# Patient Record
Sex: FEMALE | Race: WHITE | Employment: OTHER | URBAN - METROPOLITAN AREA
[De-identification: names, ages, dates, MRNs, and addresses within clinical notes are randomized per-mention and may not be internally consistent; named-entity substitution may affect disease eponyms.]

---

## 2020-05-12 ENCOUNTER — APPOINTMENT (OUTPATIENT)
Dept: GENERAL RADIOLOGY | Age: 25
End: 2020-05-12
Payer: COMMERCIAL

## 2020-05-12 ENCOUNTER — HOSPITAL ENCOUNTER (EMERGENCY)
Age: 25
Discharge: HOME | End: 2020-05-12
Attending: EMERGENCY MEDICINE
Payer: COMMERCIAL

## 2020-05-12 VITALS
RESPIRATION RATE: 13 BRPM | DIASTOLIC BLOOD PRESSURE: 84 MMHG | SYSTOLIC BLOOD PRESSURE: 136 MMHG | TEMPERATURE: 99.1 F | WEIGHT: 230 LBS | HEIGHT: 61 IN | OXYGEN SATURATION: 98 % | HEART RATE: 110 BPM | BODY MASS INDEX: 43.43 KG/M2

## 2020-05-12 PROCEDURE — 73562 X-RAY EXAM OF KNEE 3: CPT

## 2020-05-12 PROCEDURE — 99283 EMERGENCY DEPT VISIT LOW MDM: CPT

## 2020-05-12 ASSESSMENT — PAIN SCALES - GENERAL: PAINLEVEL_OUTOF10: 7

## 2020-05-12 NOTE — ED NOTES
Patient to Ed with c/o right knee pain for past week after kneeling on ground at work to move \"product\". Pain not improving although trying to rest, ice and taking Tylenol for pain. Patient was working today and had to kneel again and pain returned to point that patient had trouble walking and came to ED for evaluation.      Rebecca Ramirez RN  05/12/20 1484

## 2020-05-12 NOTE — ED PROVIDER NOTES
[facility] radiographic images are visualized and preliminarily interpreted by the emergency physician with the below findings:    Interpretation per the Radiologist below, ifavailable at the time of this note:    XR KNEE RIGHT (3 VIEWS)   Final Result   No acute fracture or dislocation. Possible chronic patellar tendinitis and   early degenerative change of patella and patellofemoral joint. ED BEDSIDE ULTRASOUND:   Performed by ED Physician - none    LABS:  Labs Reviewed - No data to display    All other labs were within normal range ornot returned as of this dictation. EMERGENCY DEPARTMENT COURSE and DIFFERENTIAL DIAGNOSIS/MDM:   Vitals:    Vitals:    05/12/20 0949   BP: (!) 164/81   Pulse: 124   Resp: 15   Temp: 99.1 °F (37.3 °C)   SpO2: 100%   Weight: 104.3 kg (230 lb)   Height: 5' 1\" (1.549 m)            X-rays reveal mild degenerative changes around the patella. Patient is provided with a knee immobilizer and is advised outpatient follow-up. She might need quadriceps strengthening exercises for long-term relief of symptoms. MDM    CONSULTS:  None    PROCEDURES:  Unlessotherwise noted below, none     Procedures    FINAL IMPRESSION      1. Strain of right knee, initial encounter          DISPOSITION/PLAN   DISPOSITION Decision To Discharge 05/12/2020 10:41:25 AM      PATIENT REFERRED TO:  [place] [address]  [phone]          DISCHARGE MEDICATIONS:         Problem List:  There is no problem list on file for this patient. Summation      Patient Course: Discharged. ED Medicationsadministered this visit:  Medications - No data to display    New Prescriptions from this visit:    New Prescriptions    No medications on file       Follow-up:  [place] [address]  [phone]            Final Impression:   1.  Strain of right knee, initial encounter               (Please note that portions of this

## 2020-05-19 ENCOUNTER — HOSPITAL ENCOUNTER (OUTPATIENT)
Age: 25
Setting detail: SPECIMEN
Discharge: HOME | End: 2020-05-19
Payer: COMMERCIAL

## 2020-05-19 ENCOUNTER — INITIAL PRENATAL (OUTPATIENT)
Dept: OBGYN CLINIC | Age: 25
End: 2020-05-19

## 2020-05-19 VITALS
BODY MASS INDEX: 41.54 KG/M2 | DIASTOLIC BLOOD PRESSURE: 60 MMHG | SYSTOLIC BLOOD PRESSURE: 120 MMHG | WEIGHT: 220 LBS | HEIGHT: 61 IN

## 2020-05-19 LAB
DIRECT EXAM: NORMAL
Lab: NORMAL
SPECIMEN DESCRIPTION: NORMAL

## 2020-05-19 PROCEDURE — 0500F INITIAL PRENATAL CARE VISIT: CPT | Performed by: NURSE PRACTITIONER

## 2020-05-19 RX ORDER — ACETAMINOPHEN 325 MG/1
650 TABLET ORAL EVERY 6 HOURS PRN
COMMUNITY
End: 2020-08-18

## 2020-05-19 NOTE — PROGRESS NOTES
OB History    Para Term  AB Living   1             SAB TAB Ectopic Molar Multiple Live Births                    # Outcome Date GA Lbr Zeferino/2nd Weight Sex Delivery Anes PTL Lv   1 Current               Harsha Joshi is being seen today for her new obstetrical visit. The pregnancy is not a planned pregnancy, but she states she is very happy. She is  accepting at this time. Her last period was Patient's last menstrual period was 2020. Celio Urrutia This was a sure and definite menses. She was not on OCP at conception. Gestation 13w0d  Estimated Date of Delivery: 20  Last PAP : has never had one      SO/Partner:   -Darinel Involved:  yes    Occupation:  [employer]      Pets:  dog    Teratogen exposure since LMP: (OTC/prescription/ETOH/drugs):  denies    History of prior GBS-infected child:  no  Recent travel outside of country (partner also):  no   COVID/Zika exposure (partner also): no  Any history of genetic or chromosomal aberations in herself the father to be or their respective families: FOB cousin with muscular dystrophy  Any histories of spina bifida or abdominal wall defects; omphalocele's or gastroschisis no  Hx Hypothyroidism: denies  Hx preeclampsia or HTN:  denies  Hx PPD: n/a, denies hx of anxiety or depression  Hx Diabetes: denies  Hx GDM: n/a  First degree relative with diabetes: mother       No Known Allergies  Current Outpatient Medications   Medication Sig Dispense Refill    Prenatal Vit-Fe Fumarate-FA (PRENATAL VITAMIN PO) Take by mouth       No current facility-administered medications for this visit. No past medical history on file. No past surgical history on file. Swelling: no  Bleeding: no  Discharge: no   Dysuria: no  Nausea: yes -  encouraged small frequent meals, and vitamin B6 and unisom if needed. Heartburn: no  Epigastric pain: no       Office protocol reviewed, After hours number provided.    Diet and exercise reveiwed  Warning signs reviewed  Testing reviewed     Q&A  Refer no- declined  for 1st trimester screen, info provided for screening, Discussed dates and orders for Anatomy USd and fetal echo if indicated. Breastfeeding education. She verbally consented to HIV testing and drug screening. She was counseled on Toxoplasmosis/Listeriosis (cats/raw meat). Prenatal Vitamins recommended. Pelvic exam completed PAP and cultures obtained and sent. Prenatal labs and dating us ordered. .     RV prn/ 4 weeks     Of the 30 minute duration appointment visit, Samantha Wheatley CNP spent at least 50% of the face-to-face time in counseling, explanation of diagnosis, planning of further management, and answering all questions.

## 2020-05-19 NOTE — PATIENT INSTRUCTIONS
[employer], [facility] disclaims any warranty or liability for your use of this information. Nutrition During Pregnancy: Care Instructions  Your Care Instructions    Healthy eating when you are pregnant is important for you and your baby. It can help you feel well and have a successful pregnancy and delivery. During pregnancy your nutrition needs increase. Even if you have excellent eating habits, your doctor may recommend a multivitamin to make sure you get enough iron and folic acid. Many pregnant women wonder how much weight they should gain. In general, women who were at a healthy weight before they became pregnant should gain between 25 and 35 pounds. Women who were overweight before pregnancy are usually advised to gain 15 to 25 pounds. Women who were underweight before pregnancy are usually advised to gain 28 to 40 pounds. Your doctor will work with you to set a weight goal that is right for you. Gaining a healthy amount of weight helps you have a healthy baby. Follow-up care is a key part of your treatment and safety. Be sure to make and go to all appointments, and call your doctor if you are having problems. It's also a good idea to know your test results and keep a list of the medicines you take. How can you care for yourself at home? · Eat plenty of fruits and vegetables. Include a variety of orange, yellow, and leafy dark-green vegetables every day. · Choose whole-grain bread, cereal, and pasta. Good choices include whole wheat bread, whole wheat pasta, brown rice, and oatmeal.  · Get 4 or more servings of milk and milk products each day. Good choices include nonfat or low-fat milk, yogurt, and cheese. If you cannot eat milk products, you can get calcium from calcium-fortified products such as orange juice, soy milk, and tofu. Other non-milk sources of calcium include leafy green vegetables, such as broccoli, kale, mustard greens, turnip greens, bok maria luisa, and brussels sprouts.   · If you Incorporated disclaims any warranty or liability for your use of this information. Managing Morning Sickness: Care Instructions  Your Care Instructions    For many women, the toughest part of early pregnancy is morning sickness. Morning sickness can range from mild nausea to severe nausea with bouts of vomiting. Symptoms may be worse in the morning, although they can strike at any time of the day or night. If you have nausea, vomiting, or both, look for safe measures that can bring you relief. You can take simple steps at home to manage morning sickness. These steps include changing what and when you eat and avoiding certain foods and smells. Some women find that acupuncture and acupressure wristbands also help. Follow-up care is a key part of your treatment and safety. Be sure to make and go to all appointments, and call your doctor if you are having problems. It's also a good idea to know your test results and keep a list of the medicines you take. How can you care for yourself at home? · Keep food in your stomach, but not too much at once. Your nausea may be worse if your stomach is empty. Eat five or six small meals a day instead of three large meals. · For morning nausea, eat a small snack, such as a couple of crackers or dry biscuits, before rising. Allow a few minutes for your stomach to settle before you get out of bed slowly. · Drink plenty of fluids, enough so that your urine is light yellow or clear like water. If you have kidney, heart, or liver disease and have to limit fluids, talk with your doctor before you increase the amount of fluids you drink. Some women find that peppermint tea helps with nausea. · Eat more protein, such as chicken, fish, lean meat, beans, nuts, and seeds. · Eat carbohydrate foods, such as potatoes, whole-grain cereals, rice, and pasta. · Avoid smells and foods that make you feel nauseated.  Spicy or high-fat foods, citrus juice, milk, coffee, and tea with hand from supporting your breast and bring it under your baby to cradle him or her. Now just relax and breastfeed your baby. · You will know that your baby is feeding well when:  ? His or her mouth covers a lot of the areola, and the lips are flared out.  ? His or her chin and nose rest against your breast.  ? Sucking is deep and rhythmic, with short pauses. ? You are able to see and hear your baby swallowing. ? You do not feel pain in your nipple. · Offer both breasts to your baby at each feeding. Each time you breastfeed, switch which breast you start with. · Anytime you need to remove your baby from the breast, put one finger in the corner of his or her mouth. Push your finger between your baby's gums to gently break the seal. If you do not break the tight seal before you remove your baby, your nipples can become sore, cracked, or bruised. · After feeding your baby, gently pat his or her back to let out any swallowed air. After your baby burps, offer the breast again, or offer the other breast. Sometimes a baby will want to keep feeding after being burped. When should you call for help? Call your doctor now or seek immediate medical care if:    · You have symptoms of a breast infection, such as:  ? Increased pain, swelling, redness, or warmth around a breast.  ? Red streaks extending from the breast.  ? Pus draining from a breast.  ? A fever. · Your baby has no wet diapers for 6 hours. Watch closely for changes in your health, and be sure to contact your doctor if:    · Your baby has trouble latching on to your breast.     · You continue to have pain or discomfort when breastfeeding. · You have other questions or concerns. Where can you learn more? Go to https://InStore Audio NetworkgauravMempile.UpMo. org and sign in to your Delivered account. Enter P492 in the EdRover box to learn more about \"Breastfeeding: Care Instructions. \"     If you do not have an account, please click on the \"Sign Up Now\" link. Current as of: September 5, 2018  Content Version: 12.0  © 8569-9446 [employer]. Care instructions adapted under license by [facility] ([facility]). If you have questions about a medical condition or this instruction, always ask your healthcare professional. [address] any warranty or liability for your use of this information. Learning About Birth Defects Testing  What is birth defects testing? Birth defects testing is done during pregnancy to look for possible problems with the baby (fetus). A birth defect may have only a minor impact on a child's life. Or it can have a major effect on quality or length of life. You and your doctor can choose from many tests. You may have no tests, one test, or many tests. Talking with a genetic counselor may help you make decisions about testing. The counselor is trained to help you understand these tests. He or she can also help you find resources for support. What are the types of tests? You may have a screening test or a diagnostic test. Or you may have both types of tests. Screening tests show the chance that a baby has a certain birth defect, such as Down syndrome, spina bifida, or trisomy 25. Diagnostic tests show if a baby has a certain birth defect. · Screening tests and when they are done:  ? Blood tests at 10 to 13 weeks (first trimester)  ? Cell free fetal DNA test at 10 weeks or later (first trimester)  ? Nuchal translucency test at 11 to 14 weeks (first trimester)  ? Triple or quad screening at 15 to 20 weeks (second trimester)  ? Ultrasound at 18 to 20 weeks (second trimester)  · Diagnostic tests and when they are done:  ? Chorionic villus sampling (CVS) at 10 to 13 weeks (first trimester)  ? Amniocentesis at 13 to 20 weeks (second trimester)  In some cases, the doctors look at the combined screenings that you've had over a period of time. This is called an integrated screening.   What are the screening

## 2020-05-21 LAB
CHLAMYDIA BY THIN PREP: NEGATIVE
N. GONORRHOEAE DNA, THIN PREP: NEGATIVE
SPECIMEN DESCRIPTION: NORMAL

## 2020-05-23 ENCOUNTER — PATIENT MESSAGE (OUTPATIENT)
Dept: OBGYN CLINIC | Age: 25
End: 2020-05-23

## 2020-05-26 ENCOUNTER — PATIENT MESSAGE (OUTPATIENT)
Dept: OBGYN CLINIC | Age: 25
End: 2020-05-26

## 2020-05-26 LAB — CYTOLOGY REPORT: NORMAL

## 2020-05-26 NOTE — TELEPHONE ENCOUNTER
From: Christofertine Nose  To: FATEMEH Oliver CNP  Sent: 5/26/2020 3:07 PM EDT  Subject: Non-Urgent Medical Question    Luc Stokesen,    For the blood work should I try and block out a chunk of my day to make sure I get all of it done? And the fax number for Anuj Dave is [phone] to send the work restrictions. Thanks Presley Chapman!      ----- Message -----   From:FATEMEH Wells CNP   Sent:5/26/2020 12:48 PM EDT   To:Kathy Coyne   Subject:RE: Non-Urgent Medical Question    Luc Mcallisterah    I am so happy that you felt comfortably we want to make sure you always feel that way! Don't hesitate to let us know if you need anything!!    1. Yes there is blood work it is the system if you go to any [facility] lab and I would recommend getting that done as soon as you are able. You just go to [facility] lab and tell them you have orders in system for labs. 2. In regards to work restrictions we recommend not consistently lifting over 25 pounds. . Do you want us to fax a letter to your employer with the restrictions? If so send us their information and the staff will fax it. Make sure you stay well hydrated at work also. Let us know if you need anything else! Ta Noonan CNP       ----- Message -----   From:Kathy Coyne   Sent:5/23/2020 11:51 AM EDT   To:FATEMEH Grimaldo CNP   Subject:Non-Urgent Medical Question    Presley Chapman, thank you for a visit that I thought was going to be awkward not be so awkward. You made me feel comfortable. So I have two questions. 1. Didnt you say there was bloodwork I had to get done and when did you want me to complete that by? 2. Also, can I also get the work restrictions that we were talking about last appointment? I know I said I probably didnt need it but I just want it Incase there is any issues with my employer.

## 2020-05-26 NOTE — LETTER
[facility] [address]  Phone: [phone]  Fax: [phone]    FATEMEH Schilling CNP        May 26, 2020     Patient: Alto Gilford   YOB: 1995   Date of Visit: 5/26/2020       To Whom It May Concern: It is my medical opinion that Alto Gilford is able to work with lifting restrictions of 25lbs or less. If you have any questions or concerns, please don't hesitate to call.     Sincerely,        FATEMEH Schilling CNP

## 2020-06-01 ENCOUNTER — HOSPITAL ENCOUNTER (OUTPATIENT)
Age: 25
Setting detail: SPECIMEN
Discharge: HOME | End: 2020-06-01
Payer: COMMERCIAL

## 2020-06-01 LAB
ABO/RH: NORMAL
ABSOLUTE EOS #: 0.03 K/UL (ref 0–0.44)
ABSOLUTE IMMATURE GRANULOCYTE: 0.09 K/UL (ref 0–0.3)
ABSOLUTE LYMPH #: 1.55 K/UL (ref 1.1–3.7)
ABSOLUTE MONO #: 0.67 K/UL (ref 0.1–1.2)
ANTIBODY SCREEN: NEGATIVE
BASOPHILS # BLD: 0 % (ref 0–2)
BASOPHILS ABSOLUTE: 0.03 K/UL (ref 0–0.2)
DIFFERENTIAL TYPE: ABNORMAL
EOSINOPHILS RELATIVE PERCENT: 0 % (ref 1–4)
GLUCOSE ADMINISTRATION: ABNORMAL
GLUCOSE TOLERANCE SCREEN 50G: 183 MG/DL (ref 70–135)
HCG QUANTITATIVE: ABNORMAL IU/L
HCT VFR BLD CALC: 34.6 % (ref 36.3–47.1)
HEMOGLOBIN: 11.5 G/DL (ref 11.9–15.1)
HEPATITIS B SURFACE ANTIGEN: NONREACTIVE
HIV AG/AB: NONREACTIVE
IMMATURE GRANULOCYTES: 1 %
LYMPHOCYTES # BLD: 10 % (ref 24–43)
MCH RBC QN AUTO: 28.6 PG (ref 25.2–33.5)
MCHC RBC AUTO-ENTMCNC: 33.2 G/DL (ref 28.4–34.8)
MCV RBC AUTO: 86.1 FL (ref 82.6–102.9)
MONOCYTES # BLD: 4 % (ref 3–12)
NRBC AUTOMATED: 0 PER 100 WBC
PDW BLD-RTO: 11.9 % (ref 11.8–14.4)
PLATELET # BLD: 336 K/UL (ref 138–453)
PLATELET ESTIMATE: ABNORMAL
PMV BLD AUTO: 11.8 FL (ref 8.1–13.5)
RBC # BLD: 4.02 M/UL (ref 3.95–5.11)
RBC # BLD: ABNORMAL 10*6/UL
RUBV IGG SER QL: 57.3 IU/ML
SEG NEUTROPHILS: 85 % (ref 36–65)
SEGMENTED NEUTROPHILS ABSOLUTE COUNT: 12.79 K/UL (ref 1.5–8.1)
T. PALLIDUM, IGG: NONREACTIVE
TSH SERPL DL<=0.05 MIU/L-ACNC: 1.37 MIU/L (ref 0.3–5)
WBC # BLD: 15.2 K/UL (ref 3.5–11.3)
WBC # BLD: ABNORMAL 10*3/UL

## 2020-06-02 ENCOUNTER — TELEPHONE (OUTPATIENT)
Dept: OBGYN CLINIC | Age: 25
End: 2020-06-02

## 2020-06-02 PROBLEM — O99.810 ABNORMAL GLUCOSE TOLERANCE TEST IN PREGNANCY: Status: ACTIVE | Noted: 2020-06-02

## 2020-06-08 ENCOUNTER — OFFICE VISIT (OUTPATIENT)
Dept: PRIMARY CARE CLINIC | Age: 25
End: 2020-06-08
Payer: COMMERCIAL

## 2020-06-08 VITALS
BODY MASS INDEX: 40.7 KG/M2 | WEIGHT: 215.6 LBS | RESPIRATION RATE: 16 BRPM | OXYGEN SATURATION: 98 % | SYSTOLIC BLOOD PRESSURE: 130 MMHG | HEART RATE: 130 BPM | DIASTOLIC BLOOD PRESSURE: 88 MMHG | HEIGHT: 61 IN

## 2020-06-08 PROBLEM — Z83.3 FAMILY HISTORY OF DIABETES MELLITUS: Status: ACTIVE | Noted: 2020-06-08

## 2020-06-08 LAB — CYSTIC FIBROSIS: NORMAL

## 2020-06-08 PROCEDURE — 99385 PREV VISIT NEW AGE 18-39: CPT | Performed by: NURSE PRACTITIONER

## 2020-06-08 ASSESSMENT — ENCOUNTER SYMPTOMS
ABDOMINAL PAIN: 0
SORE THROAT: 0
VOMITING: 0
NAUSEA: 0
COLOR CHANGE: 0
RHINORRHEA: 0
DIARRHEA: 0
SHORTNESS OF BREATH: 0
CHEST TIGHTNESS: 0

## 2020-06-08 ASSESSMENT — PATIENT HEALTH QUESTIONNAIRE - PHQ9
SUM OF ALL RESPONSES TO PHQ QUESTIONS 1-9: 0
SUM OF ALL RESPONSES TO PHQ9 QUESTIONS 1 & 2: 0
2. FEELING DOWN, DEPRESSED OR HOPELESS: 0
1. LITTLE INTEREST OR PLEASURE IN DOING THINGS: 0
SUM OF ALL RESPONSES TO PHQ QUESTIONS 1-9: 0

## 2020-06-08 NOTE — PROGRESS NOTES
instructions. Discussed use, benefit, and side effects of prescribed medications. All patient questions answered. Pt voiced understanding. Reviewed health maintenance. Instructed to continue current medications, diet and exercise. Patient agreed with treatment plan. Follow up as directed.        Electronicallysigned by FATEMEH Cardona CNP on 6/8/2020 at 2:38 PM

## 2020-06-10 ENCOUNTER — HOSPITAL ENCOUNTER (OUTPATIENT)
Age: 25
Setting detail: SPECIMEN
Discharge: HOME | End: 2020-06-10
Payer: COMMERCIAL

## 2020-06-10 LAB
AMOUNT GLUCOSE GIVEN: 100 G
GLUCOSE FASTING: 86 MG/DL (ref 65–99)
GLUCOSE TOLERANCE TEST 1 HOUR: 224 MG/DL (ref 65–184)
GLUCOSE TOLERANCE TEST 2 HOUR: 187 MG/DL (ref 65–139)
GLUCOSE TOLERANCE TEST 3 HOUR: 164 MG/DL (ref 65–130)

## 2020-06-11 ENCOUNTER — TELEPHONE (OUTPATIENT)
Dept: OBGYN CLINIC | Age: 25
End: 2020-06-11

## 2020-06-11 ENCOUNTER — TELEPHONE (OUTPATIENT)
Dept: PERINATAL CARE | Age: 25
End: 2020-06-11

## 2020-06-12 ENCOUNTER — PATIENT MESSAGE (OUTPATIENT)
Dept: OBGYN CLINIC | Age: 25
End: 2020-06-12

## 2020-06-15 ENCOUNTER — HOSPITAL ENCOUNTER (OUTPATIENT)
Dept: DIABETES SERVICES | Age: 25
Setting detail: THERAPIES SERIES
Discharge: HOME | End: 2020-06-15
Payer: COMMERCIAL

## 2020-06-15 PROCEDURE — G0108 DIAB MANAGE TRN  PER INDIV: HCPCS

## 2020-06-16 ENCOUNTER — ROUTINE PRENATAL (OUTPATIENT)
Dept: OBGYN CLINIC | Age: 25
End: 2020-06-16

## 2020-06-16 ENCOUNTER — HOSPITAL ENCOUNTER (OUTPATIENT)
Dept: MRI IMAGING | Facility: CLINIC | Age: 25
Discharge: HOME | End: 2020-06-18
Payer: COMMERCIAL

## 2020-06-16 VITALS
TEMPERATURE: 98.2 F | DIASTOLIC BLOOD PRESSURE: 72 MMHG | WEIGHT: 208.38 LBS | SYSTOLIC BLOOD PRESSURE: 122 MMHG | BODY MASS INDEX: 39.05 KG/M2

## 2020-06-16 PROCEDURE — 0502F SUBSEQUENT PRENATAL CARE: CPT | Performed by: OBSTETRICS & GYNECOLOGY

## 2020-06-16 PROCEDURE — 73721 MRI JNT OF LWR EXTRE W/O DYE: CPT

## 2020-06-16 PROCEDURE — 73718 MRI LOWER EXTREMITY W/O DYE: CPT

## 2020-06-16 NOTE — PROGRESS NOTES
Occupational Health NP, Sarah Leslie calls, pt with abnormal MRI right femur, questionable sarcoma, less likely large hematoma with absence of major trauma per radiologist.  Referral faxed to [facility] Dr. Manuel Fong, Orthopedic Oncology. Pt notified.

## 2020-06-17 ENCOUNTER — TELEPHONE (OUTPATIENT)
Dept: OBGYN CLINIC | Age: 25
End: 2020-06-17

## 2020-06-17 RX ORDER — HYDROCODONE BITARTRATE AND ACETAMINOPHEN 5; 325 MG/1; MG/1
1 TABLET ORAL EVERY 4 HOURS PRN
Qty: 30 TABLET | Refills: 0 | Status: SHIPPED | OUTPATIENT
Start: 2020-06-17 | End: 2020-06-22

## 2020-06-17 NOTE — TELEPHONE ENCOUNTER
Patient has not taken either one of these medications before, but she is wiling to try. [employer] in [place]

## 2020-06-22 ENCOUNTER — HOSPITAL ENCOUNTER (OUTPATIENT)
Age: 25
Setting detail: SPECIMEN
Discharge: HOME | End: 2020-06-22
Payer: COMMERCIAL

## 2020-06-22 LAB
C-REACTIVE PROTEIN: 273 MG/L (ref 0–5)
INR BLD: 1
PARTIAL THROMBOPLASTIN TIME: 25.1 SEC (ref 20.5–30.5)
PROTHROMBIN TIME: 10.1 SEC (ref 9–12)
SEDIMENTATION RATE, ERYTHROCYTE: 56 MM (ref 0–20)

## 2020-06-22 RX ORDER — HYDROCODONE BITARTRATE AND ACETAMINOPHEN 5; 325 MG/1; MG/1
1 TABLET ORAL EVERY 8 HOURS PRN
Qty: 12 TABLET | Refills: 0 | Status: SHIPPED | OUTPATIENT
Start: 2020-06-23 | End: 2020-06-28

## 2020-06-22 RX ORDER — HYDROCODONE BITARTRATE AND ACETAMINOPHEN 5; 325 MG/1; MG/1
1 TABLET ORAL EVERY 4 HOURS PRN
Qty: 30 TABLET | Refills: 0 | Status: CANCELLED | OUTPATIENT
Start: 2020-06-22 | End: 2020-06-27

## 2020-06-22 RX ORDER — HYDROCODONE BITARTRATE AND ACETAMINOPHEN 5; 325 MG/1; MG/1
1 TABLET ORAL EVERY 8 HOURS PRN
Qty: 12 TABLET | Refills: 0 | Status: SHIPPED | OUTPATIENT
Start: 2020-06-23 | End: 2020-06-22 | Stop reason: CLARIF

## 2020-06-23 ENCOUNTER — TELEPHONE (OUTPATIENT)
Dept: PRIMARY CARE CLINIC | Age: 25
End: 2020-06-23

## 2020-06-23 LAB
ABSOLUTE EOS #: 0 K/UL (ref 0–0.4)
ABSOLUTE IMMATURE GRANULOCYTE: 0.38 K/UL (ref 0–0.3)
ABSOLUTE LYMPH #: 1.5 K/UL (ref 1–4.8)
ABSOLUTE MONO #: 2.63 K/UL (ref 0.1–0.8)
BASOPHILS # BLD: 0 % (ref 0–2)
BASOPHILS ABSOLUTE: 0 K/UL (ref 0–0.2)
DIFFERENTIAL TYPE: ABNORMAL
EOSINOPHILS RELATIVE PERCENT: 0 % (ref 1–4)
HCT VFR BLD CALC: 30.5 % (ref 36.3–47.1)
HEMOGLOBIN: 9.3 G/DL (ref 11.9–15.1)
IMMATURE GRANULOCYTES: 1 %
LYMPHOCYTES # BLD: 4 % (ref 24–44)
MCH RBC QN AUTO: 27 PG (ref 25.2–33.5)
MCHC RBC AUTO-ENTMCNC: 30.5 G/DL (ref 28.4–34.8)
MCV RBC AUTO: 88.7 FL (ref 82.6–102.9)
MONOCYTES # BLD: 7 % (ref 1–7)
MORPHOLOGY: NORMAL
NRBC AUTOMATED: 0.1 PER 100 WBC
PDW BLD-RTO: 12.2 % (ref 11.8–14.4)
PLATELET # BLD: 552 K/UL (ref 138–453)
PLATELET ESTIMATE: ABNORMAL
PMV BLD AUTO: 10.4 FL (ref 8.1–13.5)
RBC # BLD: 3.44 M/UL (ref 3.95–5.11)
RBC # BLD: ABNORMAL 10*6/UL
SEG NEUTROPHILS: 88 % (ref 36–66)
SEGMENTED NEUTROPHILS ABSOLUTE COUNT: 33.09 K/UL (ref 1.8–7.7)
WBC # BLD: 37.6 K/UL (ref 3.5–11.3)
WBC # BLD: ABNORMAL 10*3/UL

## 2020-06-24 ENCOUNTER — HOSPITAL ENCOUNTER (OUTPATIENT)
Dept: DIABETES SERVICES | Age: 25
Setting detail: THERAPIES SERIES
Discharge: HOME | End: 2020-06-24
Payer: COMMERCIAL

## 2020-06-24 PROCEDURE — 97802 MEDICAL NUTRITION INDIV IN: CPT

## 2020-06-24 NOTE — PROGRESS NOTES
Gestational Diabetes Education Progress Note - PHONE  This visit is a TELEPHONE encounter (During MQTCB-13 public health emergency). Pursuant to the emergency declaration under the [facility], [address] [place] [address] authority and the [employer] Act, this TELEPHONE Visit was conducted with patient's (and/or legal guardian's) consent, to reduce the patient's risk of exposure to COVID-19 and provide necessary medical care. The patient (and/or legal guardian) has also been advised to contact this office for worsening conditions or problems, and seek emergency medical treatment and/or call 911 if deemed necessary. Patient identification was verified at the start of the visit: Yes    Total time spent for this encounter: 1 hour   6/24/20 JW session begun on doxy. me but switched to phone as sound went. Doxy 15 mins, phone 30 total 45 mins. Services were provided through a video synchronous discussion virtually to substitute for in-person clinic visit. Patient and provider were located at their individual home/office. Assessment of learning needs and self care was done - see Gestational Diabetes Screening  This is new for patient, both pregnancy and GDM. Family Hx of DM on mom's side and mom does take Metformin and insulin.  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  and family supportive.           Teaching provided at this session included:   _X__ Definition of gestational diabetes    _X_ Risk factors   _X__ Effects on pregnancy       _ X_ Management   _X__ Self-monitoring of blood sugar (FBS and 2 hrs postprandial)   _X__ Blood sugar targets FBS 65-90 and <120 2 hrs postprandial)   _X__  Role of Insulins and use of injection methods  _x__ Pen   __x_ Vonita Bence    _x___Hyperglycemia  _x__ Hypoglycemia and treatment                X       Exercise  Recently injured knee at work and now having pain going up and down ladders at work and walking on cement. Does have consult for PT but nothing set up yet,,filed for workman's comp but nothing started yet. Having some stress over all this. 6/24/20 JW found to have a mass on thigh and to see a specialist re: Pt is in extreme pain and taking pain medicines around the clock. At time of call, pt in bed. Only getting up to use the bathroom. Meal planning:   _X_  Avoid fruit juice and sugary beverages. recently switched to diet 7 up and will put [employer] flavor packets in water              _X_  Aim to eat small frequent meals and snacks. (ie., 3 + 3)                _X__  Eat balanced meals according to divided dinner plate [id number]/14/62 JW pt's spouse, family and friends doing her meals and very supportive. Discussed basics of carb counting to help pt and others with putting meals together. 30,15.45-60,15,45-60,15 grams carb per meal/snack. Discussed constipation and handling if happens (d/t pain med, hormones and if limits water b/c of not wanting to move). Planned follow-up:    x__  Another appointment /  phone call support has been scheduled for RD on June 24 at 3:30p. Pt will try video. _X_ Patient is to report blood glucose test results to physician as directed by  prescribing physician. 6/24/20 JW pt had positive attitude during the call and appreciative of the session. Resource materials provided via mail prior to call includes:  Mailed out packet today 6/15/20 and referred to [facility] resources included. Gestational Diabetes Mellitus ( GDM) toolkit form [place] gestational diabetes postpartum care learning collaborative 2018.    Gestational diabetes handout from [facility] jan 2016  \"Simple Guidelines for meal planning with gestational diabetes\" handout 3 meals and 3 snacks  SMBG sheets to fax back to [facility] weekly  BD  healthy injection site selection and rotation with 6 mm insulin syringe and 4 mm pen needle  Did you have gestational diabetes when you were pregnant? Handout from [facility]  April 2014      Patient has set goal for practice of diabetes self management :  SMBG- check fasting and 2 hours PP, eat 3 meals and 3 snacks/day, avoid sugary beverages. Patient does not have use of a home BG meter and has not started to test BG. Pt informed order was called into [facility] on June 11 but patient has not heard anything. Recommended pt call [facility] but may need to call insurance, [employer] to find out where she may need to get meter and supplies. Discussed results of 3hGTT and importance of getting meter as soon as possible to monitor BG. Patient stated understanding of role of insulin in care of gestational diabetes and if needs support to start to use insulin follow up care will be planned.     Mushtaq Trinidad RD, LD

## 2020-06-25 ENCOUNTER — PATIENT MESSAGE (OUTPATIENT)
Dept: OBGYN CLINIC | Age: 25
End: 2020-06-25

## 2020-07-02 ENCOUNTER — TELEPHONE (OUTPATIENT)
Dept: PRIMARY CARE CLINIC | Age: 25
End: 2020-07-02

## 2020-07-17 ENCOUNTER — TELEPHONE (OUTPATIENT)
Dept: PRIMARY CARE CLINIC | Age: 25
End: 2020-07-17

## 2020-07-17 PROBLEM — D50.9 IRON DEFICIENCY ANEMIA: Status: ACTIVE | Noted: 2020-07-02

## 2020-07-17 PROBLEM — D75.839 THROMBOCYTOSIS: Status: ACTIVE | Noted: 2020-06-30

## 2020-07-17 PROBLEM — O10.919 CHRONIC HYPERTENSION AFFECTING PREGNANCY: Status: ACTIVE | Noted: 2020-07-11

## 2020-07-17 PROBLEM — C41.9 SARCOMA OF BONE (HCC): Status: ACTIVE | Noted: 2020-07-07

## 2020-07-17 PROBLEM — O24.419 GESTATIONAL DIABETES: Status: ACTIVE | Noted: 2020-06-30

## 2020-07-17 PROBLEM — R74.01 TRANSAMINITIS: Status: ACTIVE | Noted: 2020-07-11

## 2020-07-17 RX ORDER — POLYETHYLENE GLYCOL 3350 17 G/17G
17 POWDER, FOR SOLUTION ORAL 2 TIMES DAILY PRN
Qty: 1020 G | Refills: 1 | Status: SHIPPED | OUTPATIENT
Start: 2020-07-17 | End: 2020-09-15

## 2020-07-17 RX ORDER — OXYCODONE HYDROCHLORIDE 15 MG/1
15 TABLET ORAL EVERY 4 HOURS PRN
Qty: 15 TABLET | Refills: 0 | Status: SHIPPED | OUTPATIENT
Start: 2020-07-17 | End: 2020-07-21

## 2020-07-17 RX ORDER — OXYCODONE HYDROCHLORIDE 15 MG/1
15 TABLET ORAL EVERY 4 HOURS PRN
COMMUNITY
Start: 2020-07-06 | End: 2020-07-21 | Stop reason: SDUPTHER

## 2020-07-17 NOTE — TELEPHONE ENCOUNTER
Patient called, she was dc from [facility] with rx for oxycodone 15mg 1 po q 4hrs prn pain. They advised her if she was still in pain to contact pcp for refill. Please advise due to her unique circumstance if you are willing to rx this for patient.     Zachary Welsh

## 2020-07-21 RX ORDER — OXYCODONE HYDROCHLORIDE 10 MG/1
10 TABLET ORAL EVERY 4 HOURS PRN
Qty: 60 TABLET | Refills: 0 | Status: ON HOLD | OUTPATIENT
Start: 2020-07-21 | End: 2020-08-10 | Stop reason: HOSPADM

## 2020-07-21 NOTE — TELEPHONE ENCOUNTER
Patient requesting refill of oxycodone. Patient states she is having surgery next Thursday 7/30 and needs the medication to last her until then. Advised patient it may be too soon to fill since the last script looks like it was sent on 7/6 and she would not be due to refill until 8/6. Please advise.

## 2020-07-22 ENCOUNTER — CARE COORDINATION (OUTPATIENT)
Dept: OTHER | Facility: CLINIC | Age: 25
End: 2020-07-22

## 2020-07-22 NOTE — CARE COORDINATION
ACM attempted to reach patient for ACM call. HIPAA compliant message left requesting a return phone call at patients convenience. Will continue to follow.

## 2020-07-23 ENCOUNTER — CARE COORDINATION (OUTPATIENT)
Dept: OTHER | Facility: CLINIC | Age: 25
End: 2020-07-23

## 2020-07-23 NOTE — CARE COORDINATION
ACM attempted to reach patient for ACM call. HIPAA compliant message left requesting a return phone call at patients convenience. Will continue to follow. Unable to reach letter sent out to this patient. Will continue to monitor.

## 2020-07-31 ENCOUNTER — HOSPITAL ENCOUNTER (INPATIENT)
Age: 25
LOS: 10 days | Discharge: HOME | DRG: 560 | End: 2020-08-10
Attending: PHYSICAL MEDICINE & REHABILITATION | Admitting: PHYSICAL MEDICINE & REHABILITATION
Payer: COMMERCIAL

## 2020-07-31 PROBLEM — D72.829 LEUKOCYTOSIS: Status: ACTIVE | Noted: 2020-07-31

## 2020-07-31 PROBLEM — Z89.621: Status: ACTIVE | Noted: 2020-07-31

## 2020-07-31 PROCEDURE — 6370000000 HC RX 637 (ALT 250 FOR IP): Performed by: PHYSICAL MEDICINE & REHABILITATION

## 2020-07-31 PROCEDURE — 82947 ASSAY GLUCOSE BLOOD QUANT: CPT

## 2020-07-31 PROCEDURE — 1180000000 HC REHAB R&B

## 2020-07-31 RX ORDER — SWAB
1 SWAB, NON-MEDICATED MISCELLANEOUS DAILY
Status: DISCONTINUED | OUTPATIENT
Start: 2020-08-01 | End: 2020-08-10 | Stop reason: HOSPADM

## 2020-07-31 RX ORDER — BISACODYL 10 MG
10 SUPPOSITORY, RECTAL RECTAL DAILY PRN
Status: DISCONTINUED | OUTPATIENT
Start: 2020-07-31 | End: 2020-08-10 | Stop reason: HOSPADM

## 2020-07-31 RX ORDER — CYCLOBENZAPRINE HCL 10 MG
10 TABLET ORAL 3 TIMES DAILY PRN
Status: DISCONTINUED | OUTPATIENT
Start: 2020-07-31 | End: 2020-08-10 | Stop reason: HOSPADM

## 2020-07-31 RX ORDER — ACETAMINOPHEN 325 MG/1
650 TABLET ORAL EVERY 4 HOURS PRN
Status: DISCONTINUED | OUTPATIENT
Start: 2020-07-31 | End: 2020-08-10 | Stop reason: HOSPADM

## 2020-07-31 RX ORDER — OXYCODONE HYDROCHLORIDE 5 MG/1
10 TABLET ORAL EVERY 4 HOURS PRN
Status: DISCONTINUED | OUTPATIENT
Start: 2020-07-31 | End: 2020-08-10 | Stop reason: HOSPADM

## 2020-07-31 RX ORDER — POLYETHYLENE GLYCOL 3350 17 G/17G
17 POWDER, FOR SOLUTION ORAL DAILY
Status: DISCONTINUED | OUTPATIENT
Start: 2020-08-01 | End: 2020-08-10 | Stop reason: HOSPADM

## 2020-07-31 RX ORDER — OXYCODONE HYDROCHLORIDE 5 MG/1
5 TABLET ORAL EVERY 4 HOURS PRN
Status: DISCONTINUED | OUTPATIENT
Start: 2020-07-31 | End: 2020-08-10 | Stop reason: HOSPADM

## 2020-07-31 RX ORDER — SENNA PLUS 8.6 MG/1
2 TABLET ORAL DAILY PRN
Status: DISCONTINUED | OUTPATIENT
Start: 2020-07-31 | End: 2020-08-07 | Stop reason: CLARIF

## 2020-07-31 RX ORDER — GABAPENTIN 100 MG/1
200 CAPSULE ORAL 3 TIMES DAILY
Status: DISCONTINUED | OUTPATIENT
Start: 2020-07-31 | End: 2020-08-02

## 2020-07-31 RX ADMIN — INSULIN HUMAN 8 UNITS: 100 INJECTION, SUSPENSION SUBCUTANEOUS at 23:30

## 2020-07-31 RX ADMIN — GABAPENTIN 200 MG: 100 CAPSULE ORAL at 23:18

## 2020-07-31 RX ADMIN — OXYCODONE HYDROCHLORIDE 5 MG: 5 TABLET ORAL at 23:18

## 2020-07-31 ASSESSMENT — PAIN DESCRIPTION - ONSET: ONSET: ON-GOING

## 2020-07-31 ASSESSMENT — PAIN DESCRIPTION - FREQUENCY: FREQUENCY: CONTINUOUS

## 2020-07-31 ASSESSMENT — PAIN DESCRIPTION - ORIENTATION: ORIENTATION: RIGHT

## 2020-07-31 ASSESSMENT — PAIN DESCRIPTION - LOCATION: LOCATION: LEG

## 2020-07-31 ASSESSMENT — PAIN DESCRIPTION - DESCRIPTORS: DESCRIPTORS: NUMBNESS;TINGLING;BURNING

## 2020-07-31 ASSESSMENT — PAIN DESCRIPTION - PAIN TYPE: TYPE: SURGICAL PAIN;PHANTOM PAIN

## 2020-07-31 ASSESSMENT — PAIN SCALES - GENERAL
PAINLEVEL_OUTOF10: 0
PAINLEVEL_OUTOF10: 3

## 2020-07-31 NOTE — PROGRESS NOTES
Rcv'd approval for ARU from [facility] via reviewLink. Bria Rubio CM @ . Martina Martinez states pt is medically ready today, and is agreeable to fax pt's admitting orders with continuation of DVT prophylaxis. Admission Assessment completed and Dr Deondre Metz notified. Writer provided Martina Martinez with contact info for report. Verified pt's benefits for ARU with Dejah @ [facility] which are as follows:  90/10 after $1000 deductible.

## 2020-07-31 NOTE — PROGRESS NOTES
[address]  Acute Inpatient Rehab Preadmission Assessment    Patient Name: Miranda Becker        MRN: <[id number]>    : 1995  (22 y.o.)  Gender: female     Admitted from:   [facility]  [][facility]   [][facility]   [x]Outside Admission - Location:                                  [x]Initial         []Updated    Date of Onset / admission to the acute hospital:  20    Impairment group:  5.3 R AKA    Did patient have surgery? []No  [x]Yes:    Right Hip Disarticulation    Physicians: Sirisha Anderson, [address] [place] [address] for clinical complications:  High    Co-morbidities:  23 weeks gestation, Gestational DM, recent dx of Sarcoma R BLE, Anemia, Leukocytosis, Pain     Financial Information  Primary insurance:  []Medicare [] Medicare HMO  [x]Commercial insurance    []Medicaid   [] Medicaid HMO []Workers Compensation   []Personal Pay    Secondary Insurance:  []Medicare [] Medicare HMO  []Commercial insurance    []Medicaid   []Workers Compensation [x]None    Precautions:   []Cardiac Precautions    []Total hip precautions    [x]Weight Bearing status:  NWB R pelvis, but ok to sit upright  [x]Safety Precautions/Concerns  []Visually impaired     []Hard of Hearing     Isolation Precautions:         []Yes  [x]No  If Yes:  [] Droplet  []Contact []Airborne     []VRE     []MRSA     []C-diff         [] TB       [] Other:        Physiatrist:  []        [x]   Dr. Raciel Dao  []   Dr. Lázaro Foster    Patients Occupation: Employed full time    Reviewed Lab and Diagnostic reports from Current Admission: Yes    Patients Prior Functional  Level: Independent    History of current illness:  22 yr old female who had c/o pain in right thigh beginning in 2020. MRI performed on 20 showed a large complex mass along the anteromedial aspect of the mid-distal thigh. 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 Pt had recent hip disarticulation surgery. Pt is currently 23 weeks pregnant. Current functional status for upper extremity ADLs:  Min A-Mod A       Current functional status for lower extremity ADLs:  Min A-Dep       Current functional status for bed, chair, wheelchair transfers:  Min A       Current functional status for toilet transfers:  Min A       Current functional status for locomotion:  Not Attempted       Current functional status for comprehension: Complete independence    Current functional status for expression: Complete independence    Current functional status for social interaction: Complete independence    Current functional status for problem solving: Complete independence    Current functional status for memory: Complete independence    Current Deficits R/T Impairment: Impaired Functional Mobility and Decreased ADLs    Required Therapy:   [x] Physical Therapy  [x] Occupational Therapy   [] Speech Therapy    Additional Services:  [x]   [x] Recreational Therapy  [x] Nutrition  [] Dialysis  [] Other:     Rehab Justification:  Needs 3 hrs therapy per day or 15 hours per week:  Yes  Identified Rehab Nursing needs: Yes  Intense Interdisciplinary need:  Yes  Need for 24 hr physician supervision:  Yes  Measurable improved quality of life:  Yes  Willingness to participate:  Yes  Medical Necessity:  Yes  Patient able to tolerate care proposed:  Yes    Expected Discharge Destination/Functional Level:  Home with assist  Expected length of time to achieve that level of improvement: 1-2 weeks  Expected Post Discharge Treatments: Home with possible Home Care    Other information relevant to the care needs:  n/a    Acute Inpatient Rehabilitation Disclosure Statement provided to patient. Patient verbalized understanding. Copy placed on patient's light chart. I have reviewed and concur with the findings and results of the pre-admission screening assessment completed by the Inpatient Rehabilitation Admissions Coordinator.

## 2020-08-01 PROBLEM — Z92.89 HISTORY OF BLOOD TRANSFUSION: Status: ACTIVE | Noted: 2020-08-01

## 2020-08-01 LAB
ALBUMIN SERPL-MCNC: 3.3 G/DL (ref 3.5–5.2)
ALBUMIN/GLOBULIN RATIO: ABNORMAL (ref 1–2.5)
ALP BLD-CCNC: 112 U/L (ref 35–104)
ALT SERPL-CCNC: 163 U/L (ref 5–33)
ANION GAP SERPL CALCULATED.3IONS-SCNC: 12 MMOL/L (ref 9–17)
AST SERPL-CCNC: 92 U/L
BILIRUB SERPL-MCNC: 0.53 MG/DL (ref 0.3–1.2)
BILIRUBIN DIRECT: 0.28 MG/DL
BILIRUBIN, INDIRECT: 0.25 MG/DL (ref 0–1)
BUN BLDV-MCNC: 5 MG/DL (ref 6–20)
BUN/CREAT BLD: ABNORMAL (ref 9–20)
CALCIUM SERPL-MCNC: 9.4 MG/DL (ref 8.6–10.4)
CHLORIDE BLD-SCNC: 100 MMOL/L (ref 98–107)
CO2: 22 MMOL/L (ref 20–31)
CREAT SERPL-MCNC: <0.4 MG/DL (ref 0.5–0.9)
CREATININE URINE: 79 MG/DL (ref 28–217)
GFR AFRICAN AMERICAN: ABNORMAL ML/MIN
GFR NON-AFRICAN AMERICAN: ABNORMAL ML/MIN
GFR SERPL CREATININE-BSD FRML MDRD: ABNORMAL ML/MIN/{1.73_M2}
GFR SERPL CREATININE-BSD FRML MDRD: ABNORMAL ML/MIN/{1.73_M2}
GLOBULIN: ABNORMAL G/DL (ref 1.5–3.8)
GLUCOSE BLD-MCNC: 100 MG/DL (ref 65–105)
GLUCOSE BLD-MCNC: 106 MG/DL (ref 70–99)
GLUCOSE BLD-MCNC: 114 MG/DL (ref 65–105)
GLUCOSE BLD-MCNC: 88 MG/DL (ref 65–105)
GLUCOSE BLD-MCNC: 93 MG/DL (ref 65–105)
HCT VFR BLD CALC: 32 % (ref 36–46)
HEMOGLOBIN: 11 G/DL (ref 12–16)
MCH RBC QN AUTO: 30.1 PG (ref 26–34)
MCHC RBC AUTO-ENTMCNC: 34.5 G/DL (ref 31–37)
MCV RBC AUTO: 87.3 FL (ref 80–100)
NRBC AUTOMATED: ABNORMAL PER 100 WBC
PDW BLD-RTO: 16.6 % (ref 11.5–14.9)
PLATELET # BLD: 509 K/UL (ref 150–450)
PMV BLD AUTO: 7.3 FL (ref 6–12)
POTASSIUM SERPL-SCNC: 4 MMOL/L (ref 3.7–5.3)
RBC # BLD: 3.66 M/UL (ref 4–5.2)
SODIUM BLD-SCNC: 134 MMOL/L (ref 135–144)
TOTAL PROTEIN, URINE: 16 MG/DL
TOTAL PROTEIN: 6.3 G/DL (ref 6.4–8.3)
URINE TOTAL PROTEIN CREATININE RATIO: 0.2 (ref 0–0.2)
WBC # BLD: 6.5 K/UL (ref 3.5–11)

## 2020-08-01 PROCEDURE — 6360000002 HC RX W HCPCS: Performed by: PHYSICAL MEDICINE & REHABILITATION

## 2020-08-01 PROCEDURE — 80076 HEPATIC FUNCTION PANEL: CPT

## 2020-08-01 PROCEDURE — 97530 THERAPEUTIC ACTIVITIES: CPT

## 2020-08-01 PROCEDURE — 99223 1ST HOSP IP/OBS HIGH 75: CPT | Performed by: PHYSICAL MEDICINE & REHABILITATION

## 2020-08-01 PROCEDURE — 97542 WHEELCHAIR MNGMENT TRAINING: CPT

## 2020-08-01 PROCEDURE — 6370000000 HC RX 637 (ALT 250 FOR IP): Performed by: STUDENT IN AN ORGANIZED HEALTH CARE EDUCATION/TRAINING PROGRAM

## 2020-08-01 PROCEDURE — 97162 PT EVAL MOD COMPLEX 30 MIN: CPT

## 2020-08-01 PROCEDURE — 97110 THERAPEUTIC EXERCISES: CPT

## 2020-08-01 PROCEDURE — 6370000000 HC RX 637 (ALT 250 FOR IP): Performed by: PHYSICAL MEDICINE & REHABILITATION

## 2020-08-01 PROCEDURE — 80048 BASIC METABOLIC PNL TOTAL CA: CPT

## 2020-08-01 PROCEDURE — 36415 COLL VENOUS BLD VENIPUNCTURE: CPT

## 2020-08-01 PROCEDURE — 99223 1ST HOSP IP/OBS HIGH 75: CPT | Performed by: INTERNAL MEDICINE

## 2020-08-01 PROCEDURE — 82570 ASSAY OF URINE CREATININE: CPT

## 2020-08-01 PROCEDURE — 84156 ASSAY OF PROTEIN URINE: CPT

## 2020-08-01 PROCEDURE — 85027 COMPLETE CBC AUTOMATED: CPT

## 2020-08-01 PROCEDURE — 97166 OT EVAL MOD COMPLEX 45 MIN: CPT

## 2020-08-01 PROCEDURE — 1180000000 HC REHAB R&B

## 2020-08-01 PROCEDURE — 97535 SELF CARE MNGMENT TRAINING: CPT

## 2020-08-01 RX ORDER — ASPIRIN 81 MG/1
81 TABLET, CHEWABLE ORAL DAILY
Status: DISCONTINUED | OUTPATIENT
Start: 2020-08-01 | End: 2020-08-10 | Stop reason: HOSPADM

## 2020-08-01 RX ORDER — NICOTINE POLACRILEX 4 MG
15 LOZENGE BUCCAL PRN
Status: DISCONTINUED | OUTPATIENT
Start: 2020-08-01 | End: 2020-08-10 | Stop reason: HOSPADM

## 2020-08-01 RX ORDER — FERROUS SULFATE 325(65) MG
325 TABLET ORAL
Status: DISCONTINUED | OUTPATIENT
Start: 2020-08-01 | End: 2020-08-10 | Stop reason: HOSPADM

## 2020-08-01 RX ADMIN — ENOXAPARIN SODIUM 40 MG: 40 INJECTION SUBCUTANEOUS at 08:42

## 2020-08-01 RX ADMIN — Medication 1 TABLET: at 08:43

## 2020-08-01 RX ADMIN — FERROUS SULFATE TAB 325 MG (65 MG ELEMENTAL FE) 325 MG: 325 (65 FE) TAB at 08:43

## 2020-08-01 RX ADMIN — ASPIRIN 81 MG CHEWABLE TABLET 81 MG: 81 TABLET CHEWABLE at 08:42

## 2020-08-01 RX ADMIN — GABAPENTIN 200 MG: 100 CAPSULE ORAL at 14:43

## 2020-08-01 RX ADMIN — GABAPENTIN 200 MG: 100 CAPSULE ORAL at 20:31

## 2020-08-01 RX ADMIN — OXYCODONE HYDROCHLORIDE 5 MG: 5 TABLET ORAL at 08:42

## 2020-08-01 RX ADMIN — POLYETHYLENE GLYCOL 3350 17 G: 17 POWDER, FOR SOLUTION ORAL at 08:42

## 2020-08-01 RX ADMIN — GABAPENTIN 200 MG: 100 CAPSULE ORAL at 08:43

## 2020-08-01 RX ADMIN — OXYCODONE HYDROCHLORIDE 5 MG: 5 TABLET ORAL at 14:47

## 2020-08-01 ASSESSMENT — PAIN SCALES - GENERAL
PAINLEVEL_OUTOF10: 3
PAINLEVEL_OUTOF10: 3
PAINLEVEL_OUTOF10: 4
PAINLEVEL_OUTOF10: 4
PAINLEVEL_OUTOF10: 0
PAINLEVEL_OUTOF10: 3

## 2020-08-01 ASSESSMENT — PAIN DESCRIPTION - PAIN TYPE
TYPE: PHANTOM PAIN
TYPE: SURGICAL PAIN;PHANTOM PAIN

## 2020-08-01 ASSESSMENT — PAIN DESCRIPTION - LOCATION
LOCATION: LEG
LOCATION: LEG;GROIN

## 2020-08-01 ASSESSMENT — PAIN DESCRIPTION - ORIENTATION
ORIENTATION: RIGHT
ORIENTATION: RIGHT

## 2020-08-01 ASSESSMENT — PAIN DESCRIPTION - FREQUENCY
FREQUENCY: CONTINUOUS
FREQUENCY: CONTINUOUS

## 2020-08-01 ASSESSMENT — PAIN DESCRIPTION - PROGRESSION
CLINICAL_PROGRESSION: NOT CHANGED
CLINICAL_PROGRESSION: NOT CHANGED

## 2020-08-01 ASSESSMENT — PAIN DESCRIPTION - ONSET: ONSET: ON-GOING

## 2020-08-01 NOTE — H&P
Physical Medicine & Rehabilitation History and Physical  [facility] Acute Rehabilitation Unit     Primary care provider: FATEMEH Peres CNP     Chief Complaint and Reason for Rehabilitation Admission:   ADL and Mobility deficits secondary to R leg amputation    History of Present Illness:  Keaton Arzate  is a 22 y.o. right-handed     female admitted to the [address] [place] [address] unit on 7/31/2020. She was originally admitted to [address] [place] [address],[address] on 7/23/2020 for planned amputation R leg for Stage 3 sarcoma. She had R hip disarticulation performed 7/23/2020. She is pregnant - at 21 weeks gestation with comorbid gestational diabetes. She is NWB on RLE/R pelvis. She is currently requiring assistance for self-care activities and mobility prompting this admission. She reports pain is well controlled and responding to medications. Premorbid function:  Independent    Current Function:  PT:  Restrictions/Precautions: Weight Bearing  Other position/activity restrictions: Per Dr. Jessica Miles, Pt's orthopaedic resident, no showering until the patient follows up with the Ortho surgeon on 8/19. OT:                                               SPEECH:      Past Medical History:      Diagnosis Date    Diabetes mellitus ([address]     Gestational    Osteosarcoma        Past Surgical History:      Procedure Laterality Date    AMPUTATION Right 07/23/2020    Right Hip Disarticulation       Allergies:    Patient has no known allergies.     Medications   Scheduled Meds:   ferrous sulfate  325 mg Oral Daily with breakfast    aspirin  81 mg Oral Daily    enoxaparin  40 mg Subcutaneous Daily    polyethylene glycol  17 g Oral Daily    gabapentin  200 mg Oral TID    insulin NPH  8 Units Subcutaneous Nightly    prenatal vitamin  1 tablet Oral Daily     Continuous Infusions:  PRN Meds:.senna, bisacodyl, acetaminophen, cyclobenzaprine, oxyCODONE of club or organization: None     Attends meetings of clubs or organizations: None     Relationship status: None    Intimate partner violence     Fear of current or ex partner: None     Emotionally abused: None     Physically abused: None     Forced sexual activity: None   Other Topics Concern    None   Social History Narrative    ** Merged History Encounter **            Family History:       Problem Relation Age of Onset    Diabetes Mother     Diabetes Maternal Grandmother     Heart Surgery Maternal Grandmother     Stroke Maternal Grandfather     Hypertension Maternal Grandfather        Review of Systems:  CONSTITUTIONAL:  Denies fevers, chills, sweats or fatigue. EYES:  Denies diplopia, blind spots, blurring. HEENT:  Denies hearing loss, trouble chewing or swallowing. RESPIRATORY:  No wheezing, coughing, shortness of breath. CARDIOVASCULAR:  Denies chest pain, palpitations, lightheadedness. GASTROINTESTINAL:  Denies heartburn, nausea, constipation, diarrhea, abdominal pain. GENITOURINARY:  No urgency, frequency, incontinence, dysuria. ENDOCRINE:  Denies hot or cold intolerance. MUSCULOSKELETAL:  Reports focal joint pain. Denies back pain, neck pain. NEUROLOGICAL:  Denies focal numbness, tingling, balance loss, headache. BEHAVIOR/PSYCH:  Denies depression, anxiety, memory loss, insomnia. SKIN:  No ulcers, rash, bruises. Physical Exam:  /87   Pulse 101   Temp 97.7 °F (36.5 °C) (Oral)   Resp 16   Ht 5' 2\" (1.575 m)   Wt 152 lb 12.5 oz (69.3 kg)   LMP 02/18/2020   SpO2 99%   BMI 27.94 kg/m²     GEN: Well developed, well nourished, in NAD  HEENT:  NCAT. PERRL. EOMI. Mucous membranes pink and moist.   PULM:  Clear to ausculation. No rales or rhonchi. Respirations WNL and unlabored. CV:  tachycardic rate regular rhythm. No murmurs or gallops. GI:  Abdomen soft. Nontender. Non-distended. BS + and equal.    NEUROLOGICAL: A&O x3. Sensation intact to light touch. DTRs 2+ L leg. MSK:  Functional ROM BUEs and LLE. Motor testing 5/5 key muscles BUE and LLEs. SKIN: Warm dry and intact. Good turgor. R hip disartic incision well approximated, no erythema, no drainage. EXTREMITIES:  No L calf tenderness to palpation. No edema LLE. PSYCH: Mood WNL. Appropriately interactive. Affect WNL. Principal Diagnosis/plan:  The patient is a 22y.o. year old with ADL and Mobility deficits secondary to R leg amputation    She will require close medical monitoring for the comorbidities listed below. She will benefit from intensive interdisciplinary therapies and rehab nursing care and is appropriate for inpatient rehabilitation. The post admission physician evaluation (CHARLENE) is consistent with the pre-admission assessment. See above findings to reflect the elements required in the CHARLENE. Patient's admitting condition is consistent with the findings of the preadmission assessment by the rehabilitation admissions coordinator. Diagnoses/plan:    1. 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 NWB RLE. Tylenol prn, gabapentin TID, oxycodone prn. She met with Filemon Garcia pre-operatively and will be working with him for prosthetic. 2. Pregnant with gestational diabetes: 23 weeks gestation. OB consulted. On NPH insulin nightly. Prenatal vitamin daily. Will defer hypoglycemia orders to OB/IM. 3. Bowel management: on miralax daily, senokot prn, dulcolax prn.   4. DVT Prophylaxis:  low molecular weight heparin, SCD while in bed and SADIE  5. Internal medicine for medical management      Estimated Length of Stay:  2 weeks.     Prognosis  good    Goals    Home at [facility] at Discharge: None      Maurilio Head MD     This note is created with the assistance of a speech recognition program.  While intending to generate a document that actually reflects the content of the visit, the document can still have

## 2020-08-01 NOTE — PROGRESS NOTES
Writer spoke with pt's nurse Glorietta Nageotte from [facility] who states that orders were completed in a discharge readmission navigator up at [facility]. Writer explained to nurse that these orders did not transfer as the patient came from a different hospital system. Glorietta Nageotte states she will contact the pt's physicians to call writer to clarify orders that were listed in the AVS.  In the meantime, writer contacts Dr. Nicho Santos to obtain home med orders and dressing change orders. At 2046, Dr. Tracey Carranza, Pt's orthopaedic resident calls to clarify orders including daily dressing changes to incision site as well as no showering until the patient follows up with the Ortho surgeon on 8/19.     [facility] RN  7/31/20 2046

## 2020-08-01 NOTE — CONSULTS
[address]    Patient Name: Chrystal Thomas     Patient : 1995  Room/Bed: 7036/7842-29  Admission Date/Time: 2020  7:15 PM  Primary Care Physician: FATEMEH Goetz CNP    Consulting Provider: Deidra Correia MD  Reason for Consult: 23 weeks pregnant s/p hip disarticulation    CC: S/p R hip disarticulation 20             HPI: Chrystal Thomas is a 22 y.o. female  at 23w4d admitted to inpatient rehab s/p right hip disarticulation at [facility] on 20. The patient is established with Dr. Mehrdad Hobbs for her OB/GYN care. The patient was recently diagnosed with osteosarcoma and had her right hip removed. She had been having pain in the leg for some time and the lesion was noted on imaging. The patient today denies fever, chills, headache, change in vision, RUQ pain, N/V, urinary symptoms, change in vaginal discharge. She is feeling fetal movement, she denies contractions, vaginal bleeding, or leaking of fluid. She admits to some phantom leg pain which is overall controlled. The patient did not receive chemo or radiation for her tumor, as she elected for surgical management. The patient's obstetric history is significant for newly diagnosed cHTN, newly diagnosed pregestational diabetes, FHx DM, anemia, and osteosarcoma.     REVIEW OF SYSTEMS:   Constitutional: negative fever, negative chills, positive weight loss  HEENT: negative visual disturbances, negative headaches, negative dizziness, negative hearing loss  Breast: Negative breast abnormalities, negative breast lumps, negative nipple discharge  Respiratory: negative dyspnea, negative cough, negative SOB  Cardiovascular: negative chest pain,  negative palpitations, negative arrhythmia, negative syncope   Gastrointestinal: negative abdominal pain, negative RUQ pain, negative N/V, negative diarrhea, negative constipation, negative bowel changes, negative heartburn   Genitourinary: negative Nicole Salamanca MD        oxyCODONE (ROXICODONE) immediate release tablet 5 mg  5 mg Oral Q4H PRN Nicole Salamanca MD   5 mg at 07/31/20 2318    Or    oxyCODONE (ROXICODONE) immediate release tablet 10 mg  10 mg Oral Q4H PRN Nicole Salamanca MD        prenatal vitamin 28-0.8 MG tablet 1 tablet  1 tablet Oral Daily Nicole Salamanca MD           FAMILY HISTORY:  family history includes Diabetes in her maternal grandmother and mother; Heart Surgery in her maternal grandmother; Hypertension in her maternal grandfather; Stroke in her maternal grandfather. SOCIAL HISTORY:   reports that she has never smoked. She has never used smokeless tobacco. She reports previous alcohol use. She reports that she does not use drugs. ________________________________________________________________________                                    Fany Cedars:  Vitals:    07/31/20 2116 07/31/20 2318 07/31/20 2332   BP:   131/77   Pulse: 116  96   Resp: 16  16   Temp: 97.4 °F (36.3 °C)  97.5 °F (36.4 °C)   TempSrc:   Oral   SpO2: 95%  100%   Weight:  152 lb 12.5 oz (69.3 kg)    Height:  5' 2\" (1.575 m)                                                FHT: 143    INPUT/OUTPUT:  No intake/output data recorded. No intake/output data recorded. PHYSICAL EXAM:     General Appearance: Appears healthy. Alert; in no acute distress. Pleasant. Skin: Skin color, texture, turgor normal. No rashes, right hip extensively bandaged. Lymphatic: No abnormally enlarged lymph nodes. Neck and EENT: normal atraumatic, no neck masses, normal thyroid, no jvd  Respiratory: Normal expansion. Clear to auscultation. No rales, rhonchi, or wheezing.   Cardiovascular: normal, regular rate and rhythm  Abdomen: soft, non-tender, non-distended, gravid, no right upper quadrant tenderness and no CVA tenderness, no rebound, guarding, or Thrombocytosis   - Patient's most recent platelets 210, CBC ordered weekly   - Platelets elevated prior to surgery, trending down    Sinus Tachycardia   - Patient noted to have tachycardia through many hospital admission, VSS, asymptomatic at this time   - EKGs showing sinus tach during hospital admissions    Transaminitis   - Patient with previously elevated ALT and AST   - Most recent ALT/AST of 29/15 on 7/12/20    Weight Loss   - Patient's prepregnancy weight 240lb, currently 150lb   - Dietary consult per primary      Patient Active Problem List    Diagnosis Date Noted    History of blood transfusion 08/01/2020    History of disarticulation of right hip 07/31/2020    Leukocytosis 07/31/2020    Transaminitis 07/11/2020    Chronic hypertension affecting pregnancy 07/11/2020    Sarcoma of bone ([facility] [address] 07/07/2020    Iron deficiency anemia 07/02/2020     Last Assessment & Plan:   Hgb 8.2 g/dL  Asymptomatic   S/p iv iron on 7/1 and 1 u pRBC     Continue to monitor vitals  Last Assessment & Plan:   Hgb 8.2 g/dL  Asymptomatic   S/p iv iron on 7/1 and 1 u pRBC     Continue to monitor vitals      Gestational diabetes 06/30/2020     Last Assessment & Plan:   Formatting of this note might be different from the original.  GDMA2  Abnormal early 1 hour GTT (183)  Abnormal 3 hour GTT  - fasting 86  - 1 hour 224  - 2 hour 187  - 3 hour 164  HbA1c 7/1 5.8  Component      Latest Ref Rng & Units 7/5/2020 7/5/2020 7/5/2020 7/5/2020           8:06 AM 11:27 AM  4:48 PM  9:28 PM   Glucose, metered      50 - 140 mg/dL 93 135 99 131     Component      Latest Ref Rng & Units 7/6/2020           8:05 AM   Glucose, metered      50 - 140 mg/dL 98        Plan:  Insulin NPH 20/10 and novolog 6u w/ meals   BG checks 4x daily  Last Assessment & Plan:   Formatting of this note might be different from the original.  GDMA2  Abnormal early 1 hour GTT (183)  Abnormal 3 hour GTT  - fasting 86  - 1 hour 224  - 2 hour 187  - 3 hour 164  HbA1c 7/1 5. 8  Component      Latest Ref Rng & Units 7/5/2020 7/5/2020 7/5/2020 7/5/2020           8:06 AM 11:27 AM  4:48 PM  9:28 PM   Glucose, metered      50 - 140 mg/dL 93 135 99 131     Component      Latest Ref Rng & Units 7/6/2020           8:05 AM   Glucose, metered      50 - 140 mg/dL 98        Plan:  Insulin NPH 20/10 and novolog 6u w/ meals   BG checks 4x daily      Thrombocytosis ([address] 06/30/2020     Last Assessment & Plan:   Plts elevated, 573 (07/02) --> 534 (07/03)  Etiology: secondary to iron deficiency   Patient found to have iron deficiency  Peripheral smear   -  Neutrophilia and monocytosis. -  Thrombocytosis. -  Normocytic normochromic anemia. -  No evidence of schistocytes, dysplasia or blasts. 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 Nacho Bruno, who is agreeable.      Attending's Name: Dr. Kelli Medley DO  Ob/Gyn Resident  Pager: [phone]  [facility]  8/1/2020, 12:06 AM

## 2020-08-01 NOTE — CONSULTS
Formerly [facility] Internal Medicine    CONSULTATION / HISTORY AND PHYSICAL EXAMINATION            Date:   8/1/2020  Patient name:  Heide Collins  Date of admission:  7/31/2020  7:15 PM  MRN:   [id number]  Account:  [de-identified]  YOB: 1995  PCP:    FATEMEH Pinedo CNP  Room:   2613/2613-01  Code Status:    Full Code    Physician Requesting Consult: Dakota Yadav MD    Reason for Consult:  Med mx    Chief Complaint:     No chief complaint on file.  sarcoma thigh    History Obtained From:     Patient medical record nursing staff    History of Present Illness:     21-year-old very pleasant  lady who is 26 weeks pregnant a month ago she had right thigh and knee pain with extensive work-up she was diagnosed with sarcoma at a history of [place] she had right. Disarticulation surgery done  Pain is controlled she denies any nausea vomiting no dizziness    Past Medical History:     Past Medical History:   Diagnosis Date    Diabetes mellitus ([address]     Gestational    Osteosarcoma         Past Surgical History:     Past Surgical History:   Procedure Laterality Date    AMPUTATION Right 07/23/2020    Right Hip Disarticulation        Medications Prior to Admission:     Prior to Admission medications    Medication Sig Start Date End Date Taking? Authorizing Provider   polyethylene glycol (GLYCOLAX) 17 GM/SCOOP powder Take 17 g by mouth 2 times daily as needed (constipation) 7/17/20 9/15/20 Yes FATEMEH Issa CNP   acetaminophen (TYLENOL) 325 MG tablet Take 650 mg by mouth every 6 hours as needed for Pain   Yes Historical Provider, MD   Prenatal MV-Min-Fe Fum-FA-DHA (PRENATAL 1 PO) Take by mouth   Yes Historical Provider, MD        Allergies:     Patient has no known allergies. Social History:     Tobacco:    reports that she has never smoked. She has never used smokeless tobacco.  Alcohol:      reports previous alcohol use.   Drug Use:  reports no history of drug use. Family History:     Family History   Problem Relation Age of Onset    Diabetes Mother     Diabetes Maternal Grandmother     Heart Surgery Maternal Grandmother     Stroke Maternal Grandfather     Hypertension Maternal Grandfather        Review of Systems:     Positive and Negative as described in HPI. CONSTITUTIONAL:  negative for fevers, chills, sweats, fatigue, weight loss  HEENT:  negative for vision, hearing changes, runny nose, throat pain  RESPIRATORY:  negative for shortness of breath, cough, congestion, wheezing. CARDIOVASCULAR:  negative for chest pain, palpitations. GASTROINTESTINAL:  negative for nausea, vomiting, diarrhea, constipation, change in bowel habits, abdominal pain   GENITOURINARY:  negative for difficulty of urination, burning with urination, frequency   INTEGUMENT:  negative for rash, skin lesions, easy bruising   HEMATOLOGIC/LYMPHATIC:  negative for swelling/edema   ALLERGIC/IMMUNOLOGIC:  negative for urticaria , itching  ENDOCRINE:  negative increase in drinking, increase in urination, hot or cold intolerance  MUSCULOSKELETAL:post  Hip  Dis articulation NEUROLOGICAL:  negative for headaches, dizziness, lightheadedness, numbness, pain, tingling extremities  BEHAVIOR/PSYCH:  negative for depression, anxiety    Physical Exam:     /87   Pulse 101   Temp 97.7 °F (36.5 °C) (Oral)   Resp 16   Ht 5' 2\" (1.575 m)   Wt 152 lb 12.5 oz (69.3 kg)   LMP 2020   SpO2 99%   BMI 27.94 kg/m²   Temp (24hrs), Av.5 °F (36.4 °C), Min:97.4 °F (36.3 °C), Max:97.7 °F (36.5 °C)    Recent Labs     20  2328 20  1130   POCGLU 114* 100     No intake or output data in the 24 hours ending 20 1326    General Appearance:  alert, well appearing, and in no acute distress  Mental status: oriented to person, place, and time with normal affect  Head:  normocephalic, atraumatic.   Eye: no icterus, redness, pupils equal and reactive, extraocular eye movements intact, conjunctiva clear  Ear: normal external ear, no discharge, hearing intact  Nose:  no drainage noted  Mouth: mucous membranes moist  Neck: supple, no carotid bruits, thyroid not palpable  Lungs: Bilateral equal air entry, clear to ausculation, no wheezing, rales or rhonchi, normal effort  Cardiovascular: normal rate, regular rhythm, no murmur, gallop, rub.   Abdomen: Soft, nontender, nondistended, normal bowel sounds, no hepatomegaly or splenomegaly  Neurologic: There are no new focal motor or sensory deficits, normal muscle tone and bulk, no abnormal sensation, normal speech, cranial nerves II through XII grossly intact  Skin: No gross lesions, rashes, bruising or bleeding on exposed skin area  Extremities: Right hip posterior disarticulation for right femur psych: normal affect    Investigations:      Laboratory Testing:  Recent Results (from the past 24 hour(s))   POC Glucose Fingerstick    Collection Time: 07/31/20 11:28 PM   Result Value Ref Range    POC Glucose 114 (H) 65 - 105 mg/dL   Basic Metabolic Panel w/ Reflex to MG    Collection Time: 08/01/20  6:38 AM   Result Value Ref Range    Glucose 106 (H) 70 - 99 mg/dL    BUN 5 (L) 6 - 20 mg/dL    CREATININE <0.40 (L) 0.50 - 0.90 mg/dL    Bun/Cre Ratio NOT REPORTED 9 - 20    Calcium 9.4 8.6 - 10.4 mg/dL    Sodium 134 (L) 135 - 144 mmol/L    Potassium 4.0 3.7 - 5.3 mmol/L    Chloride 100 98 - 107 mmol/L    CO2 22 20 - 31 mmol/L    Anion Gap 12 9 - 17 mmol/L    GFR Non- CANNOT BE CALCULATED >60 mL/min    GFR  CANNOT BE CALCULATED >60 mL/min    GFR Comment          GFR Staging NOT REPORTED    CBC    Collection Time: 08/01/20  6:38 AM   Result Value Ref Range    WBC 6.5 3.5 - 11.0 k/uL    RBC 3.66 (L) 4.0 - 5.2 m/uL    Hemoglobin 11.0 (L) 12.0 - 16.0 g/dL    Hematocrit 32.0 (L) 36 - 46 %    MCV 87.3 80 - 100 fL    MCH 30.1 26 - 34 pg    MCHC 34.5 31 - 37 g/dL    RDW 16.6 (H) 11.5 - 14.9 %    Platelets 923 (H) 426 - 450 k/uL MPV 7.3 6.0 - 12.0 fL    NRBC Automated NOT REPORTED per 100 WBC   HEPATIC FUNCTION PANEL    Collection Time: 08/01/20  6:38 AM   Result Value Ref Range    Alb 3.3 (L) 3.5 - 5.2 g/dL    Alkaline Phosphatase 112 (H) 35 - 104 U/L     (H) 5 - 33 U/L    AST 92 (H) <32 U/L    Total Bilirubin 0.53 0.3 - 1.2 mg/dL    Bilirubin, Direct 0.28 <0.31 mg/dL    Bilirubin, Indirect 0.25 0.00 - 1.00 mg/dL    Total Protein 6.3 (L) 6.4 - 8.3 g/dL    Globulin NOT REPORTED 1.5 - 3.8 g/dL    Albumin/Globulin Ratio NOT REPORTED 1.0 - 2.5   POC Glucose Fingerstick    Collection Time: 08/01/20 11:30 AM   Result Value Ref Range    POC Glucose 100 65 - 105 mg/dL       Imaging/Diagonstics:      Assessment :      Primary Problem  History of disarticulation of right hip    Active Hospital Problems    Diagnosis Date Noted    History of blood transfusion [Z92.89] 08/01/2020    History of disarticulation of right hip [Z89.621] 07/31/2020    Leukocytosis [D72.829] 07/31/2020    Chronic hypertension affecting pregnancy [O10.919] 07/11/2020    Sarcoma of bone [address] [C41.9] 07/07/2020    Iron deficiency anemia [D50.9] 07/02/2020    Gestational diabetes [O24.419] 06/30/2020    Thrombocytosis ([facility] [D47.3] 06/30/2020    Family history of diabetes mellitus [Z83.3] 06/08/2020    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 26 weeks pregnant  3. 40 of Lovenox for DVT prophylaxis  4. On novolin shiv check a1c  5. On gabapentin 200 mg 3 times a day iron deficiency anemia perioperative blood loss on ferrous sulfate    Consultations:   IP CONSULT TO DIETITIAN  IP CONSULT TO SOCIAL WORK  IP CONSULT TO INTERNAL MEDICINE  IP CONSULT TO OB GYN  IP CONSULT TO OB GYN      Tawny Tubbs MD  8/1/2020  1:26 PM    Copy sent to Dr. Jermaine Garcia, APRN - CNP    Please note that this chart was generated using voice recognition Dragon dictation software.   Although every effort was made to ensure the accuracy of this automated transcription, some errors in transcription may have occurred.

## 2020-08-01 NOTE — PROGRESS NOTES
Page out to Dr. Miladys Calderon at [phone], returns call. Updated on pt condition, gestational diabetes and need for a hypoglycemia protocol. Orders received and read back to use our standard hypoglycemia protocol.

## 2020-08-01 NOTE — PROGRESS NOTES
[address] [facility]    Acute Rehabilitation OT Evaluation  Date: 20  Patient Name: Warren Bustillo       Room: 4182/8767-69  MRN: [id number]  Account: [de-identified]   : 1995  (22 y.o.) Gender: female     Referring Practitioner: Dr. Jadon Jarvis  Diagnosis: R hip disarticulation on 20 due to osteosarcoma  Additional Pertinent Hx: Warren Bustillo is a 22 y.o. right-handed     female admitted to the [address] [place] [address] on 2020. She was originally admitted to [address] [place] [address],[address] on 2020 for planned amputation R leg for Stage 3 sarcoma. She had R hip disarticulation performed 2020. She is pregnant - at 21 weeks gestation with comorbid gestational diabetes. She is NWB on RLE/R pelvis. Treatment Diagnosis: Impaired self-care status. Past Medical History:  has a past medical history of Diabetes mellitus ([address] and Osteosarcoma. Past Surgical History:   has a past surgical history that includes amputation (Right, 2020). Restrictions  Restrictions/Precautions: Weight Bearing(NWB Right pelvis - do not roll onto R hip or weight shift onto R hip while seated)  Other position/activity restrictions: Per Dr. Eliazar Mcmanusist, Pt's orthopaedic resident, no showering until the patient follows up with the Ortho surgeon on . Right Lower Extremity Weight Bearing: Non Weight Bearing(NWB R pelvis, OK to sit upright- per PT notes from [facility]  Equipment Used: Bed, Wheelchair    Vitals  Temp: 97.7 °F (36.5 °C)  Pulse: 101  Resp: 16  BP: 131/87  Height: 5' 2\" (157.5 cm)  Weight: 152 lb 12.5 oz (69.3 kg)  BMI (Calculated): 28  Oxygen Therapy  SpO2: 99 %  O2 Device: None (Room air)  Level of Consciousness: Alert    Subjective  Subjective: \"I had to take a break from the wheelchair, that was my first time sitting up. Derril Athens Derril Athens Derril Sergio I don't think there are any precautions for that, I think it's just what I can tolerate\" Pt knows she is NWB to her R pelvis and cannot roll onto R side while supine. Pain Level: 4  Pain Location: Leg  Pain Orientation: Right  Orientation  Overall Orientation Status: Within Normal Limits  Vision  Vision: Impaired  Vision Exceptions: Wears glasses at all times  Hearing  Hearing: Within functional limits  Vision - Basic Assessment  Prior Vision: Wears glasses all the time  Social/Functional History  Lives With: Spouse(Parents live next door)  Type of Home: Mobile home  Home Layout: One level  Home Access: Ramped entrance  Bathroom Shower/Tub: Tub/Shower unit  Bathroom Equipment: Shower chair(Bedside commode)  Bathroom Accessibility: Walker accessible(w/c will not fit through bathroom doors.)  Home Equipment: Rolling walker, [employer], Wheelchair-electric(Borrowed w/c, RW, and power chair.)  ADL Assistance: Independent  Homemaking Assistance: Independent  Ambulation Assistance: Independent  Transfer Assistance: Independent  Active : Yes  Mode of Transportation: SUV, Truck  Occupation: Unemployed  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 Pt reports she has good family support. Parents live next door. Both parents work, pt's father work in the same neighbor boogie, both parents will be able to assist as needed. Spouse works at [employer]/. Pt reports plans to potentially move into more accessible home if needed.   Pain Assessment  Pain Assessment: 0-10  Pain Level: 4  Patient's Stated Pain Goal: No pain  Pain Type: Phantom pain  Pain Location: Leg  Pain Orientation: Right  Pain Descriptors: (\"It feels like my leg is asleep\")  Pain Frequency: Continuous  Clinical Progression: Not changed    Objective  Vision - Basic Assessment  Prior Vision: Wears glasses all the time   Cognition  Overall Cognitive Status: WFL   Perception  Overall Perceptual Status: WFL  Sensation  Overall Sensation Status: Impaired(reports phantom limb sensation RLE)     UE Function  Hand Dominance  Hand Dominance: Right        LUE Strength  Gross LUE Strength: WFL  L Hand General: 4+/5  LUE Strength Comment: Shoulder 4+/5, elbow 4+/5     LUE Tone: Normotonic     LUE AROM (degrees)  LUE AROM : WFL     Left Hand AROM (degrees)  Left Hand AROM: WFL  RUE Strength  Gross RUE Strength: WFL  R Hand General: 4+/5  RUE Strength Comment: Shoulder 4+/5, elbow 4+/5      RUE Tone: Normotonic     RUE AROM (degrees)  RUE AROM : WFL     Right Hand AROM (degrees)  Right Hand AROM: WFL                        Fine Motor Skills  Coordination  Movements Are Fluid And Coordinated:  Yes                           Mobility  Supine to Sit: Stand by assistance  Sit to Supine: Stand by assistance       Balance  Sitting Balance: Stand by assistance  Standing Balance: Minimal assistance  Standing Balance  Time: AM: 1-2 minutes x 4; 2-3 minutes x 1; PM: 1-2 minutes x 3  Activity: AM: self-care tasks including lower body bathing/dressing, toileting tasks and transfers; PM: toileting tasks and transfers  Comment: 1-2 UE support via grab bar or sink in bathroom  Functional Mobility  Functional - Mobility Device: Wheelchair  Activity: To/from bathroom, To/From therapy gym  Assist Level: Supervision  Functional Mobility Comments: 1-2 verbal cues as needed for technique  Bed mobility  Rolling to Left: Stand by assistance  Rolling to Right: Unable to assess(pt is NWB R pelvis/LE)  Supine to Sit: Stand by assistance  Sit to Supine: Stand by assistance  Scooting: Stand by assistance     Transfers  Stand Pivot Transfers: Minimal assistance  Sit to stand: Minimal assistance  Stand to sit: Minimal assistance  Transfer Comments: Pt demonstrated Fair safety awareness with functional transfer with further education warranted  Toilet Transfers  Toilet - Technique: Stand pivot, To right, To left  Equipment Used: Grab bars  Toilet Transfer: Minimal assistance  Toilet Transfers Comments: Minimal verbal cues for wheelchair positioning and safety  Tub Transfers  Tub Transfers Comments: OT provided education and demonstration regarding use of tub transfer bench for tub shower for Pt's home discharge. Pt verbalized Fair understanding. Shower Transfers  Shower Transfers Comments: Per ortho surgeon, Pt may not shower until after her follow-up appointment on 8/19/20. Wheelchair Bed Transfers  Wheelchair/Bed - Technique: Stand pivot, To left  Equipment Used: Bed, Wheelchair  Level of Asssistance: Minimal assistance  Wheelchair Transfers Comments: in AM, Pt engaged in 270 degree pivot to wheelchair. OT provided physical demonstration and education regarding performing 90 degree pivot to promote safety and energy conservation. Pt demonstrated Fair carryover. Functional Activity Tolerance  Functional Activity Tolerance: Tolerates 30 min exercise with multiple rests   Activity Tolerance: Patient Tolerated treatment well         ADL     ADL  Feeding: Independent  Grooming: Supervision;Setup(seated in wheelchair at sink)  UE Bathing: Supervision;Setup(seated in wheelchair at sink)  LE Bathing: Minimal assistance;Setup; Increased time to complete(assist L foot; contact guard assist while standing)  UE Dressing: Supervision;Setup(while seated in wheelchair)  LE Dressing: Dependent/Total(assist SADIE hose, L sock, thread BLE & pull pants over R hip)  Toileting: Maximum assistance;Setup(assist personal hygiene with bowel movement & pull clothing down/up over right hip; contact guard while standing with personal hygiene after urinating in PM)  Additional Comments: OT faciliated Pt engagement in self-care tasks while seated at sink. Please see above for details. In AM, OT facilaited Pt engagement in toileting tasks with bowel movement. Pt required assist for personal hygiene after bowel movement as well as clothing management before/after toileting. In PM, OT facilitated Pt engagement in toileting tasks after voiding.  Pt stood with contact guard assist and engaged in personal hygiene with success. Pt continued to require assist to pull pants over right hip. Continue OT POC to maximize Pt's independence with self-care and mobility. OT scores     Eating  Assistance Needed: Independent  CARE Score: 6  Discharge Goal: Independent  Oral Hygiene  Assistance Needed: Supervision or touching assistance  CARE Score: 4  Discharge Goal: Independent  Toileting Hygiene  Assistance Needed: Substantial/maximal assistance  CARE Score: 2  Discharge Goal: Independent  Shower/Bathe Self  Assistance Needed: Partial/moderate assistance  CARE Score: 3  Discharge Goal: Independent  Upper Body Dressing  Assistance Needed: Supervision or touching assistance  CARE Score: 4  Discharge Goal: Independent  Lower Body Dressing  Assistance Needed: Dependent  CARE Score: 1  Discharge Goal: Independent  Putting On/Taking Off Footwear  Assistance Needed: Dependent  CARE Score: 1  Discharge Goal: Partial/moderate assistance  Toilet Transfer  Assistance Needed: Partial/moderate assistance  CARE Score: 3  Discharge Goal: Independent      Goals  Patient Goals   Patient goals : \"My goal is to be as independent as I can be\"  Short term goals  Time Frame for Short term goals: 5 to 7 days  Short term goal 1: Pt will verbalize/demonstrate Good understanding of assistive equipment/durable medical equipment/modified techniques as needed for increased independence with self-care and mobility. Short term goal 2: Pt will perform lower body dressing and toileting tasks with Minimal assist and use of assistive equipment/modified techniques as needed. Short term goal 3: Pt will perform functional mobility and transfers during self-care with stand by assist, least restrictive mobility device, and Good safety. Short term goal 4: Pt will stand for 4+ minutes with 1-2 UE support, stand by assist, and no loss of balance while engaging in functional activity of choice.   Short term goal 5: Pt will actively participate in 30+ minutes of therapeutic exercise/functional activities to promote increased independence with self-care and mobility. Long term goals  Time Frame for Long term goals : By discharge  Long term goal 1: Pt will perform BADLs with modified independence and Good safety. Long term goal 2: Pt will perform functional mobility and transfers during self-care with modified independence, least restrictive mobility device, and Good safety. Long term goal 3: Pt will stand for 8+ minutes with 1-2 UE support, modified independence, and no loss of balance while engaging in self-care/functional activity of choice. Long term goal 4: Pt will perform simple meal prep/light housekeeping with modified independence and Good safety. Assessment  Performance deficits / Impairments: Decreased functional mobility , Decreased ADL status, Decreased strength, Decreased safe awareness, Decreased endurance, Decreased sensation, Decreased balance, Decreased high-level IADLs, Decreased posture  Treatment Diagnosis: Impaired self-care status.   Prognosis: Good  Decision Making: Medium Complexity  REQUIRES OT FOLLOW UP: Yes  Discharge Recommendations: Patient would benefit from continued therapy after discharge  Plan  Times per week: 5-7  Times per day: Twice a day  Current Treatment Recommendations: Self-Care / ADL, Home Management Training, Strengthening, Balance Training, Functional Mobility Training, Endurance Training, Wheelchair Mobility Training, Pain Management, Safety Education & Training, Patient/Caregiver Education & Training, Equipment Evaluation, Education, & procurement, Positioning        08/01/20 1028 08/01/20 1410   OT Individual Minutes   Time In [id number]   [facility] Z [id number]   [id number]   Time Code Minutes    Timed Code Treatment Minutes 77 Minutes 30 Minutes     Electronically signed by Nette Gamino OT on 8/1/20 at 3:13 PM EDT

## 2020-08-01 NOTE — PROGRESS NOTES
Physical Therapy    Facility/Department: [facility] ACUTE REHAB  Initial Assessment    NAME: Wayne Live  : 1995  MRN: [id number]    Date of Service: 2020    Discharge Recommendations:  Home with assist PRN   PT Equipment Recommendations  Equipment Needed: Yes  Other: Light weight w/c, RW    Assessment   Body structures, Functions, Activity limitations: Decreased functional mobility ; Decreased ROM; Decreased strength;Decreased endurance;Decreased balance; Increased pain;Decreased posture  Assessment: Pt admitted to reb unit s/p r Hip disacticualtion due to osteosarcoma. Pt very motivated and has good family support. Pt present with overall decreased strength, including core strength, affecting her posture and mobility. Pt needs skilled physical therapy to address her deficits. Treatment Diagnosis: Impaired fucntion. Prognosis: Good  Decision Making: High Complexity  Exam: ROM, MMT, fucntional mobility. PT Education: PT Role;Plan of Care;Goals;Precautions;Weight-bearing Education; Functional Mobility Training;Transfer Training;Disease Specific Education  Patient Education: desentization of residual limb. REQUIRES PT FOLLOW UP: Yes  Activity Tolerance  Activity Tolerance: Patient limited by fatigue       Patient Diagnosis(es): There were no encounter diagnoses. has a past medical history of Diabetes mellitus ([facility] [address] and Osteosarcoma. has a past surgical history that includes amputation (Right, 2020).     Restrictions  Restrictions/Precautions  Restrictions/Precautions: Weight Bearing(NWB Right pelvis - do not roll onto R hip or weight shift onto R hip while seated)  Lower Extremity Weight Bearing Restrictions  Right Lower Extremity Weight Bearing: Non Weight Bearing(NWB R pelvis, OK to sit upright- per PT notes from [facility]  Position Activity Restriction  Other position/activity restrictions: Per Dr. Betts, Pt's orthopaedic resident, no showering until the patient follows up with the Ortho surgeon on 8/19. Vision/Hearing  Vision: Impaired  Vision Exceptions: Wears glasses at all times  Hearing: Within functional limits     Subjective  General  Patient assessed for rehabilitation services?: Yes  Additional Pertinent Hx: Yoselin Nur  is a 22 y.o. right-handed     female admitted to the [address] [place] [address] unit on 7/31/2020. She was originally admitted to [address] [place] [address],[address] on 7/23/2020 for planned amputation R leg for Stage 3 sarcoma. She had R hip disarticulation performed 7/23/2020. She is pregnant - at 21 weeks gestation with comorbid gestational diabetes. She is NWB on RLE/R pelvis. Referring Practitioner: Dr Armando Parks  Referral Date : 07/31/20  Diagnosis: S/P R hip disarticulation  Follows Commands: Within Functional Limits  Pain Screening  Patient Currently in Pain: Yes  Pain Assessment  Pain Assessment: 0-10  Pain Level: 3  Patient's Stated Pain Goal: No pain  Pain Type: Surgical pain; Phantom pain  Pain Location: Leg;Groin  Pain Orientation: Right  Pain Descriptors: (Pulling)  Pain Frequency: Continuous  Pain Onset: On-going  Clinical Progression: Not changed  Non-Pharmaceutical Pain Intervention(s): Repositioned  Response to Pain Intervention: Asleep with RR greater than 10  Vital Signs  Patient Currently in Pain: Yes       Orientation  Orientation  Overall Orientation Status: Within Functional Limits  Social/Functional History  Social/Functional History  Lives With: Spouse(Parents live next door)  Type of Home: Mobile home  Home Layout: One level  Home Access: Ramped entrance  Bathroom Shower/Tub: Tub/Shower unit  Bathroom Equipment: Shower chair(Bedside commode)  Bathroom Accessibility: Walker accessible(w/c will not fit through bathroom doors.)  Home Equipment: Rolling walker, Wheelchair-manual, Wheelchair-electric(Borrowed w/c, RW, and power chair.)  ADL Assistance: Independent  Homemaking Assistance: Independent  Ambulation Assistance: Independent  Transfer Assistance: Independent  Active : Yes  Mode of Transportation: SUV, Truck  Occupation: Unemployed  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 Pt reports she has good family support. Parents live next door. Both parents work, pt's father work in the same neighbor boogie, both parents will be able to assist as needed. Spouse works at [employer]/. Pt reports plans to potentially move into more accessible home if needed. Cognition        Objective          AROM RLE (degrees)  RLE General AROM: R Hip disarticulation  AROM LLE (degrees)  LLE AROM : WFL  Strength RLE  Comment: NA  Strength LLE  Comment: Grossly 4-/5     Sensation  Overall Sensation Status: WFL     Transfers  Sit to Stand: Contact guard assistance  Stand to sit: Contact guard assistance  Bed to Chair: Contact guard assistance;Minimal assistance(RW)  Pt tolerates sitting up in a w/c for 1 to 1 and a half hour at this time. Comment: Provided verbal/tactile cues and physical assist as needed to progress functional mobility including bed mobility, transfers and standing tolerance with 2ww  Ambulation  Ambulation?: Yes  Ambulation 1  Surface: level tile  Device: Rolling Walker  Assistance: Minimal assistance  Quality of Gait: L LE shaky due to weakness, pt slight nervous , but able to conintue with treatment progression, pt motivated. Gait Deviations: Slow Laurence;Decreased step length;Decreased step height  Distance: 3 tiny steps A:M and P:M.   Wheelchair Activities  Wheelchair Type: Standard  Wheelchair Cushion: Pressure Relieving  Pressure Relief Type: Lateral lean;Push up  Level of Assistance for pressure relief activities: Stand by assistance  Propulsion: Yes  Propulsion 1  Propulsion: Manual  Level: Level Tile  Method: RUE;LUE  Level of Assistance: Contact guard assistance;Stand by assistance  Description/ Details: SBA straight path,  CGA/min A around corners. Distance: 70 ft (A:M), 60 ft (P:M). Balance  Posture: Fair(Increased weight shift noted on L hip)  Sitting - Static: Fair;+(needs ue support to maiantian balance)  Sitting - Dynamic: Fair;-  Standing - Static: Fair(RW)  Standing - Dynamic: Fair;-(RW)  Other exercises  Other exercises?: Yes  Other exercises 1: Educated on weight shifting, scooting, transfers, w/c mobility, desensitization, rehab protocol. Other exercises 2: Standing tolerance in // bar, stood  for 35 secs, 45 secs, L LE gets shaky due to weakness/fatique  Other exercises 3: Worked on seated posture -feedback from mirror. Plan   Plan  Times per week: 1.5 hr/day, 5 to 7 days/week. Safety Devices  Type of devices: Gait belt, Left in chair, Call light within reach  Current Treatment Recommendations: Strengthening, Balance Training, Functional Mobility Training, Transfer Training, Endurance Training, Wheelchair Mobility Training, Gait Training, Safety Education & Training, Patient/Caregiver Education & Training, Equipment Evaluation, Education, & procurement, Positioning    G-Code       OutComes Score                                                  AM-PAC Score             Goals  Short term goals  Time Frame for Short term goals: 1 week  Short term goal 1: Pt able to roll to left side for psotion change/pressure relief, iindependently. Short term goal 2: Pt able to perform supine <>sit at supervsion level. Short term goal 3: Pt able to perform sit <>stand at SBA  Short term goal 4:  Pt able to transfer from bed<>W/C<>toilet at [facility]  Short term goal 5: Pt to demonstrate independence in pressure reliefing technique while in bed,  chair or w/c.    Short term goal 6: Pt able to manuever w/c on level surface, 150 ft, SBA  Short term goal 7:  Pt able to ambulate with RW dist of 10 to 15 ft, CGA  Long term goals  Time Frame for Long term goals : By LOS  Long term goal 1:  Pt able to trasnfer independently  Long term goal 2: Pt able to ambulate with RW dist of 10 to 15 ft with RW at supervsion level. Long term goal 3: Pt able to propel w/c on incline surfaces at SBA, dist of 50 to 80 ft   Long term goal 4: Pt able to proel w/c dist of 150 ft, on level surfaces, independently.   Patient Goals   Patient goals : Get stronger       Therapy Time   Individual Concurrent Group Co-treatment   Time In [facility]         Time Out 1402         Minutes 31         Timed Code Treatment Minutes: 235 [facility]       Luci Méndez, SUSIE

## 2020-08-02 PROBLEM — Z87.440 HISTORY OF UTI: Status: ACTIVE | Noted: 2020-08-02

## 2020-08-02 LAB
-: ABNORMAL
AMORPHOUS: ABNORMAL
BACTERIA: ABNORMAL
BILIRUBIN URINE: NEGATIVE
CASTS UA: ABNORMAL /LPF
COLOR: YELLOW
COMMENT UA: ABNORMAL
CRYSTALS, UA: ABNORMAL /HPF
EPITHELIAL CELLS UA: ABNORMAL /HPF
ESTIMATED AVERAGE GLUCOSE: 97 MG/DL
GLUCOSE BLD-MCNC: 104 MG/DL (ref 65–105)
GLUCOSE BLD-MCNC: 108 MG/DL (ref 65–105)
GLUCOSE BLD-MCNC: 90 MG/DL (ref 65–105)
GLUCOSE BLD-MCNC: 92 MG/DL (ref 65–105)
GLUCOSE URINE: NEGATIVE
HBA1C MFR BLD: 5 % (ref 4–6)
KETONES, URINE: NEGATIVE
LEUKOCYTE ESTERASE, URINE: NEGATIVE
MUCUS: ABNORMAL
NITRITE, URINE: NEGATIVE
OTHER OBSERVATIONS UA: ABNORMAL
PH UA: 7 (ref 5–8)
PROTEIN UA: NEGATIVE
RBC UA: ABNORMAL /HPF
RENAL EPITHELIAL, UA: ABNORMAL /HPF
SPECIFIC GRAVITY UA: 1.01 (ref 1–1.03)
TRICHOMONAS: ABNORMAL
TURBIDITY: ABNORMAL
URINE HGB: NEGATIVE
UROBILINOGEN, URINE: ABNORMAL
WBC UA: ABNORMAL /HPF
YEAST: ABNORMAL

## 2020-08-02 PROCEDURE — 97535 SELF CARE MNGMENT TRAINING: CPT

## 2020-08-02 PROCEDURE — 97542 WHEELCHAIR MNGMENT TRAINING: CPT

## 2020-08-02 PROCEDURE — 1180000000 HC REHAB R&B

## 2020-08-02 PROCEDURE — 81001 URINALYSIS AUTO W/SCOPE: CPT

## 2020-08-02 PROCEDURE — 82947 ASSAY GLUCOSE BLOOD QUANT: CPT

## 2020-08-02 PROCEDURE — 87088 URINE BACTERIA CULTURE: CPT

## 2020-08-02 PROCEDURE — 87186 SC STD MICRODIL/AGAR DIL: CPT

## 2020-08-02 PROCEDURE — 97116 GAIT TRAINING THERAPY: CPT

## 2020-08-02 PROCEDURE — 83036 HEMOGLOBIN GLYCOSYLATED A1C: CPT

## 2020-08-02 PROCEDURE — 87077 CULTURE AEROBIC IDENTIFY: CPT

## 2020-08-02 PROCEDURE — 87086 URINE CULTURE/COLONY COUNT: CPT

## 2020-08-02 PROCEDURE — 6360000002 HC RX W HCPCS: Performed by: PHYSICAL MEDICINE & REHABILITATION

## 2020-08-02 PROCEDURE — 97110 THERAPEUTIC EXERCISES: CPT

## 2020-08-02 PROCEDURE — 36415 COLL VENOUS BLD VENIPUNCTURE: CPT

## 2020-08-02 PROCEDURE — 97530 THERAPEUTIC ACTIVITIES: CPT

## 2020-08-02 PROCEDURE — 6370000000 HC RX 637 (ALT 250 FOR IP): Performed by: PHYSICAL MEDICINE & REHABILITATION

## 2020-08-02 PROCEDURE — 99232 SBSQ HOSP IP/OBS MODERATE 35: CPT | Performed by: INTERNAL MEDICINE

## 2020-08-02 PROCEDURE — 97112 NEUROMUSCULAR REEDUCATION: CPT

## 2020-08-02 PROCEDURE — 6370000000 HC RX 637 (ALT 250 FOR IP): Performed by: STUDENT IN AN ORGANIZED HEALTH CARE EDUCATION/TRAINING PROGRAM

## 2020-08-02 RX ORDER — GABAPENTIN 100 MG/1
100 CAPSULE ORAL ONCE
Status: COMPLETED | OUTPATIENT
Start: 2020-08-02 | End: 2020-08-02

## 2020-08-02 RX ORDER — GABAPENTIN 300 MG/1
300 CAPSULE ORAL 3 TIMES DAILY
Status: DISCONTINUED | OUTPATIENT
Start: 2020-08-02 | End: 2020-08-10 | Stop reason: HOSPADM

## 2020-08-02 RX ADMIN — GABAPENTIN 100 MG: 100 CAPSULE ORAL at 16:18

## 2020-08-02 RX ADMIN — OXYCODONE HYDROCHLORIDE 5 MG: 5 TABLET ORAL at 17:37

## 2020-08-02 RX ADMIN — GABAPENTIN 200 MG: 100 CAPSULE ORAL at 08:09

## 2020-08-02 RX ADMIN — GABAPENTIN 200 MG: 100 CAPSULE ORAL at 14:14

## 2020-08-02 RX ADMIN — ENOXAPARIN SODIUM 40 MG: 40 INJECTION SUBCUTANEOUS at 08:10

## 2020-08-02 RX ADMIN — GABAPENTIN 300 MG: 300 CAPSULE ORAL at 22:42

## 2020-08-02 RX ADMIN — FERROUS SULFATE TAB 325 MG (65 MG ELEMENTAL FE) 325 MG: 325 (65 FE) TAB at 08:09

## 2020-08-02 RX ADMIN — OXYCODONE HYDROCHLORIDE 5 MG: 5 TABLET ORAL at 05:56

## 2020-08-02 RX ADMIN — ASPIRIN 81 MG CHEWABLE TABLET 81 MG: 81 TABLET CHEWABLE at 08:09

## 2020-08-02 RX ADMIN — OXYCODONE HYDROCHLORIDE 5 MG: 5 TABLET ORAL at 22:42

## 2020-08-02 RX ADMIN — Medication 1 TABLET: at 11:18

## 2020-08-02 ASSESSMENT — PAIN DESCRIPTION - FREQUENCY
FREQUENCY: CONTINUOUS
FREQUENCY: INTERMITTENT
FREQUENCY: CONTINUOUS

## 2020-08-02 ASSESSMENT — PAIN DESCRIPTION - DESCRIPTORS
DESCRIPTORS: ACHING;THROBBING
DESCRIPTORS: BURNING;NUMBNESS;TINGLING
DESCRIPTORS: SPASM
DESCRIPTORS: SPASM
DESCRIPTORS: ACHING;DISCOMFORT

## 2020-08-02 ASSESSMENT — PAIN DESCRIPTION - ORIENTATION
ORIENTATION: RIGHT
ORIENTATION: RIGHT
ORIENTATION: LEFT
ORIENTATION: RIGHT
ORIENTATION: RIGHT
ORIENTATION: LEFT

## 2020-08-02 ASSESSMENT — PAIN DESCRIPTION - PAIN TYPE
TYPE: PHANTOM PAIN
TYPE: ACUTE PAIN;PHANTOM PAIN
TYPE: SURGICAL PAIN;PHANTOM PAIN
TYPE: ACUTE PAIN;PHANTOM PAIN;SURGICAL PAIN
TYPE: PHANTOM PAIN

## 2020-08-02 ASSESSMENT — PAIN DESCRIPTION - LOCATION
LOCATION: GROIN;HIP
LOCATION: LEG
LOCATION: HIP;LEG

## 2020-08-02 ASSESSMENT — PAIN SCALES - GENERAL
PAINLEVEL_OUTOF10: 6
PAINLEVEL_OUTOF10: 3
PAINLEVEL_OUTOF10: 0
PAINLEVEL_OUTOF10: 3
PAINLEVEL_OUTOF10: 4
PAINLEVEL_OUTOF10: 2
PAINLEVEL_OUTOF10: 4
PAINLEVEL_OUTOF10: 4

## 2020-08-02 ASSESSMENT — PAIN DESCRIPTION - PROGRESSION
CLINICAL_PROGRESSION: NOT CHANGED
CLINICAL_PROGRESSION: GRADUALLY WORSENING

## 2020-08-02 ASSESSMENT — PAIN - FUNCTIONAL ASSESSMENT: PAIN_FUNCTIONAL_ASSESSMENT: PREVENTS OR INTERFERES SOME ACTIVE ACTIVITIES AND ADLS

## 2020-08-02 ASSESSMENT — PAIN DESCRIPTION - ONSET
ONSET: ON-GOING

## 2020-08-02 NOTE — PROGRESS NOTES
Dr. Ashley Silva called concerning UA results and tachycardia. She will order a C and S.  OB residents will not round today and will come as needed. She will defer the tachycardia to med group. Also, she states the Neurontin dose could be increased per their standpoint.

## 2020-08-02 NOTE — PLAN OF CARE
Problem: Falls - Risk of:  Goal: Will remain free from falls  Description: Will remain free from falls  Outcome: Ongoing  Note: No falls or injuries sustained at this time. No attempts to get out of bed without nursing assistance. Call light within reach and pt. uses appropriately for assistance. Siderails up x 2. Nonskid footwear remains on. Bed in low and locked position. Hourly nursing rounds made. Pt. Alert and oriented, aware of limitations, and exhibits good safety judgement. Pt. uses assistive devices appropriately. Pt. understands individual fall risk factors. Pt. reoriented to surroundings and reminded to use call light with each nurse/patient interaction. Bed alarm remains engaged throughout the shift as a precaution. Problem: Pain:  Goal: Control of acute pain  Description: Control of acute pain  Outcome: Ongoing  Note: Pain assessment completed. Pt. able to rest.  Patient medicated with Minoo for pain this shift as ordered. Patient relates a tolerable level of discomfort with the current medication. Pt. Repositions per self for comfort. Nonverbal cues indicate pain relief. Pt. Rests quietly with eyes closed after pain medication administration. Respirations easy and unlabored. Appears free from distress. Problem: Skin Integrity:  Goal: Absence of new skin breakdown  Description: Absence of new skin breakdown  Outcome: Ongoing  Note: Skin assessment completed this shift. Nutrition and Hydration status assessed with adequate intake. Arden Score as charted. Pressure Relief Overlay remains intact and inflated to patient's bed throughout the shift. Left heel remains elevated on pillows throughout the shift. Patient able to reposition self for comfort and to prevent breakdown. Patient verbalizes understanding of pressure ulcer prevention measures. Skin integrity maintained. No new skin breakdown noted. Skin to high risk pressure areas including coccyx is clear.  Right Leg Surgical Site

## 2020-08-02 NOTE — PROGRESS NOTES
Dulce PT informs nurse that patient's pulse rate increases to 120 when she was standing up in therapy. Patient is complaining of increased phantom area/ leg pain. Will imform MD of this.

## 2020-08-02 NOTE — PROGRESS NOTES
hip per precautions)  Other exercises 4: Lateral Transfer practice with w/c arm lifted for ease of transfers  Other exercises 5: Monitor Heart Rate Closely, Tachy HR fluctuating between 111 to 125           Activity Tolerance: Patient limited by fatigue, Treatment limited secondary to medical complications (free text)(Heart Rate Tachy at times 111 to 125)  PT Equipment Recommendations  Equipment Needed: Yes  Other: Light weight w/c, RW       Patient Education  New Education Provided:  Mirror Therapy and Phantom Pains  Learner:patient  Method: demonstration and explanation       Outcome: needs reinforcement     Current Treatment Recommendations: Strengthening, Balance Training, Functional Mobility Training, Transfer Training, Endurance Training, Wheelchair Mobility Training, Gait Training, Safety Education & Training, Patient/Caregiver Education & Training, Equipment Evaluation, Education, & procurement, Positioning    Conditions Requiring Skilled Therapeutic Intervention  Body structures, Functions, Activity limitations: Decreased functional mobility ; Decreased ROM; Decreased strength;Decreased endurance;Decreased balance; Increased pain;Decreased posture  Assessment: Pt admitted to [facility] unit s/p r Hip disacticualtion due to osteosarcoma. Pt very motivated and has good family support. Pt present with overall decreased strength, including core strength, affecting her posture and mobility. Today pt has increased phantom pain, mirror therapy initiated, pt also tachy during a:m session. Pt given rest breaks as needed. RN informed too. Treatment Diagnosis: Impaired function. Prognosis: Good  Decision Making: High Complexity  Exam: ROM, MMT, fucntional mobility. REQUIRES PT FOLLOW UP: Yes  Discharge Recommendations: Home with assist PRN    Goals  Short term goals  Time Frame for Short term goals: 1 week  Short term goal 1: Pt able to roll to left side for psotion change/pressure relief, iindependently.   Short term goal 2: Pt able to perform supine <>sit at supervsion level. Short term goal 3: Pt able to perform sit <>stand at SBA  Short term goal 4:  Pt able to transfer from bed<>W/C<>toilet at [facility]  Short term goal 5: Pt to demonstrate independence in pressure reliefing technique while in bed,  chair or w/c. Short term goal 6: Pt able to manuever w/c on level surface, 150 ft, SBA  Short term goal 7:  Pt able to ambulate with RW dist of 10 to 15 ft, CGA  Long term goals  Time Frame for Long term goals : By LOS  Long term goal 1:  Pt able to trasnfer independently  Long term goal 2: Pt able to ambulate with RW dist of 10 to 15 ft with RW at supervsion level. Long term goal 3: Pt able to propel w/c on incline surfaces at SBA, dist of 50 to 80 ft   Long term goal 4: Pt able to proel w/c dist of 150 ft, on level surfaces, independently.        08/02/20 1300   PT Individual Minutes   Time In 1300   Time Out 1345   Minutes 45       Electronically signed by Rui Lo PTA on 8/2/20 at 3:44 PM EDT

## 2020-08-02 NOTE — PLAN OF CARE
Problem: Falls - Risk of:  Goal: Will remain free from falls  Description: Will remain free from falls  Outcome: Ongoing  Note: No falls or injuries sustained at this time. No attempts to get out of bed without nursing assistance. Call light within reach and pt. uses appropriately for assistance. Siderails up x 2. Nonskid footwear remains on. Bed in low and locked position. Hourly nursing rounds made. Pt. Alert and oriented, aware of limitations, and exhibits good safety judgement. Pt. uses assistive devices appropriately. Pt. understands individual fall risk factors. Pt. reoriented to surroundings and reminded to use call light with each nurse/patient interaction. Bed alarm remains engaged throughout the shift as a precaution. Problem: Pain:  Goal: Control of acute pain  Description: Control of acute pain  Outcome: Ongoing  Note: Pain assessment completed. Pt. able to rest.  Patient medicated with Minoo for pain this shift as ordered. Patient relates a tolerable level of discomfort with the current medication. Pt. Repositions per self for comfort. Nonverbal cues indicate pain relief. Pt. Rests quietly with eyes closed after pain medication administration. Respirations easy and unlabored. Appears free from distress. Problem: Skin Integrity:  Goal: Absence of new skin breakdown  Description: Absence of new skin breakdown  Outcome: Ongoing  Note: Skin assessment completed this shift. Nutrition and Hydration status assessed with adequate intake. Arden Score as charted. Pressure Relief Overlay remains intact and inflated to patient's bed throughout the shift. Left heel remains elevated on pillows throughout the shift. Patient able to reposition self for comfort and to prevent breakdown. Patient verbalizes understanding of pressure ulcer prevention measures. Skin integrity maintained. No new skin breakdown noted. Skin to high risk pressure areas including coccyx is clear.  Right Leg Surgical Site dressing CDI.

## 2020-08-02 NOTE — PROGRESS NOTES
Assessed surgical site again. Staples intact and no drainage or redness noted. More swelling noted in perineal area medial to lower incision. Feels soft to touch but slightly shiny in appearance. Applied ice bag for 20 minutes, and will reassess.

## 2020-08-02 NOTE — CONSULTS
[facility] Internal Medicine    CONSULTATION / HISTORY AND PHYSICAL EXAMINATION            Date:   8/2/2020  Patient name:  Yoselin Nur  Date of admission:  7/31/2020  7:15 PM  MRN:   [id number]  Account:  [de-identified]  YOB: 1995  PCP:    FATEMEH Adam CNP  Room:   2613/2613-01  Code Status:    Full Code    Physician Requesting Consult: Jose Macdonald MD    Reason for Consult:  Med mx    Chief Complaint:     No chief complaint on file.  sarcoma thigh    History Obtained From:     Patient medical record nursing staff    History of Present Illness:     76-year-old very pleasant  lady who is 26 weeks pregnant a month ago she had right thigh and knee pain with extensive work-up she was diagnosed with sarcoma at a history of [place] she had right. Disarticulation surgery done  Pain is controlled she denies any nausea vomiting no dizziness    Past Medical History:     Past Medical History:   Diagnosis Date    Diabetes mellitus ([address]     Gestational    Osteosarcoma         Past Surgical History:     Past Surgical History:   Procedure Laterality Date    AMPUTATION Right 07/23/2020    Right Hip Disarticulation        Medications Prior to Admission:     Prior to Admission medications    Medication Sig Start Date End Date Taking? Authorizing Provider   polyethylene glycol (GLYCOLAX) 17 GM/SCOOP powder Take 17 g by mouth 2 times daily as needed (constipation) 7/17/20 9/15/20 Yes FATEMEH Issa CNP   acetaminophen (TYLENOL) 325 MG tablet Take 650 mg by mouth every 6 hours as needed for Pain   Yes Historical Provider, MD   Prenatal MV-Min-Fe Fum-FA-DHA (PRENATAL 1 PO) Take by mouth   Yes Historical Provider, MD        Allergies:     Patient has no known allergies. Social History:     Tobacco:    reports that she has never smoked. She has never used smokeless tobacco.  Alcohol:      reports previous alcohol use.   Drug Use:  reports no history of drug use. Family History:     Family History   Problem Relation Age of Onset    Diabetes Mother     Diabetes Maternal Grandmother     Heart Surgery Maternal Grandmother     Stroke Maternal Grandfather     Hypertension Maternal Grandfather        Review of Systems:     Positive and Negative as described in HPI. CONSTITUTIONAL:  negative for fevers, chills, sweats, fatigue, weight loss  HEENT:  negative for vision, hearing changes, runny nose, throat pain  RESPIRATORY:  negative for shortness of breath, cough, congestion, wheezing. CARDIOVASCULAR:  negative for chest pain, palpitations. GASTROINTESTINAL:  negative for nausea, vomiting, diarrhea, constipation, change in bowel habits, abdominal pain   GENITOURINARY:  negative for difficulty of urination, burning with urination, frequency   INTEGUMENT:  negative for rash, skin lesions, easy bruising   HEMATOLOGIC/LYMPHATIC:  negative for swelling/edema   ALLERGIC/IMMUNOLOGIC:  negative for urticaria , itching  ENDOCRINE:  negative increase in drinking, increase in urination, hot or cold intolerance  MUSCULOSKELETAL:post  Hip  Dis articulation NEUROLOGICAL:  negative for headaches, dizziness, lightheadedness, numbness, pain, tingling extremities  BEHAVIOR/PSYCH:  negative for depression, anxiety    Physical Exam:     /89   Pulse 121 Comment: flucutates between 109 to 121, mostly stays in high 110's.   Temp 97.8 °F (36.6 °C) (Oral)   Resp 18   Ht 5' 2\" (1.575 m)   Wt 152 lb 12.5 oz (69.3 kg)   LMP 2020   SpO2 100%   BMI 27.94 kg/m²   Temp (24hrs), Av.9 °F (36.6 °C), Min:97.8 °F (36.6 °C), Max:97.9 °F (36.6 °C)    Recent Labs     20  1533 20  1921 20  0721 20  1116   POCGLU 88 93 104 90       Intake/Output Summary (Last 24 hours) at 2020 1417  Last data filed at 2020 1156  Gross per 24 hour   Intake 800 ml   Output 1150 ml   Net -350 ml       General Appearance:  alert, well appearing, and in no acute distress  Mental status: oriented to person, place, and time with normal affect  Head:  normocephalic, atraumatic. Eye: no icterus, redness, pupils equal and reactive, extraocular eye movements intact, conjunctiva clear  Ear: normal external ear, no discharge, hearing intact  Nose:  no drainage noted  Mouth: mucous membranes moist  Neck: supple, no carotid bruits, thyroid not palpable  Lungs: Bilateral equal air entry, clear to ausculation, no wheezing, rales or rhonchi, normal effort  Cardiovascular: normal rate, regular rhythm, no murmur, gallop, rub.   Abdomen: Soft, nontender, nondistended, normal bowel sounds, no hepatomegaly or splenomegaly  Neurologic: There are no new focal motor or sensory deficits, normal muscle tone and bulk, no abnormal sensation, normal speech, cranial nerves II through XII grossly intact  Skin: No gross lesions, rashes, bruising or bleeding on exposed skin area  Extremities: Right hip posterior disarticulation for right femur psych: normal affect    Investigations:      Laboratory Testing:  Recent Results (from the past 24 hour(s))   POC Glucose Fingerstick    Collection Time: 08/01/20  3:33 PM   Result Value Ref Range    POC Glucose 88 65 - 105 mg/dL   Protein / Creatinine Ratio, Urine    Collection Time: 08/01/20  6:18 PM   Result Value Ref Range    Total Protein, Urine 16 mg/dL    Creatinine, Ur 79.0 28.0 - 217.0 mg/dL    Urine Total Protein Creatinine Ratio 0.20 0.00 - 0.20   POC Glucose Fingerstick    Collection Time: 08/01/20  7:21 PM   Result Value Ref Range    POC Glucose 93 65 - 105 mg/dL   Hemoglobin A1C    Collection Time: 08/02/20  6:13 AM   Result Value Ref Range    Hemoglobin A1C 5.0 4.0 - 6.0 %    Estimated Avg Glucose 97 mg/dL   POC Glucose Fingerstick    Collection Time: 08/02/20  7:21 AM   Result Value Ref Range    POC Glucose 104 65 - 105 mg/dL   Urinalysis, Routine    Collection Time: 08/02/20  8:45 AM   Result Value Ref Range    Color, UA YELLOW YELLOW Turbidity UA TURBID (A) CLEAR    Glucose, Ur NEGATIVE NEGATIVE    Bilirubin Urine NEGATIVE NEGATIVE    Ketones, Urine NEGATIVE NEGATIVE    Specific [place], UA 1.011 1.000 - 1.030    Urine Hgb NEGATIVE NEGATIVE    pH, UA 7.0 5.0 - 8.0    Protein, UA NEGATIVE NEGATIVE    Urobilinogen, Urine ELEVATED (A) Normal    Nitrite, Urine NEGATIVE NEGATIVE    Leukocyte Esterase, Urine NEGATIVE NEGATIVE    Urinalysis Comments NOT REPORTED    Microscopic Urinalysis    Collection Time: 08/02/20  8:45 AM   Result Value Ref Range    -          WBC, UA 0 TO 2 /HPF    RBC, UA 0 TO 2 /HPF    Casts UA NOT REPORTED /LPF    Crystals, UA NOT REPORTED None /HPF    Epithelial Cells UA 5 TO 10 /HPF    Renal Epithelial, UA NOT REPORTED 0 /HPF    Bacteria, UA FEW (A) None    Mucus, UA 2+ (A) None    Trichomonas, UA NOT REPORTED None    Amorphous, UA 3+ (A) None    Other Observations UA NOT REPORTED NOT REQ. Yeast, UA NOT REPORTED None   POC Glucose Fingerstick    Collection Time: 08/02/20 11:16 AM   Result Value Ref Range    POC Glucose 90 65 - 105 mg/dL       Imaging/Diagonstics:      Assessment :      Primary Problem  History of disarticulation of right hip    Active Hospital Problems    Diagnosis Date Noted    History of blood transfusion [Z92.89] 08/01/2020    History of disarticulation of right hip [Z89.621] 07/31/2020    Leukocytosis [D72.829] 07/31/2020    Chronic hypertension affecting pregnancy [O10.919] 07/11/2020    Sarcoma of bone ([facility] [address] [C41.9] 07/07/2020    Iron deficiency anemia [D50.9] 07/02/2020    Gestational diabetes [O24.419] 06/30/2020    Thrombocytosis ([address] [D47.3] 06/30/2020    Family history of diabetes mellitus [Z83.3] 06/08/2020    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 26 weeks pregnant  3. 40 of Lovenox for DVT prophylaxis  4. On novolin shiv check a1c  5.  On gabapentin 200 mg 3 times a day iron deficiency anemia perioperative blood loss on ferrous sulfate  6. Tachycardia with exertion  7. Denies any chest pain asymptomatic we will get a 12-lead EKG    Consultations:   IP CONSULT TO DIETITIAN  IP CONSULT TO SOCIAL WORK  IP CONSULT TO INTERNAL MEDICINE  IP CONSULT TO OB GYN  IP CONSULT TO OB GYN      Magdalene Summers MD  8/2/2020  2:17 PM    Copy sent to Dr. Devon Spencer, APRN - CNP    Please note that this chart was generated using voice recognition Dragon dictation software. Although every effort was made to ensure the accuracy of this automated transcription, some errors in transcription may have occurred.

## 2020-08-02 NOTE — PROGRESS NOTES
[facility]    ACUTE REHABILITATION OCCUPATIONAL THERAPY  DAILY NOTE    Date: 20  Patient Name: Miranda Becker      Room: 1049/7686-28    MRN: [id number]   : 1995  (22 y.o.)  Gender: female   Referring Practitioner: Dr. Raciel Dao  Diagnosis: R hip disarticulation on 20 due to osteosarcoma  Additional Pertinent Hx: Miranda Becker is a 22 y.o. right-handed     female admitted to the [address] [place] [address] on 2020. She was originally admitted to [address] [place], [place] [address] on 2020 for planned amputation R leg for Stage 3 sarcoma. She had R hip disarticulation performed 2020. She is pregnant - at 21 weeks gestation with comorbid gestational diabetes. She is NWB on RLE/R pelvis. Restrictions  Restrictions/Precautions: Weight Bearing(NWB Right pelvis - do not roll onto R hip or weight shift onto R hip while seated)  Other position/activity restrictions: Per Dr. Lena Elizabeth, Pt's orthopaedic resident, no showering until the patient follows up with the Ortho surgeon on . Right Lower Extremity Weight Bearing: Non Weight Bearing(NWB R pelvis, OK to sit upright- per PT notes from [facility]  Equipment Used: Bed, Wheelchair    Subjective  Subjective: \"I think I might have peed myself\" Pt reports feeling \"spasms\" in her R hip this AM and reports that she was incontinent as a result. STEPHANE Cardenas notified of Pt's concerns regarding pain in R hip as well as incontience. RN present to change R hip dressing at end of OT session.   Patient Currently in Pain: Yes  Pain Level: 4  Pain Orientation: Left  Restrictions/Precautions: Weight Bearing(NWB Right pelvis - do not roll onto R hip or weight shift onto R hip while seated)        Pain Assessment  Pain Assessment: 0-10  Pain Level: 4  Patient's Stated Pain Goal: No pain  Pain Type: Acute pain, Phantom pain(more of phantom pain today 4/10, incision pain 2/10)  Pain Location: Leg  Pain Orientation: Left  Pain Descriptors: Spasm  Pain Frequency: Intermittent  Clinical Progression: Not changed    Objective  Cognition  Overall Cognitive Status: WFL  Perception  Overall Perceptual Status: WFL  Balance  Sitting Balance: Stand by assistance  Standing Balance: Contact guard assistance(no loss of balance noted this date)  Bed mobility  Rolling to Left: Stand by assistance  Rolling to Right: (Do not roll on right side due to incision from surgery)  Supine to Sit: Stand by assistance  Sit to Supine: Stand by assistance  Scooting: Stand by assistance  Transfers  Sit to stand: Contact guard assistance  Stand to sit: Contact guard assistance  Transfer Comments: Good safety awareness noted with hand placement during transfers  Standing Balance  Time: AM: 2-3 minutes x 1, 1-2 mintues x 3  Activity: AM: self-care tasks  Comment: 0-2 UE support via grab bar or sink in bathroom  Functional Mobility  Functional - Mobility Device: Wheelchair  Activity: To/from bathroom  Assist Level: Supervision  Functional Mobility Comments: 1-2 verbal cues as needed for technique  Toilet Transfers  Toilet - Technique: Stand pivot; To right; To left  Equipment Used: Grab bars  Toilet Transfer: Minimal assistance  Toilet Transfers Comments: Fair carryover of positioning of wheelchair for safety during functional transfer  Shower Transfers  Shower Transfers Comments: Per ortho surgeon, Pt may not shower until after her follow-up appointment on 8/19/20. ADL  Equipment Provided: Reacher;Sock aid;Long-handled sponge(elastic shoelaces in PM)  Feeding: Independent  Grooming: Supervision;Setup(seated in wheelchair at sink)  UE Bathing: Supervision;Setup(seated in wheelchair at sink)  LE Bathing: Minimal assistance;Setup; Increased time to complete(assist to dry L foot; contact guard assist while standing)  UE Dressing: Supervision;Setup(while seated in wheelchair)  LE Dressing: Minimal assistance;Setup; Increased time to complete(assist L SADIE hose & shoe in AM; contact guard while standing)  Toileting: Moderate assistance;Setup(assist to pull pants over hips in PM with nursing)  Additional Comments: OT provided eduation, demonstration, and opportunities for Pt practice with assistive equipment including reacher, sock aid, pants clip, elastic shoelace, and long-handled sponge. Pt is open and agreeable to trialling any piece of assistive equipment to maximize independence with self-care tasks. Tomorrow's OT session to further address elastic shoelace, pants clip, and modified dressing technique of performing lower body dressing while seated edge of bed for more stability with LLE. Continue OT POC. Assessment  Performance deficits / Impairments: Decreased functional mobility ; Decreased ADL status; Decreased strength;Decreased safe awareness;Decreased endurance;Decreased sensation;Decreased balance;Decreased high-level IADLs;Decreased posture  Prognosis: Good  Discharge Recommendations: Patient would benefit from continued therapy after discharge  Activity Tolerance: Patient Tolerated treatment well  Safety Devices in place: Yes  Type of devices: All fall risk precautions in place;Call light within reach;Gait belt;Patient at risk for falls; Left in bed;Nurse notified          Patient Education: OT POC, use of assistive equipment including reacher, sock aid, long-handled sponge, pants clip, and elastic shoelaces  Patient Goals   Patient goals :  \"My goal is to be as independent as I can be\"  Learner:patient  Method: demonstration and explanation       Outcome: needs reinforcement and asked questions        Plan  Plan  Times per week: 5-7  Times per day: Twice a day  Current Treatment Recommendations: Self-Care / ADL, Home Management Training, Strengthening, Balance Training, Functional Mobility Training, Endurance Training, Wheelchair Mobility Training, Pain Management, Safety Education & Training, Patient/Caregiver Education & Training, Equipment Evaluation, Education, & procurement, Positioning  Patient Goals   Patient goals : \"My goal is to be as independent as I can be\"  Short term goals  Time Frame for Short term goals: 5 to 7 days  Short term goal 1: Pt will verbalize/demonstrate Good understanding of assistive equipment/durable medical equipment/modified techniques as needed for increased independence with self-care and mobility. Short term goal 2: Pt will perform lower body dressing and toileting tasks with Minimal assist and use of assistive equipment/modified techniques as needed. Short term goal 3: Pt will perform functional mobility and transfers during self-care with stand by assist, least restrictive mobility device, and Good safety. Short term goal 4: Pt will stand for 4+ minutes with 1-2 UE support, stand by assist, and no loss of balance while engaging in functional activity of choice. Short term goal 5: Pt will actively participate in 30+ minutes of therapeutic exercise/functional activities to promote increased independence with self-care and mobility. Long term goals  Time Frame for Long term goals : By discharge  Long term goal 1: Pt will perform BADLs with modified independence and Good safety. Long term goal 2: Pt will perform functional mobility and transfers during self-care with modified independence, least restrictive mobility device, and Good safety. Long term goal 3: Pt will stand for 8+ minutes with 1-2 UE support, modified independence, and no loss of balance while engaging in self-care/functional activity of choice. Long term goal 4: Pt will perform simple meal prep/light housekeeping with modified independence and Good safety.        08/02/20 0840 08/02/20 1404 08/02/20 1426   OT Individual Minutes   Time In 0840 1404  --    Time Out 0941 1425  --    Minutes 61 21  --    Minute Variance   Variance  --   --  8   Reason  --   --  Pain  (Pt requests return to bed in PM due to pain)   Time Code Minutes    Timed Code Treatment Minutes 61 Minutes 21 Minutes  --        Electronically signed by Doris Kaba OT on 8/2/20 at 3:09 PM EDT

## 2020-08-02 NOTE — PROGRESS NOTES
Physical Therapy  Facility/Department: [facility] ACUTE REHAB  Daily Treatment Note  NAME: Latrice Sparks  : 1995  MRN: [id number]    Date of Service: 2020    Discharge Recommendations:  Home with assist PRN   PT Equipment Recommendations  Equipment Needed: Yes  Other: Light weight w/c, RW    Assessment   Body structures, Functions, Activity limitations: Decreased functional mobility ; Decreased ROM; Decreased strength;Decreased endurance;Decreased balance; Increased pain;Decreased posture  Assessment: [facility] due to osteosarcoma. Pt very motivated and has good family support. Pt present with overall decreased strength, including core strength, affecting her posture and mobility. Today pt has increased phantom pain, mirror therapy initiated, pt also tachy during a:m session. Pt given rest breaks as needed. RN informed too. Treatment Diagnosis: Impaired fucntion. Prognosis: Good  Decision Making: High Complexity  Exam: ROM, MMT, fucntional mobility. REQUIRES PT FOLLOW UP: Yes  Activity Tolerance  Activity Tolerance: Patient limited by fatigue     Patient Diagnosis(es): There were no encounter diagnoses. has a past medical history of Diabetes mellitus ([facility] [address] and Osteosarcoma. has a past surgical history that includes amputation (Right, 2020). Restrictions  Restrictions/Precautions  Restrictions/Precautions: Weight Bearing(NWB Right pelvis - do not roll onto R hip or weight shift onto R hip while seated)  Lower Extremity Weight Bearing Restrictions  Right Lower Extremity Weight Bearing: Non Weight Bearing(NWB R pelvis, OK to sit upright- per PT notes from [facility]  Position Activity Restriction  Other position/activity restrictions: Per Dr. Hugo Hernandez, Pt's orthopaedic resident, no showering until the patient follows up with the Ortho surgeon on .   Subjective   General  Additional Pertinent Hx: Latrice Sparks  is a 22 y.o. right-handed    female admitted to the [address] [place] [address] unit on 7/31/2020. She was originally admitted to [address] [place] [address],[address] on 7/23/2020 for planned amputation R leg for Stage 3 sarcoma. She had R hip disarticulation performed 7/23/2020. She is pregnant - at 21 weeks gestation with comorbid gestational diabetes. She is NWB on RLE/R pelvis. Referring Practitioner: Dr Terri Costello: Pt reports increased nerve/phantom pain today. Discussed benefit of mirror therapy for phantom pain. Pain Screening  Patient Currently in Pain: Yes  Pain Assessment  Pain Assessment: 0-10  Pain Level: 4  Patient's Stated Pain Goal: No pain  Pain Type: Acute pain; Phantom pain;Surgical pain(more of phantom pain today 4/10, incision pain 2/10)  Pain Location: Hip;Leg  Pain Orientation: Right  Pain Descriptors: Spasm(and nerve pain)  Pain Frequency: Continuous  Pain Onset: On-going  Clinical Progression: Gradually worsening  Functional Pain Assessment: Prevents or interferes some active activities and ADLs  Response to Pain Intervention: Asleep with RR greater than 10(STEPHANE Zuleta notified.)  Vital Signs  Pulse: 121(flucutates between 109 to 121, mostly stays in high 110's.)  Heart Rate Source: Monitor  BP Location: Left Arm  Level of Consciousness: Alert  Patient Currently in Pain: Yes  Oxygen Therapy  O2 Device: None (Room air)       Orientation  Orientation  Overall Orientation Status: Within Functional Limits  Cognition      Objective   Bed mobility  Rolling to Left: Stand by assistance  Rolling to Right: (Do not roll n right side due to incision from surgery)  Supine to Sit: Stand by assistance  Sit to Supine: Stand by assistance  Scooting: Stand by assistance  Transfers  Sit to Stand: Contact guard assistance;Stand by assistance  Stand to sit: Contact guard assistance  Bed to Chair: Contact guard assistance(RW)  Comment: Provided verbal/tactile cues and physical assist as needed to progress functional mobility including bed mobility, transfers and standing tolerance  in // bars/RW  Ambulation  Ambulation?: Yes  Ambulation 1  Surface: level tile  Device: Parallel Bars  Assistance: Contact guard assistance  Quality of Gait: L LE shaky due to weakness, pt slight nervous , but able to conintue with treatment progression, pt motivated. Gait Deviations: Slow Laurence;Decreased step length;Decreased step height  Distance: 5 steps forward, standing rest and trun around takes another 5 steps. Comments: HR flucutates between 109 to 121, pt given extended rest periods, HR mostly stays in high 110's. RN florencio notified. Wheelchair Activities  Wheelchair Type: Standard  Wheelchair Cushion: Pressure Relieving  Pressure Relief Type: Lateral lean;Push up  Level of Assistance for pressure relief activities: Stand by assistance  Propulsion: Yes  Propulsion 1  Propulsion: Manual  Level: Level Tile  Method: RUE;LUE  Level of Assistance: Contact guard assistance;Stand by assistance  Description/ Details: SBA straight path,  CGA/min A around corners. Distance: 150 ft, pt stops breiefly in between during propulsion. Balance  Posture: Fair(Increased weight shift noted on L hip)  Sitting - Static: Fair;+(needs ue support to maiantian balance)  Sitting - Dynamic: Fair;-  Standing - Static: Fair(RW)  Standing - Dynamic: Fair;-(RW)  Other exercises  Other exercises?: Yes  Other exercises 1: Seated LLE 2 lb, 15 reps, Blue T band 15 reps. 5 reps w/c pushups  Other exercises 2: Standing tolerance in // bar, stood  for 40 secs, 45 secs, L LE gets shaky due to weakness/fatique  Other exercises 3: Mirror therapy while seated in w/c for phantom pain.     AROM RLE (degrees)  RLE General AROM: R Hip disarticulation  AROM LLE (degrees)  LLE AROM : WFL  Strength RLE  Comment: NA  Strength LLE  Comment: Grossly 4-/5                 G-Code     OutComes Score                                                     AM-PAC Score             Goals  Short term goals  Time Frame for Short term goals: 1 week  Short term goal 1: Pt able to roll to left side for psotion change/pressure relief, iindependently. Short term goal 2: Pt able to perform supine <>sit at supervsion level. Short term goal 3: Pt able to perform sit <>stand at SBA  Short term goal 4:  Pt able to transfer from bed<>W/C<>toilet at [facility]  Short term goal 5: Pt to demonstrate independence in pressure reliefing technique while in bed,  chair or w/c. Short term goal 6: Pt able to manuever w/c on level surface, 150 ft, SBA  Short term goal 7:  Pt able to ambulate with RW dist of 10 to 15 ft, CGA  Long term goals  Time Frame for Long term goals : By LOS  Long term goal 1:  Pt able to trasnfer independently  Long term goal 2: Pt able to ambulate with RW dist of 10 to 15 ft with RW at supervsion level. Long term goal 3: Pt able to propel w/c on incline surfaces at SBA, dist of 50 to 80 ft   Long term goal 4: Pt able to proel w/c dist of 150 ft, on level surfaces, independently. Patient Goals   Patient goals : Get stronger    Plan    Plan  Times per week: 1.5 hr/day, 5 to 7 days/week.   Current Treatment Recommendations: Strengthening, Balance Training, Functional Mobility Training, Transfer Training, Endurance Training, Wheelchair Mobility Training, Gait Training, Safety Education & Training, Patient/Caregiver Education & Training, Equipment Evaluation, Education, & procurement, Positioning  Safety Devices  Type of devices: Gait belt, Left in chair, Call light within reach     Therapy Time   Individual Concurrent Group Co-treatment   Time In 1300         Time Out 1345         Minutes [facility], PT

## 2020-08-03 LAB
GLUCOSE BLD-MCNC: 102 MG/DL (ref 65–105)
GLUCOSE BLD-MCNC: 93 MG/DL (ref 65–105)
GLUCOSE BLD-MCNC: 94 MG/DL (ref 65–105)
GLUCOSE BLD-MCNC: 95 MG/DL (ref 65–105)

## 2020-08-03 PROCEDURE — 97110 THERAPEUTIC EXERCISES: CPT

## 2020-08-03 PROCEDURE — 97116 GAIT TRAINING THERAPY: CPT

## 2020-08-03 PROCEDURE — 99232 SBSQ HOSP IP/OBS MODERATE 35: CPT | Performed by: PHYSICAL MEDICINE & REHABILITATION

## 2020-08-03 PROCEDURE — 1180000000 HC REHAB R&B

## 2020-08-03 PROCEDURE — 97535 SELF CARE MNGMENT TRAINING: CPT

## 2020-08-03 PROCEDURE — 97530 THERAPEUTIC ACTIVITIES: CPT

## 2020-08-03 PROCEDURE — 99232 SBSQ HOSP IP/OBS MODERATE 35: CPT | Performed by: INTERNAL MEDICINE

## 2020-08-03 PROCEDURE — 6360000002 HC RX W HCPCS: Performed by: PHYSICAL MEDICINE & REHABILITATION

## 2020-08-03 PROCEDURE — 6370000000 HC RX 637 (ALT 250 FOR IP): Performed by: PHYSICAL MEDICINE & REHABILITATION

## 2020-08-03 PROCEDURE — 82947 ASSAY GLUCOSE BLOOD QUANT: CPT

## 2020-08-03 PROCEDURE — 97542 WHEELCHAIR MNGMENT TRAINING: CPT

## 2020-08-03 PROCEDURE — 6370000000 HC RX 637 (ALT 250 FOR IP): Performed by: STUDENT IN AN ORGANIZED HEALTH CARE EDUCATION/TRAINING PROGRAM

## 2020-08-03 RX ADMIN — OXYCODONE HYDROCHLORIDE 5 MG: 5 TABLET ORAL at 21:28

## 2020-08-03 RX ADMIN — OXYCODONE HYDROCHLORIDE 5 MG: 5 TABLET ORAL at 09:12

## 2020-08-03 RX ADMIN — ASPIRIN 81 MG CHEWABLE TABLET 81 MG: 81 TABLET CHEWABLE at 09:04

## 2020-08-03 RX ADMIN — Medication 1 TABLET: at 09:04

## 2020-08-03 RX ADMIN — FERROUS SULFATE TAB 325 MG (65 MG ELEMENTAL FE) 325 MG: 325 (65 FE) TAB at 09:04

## 2020-08-03 RX ADMIN — ENOXAPARIN SODIUM 40 MG: 40 INJECTION SUBCUTANEOUS at 09:04

## 2020-08-03 RX ADMIN — POLYETHYLENE GLYCOL 3350 17 G: 17 POWDER, FOR SOLUTION ORAL at 09:04

## 2020-08-03 RX ADMIN — GABAPENTIN 300 MG: 300 CAPSULE ORAL at 09:04

## 2020-08-03 RX ADMIN — GABAPENTIN 300 MG: 300 CAPSULE ORAL at 14:11

## 2020-08-03 RX ADMIN — GABAPENTIN 300 MG: 300 CAPSULE ORAL at 21:27

## 2020-08-03 ASSESSMENT — PAIN DESCRIPTION - DESCRIPTORS: DESCRIPTORS: ACHING;DISCOMFORT

## 2020-08-03 ASSESSMENT — PAIN SCALES - GENERAL
PAINLEVEL_OUTOF10: 4
PAINLEVEL_OUTOF10: 3
PAINLEVEL_OUTOF10: 4
PAINLEVEL_OUTOF10: 3
PAINLEVEL_OUTOF10: 4

## 2020-08-03 ASSESSMENT — PAIN DESCRIPTION - PAIN TYPE
TYPE: PHANTOM PAIN

## 2020-08-03 ASSESSMENT — PAIN DESCRIPTION - LOCATION
LOCATION: HIP;LEG

## 2020-08-03 ASSESSMENT — PAIN DESCRIPTION - ORIENTATION
ORIENTATION: RIGHT
ORIENTATION: RIGHT

## 2020-08-03 ASSESSMENT — PAIN DESCRIPTION - FREQUENCY: FREQUENCY: CONTINUOUS

## 2020-08-03 ASSESSMENT — PAIN DESCRIPTION - PROGRESSION: CLINICAL_PROGRESSION: NOT CHANGED

## 2020-08-03 ASSESSMENT — PAIN DESCRIPTION - ONSET: ONSET: ON-GOING

## 2020-08-03 NOTE — PROGRESS NOTES
Physical Therapy  [address]  Acute Rehabilitation Physical Therapy Progress Note    Date: 8/3/20  Patient Name: Yo Mesa       Room: [facility]7/1872-28  MRN: [id number]   Account: [de-identified]   : 1995  (22 y.o.) Gender: female     Referring Practitioner: Dr. 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 Past Surgical History:   has a past surgical history that includes amputation (Right, 2020). Additional Pertinent Hx: Yo Mesa  is a 22 y.o. right-handed     female admitted to the [address] [place] [address] unit on 2020. She was originally admitted to [address] [place] [address],[address] on 2020 for planned amputation R leg for Stage 3 sarcoma. She had R hip disarticulation performed 2020. She is pregnant - at 21 weeks gestation with comorbid gestational diabetes. She is NWB on RLE/R pelvis. Restrictions/Precautions  Restrictions/Precautions: Weight Bearing(NWB Right pelvis - do not roll onto R hip or weight shift onto R hip while seated)  Lower Extremity Weight Bearing Restrictions  Right Lower Extremity Weight Bearing: Non Weight Bearing(NWB R pelvis, OK to sit upright- per PT notes from [facility]  Position Activity Restriction  Other position/activity restrictions: Per Dr. Jaquan Card, Pt's orthopaedic resident, no showering until the patient follows up with the Ortho surgeon on .             Vital Signs  Patient Currently in Pain: Yes  Pain Assessment: 0-10  Pain Level: 3  Pain Type: Phantom pain  Pain Location: Hip;Leg                Bed Mobility:   Bed Mobility  Rolling: Supervision;Rolling Left  Supine to Sit: Supervision  Sit to Supine: Supervision  Scooting: Supervision  Comment: mat table with wedge and 2 pillows  Bed mobility  Scooting: Supervision    Transfers:  Sit to Stand: Contact guard assistance;Stand from edge of mat with rolling walker  Other exercises 5: standing in // bars: L heel raises and mini squats  Other exercises 6: standing in // bars: standing liliya with single UE support x 10 ea side -40 seconds total  Other exercises 7: supine 5# bar upper body ex x 15 in all directions           Activity Tolerance: Patient Tolerated treatment well  PT Equipment Recommendations  Equipment Needed: Yes  Other: Light weight w/c, RW  Other Comments  Comments: Pt reported she is having some issues with swelling in R groin/pelvis area. Encouraged pt with icing following therapy sessions and to lye down in bed with head of bed elevated to assist with pressure relief    Patient Education  New Education Provided:  w/c mobility on ramp, gait training with RW, core/bal activities, LE strengthening ex  Learner:patient  Method: explanation       Outcome: demonstrated understanding     Current Treatment Recommendations: Strengthening, Balance Training, Functional Mobility Training, Transfer Training, Endurance Training, Wheelchair Mobility Training, Gait Training, Safety Education & Training, Patient/Caregiver Education & Training, Equipment Evaluation, Education, & procurement, Positioning    Conditions Requiring Skilled Therapeutic Intervention  Body structures, Functions, Activity limitations: Decreased functional mobility ; Decreased ROM; Decreased strength;Decreased endurance;Decreased balance; Increased pain;Decreased posture  Assessment: Pt admitted to reb unit s/p r Hip disacticualtion due to osteosarcoma. Pt very motivated and has good family support. Pt present with overall decreased strength, including core strength, affecting her posture and mobility. Today pt has increased phantom pain, mirror therapy initiated, pt also tachy during a:m session. Pt given rest breaks as needed. RN informed too. Treatment Diagnosis: Impaired function.   Prognosis: Good  REQUIRES PT FOLLOW UP: Yes  Discharge Recommendations: Home with assist PRN    Goals  Short term goals  Time Frame for Short term goals: 1 week  Short term goal 1: Pt able to roll to left side for psotion change/pressure relief, iindependently. Short term goal 2: Pt able to perform supine <>sit at supervsion level. Short term goal 3: Pt able to perform sit <>stand at SBA  Short term goal 4:  Pt able to transfer from bed<>W/C<>toilet at [facility]  Short term goal 5: Pt to demonstrate independence in pressure reliefing technique while in bed,  chair or w/c. Short term goal 6: Pt able to manuever w/c on level surface, 150 ft, SBA  Short term goal 7:  Pt able to ambulate with RW dist of 10 to 15 ft, CGA  Long term goals  Time Frame for Long term goals : By LOS  Long term goal 1:  Pt able to trasnfer independently  Long term goal 2: Pt able to ambulate with RW dist of 10 to 15 ft with RW at supervsion level. Long term goal 3: Pt able to propel w/c on incline surfaces at SBA, dist of 50 to 80 ft   Long term goal 4: Pt able to proel w/c dist of 150 ft, on level surfaces, independently.        08/03/20 0749 08/03/20 1614   PT Individual Minutes   Time In 4700 1445   Time Out 0848 1520   Minutes 59 35       Electronically signed by Charla Rhodes on 8/3/20 at 4:18 PM EDT

## 2020-08-03 NOTE — PLAN OF CARE
Individualized Plan of Care  [address]  [facility]    Rehabilitation physician: Dr Abbey Jackson Date: 7/31/2020     Rehabilitation Diagnosis: History of disarticulation of right hip [Z89.621]     Rehabilitation impairments: self care, mobility, pain management and safety    Factors facilitating achievement of predicted outcomes: Family support, Motivated, Cooperative, Pleasant, Good insight into deficits, Home is wheelchair accessible and Has homemaker services  Barriers to the achievement of predicted outcomes: Pain, Depression, Decreased endurance, Medical complications, Wound Care and Medication managment    Patient Goals: Improve independence with mobility, Improvement of mobility at a wheelchair level, Increase overall strength and endurance, Increase balance, Increase independence with activities of daily living, Increase safety awareness, Integrate appropriate pain management plan, Assure adequate nutritional option for discharge and Provide appropriate patient and family education      NURSING:  Nursing goals for Clarisse Herrera while on the rehabilitation unit will include:  Adequate number of bowel movements, Urinate with no urinary retention >300ml in bladder, Maintain O2 SATs at an acceptable level during stay, Effective pain management while on the rehabilitation unit, Establish adequate pain control plan for discharge, Absence of skin breakdown while on the rehabilitation unit, Improved skin integrity via assessments including wound measurements, Avoidance of any hospital acquired infections, No signs/symptoms of infection at the wound site, Freedom from injury during hospitalization and Complete education with patient/family with understanding demonstrated regarding disease process and resultant impairment     In order to achieve these goals, nursing interventions may include bowel/bladder training, education for medical assistive devices, medication education, O2 saturation management, energy conservation, stress management techniques, fall prevention, alarms protocol, seating and positioning, skin/wound care, pressure relief instruction, dressing changes, infection protection, DVT prophylaxis, assistance with safe transfers , and/or assistance with bathroom activities and hygiene. PHYSICAL THERAPY:  Goals:        Short term goals  Time Frame for Short term goals: 1 week  Short term goal 1: Pt able to roll to left side for psotion change/pressure relief, iindependently. Short term goal 2: Pt able to perform supine <>sit at supervsion level. Short term goal 3: Pt able to perform sit <>stand at SBA  Short term goal 4:  Pt able to transfer from bed<>W/C<>toilet at [facility]  Short term goal 5: Pt to demonstrate independence in pressure reliefing technique while in bed,  chair or w/c. Short term goal 6: Pt able to manuever w/c on level surface, 150 ft, SBA  Short term goal 7:  Pt able to ambulate with RW dist of 10 to 15 ft, CGA  Long term goals  Time Frame for Long term goals : By LOS  Long term goal 1:  Pt able to trasnfer independently  Long term goal 2: Pt able to ambulate with RW dist of 10 to 15 ft with RW at supervsion level. Long term goal 3: Pt able to propel w/c on incline surfaces at SBA, dist of 50 to 80 ft   Long term goal 4: Pt able to proel w/c dist of 150 ft, on level surfaces, independently. Plan of Care: Pt to be seen by physical therapy services 1 Hour 30 Minutes per day at least 5 out of 7 days per week     Anticipated interventions may include therapeutic exercises, gait training, neuromuscular re-ed, transfer training, community reintegration, bed mobility, w/c mobility and training.       OCCUPATIONAL THERAPY:  Goals:             Short term goals  Time Frame for Short term goals: 5 to 7 days  Short term goal 1: Pt will verbalize/demonstrate Good understanding of assistive equipment/durable medical equipment/modified techniques as needed for increased independence with self-care and mobility. Short term goal 2: Pt will perform lower body dressing and toileting tasks with Minimal assist and use of assistive equipment/modified techniques as needed. Short term goal 3: Pt will perform functional mobility and transfers during self-care with stand by assist, least restrictive mobility device, and Good safety. Short term goal 4: Pt will stand for 4+ minutes with 1-2 UE support, stand by assist, and no loss of balance while engaging in functional activity of choice. Short term goal 5: Pt will actively participate in 30+ minutes of therapeutic exercise/functional activities to promote increased independence with self-care and mobility. Long term goals  Time Frame for Long term goals : By discharge  Long term goal 1: Pt will perform BADLs with modified independence and Good safety. Long term goal 2: Pt will perform functional mobility and transfers during self-care with modified independence, least restrictive mobility device, and Good safety. Long term goal 3: Pt will stand for 8+ minutes with 1-2 UE support, modified independence, and no loss of balance while engaging in self-care/functional activity of choice. Long term goal 4: Pt will perform simple meal prep/light housekeeping with modified independence and Good safety. Plan of Care: Patient to be seen by occupational therapy services 1 Hour 30 Minutes per day at least 5 out of 7 days per week     Anticipated interventions may include ADL and IADL retraining, strengthening, safety education and training, patient/caregiver education and training, equipment evaluation/ training/procurement, neuromuscular reeducation, wheelchair mobility training. SPEECH THERAPY:   Goals:                      Plan of Care:     CASE MANAGEMENT:  Goals:   Assist patient/family with discharge planning, patient/family counseling,  and coordination with insurance during the inpatient rehabilitation stay.          Other members of the multidisciplinary rehabilitation team that will be involved in the patient's plan of care include recreational therapy, dietary, respiratory therapy, and neuropsychology. Medical issues being managed closely and that require 24 hour availability of a physician:  Weight bearing precautions, Wound care, Pain management, Infection protection, DVT prophylaxis, Fall precautions, Fluid/Electrolyte balance and Nutritional status                                           Physician anticipated functional outcomes: Improved independence with functional measures   Estimated length of stay for this admission 2 weeks  Medical Prognosis: Good  Anticipated disposition: Home. The potential to achieve the above medical and rehabilitative goals is good. This plan of care has been developed with the assistance and input of the multidisciplinary rehabilitation team.  The plan was reviewed with the patient on 8/3/2020. The patient has had the opportunity to provide input to the therapy team.    I have reviewed this Individualized Plan of Care and agree with its contents. Above documentation has been expanded, modified, adjusted to reflect the findings of my evaluations and goals for the patient.     Physician:  Electronically signed by Abraham Burks MD on 8/3/20 at 9:00 AM EDT

## 2020-08-03 NOTE — PATIENT CARE CONFERENCE
assist ignacio NOEL    Minimal assistance;Setup; Increased time to complete(assist L SADIE hose & shoe in AM; contact guard while standing)   Toilet Transfer   4  Assistance Needed: Supervision or touching assistance      Toilet - Technique: Stand pivot; To right; To left  Equipment Used: Grab bars  Toilet Transfer: Contact guard assistance  Toilet Transfers Comments: Fair carryover of positioning of wheelchair for safety during functional transfer   [address]   3 Assistance Needed: Partial/moderate assistance      Moderate assistance;Setup(assist to pull pants over hips in PM with nursing)    Bed mobility  Rolling to Left: Stand by assistance  Rolling to Right: (Do not roll on right side due to incision from surgery)  Supine to Sit: Stand by assistance  Sit to Supine: Stand by assistance  Scooting: Stand by assistance        Balance  Sitting Balance: Stand by assistance  Standing Balance: Stand by assistance  Standing Balance  Time: 2 min, 1 min x3   Activity: ADL, funcitonal standing   Comment: 0-2 UE support via grab bar or sink in bathroom    Short term goals  Time Frame for Short term goals: 5 to 7 days  Short term goal 1: Pt will verbalize/demonstrate Good understanding of assistive equipment/durable medical equipment/modified techniques as needed for increased independence with self-care and mobility. Short term goal 2: Pt will perform lower body dressing and toileting tasks with Minimal assist and use of assistive equipment/modified techniques as needed. Short term goal 3: Pt will perform functional mobility and transfers during self-care with stand by assist, least restrictive mobility device, and Good safety. Short term goal 4: Pt will stand for 4+ minutes with 1-2 UE support, stand by assist, and no loss of balance while engaging in functional activity of choice.   Short term goal 5: Pt will actively participate in 30+ minutes of therapeutic exercise/functional activities to promote increased prep/light housekeeping with modified independence and Good safety. ST:     Team Members Present at Conference:  :  [address], [address] [place] [address]  Occupational Therapist: Ni Wiggins OT   [address] PT  Speech Therapist:  N/A  Nurse: Dana Garcia RN   Dietary/Nutrition: Cici Adrian RD LD    Pastoral Care: Eliecer Saleh  CMG:   Miguelito Piña RN     I approve the established interdisciplinary plan of care as documented within the medical record of Yo Mesa.     Fiorella Olson MD

## 2020-08-03 NOTE — PROGRESS NOTES
[facility] Internal Medicine    Progress Note  Patient assessed for rehabilitation services?: Yes  Additional Pertinent Hx: Ramiro Macdonald  is a 22 y.o. right-handed     female admitted to the [address] [place] [address] unit on 7/31/2020. She was originally admitted to [address] [place] [address],[address] on 7/23/2020 for planned amputation R leg for Stage 3 sarcoma. She had R hip disarticulation performed 7/23/2020. She is pregnant - at 21 weeks gestation with comorbid gestational diabetes. She is NWB on RLE/R pelvis. Referring Practitioner: Dr Leonard Guerra  8/3/2020    11:18 AM    Name:   Ramiro Macdonald  MRN:     [id number]     Acct:      [de-identified]   Room:   [address] [place] [address] Day:  3  Admit Date:  7/31/2020  7:15 PM    PCP:   FATEMEH Alcantar CNP  Code Status:  Full Code    Subjective:     C/C: No chief complaint on file. Principal Problem:    History of disarticulation of right hip  Active Problems:    Pregestational Diabetes    Family history of diabetes mellitus    Gestational diabetes    Iron deficiency anemia    Sarcoma of bone ([facility]    Thrombocytosis ([facility]    Chronic hypertension affecting pregnancy    Leukocytosis    History of blood transfusion    History of E. Coli UTI 10-<100,000CFU (7/7/20)  Resolved Problems:    * No resolved hospital problems. *      Interval History Status: improved.        S/p disarticulation surgery following diagnosis of a sarcoma  She states she is doing much better  Would like to wait until after the completion of pregnancy before she tries her prosthesis  Working with physical therapy  No other complaints overnight     Significant last 24 hr data reviewed ;   Vitals:    08/02/20 1300 08/02/20 1532 08/02/20 1918 08/03/20 0624   BP:   118/74 125/80   Pulse: 125  107 101   Resp:   16 16   Temp:   97.9 °F (36.6 °C) 97.5 °F (36.4 °C)   TempSrc:   Oral Oral   SpO2: 100%  98% 97%   Weight:       Height:  5' 2\" (1.575 m)        Recent Results (from the past 24 hour(s))   POC Glucose Fingerstick    Collection Time: 08/02/20  3:54 PM   Result Value Ref Range    POC Glucose 92 65 - 105 mg/dL   POC Glucose Fingerstick    Collection Time: 08/02/20  7:50 PM   Result Value Ref Range    POC Glucose 108 (H) 65 - 105 mg/dL   POC Glucose Fingerstick    Collection Time: 08/03/20  6:21 AM   Result Value Ref Range    POC Glucose 95 65 - 105 mg/dL   POC Glucose Fingerstick    Collection Time: 08/03/20 10:48 AM   Result Value Ref Range    POC Glucose 93 65 - 105 mg/dL     Recent Labs     08/02/20  1116 08/02/20  1554 08/02/20  1950 08/03/20  0621 08/03/20  1048   POCGLU 90 92 108* 95 93        No results found. HPI:   See history in H and P      Review of Systems:     Constitutional:  negative for chills, fevers, sweats  Respiratory:  negative for cough, dyspnea on exertion, hemoptysis, shortness of breath, wheezing  Cardiovascular:  negative for chest pain, chest pressure/discomfort, lower extremity edema, palpitations  Gastrointestinal:  negative for abdominal pain, constipation, diarrhea, nausea, vomiting  Neurological:  negative for dizziness, headache  Data:     Past Medical History:  no change     Social History:  no change    Family History: @no change    Vitals:      I/O (24Hr):     Intake/Output Summary (Last 24 hours) at 8/3/2020 1118  Last data filed at 8/2/2020 1156  Gross per 24 hour   Intake 240 ml   Output 300 ml   Net -60 ml       Labs:    URINE ANALYSIS: No results found for: LABURIN     CBC:  Lab Results   Component Value Date    WBC 6.5 08/01/2020    HGB 11.0 08/01/2020     08/01/2020        BMP:    Lab Results   Component Value Date     08/01/2020    K 4.0 08/01/2020     08/01/2020    CO2 22 08/01/2020    BUN 5 08/01/2020    CREATININE <0.40 08/01/2020    GLUCOSE 106 08/01/2020      LIVER PROFILE:  Lab Results   Component Value Date     08/01/2020    AST 92 08/01/2020    PROT 6.3 08/01/2020    BILITOT 0.53 08/01/2020    BILIDIR 0.28 08/01/2020    LABALBU 3.3 08/01/2020               Radiology:      Physical Examination:        General appearance:  alert, cooperative and no distress  Mental Status:  oriented to person, place and time and normal affect  Lungs:  clear to auscultation bilaterally, normal effort  Heart:  regular rate and rhythm, no murmur  Abdomen:  soft, nontender, nondistended, normal bowel sounds, no masses, hepatomegaly, splenomegaly  Extremities:  no edema, redness, S/p disarticulation on right  Skin:  no gross lesions, rashes, induration    Assessment:        Primary Problem  History of disarticulation of right hip    Active Hospital Problems    Diagnosis Date Noted    History of E. Coli UTI 10-<100,000CFU (7/7/20) [Z87.440] 08/02/2020    History of blood transfusion [Z92.89] 08/01/2020    History of disarticulation of right hip [Z89.621] 07/31/2020    Leukocytosis [D72.829] 07/31/2020    Chronic hypertension affecting pregnancy [O10.919] 07/11/2020    Sarcoma of bone ([facility] [address] [C41.9] 07/07/2020    Iron deficiency anemia [D50.9] 07/02/2020    Gestational diabetes [O24.419] 06/30/2020    Thrombocytosis ([address] [D47.3] 06/30/2020    Family history of diabetes mellitus [Z83.3] 06/08/2020    Pregestational Diabetes [O99.810] 06/02/2020       Plan:        1. 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 26 weeks pregnant  3. 40 of Lovenox for DVT prophylaxis  4. On novolin- HbA1c of 5 (2/8/2020)  5. On gabapentin 300 mg 3 times a day   6. iron deficiency anemia perioperative blood loss on ferrous sulfate  7. Tachycardia with exertion      Medications:      Allergies:  No Known Allergies    Current Meds:   Scheduled Meds:    gabapentin  300 mg Oral TID    ferrous sulfate  325 mg Oral Daily with breakfast    aspirin  81 mg Oral Daily    enoxaparin  40 mg Subcutaneous Daily    polyethylene glycol  17 g Oral Daily    prenatal vitamin  1 tablet Oral Daily     Continuous Infusions: PRN Meds: glucose, glucagon (rDNA), senna, bisacodyl, acetaminophen, cyclobenzaprine, oxyCODONE **OR** oxyCODONE       Ximena Kang MD  8/3/2020  11:18 AM     Attestation and add on       I have discussed the care of Sandra Briggs , including pertinent history and exam findings,      8/3/20    with the resident. I have seen and examined the patient and the key elements of all parts of the encounter have been performed by me . I agree with the assessment, plan and orders as documented by the resident. Principal Problem:    History of disarticulation of right hip  Active Problems:    Pregestational Diabetes    Family history of diabetes mellitus    Gestational diabetes    Iron deficiency anemia    Sarcoma of bone ([address]    Thrombocytosis ([address]    Chronic hypertension affecting pregnancy    Leukocytosis    History of blood transfusion    History of E. Coli UTI 10-<100,000CFU (7/7/20)  Resolved Problems:    * No resolved hospital problems. *            ---- ;        Medications: Allergies:  No Known Allergies    Current Meds:   Scheduled Meds:    gabapentin  300 mg Oral TID    ferrous sulfate  325 mg Oral Daily with breakfast    aspirin  81 mg Oral Daily    enoxaparin  40 mg Subcutaneous Daily    polyethylene glycol  17 g Oral Daily    prenatal vitamin  1 tablet Oral Daily     Continuous Infusions:   PRN Meds: glucose, glucagon (rDNA), senna, bisacodyl, acetaminophen, cyclobenzaprine, oxyCODONE **OR** oxyCODONE        [facility] [address], [address] [place] [address].    Phone [phone]   Fax: [phone]  Answering Service: [phone]

## 2020-08-03 NOTE — CARE COORDINATION
Writer called oncologist office and cancelled the pts appt. They will call the writer and let him know if the dr wants to reschedule or not.

## 2020-08-03 NOTE — PLAN OF CARE
Problem: Falls - Risk of:  Goal: Will remain free from falls  Description: Will remain free from falls  Outcome: Ongoing  Note: No falls or injuries sustained at this time. No attempts to get out of bed without nursing assistance. Call light within reach and pt. uses appropriately for assistance. Siderails up x 2. Nonskid footwear remains on. Bed in low and locked position. Hourly nursing rounds made. Pt. Alert and oriented, aware of limitations, and exhibits good safety judgement. Pt. uses assistive devices appropriately. Pt. understands individual fall risk factors. Pt. reoriented to surroundings and reminded to use call light with each nurse/patient interaction. Bed alarm remains engaged throughout the shift as a precaution.         Problem: Pain:  Goal: Control of acute pain  Description: Control of acute pain  Outcome: Ongoing  Note: Pain assessment and reassessment completed so far this shift. Pt. able to rest after the use of pain medication. Patient medicated with M[place]o for pain this shift as ordered. Patient relates a tolerable level of discomfort with the current medication. Pt. Repositions per self for comfort. Nonverbal cues indicate pain relief. Pt. Rests quietly with eyes closed after pain medication administration. Respirations easy and unlabored. Appears free from distress.         Problem: Skin Integrity:  Goal: Absence of new skin breakdown  Description: Absence of new skin breakdown  Outcome: Ongoing  Note: Skin assessment completed this shift. Nutrition and Hydration status assessed with adequate intake. Arden Score as charted. Pt refuses Pressure Relief Overlay. Left heel remains elevated on pillows throughout the shift. Patient able to reposition self for comfort and to prevent breakdown. Patient verbalizes understanding of pressure ulcer prevention measures. Skin integrity maintained. No new skin breakdown noted. Skin to high risk pressure areas including coccyx is clear.  Right Leg Surgical Site dressing CDI. Redness noted around tape.

## 2020-08-03 NOTE — PROGRESS NOTES
Physical Medicine & Rehabilitation  Progress Note      Subjective:      22year-old female s/p R hip disarticulation amputation for sarcoma. Patient is having problems with pain in the R surgical incision , requiring pain medications and phantom limb pain  She is performing desensitization techniques. ROS:  Denies fevers, chills, sweats. No chest pain, palpitations, lightheadedness. Denies coughing, wheezing or shortness of breath. Denies abdominal pain, nausea, diarrhea or constipation. No new areas of joint pain. Denies new areas of numbness or weakness. Denies new anxiety or depression issues. No new skin problems. Rehabilitation:   Progressing in therapies. PT:  Restrictions/Precautions: Weight Bearing(NWB Right pelvis - do not roll onto R hip or weight shift onto R hip while seated)  Other position/activity restrictions: Per Dr. Ayesha Oliveira, Pt's orthopaedic resident, no showering until the patient follows up with the Ortho surgeon on 8/19. Right Lower Extremity Weight Bearing: Non Weight Bearing(NWB R pelvis, OK to sit upright- per PT notes from [facility].)   Transfers  Sit to Stand: Contact guard assistance, Stand by assistance  Stand to sit: Contact guard assistance, Stand by assistance  Bed to Chair: Contact guard assistance(RW)  Stand Pivot Transfers: Contact guard assistance(w/RW)  Squat Pivot Transfers: Contact guard assistance(no AD W/C<>mat table)  Lateral Transfers: Contact guard assistance  Comment: Standing with RW  Ambulation 1  Surface: level tile  Device: Rolling Walker  Other Apparatus: Wheelchair follow  Assistance: Contact guard assistance  Quality of Gait: L LE shaky due to weakness, however no buckling  Gait Deviations: Slow Laurence, Decreased step length, Decreased step height  Distance: 10ft  Comments: HR remains in 120's with activity.  Rest breaks given PRN    Transfers  Sit to Stand: Contact guard assistance, Stand by assistance  Stand to sit: Contact guard assistance, Stand by assistance  Bed to Chair: Contact guard assistance(RW)  Stand Pivot Transfers: Contact guard assistance(w/RW)  Squat Pivot Transfers: Contact guard assistance(no AD W/C<>mat table)  Lateral Transfers: Contact guard assistance  Comment: Standing with RW  Ambulation  Ambulation?: Yes  Ambulation 1  Surface: level tile  Device: Rolling Walker  Other Apparatus: Wheelchair follow  Assistance: Contact guard assistance  Quality of Gait: L LE shaky due to weakness, however no buckling  Gait Deviations: Slow Laurence, Decreased step length, Decreased step height  Distance: 10ft  Comments: HR remains in 120's with activity. Rest breaks given PRN    Surface: level tile  Ambulation 1  Surface: level tile  Device: Rolling Walker  Other Apparatus: Wheelchair follow  Assistance: Contact guard assistance  Quality of Gait: L LE shaky due to weakness, however no buckling  Gait Deviations: Slow Laurence, Decreased step length, Decreased step height  Distance: 10ft  Comments: HR remains in 120's with activity. Rest breaks given PRN    OT:  ADL  Equipment Provided: Reacher, Sock aid, Long-handled sponge(elastic shoelaces in PM)  Feeding: Independent  Grooming: Supervision, Setup(seated in wheelchair at sink)  UE Bathing: Supervision, Setup(seated in wheelchair at sink)  LE Bathing: Minimal assistance, Setup, Increased time to complete(assist to dry L foot; contact guard assist while standing)  UE Dressing: Supervision, Setup(while seated in wheelchair)  LE Dressing: Minimal assistance, Setup, Increased time to complete(assist L SADIE hose & shoe in AM; contact guard while standing)  Toileting: Moderate assistance, Setup(assist to pull pants over hips in PM with nursing)  Additional Comments: OT provided eduation, demonstration, and opportunities for Pt practice with assistive equipment including reacher, sock aid, pants clip, elastic shoelace, and long-handled sponge.  Pt is open and agreeable to trialling any piece of assistive tablet 325 mg, 325 mg, Oral, Daily with breakfast  aspirin chewable tablet 81 mg, 81 mg, Oral, Daily  enoxaparin (LOVENOX) injection 40 mg, 40 mg, Subcutaneous, Daily  glucose (GLUTOSE) 40 % oral gel 15 g, 15 g, Oral, PRN  glucagon (rDNA) injection 1 mg, 1 mg, Intramuscular, PRN  polyethylene glycol (GLYCOLAX) packet 17 g, 17 g, Oral, Daily  senna (SENOKOT) tablet 17.2 mg, 2 tablet, Oral, Daily PRN  bisacodyl (DULCOLAX) suppository 10 mg, 10 mg, Rectal, Daily PRN  acetaminophen (TYLENOL) tablet 650 mg, 650 mg, Oral, Q4H PRN  cyclobenzaprine (FLEXERIL) tablet 10 mg, 10 mg, Oral, TID PRN  oxyCODONE (ROXICODONE) immediate release tablet 5 mg, 5 mg, Oral, Q4H PRN **OR** oxyCODONE (ROXICODONE) immediate release tablet 10 mg, 10 mg, Oral, Q4H PRN  prenatal vitamin 28-0.8 MG tablet 1 tablet, 1 tablet, Oral, Daily      Impression/Plan:   Impaired ADLs, gait, and mobility due to:      1. 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 NWB RLE. Tylenol prn, gabapentin TID, oxycodone prn. She met with Katelynn Quintero pre-operatively and will be working with him for prosthetic. Phantom limb pain increasing - increased dose of gabapentin to 300 mg with approval from OB on 8/2. Will add mirror therapy to treatment plan. Has follow up apointment with oncologist at [facility] which will be rescheduled. 2. Pregnant with gestational diabetes: OB following. On NPH insulin nightly. Prenatal vitamin daily. 3. Bowel management: on miralax daily, senokot prn, dulcolax prn.   4. DVT Prophylaxis:  low molecular weight heparin, SCD while in bed and SADIE  5.  Internal medicine for medical management    Electronically signed by Keyshawn Chan MD on 8/3/2020 at 9:01 AM      This note is created with the assistance of a speech recognition program.  While intending to generate a document that actually reflects the content of the visit, the document can still have some errors

## 2020-08-03 NOTE — CARE COORDINATION
Pt completed the burns depression scale and scored a 5  which places the pt in the mild depression category. There were no reports of suicidal/homicidal ideation or plans at the time of the assessment. there is no  history suicidal/homicidal attempts or thought. There is no history of mental health treatment. There is no history of drug or alcohol abuse.  There is no history of drug or alcohol treatment in the past.

## 2020-08-03 NOTE — PROGRESS NOTES
OB/GYN PROGRESS NOTE    Nafisa Palmer is a 22 y.o. female  at [facility]  Day: 4    Subjective:   Patient has been seen and examined. Patient is doing well overall, complaining of nothing. Pt states she is anxious in general and is taking it day by day. Patient denies any vaginal discharge and any urinary complaints. The patient reports fetal movement is present, denies contractions, denies loss of fluid, denies vaginal bleeding. Patient denies headache, vision changes, nausea, vomiting, fever, chills, shortness of breath, chest pain, RUQ pain, abdominal pain, diarrhea, change in color/amount/odor of vaginal discharge, dysuria or, hematuria.      Objective:   Vitals:  Vitals:    20 1045 20 1300 20 1532 20 1918   BP:    118/74   Pulse: 121 125  107   Resp:    16   Temp:    97.9 °F (36.6 °C)   TempSrc:    Oral   SpO2:  100%  98%   Weight:       Height:   5' 2\" (1.575 m)          FHT: 140bpm    Physical Exam:  General appearance:  no apparent distress, alert and cooperative  HEENT: head atraumatic, normocephalic, moist mucous membranes, trachea midline  Neurologic:  alert, oriented, normal speech, no focal findings or movement disorder noted  Lungs:  No increased work of breathing, good air exchange, clear to auscultation bilaterally, no crackles or wheezing  Heart:  normal S1 and S2, regular rate and rhythm and no murmur    Abdomen:  soft, gravid, non-tender, no rebound, guarding, or rigidity, no RUQ or epigastric tenderness, no signs or symptoms of abruption and no signs or symptoms of chorioamnionitis  Extremities:  no left calf tenderness, left lower extremity without edema/erythema, R hip disarticulation site healing well and bandaged, no tenderness to palpation, erythema, drainage noted  Musculoskeletal: Gross strength equal and intact throughout, no gross abnormalities, range of motion normal in hips, knees, shoulders and spine  Psychiatric: Mood appropriate, normal affect Rectal Exam: not indicated  Pelvic Exam: not indicated    PRENATAL LAB RESULTS:   Blood Type/Rh: A pos  Antibody Screen: negative  Hemoglobin, Hematocrit, Platelets: 22.8 / 29.4 / 336  Rubella: immune  T. Pallidum, IgG: non-reactive   Hepatitis B Surface Antigen: non-reactive   HIV: non-reactive   Sickle Cell Screen: not done  Gonorrhea: negative  Chlamydia: negative  Urine culture: pending    Early 1 hour Glucose Tolerance Test: 183  Early 3 hour Glucose Tolerance Test: Fastin; 1 hour: 224; 2 hour  187; 3 hour: 164  1 hour Glucose Tolerance Test: not done  3 hour Glucose Tolerance Test: not done    Group B Strep: not done  Cystic Fibrosis Screen: negative  First Trimester Screen: not done  MSAFP/Multiple Markers: not done  Non-Invasive Prenatal Testing: not done  Anatomy US: not done    Diagnostics:     Assessment/Plan:  Sandra Briggs is a 22 y.o. female  at 24w9d IUP   - Rh positive/ Rubella immune/ GBS unknown   - No indication for GBS prophylaxis    - Rhogam: not indicated     - Tdap vaccination: will offer starting 27 weeks GA   - Continue PNV, aspirin 81mg daily, iron daily   - VSS   -  bpm   - Pt needs to follow up with MFM for an anatomy scan in the next 1-2weeks    - Will be rounding weekly on  with fetal heart tones unless symptoms of abdominal pain/cramping, vaginal bleeding, leakage of fluid are noted or any signs or symptoms of preeclampsia with elevated blood pressures greater than or equal to 163 systolic or greater than or equal to 234 diastolic with persistent headache, vision changes, RUQ abdominal pain even after pain medications. Please page Ob/Gyn on call resident with any questions/concerns if needed (will order nursing miscellaneous communication order with these criteria).     S/p R Hip Disarticulation  Osteosarcoma @[facility] on 20   - Inpatient rehab primary- defer management to primary team   - IM consulted   - Dietician consulted   - SW consulted   - PT/OT  Neutrophilia and monocytosis. -  Thrombocytosis. -  Normocytic normochromic anemia. -  No evidence of schistocytes, dysplasia or blasts.   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 Raine Venegas DO  Ob/Gyn Resident  8/3/2020, 1:31 AM

## 2020-08-03 NOTE — PLAN OF CARE
Problem: Falls - Risk of:  Goal: Will remain free from falls  Description: Will remain free from falls  Outcome: Ongoing  Goal: Absence of physical injury  Description: Absence of physical injury  Outcome: Ongoing     Problem: Pain:  Goal: Pain level will decrease  Description: Pain level will decrease  Outcome: Ongoing  Goal: Control of acute pain  Description: Control of acute pain  Outcome: Ongoing  Goal: Control of chronic pain  Description: Control of chronic pain  Outcome: Ongoing     Problem: Skin Integrity:  Goal: Will show no infection signs and symptoms  Description: Will show no infection signs and symptoms  Outcome: Ongoing  Goal: Absence of new skin breakdown  Description: Absence of new skin breakdown  Outcome: Ongoing     Problem: Musculor/Skeletal Functional Status  Goal: Highest potential functional level  Outcome: Ongoing  Goal: Absence of falls  Outcome: Ongoing     Problem: Musculor/Skeletal Functional Status  Goal: Highest potential functional level  Outcome: Ongoing  Goal: Absence of falls  Outcome: Ongoing     Problem: Nutrition  Goal: Optimal nutrition therapy  Outcome: Ongoing

## 2020-08-03 NOTE — PROGRESS NOTES
[address]   ACUTE REHABILITATION OCCUPATIONAL THERAPY  DAILY NOTE    Date: 8/3/20  Patient Name: Guanako Alfredo      Room: 5376/6456-14    MRN: [id number]   : 1995  (22 y.o.)  Gender: female   Referring Practitioner: Dr. Myron Karimi  Diagnosis: R hip disarticulation on 20 due to osteosarcoma  Additional Pertinent Hx: Guanako Alfredo is a 22 y.o. right-handed     female admitted to the [address] [place] [address] on 2020. She was originally admitted to [address] [place], [place] [address] on 2020 for planned amputation R leg for Stage 3 sarcoma. She had R hip disarticulation performed 2020. She is pregnant - at 21 weeks gestation with comorbid gestational diabetes. She is NWB on RLE/R pelvis. Restrictions  Restrictions/Precautions: Weight Bearing  Other position/activity restrictions: Per Dr. Elva Reyes, Pt's orthopaedic resident, no showering until the patient follows up with the Ortho surgeon on . Right Lower Extremity Weight Bearing: Non Weight Bearing  Required Braces or Orthoses?: Yes  Equipment Used: Bed, Wheelchair    Subjective  Subjective: Pt states she is having slight abdominal cramping in upper abdomen. Comments: Pt requried emotional support this date due to current situation.  Pt has anxiety about how things will  change at home after discharge    Patient Currently in Pain: Yes  Pain Level: 4  Pain Location: Hip;Leg  Pain Orientation: Right  Restrictions/Precautions: Weight Bearing  Overall Orientation Status: Within Normal Limits  Patient Observation  Observations: Pt demo left lean while seated   Pain Assessment  Pain Assessment: 0-10  Pain Level: 4  Pain Type: Phantom pain  Pain Location: Hip, Leg  Pain Orientation: Right    Objective  Cognition  Overall Cognitive Status: WFL  Perception  Overall Perceptual Status: WFL  Balance  Sitting Balance: Stand by assistance  Standing Balance: Stand by assistance  Bed mobility  Supine to Sit: Stand by assistance  Sit to Supine: Stand by assistance  Transfers  Sit to stand: Stand by assistance  Stand to sit: Stand by assistance  Standing Balance  Time: 2 min, 1 min x3   Activity: ADL, funcitonal standing   Functional Mobility  Functional - Mobility Device: Wheelchair  Activity: To/from bathroom;Transport items; Retrieve items  Assist Level: Supervision  Toilet Transfers  Toilet - Technique: Stand pivot; To right; To left  Equipment Used: Grab bars  Toilet Transfer: Contact guard assistance     Type of ROM/Therapeutic Exercise  Type of ROM/Therapeutic Exercise: Free weights  Comment: Pt engaged in upper extremeity exercises 2# free weight 20 reps. Requring rest breaks between exercises. Completed to imporve strneght and endurance for ADL and IADL activities   Exercises  Shoulder Elevation: x  Shoulder Flexion: x  Shoulder Extension: x  Shoulder ABduction: x  Shoulder ADduction: x  Horizontal ABduction: x  Horizontal ADduction: x  Elbow Flexion: x  Elbow Extension: x  Supination: x  Pronation: x  Wrist Flexion: x  Wrist Extension: x         ADL  Equipment Provided: Reacher;Sock aid;Long-handled sponge  Feeding: Independent  Grooming: Supervision;Setup(seated at sink )  UE Bathing: Supervision;Setup  LE Bathing: Minimal assistance;Setup; Increased time to complete  UE Dressing: Setup          Assessment  Performance deficits / Impairments: Decreased functional mobility ; Decreased ADL status; Decreased strength;Decreased safe awareness;Decreased endurance;Decreased sensation;Decreased balance;Decreased high-level IADLs;Decreased posture  Prognosis: Good  Discharge Recommendations: Patient would benefit from continued therapy after discharge  Activity Tolerance: Patient Tolerated treatment well  Safety Devices in place: Yes  Type of devices: Left in chair;Call light within reach; Chair alarm in place          Patient Education:  Patient Goals   Patient goals :  \"My goal is to be as independent as I can engaging in self-care/functional activity of choice. Long term goal 4: Pt will perform simple meal prep/light housekeeping with modified independence and Good safety.        08/03/20 0927 08/03/20 1536   OT Individual Minutes   Time In 3855 1410   Time Out 1037 1446   Minutes 70 36       Electronically signed by MELODY Blackwood Caro on 8/3/20 at 3:37 PM EDT

## 2020-08-03 NOTE — CARE COORDINATION
CASE MANAGEMENT NOTE:    Admission Date:  7/31/2020 Nafisa Palmer is a 22 y.o.  female    Admitted for : History of disarticulation of right hip [Z89.621]    Met with:  Patient    PCP:  hayley sim cnp                                Insurance:  mmo      Current Residence/ Living Arrangements:  independently at home with her , he works fulltime             Current Services PTA:  No    Is patient agreeable to VNS: Yes    Freedom of choice provided:  Yes    List of [address] provided: Yes    VNS chosen:  No    DME:  bedside commode    Home Oxygen: No    Nebulizer: No    CPAP/BIPAP: No    Supplier: N/A    Potential Assistance Needed: Yes    SNF needed: No    Freedom of choice and list provided: NA    Pharmacy:  unk       Does Patient want to use MEDS to BEDS? Yes    Is the Patient an V[facility] with Readmission Risk Score greater than 14%? No  If yes, pt needs a follow up appointment made within 7 days. Family Members/Caregivers that pt would like involved in their care:    Yes    If yes, list name here:  Tanisha Prabhakar Maryann 45    Transportation Provider:  Family             Is patient in Isolation/One on One/Altered Mental Status? No  If yes, skip next question. If no, would they like an I-Pad to  use? No  If yes, call [phone].              Discharge Plan:  Discharge home with vns, dme ordered as needed                 Electronically signed by: ALVARO Cramer, BELIA on 8/3/2020 at 1:03 PM

## 2020-08-04 PROBLEM — A49.8 PSEUDOMONAS INFECTION: Status: ACTIVE | Noted: 2020-08-04

## 2020-08-04 LAB
CULTURE: ABNORMAL
CULTURE: ABNORMAL
GLUCOSE BLD-MCNC: 107 MG/DL (ref 65–105)
GLUCOSE BLD-MCNC: 113 MG/DL (ref 65–105)
GLUCOSE BLD-MCNC: 90 MG/DL (ref 65–105)
GLUCOSE BLD-MCNC: 92 MG/DL (ref 65–105)
Lab: ABNORMAL
SPECIMEN DESCRIPTION: ABNORMAL

## 2020-08-04 PROCEDURE — 97530 THERAPEUTIC ACTIVITIES: CPT

## 2020-08-04 PROCEDURE — 6360000002 HC RX W HCPCS: Performed by: STUDENT IN AN ORGANIZED HEALTH CARE EDUCATION/TRAINING PROGRAM

## 2020-08-04 PROCEDURE — 99232 SBSQ HOSP IP/OBS MODERATE 35: CPT | Performed by: INTERNAL MEDICINE

## 2020-08-04 PROCEDURE — 97535 SELF CARE MNGMENT TRAINING: CPT

## 2020-08-04 PROCEDURE — 99232 SBSQ HOSP IP/OBS MODERATE 35: CPT | Performed by: PHYSICAL MEDICINE & REHABILITATION

## 2020-08-04 PROCEDURE — 82947 ASSAY GLUCOSE BLOOD QUANT: CPT

## 2020-08-04 PROCEDURE — 97116 GAIT TRAINING THERAPY: CPT

## 2020-08-04 PROCEDURE — 1180000000 HC REHAB R&B

## 2020-08-04 PROCEDURE — 97110 THERAPEUTIC EXERCISES: CPT

## 2020-08-04 PROCEDURE — 2580000003 HC RX 258: Performed by: STUDENT IN AN ORGANIZED HEALTH CARE EDUCATION/TRAINING PROGRAM

## 2020-08-04 PROCEDURE — 6370000000 HC RX 637 (ALT 250 FOR IP): Performed by: PHYSICAL MEDICINE & REHABILITATION

## 2020-08-04 PROCEDURE — 6370000000 HC RX 637 (ALT 250 FOR IP): Performed by: STUDENT IN AN ORGANIZED HEALTH CARE EDUCATION/TRAINING PROGRAM

## 2020-08-04 PROCEDURE — 6360000002 HC RX W HCPCS: Performed by: PHYSICAL MEDICINE & REHABILITATION

## 2020-08-04 RX ADMIN — ASPIRIN 81 MG CHEWABLE TABLET 81 MG: 81 TABLET CHEWABLE at 07:59

## 2020-08-04 RX ADMIN — CEFEPIME 2 G: 2 INJECTION, POWDER, FOR SOLUTION INTRAVENOUS at 12:11

## 2020-08-04 RX ADMIN — OXYCODONE HYDROCHLORIDE 5 MG: 5 TABLET ORAL at 21:58

## 2020-08-04 RX ADMIN — ENOXAPARIN SODIUM 40 MG: 40 INJECTION SUBCUTANEOUS at 07:59

## 2020-08-04 RX ADMIN — GABAPENTIN 300 MG: 300 CAPSULE ORAL at 14:01

## 2020-08-04 RX ADMIN — Medication 1 TABLET: at 09:26

## 2020-08-04 RX ADMIN — FERROUS SULFATE TAB 325 MG (65 MG ELEMENTAL FE) 325 MG: 325 (65 FE) TAB at 07:59

## 2020-08-04 RX ADMIN — GABAPENTIN 300 MG: 300 CAPSULE ORAL at 07:59

## 2020-08-04 RX ADMIN — GABAPENTIN 300 MG: 300 CAPSULE ORAL at 21:58

## 2020-08-04 RX ADMIN — POLYETHYLENE GLYCOL 3350 17 G: 17 POWDER, FOR SOLUTION ORAL at 07:59

## 2020-08-04 RX ADMIN — CEFEPIME 2 G: 2 INJECTION, POWDER, FOR SOLUTION INTRAVENOUS at 22:01

## 2020-08-04 ASSESSMENT — PAIN DESCRIPTION - ORIENTATION
ORIENTATION: RIGHT

## 2020-08-04 ASSESSMENT — PAIN DESCRIPTION - DESCRIPTORS: DESCRIPTORS: ACHING;DISCOMFORT

## 2020-08-04 ASSESSMENT — PAIN DESCRIPTION - PAIN TYPE
TYPE: SURGICAL PAIN;PHANTOM PAIN
TYPE: SURGICAL PAIN;REFERRED PAIN
TYPE: SURGICAL PAIN;PHANTOM PAIN

## 2020-08-04 ASSESSMENT — PAIN DESCRIPTION - ONSET: ONSET: ON-GOING

## 2020-08-04 ASSESSMENT — PAIN SCALES - GENERAL
PAINLEVEL_OUTOF10: 3
PAINLEVEL_OUTOF10: 4
PAINLEVEL_OUTOF10: 4
PAINLEVEL_OUTOF10: 3

## 2020-08-04 ASSESSMENT — PAIN DESCRIPTION - FREQUENCY: FREQUENCY: INTERMITTENT

## 2020-08-04 ASSESSMENT — PAIN DESCRIPTION - LOCATION
LOCATION: HIP;INCISION;LEG
LOCATION: HIP;LEG;INCISION
LOCATION: HIP

## 2020-08-04 ASSESSMENT — PAIN DESCRIPTION - PROGRESSION: CLINICAL_PROGRESSION: NOT CHANGED

## 2020-08-04 NOTE — PLAN OF CARE
Problem: Falls - Risk of:  Goal: Will remain free from falls  Description: Will remain free from falls  Outcome: Ongoing  Note: No falls this shift. Call light is within reach, side rails up x2, and bed in lowest position. Pt safety is maintained. Goal: Absence of physical injury  Description: Absence of physical injury  Outcome: Ongoing  Note: No injury this shift. Pt safety maintained. Problem: Pain:  Goal: Pain level will decrease  Description: Pain level will decrease  Outcome: Ongoing  Note: Pt pain is controlled with PRN pain medication as needed. Goal: Control of acute pain  Description: Control of acute pain  Outcome: Ongoing  Note: Pt pain is controlled with PRN pain medication as needed. Goal: Control of chronic pain  Description: Control of chronic pain  Outcome: Ongoing  Note: Pt pain is controlled with PRN pain medication as needed. Problem: Skin Integrity:  Goal: Will show no infection signs and symptoms  Description: Will show no infection signs and symptoms  Outcome: Ongoing  Note: Pt remains free from infection. No signs and symptoms of infection. Goal: Absence of new skin breakdown  Description: Absence of new skin breakdown  Outcome: Ongoing  Note: Skin assessment as charted. Problem: Musculor/Skeletal Functional Status  Goal: Highest potential functional level  Outcome: Ongoing  Note: PT & OT as ordered. Goal: Absence of falls  Outcome: Ongoing  Note: No falls this shift. Call light is within reach, side rails up x2, and bed in lowest position. Pt safety is maintained. Problem: Musculor/Skeletal Functional Status  Goal: Highest potential functional level  Outcome: Ongoing  Goal: Absence of falls  Outcome: Ongoing     Problem: Nutrition  Goal: Optimal nutrition therapy  Outcome: Ongoing  Note: Pt is tolerating diet. No complaints of nausea or vomiting.

## 2020-08-04 NOTE — PROGRESS NOTES
[facility] 52 Internal Medicine    Progress Note  Referring Practitioner: Dr José Miguel Melton  8/4/2020    1:50 PM    Name:   Chrystal Thomas  MRN:     [id number]     Acct:      [de-identified]   Room:   [address] [place] [address]  IP Day:  4  Admit Date:  7/31/2020  7:15 PM    PCP:   FATEMEH Goetz CNP  Code Status:  Full Code    Subjective:     C/C: No chief complaint on file. Principal Problem:    History of disarticulation of right hip  Active Problems:    Pregestational Diabetes    Family history of diabetes mellitus    Gestational diabetes    Iron deficiency anemia    Sarcoma of bone ([facility] [address]    Thrombocytosis ([facility] [address]    Chronic hypertension affecting pregnancy    Leukocytosis    History of blood transfusion    History of E. Coli UTI 10-<100,000CFU (7/7/20)    Pseudomonas UTI  Resolved Problems:    * No resolved hospital problems. *      Interval History Status: improved.        S/p disarticulation surgery following diagnosis of a sarcoma  She states she is doing much better  Would like to wait until after the completion of pregnancy before she tries her prosthesis  Urine culture growing Pseudomonas, started on cefepime  Working with physical therapy  No other complaints overnight     Significant last 24 hr data reviewed ;   Vitals:    08/02/20 1918 08/03/20 0624 08/03/20 1854 08/04/20 0625   BP: 118/74 125/80 118/61 115/82   Pulse: 107 101 102 102   Resp: 16 16 18 16   Temp: 97.9 °F (36.6 °C) 97.5 °F (36.4 °C) 97.2 °F (36.2 °C) 98.3 °F (36.8 °C)   TempSrc: Oral Oral Oral Oral   SpO2: 98% 97% 100% 100%   Weight:       Height:          Recent Results (from the past 24 hour(s))   POC Glucose Fingerstick    Collection Time: 08/03/20  3:39 PM   Result Value Ref Range    POC Glucose 94 65 - 105 mg/dL   POC Glucose Fingerstick    Collection Time: 08/03/20  7:45 PM   Result Value Ref Range    POC Glucose 102 65 - 105 mg/dL   POC Glucose Fingerstick    Collection Time: 08/04/20  6:23 AM   Result Value Ref Range    POC Glucose 90 65 - 105 mg/dL   POC Glucose Fingerstick    Collection Time: 08/04/20 10:40 AM   Result Value Ref Range    POC Glucose 92 65 - 105 mg/dL     Recent Labs     08/03/20  1048 08/03/20  1539 08/03/20  1945 08/04/20  0623 08/04/20  1040   POCGLU 93 94 102 90 92        No results found. HPI:   See history in H and P      Review of Systems:     Constitutional:  negative for chills, fevers, sweats  Respiratory:  negative for cough, dyspnea on exertion, hemoptysis, shortness of breath, wheezing  Cardiovascular:  negative for chest pain, chest pressure/discomfort, lower extremity edema, palpitations  Gastrointestinal:  negative for abdominal pain, constipation, diarrhea, nausea, vomiting  Neurological:  negative for dizziness, headache  Data:     Past Medical History:  no change     Social History:  no change    Family History: @no change    Vitals:      I/O (24Hr):   No intake or output data in the 24 hours ending 08/04/20 1350    Labs:    URINE ANALYSIS: No results found for: LABURIN     CBC:  Lab Results   Component Value Date    WBC 6.5 08/01/2020    HGB 11.0 08/01/2020     08/01/2020        BMP:    Lab Results   Component Value Date     08/01/2020    K 4.0 08/01/2020     08/01/2020    CO2 22 08/01/2020    BUN 5 08/01/2020    CREATININE <0.40 08/01/2020    GLUCOSE 106 08/01/2020      LIVER PROFILE:  Lab Results   Component Value Date     08/01/2020    AST 92 08/01/2020    PROT 6.3 08/01/2020    BILITOT 0.53 08/01/2020    BILIDIR 0.28 08/01/2020    LABALBU 3.3 08/01/2020               Radiology:      Physical Examination:        General appearance:  alert, cooperative and no distress  Mental Status:  oriented to person, place and time and normal affect  Lungs:  clear to auscultation bilaterally, normal effort  Heart:  regular rate and rhythm, no murmur  Abdomen:  soft, nontender, nondistended, normal bowel sounds, no masses, hepatomegaly, splenomegaly  Extremities: no edema, redness, S/p disarticulation on right  Skin:  no gross lesions, rashes, induration    Assessment:        Primary Problem  History of disarticulation of right hip    Active Hospital Problems    Diagnosis Date Noted    Pseudomonas UTI [A49.8] 08/04/2020    History of E. Coli UTI 10-<100,000CFU (7/7/20) [Z87.440] 08/02/2020    History of blood transfusion [Z92.89] 08/01/2020    History of disarticulation of right hip [Z89.621] 07/31/2020    Leukocytosis [D72.829] 07/31/2020    Chronic hypertension affecting pregnancy [O10.919] 07/11/2020    Sarcoma of bone ([address] [C41.9] 07/07/2020    Iron deficiency anemia [D50.9] 07/02/2020    Gestational diabetes [O24.419] 06/30/2020    Thrombocytosis ([address] [D47.3] 06/30/2020    Family history of diabetes mellitus [Z83.3] 06/08/2020    Pregestational Diabetes [O99.810] 06/02/2020       Plan:        1. 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 UTI- started on cefepime X 10 days  3. 26 weeks pregnant  4. 40 of Lovenox for DVT prophylaxis  5. On novolin- HbA1c of 5 (2/8/2020)  6. On gabapentin 300 mg 3 times a day   7. iron deficiency anemia perioperative blood loss on ferrous sulfate  8. Tachycardia with exertion      Medications: Allergies:  No Known Allergies    Current Meds:   Scheduled Meds:    cefepime  2 g Intravenous Q12H    gabapentin  300 mg Oral TID    ferrous sulfate  325 mg Oral Daily with breakfast    aspirin  81 mg Oral Daily    enoxaparin  40 mg Subcutaneous Daily    polyethylene glycol  17 g Oral Daily    prenatal vitamin  1 tablet Oral Daily     Continuous Infusions:   PRN Meds: glucose, glucagon (rDNA), senna, bisacodyl, acetaminophen, cyclobenzaprine, oxyCODONE **OR** oxyCODONE       Darryl Kang MD  8/4/2020  1:50 PM     Attestation and add on       I have discussed the care of Al Mirza , including pertinent history and exam findings,      8/5/20    with the resident.   I have seen and examined the patient and the key elements of all parts of the encounter have been performed by me . I agree with the assessment, plan and orders as documented by the resident. Principal Problem:    History of disarticulation of right hip  Active Problems:    Pregestational Diabetes    Family history of diabetes mellitus    Gestational diabetes    Iron deficiency anemia    Sarcoma of bone ([facility] [address]    Thrombocytosis ([facility] [address]    Chronic hypertension affecting pregnancy    Leukocytosis    History of blood transfusion    History of E. Coli UTI 10-<100,000CFU (7/7/20)    Pseudomonas UTI  Resolved Problems:    * No resolved hospital problems. *            ---- ;        Medications: Allergies:  No Known Allergies    Current Meds:   Scheduled Meds:    cefepime  2 g Intravenous Q12H    gabapentin  300 mg Oral TID    ferrous sulfate  325 mg Oral Daily with breakfast    aspirin  81 mg Oral Daily    enoxaparin  40 mg Subcutaneous Daily    polyethylene glycol  17 g Oral Daily    prenatal vitamin  1 tablet Oral Daily     Continuous Infusions:   PRN Meds: glucose, glucagon (rDNA), senna, bisacodyl, acetaminophen, cyclobenzaprine, oxyCODONE **OR** oxyCODONE        [facility]  [address] [place] [address].    Phone [phone]   Fax: [phone]  Answering Service: [phone]

## 2020-08-04 NOTE — PROGRESS NOTES
Physical Medicine & Rehabilitation  Progress Note      Subjective:      22year-old female s/p R hip disarticulation amputation for sarcoma. Patient reports more phantom limb sensations today than phantom limb pain. She is tearful today because of new UTI diagnosis and initiation of IV antibiotic. ROS:  Denies fevers, chills, sweats. No chest pain, palpitations, lightheadedness. Denies coughing, wheezing or shortness of breath. Denies abdominal pain, nausea, diarrhea or constipation. No new areas of joint pain. Denies new areas of numbness or weakness. Denies new anxiety or depression issues. No new skin problems. Rehabilitation:   Progressing in therapies. PT:  Restrictions/Precautions: Weight Bearing  Other position/activity restrictions: Per Dr. Tolu Hartman, Pt's orthopaedic resident, no showering until the patient follows up with the Ortho surgeon on 8/19. Right Lower Extremity Weight Bearing: Non Weight Bearing   Transfers  Sit to Stand: Contact guard assistance, Stand by assistance  Stand to sit: Contact guard assistance, Stand by assistance  Bed to Chair: Contact guard assistance(RW)  Stand Pivot Transfers: Contact guard assistance  Squat Pivot Transfers: Stand by assistance(no AD W/C<>mat table)  Lateral Transfers: Contact guard assistance  Comment: Rolling walker/parallel bars unless noted  Ambulation 1  Surface: level tile  Device: Rolling Walker  Other Apparatus: Wheelchair follow  Assistance: Contact guard assistance  Quality of Gait: L LE shaky due to weakness, however no buckling. Distance included 180* turn. Gait Deviations: Slow Laurence, Decreased step length, Decreased step height  Distance: 17' AM  Comments: HR 120s with activity. Rest breaks given PRN. Pt wearing sneaker today.      Transfers  Sit to Stand: Contact guard assistance, Stand by assistance  Stand to sit: Contact guard assistance, Stand by assistance  Bed to Chair: Contact guard assistance(RW)  Stand Pivot Transfers: Contact guard assistance  Squat Pivot Transfers: Stand by assistance(no AD W/C<>mat table)  Lateral Transfers: Contact guard assistance  Comment: Rolling walker/parallel bars unless noted  Ambulation  Ambulation?: Yes  Ambulation 1  Surface: level tile  Device: Rolling Walker  Other Apparatus: Wheelchair follow  Assistance: Contact guard assistance  Quality of Gait: L LE shaky due to weakness, however no buckling. Distance included 180* turn. Gait Deviations: Slow Laurence, Decreased step length, Decreased step height  Distance: 17' AM  Comments: HR 120s with activity. Rest breaks given PRN. Pt wearing sneaker today. Surface: level tile  Ambulation 1  Surface: level tile  Device: Rolling Walker  Other Apparatus: Wheelchair follow  Assistance: Contact guard assistance  Quality of Gait: L LE shaky due to weakness, however no buckling. Distance included 180* turn. Gait Deviations: Slow Laurence, Decreased step length, Decreased step height  Distance: 17' AM  Comments: HR 120s with activity. Rest breaks given PRN. Pt wearing sneaker today. OT:  ADL  Equipment Provided: Reacher, Sock aid, Long-handled sponge  Feeding: Independent  Grooming: Supervision, Setup(seated at sink )  UE Bathing: Supervision, Setup  LE Bathing: Minimal assistance, Setup, Increased time to complete  UE Dressing: Setup  LE Dressing: Minimal assistance, Setup, Increased time to complete(assist L SADIE hose & shoe in AM; contact guard while standing)  Toileting: Moderate assistance, Setup(assist to pull pants over hips in PM with nursing)  Additional Comments: OT provided eduation, demonstration, and opportunities for Pt practice with assistive equipment including reacher, sock aid, pants clip, elastic shoelace, and long-handled sponge. Pt is open and agreeable to trialling any piece of assistive equipment to maximize independence with self-care tasks.  Tomorrow's OT session to further address elastic shoelace, pants clip, and modified dressing technique of performing lower body dressing while seated edge of bed for more stability with LLE. Continue OT POC. Balance  Sitting Balance: Stand by assistance  Standing Balance: Stand by assistance   Standing Balance  Time: 2 min, 1 min x3   Activity: ADL, funcitonal standing   Comment: 0-2 UE support via grab bar or sink in bathroom  Functional Mobility  Functional - Mobility Device: Wheelchair  Activity: To/from bathroom, Transport items, Retrieve items  Assist Level: Supervision  Functional Mobility Comments: 1-2 verbal cues as needed for technique     Bed mobility  Rolling to Left: Stand by assistance  Rolling to Right: (Do not roll n right side due to incision from surgery)  Supine to Sit: Stand by assistance  Sit to Supine: Stand by assistance  Scooting: Supervision  Transfers  Stand Pivot Transfers: Minimal assistance  Sit to stand: Stand by assistance  Stand to sit: Stand by assistance  Transfer Comments: Good safety awareness noted with hand placement during transfers   Toilet Transfers  Toilet - Technique: Stand pivot, To right, To left  Equipment Used: Grab bars  Toilet Transfer: Contact guard assistance  Toilet Transfers Comments: Fair carryover of positioning of wheelchair for safety during functional transfer  Tub Transfers  Tub Transfers Comments: OT provided education and demonstration regarding use of tub transfer bench for tub shower for Pt's home discharge. Pt verbalized Fair understanding. Shower Transfers  Shower Transfers Comments: Per ortho surgeon, Pt may not shower until after her follow-up appointment on 8/19/20.   Wheelchair Bed Transfers  Wheelchair/Bed - Technique: Stand pivot, To left  Equipment Used: Bed, Wheelchair  Level of Asssistance: Contact guard assistance  Wheelchair Transfers Comments: Fair carryover of technique noted from yesterday's session    SPEECH:      Objective:  /82   Pulse 102   Temp 98.3 °F (36.8 °C) (Oral)   Resp 16   Ht 5' 2\" (1.575 m) Wt 152 lb 12.5 oz (69.3 kg)   LMP 02/18/2020   SpO2 100%   BMI 27.94 kg/m²       GEN: Well developed, well nourished, in NAD  HEENT:  NCAT. PERRL. EOMI. Mucous membranes pink and moist.   PULM:  Clear to ausculation. No rales or rhonchi. Respirations WNL and unlabored. CV:  tachycardic rate regular rhythm. No murmurs or gallops. GI:  Abdomen soft. Nontender. Non-distended. BS + and equal.    NEUROLOGICAL: A&O x3. Sensation intact to light touch. MSK:  Functional ROM BUEs and LLE. Motor testing 5/5 key muscles BUE and LLEs. SKIN: Warm dry and intact. Good turgor. R hip disartic incision well approximated with staples in place, no drainage. Mild erythema medial aspect of incision. Mild induration medial aspect incision - stable today. EXTREMITIES:  No L calf tenderness to palpation. No edema LLE. PSYCH: Mood WNL. Appropriately interactive. Affect WNL. Diagnostics:     CBC:   No results for input(s): WBC, RBC, HGB, HCT, MCV, RDW, PLT in the last 72 hours. BMP:   No results for input(s): NA, K, CL, CO2, PHOS, BUN, CREATININE, GLUCOSE in the last 72 hours. Invalid input(s): CA  BNP: No results for input(s): BNP in the last 72 hours. PT/INR: No results for input(s): PROTIME, INR in the last 72 hours. APTT: No results for input(s): APTT in the last 72 hours. CARDIAC ENZYMES: No results for input(s): CKMB, CKMBINDEX, TROPONINT in the last 72 hours. Invalid input(s): CKTOTAL;3  FASTING LIPID PANEL:No results found for: CHOL, HDL, TRIG  LIVER PROFILE:   No results for input(s): AST, ALT, ALB, BILIDIR, BILITOT, ALKPHOS in the last 72 hours.      Current Medications:   Current Facility-Administered Medications: gabapentin (NEURONTIN) capsule 300 mg, 300 mg, Oral, TID  ferrous sulfate (IRON 325) tablet 325 mg, 325 mg, Oral, Daily with breakfast  aspirin chewable tablet 81 mg, 81 mg, Oral, Daily  enoxaparin (LOVENOX) injection 40 mg, 40 mg, Subcutaneous, Daily  glucose (GLUTOSE) 40 % oral gel 15 g, 15 g, Oral, PRN  glucagon (rDNA) injection 1 mg, 1 mg, Intramuscular, PRN  polyethylene glycol (GLYCOLAX) packet 17 g, 17 g, Oral, Daily  senna (SENOKOT) tablet 17.2 mg, 2 tablet, Oral, Daily PRN  bisacodyl (DULCOLAX) suppository 10 mg, 10 mg, Rectal, Daily PRN  acetaminophen (TYLENOL) tablet 650 mg, 650 mg, Oral, Q4H PRN  cyclobenzaprine (FLEXERIL) tablet 10 mg, 10 mg, Oral, TID PRN  oxyCODONE (ROXICODONE) immediate release tablet 5 mg, 5 mg, Oral, Q4H PRN **OR** oxyCODONE (ROXICODONE) immediate release tablet 10 mg, 10 mg, Oral, Q4H PRN  prenatal vitamin 28-0.8 MG tablet 1 tablet, 1 tablet, Oral, Daily      Impression/Plan:   Impaired ADLs, gait, and mobility due to:      1. 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 NWB RLE. Tylenol prn, gabapentin TID, oxycodone prn. [address] following. Phantom limb pain increased dose of gabapentin to 300 mg with approval from OB on 8/2. Mirror therapy in PT. Has follow up apointment with oncologist at [facility] which will be rescheduled. 2. Pregnant with gestational diabetes: OB following. On NPH insulin nightly. Prenatal vitamin daily. 3. UTI: OB initiated 10 days treatment with IV cefepime BID. 4. Bowel management: on miralax daily, senokot prn, dulcolax prn.   5. DVT Prophylaxis:  low molecular weight heparin, SCD while in bed and SADIE  6. Internal medicine for medical management    Electronically signed by Mana Kelly MD on 8/4/2020 at 9:06 AM      This note is created with the assistance of a speech recognition program.  While intending to generate a document that actually reflects the content of the visit, the document can still have some errors including those of syntax and sound a like substitutions which may escape proof reading. In such instances, actual meaning can be extrapolated by contextual diversion.

## 2020-08-04 NOTE — PROGRESS NOTES
OB/GYN Resident Interval Note    Patient's urine culture grew pseudomonas >[id number]. Will treat with Cefepime 2g IV Q12 hours for 10 days.      Dr Hillary Solorio updated and in agreement       Ariel Shelton DO   OB/GYN Resident  Pager [phone]  Bruno Galeana  8/4/2020, 10:28 AM

## 2020-08-04 NOTE — PLAN OF CARE
Problem: Falls - Risk of:  Goal: Will remain free from falls  Description: Will remain free from falls  Outcome: Ongoing  Note: No falls or injuries sustained at this time. No attempts to get out of bed without nursing assistance. Call light within reach and pt. uses appropriately for assistance. Siderails up x 2. Nonskid footwear remains on. Bed in low and locked position. Hourly nursing rounds made. Pt. Alert and oriented, aware of limitations, and exhibits good safety judgement. Pt. uses assistive devices appropriately. Pt. understands individual fall risk factors. Pt. reoriented to surroundings and reminded to use call light with each nurse/patient interaction. Bed alarm remains engaged throughout the shift as a precaution.         Problem: Pain:  Goal: Control of acute pain  Description: Control of acute pain  Outcome: Ongoing  Note: Pain assessment and reassessment completed so far this shift. Pt. able to rest after the use of pain medication. Patient medicated with Minoo for pain this shift as ordered.  Patient relates a tolerable level of discomfort with the current medication. Pt. Repositions per self for comfort. Nonverbal cues indicate pain relief. Pt. Rests quietly with eyes closed after pain medication administration. Respirations easy and unlabored. Appears free from distress.         Problem: Skin Integrity:  Goal: Absence of new skin breakdown  Description: Absence of new skin breakdown  Outcome: Ongoing  Note: Skin assessment completed this shift. Nutrition and Hydration status assessed with adequate intake. Arden Score as charted. Pt refuses Pressure Relief Overlay. Left heel remains elevated on pillows throughout the shift. Patient able to reposition self for comfort and to prevent breakdown. Patient verbalizes understanding of pressure ulcer prevention measures. Skin integrity maintained. No new skin breakdown noted. Skin to high risk pressure areas including coccyx is clear.  Right Leg

## 2020-08-04 NOTE — PROGRESS NOTES
[facility]    ACUTE REHABILITATION OCCUPATIONAL THERAPY  DAILY NOTE    Date: 20  Patient Name: Laverne Palma      Room: 8090/7545-62    MRN: [id number]   : 1995  (22 y.o.)  Gender: female   Referring Practitioner: Dr. 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 right-handed     female admitted to the [address] [place] [address] on 2020. 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 She had R hip disarticulation performed 2020. She is pregnant - at 21 weeks gestation with comorbid gestational diabetes. She is NWB on RLE/R pelvis. Restrictions  Restrictions/Precautions: Weight Bearing(NWB R pelvis-do not roll onto R hip or weight shift on R hip)  Other position/activity restrictions: Per Dr. Rafal Greene, Pt's orthopaedic resident, no showering until the patient follows up with the Ortho surgeon on . Right Lower Extremity Weight Bearing: Non Weight Bearing(NWB R pelvis, OK to sit upright-per PT notes from [facility]. )  Required Braces or Orthoses?: Yes  Equipment Used: Bed, Wheelchair    Subjective  Comments: Pt pleasant and motivated. Emotional and tearful at start of PM session due to now having IV placed in L hand, pt fearful that it will impede functional use and transfers for therapy due placement area. Encouragement provided to pt with good return. Patient Currently in Pain: Yes  Pain Level: 3  Pain Location: Hip;Leg; Incision  Pain Orientation: Right  Restrictions/Precautions: Weight Bearing(NWB R pelvis-do not roll onto R hip or weight shift on R hip)  Overall Orientation Status: Within Normal Limits     Pain Assessment  Pain Assessment: 0-10  Pain Level: 3  Pain Type: Surgical pain, Phantom pain  Pain Location: Hip, Leg, Incision  Pain Orientation: Right  Response to Pain Intervention: Patient Satisfied    Objective  Cognition  Overall Cognitive Status: WFL  Perception  Overall Perceptual Status: WFL  Balance  Sitting Balance: Modified independent (pt does well with off loading weight on R hip)  Standing Balance: Supervision  Bed mobility  Supine to Sit: Stand by assistance  Transfers  Stand Pivot Transfers: Stand by assistance  Sit to stand: Supervision;Stand by assistance  Stand to sit: Supervision  Standing Balance  Time: AM: <1 min, ~1 min x2, ~3 min  Activity: Am: functional transfer, Lower body bathing and dressing. Comment: 1-2 UE support via sink   Functional Mobility  Functional - Mobility Device: Wheelchair  Activity: To/from bathroom;Transport items; Retrieve items; Other  Assist Level: Supervision  Functional Mobility Comments: PM: pt tolerates self propulsion in wheelchair despite having new IV placement into L hand, pt reports lessened axiety once she realized she was able to still propell self with minimal discomfort. [employer]  Community Re-Entry Level: Wheelchair  Community Re-entry Level of Assistance: Stand by [employer] Re-Entry: Pt demonstrated good manuverability around hospital gift shop while showing good enviromental awareness of objects in path of wheelchair, demonstrates good safety and technique with reaching for items from wheelchair level.          ADL  Equipment Provided: Reacher;Sock aid;Long-handled sponge  Feeding: Independent  Grooming: Supervision;Setup(seated sinkside)  UE Bathing: Supervision;Setup(seated sinkside)  LE Bathing: Supervision;Setup;Stand by assistance(seated/standing sinkside; use of long handled sponge)  UE Dressing: Modified independent (pt completed set up and completion at w/c level )  LE Dressing: Stand by assistance;Setup;Supervision;Minimal assistance(Assist to ryan NOEL, Stand by assist in standing. )  Toileting: None(did not occur during session. )  Additional Comments: Pt demonstrates good functional use of adaptive equipment for increased independence with ADL's. Instrumental ADL's  Instrumental ADLs: Yes  [employer]  Community Re-Entry Level: Wheelchair  Community Re-entry Level of Assistance: Stand by assistance  Community Re-Entry: Pt demonstrated good manuverability around hospital gift shop while showing good enviromental awareness of objects in path of wheelchair, demonstrates good safety and technique with reaching for items from wheelchair level. Assessment  Performance deficits / Impairments: Decreased functional mobility ; Decreased ADL status; Decreased strength;Decreased safe awareness;Decreased endurance;Decreased sensation;Decreased balance;Decreased high-level IADLs;Decreased posture  Prognosis: Good  Discharge Recommendations: Patient would benefit from continued therapy after discharge  Activity Tolerance: Patient Tolerated treatment well  Safety Devices in place: Yes  Type of devices: Left in chair;Left in bed;Call light within reach  Equipment Recommendations  Equipment Needed: (TBD)        Plan  Plan  Times per week: 5-7  Times per day: Twice a day  Current Treatment Recommendations: Self-Care / ADL, Home Management Training, Strengthening, Balance Training, Functional Mobility Training, Endurance Training, Wheelchair Mobility Training, Pain Management, Safety Education & Training, Patient/Caregiver Education & Training, Equipment Evaluation, Education, & procurement, Positioning  Patient Goals   Patient goals : \"My goal is to be as independent as I can be\"  Short term goals  Time Frame for Short term goals: 5 to 7 days  Short term goal 1: Pt will verbalize/demonstrate Good understanding of assistive equipment/durable medical equipment/modified techniques as needed for increased independence with self-care and mobility.   Short term goal 2: Pt will perform lower body dressing and toileting tasks with Minimal assist and use of assistive equipment/modified techniques

## 2020-08-04 NOTE — PROGRESS NOTES
Physical Therapy  [address]   Acute Rehabilitation Physical Therapy Progress Note    Date: 20  Patient Name: Lupe Abebe       Room: 3663/5869-62  MRN: [id number]   Account: [de-identified]   : 1995  (22 y.o.) Gender: female     Referring Practitioner: Dr. 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 Past Surgical History:   has a past surgical history that includes amputation (Right, 2020). Additional Pertinent Hx: Lupe Abebe  is a 22 y.o. right-handed     female admitted to the [address] [place] [address] unit on 2020. 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 She had R hip disarticulation performed 2020. She is pregnant - at 21 weeks gestation with comorbid gestational diabetes. She is NWB on RLE/R pelvis. Restrictions/Precautions  Restrictions/Precautions: Weight Bearing  Lower Extremity Weight Bearing Restrictions  Right Lower Extremity Weight Bearing: Non Weight Bearing(R pelvis (no rolling onto or weight bearing), OK to sit upright per  PT notes)  Position Activity Restriction  Other position/activity restrictions: Per Dr. Michell Grajeda, Pt's orthopaedic resident, no showering until the patient follows up with the Ortho surgeon on . Subjective: Reports using L LE/foot movement in attempt to relieve R LE phantom pain with some success. Comments: Cold pack applied after PT. Vital Signs  Patient Currently in Pain: Yes  Pain Assessment: 0-10  Pain Level: 4  Pain Type: Surgical pain; Phantom pain  Pain Location: Hip  Pain Orientation: Right  Non-Pharmaceutical Pain Intervention(s): Rest;Repositioned; Ambulation/Increased Activity;Cold applied  Response to Pain Intervention: None;Patient Satisfied    Bed Mobility  Bridging: Supervision  Rolling: Supervision;Rolling Left  Supine to Sit: Supervision  Sit to Supine: Supervision  Scooting: Supervision  Comment: Mat, wedge, 2-4 pillows    Transfers:  Sit to Stand: Contact guard assistance;Stand by assistance  Stand to sit: Contact guard assistance;Stand by assistance  Stand pivot transfers: Stand by assistance (no device, wheelchair to bed)  Squat Pivot Transfers: Stand by assistance;Contact guard assistance(no device wheelchair to mat; CGA due to new IV wrist placement)  Comment: Rolling walker/parallel bars, unless noted. Ambulation 1  Surface: level tile  Device: Rolling Walker  Other Apparatus: Wheelchair follow  Assistance: Contact guard assistance  Quality of Gait: L LE shaky due to weakness, however no buckling. Distance included 180* turn. Gait Deviations: Slow Laurence;Decreased step length;Decreased step height  Distance: 17'   Comments: HR 120s with activity. Rest breaks given PRN. Pt wearing sneaker today. Stairs/Curb  Stairs?: No(not ready/able at this time)    Wheelchair Activities  Wheelchair Type: Standard  Wheelchair Cushion: Pressure Relieving  Pressure Relief Type: Lateral lean;Push up  Level of Assistance for pressure relief activities: Supervision;Modified independent  Propulsion: Yes  Propulsion 1  Propulsion: Manual  Level: Level Tile  Method: RUE;LUE  Level of Assistance: Stand by assistance  Description/ Details: Pt able to propel straight path, turn, backing and manuver in tight spaces.    Distance: 235ft    BALANCE Posture: Good  Sitting - Static: Good(edge of mat/bed, no UE or back support)  Sitting - Dynamic: Fair;+(Edge of mat/bed, UE support, no back support)  Standing - Static: Fair;+(rolling walker)  Standing - Dynamic: Fair;+(rolling walker)    EXERCISES    Other exercises?: Yes  Other exercises 1: Supine, seated and standing mirror therapy with L LE active ROM to Pt. tolerance  Other exercises 4: sit<>stand x5(parallel bars, rolling walker)  Other exercises 5: standing in // bars: L heel raises and mini squats  Other exercises 6: standing in // bars: standing liliya with single UE support x 10 ea side -40 seconds total    Activity Tolerance: Patient Tolerated treatment well     PT Equipment Recommendations  Equipment Needed: Yes  Mobility Devices: Wheelchair;Walker  Walker: Rolling  Wheelchair: Light Weight(With pressure relieving cushion & swing-away armrests)  Chrystal Thomas was evaluated today and a DME order was entered for a wheeled walker because she requires this to successfully complete daily living tasks of eating, toileting, personal cares, ambulating and meal preparation. A wheeled walker is necessary due to the patient's unsteady gait, upper body weakness, and inability to  an ambulation device; and she can ambulate only by pushing a walker instead of a lesser assistive device such as a cane, crutch, or standard walker. The need for this equipment was discussed with the patient and she understands and is in agreement. Chrystal Thomas was evaluated today and a DME order was entered for a lightweight wheelchair with a Roho pressure relieving cushion and swing-away armrests because she requires this to successfully complete daily living tasks of bathing, toileting, personal cares, grooming, hygiene and meal preparation. A lightweight wheelchair is necessary due to the patient's impaired ambulation and mobility restrictions. The patient would not be able to resolve these daily living tasks using a cane or walker. The patient can self-propel a lightweight wheelchair safely in their home and can maneuver within their home with adequate access. The patient cannot self-propel in a standard wheelchair. The need for this equipment was discussed with the patient and she understands, is in agreement, and has not expressed an unwillingness to use the wheelchair. Swing-away armrests are necessary due to the patient's limited ability to transfer.  A surfaces, independently.      08/04/20 0844 08/04/20 1437   PT Individual Minutes   Time In 0800 1430   Time Out [id number]   Minutes 56 37     Electronically signed by Shawn So PTA on 8/4/20 at 3:30 PM EDT

## 2020-08-05 LAB
GLUCOSE BLD-MCNC: 102 MG/DL (ref 65–105)
GLUCOSE BLD-MCNC: 94 MG/DL (ref 65–105)
GLUCOSE BLD-MCNC: 97 MG/DL (ref 65–105)
GLUCOSE BLD-MCNC: 98 MG/DL (ref 65–105)

## 2020-08-05 PROCEDURE — 82947 ASSAY GLUCOSE BLOOD QUANT: CPT

## 2020-08-05 PROCEDURE — 99232 SBSQ HOSP IP/OBS MODERATE 35: CPT | Performed by: INTERNAL MEDICINE

## 2020-08-05 PROCEDURE — 6370000000 HC RX 637 (ALT 250 FOR IP): Performed by: STUDENT IN AN ORGANIZED HEALTH CARE EDUCATION/TRAINING PROGRAM

## 2020-08-05 PROCEDURE — 2580000003 HC RX 258: Performed by: STUDENT IN AN ORGANIZED HEALTH CARE EDUCATION/TRAINING PROGRAM

## 2020-08-05 PROCEDURE — 2580000003 HC RX 258: Performed by: INTERNAL MEDICINE

## 2020-08-05 PROCEDURE — 97535 SELF CARE MNGMENT TRAINING: CPT

## 2020-08-05 PROCEDURE — 97530 THERAPEUTIC ACTIVITIES: CPT

## 2020-08-05 PROCEDURE — 6360000002 HC RX W HCPCS: Performed by: PHYSICAL MEDICINE & REHABILITATION

## 2020-08-05 PROCEDURE — 1180000000 HC REHAB R&B

## 2020-08-05 PROCEDURE — 99232 SBSQ HOSP IP/OBS MODERATE 35: CPT | Performed by: PHYSICAL MEDICINE & REHABILITATION

## 2020-08-05 PROCEDURE — 6360000002 HC RX W HCPCS: Performed by: STUDENT IN AN ORGANIZED HEALTH CARE EDUCATION/TRAINING PROGRAM

## 2020-08-05 PROCEDURE — 97116 GAIT TRAINING THERAPY: CPT

## 2020-08-05 PROCEDURE — 97110 THERAPEUTIC EXERCISES: CPT

## 2020-08-05 PROCEDURE — 6370000000 HC RX 637 (ALT 250 FOR IP): Performed by: PHYSICAL MEDICINE & REHABILITATION

## 2020-08-05 PROCEDURE — 6360000002 HC RX W HCPCS: Performed by: INTERNAL MEDICINE

## 2020-08-05 RX ADMIN — FERROUS SULFATE TAB 325 MG (65 MG ELEMENTAL FE) 325 MG: 325 (65 FE) TAB at 09:27

## 2020-08-05 RX ADMIN — Medication 1 TABLET: at 09:27

## 2020-08-05 RX ADMIN — OXYCODONE HYDROCHLORIDE 5 MG: 5 TABLET ORAL at 23:15

## 2020-08-05 RX ADMIN — OXYCODONE HYDROCHLORIDE 5 MG: 5 TABLET ORAL at 15:33

## 2020-08-05 RX ADMIN — GABAPENTIN 300 MG: 300 CAPSULE ORAL at 09:18

## 2020-08-05 RX ADMIN — CEFEPIME 2 G: 2 INJECTION, POWDER, FOR SOLUTION INTRAVENOUS at 12:24

## 2020-08-05 RX ADMIN — ASPIRIN 81 MG CHEWABLE TABLET 81 MG: 81 TABLET CHEWABLE at 09:18

## 2020-08-05 RX ADMIN — ENOXAPARIN SODIUM 40 MG: 40 INJECTION SUBCUTANEOUS at 09:19

## 2020-08-05 RX ADMIN — GABAPENTIN 300 MG: 300 CAPSULE ORAL at 20:22

## 2020-08-05 RX ADMIN — OXYCODONE HYDROCHLORIDE 5 MG: 5 TABLET ORAL at 09:27

## 2020-08-05 RX ADMIN — GABAPENTIN 300 MG: 300 CAPSULE ORAL at 15:32

## 2020-08-05 RX ADMIN — PIPERACILLIN SODIUM AND TAZOBACTAM SODIUM 3.38 G: 3; .375 INJECTION, POWDER, LYOPHILIZED, FOR SOLUTION INTRAVENOUS at 23:00

## 2020-08-05 ASSESSMENT — PAIN SCALES - GENERAL
PAINLEVEL_OUTOF10: 2
PAINLEVEL_OUTOF10: 2
PAINLEVEL_OUTOF10: 4
PAINLEVEL_OUTOF10: 2
PAINLEVEL_OUTOF10: 0
PAINLEVEL_OUTOF10: 3
PAINLEVEL_OUTOF10: 1

## 2020-08-05 ASSESSMENT — PAIN DESCRIPTION - DESCRIPTORS: DESCRIPTORS: SORE

## 2020-08-05 ASSESSMENT — PAIN DESCRIPTION - LOCATION: LOCATION: HIP;LEG

## 2020-08-05 ASSESSMENT — PAIN DESCRIPTION - ORIENTATION: ORIENTATION: RIGHT

## 2020-08-05 ASSESSMENT — PAIN DESCRIPTION - PAIN TYPE: TYPE: SURGICAL PAIN;PHANTOM PAIN

## 2020-08-05 NOTE — PROGRESS NOTES
[facility] Internal Medicine    Progress Note  Family / Caregiver Present: No  Referring Practitioner: Dr Paty Goodman  Comments: Some pink spots noted on Pt's wheelchair cushion & shorts; Pt became teary, fearful of setback. RN Jeimy Chaidez notified. 8/5/2020    10:14 AM    Name:   Lupe Abebe  MRN:     [id number]     Acct:      [de-identified]   Room:   [address] [place] [address] Day:  5  Admit Date:  7/31/2020  7:15 PM    PCP:   FATEMEH Griffni CNP  Code Status:  Full Code    Subjective:     C/C: No chief complaint on file. Principal Problem:    History of disarticulation of right hip  Active Problems:    Pregestational Diabetes    Family history of diabetes mellitus    Gestational diabetes    Iron deficiency anemia    Sarcoma of bone ([address]    Thrombocytosis ([facility]    Chronic hypertension affecting pregnancy    Leukocytosis    History of blood transfusion    History of E. Coli UTI 10-<100,000CFU (7/7/20)    Pseudomonas UTI  Resolved Problems:    * No resolved hospital problems. *      Interval History Status: improved.        S/p disarticulation surgery following diagnosis of a sarcoma  She states she is doing much better a little concerned regarding thee length of IV antibiotics  Would like to wait until after the completion of pregnancy before she tries her prosthesis  Urine culture growing Pseudomonas, started on cefepime   Working with physical therapy  No other complaints overnight     Significant last 24 hr data reviewed ;   Vitals:    08/03/20 1854 08/04/20 0625 08/04/20 1844 08/05/20 0632   BP: 118/61 115/82 (!) 105/57 128/89   Pulse: 102 102 105 108   Resp: 18 16 18 19   Temp: 97.2 °F (36.2 °C) 98.3 °F (36.8 °C) 98.1 °F (36.7 °C) 97.2 °F (36.2 °C)   TempSrc: Oral Oral Oral Oral   SpO2: 100% 100% 99% 99%   Weight:       Height:          Recent Results (from the past 24 hour(s))   POC Glucose Fingerstick    Collection Time: 08/04/20 10:40 AM   Result Value Ref Range POC Glucose 92 65 - 105 mg/dL   POC Glucose Fingerstick    Collection Time: 08/04/20  3:54 PM   Result Value Ref Range    POC Glucose 107 (H) 65 - 105 mg/dL   POC Glucose Fingerstick    Collection Time: 08/04/20  8:07 PM   Result Value Ref Range    POC Glucose 113 (H) 65 - 105 mg/dL   POC Glucose Fingerstick    Collection Time: 08/05/20  6:30 AM   Result Value Ref Range    POC Glucose 97 65 - 105 mg/dL     Recent Labs     08/04/20  0623 08/04/20  1040 08/04/20  1554 08/04/20  2007 08/05/20  0630   POCGLU 90 92 107* 113* 97        No results found. HPI:   See history in H and P      Review of Systems:     Constitutional:  negative for chills, fevers, sweats  Respiratory:  negative for cough, dyspnea on exertion, hemoptysis, shortness of breath, wheezing  Cardiovascular:  negative for chest pain, chest pressure/discomfort, lower extremity edema, palpitations  Gastrointestinal:  negative for abdominal pain, constipation, diarrhea, nausea, vomiting  Neurological:  negative for dizziness, headache  Data:     Past Medical History:  no change     Social History:  no change    Family History: @no change    Vitals:      I/O (24Hr):   No intake or output data in the 24 hours ending 08/05/20 1014    Labs:    URINE ANALYSIS: No results found for: LABURIN     CBC:  Lab Results   Component Value Date    WBC 6.5 08/01/2020    HGB 11.0 08/01/2020     08/01/2020        BMP:    Lab Results   Component Value Date     08/01/2020    K 4.0 08/01/2020     08/01/2020    CO2 22 08/01/2020    BUN 5 08/01/2020    CREATININE <0.40 08/01/2020    GLUCOSE 106 08/01/2020      LIVER PROFILE:  Lab Results   Component Value Date     08/01/2020    AST 92 08/01/2020    PROT 6.3 08/01/2020    BILITOT 0.53 08/01/2020    BILIDIR 0.28 08/01/2020    LABALBU 3.3 08/01/2020               Radiology:      Physical Examination:        General appearance:  alert, cooperative and no distress  Mental Status:  oriented to person, place and time and normal affect  Lungs:  clear to auscultation bilaterally, normal effort  Heart:  regular rate and rhythm, no murmur  Abdomen:  soft, nontender, nondistended, normal bowel sounds, no masses, hepatomegaly, splenomegaly  Extremities:  no edema, redness, S/p disarticulation on right  Skin:  no gross lesions, rashes, induration    Assessment:        Primary Problem  History of disarticulation of right hip    Active Hospital Problems    Diagnosis Date Noted    Pseudomonas UTI [A49.8] 08/04/2020    History of E. Coli UTI 10-<100,000CFU (7/7/20) [Z87.440] 08/02/2020    History of blood transfusion [Z92.89] 08/01/2020    History of disarticulation of right hip [Z89.621] 07/31/2020    Leukocytosis [D72.829] 07/31/2020    Chronic hypertension affecting pregnancy [O10.919] 07/11/2020    Sarcoma of bone ([facility] [address] [facility] [C41.9] 07/07/2020    Iron deficiency anemia [D50.9] 07/02/2020    Gestational diabetes [O24.419] 06/30/2020    Thrombocytosis ([address] [D47.3] 06/30/2020    Family history of diabetes mellitus [Z83.3] 06/08/2020    Pregestational Diabetes [O99.810] 06/02/2020       Plan:        1. 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 UTI- started on cefepime X 5 days as patient does not have symptoms of pyelonephritis if agreeable with OBG  3. 26 weeks pregnant  4. 40 of Lovenox for DVT prophylaxis  5. On novolin- HbA1c of 5 (2/8/2020)  6. On gabapentin 300 mg 3 times a day   7. iron deficiency anemia perioperative blood loss on ferrous sulfate  8. Tachycardia with exertion      Medications:      Allergies:  No Known Allergies    Current Meds:   Scheduled Meds:    cefepime  2 g Intravenous Q12H    gabapentin  300 mg Oral TID    ferrous sulfate  325 mg Oral Daily with breakfast    aspirin  81 mg Oral Daily    enoxaparin  40 mg Subcutaneous Daily    polyethylene glycol  17 g Oral Daily    prenatal vitamin  1 tablet Oral Daily     Continuous Infusions:   PRN Meds: glucose, glucagon (rDNA), senna, bisacodyl, acetaminophen, cyclobenzaprine, oxyCODONE **OR** oxyCODONE       Shyam Kang MD  8/5/2020  10:14 AM     Attestation and add on       I have discussed the care of Audrey Raymundo , including pertinent history and exam findings,      8/5/20    with the resident. I have seen and examined the patient and the key elements of all parts of the encounter have been performed by me . I agree with the assessment, plan and orders as documented by the resident. Principal Problem:    History of disarticulation of right hip  Active Problems:    Pregestational Diabetes    Family history of diabetes mellitus    Gestational diabetes    Iron deficiency anemia    Sarcoma of bone ([address]    Thrombocytosis ([facility] [address]    Chronic hypertension affecting pregnancy    Leukocytosis    History of blood transfusion    History of E. Coli UTI 10-<100,000CFU (7/7/20)    Pseudomonas UTI  Resolved Problems:    * No resolved hospital problems. *            ---- ;        Medications: Allergies:  No Known Allergies    Current Meds:   Scheduled Meds:    cefepime  2 g Intravenous Q12H    gabapentin  300 mg Oral TID    ferrous sulfate  325 mg Oral Daily with breakfast    aspirin  81 mg Oral Daily    enoxaparin  40 mg Subcutaneous Daily    polyethylene glycol  17 g Oral Daily    prenatal vitamin  1 tablet Oral Daily     Continuous Infusions:   PRN Meds: glucose, glucagon (rDNA), senna, bisacodyl, acetaminophen, cyclobenzaprine, oxyCODONE **OR** oxyCODONE        [facility] [address], [address] [place] [address].    Phone [phone]   Fax: [phone]  Answering Service: [phone]

## 2020-08-05 NOTE — PROGRESS NOTES
Physical Medicine & Rehabilitation  Progress Note      Subjective:      22year-old female s/p R hip disarticulation amputation for sarcoma. Patient noting increased drainage from incision today with no increase in pain. Continues to note more phantom limb sensation than phantom limb pain. ROS:  Denies fevers, chills, sweats. No chest pain, palpitations, lightheadedness. Denies coughing, wheezing or shortness of breath. Denies abdominal pain, nausea, diarrhea or constipation. No new areas of joint pain. Denies new areas of numbness or weakness. Denies new anxiety or depression issues. No new skin problems. Rehabilitation:   Progressing in therapies. PT:  Restrictions/Precautions: Weight Bearing  Other position/activity restrictions: Per Dr. Deric Brown, Pt's orthopaedic resident, no showering until the patient follows up with the Ortho surgeon on 8/19. Right Lower Extremity Weight Bearing: Non Weight Bearing(R pelvis (no rolling onto or weight bearing), OK to sit upright per  PT notes)   Transfers  Sit to Stand: Stand by assistance  Stand to sit: Stand by assistance  Bed to Chair: Stand by assistance  Stand Pivot Transfers: Stand by assistance(no device, wheelchair to bed)  Squat Pivot Transfers: Stand by assistance(no device, wheelchair to mat)  Lateral Transfers: Contact guard assistance  Comment: Rolling walker/parallel bars unless noted  Ambulation 1  Surface: level tile  Device: Rolling Walker  Other Apparatus: Wheelchair follow  Assistance: Stand by assistance  Quality of Gait: No shaking or loss of balance observed. Flat-foot advancement. Gait Deviations: Slow Laurence, Decreased step length, Decreased step height  Distance: 25'  Comments: HR 120s with activity. Rest breaks given PRN.      Transfers  Sit to Stand: Stand by assistance  Stand to sit: Stand by assistance  Bed to Chair: Stand by assistance  Stand Pivot Transfers: Stand by assistance(no device, wheelchair to bed)  Squat Pivot Transfers: Stand by assistance(no device, wheelchair to mat)  Lateral Transfers: Contact guard assistance  Comment: Rolling walker/parallel bars unless noted  Ambulation  Ambulation?: Yes  Ambulation 1  Surface: level tile  Device: Rolling Walker  Other Apparatus: Wheelchair follow  Assistance: Stand by assistance  Quality of Gait: No shaking or loss of balance observed. Flat-foot advancement. Gait Deviations: Slow Laurence, Decreased step length, Decreased step height  Distance: 25'  Comments: HR 120s with activity. Rest breaks given PRN. Surface: level tile  Ambulation 1  Surface: level tile  Device: Rolling Walker  Other Apparatus: Wheelchair follow  Assistance: Stand by assistance  Quality of Gait: No shaking or loss of balance observed. Flat-foot advancement. Gait Deviations: Slow Laurence, Decreased step length, Decreased step height  Distance: 25'  Comments: HR 120s with activity. Rest breaks given PRN. OT:  ADL  Equipment Provided: Reacher, Sock aid, Long-handled sponge  Feeding: Independent  Grooming: Supervision, Setup(seated sinkside)  UE Bathing: Supervision, Setup(seated sinkside)  LE Bathing: Supervision, Setup, Stand by assistance(seated/standing sinkside; use of long handled sponge)  UE Dressing: Modified independent (pt completed set up and completion at w/c level )  LE Dressing: Stand by assistance, Setup, Supervision, Minimal assistance(Assist to ryan NOEL, Stand by assist in standing. )  Toileting: None(did not occur during session. )  Additional Comments: Pt demonstrates good functional use of adaptive equipment for increased independence with ADL's. Instrumental ADL's  Instrumental ADLs: Yes     Balance  Sitting Balance: Modified independent (pt does well with off loading weight on R hip)  Standing Balance: Supervision   Standing Balance  Time: AM: <1 min, ~1 min x2, ~3 min  Activity: Am: functional transfer, Lower body bathing and dressing.    Comment: 1-2 UE support via sink Functional Mobility  Functional - Mobility Device: Wheelchair  Activity: To/from bathroom, Transport items, Retrieve items, Other  Assist Level: Supervision  Functional Mobility Comments: PM: pt tolerates self propulsion in wheelchair despite having new IV placement into L hand, pt reports lessened axiety once she realized she was able to still propell self with minimal discomfort. Bed mobility  Bridging: Supervision  Rolling to Left: Stand by assistance  Rolling to Right: (Do not roll n right side due to incision from surgery)  Supine to Sit: Stand by assistance  Sit to Supine: Stand by assistance  Scooting: Supervision  Transfers  Stand Pivot Transfers: Stand by assistance  Sit to stand: Supervision, Stand by assistance  Stand to sit: Supervision  Transfer Comments: Good safety awareness noted with hand placement during transfers   Toilet Transfers  Toilet - Technique: Stand pivot, To right, To left  Equipment Used: Grab bars  Toilet Transfer: Contact guard assistance  Toilet Transfers Comments: Fair carryover of positioning of wheelchair for safety during functional transfer  Tub Transfers  Tub Transfers Comments: OT provided education and demonstration regarding use of tub transfer bench for tub shower for Pt's home discharge. Pt verbalized Fair understanding. Shower Transfers  Shower Transfers Comments: Per ortho surgeon, Pt may not shower until after her follow-up appointment on 8/19/20. Wheelchair Bed Transfers  Wheelchair/Bed - Technique: Stand pivot, To left  Equipment Used: Bed, Wheelchair  Level of Asssistance: Contact guard assistance  Wheelchair Transfers Comments: Fair carryover of technique noted from yesterday's session    SPEECH:      Objective:  /89   Pulse 108   Temp 97.2 °F (36.2 °C) (Oral)   Resp 19   Ht 5' 2\" (1.575 m)   Wt 152 lb 12.5 oz (69.3 kg)   LMP 02/18/2020   SpO2 99%   BMI 27.94 kg/m²       GEN: Well developed, well nourished, in NAD  HEENT:  NCAT. PERRL. EOMI. Intramuscular, PRN  polyethylene glycol (GLYCOLAX) packet 17 g, 17 g, Oral, Daily  senna (SENOKOT) tablet 17.2 mg, 2 tablet, Oral, Daily PRN  bisacodyl (DULCOLAX) suppository 10 mg, 10 mg, Rectal, Daily PRN  acetaminophen (TYLENOL) tablet 650 mg, 650 mg, Oral, Q4H PRN  cyclobenzaprine (FLEXERIL) tablet 10 mg, 10 mg, Oral, TID PRN  oxyCODONE (ROXICODONE) immediate release tablet 5 mg, 5 mg, Oral, Q4H PRN **OR** oxyCODONE (ROXICODONE) immediate release tablet 10 mg, 10 mg, Oral, Q4H PRN  prenatal vitamin 28-0.8 MG tablet 1 tablet, 1 tablet, Oral, Daily      Impression/Plan:   Impaired ADLs, gait, and mobility due to:      1. 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 NWB RLE. Tylenol prn, gabapentin TID, oxycodone prn. [address] following. Phantom limb pain increased dose of gabapentin to 300 mg with approval from OB on 8/2. Mirror therapy in PT. Has follow up apointment with oncologist at [facility] which will be rescheduled. Page out to surgeon Dr. Min Mueller to discuss incision changes and duration of weightbearing restrictions to plan for appropriate seating (I.e. Roho cushion). 2. Pregnant with gestational diabetes: OB following. On NPH insulin nightly. Prenatal vitamin daily. 3. UTI: OB initiated 10 days treatment with IV cefepime BID starting 8/4.   4. Bowel management: on miralax daily, senokot prn, dulcolax prn.   5. DVT Prophylaxis:  low molecular weight heparin, SCD while in bed and SADIE  6. Internal medicine for medical management    Merlyn Valadez is seen in face-to-face evaluation and requires the assistance of a wheeled walker to successfully complete daily living tasks such as: bathing, toileting, dressing and grooming.   A wheeled walker is necessary due to the patient's unsteady gait, upper body weakness, inability to  an ambulation device, and can ambulate only by pushing a walker instead of a lesser assistive

## 2020-08-05 NOTE — FLOWSHEET NOTE
Patient visiting with her mother. Patient is pregnant with her first child and her mother first grandchild. This family is so excited even through they have endured much disappointment the last couple of weeks. This family has a strong \"Yvonne\" and they believe that God won't give you more then what you can bear. Jacoby Saleem has a very good support system during this difficult time. Chaplains will remain available to offer spiritual and emotional support as needed. 08/04/20 2000   Encounter Summary   Services provided to: Patient and family together  (Mother Sierra Ty)   Referral/Consult From: Other disciplines  (Physical Therapist/ [place])   Support System Spouse;Parent; Family members; Advent/yvonne community;Friends/neighbors   Continue Visiting   (8/4/20)   Complexity of Encounter Low   Length of Encounter 15 minutes   Spiritual Assessment Completed Yes   Routine   Type Initial   Assessment Calm; Approachable; Hopeful   Intervention Active listening;Explored feelings, thoughts, concerns;Nurtured hope;Prayer;Sustaining presence/ Ministry of presence; Discussed relationship with God;Discussed belief system/Sikhism practices/yvonne;Discussed illness/injury and it's impact   Outcome Expressed gratitude;Engaged in conversation; Shared life review;Receptive   Visited by Freeda Severs

## 2020-08-05 NOTE — PROGRESS NOTES
Physical Therapy  [address] [facility]   Acute Rehabilitation Physical Therapy Progress Note    Date: 20  Patient Name: Josh Arredondo       Room: 6813/6849-86  MRN: [id number]   Account: [de-identified]   : 1995  (22 y.o.) Gender: female     Referring Practitioner: Dr. 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 Past Surgical History:   has a past surgical history that includes amputation (Right, 2020). Additional Pertinent Hx: Josh Arredondo  is a 22 y.o. right-handed     female admitted to the [address] [place] [address] on 2020. She was originally admitted to [facility] on 2020 for planned amputation R leg for Stage 3 sarcoma. She had R hip disarticulation performed 2020. She is pregnant - at 21 weeks gestation with comorbid gestational diabetes. She is NWB on RLE/R pelvis. Restrictions/Precautions  Restrictions/Precautions: Weight Bearing  Lower Extremity Weight Bearing Restrictions  Right Lower Extremity Weight Bearing: Non Weight Bearing(R pelvis (no rolling onto or weight bearing), OK to sit upright per  PT notes)    Subjective: Pt voicing pleasure that IV site was moved from L wrist to L forearm, making transfers, ambulation less painful. Comments: Some pink spots noted on Pt's wheelchair cushion & shorts; Pt became teary, fearful of setback. STEPHANE Tobar notified; later reported incisional bleeding increased, Dr. Rigoberto Alston assessed and notified surgeon for additional feedback. Daksha Hyde changed cushion in Pt's wheelchair to provide more support while enabling better unweighting of R pelvis. Vital Signs  Patient Currently in Pain: Yes  Pain Assessment: 0-10  Pain Level: 2  Pain Type: Surgical pain; Phantom pain  Pain Location: Hip;Leg  Pain Orientation: Right  Pain Descriptors: Sore  Non-Pharmaceutical Pain Intervention(s): Ambulation/Increased Activity;Repositioned;Rest;Emotional support  Response to Pain Intervention: None;Patient Satisfied    Bed Mobility  Bridging: Supervision  Rolling: Supervision;Rolling Left  Supine to Sit: Supervision  Sit to Supine: Supervision  Scooting: Supervision  Comment: Mat, wedge, 2 pillows    Transfers:  Sit to Stand: Stand by assistance  Stand to sit: Stand by assistance  Bed to Chair: Stand by assistance  Stand pivot transfers: Stand by assistance(no device, bed to wheelchair)  Squat Pivot Transfers: Stand by assistance(no device, wheelchair to mat)  Comment: Rolling walker/parallel bars unless noted    Ambulation 1  Surface: level tile  Device: Rolling Walker  Assistance: Stand by assistance  Quality of Gait: No shaking or loss of balance observed. Flat-foot advancement. Gait Deviations: Slow Laurence;Decreased step length;Decreased step height  Distance: 25' AM; 15' x2 PM  Comments: HR 120s with activity. Rest breaks given PRN. Ambulation 2  Surface - 2: level tile  Device 2: Parallel Bars  Assistance 2: Stand by assistance  Quality of Gait 2: Emphasis on heel strike with good return to cues. Relies heavily on UEs for exaggeratedly slow swing/caution. Gait Deviations: Decreased step height;Slow Laurence  Distance: 28' x2    Stairs/Curb  Stairs?: No(not ready/able at this time)     Wheelchair Activities  Wheelchair Type: Standard  Wheelchair Cushion: Pressure Relieving  Pressure Relief Type: Lateral lean;Push up  Level of Assistance for pressure relief activities: Supervision;Modified independent  Propulsion: Yes  Propulsion 1  Propulsion: Manual  Level: Level Tile(+ ramp)  Method: RUE;LUE  Level of Assistance: Stand by assistance  Description/ Details: Pt able to propel straight path, turn, backing and manuver in tight spaces.    Distance: 300'    BALANCE Posture: Good  Sitting - Static: Good(edge of mat/bed, no UE or back support)  Sitting - Dynamic: Fair;+(Edge of mat/bed, UE support, no back support)  Standing - Static: Good;-(Single UE support, parallel bars)  Standing - Dynamic: Good;-(Single UE support, parallel bars)    EXERCISES    Other exercises?: Yes  Other exercises 2: Seated L LE active ROM, repetitions to Pt. tolerance  Other exercises 4: sit<>stand x7(parallel bars, rolling walker)  Other exercises 5: standing in // bars: L heel raises and mini squats  Other exercises 6: standing in // bars: standing liliya & 1-2 UE support, reaching OOBOS, 5 min. total with 2 seated rest breaks  Other exercises 7: Supine bilateral LE exercises, 2#, 15x each    Activity Tolerance: Patient Tolerated treatment well, Patient limited by endurance  PT Equipment Recommendations  Equipment Needed: Yes  Walker: Rolling  Wheelchair: Light Weight(With pressure relieving cushion & swing-away armrests)  Other Comments  Comments: DME order for above given to [employer] on 8/4/2020. Current Treatment Recommendations: Strengthening, Balance Training, Functional Mobility Training, Transfer Training, Endurance Training, Wheelchair Mobility Training, Gait Training, Safety Education & Training, Patient/Caregiver Education & Training, Equipment Evaluation, Education, & procurement, Positioning    Conditions Requiring Skilled Therapeutic Intervention  Body structures, Functions, Activity limitations: Decreased functional mobility ; Decreased ROM; Decreased strength;Decreased endurance;Decreased balance; Increased pain;Decreased posture  REQUIRES PT FOLLOW UP: Yes  Discharge Recommendations: Home with assist PRN    Goals  Short term goals  Time Frame for Short term goals: 1 week  Short term goal 1: Pt able to roll to left side for psotion change/pressure relief, iindependently. Short term goal 2: Pt able to perform supine <>sit at supervsion level.   Short term goal 3: Pt able to perform sit <>stand at [facility]  Short term goal 4:  Pt able to transfer from bed<>W/C<>toilet at [facility]  Short term goal 5: Pt to

## 2020-08-05 NOTE — CARE COORDINATION
Marylee Click is out of network dme ordered canceled. Order faxed to [facility].  Writer left a message on the foundation vmail asking them if they could help pay for the lucian esquivel

## 2020-08-05 NOTE — PROGRESS NOTES
ADL  Equipment Provided: Reacher  Feeding: Independent  Grooming: Setup  UE Bathing: Setup  LE Bathing: Setup;Stand by assistance  UE Dressing: Setup  LE Dressing: Stand by assistance;Setup(pants, teds, slipper sock, shoe with elastic laces)  Toileting: Stand by assistance(uses pants clip effectively)  Additional Comments: adls completed from bed to allow more independence and maintain R weigh bearing precautions,  good maintain balance for pants on and off over hips and bathe en, bottom in stand by alternating hands on walker and doing task. Assessment     Activity Tolerance: Patient Tolerated treatment well  Activity Tolerance: in am was left seated edge of bed to eat lunch, in pm in recliner, wheelchair not used today- new options for cushion for less pressure on R hip disarticulation are being explored. 08/05/20 1526 08/05/20 1532   OT Individual Minutes   Time In [facility] 59   Time Out 1150 1344   Minutes 78 33       Patient Education:  Patient Goals   Patient goals : \"My goal is to be as independent as I can be\"  Learner:family and patient  Method: demonstration and explanation       Outcome: acknowledged understanding  and demonstrated understanding   OT Education  OT Education: ADL Adaptive Strategies  Patient Education: mother present for pm education: obtain foam wedge for use on bed at home to elevate head of bed when desired. don shoe with elastic shoe lace and reachers, use of baby carrier in future, need to learn independent chair to floor to chair transfer for baby playtime in future. advantages of complete LE self care from bed currently due to precautions re weight bear on R pelvis.     Plan  Plan  Times per week: 5-7  Times per day: Twice a day  Current Treatment Recommendations: Self-Care / ADL, Home Management Training, Strengthening, Balance Training, Functional Mobility Training, Endurance Training, Wheelchair Mobility Training, Pain Management, Safety Education & Training, Patient/Caregiver Education & Training, Equipment Evaluation, Education, & procurement, Positioning  Patient Goals   Patient goals : \"My goal is to be as independent as I can be\"  Short term goals  Time Frame for Short term goals: 5 to 7 days  Short term goal 1: Pt will verbalize/demonstrate Good understanding of assistive equipment/durable medical equipment/modified techniques as needed for increased independence with self-care and mobility. Short term goal 2: Pt will perform lower body dressing and toileting tasks with Minimal assist and use of assistive equipment/modified techniques as needed. Short term goal 3: Pt will perform functional mobility and transfers during self-care with stand by assist, least restrictive mobility device, and Good safety. Short term goal 4: Pt will stand for 4+ minutes with 1-2 UE support, stand by assist, and no loss of balance while engaging in functional activity of choice. Short term goal 5: Pt will actively participate in 30+ minutes of therapeutic exercise/functional activities to promote increased independence with self-care and mobility. Long term goals  Time Frame for Long term goals : By discharge  Long term goal 1: Pt will perform BADLs with modified independence and Good safety. Long term goal 2: Pt will perform functional mobility and transfers during self-care with modified independence, least restrictive mobility device, and Good safety. Long term goal 3: Pt will stand for 8+ minutes with 1-2 UE support, modified independence, and no loss of balance while engaging in self-care/functional activity of choice. Long term goal 4: Pt will perform simple meal prep/light housekeeping with modified independence and Good safety.        Electronically signed by Marilyn Meyer OT on 8/5/20 at 3:49 PM EDT

## 2020-08-06 LAB
GLUCOSE BLD-MCNC: 103 MG/DL (ref 65–105)
GLUCOSE BLD-MCNC: 114 MG/DL (ref 65–105)
GLUCOSE BLD-MCNC: 96 MG/DL (ref 65–105)
GLUCOSE BLD-MCNC: 98 MG/DL (ref 65–105)

## 2020-08-06 PROCEDURE — 97112 NEUROMUSCULAR REEDUCATION: CPT

## 2020-08-06 PROCEDURE — 97535 SELF CARE MNGMENT TRAINING: CPT

## 2020-08-06 PROCEDURE — 97530 THERAPEUTIC ACTIVITIES: CPT

## 2020-08-06 PROCEDURE — 97116 GAIT TRAINING THERAPY: CPT

## 2020-08-06 PROCEDURE — 1180000000 HC REHAB R&B

## 2020-08-06 PROCEDURE — 99231 SBSQ HOSP IP/OBS SF/LOW 25: CPT | Performed by: INTERNAL MEDICINE

## 2020-08-06 PROCEDURE — 97542 WHEELCHAIR MNGMENT TRAINING: CPT

## 2020-08-06 PROCEDURE — 82947 ASSAY GLUCOSE BLOOD QUANT: CPT

## 2020-08-06 PROCEDURE — 6370000000 HC RX 637 (ALT 250 FOR IP): Performed by: STUDENT IN AN ORGANIZED HEALTH CARE EDUCATION/TRAINING PROGRAM

## 2020-08-06 PROCEDURE — 99232 SBSQ HOSP IP/OBS MODERATE 35: CPT | Performed by: PHYSICAL MEDICINE & REHABILITATION

## 2020-08-06 PROCEDURE — 6360000002 HC RX W HCPCS: Performed by: INTERNAL MEDICINE

## 2020-08-06 PROCEDURE — 6360000002 HC RX W HCPCS: Performed by: PHYSICAL MEDICINE & REHABILITATION

## 2020-08-06 PROCEDURE — 2580000003 HC RX 258: Performed by: INTERNAL MEDICINE

## 2020-08-06 PROCEDURE — 97110 THERAPEUTIC EXERCISES: CPT

## 2020-08-06 PROCEDURE — 6370000000 HC RX 637 (ALT 250 FOR IP): Performed by: PHYSICAL MEDICINE & REHABILITATION

## 2020-08-06 RX ADMIN — GABAPENTIN 300 MG: 300 CAPSULE ORAL at 15:58

## 2020-08-06 RX ADMIN — OXYCODONE HYDROCHLORIDE 5 MG: 5 TABLET ORAL at 09:08

## 2020-08-06 RX ADMIN — OXYCODONE HYDROCHLORIDE 5 MG: 5 TABLET ORAL at 15:58

## 2020-08-06 RX ADMIN — GABAPENTIN 300 MG: 300 CAPSULE ORAL at 20:47

## 2020-08-06 RX ADMIN — Medication 1 TABLET: at 09:08

## 2020-08-06 RX ADMIN — GABAPENTIN 300 MG: 300 CAPSULE ORAL at 09:08

## 2020-08-06 RX ADMIN — OXYCODONE HYDROCHLORIDE 5 MG: 5 TABLET ORAL at 20:50

## 2020-08-06 RX ADMIN — PIPERACILLIN SODIUM AND TAZOBACTAM SODIUM 3.38 G: 3; .375 INJECTION, POWDER, LYOPHILIZED, FOR SOLUTION INTRAVENOUS at 06:01

## 2020-08-06 RX ADMIN — PIPERACILLIN SODIUM AND TAZOBACTAM SODIUM 3.38 G: 3; .375 INJECTION, POWDER, LYOPHILIZED, FOR SOLUTION INTRAVENOUS at 23:09

## 2020-08-06 RX ADMIN — PIPERACILLIN SODIUM AND TAZOBACTAM SODIUM 3.38 G: 3; .375 INJECTION, POWDER, LYOPHILIZED, FOR SOLUTION INTRAVENOUS at 15:58

## 2020-08-06 RX ADMIN — FERROUS SULFATE TAB 325 MG (65 MG ELEMENTAL FE) 325 MG: 325 (65 FE) TAB at 09:11

## 2020-08-06 RX ADMIN — ASPIRIN 81 MG CHEWABLE TABLET 81 MG: 81 TABLET CHEWABLE at 09:08

## 2020-08-06 RX ADMIN — ENOXAPARIN SODIUM 40 MG: 40 INJECTION SUBCUTANEOUS at 09:08

## 2020-08-06 ASSESSMENT — PAIN DESCRIPTION - PAIN TYPE: TYPE: PHANTOM PAIN

## 2020-08-06 ASSESSMENT — PAIN SCALES - GENERAL
PAINLEVEL_OUTOF10: 4
PAINLEVEL_OUTOF10: 0
PAINLEVEL_OUTOF10: 4
PAINLEVEL_OUTOF10: 3

## 2020-08-06 ASSESSMENT — PAIN DESCRIPTION - LOCATION: LOCATION: LEG

## 2020-08-06 ASSESSMENT — PAIN DESCRIPTION - ORIENTATION: ORIENTATION: RIGHT

## 2020-08-06 ASSESSMENT — PAIN SCALES - WONG BAKER: WONGBAKER_NUMERICALRESPONSE: 2

## 2020-08-06 ASSESSMENT — PAIN DESCRIPTION - DESCRIPTORS: DESCRIPTORS: NUMBNESS;PINS AND NEEDLES

## 2020-08-06 ASSESSMENT — PAIN DESCRIPTION - ONSET: ONSET: ON-GOING

## 2020-08-06 NOTE — PLAN OF CARE
Problem: Falls - Risk of:  Goal: Will remain free from falls  Description: Will remain free from falls  8/6/2020 1515 by Maria Eugenia Minor RN  Outcome: Ongoing  Note: Pt educated on and verbalizes understanding of fall risks. Pt wearing nonskid stockings, uses assistive devices appropriately, call light within reach, family at bedside, encouraged to ask for assistance. Pt calls out appropriately this shift. Will continue to monitor and intervene as needed. Problem: Pain:  Goal: Control of acute pain  Description: Control of acute pain  Outcome: Ongoing  Note: Pt reports as needed pain medications are enough to keep pain manageable. Non-pharmacological interventions discussed, pt observed using ice this shift, reports that it helps. Will continue to monitor and medicate per MD orders. Problem: Skin Integrity:  Goal: Will show no infection signs and symptoms  Description: Will show no infection signs and symptoms  8/6/2020 1515 by Maria Eugenia Minor RN  Outcome: Ongoing  Note: Pt educated on risks for impaired skin integrity. Pt assisted in keeping skin clean and dry, assisted and prompted to reposition frequently. Dressing changed per MD orders and as needed. No new s/s of infection or new breakdown noted this shift. Will continue to monitor and intervene as needed.

## 2020-08-06 NOTE — PROGRESS NOTES
[facility] 52 Internal Medicine    Progress Note  Family / Caregiver Present: No  Referring Practitioner: Dr Amanda Tovar  Comments: Some pink spots noted on Pt's wheelchair cushion & shorts; Pt became teary, fearful of setback. STEPHANE Garcia notified; later reported incisional bleeding increased, Dr. Jaleel Lyn assessed and notified surgeon for additional feedback. Austin Bethea changed cushion in Pt's wheelchair to provide more support while enabling better unweighting of R pelvis. 8/6/2020    11:33 AM    Name:   Clarisse Herrera  MRN:     [id number]     Acct:      [de-identified]   Room:   [address] [place] [address]  IP Day:  6  Admit Date:  7/31/2020  7:15 PM    PCP:   FATEMEH Fields CNP  Code Status:  Full Code    Subjective:     C/C: No chief complaint on file. Principal Problem:    History of disarticulation of right hip  Active Problems:    Pregestational Diabetes    Family history of diabetes mellitus    Gestational diabetes    Iron deficiency anemia    Sarcoma of bone ([address]    Thrombocytosis ([address]    Chronic hypertension affecting pregnancy    Leukocytosis    History of blood transfusion    History of E. Coli UTI 10-<100,000CFU (7/7/20)    Pseudomonas UTI  Resolved Problems:    * No resolved hospital problems. *      Interval History Status: improved.        S/p disarticulation surgery following diagnosis of a sarcoma  She states she is doing much better a little concerned regarding thee length of IV antibiotics  Would like to wait until after the completion of pregnancy before she tries her prosthesis  Urine culture growing Pseudomonas, started on Zosyn (8/6/2020)  Working with physical therapy  No other complaints overnight     Significant last 24 hr data reviewed ;   Vitals:    08/04/20 1844 08/05/20 0632 08/05/20 1904 08/06/20 0625   BP: (!) 105/57 128/89 120/70 128/79   Pulse: 105 108 86 85   Resp: 18 19 18 16   Temp: 98.1 °F (36.7 °C) 97.2 °F (36.2 °C) 97.7 °F (36.5 °C) 97.2 °F (36.2 °C)   TempSrc: Oral Oral Oral Oral   SpO2: 99% 99% 99% 99%   Weight:       Height:          Recent Results (from the past 24 hour(s))   POC Glucose Fingerstick    Collection Time: 08/05/20  3:36 PM   Result Value Ref Range    POC Glucose 98 65 - 105 mg/dL   POC Glucose Fingerstick    Collection Time: 08/05/20  8:20 PM   Result Value Ref Range    POC Glucose 102 65 - 105 mg/dL   POC Glucose Fingerstick    Collection Time: 08/06/20  6:26 AM   Result Value Ref Range    POC Glucose 96 65 - 105 mg/dL   POC Glucose Fingerstick    Collection Time: 08/06/20 10:33 AM   Result Value Ref Range    POC Glucose 103 65 - 105 mg/dL     Recent Labs     08/05/20  1053 08/05/20  1536 08/05/20  2020 08/06/20  0626 08/06/20  1033   POCGLU 94 98 102 96 103        No results found. HPI:   See history in H and P      Review of Systems:     Constitutional:  negative for chills, fevers, sweats  Respiratory:  negative for cough, dyspnea on exertion, hemoptysis, shortness of breath, wheezing  Cardiovascular:  negative for chest pain, chest pressure/discomfort, lower extremity edema, palpitations  Gastrointestinal:  negative for abdominal pain, constipation, diarrhea, nausea, vomiting  Neurological:  negative for dizziness, headache  Data:     Past Medical History:  no change     Social History:  no change    Family History: @no change    Vitals:      I/O (24Hr):   No intake or output data in the 24 hours ending 08/06/20 1133    Labs:    URINE ANALYSIS: No results found for: LABURIN     CBC:  Lab Results   Component Value Date    WBC 6.5 08/01/2020    HGB 11.0 08/01/2020     08/01/2020        BMP:    Lab Results   Component Value Date     08/01/2020    K 4.0 08/01/2020     08/01/2020    CO2 22 08/01/2020    BUN 5 08/01/2020    CREATININE <0.40 08/01/2020    GLUCOSE 106 08/01/2020      LIVER PROFILE:  Lab Results   Component Value Date     08/01/2020    AST 92 08/01/2020    PROT 6.3 08/01/2020    BILITOT 0.53 08/01/2020    BILIDIR 0.28 08/01/2020    LABALBU 3.3 08/01/2020               Radiology:      Physical Examination:        General appearance:  alert, cooperative and no distress  Mental Status:  oriented to person, place and time and normal affect  Lungs:  clear to auscultation bilaterally, normal effort  Heart:  regular rate and rhythm, no murmur  Abdomen:  soft, nontender, nondistended, normal bowel sounds, no masses, hepatomegaly, splenomegaly  Extremities:  no edema, redness, S/p disarticulation on right  Skin:  no gross lesions, rashes, induration    Assessment:        Primary Problem  History of disarticulation of right hip    Active Hospital Problems    Diagnosis Date Noted    Pseudomonas UTI [A49.8] 08/04/2020    History of E. Coli UTI 10-<100,000CFU (7/7/20) [Z87.440] 08/02/2020    History of blood transfusion [Z92.89] 08/01/2020    History of disarticulation of right hip [Z89.621] 07/31/2020    Leukocytosis [D72.829] 07/31/2020    Chronic hypertension affecting pregnancy [O10.919] 07/11/2020    Sarcoma of bone [facility] [C41.9] 07/07/2020    Iron deficiency anemia [D50.9] 07/02/2020    Gestational diabetes [O24.419] 06/30/2020    Thrombocytosis ([facility] [D47.3] 06/30/2020    Family history of diabetes mellitus [Z83.3] 06/08/2020    Pregestational Diabetes [O99.810] 06/02/2020       Plan:        1. 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 UTI- started on Zosyn (8/6/2020)   3. 26 weeks pregnant  4. 40 of Lovenox for DVT prophylaxis  5. On novolin- HbA1c of 5 (2/8/2020)  6. On gabapentin 300 mg 3 times a day   7. iron deficiency anemia perioperative blood loss on ferrous sulfate  8. Tachycardia with exertion      Medications:      Allergies:  No Known Allergies    Current Meds:   Scheduled Meds:    piperacillin-tazobactam  3.375 g Intravenous Q8H    gabapentin  300 mg Oral TID    ferrous sulfate  325 mg Oral Daily with breakfast    aspirin  81 mg Oral Daily    enoxaparin 40 mg Subcutaneous Daily    polyethylene glycol  17 g Oral Daily    prenatal vitamin  1 tablet Oral Daily     Continuous Infusions:   PRN Meds: glucose, glucagon (rDNA), senna, bisacodyl, acetaminophen, cyclobenzaprine, oxyCODONE **OR** oxyCODONE       Bertah Becker MD  8/6/2020  11:33 AM       Attestation and add on       I have discussed the care of Al Mirza , including pertinent history and exam findings,      8/6/20    with the resident. I have seen and examined the patient and the key elements of all parts of the encounter have been performed by me . I agree with the assessment, plan and orders as documented by the resident. Principal Problem:    History of disarticulation of right hip  Active Problems:    Pregestational Diabetes    Family history of diabetes mellitus    Gestational diabetes    Iron deficiency anemia    Sarcoma of bone ([address]    Thrombocytosis ([address]    Chronic hypertension affecting pregnancy    Leukocytosis    History of blood transfusion    History of E. Coli UTI 10-<100,000CFU (7/7/20)    Pseudomonas UTI  Resolved Problems:    * No resolved hospital problems. *            ---- ;        Medications: Allergies:  No Known Allergies    Current Meds:   Scheduled Meds:    piperacillin-tazobactam  3.375 g Intravenous Q8H    gabapentin  300 mg Oral TID    ferrous sulfate  325 mg Oral Daily with breakfast    aspirin  81 mg Oral Daily    enoxaparin  40 mg Subcutaneous Daily    polyethylene glycol  17 g Oral Daily    prenatal vitamin  1 tablet Oral Daily     Continuous Infusions:   PRN Meds: glucose, glucagon (rDNA), senna, bisacodyl, acetaminophen, cyclobenzaprine, oxyCODONE **OR** oxyCODONE        [facility] [address], [address] [place] [address].    Phone [phone]   Fax: [phone]  Answering Service: [phone]

## 2020-08-06 NOTE — PROGRESS NOTES
Physical Medicine & Rehabilitation  Progress Note      Subjective:      22year-old female s/p R hip disarticulation amputation for sarcoma. Phantom limb sensation continues with intermittent phantom limb pain. Serous drainage is still minimal from medial aspect of incision. ROS:  Denies fevers, chills, sweats. No chest pain, palpitations, lightheadedness. Denies coughing, wheezing or shortness of breath. Denies abdominal pain, nausea, diarrhea or constipation. No new areas of joint pain. Denies new areas of numbness or weakness. Denies new anxiety or depression issues. No new skin problems. Rehabilitation:   Progressing in therapies. PT:  Restrictions/Precautions: Weight Bearing  Other position/activity restrictions: Per Dr. Renay Saab, Pt's orthopaedic resident, no showering until the patient follows up with the Ortho surgeon on 8/19. Right Lower Extremity Weight Bearing: Non Weight Bearing(R pelvis (no rolling onto or weight bearing), OK to sit upright per  PT notes)   Transfers  Sit to Stand: Stand by assistance  Stand to sit: Stand by assistance  Bed to Chair: Stand by assistance  Stand Pivot Transfers: Stand by assistance(no device, bed to wheelchair )  Squat Pivot Transfers: Stand by assistance(no device, wheelchair to mat)  Lateral Transfers: Contact guard assistance  Comment: Rolling walker/parallel bars unless noted  Ambulation 1  Surface: level tile  Device: Rolling Walker  Other Apparatus: Wheelchair follow  Assistance: Stand by assistance  Quality of Gait: No shaking or loss of balance observed. Flat-foot advancement. Gait Deviations: Slow Laurence, Decreased step length, Decreased step height  Distance: 25' AM; 15' x2 PM  Comments: HR 120s with activity. Rest breaks given PRN.      Transfers  Sit to Stand: Stand by assistance  Stand to sit: Stand by assistance  Bed to Chair: Stand by assistance  Stand Pivot Transfers: Stand by assistance(no device, bed to wheelchair )  Squat Pivot Mobility Comments: ambulate 15 feet bed to recliner     Bed mobility  Bridging: Supervision  Rolling to Left: Stand by assistance  Rolling to Right: (Do not roll n right side due to incision from surgery)  Supine to Sit: Modified independent  Sit to Supine: Stand by assistance  Scooting: Supervision  Transfers  Stand Pivot Transfers: Stand by assistance  Sit to stand: Stand by assistance  Stand to sit: Supervision  Transfer Comments: Good safety awareness noted with hand placement during transfers   Toilet Transfers  Toilet - Technique: Stand pivot, To right, To left  Equipment Used: Grab bars  Toilet Transfer: Contact guard assistance  Toilet Transfers Comments: Fair carryover of positioning of wheelchair for safety during functional transfer  Tub Transfers  Tub Transfers Comments: OT provided education and demonstration regarding use of tub transfer bench for tub shower for Pt's home discharge. Pt verbalized Fair understanding. Shower Transfers  Shower Transfers Comments: Per ortho surgeon, Pt may not shower until after her follow-up appointment on 8/19/20. Wheelchair Bed Transfers  Wheelchair/Bed - Technique: Stand pivot, To left  Equipment Used: Bed, Wheelchair  Level of Asssistance: Contact guard assistance  Wheelchair Transfers Comments: Fair carryover of technique noted from yesterday's session    SPEECH:      Objective:  /79   Pulse 85   Temp 97.2 °F (36.2 °C) (Oral)   Resp 16   Ht 5' 2\" (1.575 m)   Wt 152 lb 12.5 oz (69.3 kg)   LMP 02/18/2020   SpO2 99%   BMI 27.94 kg/m²       GEN: Well developed, well nourished, in NAD  HEENT:  NCAT. PERRL. EOMI. Mucous membranes pink and moist.   PULM:  Clear to ausculation. No rales or rhonchi. Respirations WNL and unlabored. CV:  tachycardic rate regular rhythm. No murmurs or gallops. GI:  Abdomen soft. Nontender. Non-distended. BS + and equal.    NEUROLOGICAL: A&O x3. Sensation intact to light touch. MSK:  Functional ROM BUEs and LLE.  Motor testing 5/5 key muscles BUE and LLEs. SKIN: Warm dry and intact. Good turgor. R hip disartic incision well approximated with staples in place, no drainage. Mild erythema medial aspect of incision. Increasing area of induration medial aspect incision - with serous drainage today - improved today with less edema and redness. EXTREMITIES:  No L calf tenderness to palpation. No edema LLE. PSYCH: Mood WNL. Appropriately interactive. Affect WNL. Diagnostics:     CBC:   No results for input(s): WBC, RBC, HGB, HCT, MCV, RDW, PLT in the last 72 hours. BMP:   No results for input(s): NA, K, CL, CO2, PHOS, BUN, CREATININE, GLUCOSE in the last 72 hours. Invalid input(s): CA  BNP: No results for input(s): BNP in the last 72 hours. PT/INR: No results for input(s): PROTIME, INR in the last 72 hours. APTT: No results for input(s): APTT in the last 72 hours. CARDIAC ENZYMES: No results for input(s): CKMB, CKMBINDEX, TROPONINT in the last 72 hours. Invalid input(s): CKTOTAL;3  FASTING LIPID PANEL:No results found for: CHOL, HDL, TRIG  LIVER PROFILE:   No results for input(s): AST, ALT, ALB, BILIDIR, BILITOT, ALKPHOS in the last 72 hours.      Current Medications:   Current Facility-Administered Medications: piperacillin-tazobactam (ZOSYN) 3.375 g in dextrose 5 % 50 mL IVPB extended infusion (mini-bag), 3.375 g, Intravenous, Q8H  gabapentin (NEURONTIN) capsule 300 mg, 300 mg, Oral, TID  ferrous sulfate (IRON 325) tablet 325 mg, 325 mg, Oral, Daily with breakfast  aspirin chewable tablet 81 mg, 81 mg, Oral, Daily  enoxaparin (LOVENOX) injection 40 mg, 40 mg, Subcutaneous, Daily  glucose (GLUTOSE) 40 % oral gel 15 g, 15 g, Oral, PRN  glucagon (rDNA) injection 1 mg, 1 mg, Intramuscular, PRN  polyethylene glycol (GLYCOLAX) packet 17 g, 17 g, Oral, Daily  senna (SENOKOT) tablet 17.2 mg, 2 tablet, Oral, Daily PRN  bisacodyl (DULCOLAX) suppository 10 mg, 10 mg, Rectal, Daily PRN  acetaminophen (TYLENOL) tablet 650 mg, 650 mg, Oral, Q4H PRN  cyclobenzaprine (FLEXERIL) tablet 10 mg, 10 mg, Oral, TID PRN  oxyCODONE (ROXICODONE) immediate release tablet 5 mg, 5 mg, Oral, Q4H PRN **OR** oxyCODONE (ROXICODONE) immediate release tablet 10 mg, 10 mg, Oral, Q4H PRN  prenatal vitamin 28-0.8 MG tablet 1 tablet, 1 tablet, Oral, Daily      Impression/Plan:   Impaired ADLs, gait, and mobility due to:      1. 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 NWB RLE. Tylenol prn, gabapentin TID, oxycodone prn. [address] following. Phantom limb pain increased dose of gabapentin to 300 mg with approval from OB on 8/2. Mirror therapy in PT. [facility] up ap[facility]  which will be rescheduled. Discussed with Dr. Luep Reeder today. Notified her of serous drainage which can be monitored as long as no increased WBC, warmth or purulent drainage. She clarifies no need to avoid weight bearing on R pelvis and can weigh tbear as tolerated on pelvis for ADLs and mobility. 2. Pregnant with gestational diabetes: OB following. On NPH insulin nightly. Prenatal vitamin daily. 3. UTI: IM changed antibiotic to Zosyn - nursing confirmed OK with OB today. Zosyn through 8/10.  4. Bowel management: on miralax daily, senokot prn, dulcolax prn.   5. DVT Prophylaxis:  low molecular weight heparin, SCD while in bed and SADIE  6. Internal medicine for medical management      Electronically signed by Cristian Woodruff MD on 8/6/2020 at 9:30 AM      This note is created with the assistance of a speech recognition program.  While intending to generate a document that actually reflects the content of the visit, the document can still have some errors including those of syntax and sound a like substitutions which may escape proof reading. In such instances, actual meaning can be extrapolated by contextual diversion.

## 2020-08-06 NOTE — PROGRESS NOTES
Writer contacted Dr Jolene Herrera resident to verify that zosyn is safe for pt to take. Resident reported that zosyn is okay for pt to receive.

## 2020-08-06 NOTE — PROGRESS NOTES
[address]    ACUTE REHABILITATION OCCUPATIONAL THERAPY  DAILY NOTE    Date: 20  Patient Name: Keaton Arzate      Room: 9582/1369-48    MRN: [id number]   : 1995  (22 y.o.)  Gender: female   Referring Practitioner: Dr. 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 right-handed     female admitted to the [address] [place] [address] on 2020. She was originally admitted to [address] [place] [address],[address] on 2020 for planned amputation R leg for Stage 3 sarcoma. She had R hip disarticulation performed 2020. She is pregnant - at 21 weeks gestation with comorbid gestational diabetes. She is NWB on RLE/R pelvis. Restrictions  Restrictions/Precautions: Weight Bearing  Other position/activity restrictions: Per Dr. Jessica Miles, Pt's orthopaedic resident, no showering until the patient follows up with the Ortho surgeon on . Right Lower Extremity Weight Bearing: Non Weight Bearing(R pelvis (no rolling onto or weight bearing))  Required Braces or Orthoses?: Yes  Equipment Used: Bed, Wheelchair    Subjective  Comments: Pt very pleasant and motivated.  present and supportive throughout.   Patient Currently in Pain: Denies  Restrictions/Precautions: Weight Bearing  Overall Orientation Status: Within Normal Limits     Pain Assessment  Pain Assessment: 0-10  Pain Level: 3  Pain Type: Surgical pain, Phantom pain  Pain Location: Hip, Leg, Incision  Pain Orientation: Right  Response to Pain Intervention: Patient Satisfied    Objective  Cognition  Overall Cognitive Status: WNL  Perception  Overall Perceptual Status: WFL  Balance  Sitting Balance: Independent  Standing Balance: Stand by assistance(stand by assist to supervision )  Bed mobility  Supine to Sit: Modified independent  Sit to Supine: Supervision  Transfers  Stand Pivot Transfers: Stand by assistance(with and without device. )  Sit to stand: Stand by assistance  Stand to sit: Supervision  Transfer Comments: Good safety awareness noted with hand placement during transfers  Standing Balance  Time: 1-2 min trials. Activity: functional transfers and ADL tasks. Functional Mobility  Functional - Mobility Device: Wheelchair  Activity: To/from bathroom;Transport items; Retrieve items; Other  Assist Level: Supervision  Toilet Transfers  Toilet - Technique: Stand pivot; To right; To left  Equipment Used: Grab bars  Toilet Transfer: Stand by assistance  Toilet Transfers Comments: pt also transferred to bedside commode in PM using rolling walker in good safety awareness. Additional Activities: Other  Additional Activities: OT provided handout and information regarding drivers evaluation and training once pt ready to take on independence with driving again. Pt and spouse both verbalized appreciation and understanding. ADL  Equipment Provided: Reacher;Sock aid;Long-handled sponge; Other (comment)(clips for increased ease with clothing mgmt during toileting)  Feeding: Independent  Grooming: Modified independent (wheelchair level )  UE Bathing: Setup  LE Bathing: Setup;Supervision(seated/standing at wheelchair level/sinkside. )  UE Dressing: Modified independent (pt retrieves clothing at wheelchair level )  LE Dressing: Minimal assistance(Assist with donning L SADIE-educated spouse on use of ezslide. Pt uses adaptive equipment for other foot level dressing.)  Toileting: Supervision;Stand by assistance          Assessment  Performance deficits / Impairments: Decreased functional mobility ; Decreased ADL status; Decreased strength;Decreased safe awareness;Decreased endurance;Decreased sensation;Decreased balance;Decreased high-level IADLs;Decreased posture  Prognosis: Good  Discharge Recommendations: Patient would benefit from continued therapy after discharge  Activity Tolerance: Patient Tolerated treatment well  Safety Devices in place: Yes  Type of devices: Left in chair;Left in bed;Call light within reach  Equipment Recommendations (Spouse and pt very motivated to have home set up well with all necessary equipment-plan on moving for more open floor plan in home)  Equipment Needed: Yes  Wheelchair: Purchased  3-in-1 Commode: reports having-will utilize for toilet transfer duirng night to ensure safety. Hand Held Shower: x  Transfer Tub Bench: x  Grab bars: x(toilet and shower. )  Reacher: x  Sock Aid: x  Long-handled Shoehorn: x  Other: long handled sponge, ezslide. Patient Education: compensatory techniques for  with loss of lower extremity, home set up education. Patient Goals   Patient goals : \"My goal is to be as independent as I can be\"  Learner:patient and significant other  Method: explanation and handout       Outcome: acknowledged understanding of , verbalized concerns and asked questions        Plan  Plan  Times per week: 5-7  Times per day: Twice a day  Current Treatment Recommendations: Self-Care / ADL, Home Management Training, Strengthening, Balance Training, Functional Mobility Training, Endurance Training, Wheelchair Mobility Training, Pain Management, Safety Education & Training, Patient/Caregiver Education & Training, Equipment Evaluation, Education, & procurement, Positioning  Patient Goals   Patient goals : \"My goal is to be as independent as I can be\"  Short term goals  Time Frame for Short term goals: 5 to 7 days  Short term goal 1: Pt will verbalize/demonstrate Good understanding of assistive equipment/durable medical equipment/modified techniques as needed for increased independence with self-care and mobility. Short term goal 2: Pt will perform lower body dressing and toileting tasks with Minimal assist and use of assistive equipment/modified techniques as needed.   Short term goal 3: Pt will perform functional mobility and transfers during self-care with stand by assist, least restrictive mobility device, and Good safety. Short term goal 4: Pt will stand for 4+ minutes with 1-2 UE support, stand by assist, and no loss of balance while engaging in functional activity of choice. Short term goal 5: Pt will actively participate in 30+ minutes of therapeutic exercise/functional activities to promote increased independence with self-care and mobility. Long term goals  Time Frame for Long term goals : By discharge  Long term goal 1: Pt will perform BADLs with modified independence and Good safety. Long term goal 2: Pt will perform functional mobility and transfers during self-care with modified independence, least restrictive mobility device, and Good safety. Long term goal 3: Pt will stand for 8+ minutes with 1-2 UE support, modified independence, and no loss of balance while engaging in self-care/functional activity of choice. Long term goal 4: Pt will perform simple meal prep/light housekeeping with modified independence and Good safety.         08/06/20 1513 08/06/20 1514   OT Individual Minutes   Time In [id number]   Time Out 1145 1307   Minutes 71 35     Electronically signed by LILIAN Albrecht on 8/6/20 at 4:45 PM EDT

## 2020-08-07 LAB
GLUCOSE BLD-MCNC: 105 MG/DL (ref 65–105)
GLUCOSE BLD-MCNC: 108 MG/DL (ref 65–105)
GLUCOSE BLD-MCNC: 85 MG/DL (ref 65–105)
GLUCOSE BLD-MCNC: 95 MG/DL (ref 65–105)

## 2020-08-07 PROCEDURE — 6360000002 HC RX W HCPCS: Performed by: INTERNAL MEDICINE

## 2020-08-07 PROCEDURE — 6370000000 HC RX 637 (ALT 250 FOR IP): Performed by: STUDENT IN AN ORGANIZED HEALTH CARE EDUCATION/TRAINING PROGRAM

## 2020-08-07 PROCEDURE — 99232 SBSQ HOSP IP/OBS MODERATE 35: CPT | Performed by: PHYSICAL MEDICINE & REHABILITATION

## 2020-08-07 PROCEDURE — 82947 ASSAY GLUCOSE BLOOD QUANT: CPT

## 2020-08-07 PROCEDURE — 97535 SELF CARE MNGMENT TRAINING: CPT

## 2020-08-07 PROCEDURE — 99231 SBSQ HOSP IP/OBS SF/LOW 25: CPT | Performed by: INTERNAL MEDICINE

## 2020-08-07 PROCEDURE — 1180000000 HC REHAB R&B

## 2020-08-07 PROCEDURE — 97110 THERAPEUTIC EXERCISES: CPT

## 2020-08-07 PROCEDURE — 97530 THERAPEUTIC ACTIVITIES: CPT

## 2020-08-07 PROCEDURE — 97542 WHEELCHAIR MNGMENT TRAINING: CPT

## 2020-08-07 PROCEDURE — 6370000000 HC RX 637 (ALT 250 FOR IP): Performed by: PHYSICAL MEDICINE & REHABILITATION

## 2020-08-07 PROCEDURE — 97112 NEUROMUSCULAR REEDUCATION: CPT

## 2020-08-07 PROCEDURE — 6360000002 HC RX W HCPCS: Performed by: PHYSICAL MEDICINE & REHABILITATION

## 2020-08-07 PROCEDURE — 2580000003 HC RX 258: Performed by: INTERNAL MEDICINE

## 2020-08-07 PROCEDURE — 97116 GAIT TRAINING THERAPY: CPT

## 2020-08-07 RX ORDER — SENNA PLUS 8.6 MG/1
2 TABLET ORAL DAILY PRN
Status: DISCONTINUED | OUTPATIENT
Start: 2020-08-07 | End: 2020-08-10 | Stop reason: HOSPADM

## 2020-08-07 RX ADMIN — Medication 1 TABLET: at 07:40

## 2020-08-07 RX ADMIN — PIPERACILLIN SODIUM AND TAZOBACTAM SODIUM 3.38 G: 3; .375 INJECTION, POWDER, LYOPHILIZED, FOR SOLUTION INTRAVENOUS at 14:21

## 2020-08-07 RX ADMIN — OXYCODONE HYDROCHLORIDE 5 MG: 5 TABLET ORAL at 07:40

## 2020-08-07 RX ADMIN — GABAPENTIN 300 MG: 300 CAPSULE ORAL at 07:34

## 2020-08-07 RX ADMIN — GABAPENTIN 300 MG: 300 CAPSULE ORAL at 14:21

## 2020-08-07 RX ADMIN — ENOXAPARIN SODIUM 40 MG: 40 INJECTION SUBCUTANEOUS at 07:35

## 2020-08-07 RX ADMIN — ASPIRIN 81 MG CHEWABLE TABLET 81 MG: 81 TABLET CHEWABLE at 07:34

## 2020-08-07 RX ADMIN — OXYCODONE HYDROCHLORIDE 5 MG: 5 TABLET ORAL at 22:00

## 2020-08-07 RX ADMIN — GABAPENTIN 300 MG: 300 CAPSULE ORAL at 20:17

## 2020-08-07 RX ADMIN — FERROUS SULFATE TAB 325 MG (65 MG ELEMENTAL FE) 325 MG: 325 (65 FE) TAB at 07:35

## 2020-08-07 RX ADMIN — PIPERACILLIN SODIUM AND TAZOBACTAM SODIUM 3.38 G: 3; .375 INJECTION, POWDER, LYOPHILIZED, FOR SOLUTION INTRAVENOUS at 06:24

## 2020-08-07 RX ADMIN — POLYETHYLENE GLYCOL 3350 17 G: 17 POWDER, FOR SOLUTION ORAL at 07:34

## 2020-08-07 ASSESSMENT — PAIN DESCRIPTION - ORIENTATION
ORIENTATION: RIGHT

## 2020-08-07 ASSESSMENT — PAIN DESCRIPTION - PAIN TYPE
TYPE: PHANTOM PAIN

## 2020-08-07 ASSESSMENT — PAIN DESCRIPTION - LOCATION
LOCATION: LEG

## 2020-08-07 ASSESSMENT — PAIN SCALES - GENERAL
PAINLEVEL_OUTOF10: 2
PAINLEVEL_OUTOF10: 4
PAINLEVEL_OUTOF10: 3
PAINLEVEL_OUTOF10: 4
PAINLEVEL_OUTOF10: 4
PAINLEVEL_OUTOF10: 0

## 2020-08-07 NOTE — DISCHARGE INSTR - COC
Continuity of Care Form    Patient Name: Sandra Briggs   :  1995  MRN:  [id number]    [facility] date:  2020  Discharge date:  8/10/2020    Code Status Order: Full Code   Advance Directives:   [facility] Documentation     Date/Time Healthcare Directive Type of Healthcare Directive Copy in [facility] [address] Agent's Name Healthcare Agent's Phone Number    [phone]  No, patient does not have an advance directive for healthcare treatment -- -- -- -- --          Admitting Physician:  Maurice Chawla MD  PCP: FATEMEH Turner CNP    Discharging Nurse: [facility] Unit/Room#: 2344/3663-98  Discharging Unit Phone Number: [phone]    Emergency Contact:   Extended Emergency Contact Information  Primary Emergency Contact: Zelalem Gandhi Phone: [phone]  Relation: Spouse  Secondary Emergency Contact: [address] Phone: [phone]  Mobile Phone: [phone]  Relation: Parent    Past Surgical History:  Past Surgical History:   Procedure Laterality Date    AMPUTATION Right 2020    Right Hip Disarticulation       Immunization History:   Immunization History   Administered Date(s) Administered    DTaP (Infanrix) 1996, 1996, 1997, 1998, 2001    HPV Quadrivalent (Gardasil) 2014, 10/15/2014, 2015    Hepatitis A Ped/Adol (Havrix, Vaqta) 2014    Hepatitis B 1996, 1996, 1997    Hepatitis B Ped/Adol (Engerix-B, Recombivax HB) 1996, 1996, 1997    Hib vaccine 1996, 1996, 1997, 11/10/1997    Influenza Vaccine, unspecified formulation 10/15/2014, 2016    Influenza Virus Vaccine 10/15/2014, 2016    MMR 11/10/1997, 2001    Meningococcal B, OMV (Bexsero) 2016, 2016    Meningococcal MCV4O (Menveo) 2008, 2014    PPD Test 2016, 2016    Polio IPV (IPOL) 1996, 1996, 04/01/1998, 07/24/2001    Tdap (Boostrix, Adacel) 12/12/2008, 08/12/2016    Varicella (Varivax) 08/12/1997, 09/28/1998, 12/12/2008, 08/19/2016       Active Problems:  Patient Active Problem List   Diagnosis Code    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 Coli UTI 10-<100,000CFU (7/7/20) Z87.440    Pseudomonas UTI A49.8       Isolation/Infection:   Isolation          No Isolation        Patient Infection Status     None to display          Nurse Assessment:  Last Vital Signs: /61   Pulse 96   Temp 98.3 °F (36.8 °C)   Resp 16   Ht 5' 2\" (1.575 m)   Wt 152 lb 12.5 oz (69.3 kg)   LMP 02/18/2020   SpO2 99%   BMI 27.94 kg/m²     Last documented pain score (0-10 scale): Pain Level: 4  Last Weight:   Wt Readings from Last 1 Encounters:   07/31/20 152 lb 12.5 oz (69.3 kg)     Mental Status:  oriented, alert, coherent, logical, thought processes intact and able to concentrate and follow conversation    IV Access:  - None    Nursing Mobility/ADLs:  Walking   Assisted  Transfer  Assisted  Bathing  Assisted  Dressing  Assisted  Toileting  Assisted  Feeding  [facility] Delivery   whole    Wound Care Documentation and Therapy:        Elimination:  Continence:   · Bowel: Yes  · Bladder: Yes  Urinary Catheter: None   Colostomy/Ileostomy/Ileal Conduit: No       Date of Last BM: 8/10/2020  No intake or output data in the 24 hours ending 08/07/20 0834  No intake/output data recorded. Safety Concerns:      At Risk for Falls    Impairments/Disabilities:      Amputation - Right hip disarticulation    Nutrition Therapy:  Current Nutrition Therapy:   - Oral Diet:  General    Routes of Feeding: Oral  Liquids: Thin Liquids  Daily Fluid Restriction: no  Last Modified Barium Swallow with Video (Video Swallowing Test): not done    Treatments at the Time of Hospital Discharge:   Respiratory Treatments: N/a  Oxygen Therapy:  is not on home oxygen therapy. Ventilator:    - No ventilator support    Rehab Therapies: Physical Therapy and Occupational Therapy  Weight Bearing Status/Restrictions: No weight bearing restirctions  Other Medical Equipment (for information only, NOT a DME order):  wheelchair and walker  Other Treatments: N/a    Patient's personal belongings (please select all that are sent with patient):  Glasses    RN SIGNATURE:  Electronically signed by Arnulfo Garcia RN on 8/10/20 at 10:44 AM EDT    CASE MANAGEMENT/SOCIAL WORK SECTION    Inpatient Status Date: ***    Readmission Risk Assessment Score:  Readmission Risk              Risk of Unplanned Readmission:        10           Discharging to Facility/ [address] [address]  Phone [phone]  Fax  [phone]  [address], [address]  [phone]  wheelchair      Dialysis Facility (if applicable)   · Name:  · Address:  · Dialysis Schedule:  · Phone:  · Fax:    / signature: Electronically signed by ALVARO Engle, ELIZABETHW on 8/7/20 at 8:35 AM EDT    PHYSICIAN SECTION    Prognosis: Good    Condition at Discharge: Stable    Rehab Potential (if transferring to Rehab): Good    Recommended Labs or Other Treatments After Discharge: PT/OT to eval and treat. Assistance with gestational diabetes management. Dressing to R amputation site.     Physician Certification: I certify the above information and transfer of Marianne Lala  is necessary for the continuing treatment of the diagnosis listed and that she requires Home Care for less 30 days.     Update Admission H&P: No change in H&P    PHYSICIAN SIGNATURE:  Electronically signed by Misty Baptiste MD on 8/10/20 at 9:00 AM EDT

## 2020-08-07 NOTE — PLAN OF CARE
Problem: Falls - Risk of:  Goal: Will remain free from falls  Description: Will remain free from falls  Outcome: Ongoing   Pt. Remains free of falls, appropriate fall precautions in place. Problem: Pain:  Goal: Control of acute pain  Description: Control of acute pain  Outcome: Ongoing   Verbalizes pain is adequately controlled. Problem: Skin Integrity:  Goal: Absence of new skin breakdown  Description: Absence of new skin breakdown  Outcome: Ongoing   Pt. Shows no new signs of skin break down.

## 2020-08-07 NOTE — CARE COORDINATION
Attempted IV access x2 right arm unsuccessfully. Pt very upset because of prolonged illness and frequent \"pokes\". Will request house supervisor to attempt.

## 2020-08-07 NOTE — PROGRESS NOTES
Physical Therapy  [address]  Acute Rehabilitation Physical Therapy Progress Note    Date: 20  Patient Name: Chrystal Thomas       Room: 9379/8397-01  MRN: [id number]   Account: [de-identified]   : 1995  (22 y.o.) Gender: female     Referring Practitioner: Dr. 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 Past Surgical History:   has a past surgical history that includes amputation (Right, 2020). Additional Pertinent Hx: Chrystal Thomas is a 22 y.o. right-handed     female admitted to the [address] [place] [address] unit on 2020. 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 She had R hip disarticulation performed 2020. She is pregnant - at 24 weeks gestation with comorbid gestational diabetes. Restrictions/Precautions  Restrictions/Precautions: Weight Bearing  Lower Extremity Weight Bearing Restrictions  Right Lower Extremity Weight Bearing: Weight Bearing As Tolerated(Clarified order: allowed to weight bear R side pelvis)    Subjective: Pt reports wider wheelchair is more comfortable. Comments: ANGELICA Elaine notified of preference for wider wheelchair.      Vital Signs  Patient Currently in Pain: Yes  Pain Assessment: 0-10  Pain Level: 4  Pain Type: Phantom pain  Pain Location: Leg  Pain Orientation: Right  Non-Pharmaceutical Pain Intervention(s): Repositioned;Rest;Ambulation/Increased Activity(mirror therapy)  Response to Pain Intervention: None;Patient Satisfied     Bed Mobility  Bridging: Modified independent   Rolling: Modified independent;Rolling Left  Supine to Sit: Modified independent  Sit to Supine: Modified independent  Scooting: Modified independent  Comment: Mat, wedge, 4 pillows (at Pt request)    Transfers:  Sit to Stand: Stand by assistance;Supervision  Stand to sit: Stand by assistance;Supervision  Bed to Chair: Supervision(no device)  Stand pivot transfers: Supervision(no device)  Squat Pivot Transfers: Supervision(no device)  Comment: Rolling walker/parallel bars, except where noted. Ambulation 1  Surface: level tile  Device: Rolling Walker  Assistance: Supervision(IV pole management in AM)  Quality of Gait: Steady, no shaking or loss of balance observed. Good heel strike after initial flat-footed steps. Gait Deviations: Slow Laurence  Distance: 60' AM, 40' PM    Stairs/Curb  Stairs?: Yes  Stairs  # Steps : 4(x2)  Stairs Height: (2\"/4\")  Rails: Bilateral(parallel bars)  Assistance: Contact guard assistance  Comment: Pt demonstrated no difficulty stepping up onto & off of block steps, forward and retro approach. Wheelchair Activities  Wheelchair Type: Standard  Wheelchair Cushion: Pressure Relieving  Pressure Relief Type: Lateral lean;Push up  Level of Assistance for pressure relief activities: Modified independent  Wheelchair Parts Management: Yes  Left Leg Rest Level of Assistance: Independent(swinging away, into place)  Right Leg Rest Level of Assistance: Unable to assess(comment)(Not applicable)  Left Brakes Level of Assistance: Independent  Right Brakes Level of Assistance: Independent  Propulsion: Yes  Propulsion 1  Propulsion: Manual  Level: Level Tile  Method: RUE;LUE  Level of Assistance: Modified independent(Pt propelled herself to PT gym in afternoon.)  Description/ Details: Pt able to propel straight paths, turns, backing and manuevering in tight spaces, set-up for transfers; removes leg rest PRN and applied brakes appropriately.    Distance: 185'    BALANCE Posture: Good  Sitting - Static: Good(edge of mat/bed, no UE or back support)  Sitting - Dynamic: Good(Edge of mat/bed, no back or UE support)  Standing - Static: Good;-(Single UE support, parallel bars)  Standing - Dynamic: Good;-(Single UE support, parallel bars)    EXERCISES    Other exercises?: Yes  Other exercises 1: Supine mirror therapy & L LE active ROM, repetitions to Pt. tolerance  Other exercises 3: seated edge of mat: Balloon tap x 3 min; pt weight shifting to her tolerance & demonstrates good safety awareness  Other exercises 4: sit<>stand x7(Various seat heights)  Other exercises 5: Standing in parallel bars: L heel raises; mini squats on foam pad  Other exercises 6: Static standing balance in parallel bars: foam mat; magdalene disc  Other exercises 7: Forward, retro and lateral ambulation in parallel bars, 2x each direction. Activity Tolerance: Patient Tolerated treatment well, Patient limited by endurance     PT Equipment Recommendations  Equipment Needed: Yes  Walker: Rolling  Wheelchair: Light Weight(With pressure relieving cushion & swing-away armrests)  Other Comments  Comments: DME order for above given to Shanice Mera on 8/4/2020. Current Treatment Recommendations: Strengthening, Balance Training, Functional Mobility Training, Transfer Training, Endurance Training, Wheelchair Mobility Training, Gait Training, Safety Education & Training, Patient/Caregiver Education & Training, Equipment Evaluation, Education, & procurement, Positioning    Conditions Requiring Skilled Therapeutic Intervention  Body structures, Functions, Activity limitations: Decreased functional mobility ; Decreased ROM; Decreased strength;Decreased endurance;Decreased balance; Increased pain;Decreased posture  REQUIRES PT FOLLOW UP: Yes  Discharge Recommendations: Home with assist PRN    Goals  Short term goals  Time Frame for Short term goals: 1 week  Short term goal 1: Pt able to roll to left side for position change/pressure relief, independently. Short term goal 2: Pt able to perform supine <>sit at supervision level.   Short term goal 3: Pt able to perform sit <>stand at [facility]  Short term goal 4:  Pt able to transfer from bed<>W/C<>toilet at [facility]  Short term goal 5: Pt to demonstrate

## 2020-08-07 NOTE — PROGRESS NOTES
[address] [facility]    ACUTE REHABILITATION OCCUPATIONAL THERAPY  DAILY NOTE    Date: 20  Patient Name: Marianne Lala      Room: 9329/4854-30    MRN: [id number]   : 1995  (22 y.o.)  Gender: female   Referring Practitioner: Dr. 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 right-handed     female admitted to the [address] [place] [address] on 2020. 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 She had R hip disarticulation performed 2020. She is pregnant - at 21 weeks gestation with comorbid gestational diabetes. She is allowed to bear weight on her pelvis. Restrictions  Restrictions/Precautions: Weight Bearing  Other position/activity restrictions: Per Dr. Danielle Espinoza, Pt's orthopaedic resident, no showering until the patient follows up with the Ortho surgeon on .   Right Lower Extremity Weight Bearing: Weight Bearing As Tolerated(Clarified order  - allowed to weight bear right-side pelvis)  Required Braces or Orthoses?: Yes  Equipment Used: Bed, Wheelchair    Subjective  Subjective: \" I feel much more confident about going home, I am less worried\"   Comments: Pt motivated and cooperative   Patient Currently in Pain: Yes  Pain Level: 4  Pain Location: Leg  Pain Orientation: Right  Restrictions/Precautions: Weight Bearing  Overall Orientation Status: Within Normal Limits  Patient Observation  Observations: Pt demo left lean while seated to offload pressure on R hip/ incision   Pain Assessment  Pain Assessment: 0-10  Pain Level: 4  Pain Type: Phantom pain  Pain Location: Leg  Pain Orientation: Right    Objective  Cognition  Overall Cognitive Status: WNL  Perception  Overall Perceptual Status: WFL  Balance  Sitting Balance: Independent  Standing Balance: Stand by assistance  Bed mobility  Supine to Sit: Modified independent  Sit to Supine: Supervision  Transfers  Stand Pivot Transfers: Stand by assistance(bed to wheel chair)  Sit to stand: Stand by assistance  Stand to sit: Supervision  Standing Balance  Time: 1 min, 4 min   Activity: ADL, transfers   Comment: 1-2 UE support   Functional Mobility  Functional - Mobility Device: Wheelchair  Activity: To/from bathroom;Transport items; Retrieve items; Other  Assist Level: Supervision  Toilet Transfers  Toilet - Technique: Stand pivot; To right; To left  Equipment Used: Grab bars  Toilet Transfer: Stand by assistance  Shower Transfers  Shower Transfers Comments: Per ortho surgeon, Pt may not shower until after her follow-up appointment on 8/19/20. Type of ROM/Therapeutic Exercise  Type of ROM/Therapeutic Exercise: Free weights  Comment: Pt engaged in upper extremeity exercises 2# free weight 20 reps. Requring rest breaks between exercises. Completed to imporve strneght and endurance for ADL and IADL activities   Exercises  Shoulder Elevation: x  Shoulder Flexion: x  Shoulder Extension: x  Shoulder ABduction: x  Shoulder ADduction: x  Horizontal ABduction: x  Horizontal ADduction: x  Elbow Flexion: x  Elbow Extension: x  Supination: x  Pronation: x  Wrist Flexion: x  Wrist Extension: x            ADL  Equipment Provided: Reacher;Sock aid;Long-handled sponge; Other (comment)  Feeding: Independent  Grooming: Modified independent ( oral care and hair care seated at sink)   UE Bathing: Setup( seated at sink)   LE Bathing: Setup;Supervision ( using long handled sponge)   UE Dressing: Modified independent ( donning over head shirt and bra)   LE Dressing: min assistance( assist donning TEDS)           Assessment  Performance deficits / Impairments: Decreased functional mobility ; Decreased ADL status; Decreased strength;Decreased safe awareness;Decreased endurance;Decreased sensation;Decreased balance;Decreased high-level IADLs;Decreased posture  Assessment: Pt engaged in simulated in tub transfer bench transfers, good understanding and safety   Prognosis: Good  Discharge Recommendations: Patient would benefit from continued therapy after discharge  Activity Tolerance: Patient Tolerated treatment well  Safety Devices in place: Yes  Equipment Recommendations  Equipment Needed: Yes  Wheelchair: Purchased  3-in-1 Commode: reports having-will utilize for toilet transfer duirng night to ensure safety. Hand Held Shower: x  Transfer Tub Bench: x  Grab bars: x  Reacher: x  Sock Aid: x  Long-handled Shoehorn: x  Other: long handled sponge, ezslide. Patient Education:  Patient Goals   Patient goals : \"My goal is to be as independent as I can be\"  Learner:patient  Method: explanation       Outcome: acknowledged understanding  and demonstrated understanding        Plan  Plan  Times per week: 5-7  Times per day: Twice a day  Current Treatment Recommendations: Self-Care / ADL, Home Management Training, Strengthening, Balance Training, Functional Mobility Training, Endurance Training, Wheelchair Mobility Training, Pain Management, Safety Education & Training, Patient/Caregiver Education & Training, Equipment Evaluation, Education, & procurement, Positioning  Patient Goals   Patient goals : \"My goal is to be as independent as I can be\"  Short term goals  Time Frame for Short term goals: 5 to 7 days  Short term goal 1: Pt will verbalize/demonstrate Good understanding of assistive equipment/durable medical equipment/modified techniques as needed for increased independence with self-care and mobility. Short term goal 2: Pt will perform lower body dressing and toileting tasks with Minimal assist and use of assistive equipment/modified techniques as needed. Short term goal 3: Pt will perform functional mobility and transfers during self-care with stand by assist, least restrictive mobility device, and Good safety.   Short term goal 4: Pt will stand for 4+ minutes with 1-2 UE support, stand by assist, and no loss of balance while engaging in functional activity of choice. Short term goal 5: Pt will actively participate in 30+ minutes of therapeutic exercise/functional activities to promote increased independence with self-care and mobility. Long term goals  Time Frame for Long term goals : By discharge  Long term goal 1: Pt will perform BADLs with modified independence and Good safety. Long term goal 2: Pt will perform functional mobility and transfers during self-care with modified independence, least restrictive mobility device, and Good safety. Long term goal 3: Pt will stand for 8+ minutes with 1-2 UE support, modified independence, and no loss of balance while engaging in self-care/functional activity of choice. Long term goal 4: Pt will perform simple meal prep/light housekeeping with modified independence and Good safety.         08/07/20 1034 08/07/20 1556   OT Individual Minutes   Time In 1034 1300   Time Out 1138 1330   Minutes 64 30     Electronically signed by MELODY Gaitan on 8/7/20 at 3:56 PM EDT

## 2020-08-07 NOTE — PROGRESS NOTES
08/01/2020    BILITOT 0.53 08/01/2020    BILIDIR 0.28 08/01/2020    LABALBU 3.3 08/01/2020               Radiology:      Physical Examination:        General appearance:  alert, cooperative and no distress  Mental Status:  oriented to person, place and time and normal affect  Lungs:  clear to auscultation bilaterally, normal effort  Heart:  regular rate and rhythm, no murmur  Abdomen:  soft, nontender, nondistended, normal bowel sounds, no masses, hepatomegaly, splenomegaly  Extremities:  no edema, redness, S/p disarticulation on right  Skin:  no gross lesions, rashes, induration    Assessment:        Primary Problem  History of disarticulation of right hip    Active Hospital Problems    Diagnosis Date Noted    Pseudomonas UTI [A49.8] 08/04/2020    History of E. Coli UTI 10-<100,000CFU (7/7/20) [Z87.440] 08/02/2020    History of blood transfusion [Z92.89] 08/01/2020    History of disarticulation of right hip [Z89.621] 07/31/2020    Leukocytosis [D72.829] 07/31/2020    Chronic hypertension affecting pregnancy [O10.919] 07/11/2020    Sarcoma of bone ([facility] [C41.9] 07/07/2020    Iron deficiency anemia [D50.9] 07/02/2020    Gestational diabetes [O24.419] 06/30/2020    Thrombocytosis ([address] [D47.3] 06/30/2020    Family history of diabetes mellitus [Z83.3] 06/08/2020    Pregestational Diabetes [O99.810] 06/02/2020       Plan:        1. 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 UTI- started on Zosyn (8/6/2020), follow up on reports of the U/A with reflex to culture on monday  3. 26 weeks pregnant  4. 40 of Lovenox for DVT prophylaxis  5. Pre gestational diabetes -HbA1c of 5 (2/8/2020)  6. On gabapentin 300 mg 3 times a day   7. iron deficiency anemia perioperative blood loss on ferrous sulfate  8. Tachycardia with exertion  9. Tentative discharge on 8/10      Medications:      Allergies:  No Known Allergies    Current Meds:   Scheduled Meds:    piperacillin-tazobactam  3.375 g Intravenous Q8H  gabapentin  300 mg Oral TID    ferrous sulfate  325 mg Oral Daily with breakfast    aspirin  81 mg Oral Daily    enoxaparin  40 mg Subcutaneous Daily    polyethylene glycol  17 g Oral Daily    prenatal vitamin  1 tablet Oral Daily     Continuous Infusions:   PRN Meds: glucose, glucagon (rDNA), senna, bisacodyl, acetaminophen, cyclobenzaprine, oxyCODONE **OR** oxyCODONE       Bertha Garcia MD  8/7/2020  10:03 AM     Attestation and add on       I have discussed the care of Nafisa Palmer , including pertinent history and exam findings,      08/07/2020    with the resident. I have seen and examined the patient and the key elements of all parts of the encounter have been performed by me . I agree with the assessment, plan and orders as documented by the resident. Principal Problem:    History of disarticulation of right hip  Active Problems:    Pregestational Diabetes    Family history of diabetes mellitus    Gestational diabetes    Iron deficiency anemia    Sarcoma of bone ([address]    Thrombocytosis ([address]    Chronic hypertension affecting pregnancy    Leukocytosis    History of blood transfusion    History of E. Coli UTI 10-<100,000CFU (7/7/20)    Pseudomonas UTI  Resolved Problems:    * No resolved hospital problems. *            ---- ;        Medications: Allergies:  No Known Allergies    Current Meds:   Scheduled Meds:    piperacillin-tazobactam  3.375 g Intravenous Q8H    gabapentin  300 mg Oral TID    ferrous sulfate  325 mg Oral Daily with breakfast    aspirin  81 mg Oral Daily    enoxaparin  40 mg Subcutaneous Daily    polyethylene glycol  17 g Oral Daily    prenatal vitamin  1 tablet Oral Daily     Continuous Infusions:   PRN Meds: senna, glucose, glucagon (rDNA), bisacodyl, acetaminophen, cyclobenzaprine, oxyCODONE **OR** oxyCODONE        [facility] [address], [address] [place] [address].    Phone [phone]   Fax: [phone]  Answering

## 2020-08-07 NOTE — CARE COORDINATION
Writer texted ifeanyi that wheelchair order was changed from 16\" to an 18\". Writer also reminded her d/c was 08/10.

## 2020-08-07 NOTE — PROGRESS NOTES
Physical Medicine & Rehabilitation  Progress Note      Subjective:      22year-old female s/p R hip disarticulation amputation for sarcoma. Updated her on discussion with Dr. Mackenzie Davidson yesterday. Incision improving. She denies any acute issues overnight. ROS:  Denies fevers, chills, sweats. No chest pain, palpitations, lightheadedness. Denies coughing, wheezing or shortness of breath. Denies abdominal pain, nausea, diarrhea or constipation. No new areas of joint pain. Denies new areas of numbness or weakness. Denies new anxiety or depression issues. No new skin problems. Rehabilitation:   Progressing in therapies. PT:  Restrictions/Precautions: Weight Bearing  Other position/activity restrictions: Per Dr. Randy Santoro, Pt's orthopaedic resident, no showering until the patient follows up with the Ortho surgeon on 8/19. Right Lower Extremity Weight Bearing: Weight Bearing As Tolerated(Clarified order  - allowed to weight bear right-side pelvis)   Transfers  Sit to Stand: Stand by assistance  Stand to sit: Stand by assistance  Bed to Chair: Stand by assistance  Stand Pivot Transfers: Stand by assistance(no device, bed to wheelchair )  Squat Pivot Transfers: Stand by assistance(no device, wheelchair to mat)  Lateral Transfers: Contact guard assistance  Comment: Rolling walker/parallel bars unless noted  Ambulation 1  Surface: level tile  Device: Rolling Walker  Other Apparatus: Wheelchair follow  Assistance: Stand by assistance  Quality of Gait: Steady, no shaking or loss of balance observed. Cue for heel strike with good return. Gait Deviations: Slow Laurence, Decreased step length, Decreased step height  Distance: 40' AM & 50' PM  Comments: Pt states she's becoming better able to  fatigue.      Transfers  Sit to Stand: Stand by assistance  Stand to sit: Stand by assistance  Bed to Chair: Stand by assistance  Stand Pivot Transfers: Stand by assistance(no device, bed to wheelchair )  Squat Pivot Transfers: Stand by assistance(no device, wheelchair to mat)  Lateral Transfers: Contact guard assistance  Comment: Rolling walker/parallel bars unless noted  Ambulation  Ambulation?: Yes  Ambulation 1  Surface: level tile  Device: Rolling Walker  Other Apparatus: Wheelchair follow  Assistance: Stand by assistance  Quality of Gait: Steady, no shaking or loss of balance observed. Cue for heel strike with good return. Gait Deviations: Slow Laurence, Decreased step length, Decreased step height  Distance: 40' AM & 50' PM  Comments: Pt states she's becoming better able to  fatigue. Surface: level tile  Ambulation 1  Surface: level tile  Device: Rolling Walker  Other Apparatus: Wheelchair follow  Assistance: Stand by assistance  Quality of Gait: Steady, no shaking or loss of balance observed. Cue for heel strike with good return. Gait Deviations: Slow Laurence, Decreased step length, Decreased step height  Distance: 40' AM & 50' PM  Comments: Pt states she's becoming better able to  fatigue. OT:  ADL  Equipment Provided: Reacher, Sock aid, Long-handled sponge, Other (comment)  Feeding: Independent  Grooming: Modified independent   UE Bathing: Setup  LE Bathing: Setup, Supervision  UE Dressing: Modified independent   LE Dressing: Minimal assistance(Assist with donning L SADIE. Pt uses reacher for other footlev)  Toileting: Supervision, Stand by assistance  Additional Comments: adls completed from bed to allow more independence and maintain R weigh bearing precautions,  good maintain balance for pants on and off over hips and bathe en, bottom in stand by alternating hands on walker and doing task. Instrumental ADL's  Instrumental ADLs: Yes     Balance  Sitting Balance: Independent  Standing Balance: Stand by assistance(stand by assist to supervision )   Standing Balance  Time: 1-2 min trials. Activity: functional transfers and ADL tasks.    Comment: 1-2 UE support via sink   Functional Mobility  Functional - Mobility Device: Wheelchair  Activity: To/from bathroom, Transport items, Retrieve items, Other  Assist Level: Supervision  Functional Mobility Comments: ambulate 15 feet bed to recliner     Bed mobility  Bridging: Supervision  Rolling to Left: Stand by assistance  Rolling to Right: (Do not roll n right side due to incision from surgery)  Supine to Sit: Modified independent  Sit to Supine: Supervision  Scooting: Modified independent  Transfers  Stand Pivot Transfers: Stand by assistance(bed to wheel chair)  Sit to stand: Stand by assistance  Stand to sit: Supervision  Transfer Comments: Good safety awareness noted with hand placement during transfers   Toilet Transfers  Toilet - Technique: Stand pivot, To right, To left  Equipment Used: Grab bars  Toilet Transfer: Stand by assistance  Toilet Transfers Comments: pt also transferred to bedside commode in PM using rolling walker in good safety awareness. Tub Transfers  Tub Transfers Comments: OT provided education and demonstration regarding use of tub transfer bench for tub shower for Pt's home discharge. Pt verbalized Fair understanding. Shower Transfers  Shower Transfers Comments: Per ortho surgeon, Pt may not shower until after her follow-up appointment on 8/19/20. Wheelchair Bed Transfers  Wheelchair/Bed - Technique: Stand pivot, To left  Equipment Used: Bed, Wheelchair  Level of Asssistance: Stand by assistance  Wheelchair Transfers Comments: Fair carryover of technique noted from yesterday's session    SPEECH:      Objective:  /61   Pulse 96   Temp 98.3 °F (36.8 °C)   Resp 16   Ht 5' 2\" (1.575 m)   Wt 152 lb 12.5 oz (69.3 kg)   LMP 02/18/2020   SpO2 99%   BMI 27.94 kg/m²       GEN: Well developed, well nourished, in NAD  HEENT:  NCAT. PERRL. EOMI. Mucous membranes pink and moist.   PULM:  Clear to ausculation. No rales or rhonchi. Respirations WNL and unlabored. CV:  tachycardic rate regular rhythm.   No murmurs or gallops. GI:  Abdomen soft. Nontender. Non-distended. BS + and equal.    NEUROLOGICAL: A&O x3. Sensation intact to light touch. MSK:  Functional ROM BUEs and LLE. Motor testing 5/5 key muscles BUE and LLEs. SKIN: Warm dry and intact. Good turgor. R hip disartic incision well approximated with staples in place, no drainage. Induration medial aspect incision - with no drainage today   EXTREMITIES:  No L calf tenderness to palpation. No edema LLE. PSYCH: Mood WNL. Appropriately interactive. Affect WNL. Diagnostics:     CBC:   No results for input(s): WBC, RBC, HGB, HCT, MCV, RDW, PLT in the last 72 hours. BMP:   No results for input(s): NA, K, CL, CO2, PHOS, BUN, CREATININE, GLUCOSE in the last 72 hours. Invalid input(s): CA  BNP: No results for input(s): BNP in the last 72 hours. PT/INR: No results for input(s): PROTIME, INR in the last 72 hours. APTT: No results for input(s): APTT in the last 72 hours. CARDIAC ENZYMES: No results for input(s): CKMB, CKMBINDEX, TROPONINT in the last 72 hours. Invalid input(s): CKTOTAL;3  FASTING LIPID PANEL:No results found for: CHOL, HDL, TRIG  LIVER PROFILE:   No results for input(s): AST, ALT, ALB, BILIDIR, BILITOT, ALKPHOS in the last 72 hours.      Current Medications:   Current Facility-Administered Medications: piperacillin-tazobactam (ZOSYN) 3.375 g in dextrose 5 % 50 mL IVPB extended infusion (mini-bag), 3.375 g, Intravenous, Q8H  gabapentin (NEURONTIN) capsule 300 mg, 300 mg, Oral, TID  ferrous sulfate (IRON 325) tablet 325 mg, 325 mg, Oral, Daily with breakfast  aspirin chewable tablet 81 mg, 81 mg, Oral, Daily  enoxaparin (LOVENOX) injection 40 mg, 40 mg, Subcutaneous, Daily  glucose (GLUTOSE) 40 % oral gel 15 g, 15 g, Oral, PRN  glucagon (rDNA) injection 1 mg, 1 mg, Intramuscular, PRN  polyethylene glycol (GLYCOLAX) packet 17 g, 17 g, Oral, Daily  senna (SENOKOT) tablet 17.2 mg, 2 tablet, Oral, Daily PRN  bisacodyl (DULCOLAX) suppository 10 mg, 10 mg, Rectal, Daily PRN  acetaminophen (TYLENOL) tablet 650 mg, 650 mg, Oral, Q4H PRN  cyclobenzaprine (FLEXERIL) tablet 10 mg, 10 mg, Oral, TID PRN  oxyCODONE (ROXICODONE) immediate release tablet 5 mg, 5 mg, Oral, Q4H PRN **OR** oxyCODONE (ROXICODONE) immediate release tablet 10 mg, 10 mg, Oral, Q4H PRN  prenatal vitamin 28-0.8 MG tablet 1 tablet, 1 tablet, Oral, Daily      Impression/Plan:   Impaired ADLs, gait, and mobility due to:      1. 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 NWB RLE. Tylenol prn, gabapentin TID, oxycodone prn. [address] following. Phantom limb pain increased dose of gabapentin to 300 mg with approval from OB on 8/2. Mirror therapy in PT. [facility] follow up apointment [facility]  which will be rescheduled. Follow up with Dr. Shanika Romero (ortho) as scheduled. OK for seated activities for ADLs. No weight bearing restrictions on pelvis. 2. Pregnant with gestational diabetes: OB following. On NPH insulin nightly. Prenatal vitamin daily. 3. UTI: IM changed antibiotic to Zosyn - nursing confirmed OK with OB. Zosyn through 8/10.  4. Bowel management: on miralax daily, senokot prn, dulcolax prn.   5. DVT Prophylaxis:  low molecular weight heparin, SCD while in bed and SADIE  6. Internal medicine for medical management      Electronically signed by Keyshawn Chan MD on 8/7/2020 at 2:22 PM      This note is created with the assistance of a speech recognition program.  While intending to generate a document that actually reflects the content of the visit, the document can still have some errors including those of syntax and sound a like substitutions which may escape proof reading. In such instances, actual meaning can be extrapolated by contextual diversion.

## 2020-08-08 LAB
-: ABNORMAL
AMORPHOUS: ABNORMAL
ANION GAP SERPL CALCULATED.3IONS-SCNC: 10 MMOL/L (ref 9–17)
BACTERIA: ABNORMAL
BILIRUBIN URINE: NEGATIVE
BUN BLDV-MCNC: 4 MG/DL (ref 6–20)
BUN/CREAT BLD: ABNORMAL (ref 9–20)
CALCIUM SERPL-MCNC: 8.9 MG/DL (ref 8.6–10.4)
CASTS UA: ABNORMAL /LPF
CHLORIDE BLD-SCNC: 104 MMOL/L (ref 98–107)
CO2: 22 MMOL/L (ref 20–31)
COLOR: YELLOW
COMMENT UA: ABNORMAL
CREAT SERPL-MCNC: <0.4 MG/DL (ref 0.5–0.9)
CRYSTALS, UA: ABNORMAL /HPF
EPITHELIAL CELLS UA: ABNORMAL /HPF
GFR AFRICAN AMERICAN: ABNORMAL ML/MIN
GFR NON-AFRICAN AMERICAN: ABNORMAL ML/MIN
GFR SERPL CREATININE-BSD FRML MDRD: ABNORMAL ML/MIN/{1.73_M2}
GFR SERPL CREATININE-BSD FRML MDRD: ABNORMAL ML/MIN/{1.73_M2}
GLUCOSE BLD-MCNC: 113 MG/DL (ref 65–105)
GLUCOSE BLD-MCNC: 117 MG/DL (ref 70–99)
GLUCOSE BLD-MCNC: 127 MG/DL (ref 65–105)
GLUCOSE BLD-MCNC: 83 MG/DL (ref 65–105)
GLUCOSE BLD-MCNC: 92 MG/DL (ref 65–105)
GLUCOSE URINE: NEGATIVE
HCT VFR BLD CALC: 30.3 % (ref 36–46)
HEMOGLOBIN: 10 G/DL (ref 12–16)
KETONES, URINE: NEGATIVE
LEUKOCYTE ESTERASE, URINE: NEGATIVE
MCH RBC QN AUTO: 29.3 PG (ref 26–34)
MCHC RBC AUTO-ENTMCNC: 33.1 G/DL (ref 31–37)
MCV RBC AUTO: 88.4 FL (ref 80–100)
MUCUS: ABNORMAL
NITRITE, URINE: NEGATIVE
NRBC AUTOMATED: ABNORMAL PER 100 WBC
OTHER OBSERVATIONS UA: ABNORMAL
PDW BLD-RTO: 17.3 % (ref 11.5–14.9)
PH UA: 8 (ref 5–8)
PLATELET # BLD: 280 K/UL (ref 150–450)
PMV BLD AUTO: 7.9 FL (ref 6–12)
POTASSIUM SERPL-SCNC: 3.9 MMOL/L (ref 3.7–5.3)
PROTEIN UA: NEGATIVE
RBC # BLD: 3.42 M/UL (ref 4–5.2)
RBC UA: ABNORMAL /HPF
RENAL EPITHELIAL, UA: ABNORMAL /HPF
SODIUM BLD-SCNC: 136 MMOL/L (ref 135–144)
SPECIFIC GRAVITY UA: 1.01 (ref 1–1.03)
TRICHOMONAS: ABNORMAL
TURBIDITY: ABNORMAL
URINE HGB: NEGATIVE
UROBILINOGEN, URINE: NORMAL
WBC # BLD: 4.1 K/UL (ref 3.5–11)
WBC UA: ABNORMAL /HPF
YEAST: ABNORMAL

## 2020-08-08 PROCEDURE — 81001 URINALYSIS AUTO W/SCOPE: CPT

## 2020-08-08 PROCEDURE — 97530 THERAPEUTIC ACTIVITIES: CPT

## 2020-08-08 PROCEDURE — 6360000002 HC RX W HCPCS: Performed by: INTERNAL MEDICINE

## 2020-08-08 PROCEDURE — 97535 SELF CARE MNGMENT TRAINING: CPT

## 2020-08-08 PROCEDURE — 36415 COLL VENOUS BLD VENIPUNCTURE: CPT

## 2020-08-08 PROCEDURE — 6370000000 HC RX 637 (ALT 250 FOR IP): Performed by: STUDENT IN AN ORGANIZED HEALTH CARE EDUCATION/TRAINING PROGRAM

## 2020-08-08 PROCEDURE — 80048 BASIC METABOLIC PNL TOTAL CA: CPT

## 2020-08-08 PROCEDURE — 6360000002 HC RX W HCPCS: Performed by: PHYSICAL MEDICINE & REHABILITATION

## 2020-08-08 PROCEDURE — 6370000000 HC RX 637 (ALT 250 FOR IP): Performed by: PHYSICAL MEDICINE & REHABILITATION

## 2020-08-08 PROCEDURE — 1180000000 HC REHAB R&B

## 2020-08-08 PROCEDURE — 97116 GAIT TRAINING THERAPY: CPT

## 2020-08-08 PROCEDURE — 82947 ASSAY GLUCOSE BLOOD QUANT: CPT

## 2020-08-08 PROCEDURE — 99231 SBSQ HOSP IP/OBS SF/LOW 25: CPT | Performed by: INTERNAL MEDICINE

## 2020-08-08 PROCEDURE — 97110 THERAPEUTIC EXERCISES: CPT

## 2020-08-08 PROCEDURE — 85027 COMPLETE CBC AUTOMATED: CPT

## 2020-08-08 PROCEDURE — 2580000003 HC RX 258: Performed by: INTERNAL MEDICINE

## 2020-08-08 PROCEDURE — 97542 WHEELCHAIR MNGMENT TRAINING: CPT

## 2020-08-08 RX ADMIN — PIPERACILLIN SODIUM AND TAZOBACTAM SODIUM 3.38 G: 3; .375 INJECTION, POWDER, LYOPHILIZED, FOR SOLUTION INTRAVENOUS at 00:22

## 2020-08-08 RX ADMIN — OXYCODONE HYDROCHLORIDE 5 MG: 5 TABLET ORAL at 07:40

## 2020-08-08 RX ADMIN — FERROUS SULFATE TAB 325 MG (65 MG ELEMENTAL FE) 325 MG: 325 (65 FE) TAB at 07:40

## 2020-08-08 RX ADMIN — OXYCODONE HYDROCHLORIDE 5 MG: 5 TABLET ORAL at 21:16

## 2020-08-08 RX ADMIN — POLYETHYLENE GLYCOL 3350 17 G: 17 POWDER, FOR SOLUTION ORAL at 07:41

## 2020-08-08 RX ADMIN — PIPERACILLIN SODIUM AND TAZOBACTAM SODIUM 3.38 G: 3; .375 INJECTION, POWDER, LYOPHILIZED, FOR SOLUTION INTRAVENOUS at 23:45

## 2020-08-08 RX ADMIN — GABAPENTIN 300 MG: 300 CAPSULE ORAL at 07:40

## 2020-08-08 RX ADMIN — PIPERACILLIN SODIUM AND TAZOBACTAM SODIUM 3.38 G: 3; .375 INJECTION, POWDER, LYOPHILIZED, FOR SOLUTION INTRAVENOUS at 08:57

## 2020-08-08 RX ADMIN — GABAPENTIN 300 MG: 300 CAPSULE ORAL at 14:40

## 2020-08-08 RX ADMIN — ASPIRIN 81 MG CHEWABLE TABLET 81 MG: 81 TABLET CHEWABLE at 07:40

## 2020-08-08 RX ADMIN — ENOXAPARIN SODIUM 40 MG: 40 INJECTION SUBCUTANEOUS at 07:40

## 2020-08-08 RX ADMIN — GABAPENTIN 300 MG: 300 CAPSULE ORAL at 21:16

## 2020-08-08 RX ADMIN — PIPERACILLIN SODIUM AND TAZOBACTAM SODIUM 3.38 G: 3; .375 INJECTION, POWDER, LYOPHILIZED, FOR SOLUTION INTRAVENOUS at 17:21

## 2020-08-08 RX ADMIN — Medication 1 TABLET: at 07:41

## 2020-08-08 ASSESSMENT — PAIN DESCRIPTION - LOCATION
LOCATION: HIP
LOCATION: HIP

## 2020-08-08 ASSESSMENT — PAIN SCALES - GENERAL
PAINLEVEL_OUTOF10: 5
PAINLEVEL_OUTOF10: 2
PAINLEVEL_OUTOF10: 0
PAINLEVEL_OUTOF10: 3
PAINLEVEL_OUTOF10: 2

## 2020-08-08 ASSESSMENT — PAIN DESCRIPTION - ORIENTATION
ORIENTATION: RIGHT
ORIENTATION: RIGHT

## 2020-08-08 ASSESSMENT — PAIN DESCRIPTION - PAIN TYPE
TYPE: SURGICAL PAIN
TYPE: SURGICAL PAIN;PHANTOM PAIN

## 2020-08-08 NOTE — PROGRESS NOTES
[facility]    ACUTE REHABILITATION OCCUPATIONAL THERAPY  DAILY NOTE    Date: 20  Patient Name: Marianne Lala      Room: 2106/5976-95    MRN: [id number]   : 1995  (22 y.o.)  Gender: female   Referring Practitioner: Dr. 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 right-handed     female admitted to the [address] [place] [address] on 2020. She was originally admitted to [address] [place], [place] [address] [address] on 2020 for planned amputation R leg for Stage 3 sarcoma. She had R hip disarticulation performed 2020. She is pregnant - at 21 weeks gestation with comorbid gestational diabetes. She is NWB on RLE/R pelvis. Restrictions  Restrictions/Precautions: Weight Bearing  Other position/activity restrictions: Per Dr. Danielle Espinoza, Pt's orthopaedic resident, no showering until the patient follows up with the Ortho surgeon on . Right Lower Extremity Weight Bearing: Weight Bearing As Tolerated(allowed to weight bear. )  Required Braces or Orthoses?: Yes  Equipment Used: Bed, Wheelchair    Subjective  Comments: Pt pleasant and motivated.    Patient Currently in Pain: Yes  Pain Level: 3  Pain Location: Hip(incision site and also phatom foot pain. )  Pain Orientation: Right  Restrictions/Precautions: Weight Bearing  Overall Orientation Status: Within Normal Limits     Pain Assessment  Pain Assessment: 0-10  Pain Level: 3  Pain Type: Surgical pain, Phantom pain  Pain Location: Hip(incision site and also phatom foot pain. )  Pain Orientation: Right  Response to Pain Intervention: Patient Satisfied    Objective  Cognition  Overall Cognitive Status: WNL  Perception  Overall Perceptual Status: WFL  Balance  Sitting Balance: Independent  Standing Balance: Supervision  Bed mobility  Supine to Sit: Modified independent  Transfers  Stand Pivot Transfers: Supervision  Sit to stand: x  Long-handled Shoehorn: x  Other: long handled sponge, ezslide. Comments: Discussed techniques to increase ease and safety with careing for new baby while being a new amputee. Educated and recommended use of baby carriers for transporting baby, heightened basinets to place and retrieve baby, use of elevated surfaces for tummy time supervision vs. floor. Plan  Plan  Times per week: 5-7  Times per day: Twice a day  Current Treatment Recommendations: Self-Care / ADL, Home Management Training, Strengthening, Balance Training, Functional Mobility Training, Endurance Training, Wheelchair Mobility Training, Pain Management, Safety Education & Training, Patient/Caregiver Education & Training, Equipment Evaluation, Education, & procurement, Positioning  Patient Goals   Patient goals : \"My goal is to be as independent as I can be\"  Short term goals  Time Frame for Short term goals: 5 to 7 days  Short term goal 1: Pt will verbalize/demonstrate Good understanding of assistive equipment/durable medical equipment/modified techniques as needed for increased independence with self-care and mobility. Short term goal 2: Pt will perform lower body dressing and toileting tasks with Minimal assist and use of assistive equipment/modified techniques as needed. Short term goal 3: Pt will perform functional mobility and transfers during self-care with stand by assist, least restrictive mobility device, and Good safety. Short term goal 4: Pt will stand for 4+ minutes with 1-2 UE support, stand by assist, and no loss of balance while engaging in functional activity of choice. Short term goal 5: Pt will actively participate in 30+ minutes of therapeutic exercise/functional activities to promote increased independence with self-care and mobility. Long term goals  Time Frame for Long term goals : By discharge  Long term goal 1: Pt will perform BADLs with modified independence and Good safety.   Long term goal 2: Pt will perform

## 2020-08-08 NOTE — PLAN OF CARE
Problem: Falls - Risk of:  Goal: Will remain free from falls  Description: Will remain free from falls  8/8/2020 1640 by Antonio Kaur RN  Outcome: Ongoing  Note: Patient is alert and oriented and remains free from falls this shift. Call light is within reach and patient is using appropriately. Spouse at bedside. Problem: Pain:  Description: Pain management should include both nonpharmacologic and pharmacologic interventions. Goal: Control of acute pain  Description: Control of acute pain  8/8/2020 1640 by Antonio Kaur RN  Outcome: Ongoing  Note: Adequate pain control maintained this shift. Medicated per PRN orders. See MAR. Patient currently resting in bed. No distress noted. RN Will continue to monitor.       Problem: Skin Integrity:  Goal: Absence of new skin breakdown  Description: Absence of new skin breakdown  8/8/2020 1640 by Antonio Kaur RN  Outcome: Ongoing

## 2020-08-08 NOTE — PLAN OF CARE
Problem: Falls - Risk of:  Goal: Will remain free from falls  Description: Will remain free from falls  Outcome: Ongoing  Note: Pt remained absent from falls. Call light within reach. Bed locked and in lowest position. Problem: Falls - Risk of:  Goal: Absence of physical injury  Description: Absence of physical injury  Outcome: Ongoing  Note: Patient remains free of injury. safe environment maintained       Problem: Pain:  Goal: Pain level will decrease  Description: Pain level will decrease  Outcome: Ongoing  Note: Phantom pain right leg. Medicated as ordered. Patient tolerating      Problem: Pain:  Goal: Control of acute pain  Description: Control of acute pain  Outcome: Ongoing  Note: Phantom pain right leg. Medicated as ordered. Patient tolerating      Problem: Pain:  Goal: Control of chronic pain  Description: Control of chronic pain  Outcome: Ongoing  Note: Phantom pain right leg. Medicated as ordered. Patient tolerating      Problem: Skin Integrity:  Goal: Will show no infection signs and symptoms  Description: Will show no infection signs and symptoms  Outcome: Ongoing  Note: No new s/s of infection. Will continue to monitor        Problem: Skin Integrity:  Goal: Absence of new skin breakdown  Description: Absence of new skin breakdown  Outcome: Ongoing  Note: Pt skin integrity remained intact, no new alterations noted. Head to toe completed, see chart assessment.         Problem: Musculor/Skeletal Functional Status  Goal: Highest potential functional level  Outcome: Ongoing  Note: Functional level improving      Problem: Musculor/Skeletal Functional Status  Goal: Absence of falls  Outcome: Ongoing  Note: Free of falls       Problem: Nutrition  Goal: Optimal nutrition therapy  Outcome: Ongoing  Note: Adequate nutrition maintained

## 2020-08-08 NOTE — PROGRESS NOTES
Physical Medicine & Rehabilitation  Progress Note      Subjective:      22year-old female s/p R hip disarticulation amputation for sarcoma. . Incision improving. She denies any acute issues overnight. pain is well controlled, BM this AM     ROS:  Denies fevers, chills, sweats. No chest pain, palpitations, lightheadedness. Denies coughing, wheezing or shortness of breath. Denies abdominal pain, nausea, diarrhea or constipation. No new areas of joint pain. Denies new areas of numbness or weakness. Denies new anxiety or depression issues. No new skin problems. Rehabilitation:   Progressing in therapies. PT:    Bed Mobility  Bridging: Modified independent   Rolling: Modified independent;Rolling Left  Supine to Sit: Modified independent  Sit to Supine: Modified independent  Scooting: Modified independent  Comment: Mat, wedge, 4 pillows (at Pt request)     Transfers:  Sit to Stand: Stand by assistance;Supervision  Stand to sit: Stand by assistance;Supervision  Bed to Chair: Supervision(no device)  Stand pivot transfers: Supervision(no device)  Squat Pivot Transfers: Supervision(no device)  Comment: Rolling walker/parallel bars, except where noted.      Ambulation 1  Surface: level tile  Device: Rolling Walker  Assistance: Supervision(IV pole management in AM)  Quality of Gait: Steady, no shaking or loss of balance observed. Good heel strike after initial flat-footed steps.    Gait Deviations: Slow Laurence  Distance: 60' AM, 40' PM     Stairs/Curb  Stairs?: Yes  Stairs  # Steps : 4(x2)  Stairs Height: (2\"/4\")  Rails: Bilateral(parallel bars)  Assistance: Contact guard assistance  Comment: Pt demonstrated no difficulty stepping up onto & off of block steps, forward and retro approach.      Wheelchair Activities  Wheelchair Type: Standard  Wheelchair Cushion: Pressure Relieving  Pressure Relief Type: Lateral lean;Push up  Level of Assistance for pressure relief activities: Modified independent  Wheelchair Parts Management: Yes  Left Leg Rest Level of Assistance: Independent(swinging away, into place)  Right Leg Rest Level of Assistance: Unable to assess(comment)(Not applicable)  Left Brakes Level of Assistance: Independent  Right Brakes Level of Assistance: Independent  Propulsion: Yes  Propulsion 1  Propulsion: Manual  Level: Level Tile  Method: RUE;LUE  Level of Assistance: Modified independent(Pt propelled herself to PT gym in afternoon.)  Description/ Details: Pt able to propel straight paths, turns, backing and manuevering in tight spaces, set-up for transfers; removes leg rest PRN and applied brakes appropriately. Distance: 185'     BALANCE Posture: Good  Sitting - Static: Good(edge of mat/bed, no UE or back support)  Sitting - Dynamic: Good(Edge of mat/bed, no back or UE support)  Standing - Static: Good;-(Single UE support, parallel bars)  Standing - Dynamic: Good;-(Single UE support, parallel bars)       OT    Cognition  Overall Cognitive Status: WNL  Perception  Overall Perceptual Status: WFL  Balance  Sitting Balance: Independent  Standing Balance: Stand by assistance  Bed mobility  Supine to Sit: Modified independent  Sit to Supine: Supervision  Transfers  Stand Pivot Transfers: Stand by assistance(bed to wheel chair)  Sit to stand: Stand by assistance  Stand to sit: Supervision  Standing Balance  Time: 1 min, 4 min   Activity: ADL, transfers   Comment: 1-2 UE support   Functional Mobility  Functional - Mobility Device: Wheelchair  Activity: To/from bathroom;Transport items; Retrieve items; Other  Assist Level: Supervision  Toilet Transfers  Toilet - Technique: Stand pivot; To right; To left  Equipment Used: Grab bars  Toilet Transfer: Stand by assistance  Shower Transfers  Shower Transfers Comments: Per ortho surgeon, Pt may not shower until after her follow-up appointment on 8/19/20.      Type of ROM/Therapeutic Exercise  Type of ROM/Therapeutic Exercise: Free weights  Comment: Pt engaged in upper extremeity exercises 2# free weight 20 reps. Requring rest breaks between exercises. Completed to imporve strneght and endurance for ADL and IADL activities   Exercises  Shoulder Elevation: x  Shoulder Flexion: x  Shoulder Extension: x  Shoulder ABduction: x  Shoulder ADduction: x  Horizontal ABduction: x  Horizontal ADduction: x  Elbow Flexion: x  Elbow Extension: x  Supination: x  Pronation: x  Wrist Flexion: x  Wrist Extension: x            ADL  Equipment Provided: Reacher;Sock aid;Long-handled sponge; Other (comment)  Feeding: Independent  Grooming: Modified independent ( oral care and hair care seated at sink)   UE Bathing: Setup( seated at sink)   LE Bathing: Setup;Supervision ( using long handled sponge)   UE Dressing: Modified independent ( donning over head shirt and bra)   LE Dressing: min assistance( assist donning TEDS)                Objective:  /70   Pulse 93   Temp 97.6 °F (36.4 °C) (Oral)   Resp 20   Ht 5' 2\" (1.575 m)   Wt 152 lb 12.5 oz (69.3 kg)   LMP 02/18/2020   SpO2 99%   BMI 27.94 kg/m²       GEN: Well developed, well nourished, in NAD  HEENT:  NCAT. PERRL. EOMI. Mucous membranes pink and moist.   PULM:  Clear to ausculation. No rales or rhonchi. Respirations WNL and unlabored. CV:  tachycardic rate regular rhythm. No murmurs or gallops. GI:  Abdomen soft. Nontender. Non-distended. BS + and equal.    NEUROLOGICAL: A&O x3. Sensation intact to light touch. MSK:  Functional ROM BUEs and LLE. Motor testing 5/5 key muscles BUE and LLEs. SKIN: Warm dry and intact. Good turgor. R hip disartic incision well approximated with staples in place, no drainage. Induration medial aspect incision - with no drainage today   EXTREMITIES:  No L calf tenderness to palpation. No edema LLE. PSYCH: Mood WNL. Appropriately interactive. Affect WNL.      Diagnostics:     CBC:   Recent Labs     08/08/20  0657   WBC 4.1   RBC 3.42*   HGB 10.0*   HCT 30.3*   MCV 88.4   RDW 17.3*        BMP: Recent Labs     08/08/20  0657      K 3.9      CO2 22   BUN 4*   CREATININE <0.40*   GLUCOSE 117*     BNP: No results for input(s): BNP in the last 72 hours. PT/INR: No results for input(s): PROTIME, INR in the last 72 hours. APTT: No results for input(s): APTT in the last 72 hours. CARDIAC ENZYMES: No results for input(s): CKMB, CKMBINDEX, TROPONINT in the last 72 hours. Invalid input(s): CKTOTAL;3  FASTING LIPID PANEL:No results found for: CHOL, HDL, TRIG  LIVER PROFILE:   No results for input(s): AST, ALT, ALB, BILIDIR, BILITOT, ALKPHOS in the last 72 hours. Current Medications:   Current Facility-Administered Medications: senna (SENOKOT) tablet 17.2 mg, 2 tablet, Oral, Daily PRN  piperacillin-tazobactam (ZOSYN) 3.375 g in dextrose 5 % 50 mL IVPB extended infusion (mini-bag), 3.375 g, Intravenous, Q8H  gabapentin (NEURONTIN) capsule 300 mg, 300 mg, Oral, TID  ferrous sulfate (IRON 325) tablet 325 mg, 325 mg, Oral, Daily with breakfast  aspirin chewable tablet 81 mg, 81 mg, Oral, Daily  enoxaparin (LOVENOX) injection 40 mg, 40 mg, Subcutaneous, Daily  glucose (GLUTOSE) 40 % oral gel 15 g, 15 g, Oral, PRN  glucagon (rDNA) injection 1 mg, 1 mg, Intramuscular, PRN  polyethylene glycol (GLYCOLAX) packet 17 g, 17 g, Oral, Daily  bisacodyl (DULCOLAX) suppository 10 mg, 10 mg, Rectal, Daily PRN  acetaminophen (TYLENOL) tablet 650 mg, 650 mg, Oral, Q4H PRN  cyclobenzaprine (FLEXERIL) tablet 10 mg, 10 mg, Oral, TID PRN  oxyCODONE (ROXICODONE) immediate release tablet 5 mg, 5 mg, Oral, Q4H PRN **OR** oxyCODONE (ROXICODONE) immediate release tablet 10 mg, 10 mg, Oral, Q4H PRN  prenatal vitamin 28-0.8 MG tablet 1 tablet, 1 tablet, Oral, Daily      Impression/Plan:   Impaired ADLs, gait, and mobility due to:      1. 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 CODEY RLE. Tylenol prn, gabapentin TID, oxycodone prn.  Carmelo Goodman following. Phantom limb pain increased dose of gabapentin to 300 mg with approval from OB on 8/2. Mirror therapy in PT. Has follow up apointment with oncologist at [facility] which will be rescheduled. Follow up with Dr. Chidi Saenz (ortho) as scheduled. OK for seated activities for ADLs. No weight bearing restrictions on pelvis. 2. Pregnant with gestational diabetes: OB following. On NPH insulin nightly. Prenatal vitamin daily. 3. UTI: IM changed antibiotic to Zosyn - nursing confirmed OK with OB. Zosyn through 8/10.  4. Bowel management: on miralax daily, senokot prn, dulcolax prn.   5. DVT Prophylaxis:  low molecular weight heparin, SCD while in bed and SADIE  6. Internal medicine for medical management      Electronically signed by Humberto Streeter MD on 8/8/2020 at 9:08 AM      This note is created with the assistance of a speech recognition program.  While intending to generate a document that actually reflects the content of the visit, the document can still have some errors including those of syntax and sound a like substitutions which may escape proof reading. In such instances, actual meaning can be extrapolated by contextual diversion.

## 2020-08-08 NOTE — PROGRESS NOTES
Physical Therapy  Facility/Department: [facility] ACUTE REHAB  Daily Treatment Note  NAME: Monae Vines  : 1995  MRN: [id number]    Date of Service: 2020    Discharge Recommendations:  Home with assist PRN   PT Equipment Recommendations  Equipment Needed: Yes  Mobility Devices: Wheelchair;Walker  Walker: Rolling  Wheelchair: Light Weight(With pressure relieving cushion & swing-away armrests)    Assessment   Body structures, Functions, Activity limitations: Decreased functional mobility ; Decreased ROM; Decreased strength;Decreased endurance;Decreased balance; Increased pain;Decreased posture  Treatment Diagnosis: Impaired function. PT Education: General Safety;Gait Training;Functional Mobility Training;Pressure Relief;Transfer Training; Injury Prevention; Energy Conservation; Adaptive Device Training  REQUIRES PT FOLLOW UP: Yes  Activity Tolerance  Activity Tolerance: Patient Tolerated treatment well;Patient limited by endurance     Patient Diagnosis(es): There were no encounter diagnoses. has a past medical history of Diabetes mellitus ([facility] [address] and Osteosarcoma. has a past surgical history that includes amputation (Right, 2020). Restrictions  Restrictions/Precautions  Restrictions/Precautions: Weight Bearing  Required Braces or Orthoses?: Yes  Lower Extremity Weight Bearing Restrictions  Right Lower Extremity Weight Bearing: Weight Bearing As Tolerated(allowed to weight bear. )  Position Activity Restriction  Other position/activity restrictions: Per Dr. Catrina Real, Pt's orthopaedic resident, no showering until the patient follows up with the Ortho surgeon on . Subjective   General  Chart Reviewed: Yes  Additional Pertinent Hx: Monae Vines is a 22 y.o. right-handed     female admitted to the [address] [place] [address] on 2020. She was originally admitted to [address] [place] [address],[facility] on 2020 for planned amputation R leg for Stage 3 sarcoma.   She had R hip of Assistance: Independent  Right Brakes Level of Assistance: Independent  Propulsion: Yes  Propulsion 1  Propulsion: Manual  Level: Level Tile  Method: RUE;LUE  Level of Assistance: Modified independent  Description/ Details: Pt able to propel straight paths, turns, backing and manuevering in tight spaces, set-up for transfers; removes leg rest PRN and applied brakes appropriately. Distance: 200 ft     Other exercises  Other exercises?: Yes  Other exercises 1: Seated LLE exercises x15 (2#, lime green theraband)  Other exercises 2: Wheelchair mobility through obstacles (figure 8, forward and retro), patient able to  cones after completion of activity from seated level  Other exercises 3: wheelchair <> floor (mat) <> mat table transfers (patient requires mod-max A to perform transfers, therapist guarding all transfers to protect surgical site)  Other exercises 4: Standing balloon tap with RW x3 minutes      Goals  Short term goals  Time Frame for Short term goals: 1 week  Short term goal 1: Pt able to roll to left side for position change/pressure relief, independently. Short term goal 2: Pt able to perform supine <>sit at supervision level. Short term goal 3: Pt able to perform sit <>stand at SBA  Short term goal 4:  Pt able to transfer from bed<>W/C<>toilet at [facility]  Short term goal 5: Pt to demonstrate independence in pressure relieving technique while in bed,  chair or w/c. Short term goal 6: Pt able to manuever w/c on level surface, 150 ft, SBA  Short term goal 7:  Pt able to ambulate with RW dist of 10 to 15 ft, CGA  Long term goals  Time Frame for Long term goals : By LOS  Long term goal 1: Pt able to transfer independently  Long term goal 2: Pt able to ambulate with RW dist of 10 to 15 ft with RW at supervision level.    Long term goal 3: Pt able to propel w/c on incline surfaces at SBA, dist of 50 to 80 ft   Long term goal 4: Pt able to propel w/c dist of 150 ft, on level surfaces, independently. Patient Goals   Patient goals : Get stronger    Plan    Plan  Times per week: 1.5 hr/day, 5 to 7 days/week.   Specific instructions for Next Treatment: HEP  Current Treatment Recommendations: Strengthening, Balance Training, Functional Mobility Training, Transfer Training, Endurance Training, Wheelchair Mobility Training, Gait Training, Safety Education & Training, Patient/Caregiver Education & Training, Equipment Evaluation, Education, & procurement, Positioning  Safety Devices  Type of devices: Gait belt        08/08/20 0938 08/08/20 1537   PT Individual Minutes   Time In [id number]   Time Out [id number]   Minutes 46 [address] [place], [place]

## 2020-08-08 NOTE — PROGRESS NOTES
[place] [address] Internal Medicine    Progress Note  Chart Reviewed: Yes  Additional Pertinent Hx: Marisabel Mcelroy is a 22 y.o. right-handed     female admitted to the [address] [place] [address] unit on 7/31/2020. She was originally admitted to [address] [place] [address],[address] on 7/23/2020 for planned amputation R leg for Stage 3 sarcoma. She had R hip disarticulation performed 7/23/2020. She is pregnant - at 24 weeks gestation with comorbid gestational diabetes. Family / Caregiver Present: No  Referring Practitioner: Dr Baljeet Nieves  8/8/2020    9:51 AM    Name:   Marisabel Mcelroy  MRN:     [id number]     Acct:      [de-identified]   Room:   [address] [place] [address] Day:  8  Admit Date:  7/31/2020  7:15 PM    PCP:   FATEMEH Peralta CNP  Code Status:  Full Code    Subjective:     C/C: No chief complaint on file. Principal Problem:    History of disarticulation of right hip  Active Problems:    Pregestational Diabetes    Family history of diabetes mellitus    Gestational diabetes    Iron deficiency anemia    Sarcoma of bone ([facility] [address]    Thrombocytosis ([facility] [address]    Chronic hypertension affecting pregnancy    Leukocytosis    History of blood transfusion    History of E. Coli UTI 10-<100,000CFU (7/7/20)    Pseudomonas UTI  Resolved Problems:    * No resolved hospital problems. *      Interval History Status: improved.        S/p disarticulation surgery following diagnosis of a sarcoma  She states she is doing much better  Stop Zosyn on the day of the discharge  Would like to wait until after the completion of pregnancy before she tries her prosthesis  Good oral intake  Regular bowel and bladder movements  Urine culture growing Pseudomonas, started on Zosyn (8/6/2020)  Working with physical therapy  No other complaints overnight     Significant last 24 hr data reviewed ;   Vitals:    08/06/20 1851 08/07/20 0600 08/07/20 2026 08/08/20 0628   BP: 120/68 116/61 125/67 121/70   Pulse: 93 96 88 93 Resp: 16 16 17 20   Temp: 97.5 °F (36.4 °C) 98.3 °F (36.8 °C) 97.3 °F (36.3 °C) 97.6 °F (36.4 °C)   TempSrc: Oral  Oral Oral   SpO2: 98% 99% 96% 99%   Weight:       Height:          Recent Results (from the past 24 hour(s))   POC Glucose Fingerstick    Collection Time: 08/07/20 10:58 AM   Result Value Ref Range    POC Glucose 85 65 - 105 mg/dL   POC Glucose Fingerstick    Collection Time: 08/07/20  4:02 PM   Result Value Ref Range    POC Glucose 108 (H) 65 - 105 mg/dL   POC Glucose Fingerstick    Collection Time: 08/07/20  8:28 PM   Result Value Ref Range    POC Glucose 105 65 - 105 mg/dL   POC Glucose Fingerstick    Collection Time: 08/08/20  6:32 AM   Result Value Ref Range    POC Glucose 92 65 - 105 mg/dL   Basic Metabolic Panel w/ Reflex to MG    Collection Time: 08/08/20  6:57 AM   Result Value Ref Range    Glucose 117 (H) 70 - 99 mg/dL    BUN 4 (L) 6 - 20 mg/dL    CREATININE <0.40 (L) 0.50 - 0.90 mg/dL    Bun/Cre Ratio NOT REPORTED 9 - 20    Calcium 8.9 8.6 - 10.4 mg/dL    Sodium 136 135 - 144 mmol/L    Potassium 3.9 3.7 - 5.3 mmol/L    Chloride 104 98 - 107 mmol/L    CO2 22 20 - 31 mmol/L    Anion Gap 10 9 - 17 mmol/L    GFR Non- CANNOT BE CALCULATED >60 mL/min    GFR  CANNOT BE CALCULATED >60 mL/min    GFR Comment          GFR Staging NOT REPORTED    CBC    Collection Time: 08/08/20  6:57 AM   Result Value Ref Range    WBC 4.1 3.5 - 11.0 k/uL    RBC 3.42 (L) 4.0 - 5.2 m/uL    Hemoglobin 10.0 (L) 12.0 - 16.0 g/dL    Hematocrit 30.3 (L) 36 - 46 %    MCV 88.4 80 - 100 fL    MCH 29.3 26 - 34 pg    MCHC 33.1 31 - 37 g/dL    RDW 17.3 (H) 11.5 - 14.9 %    Platelets 586 618 - 545 k/uL    MPV 7.9 6.0 - 12.0 fL    NRBC Automated NOT REPORTED per 100 WBC     Recent Labs     08/07/20  0638 08/07/20  1058 08/07/20  1602 08/07/20  2028 08/08/20  0632   POCGLU 95 85 108* 105 92        No results found.           HPI:   See history in H and P      Review of Systems:     Constitutional: negative for chills, fevers, sweats  Respiratory:  negative for cough, dyspnea on exertion, hemoptysis, shortness of breath, wheezing  Cardiovascular:  negative for chest pain, chest pressure/discomfort, lower extremity edema, palpitations  Gastrointestinal:  negative for abdominal pain, constipation, diarrhea, nausea, vomiting  Neurological:  negative for dizziness, headache  Data:     Past Medical History:  no change     Social History:  no change    Family History: @no change    Vitals:      I/O (24Hr): Intake/Output Summary (Last 24 hours) at 8/8/2020 0951  Last data filed at 8/7/2020 1257  Gross per 24 hour   Intake 200 ml   Output --   Net 200 ml       Labs:    URINE ANALYSIS: No results found for: LABURIN     CBC:  Lab Results   Component Value Date    WBC 4.1 08/08/2020    HGB 10.0 08/08/2020     08/08/2020        BMP:    Lab Results   Component Value Date     08/08/2020    K 3.9 08/08/2020     08/08/2020    CO2 22 08/08/2020    BUN 4 08/08/2020    CREATININE <0.40 08/08/2020    GLUCOSE 117 08/08/2020      LIVER PROFILE:  Lab Results   Component Value Date     08/01/2020    AST 92 08/01/2020    PROT 6.3 08/01/2020    BILITOT 0.53 08/01/2020    BILIDIR 0.28 08/01/2020    LABALBU 3.3 08/01/2020               Radiology:      Physical Examination:        General appearance:  alert, cooperative and no distress  Mental Status:  oriented to person, place and time and normal affect  Lungs:  clear to auscultation bilaterally, normal effort  Heart:  regular rate and rhythm, no murmur  Abdomen:  soft, nontender, nondistended, normal bowel sounds, no masses, hepatomegaly, splenomegaly  Extremities:  no edema, redness, S/p disarticulation on right  Skin:  no gross lesions, rashes, induration    Assessment:        Primary Problem  History of disarticulation of right hip    Active Hospital Problems    Diagnosis Date Noted    Pseudomonas UTI [A49.8] 08/04/2020    History of E.  Coli UTI 10-<100,000CFU (7/7/20) [Z87.440] 08/02/2020    History of blood transfusion [Z92.89] 08/01/2020    History of disarticulation of right hip [Z89.621] 07/31/2020    Leukocytosis [D72.829] 07/31/2020    Chronic hypertension affecting pregnancy [O10.919] 07/11/2020    Sarcoma of bone ([facility] [address] [C41.9] 07/07/2020    Iron deficiency anemia [D50.9] 07/02/2020    Gestational diabetes [O24.419] 06/30/2020    Thrombocytosis ([facility] [address] [D47.3] 06/30/2020    Family history of diabetes mellitus [Z83.3] 06/08/2020    Pregestational Diabetes [O99.810] 06/02/2020       Plan:        1. 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 UTI- started on Zosyn (8/6/2020), follow up on reports of the U/A with reflex to culture on monday  3. 26 weeks pregnant  4. 40 of Lovenox for DVT prophylaxis  5. Pre gestational diabetes -HbA1c of 5 (2/8/2020)  6. On gabapentin 300 mg 3 times a day   7. iron deficiency anemia perioperative blood loss on ferrous sulfate  8. Tachycardia with exertion  9. Tentative discharge on 8/10      Medications: Allergies:  No Known Allergies    Current Meds:   Scheduled Meds:    piperacillin-tazobactam  3.375 g Intravenous Q8H    gabapentin  300 mg Oral TID    ferrous sulfate  325 mg Oral Daily with breakfast    aspirin  81 mg Oral Daily    enoxaparin  40 mg Subcutaneous Daily    polyethylene glycol  17 g Oral Daily    prenatal vitamin  1 tablet Oral Daily     Continuous Infusions:   PRN Meds: senna, glucose, glucagon (rDNA), bisacodyl, acetaminophen, cyclobenzaprine, oxyCODONE **OR** oxyCODONE       Bertha Steen MD  8/8/2020  9:51 AM     Attestation and add on       I have discussed the care of [facility] , including pertinent history and exam findings,      8/8/20    with the resident. I have seen and examined the patient and the key elements of all parts of the encounter have been performed by me .    I agree with the assessment, plan and orders as documented by the resident. Principal Problem:    History of disarticulation of right hip  Active Problems:    Pregestational Diabetes    Family history of diabetes mellitus    Gestational diabetes    Iron deficiency anemia    Sarcoma of bone ([facility] [address]    Thrombocytosis ([facility] [address]    Chronic hypertension affecting pregnancy    Leukocytosis    History of blood transfusion    History of E. Coli UTI 10-<100,000CFU (7/7/20)    Pseudomonas UTI  Resolved Problems:    * No resolved hospital problems. *            ---- ;        Medications: Allergies:  No Known Allergies    Current Meds:   Scheduled Meds:    piperacillin-tazobactam  3.375 g Intravenous Q8H    gabapentin  300 mg Oral TID    ferrous sulfate  325 mg Oral Daily with breakfast    aspirin  81 mg Oral Daily    enoxaparin  40 mg Subcutaneous Daily    polyethylene glycol  17 g Oral Daily    prenatal vitamin  1 tablet Oral Daily     Continuous Infusions:   PRN Meds: senna, glucose, glucagon (rDNA), bisacodyl, acetaminophen, cyclobenzaprine, oxyCODONE **OR** oxyCODONE        [facility] [address], [address] [place] [address].    Phone [phone]   Fax: [phone]  Answering Service: [phone]

## 2020-08-09 LAB
ALBUMIN SERPL-MCNC: 3.4 G/DL (ref 3.5–5.2)
ALBUMIN/GLOBULIN RATIO: ABNORMAL (ref 1–2.5)
ALP BLD-CCNC: 64 U/L (ref 35–104)
ALT SERPL-CCNC: 89 U/L (ref 5–33)
AST SERPL-CCNC: 19 U/L
BILIRUB SERPL-MCNC: 0.35 MG/DL (ref 0.3–1.2)
BILIRUBIN DIRECT: 0.15 MG/DL
BILIRUBIN, INDIRECT: 0.2 MG/DL (ref 0–1)
GLOBULIN: ABNORMAL G/DL (ref 1.5–3.8)
GLUCOSE BLD-MCNC: 110 MG/DL (ref 65–105)
GLUCOSE BLD-MCNC: 80 MG/DL (ref 65–105)
GLUCOSE BLD-MCNC: 89 MG/DL (ref 65–105)
GLUCOSE BLD-MCNC: 90 MG/DL (ref 65–105)
TOTAL PROTEIN: 5.9 G/DL (ref 6.4–8.3)

## 2020-08-09 PROCEDURE — 97530 THERAPEUTIC ACTIVITIES: CPT

## 2020-08-09 PROCEDURE — 99231 SBSQ HOSP IP/OBS SF/LOW 25: CPT | Performed by: INTERNAL MEDICINE

## 2020-08-09 PROCEDURE — 6360000002 HC RX W HCPCS: Performed by: PHYSICAL MEDICINE & REHABILITATION

## 2020-08-09 PROCEDURE — 6370000000 HC RX 637 (ALT 250 FOR IP): Performed by: PHYSICAL MEDICINE & REHABILITATION

## 2020-08-09 PROCEDURE — 2580000003 HC RX 258: Performed by: INTERNAL MEDICINE

## 2020-08-09 PROCEDURE — 97110 THERAPEUTIC EXERCISES: CPT

## 2020-08-09 PROCEDURE — 1180000000 HC REHAB R&B

## 2020-08-09 PROCEDURE — 6360000002 HC RX W HCPCS: Performed by: INTERNAL MEDICINE

## 2020-08-09 PROCEDURE — 97535 SELF CARE MNGMENT TRAINING: CPT

## 2020-08-09 PROCEDURE — 6370000000 HC RX 637 (ALT 250 FOR IP): Performed by: STUDENT IN AN ORGANIZED HEALTH CARE EDUCATION/TRAINING PROGRAM

## 2020-08-09 PROCEDURE — 97542 WHEELCHAIR MNGMENT TRAINING: CPT

## 2020-08-09 PROCEDURE — 36415 COLL VENOUS BLD VENIPUNCTURE: CPT

## 2020-08-09 PROCEDURE — 82947 ASSAY GLUCOSE BLOOD QUANT: CPT

## 2020-08-09 PROCEDURE — 97116 GAIT TRAINING THERAPY: CPT

## 2020-08-09 PROCEDURE — 80076 HEPATIC FUNCTION PANEL: CPT

## 2020-08-09 RX ADMIN — ACETAMINOPHEN 650 MG: 325 TABLET, FILM COATED ORAL at 09:19

## 2020-08-09 RX ADMIN — PIPERACILLIN SODIUM AND TAZOBACTAM SODIUM 3.38 G: 3; .375 INJECTION, POWDER, LYOPHILIZED, FOR SOLUTION INTRAVENOUS at 14:53

## 2020-08-09 RX ADMIN — OXYCODONE HYDROCHLORIDE 5 MG: 5 TABLET ORAL at 21:07

## 2020-08-09 RX ADMIN — PIPERACILLIN SODIUM AND TAZOBACTAM SODIUM 3.38 G: 3; .375 INJECTION, POWDER, LYOPHILIZED, FOR SOLUTION INTRAVENOUS at 05:51

## 2020-08-09 RX ADMIN — FERROUS SULFATE TAB 325 MG (65 MG ELEMENTAL FE) 325 MG: 325 (65 FE) TAB at 09:18

## 2020-08-09 RX ADMIN — ENOXAPARIN SODIUM 40 MG: 40 INJECTION SUBCUTANEOUS at 09:18

## 2020-08-09 RX ADMIN — ASPIRIN 81 MG CHEWABLE TABLET 81 MG: 81 TABLET CHEWABLE at 09:18

## 2020-08-09 RX ADMIN — GABAPENTIN 300 MG: 300 CAPSULE ORAL at 14:51

## 2020-08-09 RX ADMIN — Medication 1 TABLET: at 09:24

## 2020-08-09 RX ADMIN — GABAPENTIN 300 MG: 300 CAPSULE ORAL at 09:19

## 2020-08-09 RX ADMIN — CYCLOBENZAPRINE 10 MG: 10 TABLET, FILM COATED ORAL at 09:18

## 2020-08-09 RX ADMIN — OXYCODONE HYDROCHLORIDE 5 MG: 5 TABLET ORAL at 09:24

## 2020-08-09 RX ADMIN — GABAPENTIN 300 MG: 300 CAPSULE ORAL at 21:07

## 2020-08-09 ASSESSMENT — PAIN DESCRIPTION - DESCRIPTORS: DESCRIPTORS: NUMBNESS;TINGLING

## 2020-08-09 ASSESSMENT — PAIN DESCRIPTION - LOCATION
LOCATION: FOOT
LOCATION: HIP

## 2020-08-09 ASSESSMENT — PAIN SCALES - GENERAL
PAINLEVEL_OUTOF10: 3
PAINLEVEL_OUTOF10: 0
PAINLEVEL_OUTOF10: 4
PAINLEVEL_OUTOF10: 6
PAINLEVEL_OUTOF10: 0
PAINLEVEL_OUTOF10: 3

## 2020-08-09 ASSESSMENT — PAIN DESCRIPTION - ORIENTATION
ORIENTATION: RIGHT
ORIENTATION: RIGHT

## 2020-08-09 ASSESSMENT — PAIN DESCRIPTION - PAIN TYPE
TYPE: SURGICAL PAIN
TYPE: PHANTOM PAIN

## 2020-08-09 NOTE — PROGRESS NOTES
independent  Standing Balance  Time: AM: 1- 2min trials PM: 1-3 min trials   Activity: ADL and IADL tasks  Comment: 1-2 UE support, no noted loss of balance  Functional Mobility  Functional - Mobility Device: Wheelchair  Activity: To/from bathroom;Transport items; Retrieve items; Other  Assist Level: Modified independent   Toilet Transfers  Toilet - Technique: Stand pivot; To right; To left  Equipment Used: Grab bars  Toilet Transfer: Modified independent     Type of ROM/Therapeutic Exercise  Type of ROM/Therapeutic Exercise: AROM  Comment: educated on wheelchair pushups to address increased upper body strength to assist with transfers, mobilty and functional tasks for baby in near future. ADL  Equipment Provided: Reacher;Sock aid;Long-handled sponge; Other (comment)(dressing clips for toileting )  Feeding: Independent  Grooming: Modified independent   UE Bathing: Modified independent   LE Bathing: Modified independent   UE Dressing: Modified independent   LE Dressing: Modified independent ; Increased time to complete(pt able to ryan L SADIE stocking this date using ezslide while seated in bed.)  Toileting: Modified independent      Instrumental ADL's  Instrumental ADLs: Yes  Light Housekeeping  Light Housekeeping Level: Wheelchair  Light Housekeeping Level of Assistance: Supervision  Light Housekeeping: Pt engaged in multiple simulated household tasks to address technique and safety through engagement for increased independence with functional tasks. Pt loaded/transfered/unloaded towels using washer and dryer-stood safety prn to complete task, managed opening/closing  to unload items and return to upper/lower cupboards prn-utizing countertops for transfer assist, manged opening/closing refrigerater and retreving gallon of milk/items from lower drawers. No safety conerns with any tasks this date.  Discussed and educuated on tips and techniques for set up with  to increase ease/safety and independence; recommended ways to practice for baby upon returning home with use of weighted baby doll to practice transporting/lifting and caring for baby through simulated tasks. Pt receptive and appreciative of information provided. Assessment  Performance deficits / Impairments: Decreased functional mobility ; Decreased ADL status; Decreased strength;Decreased safe awareness;Decreased endurance;Decreased sensation;Decreased balance;Decreased high-level IADLs;Decreased posture  Prognosis: Good  Discharge Recommendations: Patient would benefit from continued therapy after discharge  Activity Tolerance: Patient Tolerated treatment well  Safety Devices in place: Yes  Type of devices: Left in chair;Left in bed;Call light within reach        Plan  Plan  Times per week: 5-7  Times per day: Twice a day  Current Treatment Recommendations: Self-Care / ADL, Home Management Training, Strengthening, Balance Training, Functional Mobility Training, Endurance Training, Wheelchair Mobility Training, Pain Management, Safety Education & Training, Patient/Caregiver Education & Training, Equipment Evaluation, Education, & procurement, Positioning  Patient Goals   Patient goals : \"My goal is to be as independent as I can be\"  Short term goals  Time Frame for Short term goals: 5 to 7 days  Short term goal 1: Pt will verbalize/demonstrate Good understanding of assistive equipment/durable medical equipment/modified techniques as needed for increased independence with self-care and mobility. Short term goal 2: Pt will perform lower body dressing and toileting tasks with Minimal assist and use of assistive equipment/modified techniques as needed. Short term goal 3: Pt will perform functional mobility and transfers during self-care with stand by assist, least restrictive mobility device, and Good safety.   Short term goal 4: Pt will stand for 4+ minutes with 1-2 UE support, stand by assist, and no loss of balance while engaging in functional activity of choice. Short term goal 5: Pt will actively participate in 30+ minutes of therapeutic exercise/functional activities to promote increased independence with self-care and mobility. Long term goals  Time Frame for Long term goals : By discharge  Long term goal 1: Pt will perform BADLs with modified independence and Good safety. Long term goal 2: Pt will perform functional mobility and transfers during self-care with modified independence, least restrictive mobility device, and Good safety. Long term goal 3: Pt will stand for 8+ minutes with 1-2 UE support, modified independence, and no loss of balance while engaging in self-care/functional activity of choice. Long term goal 4: Pt will perform simple meal prep/light housekeeping with modified independence and Good safety.         08/09/20 1429 08/09/20 1430   OT Individual Minutes   Time In [id number]   Time Out 1134 1303   Minutes 56 35     Electronically signed by LILIAN Dixon on 8/9/20 at 3:41 PM EDT

## 2020-08-09 NOTE — PROGRESS NOTES
[facility] Internal Medicine    Progress Note  Chart Reviewed: Yes  Additional Pertinent Hx: Miranda Becker is a 22 y.o. right-handed     female admitted to the [address] [place] [address] unit on 7/31/2020. 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 She had R hip disarticulation performed 7/23/2020. She is pregnant - at 24 weeks gestation with comorbid gestational diabetes. Family / Caregiver Present: No  Referring Practitioner: Dr Raciel Dao  8/9/2020    2:14 PM    Name:   Miranda Becker  MRN:     [id number]     Acct:      [de-identified]   Room:   [address] [place] [address] Day:  9  Admit Date:  7/31/2020  7:15 PM    PCP:   FATEMEH Hardy CNP  Code Status:  Full Code    Subjective:     C/C: No chief complaint on file. Principal Problem:    History of disarticulation of right hip  Active Problems:    Pregestational Diabetes    Family history of diabetes mellitus    Gestational diabetes    Iron deficiency anemia    Sarcoma of bone ([address]    Thrombocytosis ([address]    Chronic hypertension affecting pregnancy    Leukocytosis    History of blood transfusion    History of E. Coli UTI 10-<100,000CFU (7/7/20)    Pseudomonas UTI  Resolved Problems:    * No resolved hospital problems. *      Interval History Status: improved.        S/p disarticulation surgery following diagnosis of a sarcoma  She states she is doing much better  Stop Zosyn on the day of the discharge  Would like to wait until after the completion of pregnancy before she tries her prosthesis  Good oral intake  Regular bowel and bladder movements  Urine culture growing Pseudomonas, started on Zosyn (8/6/2020)  Working with physical therapy  No other complaints overnight     Significant last 24 hr data reviewed ;   Vitals:    08/07/20 2026 08/08/20 0628 08/08/20 1825 08/09/20 0649   BP: 125/67 121/70 128/73 109/64   Pulse: 88 93 103 103 Resp: 17 20 18 19   Temp: 97.3 °F (36.3 °C) 97.6 °F (36.4 °C) 98.1 °F (36.7 °C) 97.9 °F (36.6 °C)   TempSrc: Oral Oral Oral Oral   SpO2: 96% 99% 96% 100%   Weight:       Height:          Recent Results (from the past 24 hour(s))   POC Glucose Fingerstick    Collection Time: 08/08/20  3:35 PM   Result Value Ref Range    POC Glucose 113 (H) 65 - 105 mg/dL   POC Glucose Fingerstick    Collection Time: 08/08/20  8:11 PM   Result Value Ref Range    POC Glucose 127 (H) 65 - 105 mg/dL   POC Glucose Fingerstick    Collection Time: 08/09/20  6:46 AM   Result Value Ref Range    POC Glucose 90 65 - 105 mg/dL   HEPATIC FUNCTION PANEL    Collection Time: 08/09/20  8:50 AM   Result Value Ref Range    Alb 3.4 (L) 3.5 - 5.2 g/dL    Alkaline Phosphatase 64 35 - 104 U/L    ALT 89 (H) 5 - 33 U/L    AST 19 <32 U/L    Total Bilirubin 0.35 0.3 - 1.2 mg/dL    Bilirubin, Direct 0.15 <0.31 mg/dL    Bilirubin, Indirect 0.20 0.00 - 1.00 mg/dL    Total Protein 5.9 (L) 6.4 - 8.3 g/dL    Globulin NOT REPORTED 1.5 - 3.8 g/dL    Albumin/Globulin Ratio NOT REPORTED 1.0 - 2.5   POC Glucose Fingerstick    Collection Time: 08/09/20 11:15 AM   Result Value Ref Range    POC Glucose 80 65 - 105 mg/dL     Recent Labs     08/08/20  1052 08/08/20  1535 08/08/20 2011 08/09/20  0646 08/09/20  1115   POCGLU 83 113* 127* 90 80        No results found. HPI:   See history in H and P      Review of Systems:     Constitutional:  negative for chills, fevers, sweats  Respiratory:  negative for cough, dyspnea on exertion, hemoptysis, shortness of breath, wheezing  Cardiovascular:  negative for chest pain, chest pressure/discomfort, lower extremity edema, palpitations  Gastrointestinal:  negative for abdominal pain, constipation, diarrhea, nausea, vomiting  Neurological:  negative for dizziness, headache  Data:     Past Medical History:  no change     Social History:  no change    Family History: @no change    Vitals:      I/O (24Hr):     Intake/Output Summary (Last 24 hours) at 8/9/2020 1414  Last data filed at 8/8/2020 1728  Gross per 24 hour   Intake 300 ml   Output --   Net 300 ml       Labs:    URINE ANALYSIS: No results found for: LABURIN     CBC:  Lab Results   Component Value Date    WBC 4.1 08/08/2020    HGB 10.0 08/08/2020     08/08/2020        BMP:    Lab Results   Component Value Date     08/08/2020    K 3.9 08/08/2020     08/08/2020    CO2 22 08/08/2020    BUN 4 08/08/2020    CREATININE <0.40 08/08/2020    GLUCOSE 117 08/08/2020      LIVER PROFILE:  Lab Results   Component Value Date    ALT 89 08/09/2020    AST 19 08/09/2020    PROT 5.9 08/09/2020    BILITOT 0.35 08/09/2020    BILIDIR 0.15 08/09/2020    LABALBU 3.4 08/09/2020               Radiology:      Physical Examination:        General appearance:  alert, cooperative and no distress  Mental Status:  oriented to person, place and time and normal affect  Lungs:  clear to auscultation bilaterally, normal effort  Heart:  regular rate and rhythm, no murmur  Abdomen:  soft, nontender, nondistended, normal bowel sounds, no masses, hepatomegaly, splenomegaly  Extremities:  no edema, redness, S/p disarticulation on right  Skin:  no gross lesions, rashes, induration    Assessment:        Primary Problem  History of disarticulation of right hip    Active Hospital Problems    Diagnosis Date Noted    Pseudomonas UTI [A49.8] 08/04/2020    History of E.  Coli UTI 10-<100,000CFU (7/7/20) [Z87.440] 08/02/2020    History of blood transfusion [Z92.89] 08/01/2020    History of disarticulation of right hip [Z89.621] 07/31/2020    Leukocytosis [D72.829] 07/31/2020    Chronic hypertension affecting pregnancy [O10.919] 07/11/2020    Sarcoma of bone ([facility] [C41.9] 07/07/2020    Iron deficiency anemia [D50.9] 07/02/2020    Gestational diabetes [O24.419] 06/30/2020    Thrombocytosis ([address] [D47.3] 06/30/2020    Family history of diabetes mellitus [Z83.3] 06/08/2020    Pregestational Diabetes [O99.810] 06/02/2020       Plan:        1. 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 UTI- started on Zosyn (8/6/2020), follow up on reports of the U/A with reflex to culture on monday  3. 26 weeks pregnant  4. 40 of Lovenox for DVT prophylaxis  5. Pre gestational diabetes -HbA1c of 5 (2/8/2020)  6. On gabapentin 300 mg 3 times a day   7. iron deficiency anemia perioperative blood loss on ferrous sulfate  8. Tachycardia with exertion  9. Tentative discharge on 8/10      Medications:      Allergies:  No Known Allergies    Current Meds:   Scheduled Meds:    piperacillin-tazobactam  3.375 g Intravenous Q8H    gabapentin  300 mg Oral TID    ferrous sulfate  325 mg Oral Daily with breakfast    aspirin  81 mg Oral Daily    enoxaparin  40 mg Subcutaneous Daily    polyethylene glycol  17 g Oral Daily    prenatal vitamin  1 tablet Oral Daily     Continuous Infusions:   PRN Meds: senna, glucose, glucagon (rDNA), bisacodyl, acetaminophen, cyclobenzaprine, oxyCODONE **OR** oxyCODONE       Constanza Smith MD  8/9/2020  2:14 PM

## 2020-08-09 NOTE — PROGRESS NOTES
Dr. Tim Garcia notified of pt. Being lowered to the floor, per therapy this afternoon, after loosing her balance  Pt.  Has no c/o of pain, or SOB at this time  No new orders at this time

## 2020-08-09 NOTE — PROGRESS NOTES
Physical Therapy  [place]ility[place] [facility]  Daily Treatment Note  NAME: Lily Gatica  : 1995  MRN: [id number]    Date of Service: 2020    Discharge Recommendations:  Home with assist PRN   PT Equipment Recommendations  Equipment Needed: Yes  Mobility Devices: Scharlene Harriet; Wheelchair  Walker: Rolling  Wheelchair: Light Weight(With pressure relieving cushion & swing-away armrests)  Other: Light weight w/c, RW    Assessment   Body structures, Functions, Activity limitations: Decreased functional mobility ; Decreased ROM; Decreased strength;Decreased endurance;Decreased balance; Increased pain;Decreased posture  Treatment Diagnosis: Impaired function. REQUIRES PT FOLLOW UP: Yes  Activity Tolerance  Activity Tolerance: Patient Tolerated treatment well     Patient Diagnosis(es): There were no encounter diagnoses. has a past medical history of Diabetes mellitus ([facility] and Osteosarcoma. has a past surgical history that includes amputation (Right, 2020). Restrictions  Restrictions/Precautions  Restrictions/Precautions: Weight Bearing  Required Braces or Orthoses?: Yes  Lower Extremity Weight Bearing Restrictions  Right Lower Extremity Weight Bearing: Weight Bearing As Tolerated(allowed to weight bear. )  Position Activity Restriction  Other position/activity restrictions: Per Dr. Rosario Silva, Pt's orthopaedic resident, no showering until the patient follows up with the Ortho surgeon on . Subjective   General  Chart Reviewed: Yes  Additional Pertinent Hx: Lily Gatica is a 22 y.o. right-handed     female admitted to the [address] [place] [address] on 2020. 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 She had R hip disarticulation performed 2020. She is pregnant - at 24 weeks gestation with comorbid gestational diabetes.   Family / Caregiver Present: No  Referring Practitioner:  9  Stairs Height: 4\"  Rails: Bilateral  Curbs: 6\"(Patient performs x2 curb steps while outside. Patient able to perform at [facility]. Patient able to lift walker up/down on to and off of curb.)  Assistance: Contact guard assistance  Comment: x6 steps performed forward, x3 steps perfomed retro. Wheelchair Activities  Wheelchair Type: Standard  Wheelchair Cushion: Pressure Relieving  Pressure Relief Type: Lateral lean;Push up  Level of Assistance for pressure relief activities: Modified independent  Wheelchair Parts Management: Yes  Left Leg Rest Level of Assistance: Independent(swinging away, into place)  Right Leg Rest Level of Assistance: Unable to assess(comment)(N/A)  Left Brakes Level of Assistance: Independent  Right Brakes Level of Assistance: Independent  Propulsion: Yes  Propulsion 1  Propulsion: Manual  Level: Level Tile  Method: Minor [place]  Description/ Details: Pt able to propel straight paths, turns, backing and manuevering in tight spaces, set-up for transfers; removes leg rest PRN and applied brakes appropriately. Distance: 200 ft  Propulsion 2  Propulsion: Manual  Level: Ramp  Method: RUE;LUE  Level of Assistance: Minimal assistance  Description/ Details: Able to control descending ramp safely, requires minimal assist on ascending ramp. Distance: 50 ft     Other exercises  Other exercises?: Yes  Other exercises 1: Seated LLE exercises x15 (2#, lime green theraband)  Other exercises 2: Wheelchair mobility through obstacles (figure 8, forward and retro), patient able to  cones after completion of activity from seated level  Other exercises 3: wheelchair <> floor (mat) <> mat table transfers (patient requires min-mod A to perform transfers, therapist guarding all transfers to protect surgical site)  Other exercises 4: Seated balloon tap at edge of mat.  Patient using 3# weighted bar to tap balloon back to therapist.  Other exercises 5: Car transfer x3 (Patient's mother present for transfers)  Other exercises 6: Ambulation outside and performing curb step with RW x2  Other exercises 7: Mirror therapy x15 minutes (patient performs LLE supine ther-ex while doing mirror therapy x15)  Other exercises 8: Patient able to  object (remote) off floor from standing position with rolling walker (supervision level with reacher). Goals  Short term goals  Time Frame for Short term goals: 1 week  Short term goal 1: Pt able to roll to left side for position change/pressure relief, independently. Short term goal 2: Pt able to perform supine <>sit at supervision level. Short term goal 3: Pt able to perform sit <>stand at SBA  Short term goal 4:  Pt able to transfer from bed<>W/C<>toilet at [facility]  Short term goal 5: Pt to demonstrate independence in pressure relieving technique while in bed,  chair or w/c. Short term goal 6: Pt able to manuever w/c on level surface, 150 ft, SBA  Short term goal 7:  Pt able to ambulate with RW dist of 10 to 15 ft, CGA  Long term goals  Time Frame for Long term goals : By LOS  Long term goal 1: Pt able to transfer independently  Long term goal 2: Pt able to ambulate with RW dist of 10 to 15 ft with RW at supervision level. Long term goal 3: Pt able to propel w/c on incline surfaces at SBA, dist of 50 to 80 ft   Long term goal 4: Pt able to propel w/c dist of 150 ft, on level surfaces, independently. Patient Goals   Patient goals : Get stronger    Plan    Plan  Times per week: 1.5 hr/day, 5 to 7 days/week.   Specific instructions for Next Treatment: HEP  Current Treatment Recommendations: Strengthening, Balance Training, Functional Mobility Training, Transfer Training, Endurance Training, Wheelchair Mobility Training, Gait Training, Safety Education & Training, Patient/Caregiver Education & Training, Equipment Evaluation, Education, & procurement, Positioning  Safety Devices  Type of devices: Gait belt        08/09/20 0734 08/09/20 1445   PT Individual Minutes   Time In [id number] Time Out [id number]   Minutes 62 [id number] [place], [place]

## 2020-08-09 NOTE — PROGRESS NOTES
Per therapy, pt. Lost her balance, today , this afternoon, and was lowered to the floor  Pt.  Is now resting quietly in bed  Dressing is dry and intact, with all stapes intact to rt hip area

## 2020-08-09 NOTE — PROGRESS NOTES
Rounding done, per Mamadou Leggett and Edmund Enciso RN's  IV intact, no redness noted  Pt. Rests peacefully  Dressing dry and intact to rt hip area  Pt cont.  To watch TV at this time

## 2020-08-09 NOTE — PROGRESS NOTES
Physical Medicine & Rehabilitation  Progress Note      Subjective:      22year-old female s/p R hip disarticulation amputation for sarcoma. . Incision improving. She denies any acute issues overnight. Still some Phantom limb pain , B/B ok     ROS:  Denies fevers, chills, sweats. No chest pain, palpitations, lightheadedness. Denies coughing, wheezing or shortness of breath. Denies abdominal pain, nausea, diarrhea or constipation. No new areas of joint pain. Denies new areas of numbness or weakness. Denies new anxiety or depression issues. No new skin problems. Rehabilitation:   Progressing in therapies. PT:    Bed mobility  Bridging: Modified independent   Rolling to Left: Supervision  Rolling to Right: Unable to assess(Do not roll on right side due to incision from surgery)  Supine to Sit: Modified independent  Sit to Supine: Modified independent  Scooting: Modified independent  Transfers  Sit to Stand: Supervision  Stand to sit: Supervision  Bed to Chair: Supervision  Stand Pivot Transfers: Supervision  Squat Pivot Transfers: Supervision  Comment: With and without RW  Ambulation  Ambulation?: Yes  Ambulation 1  Surface: level tile  Device: Rolling Walker  Assistance: Supervision  Quality of Gait: Steady, no shaking or loss of balance observed. Good heel strike after initial flat-footed steps. Gait Deviations: Slow Laurence  Distance: 80 ft (AM); 90 ft (PM)  Comments: Ambulated with personal walker in PM.  Stairs/Curb  Stairs?: Yes  Stairs  # Steps : 9  Stairs Height: 4\"  Rails: Bilateral  Assistance: Minimal assistance  Comment: x6 steps performed forward, x3 steps perfomed retro. Patient initially fearful of stair negotiation.   Wheelchair Activities  Wheelchair Type: Standard  Wheelchair Cushion: Pressure Relieving  Pressure Relief Type: Lateral lean;Push up  Level of Assistance for pressure relief activities: Modified independent  Wheelchair Parts Management: Yes  Left Leg Rest Level of Assistance: Independent(swinging away, into place)  Right Leg Rest Level of Assistance: Unable to assess(comment)(N/A)  Left Brakes Level of Assistance: Independent  Right Brakes Level of Assistance: Independent  Propulsion: Yes  Propulsion 1  Propulsion: Manual  Level: Level Tile  Method: RUE;LUE  Level of Assistance: Modified independent  Description/ Details: Pt able to propel straight paths, turns, backing and manuevering in tight spaces, set-up for transfers; removes leg rest PRN and applied brakes appropriately. Distance: 200 ft    OT    Cognition  Overall Cognitive Status: WNL  Perception  Overall Perceptual Status: WFL  Balance  Sitting Balance: Independent  Standing Balance: Supervision  Bed mobility  Supine to Sit: Modified independent  Transfers  Stand Pivot Transfers: Supervision  Sit to stand: Supervision  Stand to sit: Supervision  Transfer Comments: Good safety awareness noted with hand placement during transfers  Standing Balance  Time: AM: 1-2 min trials PM: 2-3 min   Activity: ADL tasks. Comment: 1-2 UE support   Functional Mobility  Functional - Mobility Device: Wheelchair  Activity: To/from bathroom;Transport items; Retrieve items; Other  Assist Level: Modified independent   Toilet Transfers  Toilet - Technique: Stand pivot; To right; To left  Equipment Used: Grab bars  Toilet Transfer: Supervision  Toilet Transfers Comments: pt also transferred to/from bedside commode in PM using rolling walker in good safety awareness. ADL  Equipment Provided: Reacher;Sock aid;Long-handled sponge; Other (comment)  Feeding: Independent  Grooming: Modified independent   UE Bathing: Setup  LE Bathing: Setup;Supervision  UE Dressing: Modified independent   LE Dressing: Minimal assistance(Assist with donning L stocking. )  Toileting: Supervision (pt demonstrates good use of double clips for clothing management)     Instrumental ADL's  Instrumental ADLs: (attempted to complete IADL simulation this date however pt had urgent need to use restroom)         Objective:  /64   Pulse 103   Temp 97.9 °F (36.6 °C) (Oral)   Resp 19   Ht 5' 2\" (1.575 m)   Wt 152 lb 12.5 oz (69.3 kg)   LMP 02/18/2020   SpO2 100%   BMI 27.94 kg/m²       GEN: Well developed, well nourished, in NAD  HEENT:  NCAT. PERRL. EOMI. Mucous membranes pink and moist.   PULM:  Clear to ausculation. No rales or rhonchi. Respirations WNL and unlabored. CV:  tachycardic rate regular rhythm. No murmurs or gallops. GI:  Abdomen soft. Nontender. Non-distended. BS + and equal.    NEUROLOGICAL: A&O x3. Sensation intact to light touch. MSK:  Functional ROM BUEs and LLE. Motor testing 5/5 key muscles BUE and LLEs. SKIN: Warm dry and intact. Good turgor. R hip disartic incision well approximated with staples in place, no drainage. Induration medial aspect incision - with no drainage today   EXTREMITIES:  No L calf tenderness to palpation. No edema LLE. PSYCH: Mood WNL. Appropriately interactive. Affect WNL. Diagnostics:     CBC:   Recent Labs     08/08/20  0657   WBC 4.1   RBC 3.42*   HGB 10.0*   HCT 30.3*   MCV 88.4   RDW 17.3*        BMP:   Recent Labs     08/08/20  0657      K 3.9      CO2 22   BUN 4*   CREATININE <0.40*   GLUCOSE 117*     BNP: No results for input(s): BNP in the last 72 hours. PT/INR: No results for input(s): PROTIME, INR in the last 72 hours. APTT: No results for input(s): APTT in the last 72 hours. CARDIAC ENZYMES: No results for input(s): CKMB, CKMBINDEX, TROPONINT in the last 72 hours. Invalid input(s): CKTOTAL;3  FASTING LIPID PANEL:No results found for: CHOL, HDL, TRIG  LIVER PROFILE:   No results for input(s): AST, ALT, ALB, BILIDIR, BILITOT, ALKPHOS in the last 72 hours.      Current Medications:   Current Facility-Administered Medications: senna (SENOKOT) tablet 17.2 mg, 2 tablet, Oral, Daily PRN  piperacillin-tazobactam (ZOSYN) 3.375 g in dextrose 5 % 50 mL IVPB extended infusion (mini-bag), 3.375 g, Intravenous, Q8H  gabapentin (NEURONTIN) capsule 300 mg, 300 mg, Oral, TID  ferrous sulfate (IRON 325) tablet 325 mg, 325 mg, Oral, Daily with breakfast  aspirin chewable tablet 81 mg, 81 mg, Oral, Daily  enoxaparin (LOVENOX) injection 40 mg, 40 mg, Subcutaneous, Daily  glucose (GLUTOSE) 40 % oral gel 15 g, 15 g, Oral, PRN  glucagon (rDNA) injection 1 mg, 1 mg, Intramuscular, PRN  polyethylene glycol (GLYCOLAX) packet 17 g, 17 g, Oral, Daily  bisacodyl (DULCOLAX) suppository 10 mg, 10 mg, Rectal, Daily PRN  acetaminophen (TYLENOL) tablet 650 mg, 650 mg, Oral, Q4H PRN  cyclobenzaprine (FLEXERIL) tablet 10 mg, 10 mg, Oral, TID PRN  oxyCODONE (ROXICODONE) immediate release tablet 5 mg, 5 mg, Oral, Q4H PRN **OR** oxyCODONE (ROXICODONE) immediate release tablet 10 mg, 10 mg, Oral, Q4H PRN  prenatal vitamin 28-0.8 MG tablet 1 tablet, 1 tablet, Oral, Daily      Impression/Plan:   Impaired ADLs, gait, and mobility due to:      1. 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 NWB RLE. Tylenol prn, gabapentin TID, oxycodone prn. [address] following. Phantom limb pain increased dose of gabapentin to 300 mg with approval from OB on 8/2, consider titrating it to effect if ok with OB. Mirror therapy in PT. Has follow up apointment with, may  oncologist at [facility] which will be rescheduled. Follow up with Dr. Radha Bowen (ortho) as scheduled. OK for seated activities for ADLs. No weight bearing restrictions on pelvis. 2. Pregnant 26 weeks with gestational diabetes: OB following. On NPH insulin nightly. Prenatal vitamin daily. 3. UTI: IM changed antibiotic to Zosyn - nursing confirmed OK with OB. Zosyn through 8/10.  4. Bowel management: on miralax daily, senokot prn, dulcolax prn.   5. DVT Prophylaxis:  low molecular weight heparin, SCD while in bed and SADIE  6.  Internal medicine for medical management      Electronically

## 2020-08-10 VITALS
TEMPERATURE: 97.9 F | SYSTOLIC BLOOD PRESSURE: 116 MMHG | DIASTOLIC BLOOD PRESSURE: 74 MMHG | WEIGHT: 154.76 LBS | OXYGEN SATURATION: 98 % | HEIGHT: 62 IN | RESPIRATION RATE: 19 BRPM | BODY MASS INDEX: 28.48 KG/M2 | HEART RATE: 109 BPM

## 2020-08-10 LAB
GLUCOSE BLD-MCNC: 93 MG/DL (ref 65–105)
GLUCOSE BLD-MCNC: 96 MG/DL (ref 65–105)

## 2020-08-10 PROCEDURE — 6370000000 HC RX 637 (ALT 250 FOR IP): Performed by: PHYSICAL MEDICINE & REHABILITATION

## 2020-08-10 PROCEDURE — 6370000000 HC RX 637 (ALT 250 FOR IP): Performed by: STUDENT IN AN ORGANIZED HEALTH CARE EDUCATION/TRAINING PROGRAM

## 2020-08-10 PROCEDURE — 82947 ASSAY GLUCOSE BLOOD QUANT: CPT

## 2020-08-10 PROCEDURE — 6360000002 HC RX W HCPCS: Performed by: PHYSICAL MEDICINE & REHABILITATION

## 2020-08-10 PROCEDURE — 97110 THERAPEUTIC EXERCISES: CPT

## 2020-08-10 PROCEDURE — 99239 HOSP IP/OBS DSCHRG MGMT >30: CPT | Performed by: PHYSICAL MEDICINE & REHABILITATION

## 2020-08-10 PROCEDURE — 6360000002 HC RX W HCPCS: Performed by: INTERNAL MEDICINE

## 2020-08-10 PROCEDURE — 2580000003 HC RX 258: Performed by: INTERNAL MEDICINE

## 2020-08-10 PROCEDURE — 97116 GAIT TRAINING THERAPY: CPT

## 2020-08-10 RX ORDER — OXYCODONE HYDROCHLORIDE 5 MG/1
5 TABLET ORAL EVERY 8 HOURS PRN
Qty: 21 TABLET | Refills: 0 | Status: SHIPPED | OUTPATIENT
Start: 2020-08-10 | End: 2020-08-10

## 2020-08-10 RX ORDER — GLUCOSAMINE HCL/CHONDROITIN SU 500-400 MG
CAPSULE ORAL
Qty: 100 STRIP | Refills: 3 | Status: ON HOLD | OUTPATIENT
Start: 2020-08-10 | End: 2020-10-13 | Stop reason: HOSPADM

## 2020-08-10 RX ORDER — LANCETS 30 GAUGE
1 EACH MISCELLANEOUS DAILY
Qty: 200 EACH | Refills: 2 | Status: ON HOLD | OUTPATIENT
Start: 2020-08-10 | End: 2020-10-13 | Stop reason: HOSPADM

## 2020-08-10 RX ORDER — ASPIRIN 81 MG/1
81 TABLET, CHEWABLE ORAL DAILY
Qty: 30 TABLET | Refills: 3 | Status: ON HOLD | OUTPATIENT
Start: 2020-08-11 | End: 2020-10-13 | Stop reason: HOSPADM

## 2020-08-10 RX ORDER — GABAPENTIN 300 MG/1
300 CAPSULE ORAL 3 TIMES DAILY
Qty: 90 CAPSULE | Refills: 0 | Status: SHIPPED | OUTPATIENT
Start: 2020-08-10 | End: 2020-09-08 | Stop reason: SDUPTHER

## 2020-08-10 RX ORDER — BLOOD PRESSURE TEST KIT
1 KIT MISCELLANEOUS 4 TIMES DAILY
Qty: 100 EACH | Refills: 3 | Status: ON HOLD | OUTPATIENT
Start: 2020-08-10 | End: 2020-10-13 | Stop reason: HOSPADM

## 2020-08-10 RX ORDER — DOCUSATE SODIUM 100 MG/1
100 CAPSULE, LIQUID FILLED ORAL 2 TIMES DAILY
Qty: 60 CAPSULE | Refills: 0 | Status: SHIPPED | OUTPATIENT
Start: 2020-08-10 | End: 2020-09-09

## 2020-08-10 RX ORDER — CYCLOBENZAPRINE HCL 10 MG
10 TABLET ORAL 3 TIMES DAILY PRN
Qty: 30 TABLET | Refills: 0 | Status: SHIPPED | OUTPATIENT
Start: 2020-08-10 | End: 2020-09-08 | Stop reason: SDUPTHER

## 2020-08-10 RX ORDER — OXYCODONE HYDROCHLORIDE 5 MG/1
5 TABLET ORAL EVERY 8 HOURS PRN
Qty: 21 TABLET | Refills: 0 | Status: SHIPPED | OUTPATIENT
Start: 2020-08-10 | End: 2020-08-17

## 2020-08-10 RX ORDER — PNV NO.95/FERROUS FUM/FOLIC AC 28MG-0.8MG
1 TABLET ORAL DAILY
Qty: 30 TABLET | Refills: 5 | Status: SHIPPED | OUTPATIENT
Start: 2020-08-10 | End: 2021-01-06

## 2020-08-10 RX ORDER — FERROUS SULFATE 325(65) MG
325 TABLET ORAL 2 TIMES DAILY
Qty: 60 TABLET | Refills: 0 | Status: SHIPPED | OUTPATIENT
Start: 2020-08-10 | End: 2022-01-07 | Stop reason: ALTCHOICE

## 2020-08-10 RX ORDER — GABAPENTIN 300 MG/1
300 CAPSULE ORAL 3 TIMES DAILY
Qty: 90 CAPSULE | Refills: 0 | Status: SHIPPED | OUTPATIENT
Start: 2020-08-10 | End: 2020-08-10

## 2020-08-10 RX ADMIN — Medication 1 TABLET: at 15:17

## 2020-08-10 RX ADMIN — FERROUS SULFATE TAB 325 MG (65 MG ELEMENTAL FE) 325 MG: 325 (65 FE) TAB at 07:45

## 2020-08-10 RX ADMIN — OXYCODONE HYDROCHLORIDE 5 MG: 5 TABLET ORAL at 07:45

## 2020-08-10 RX ADMIN — PIPERACILLIN SODIUM AND TAZOBACTAM SODIUM 3.38 G: 3; .375 INJECTION, POWDER, LYOPHILIZED, FOR SOLUTION INTRAVENOUS at 00:44

## 2020-08-10 RX ADMIN — PIPERACILLIN SODIUM AND TAZOBACTAM SODIUM 3.38 G: 3; .375 INJECTION, POWDER, LYOPHILIZED, FOR SOLUTION INTRAVENOUS at 06:06

## 2020-08-10 RX ADMIN — ENOXAPARIN SODIUM 40 MG: 40 INJECTION SUBCUTANEOUS at 07:45

## 2020-08-10 RX ADMIN — ASPIRIN 81 MG CHEWABLE TABLET 81 MG: 81 TABLET CHEWABLE at 07:45

## 2020-08-10 RX ADMIN — GABAPENTIN 300 MG: 300 CAPSULE ORAL at 07:45

## 2020-08-10 RX ADMIN — GABAPENTIN 300 MG: 300 CAPSULE ORAL at 15:17

## 2020-08-10 ASSESSMENT — PAIN DESCRIPTION - ORIENTATION: ORIENTATION: RIGHT

## 2020-08-10 ASSESSMENT — PAIN DESCRIPTION - LOCATION: LOCATION: HIP

## 2020-08-10 ASSESSMENT — PAIN DESCRIPTION - PAIN TYPE: TYPE: SURGICAL PAIN

## 2020-08-10 ASSESSMENT — PAIN SCALES - GENERAL
PAINLEVEL_OUTOF10: 3
PAINLEVEL_OUTOF10: 3

## 2020-08-10 NOTE — PROGRESS NOTES
independent    Transfers  Sit to stand: Modified independent  Stand to sit: Modified independent  Transfer Comments: Good safety awareness noted with hand placement during transfers     Standing Balance  Time: 1 min x3   Activity: ADL and IADL tasks  Comment: 1-2 UE support, no noted loss of balance    Functional Mobility  Functional - Mobility Device: Wheelchair  Activity: To/from bathroom;Transport items; Retrieve items; Other  Assist Level: Modified independent   Shower Transfers  Shower Transfers Comments: Per ortho surgeon, Pt may not shower until after her follow-up appointment on 8/19/20. ADL  Feeding: Independent  Grooming: Modified independent (seated at sink )  UE Bathing: Modified independent   LE Bathing: Modified independent   UE Dressing: Modified independent   LE Dressing: Modified independent ; Increased time to complete(donning pants, underware and sock/SADIE )  Toileting: Modified independent   Additional Comments: Pt seated at sink for ADLs and gathering own clothing from wheel chair level. Pt sat on bed lifting leg onto bed to place of SADIE on left fot/leg       Light Housekeeping  Light Housekeeping Level: Wheelchair  Light Housekeeping Level of Assistance: Modified independent  Light Housekeeping: pt engaged in gathering personal items around room in prep for discharge     Assessment  Performance deficits / Impairments: Decreased functional mobility ; Decreased ADL status; Decreased strength;Decreased safe awareness;Decreased endurance;Decreased sensation;Decreased balance;Decreased high-level IADLs;Decreased posture  Prognosis: Good  Discharge Recommendations: Patient would benefit from continued therapy after discharge  Activity Tolerance: Patient Tolerated treatment well  Safety Devices in place: Yes  Equipment Recommendations  Equipment Needed: Yes  Wheelchair: Purchased  3-in-1 Commode: reports having-will utilize for toilet transfer duirng night to ensure safety.    Hand Held Shower: x  Transfer Tub Bench: x  Grab bars: x  Reacher: x  Sock Aid: x  Long-handled Shoehorn: x  Other: long handled sponge, ezslide. Patient Education:  Patient Goals   Patient goals : \"My goal is to be as independent as I can be\"   Education provided to pt on home safety, adaptive equipment, fall prevention and energy conservation   Learner:patient  Method: demonstration and explanation       Outcome: acknowledged understanding , demonstrated understanding and asked questions        Plan  Plan  Times per week: 5-7  Times per day: Twice a day  Current Treatment Recommendations: Self-Care / ADL, Home Management Training, Strengthening, Balance Training, Functional Mobility Training, Endurance Training, Wheelchair Mobility Training, Pain Management, Safety Education & Training, Patient/Caregiver Education & Training, Equipment Evaluation, Education, & procurement, Positioning  Patient Goals   Patient goals : \"My goal is to be as independent as I can be\"  Short term goals  Time Frame for Short term goals: 5 to 7 days  Short term goal 1: Pt will verbalize/demonstrate Good understanding of assistive equipment/durable medical equipment/modified techniques as needed for increased independence with self-care and mobility. Short term goal 2: Pt will perform lower body dressing and toileting tasks with Minimal assist and use of assistive equipment/modified techniques as needed. Short term goal 3: Pt will perform functional mobility and transfers during self-care with stand by assist, least restrictive mobility device, and Good safety. Short term goal 4: Pt will stand for 4+ minutes with 1-2 UE support, stand by assist, and no loss of balance while engaging in functional activity of choice. Short term goal 5: Pt will actively participate in 30+ minutes of therapeutic exercise/functional activities to promote increased independence with self-care and mobility. Long term goals  Time Frame for Long term goals :  By

## 2020-08-10 NOTE — PROGRESS NOTES
OB/GYN PROGRESS NOTE    Al Mirza is a 22 y.o. female  at [address] [facility] Day: 11    Subjective:   Patient has been seen and examined. Patient is pleasant and has no complaints. She states they are planning to discharge her home today. Patient discussed with Phillip Guzmán her new found community of amputees and her coordinated plans with her  and mother for care once she gets home. She plans to start carrying a baby doll with her in a carrier and while in the wheelchair in attempts to adapt for once the baby is born. The patient reports that she will call Dr. Ardath Halsted office to make a prenatal appointment and is also aware that she will need to be seen by MFM. Her blood glucose log was reviewed and fasting blood sugars noted to be consistently >90. Discussed with patient the need to place her back on NPH 8U. She previously declined insulin however, after a week of moniotring glucose levels is amenable to starting the medication. Writer reviewed with patient that her 2 hour PP levels are consistently <120 and there is no need for meal time insulin currently. Writer discussed with patient signs and symptoms of PreE as well as potential direct abdominal trauma from falls as she becomes more mobile and accustomed to life as an amputee. She verbalized understanding of direct abdominal trauma and risk or placental abruption and to call or report to the nearest emergency department with concerns. Patient denies any vaginal discharge and any urinary complaints. The patient reports fetal movement is present, denies contractions, denies loss of fluid, denies vaginal bleeding. Patient denies headache, vision changes, nausea, vomiting, fever, chills, shortness of breath, chest pain, RUQ pain, abdominal pain, diarrhea, change in color/amount/odor of vaginal discharge, dysuria or, hematuria.      Objective:   Vitals:  Vitals:    20 0719 20 1428 20 1832 08/10/20 0625   BP:  102/63 127/75 116/74 Pulse: 98 100 98 109   Resp:  19 18 19   Temp:  97.5 °F (36.4 °C) 97.9 °F (36.6 °C) 97.9 °F (36.6 °C)   TempSrc:  Oral Oral Oral   SpO2:  100% 99% 98%   Weight:       Height:             Fetal Heat Tones via Doppler: 135      Physical Exam:  General appearance:  no apparent distress, alert and cooperative  HEENT: head atraumatic, normocephalic, moist mucous membranes, trachea midline  Neurologic:  alert, oriented, normal speech, no focal findings or movement disorder noted  Lungs:  No increased work of breathing, good air exchange, clear to auscultation bilaterally, no crackles or wheezing  Heart:  regular rate and rhythm and no murmur    Abdomen:  soft, gravid, non-tender, no rebound, guarding, or rigidity, no RUQ or epigastric tenderness, no signs or symptoms of abruption and no signs or symptoms of chorioamnionitis  Extremities:  no calf tenderness, non edematous. Right lower extremity surgically absent   Musculoskeletal: Gross strength equal and intact throughout, no gross abnormalities, range of motion normal in hips, knees (right LE surgically absent), shoulders and spine, CVA tenderness: none  Psychiatric: Mood appropriate, normal affect   Pelvic Exam: Not indicated  Rectal Exam: Not indicated      DATA:  PRENATAL LAB RESULTS:   Blood Type/Rh: A pos  Antibody Screen: negative  Hemoglobin, Hematocrit, Platelets: 30.0 / 40.7 / 336  Rubella: immune  T. Pallidum, IgG: non-reactive   Hepatitis B Surface Antigen: non-reactive   HIV: non-reactive   Sickle Cell Screen: not available  Gonorrhea: negative  Chlamydia: negative  Urine culture: positive E. Coli, date: 20     positive Pseudomonas, 20    Early 1 hour Glucose Tolerance Test: 183  Early 3 hour Glucose Tolerance Test: Fastin; 1 hour: 224; 2 hour  187; 3 hour: 164    Group B Strep: not done  Cystic Fibrosis Screen: negative  First Trimester Screen: not available  MSAFP/Multiple Markers: not available  Non-Invasive Prenatal Testing: not available  Anatomy US: (20) posterior placenta, normal anatomy \"fetal survey can be performed on elective basis\"      Assessment/Plan:  Gwen Rowe is a 22 y.o. female  at 24w5d IUP   - Rh+/ Rubella Immune/ GBS unknown   - Rhogam: not indicated   - Tdap vaccination: at 27 weeks or later   - Continue PNV, ASA 81 mg, SCDs, iron daily   - VSS   -    - Anatomy scan (20) posterior placenta, normal anatomy \"fetal survey can be performed on elective basis. \" Discussed with patient that she will need to be seen by MFM outpatient and that referral will be made through Dr. Hitesh Barnhart office   - Plan per chart review and after speaking with the patient is that primary team will be discharging her home today. Patient state she will call Dr. Hitesh Barnhart office to schedule prenatal appointment and establish care. OB will continue to round weekly while inpatient with fetal heart tones unless patient experiences OB complaints or s/s PreEclampsia such as BP greater than or equal to 160/110 with persistent headache, vision changes, CP, difficulty breathing, or RUQ pain. Please page Ob/Gyn on call resident with any questions/concerns if needed. 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 HgbA1c 5.0% on    - POCTs 4 x daily fasting and 2 hours postprandial   - Was previously on NPH 8U nightly. Last dose given . On chart review patient previously declined further treatment with NPH 8U and stated she may be agreeable to start back on half dose of NPH 4U nightly. - Blood glucose levels through hospital stay reviewed. Patient is consistently >90 for fasting levels and thus not well controlled. Two hour postprandials are consistently <120 and well controlled without medications at this time. Discussed with patient and Dr. Mota Lung care plan and to restart NPH 8U nightly and for patient to keep track of glucose levels while at home. Rx provided for NPH and glucose testing supplies. cHTN (no meds)   - Diagnosed in this pregnancy   - ASA 81 mg daily if no contraindication from primary team   - PreE labs wnl with exception of transaminitis. P:C 0.20 (8/1)   - Denies s/s PreE at this time   - BP normotensive   - 24 hour protein/creatinine ordered, not completed    Transaminitis (downtrending)   - Previously eleavted ALT/AST    - RUQ ultrasound (7/7): sludge with no calculus or evidence of cholecystitis   - ALT/AST 17/15 (6/30) > 29/15 (7/12) > 163/92 (8/2)> 89/19 (8/9)   - Denies RUQ pain. Nontender, No masses palpated on exam    Hx Anemia    - s/p blood transfusion at [facility]   - CBC weekly   - Clinically asymptomatic with stable vital signs   - Hgb 10.0 (8/8)    Hx Leukocytosis (37>4.1)   - CBC without leukocytosis (8/8)   - Afebrile     Hx Thrombocytosis (552>509>280)   - Platelets 686 (8/8)    Maternal Tachycardia   - HR 90s-100   - Denies CP, SOB, Palpitations   - Maternal Echo 6/30/20 EF: 63%    Weight Loss   - Patient's prepregnancy weight 240 lb   - Current weight 154 lbs on 8/9   - Dietary consult per primary     Hx E.Coli UTI x 1   - Denies urinary complaints at this time   - UCx + Pseudomonas Cefepime 2g IV BID (8/4-8/5) > Zosyn q8h (8/5-8/10)  per primary       Patient Active Problem List    Diagnosis Date Noted    Pseudomonas UTI 08/04/2020     Overview Note:     8/4/20 treated with Cefepime 2g IV for 10 days       History of E.  Coli UTI 10-<100,000CFU (7/7/20) 08/02/2020    History of blood transfusion 08/01/2020    History of disarticulation of right hip 07/31/2020    Leukocytosis 07/31/2020    Transaminitis 07/11/2020    Chronic hypertension affecting pregnancy 07/11/2020 anemia. -  No evidence of schistocytes, dysplasia or blasts. -Iron studies: iron 19, iron saturation 11%  -patient given one 1 unit leukocyte-reduced, irradiated RBC 07/02    Plan:  - f/u CBC w/ daily      Family history of diabetes mellitus 06/08/2020    Pregestational Diabetes 06/02/2020     Overview Note:     Elevated early 1hr, failed early 3hr GTT         Dr. Rolando Moreland updated and in agreement with plan of care.      Uriel Faria DO  Ob/Gyn Resident  8/10/2020, 7:25 AM

## 2020-08-10 NOTE — PLAN OF CARE
Problem: Falls - Risk of:  Goal: Will remain free from falls  Description: Will remain free from falls  Outcome: Ongoing     Problem: Pain:  Goal: Pain level will decrease  Description: Pain level will decrease  Outcome: Ongoing     Problem: Skin Integrity:  Goal: Will show no infection signs and symptoms  Description: Will show no infection signs and symptoms  Outcome: Ongoing     Problem: Musculor/Skeletal Functional Status  Goal: Absence of falls  Outcome: Ongoing

## 2020-08-10 NOTE — PROGRESS NOTES
Physical Therapy  Facility/Department: [facility] ACUTE REHAB  Daily Treatment Note  NAME: Jonathan Melendrez  : 1995  MRN: [id number]    Date of Service: 8/10/2020    Discharge Recommendations:  Home with assist PRN        Assessment      PT Education: Disease Specific Education;Transfer Training;Gait Training  Patient Education: RoHo cushion set up and position with spouse and pt; home equipment and wc 16 inch vs 18 inch     Patient Diagnosis(es): The primary encounter diagnosis was Sarcoma of bone ([facility] A diagnosis of History of disarticulation of right hip was also pertinent to this visit. has a past medical history of Diabetes mellitus ([facility] and Osteosarcoma. has a past surgical history that includes amputation (Right, 2020). Restrictions  Restrictions/Precautions  Restrictions/Precautions: Weight Bearing  Required Braces or Orthoses?: Yes  Lower Extremity Weight Bearing Restrictions  Right Lower Extremity Weight Bearing: Weight Bearing As Tolerated  Position Activity Restriction  Other position/activity restrictions: Per Dr. Linda Foster, Pt's orthopaedic resident, no showering until the patient follows up with the Ortho surgeon on . Subjective   General  Chart Reviewed: Yes  Additional Pertinent Hx: Jonathan Melendrez is a 22 y.o. right-handed     female admitted to the [address] [place] [address] on 2020. 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 She had R hip disarticulation performed 2020. She is pregnant - at 24 weeks gestation with comorbid gestational diabetes. Response To Previous Treatment: Patient with no complaints from previous session.   Family / Caregiver Present: Earna Peabody)          Orientation     Cognition      Objective      Transfers  Sit to Stand: Supervision  Stand to sit: Supervision  Bed to Chair: Supervision  Stand Pivot Transfers: Supervision  Car Transfer: Contact guard assistance  Comment: With and without RW; Spouse Darinel assisting SBA; good understanding verbalized and demonstrated; Spouse works for [employer]. Ambulation  Ambulation?: Yes  Ambulation 1  Surface: level tile  Device: Rolling Walker  Assistance: Supervision  Quality of Gait: Steady, no shaking or loss of balance observed. Good heel strike after initial flat-footed steps. Gait Deviations: Slow Laurence  Distance: 120 feet  Ambulation 2  Surface - 2: outdoors;uneven  Device 2: Rolling Walker  Assistance 2: Contact guard assistance  Gait Deviations: Slow Laurence  Distance: 15 ft  Comments: Cues for cracks and uneven sidewalk. Stairs/Curb  Stairs?: Yes  Stairs  # Steps : 9  Stairs Height: 4\"  Rails: Bilateral  Curbs: 6\"(Patient performs x2 curb steps while outside. Patient able to perform at [facility]. Patient able to lift walker up/down on to and off of curb.)  Assistance: Contact guard assistance  Comment: x6 steps performed forward, x3 steps perfomed retro. Wheelchair Activities  Wheelchair Type: Standard  Wheelchair Cushion: Pressure Relieving  Pressure Relief Type: Lateral lean;Push up  Level of Assistance for pressure relief activities: Modified independent  Wheelchair Parts Management: Yes  Left Leg Rest Level of Assistance: Independent(swinging away, into place)  Right Leg Rest Level of Assistance: Unable to assess(comment)(N/A)  Left Brakes Level of Assistance: Independent  Right Brakes Level of Assistance: Independent  Propulsion: Yes  Propulsion 1  Propulsion: Manual  Level: Level Tile  Method: Mennie Fish  Description/ Details: Pt able to propel straight paths, turns, backing and manuevering in tight spaces, set-up for transfers; removes leg rest PRN and applied brakes appropriately.    Distance: 200 ft  Propulsion 2  Propulsion: Manual  Level: Ramp  Method: RUE;LUE  Level of Assistance: Minimal assistance  Description/ Details: Able to control descending ramp safely, requires minimal assist on ascending ramp. Distance: 50 ft                                 G-Code     OutComes Score                                                     AM-PAC Score             Goals  Short term goals  Time Frame for Short term goals: 1 week  Short term goal 1: Pt able to roll to left side for position change/pressure relief, independently. Short term goal 2: Pt able to perform supine <>sit at supervision level. Short term goal 3: Pt able to perform sit <>stand at SBA  Short term goal 4:  Pt able to transfer from bed<>W/C<>toilet at [facility]  Short term goal 5: Pt to demonstrate independence in pressure relieving technique while in bed,  chair or w/c. Short term goal 6: Pt able to manuever w/c on level surface, 150 ft, SBA  Short term goal 7:  Pt able to ambulate with RW dist of 10 to 15 ft, CGA  Long term goals  Time Frame for Long term goals : By LOS  Long term goal 1: Pt able to transfer independently  Long term goal 2: Pt able to ambulate with RW dist of 10 to 15 ft with RW at supervision level. Long term goal 3: Pt able to propel w/c on incline surfaces at SBA, dist of 50 to 80 ft   Long term goal 4: Pt able to propel w/c dist of 150 ft, on level surfaces, independently. Patient Goals   Patient goals : Get stronger    Plan    Plan  Times per week: 1.5 hr/day, 5 to 7 days/week.   Specific instructions for Next Treatment: HEP  Current Treatment Recommendations: Strengthening, Balance Training, Functional Mobility Training, Transfer Training, Endurance Training, Wheelchair Mobility Training, Gait Training, Safety Education & Training, Patient/Caregiver Education & Training, Equipment Evaluation, Education, & procurement, Positioning  Safety Devices  Type of devices: Gait belt     Therapy Time   Individual Concurrent Group Co-treatment   Time In 1000         Time Out 1032         Minutes 32                 Micaela De Luna, PTA

## 2020-08-10 NOTE — PROGRESS NOTES
CLINICAL PHARMACY NOTE: MEDS TO [address] Select Patient?: No  Total # of Prescriptions Filled: 4   The following medications were delivered to the patient:  · Cyclobenzaprine  · gabapentin  · BD INS syringe  · Oxycodone  ·   Total # of Interventions Completed: 0  Time Spent (min): 30    Additional Documentation:Patient to come back and  Humulin N insulin

## 2020-08-10 NOTE — DISCHARGE SUMMARY
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, gestational diabetes - 24 weeks gestation at discharge from rehab, UTI - resolved    Discharge Functional Status:    Physical therapy:  Bed Mobility: Rolling: Modified independent, Rolling Left  Supine to Sit: Modified independent  Sit to Supine: Modified independent  Scooting: Modified independent  Transfers: Sit to Stand: Supervision  Stand to sit: Supervision  Bed to Chair: Supervision  Squat Pivot Transfers: Supervision, Ambulation 1  Surface: level tile  Device: Rolling Walker  Other Apparatus: Wheelchair follow  Assistance: Supervision  Quality of Gait: Steady, no shaking or loss of balance observed. Good heel strike after initial flat-footed steps. Gait Deviations: Slow Laurence  Distance: 80 ft  Comments: Ambulated with personal walker in PM., Stairs  # Steps : 9  Stairs Height: 4\"  Rails: Bilateral  Curbs: 6\"(Patient performs x2 curb steps while outside. Patient able to perform at [facility]. Patient able to lift walker up/down on to and off of curb.)  Assistance: Contact guard assistance  Comment: x6 steps performed forward, x3 steps perfomed retro. Mobility:  , PT Equipment Recommendations  Equipment Needed: Yes  Mobility Devices: Delfinorelijahe Harriet, Wheelchair  Walker: Rolling  Wheelchair: Light Weight(With pressure relieving cushion & swing-away armrests)  Other: Light weight w/c, RW, Assessment: HR 110s-120s during activity today. Occupational therapy:  , Equipment Recommendations  Equipment Needed: Yes  Wheelchair: Purchased  3-in-1 Commode: reports having-will utilize for toilet transfer duirng night to ensure safety.    Hand Held Shower: x  Transfer Tub Bench: x  Grab bars: x  Reacher: x  Sock Aid: x  Long-handled Shoehorn: x  Other: long handled sponge, ezslide. , Assessment: Pt engaged in simulated in tub transfer bench transfers, good understanding and safety     Speech therapy:         Inpatient Rehabilitation Course:   Miranda Becker is a 22 y.o. female admitted to inpatient rehabilitation on 7/31/2020 for rehab for RLE hip disarticulation amputation. 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 She had R hip disarticulation performed 7/23/2020. She is pregnant - at 21 weeks gestation with comorbid gestational diabetes. She is NWB on RLE/R pelvis. Patient evaluated today:  GEN: Well developed, well nourished, in NAD  HEENT:  NCAT.  PERRL.  EOMI.  Mucous membranes pink and moist.   PULM:  Clear to ausculation. No rales or rhonchi. Respirations WNL and unlabored. CV:  tachycardic rate regular rhythm.  No murmurs or gallops. GI:  Abdomen soft. Nontender. Non-distended.  BS + and equal.    NEUROLOGICAL: A&O x3. Sensation intact to light touch. MSK:  Functional ROM BUEs and LLE. Motor testing 5/5 key muscles BUE and LLEs.    SKIN: Warm dry and intact. Good turgor. R hip disartic incision well approximated with staples in place, no drainage. Induration medial aspect incision - with no drainage today   EXTREMITIES:  No L calf tenderness to palpation. No edema LLE.    PSYCH: Mood WNL. Appropriately interactive. Affect WNL. Rehab course:   Her pain was controlled with prn oxycodone. Phantom limb pain was addressed with gabapentin and mirror therapy. She was followed by OB and internal medicine. Her gestational diabetes was managed with NPH insulin. She developed UTI which was treated with IV zosyn - completed 8/10. Chronic conditions were stable on home medications.      The patient participated in an aggressive multidisciplinary inpatient rehabilitation program involving 3 hours per day, 5 days per week of rehabilitation. Patient benefited from inpatient rehab and was discharged in good stable condition. Consults:   OB and internal medicine    Significant Diagnostics:   CBC:   Lab Results   Component Value Date    WBC 4.1 08/08/2020    RBC 3.42 08/08/2020    HGB 10.0 08/08/2020    HCT 30.3 08/08/2020    MCV 88.4 08/08/2020    MCH 29.3 08/08/2020    MCHC 33.1 08/08/2020    RDW 17.3 08/08/2020     08/08/2020    MPV 7.9 08/08/2020     BMP:    Lab Results   Component Value Date     08/08/2020    K 3.9 08/08/2020     08/08/2020    CO2 22 08/08/2020    BUN 4 08/08/2020    LABALBU 3.4 08/09/2020    CREATININE <0.40 08/08/2020    CALCIUM 8.9 08/08/2020    GFRAA CANNOT BE CALCULATED 08/08/2020    LABGLOM CANNOT BE CALCULATED 08/08/2020    GLUCOSE 117 08/08/2020         Patient Instructions: Activity as tolerated - no weight bearing restriction on pelvis. Medications, precautions and follow up reviewed with patient and family    Follow-up visits: See after visit summary from hospitalization    Disposition:  Home     Discharge Medications:   Real Hoar   Home Medication Instructions K:[id number]    Printed on:08/10/20 1010   Medication Information                      acetaminophen (TYLENOL) 325 MG tablet  Take 650 mg by mouth every 6 hours as needed for Pain             Alcohol Swabs PADS  1 tablet by Other route 4 times daily             aspirin 81 MG chewable tablet  Take 1 tablet by mouth daily             blood glucose monitor kit and supplies  Dispense sufficient amount for indicated testing frequency plus additional to accommodate PRN testing needs. Dispense all needed supplies to include: monitor, strips, lancing device, lancets, control solutions, alcohol swabs. blood glucose monitor strips  Test 4 times a day & as needed for symptoms of irregular blood glucose.  Dispense sufficient amount for indicated testing frequency plus additional to accommodate PRN testing needs. cyclobenzaprine (FLEXERIL) 10 MG tablet  Take 1 tablet by mouth 3 times daily as needed for Muscle spasms             docusate sodium (COLACE) 100 MG capsule  Take 1 capsule by mouth 2 times daily             ferrous sulfate (IRON 325) 325 (65 Fe) MG tablet  Take 1 tablet by mouth 2 times daily             gabapentin (NEURONTIN) 300 MG capsule  Take 1 capsule by mouth 3 times daily for 30 days. insulin NPH (HUMULIN N) 100 UNIT/ML injection vial  Inject 8 Units into the skin nightly             Insulin Pen Needle 31G X 5 MM MISC  1 each by Does not apply route daily Substitute with appropriate needle for injection of insulin NPH that is covered by the patient's insurance. Lancets MISC  1 each by Does not apply route daily Substitute as needed for insurance coverage             oxyCODONE (ROXICODONE) 5 MG immediate release tablet  Take 1 tablet by mouth every 8 hours as needed for Pain for up to 7 days. polyethylene glycol (GLYCOLAX) 17 GM/SCOOP powder  Take 17 g by mouth 2 times daily as needed (constipation)             Prenatal MV-Min-Fe Fum-FA-DHA (PRENATAL 1 PO)  Take by mouth             Prenatal Vit-Fe Fumarate-FA (PRENATAL VITAMIN) 27-0.8 MG TABS  Take 1 tablet by mouth daily May substitute with formulary covered by patient's insurance               She has 3 week supply of Lovenox at home. She will contact her surgeon Dr. Rl Lucero to determine post-op duration of Lovenox - no additional Lovenox ordered today.    Frederic Jason MD

## 2020-08-10 NOTE — PLAN OF CARE
Problem: Falls - Risk of:  Goal: Will remain free from falls  Description: Will remain free from falls  Outcome: Ongoing  Note: Pt educated on and verbalizes understanding of fall risks. Pt wearing nonskid stockings, uses assistive devices appropriately, call light within reach, spouse at bedside, encouraged to ask for assistance. Pt calls out appropriately this shift. Will continue to monitor and intervene as needed. Problem: Pain:  Goal: Control of acute pain  Description: Control of acute pain  Outcome: Ongoing  Note: Pt reports pain as 2-3/10 this shift. Reports that as needed pain medications help. Verbalizes she looks forward to not needing to take pain medications anymore. Pt acknowledges and demonstrates non-pharmacological interventions. Will continue to monitor and assist as needed. Problem: Skin Integrity:  Goal: Will show no infection signs and symptoms  Description: Will show no infection signs and symptoms  Outcome: Ongoing  Note: Pt educated on risks for impaired skin integrity. Pt assisted in keeping skin clean and dry, assisted and prompted to reposition frequently. Dressings changed per MD orders and as needed. No s/s of infection or new breakdown noted this shift. Will continue to monitor and intervene as needed.

## 2020-08-10 NOTE — PROGRESS NOTES
Pt discharged home with support from spouse and parents. Pt and spouse verbalized understanding of discharge instructions including medications, follow up appointments, and equipment. Medications filled through [id number]. No s/s of distress noted.

## 2020-08-10 NOTE — PROGRESS NOTES
Nutrition Note    Pt has been restarted on insulin. States she has been eating well and family is also bringing food in for her. Pt is aware of importance of eating adequate protein and distributing foods throughout the day with limitation of sugary foods. Has received some information regarding a gestational diabetic diet and has references at home. Pt is being discharged today.     Electronically signed by Marcell Frausto RD, LD on 8/10/20 at 3:31 PM EDT    Contact: [phone]

## 2020-08-10 NOTE — DISCHARGE INSTR - DIET

## 2020-08-11 ENCOUNTER — CARE COORDINATION (OUTPATIENT)
Dept: OTHER | Facility: CLINIC | Age: 25
End: 2020-08-11

## 2020-08-11 NOTE — CARE COORDINATION
Ambulatory Care Coordination Note  2020  CM Risk Score: 0  Charlson 10 Year Mortality Risk Score: 10%     ACC: Charis Colindres, RN    Summary Note: s/w the patient today, she is day 1 of rehab d/c. This is a 25 weeker with high risk pregnancy, hx of sarcoma recently leg amputation for this and gestational diabetes. Pt reports that she is doing well so far. Anna Griffin will f/u with the pt for the surgery. I am following the patient for the pregnancy. Patient eligible for [facility] Manager contacted the patient by telephone to discuss the maternity management program.  Patient agrees to care management services at this time. Verified name and  with patient as identifiers. Risk Factors Identified: Gestational diabetes /sarcoma    Needs to be reviewed by the provider   home health care-recent hospitalization for leg amputation pt seen by home health today, DME-walker/wc has the walker awaiting the wc and PT-needs continued in home PT already ordered         Method of communication with provider : none    Does patient have OB/Gyn Selected? Yes    Discussed follow up appointments. If no appointment was previously scheduled, appointment scheduling offered: Yes  [facility] follow up appointment(s): No future appointments. needs to make ob/mfm appointment  [facility] follow up appointment(s):     OB History:   OB History    Para Term  AB Living   1 0 0 0 0     SAB TAB Ectopic Molar Multiple Live Births   0 0 0 0          # Outcome Date GA Lbr Zeferino/2nd Weight Sex Delivery Anes PTL Lv   1 Current                25w0d    Medication reconciliation was performed with patient, who verbalizes understanding of administration of home medications. Advised obtaining a 90-day supply of all daily and as-needed medications.      Barriers/Support system:  mother and spouse     2nd and 3rd Trimester Focused Assessment:   Healthy behavior review, MFM referral needed? No, Fetal movement-pt reports baby moving well feeling flutter kicks, Red flags: bleeding/leaking and Food/Medications to avoid-diabetic diet/avoiding raw fish/meats/caffiene    BG [id number] pt is checking her bs 4 times a day taking insulin only at night and Insulin regimen-following MD orders taking insulin only at night now    Patient given an opportunity to ask questions and does not have any further questions or concerns at this time. Were discharge instructions available to patient? Yes. Reviewed appropriate site of care based on symptoms and resources available to patient including: Benefits related nurse triage line, When to call 911, [employer] Messaging and Condition related references. The patient agrees to contact the OB/Gyn office for questions related to their healthcare. Plan for follow-up call in 5-7 days based on severity of symptoms and risk factors. Plan for next call: symptom management-gestational diabetes, self management-prenatal care and follow up appointment-needs to make appt for OB/MFM F/U      Ambulatory Care Coordination Assessment    Care Coordination Protocol  Program Enrollment:  Rising Risk  Referral from Primary Care Provider:  No  Week 1 - Initial Assessment                             Suggested Interventions and Community Resources                  Prior to Admission medications    Medication Sig Start Date End Date Taking? Authorizing Provider   insulin NPH (HUMULIN N) 100 UNIT/ML injection vial Inject 8 Units into the skin nightly 8/10/20   June Angeles, DO   blood glucose monitor strips Test 4 times a day & as needed for symptoms of irregular blood glucose. Dispense sufficient amount for indicated testing frequency plus additional to accommodate PRN testing needs.  8/10/20   June Angeles, DO   Alcohol Swabs PADS 1 tablet by Other route 4 times daily 8/10/20   June Angeles, DO   blood glucose monitor kit and supplies Dispense sufficient amount for indicated testing frequency plus additional to accommodate PRN testing needs. Dispense all needed supplies to include: monitor, strips, lancing device, lancets, control solutions, alcohol swabs. 8/10/20   Adela Angeles, DO   Insulin Pen Needle 31G X 5 MM MISC 1 each by Does not apply route daily Substitute with appropriate needle for injection of insulin NPH that is covered by the patient's insurance. 8/10/20   [facility] [address], DO   Lancets [address] [facility] 1 each by Does not apply route daily Substitute as needed for insurance coverage 8/10/20   [facility] DO Karthik   Prenatal Vit-Fe Fumarate-FA (PRENATAL VITAMIN) 27-0.8 MG TABS Take 1 tablet by mouth daily May substitute with formulary covered by patient's insurance 8/10/20 9/9/20  [facility] K[facility]ik   ferrous sulfate (IRON 325) 325 (65 Fe) MG tablet Take 1 tablet by mouth 2 times daily 8/10/20   [facility] Karthik   docusate sodium (COLACE) 100 MG capsule Take 1 capsule by mouth 2 times daily 8/10/20 9/9/20  [facility] Karthik   aspirin 81 MG chewable tablet Take 1 tablet by mouth daily 8/11/20   Talon Aponte MD   cyclobenzaprine (FLEXERIL) 10 MG tablet Take 1 tablet by mouth 3 times daily as needed for Muscle spasms 8/10/20 8/20/20  Talon Aponte MD   oxyCODONE (ROXICODONE) 5 MG immediate release tablet Take 1 tablet by mouth every 8 hours as needed for Pain for up to 7 days. 8/10/20 8/17/20  Talon Aponte MD   gabapentin (NEURONTIN) 300 MG capsule Take 1 capsule by mouth 3 times daily for 30 days. 8/10/20 9/9/20  Talon Aponte MD   polyethylene glycol (GLYCOLAX) 17 GM/SCOOP powder Take 17 g by mouth 2 times daily as needed (constipation) 7/17/20 9/15/20  FATEMEH Steven - CNP   acetaminophen (TYLENOL) 325 MG tablet Take 650 mg by mouth every 6 hours as needed for Pain    Historical Provider, MD   Prenatal MV-Min-Fe Fum-FA-DHA (PRENATAL 1 PO) Take by mouth    Historical Provider, MD       No future appointments.

## 2020-08-11 NOTE — CARE COORDINATION
ACM 3rd attempt to reach patient for CM call. HIPAA compliant message left requesting a return phone call at patients convenience. Will continue to follow.

## 2020-08-12 ENCOUNTER — TELEPHONE (OUTPATIENT)
Dept: OBGYN CLINIC | Age: 25
End: 2020-08-12

## 2020-08-12 NOTE — TELEPHONE ENCOUNTER
In super good spirits :)    Sept 1- mfm for diabetes, could not get her in until then    Insulin at night, starting tonight and will follow and mfm, checking sugars    Wants to focus on baby and pregnancy, she is wheelchair bound after surgery. She feels great, and is doing well. Cant wait to meet you! She is bringing her  next week for a prenatal visit.

## 2020-08-18 ENCOUNTER — ROUTINE PRENATAL (OUTPATIENT)
Dept: OBGYN CLINIC | Age: 25
End: 2020-08-18

## 2020-08-18 ENCOUNTER — OFFICE VISIT (OUTPATIENT)
Dept: PRIMARY CARE CLINIC | Age: 25
End: 2020-08-18
Payer: COMMERCIAL

## 2020-08-18 VITALS
HEART RATE: 88 BPM | TEMPERATURE: 98.2 F | DIASTOLIC BLOOD PRESSURE: 72 MMHG | OXYGEN SATURATION: 98 % | SYSTOLIC BLOOD PRESSURE: 122 MMHG

## 2020-08-18 VITALS — WEIGHT: 154 LBS | SYSTOLIC BLOOD PRESSURE: 120 MMHG | BODY MASS INDEX: 28.17 KG/M2 | DIASTOLIC BLOOD PRESSURE: 70 MMHG

## 2020-08-18 PROBLEM — C49.9 SARCOMA (HCC): Status: ACTIVE | Noted: 2020-06-29

## 2020-08-18 PROCEDURE — 99214 OFFICE O/P EST MOD 30 MIN: CPT | Performed by: NURSE PRACTITIONER

## 2020-08-18 PROCEDURE — 0502F SUBSEQUENT PRENATAL CARE: CPT | Performed by: OBSTETRICS & GYNECOLOGY

## 2020-08-18 RX ORDER — OXYCODONE HYDROCHLORIDE 5 MG/1
5 TABLET ORAL EVERY 8 HOURS PRN
COMMUNITY
End: 2020-09-30

## 2020-08-18 ASSESSMENT — ENCOUNTER SYMPTOMS
DIARRHEA: 0
COLOR CHANGE: 0
VOMITING: 0
RHINORRHEA: 0
ABDOMINAL PAIN: 0
SORE THROAT: 0
CHEST TIGHTNESS: 0
SHORTNESS OF BREATH: 0
NAUSEA: 0

## 2020-08-18 NOTE — PROGRESS NOTES
[address] PRIMARY CARE  [address] [facility]  [address] [address]  Dept: [phone]  Dept Fax: [phone]    Gaurang Hughes is a 26 y.o. female who presentstoday for her medical conditions/complaints as noted below.   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 and inpatient rehab 2020-8/10/2020, in need of handicap placard, encounters reviewed and discussed, have also communicated with pt through [employer]   Is accompanied today by her , in wheelchair, using front wheel walker at home, will be moving on next month to home that is more easily accessible for her walker and wheelchair, have support/help from friends and family  Has 50 staples, incision is covered with dry gauze dressing, they have picture from last night- incision is CDI, no surrounding redness or swelling, pain is 2/10, no drainage or bleeding  Pt reports feeling \"so much better without the leg because margins are clear\" which has surprised her, she is smiling and laughing during visit, she and  appear to be adjusting without difficulty  Pain is well managed with ramona as needed, has not needed often, mostly using at night when she tends to have some phantom leg pain, has follow up at [facility] with surgeon tomorrow  Rest and sleep are improving, appetite is back to baseline    Had appt with GYN Dr. Vincenzo Valdez right before this appt today, pregnancy is progressing well- is 26 weeks today, she is feeling a lot of fetal movement, she is doing well managing gestational diabetes- has modified diet and using night time insulin for elevated FBS in mornings  Has appt [facility] Dr. Fan Elizabeth  Has follow up with oncologist tomorrow, chemo is planned for after birth of her son      Hemoglobin A1C (%)   Date include: monitor, strips, lancing device, lancets, control solutions, alcohol swabs. 1 kit 0    Insulin Pen Needle 31G X 5 MM MISC 1 each by Does not apply route daily Substitute with appropriate needle for injection of insulin NPH that is covered by the patient's insurance. 100 each 3    Lancets MISC 1 each by Does not apply route daily Substitute as needed for insurance coverage 200 each 2    Prenatal Vit-Fe Fumarate-FA (PRENATAL VITAMIN) 27-0.8 MG TABS Take 1 tablet by mouth daily May substitute with formulary covered by patient's insurance 30 tablet 5    ferrous sulfate (IRON 325) 325 (65 Fe) MG tablet Take 1 tablet by mouth 2 times daily 60 tablet 0    docusate sodium (COLACE) 100 MG capsule Take 1 capsule by mouth 2 times daily 60 capsule 0    aspirin 81 MG chewable tablet Take 1 tablet by mouth daily 30 tablet 3    cyclobenzaprine (FLEXERIL) 10 MG tablet Take 1 tablet by mouth 3 times daily as needed for Muscle spasms 30 tablet 0    gabapentin (NEURONTIN) 300 MG capsule Take 1 capsule by mouth 3 times daily for 30 days. 90 capsule 0    polyethylene glycol (GLYCOLAX) 17 GM/SCOOP powder Take 17 g by mouth 2 times daily as needed (constipation) 1020 g 1    Prenatal MV-Min-Fe Fum-FA-DHA (PRENATAL 1 PO) Take by mouth      insulin lispro (HUMALOG) 100 UNIT/ML injection vial Inject 6 Units into the skin Daily with supper       No current facility-administered medications for this visit.       No Known Allergies    Health Maintenance   Topic Date Due    Hepatitis A vaccine (2 of 2 - 2-dose series) 12/06/2014    Flu vaccine (1) 09/01/2020    Chlamydia screen  05/19/2021    Potassium monitoring  08/08/2021    Creatinine monitoring  08/08/2021    Cervical cancer screen  05/19/2023    DTaP/Tdap/Td vaccine (8 - Td) 08/12/2026    Hepatitis B vaccine  Completed    Hib vaccine  Completed    HPV vaccine  Completed    Varicella vaccine  Completed    Meningococcal (ACWY) vaccine  Completed    HIV screen Completed    Pneumococcal 0-64 years Vaccine  Aged Out       Subjective:      Review of Systems   Constitutional: Negative for activity change, fatigue and fever. HENT: Negative for congestion, rhinorrhea and sore throat. Eyes: Negative for visual disturbance. Respiratory: Negative for chest tightness and shortness of breath. Cardiovascular: Negative for chest pain and palpitations. Gastrointestinal: Negative for abdominal pain, diarrhea, nausea and vomiting. Endocrine: Negative for polydipsia. Genitourinary: Negative for difficulty urinating. Musculoskeletal: Positive for gait problem (NWB right pelvis). Negative for arthralgias and myalgias. R AKA   Skin: Negative for color change. Neurological: Negative for weakness and headaches. Psychiatric/Behavioral: Negative for behavioral problems. The patient is not nervous/anxious. All other systems reviewed and are negative. Objective:   /72 (Site: Left Upper Arm, Position: Sitting)   Pulse 88   Temp 98.2 °F (36.8 °C)   LMP 02/18/2020   SpO2 98%   Physical Exam  Vitals signs reviewed. Constitutional:       General: She is not in acute distress. Appearance: Normal appearance. HENT:      Head: Normocephalic. Eyes:      Pupils: Pupils are equal, round, and reactive to light. Neck:      Musculoskeletal: Normal range of motion. Cardiovascular:      Rate and Rhythm: Normal rate and regular rhythm. Pulses: Normal pulses. Heart sounds: Normal heart sounds. Pulmonary:      Effort: Pulmonary effort is normal.      Breath sounds: Normal breath sounds. Abdominal:      Palpations: Abdomen is soft. Musculoskeletal:      Comments: R AKA, unable to assess surgical site- covered with dry dressing, viewed picture from night before, incision well approximated, CDI, no drainage, no en-wound redness or swelling   Skin:     General: Skin is warm and dry.       Capillary Refill: Capillary refill takes less than 2 seconds. Neurological:      Mental Status: She is alert and oriented to person, place, and time. Psychiatric:         Mood and Affect: Mood normal.         Behavior: Behavior normal.           :       Diagnosis Orders   1. Right above-knee amputee ([address]  Handicap placard   2. Sarcoma of right femur (HCC)  Handicap placard   3. 26 weeks gestation of pregnancy     4. Encounter for follow-up examination after surgery for malignant neoplasm               :          1. Right above-knee amputee ([address]  2. Sarcoma of right femur ([address]  3. Surgical follow up  Order placed for handicap placard, f/u with surgeon and oncologist as directed. Surgical site healing well and pain well managed with gabapentin and ramona as needed. Continue OP PT.  - Handicap placard    4. Pregnancy, 26 weeks  VSS, not hypertensive, continue healthy/ low carb diet, and activity as tolerated. Continue current medications as directed and f/u with OB and MFM as scheduled. Return as needed, will plan to see after delivery to do routine screening labs and monitor diabetes    Return if symptoms worsen or fail to improve. Patient given educational materials - see patient instructions. Discussed use, benefit, and side effects of prescribed medications. All patient questions answered. Pt voiced understanding. Reviewed health maintenance. Instructed to continue current medications, diet and exercise. Patient agreed with treatment plan. Follow up as directed.        Electronicallysigned by FATEMEH Conroy CNP on 8/18/2020 at 1:08 PM

## 2020-08-19 ENCOUNTER — CARE COORDINATION (OUTPATIENT)
Dept: OTHER | Facility: CLINIC | Age: 25
End: 2020-08-19

## 2020-08-19 NOTE — CARE COORDINATION
Ambulatory Care Coordination Note  8/19/2020  CM Risk Score: 0  Charlson 10 Year Mortality Risk Score: 10%     ACC: Alan Vazquez, RN    Summary Note: ACM attempted to reach patient for follow up call regarding follow up for recent surgery due to sarcoma of thigh . HIPAA compliant message left requesting a return phone call at patients convenience. Will continue to follow.          Care Coordination Interventions    Program Enrollment:  Rising Risk  Referral from Primary Care Provider:  No  Suggested Interventions and Community Resources       Bj WHITEN, [address] [facility]  [phone]

## 2020-08-19 NOTE — PROGRESS NOTES
Josh Arredondo is a 26 y.o. female 29w4d        OB History    Para Term  AB Living   1 0 0 0 0     SAB TAB Ectopic Molar Multiple Live Births   0 0 0 0          # Outcome Date GA Lbr Zeferino/2nd Weight Sex Delivery Anes PTL Lv   1 Current                Vitals  BP: 120/70  Weight: 154 lb (69.9 kg)  Patient Position: Sitting    The patient was seen and evaluated. There was positive fetal movements. No contractions or leakage of fluid. Signs and symptoms of  labor as well as labor were reviewed. A 28 week lab panel was ordered. This includes a (HH, 1 hr GTT, U/A C&S). The patient is to complete this in the next two to four weeks. The S/S of Pre-Eclampsia were reviewed with the patient in detail. She is to report any of these if they occur. She currently denies any of these. The patient is RH positive Rhogam Ordered no    The patient was instructed on fetal kick counts and was given a kick sheet to complete every 8 hours. This is to begin at 28 weeks gestation. She was instructed that the baby should move at a minimum of ten times within one hour after a meal. The patient was instructed to lay down on her left side twenty minutes after eating and count movements for up to one hour with a target value of ten movements. She was instructed to notify the office if she did not make that target after two attempts or if after any attempt there was less than four movements. ACC: STEPHANE Gil RN MSN/ed Alonso   Cell [phone]  [email or web address]    Pt is enrolled into Maternity  20-10/22/20  Gestational diabetes 8u insulin at bedtime  Having a BABY BOY! Sarcoma of right leg- amputated, chemo after delivery? Margins were clear                        Assessment:  1. Josh Arredondo is a 26 y.o. female  2.    3. 26w1d    Patient Active Problem List    Diagnosis Date Noted    Pseudomonas UTI 08/04/2020 8/4/20 treated with Cefepime 2g IV for 10 days       History of E.  Coli UTI 10-<100,000CFU (7/7/20) 08/02/2020    History of blood transfusion 08/01/2020    History of disarticulation of right hip 07/31/2020    Leukocytosis 07/31/2020    Transaminitis 07/11/2020    Chronic hypertension affecting pregnancy 07/11/2020     Baseline preE labs wnl, P/C of 0.20 on 8/1/20      Iron deficiency anemia 07/02/2020     Last Assessment & Plan:   Hgb 8.2 g/dL  Asymptomatic   S/p iv iron on 7/1 and 1 u pRBC     Continue to monitor vitals  Last Assessment & Plan:   Hgb 8.2 g/dL  Asymptomatic   S/p iv iron on 7/1 and 1 u pRBC     Continue to monitor vitals      Gestational diabetes 06/30/2020     Last Assessment & Plan:   Formatting of this note might be different from the original.  GDMA2  Abnormal early 1 hour GTT (183)  Abnormal 3 hour GTT  - fasting 86  - 1 hour 224  - 2 hour 187  - 3 hour 164  HbA1c 7/1 5.8  Component      Latest Ref Rng & Units 7/5/2020 7/5/2020 7/5/2020 7/5/2020           8:06 AM 11:27 AM  4:48 PM  9:28 PM   Glucose, metered      50 - 140 mg/dL 93 135 99 131     Component      Latest Ref Rng & Units 7/6/2020           8:05 AM   Glucose, metered      50 - 140 mg/dL 98        Plan:  Insulin NPH 20/10 and novolog 6u w/ meals   BG checks 4x daily  Last Assessment & Plan:   Formatting of this note might be different from the original.  GDMA2  Abnormal early 1 hour GTT (183)  Abnormal 3 hour GTT  - fasting 86  - 1 hour 224  - 2 hour 187  - 3 hour 164  HbA1c 7/1 5.8  Component      Latest Ref Rng & Units 7/5/2020 7/5/2020 7/5/2020 7/5/2020           8:06 AM 11:27 AM  4:48 PM  9:28 PM   Glucose, metered      50 - 140 mg/dL 93 135 99 131     Component      Latest Ref Rng & Units 7/6/2020           8:05 AM   Glucose, metered      50 - 140 mg/dL 98        Plan:  Insulin NPH 20/10 and novolog 6u w/ meals   BG checks 4x daily      Thrombocytosis ([facility] 06/30/2020     Last Assessment & Plan:   Plts elevated, 573 (07/02) --> 534 (07/03)  Etiology: secondary to iron deficiency   Patient found to have iron deficiency  Peripheral smear   -  Neutrophilia and monocytosis. -  Thrombocytosis. -  Normocytic normochromic anemia. -  No evidence of schistocytes, dysplasia or blasts.   -Iron studies: iron 19, iron saturation 11%  -patient given one 1 unit leukocyte-reduced, irradiated RBC 07/02    Plan:  - f/u CBC w/ daily      Sarcoma ([address] 06/29/2020     Last Assessment & Plan:   Hx of right thigh mass concerning for sarcoma  - MRI at OSH on 6/12/2020: Centered along the anteromedial aspect of the mid-distal thigh there is a large complex mass measuring at least 9.7 x 8.3 x 16.9 cm. Concerning for sarcoma  Patient follows with Dr. Jaylene Harmon at [place] and wants to pursue her care at [facility]  S/p biopsy 6/29  Oxycodone for pain management     Plan:  -Continue heparin 5,000 u BID for DVT prophylaxis   - f/u biopsy results and tumor board treatment regimen   - pain management: continue oxycodone and tylenol as needed for pain management, avoid NSAIDs  -Please consider CT abdomen/pelvis/brain to stage patient appropriately   -Plan for transfer to [facility] due to [employer] and availability of pregnancy shield       Last Assessment & Plan:   Hx of right thigh mass concerning for sarcoma  - MRI at OSH on 6/12/2020: Centered along the anteromedial aspect of the mid-distal thigh there is a large complex mass measuring at least 9.7 x 8.3 x 16.9 cm. Concerning for sarcoma  Patient follows with Dr. Jaylene Harmon at [place] and wants to pursue her care at [facility]  S/p biopsy 6/29  Oxycodone for pain management     Plan:  -Continue heparin 5,000 u BID for DVT prophylaxis   - f/u biopsy results and tumor board treatment regimen   - pain management: continue oxycodone and tylenol as needed for pain management, avoid NSAIDs  -Please consider CT abdomen/pelvis/brain to stage patient appropriately   -Plan for transfer to [place] due to [employer] and availability of pregnancy shield           Family history of diabetes mellitus 2020    Pregestational Diabetes 2020     Elevated early 1hr, failed early 3hr GTT          Diagnosis Orders   1. Prenatal care in second trimester     2. Elevated glucose tolerance test     3. 26 weeks gestation of pregnancy     4. Insulin controlled gestational diabetes mellitus (GDM) in second trimester     5. Amputation of leg ([address]     6. Sarcoma of bone ([address]           Plan:  The patient will return to the office for her next visit in 3 weeks. See antepartum flow sheet. Counseled on s/s of preeclampsia. Counseled on s/s of  labor.      MFM consultation placed

## 2020-08-20 ENCOUNTER — CARE COORDINATION (OUTPATIENT)
Dept: OTHER | Facility: CLINIC | Age: 25
End: 2020-08-20

## 2020-08-20 SDOH — ECONOMIC STABILITY: TRANSPORTATION INSECURITY
IN THE PAST 12 MONTHS, HAS THE LACK OF TRANSPORTATION KEPT YOU FROM MEDICAL APPOINTMENTS OR FROM GETTING MEDICATIONS?: NO

## 2020-08-20 SDOH — ECONOMIC STABILITY: FOOD INSECURITY: WITHIN THE PAST 12 MONTHS, YOU WORRIED THAT YOUR FOOD WOULD RUN OUT BEFORE YOU GOT MONEY TO BUY MORE.: NEVER TRUE

## 2020-08-20 SDOH — ECONOMIC STABILITY: TRANSPORTATION INSECURITY
IN THE PAST 12 MONTHS, HAS LACK OF TRANSPORTATION KEPT YOU FROM MEETINGS, WORK, OR FROM GETTING THINGS NEEDED FOR DAILY LIVING?: NO

## 2020-08-20 SDOH — ECONOMIC STABILITY: FOOD INSECURITY: WITHIN THE PAST 12 MONTHS, THE FOOD YOU BOUGHT JUST DIDN'T LAST AND YOU DIDN'T HAVE MONEY TO GET MORE.: NEVER TRUE

## 2020-08-20 NOTE — CARE COORDINATION
Historical Provider, MD   enoxaparin (LOVENOX) 40 MG/0.4ML injection Inject 40 mg into the skin every 12 hours 7/31/20 8/21/20  Historical Provider, MD   oxyCODONE (ROXICODONE) 5 MG immediate release tablet Take 5 mg by mouth every 8 hours as needed for Pain. Historical Provider, MD   Handicap Placard MISC Please issue 2 placards    No expiration date 8/18/20   Ulises Love, APRN - CNP   insulin NPH (HUMULIN N) 100 UNIT/ML injection vial Inject 8 Units into the skin nightly 8/10/20   Sebastian Angeles, DO   blood glucose monitor strips Test 4 times a day & as needed for symptoms of irregular blood glucose. Dispense sufficient amount for indicated testing frequency plus additional to accommodate PRN testing needs. 8/10/20   Sebastian Angeles, DO   Alcohol Swabs PADS 1 tablet by Other route 4 times daily 8/10/20   Sebastian Angeles, DO   blood glucose monitor kit and supplies Dispense sufficient amount for indicated testing frequency plus additional to accommodate PRN testing needs. Dispense all needed supplies to include: monitor, strips, lancing device, lancets, control solutions, alcohol swabs. 8/10/20   Adela Angeles, DO   Insulin Pen Needle 31G X 5 MM MISC 1 each by Does not apply route daily Substitute with appropriate needle for injection of insulin NPH that is covered by the patient's insurance.  8/10/20   Sebastian Angeles, DO   Lancets MISC 1 each by Does not apply route daily Substitute as needed for insurance coverage 8/10/20   Sebastian Angeles DO   Prenatal Vit-Fe Fumarate-FA (PRENATAL VITAMIN) 27-0.8 MG TABS Take 1 tablet by mouth daily May substitute with formulary covered by patient's insurance 8/10/20 9/9/20  Sebastian Angeles DO   ferrous sulfate (IRON 325) 325 (65 Fe) MG tablet Take 1 tablet by mouth 2 times daily 8/10/20   Sebastian Angeles DO   docusate sodium (COLACE) 100 MG capsule Take 1 capsule by mouth 2 times daily 8/10/20 9/9/20  Sebastian Angeles DO   aspirin 81 MG chewable tablet Take 1 tablet by mouth daily 8/11/20   José Miguel Romano MD   cyclobenzaprine (FLEXERIL) 10 MG tablet Take 1 tablet by mouth 3 times daily as needed for Muscle spasms 8/10/20 8/20/20  José Miguel Romano MD   gabapentin (NEURONTIN) 300 MG capsule Take 1 capsule by mouth 3 times daily for 30 days.  8/10/20 9/9/20  José Miguel Romano MD   polyethylene glycol [facility]) 17 GM/SCOOP powder Take 17 g by mouth 2 times daily as needed (constipation) 7/17/20 9/15/20  FATEMEH Gauthier CNP   Prenatal MV-Min-Fe Fum-FA-DHA (PRENATAL 1 PO) Take by mouth    Historical Provider, MD       Future Appointments   Date Time Provider Salma Oliva   9/1/2020  9:00 AM ULTRASONOGRAPHER 1 Mat Fetal [facility]   9/1/2020 10:00 AM CONSULTS Mat Fetal [facility]   9/2/2020  2:15 PM FATEMEH Simmons CNP OB/Gyn [facility]   9/30/2020  4:30 PM José Miguel Romano MD [facility]

## 2020-08-21 ENCOUNTER — CARE COORDINATION (OUTPATIENT)
Dept: OTHER | Facility: CLINIC | Age: 25
End: 2020-08-21

## 2020-08-27 NOTE — CARE COORDINATION
F/U   Risk Factors Identified: Pt hip sarcoma/ s/p aka, gest diab     Needs to be reviewed by the provider   none         Method of communication with provider : none    Does patient have OB/Gyn Selected? Yes    Discussed follow up appointments. If no appointment was previously scheduled, appointment scheduling offered: Yes  [facility] follow up appointment(s):   Future Appointments   Date Time Provider Salma Edmondi   2020  9:00 AM ULTRASONOGRAPHER 1 Mat Fetal [facility]   2020 10:00 AM CONSULTS Mat Fetal [facility]   2020  2:15 PM Briana Amezcua, APRN - DEENA Saleh OB/Gyn [facility]   2020  4:30 PM Talon Aponte MD [facility]     Non-BS follow up appointment(s): N/A    OB History: n/a  OB History    Para Term  AB Living   1 0 0 0 0     SAB TAB Ectopic Molar Multiple Live Births   0 0 0 0          # Outcome Date GA Lbr Zeferino/2nd Weight Sex Delivery Anes PTL Lv   1 Current                27w2d    Medication reconciliation was performed with patient, who verbalizes understanding of administration of home medications. Advised obtaining a 90-day supply of all daily and as-needed medications. Barriers/Support system:  mother, father and      2nd and 3rd Trimester Focused Assessment:   Healthy behavior review  gest diab management  fasting  and post meals 100-120/ s/p aka seeing oncologyand surgeon post op    bs in good control    Pt aka incision healing well, steri strips in place staples take out out surgery f/u appointment this week. Pt has next appointments in place. Pt has no signs of infection and no pain to site. She was able to take a shower using the chair. Pt is 26 weeks this week. Pt reported baby kicking/moving stretching. Pt is anticipating breast feeding the baby, this cm provided the patient with the information to begin working on getting the breast pump through [facility].  The patient saw the onc this week and they would like to proceed with the

## 2020-09-01 ENCOUNTER — ROUTINE PRENATAL (OUTPATIENT)
Dept: PERINATAL CARE | Age: 25
End: 2020-09-01
Payer: COMMERCIAL

## 2020-09-01 VITALS
TEMPERATURE: 97.2 F | SYSTOLIC BLOOD PRESSURE: 120 MMHG | RESPIRATION RATE: 16 BRPM | HEIGHT: 62 IN | DIASTOLIC BLOOD PRESSURE: 84 MMHG | WEIGHT: 161 LBS | BODY MASS INDEX: 29.63 KG/M2 | HEART RATE: 100 BPM

## 2020-09-01 PROCEDURE — 76825 ECHO EXAM OF FETAL HEART: CPT | Performed by: OBSTETRICS & GYNECOLOGY

## 2020-09-01 PROCEDURE — 99243 OFF/OP CNSLTJ NEW/EST LOW 30: CPT | Performed by: OBSTETRICS & GYNECOLOGY

## 2020-09-01 PROCEDURE — 76820 UMBILICAL ARTERY ECHO: CPT | Performed by: OBSTETRICS & GYNECOLOGY

## 2020-09-01 PROCEDURE — 76811 OB US DETAILED SNGL FETUS: CPT | Performed by: OBSTETRICS & GYNECOLOGY

## 2020-09-01 PROCEDURE — 76819 FETAL BIOPHYS PROFIL W/O NST: CPT | Performed by: OBSTETRICS & GYNECOLOGY

## 2020-09-01 PROCEDURE — 76827 ECHO EXAM OF FETAL HEART: CPT | Performed by: OBSTETRICS & GYNECOLOGY

## 2020-09-01 PROCEDURE — 93325 DOPPLER ECHO COLOR FLOW MAPG: CPT | Performed by: OBSTETRICS & GYNECOLOGY

## 2020-09-02 ENCOUNTER — ROUTINE PRENATAL (OUTPATIENT)
Dept: OBGYN CLINIC | Age: 25
End: 2020-09-02

## 2020-09-02 VITALS
WEIGHT: 162 LBS | TEMPERATURE: 97.3 F | BODY MASS INDEX: 29.63 KG/M2 | SYSTOLIC BLOOD PRESSURE: 118 MMHG | RESPIRATION RATE: 16 BRPM | DIASTOLIC BLOOD PRESSURE: 74 MMHG

## 2020-09-02 PROBLEM — O9A.113: Status: ACTIVE | Noted: 2020-09-02

## 2020-09-02 PROCEDURE — 0502F SUBSEQUENT PRENATAL CARE: CPT | Performed by: NURSE PRACTITIONER

## 2020-09-02 NOTE — PATIENT INSTRUCTIONS
After Hours Number: You can call the office [phone]  or [phone]  Call if you have:  1. Bleeding like a period  2. Cramps or contractions greater than 2 hours  3. If you are leaking fluid  4. If you've a fever greater than 100°  5. If you feel as if baby is not moving  6. If you have continuous vomiting over 3-4 hours   Counting Your Baby's Kicks: Care Instructions  Your Care Instructions    Counting your baby's kicks is one way your doctor can tell that your baby is healthy. Most women--especially in a first pregnancy--feel their baby move for the first time between 16 and 22 weeks. The movement may feel like flutters rather than kicks. Your baby may move more at certain times of the day. When you are active, you may notice less kicking than when you are resting. At your prenatal visits, your doctor will ask whether the baby is active. In your last trimester, your doctor may ask you to count the number of times you feel your baby move. Follow-up care is a key part of your treatment and safety. Be sure to make and go to all appointments, and call your doctor if you are having problems. It's also a good idea to know your test results and keep a list of the medicines you take. How do you count fetal kicks? · A common method of checking your baby's movement is to count the number of kicks or moves you feel in 1 hour. Ten movements (such as kicks, flutters, or rolls) in 1 hour are normal. Some doctors suggest that you count in the morning until you get to 10 movements. Then you can quit for that day and start again the next day. · Pick your baby's most active time of day to count. This may be any time from morning to evening. · If you do not feel 10 movements in an hour, your baby may be sleeping. Wait for the next hour and count again. When should you call for help?   Call your doctor now or seek immediate medical care if:    · You noticed that your baby has stopped moving or is moving much less than normal.    Watch closely for changes in your health, and be sure to contact your doctor if you have any problems. Where can you learn more? Go to https://chpepiceweb.[employer]. org and sign in to your YOOWALK account. Enter F894 in the Decalog box to learn more about \"Counting Your Baby's Kicks: Care Instructions. \"     If you do not have an account, please click on the \"Sign Up Now\" link. Current as of: September 5, 2018  Content Version: 12.0  © 1734-0393 [employer]. Care instructions adapted under license by [facility] ([facility]). If you have questions about a medical condition or this instruction, always ask your healthcare professional. [address] any warranty or liability for your use of this information. Preeclampsia: Care Instructions  Overview    Preeclampsia occurs when a woman's blood pressure rises during pregnancy. Often with preeclampsia, you also have swelling in your legs, hands, and face. A test may show too much protein in your urine. Preeclampsia is also called toxemia. If preeclampsia is severe and not treated, it can lead to seizures (eclampsia) and damage to your liver or kidneys. Preeclampsia can prevent your baby from getting enough food and oxygen. This can cause a low birth weight or other problems. Your doctor will watch you closely to prevent these problems. He or she also may recommend that you reduce your activity. If your preeclampsia is a danger to your health or the health of your baby, your doctor may need to deliver your baby early. While preeclampsia is a concern, most women with preeclampsia have healthy babies. After a woman gives birth, preeclampsia usually goes away on its own. But symptoms may last a few weeks or more and can get worse after delivery. Rarely, symptoms of preeclampsia don't show up until days or even weeks after childbirth. Follow-up care is a key part of your treatment and safety.  Be sure to make and go to all appointments, and call your doctor if you are having problems. It's also a good idea to know your test results and keep a list of the medicines you take. How can you care for yourself at home? · Take and record your blood pressure at home if your doctor tells you to. ? Learn the importance of the two measures of blood pressure (such as 120 over 80, or 120/80). The first number is the systolic pressure, which is the force of blood on the artery walls as the heart pumps. The second number is the diastolic pressure, which is the force of blood on the artery walls between heartbeats, when the heart is at rest. You have a choice of monitors to use. ? Manual monitor: You pump up the cuff and use a stethoscope to listen for your pulse. ? Electronic monitor: The cuff inflates, and a gauge shows your pulse rate. ? To take your blood pressure:  ? Ask your doctor to check your blood pressure monitor to be sure that it is accurate and that the cuff fits you. Also ask your doctor to watch you to make sure that you are using it right. ? You should not eat, use tobacco products, or use medicine known to raise blood pressure (such as some nasal decongestant sprays) before you take your blood pressure. ? Avoid taking your blood pressure if you have just exercised. Also avoid taking it if you are nervous or upset. Rest at least 15 minutes before you take your blood pressure. · If your doctor advises, check the protein levels in your urine. Your doctor or nurse will show you how to do this. · Take your medicines exactly as prescribed. Call your doctor if you think you are having a problem with your medicine. · Do not smoke. Quitting smoking will help improve your baby's growth and health. If you need help quitting, talk to your doctor about stop-smoking programs and medicines. These can increase your chances of quitting for good.   · Eat a balanced and healthy diet that has lots of fruits and vegetables. · If your doctor advised bed rest, be sure to stay off your feet and rest as much as possible. ? Keep a phone, phone book, notepad, and pen near the bed where you can easily reach them. ? Gently stretch your legs every hour to maintain good blood flow. ? Have another family member pack snacks and lunch food in a cooler close to your bed. ? Use this time for activities that you usually cannot find time for, such as reading, craft projects, or letter writing. · You can keep track of your baby's health by noting the length of time it takes to count 10 movements (such as kicks, flutters, or rolls). Feeling 10 movements in less than 1 hour is considered normal. Track your baby's movements once each day. Bring this record with you to each prenatal visit. When should you call for help? Call 911 anytime you think you may need emergency care. For example, call if:    · You passed out (lost consciousness). · You have a seizure. Call your doctor now or seek immediate medical care if:    · You have symptoms of preeclampsia, such as:  ? Sudden swelling of your face, hands, or feet. ? New vision problems (such as dimness or blurring). ? A severe headache. · Your blood pressure is higher than it should be, or it rises suddenly. · You have new nausea or vomiting. · You think that you are in labor. · You have pain in your belly or pelvis. Watch closely for changes in your health, and be sure to contact your doctor if:    · You gain weight rapidly. Where can you learn more? Go to https://"[employer]"gauraviCardiac Technologies.SciQuest. org and sign in to your Emida account. Enter M664 in the [employer] box to learn more about \"Preeclampsia: Care Instructions. \"     If you do not have an account, please click on the \"Sign Up Now\" link. Current as of: September 5, 2018  Content Version: 12.0  © 4542-5813 Healthwise, Incorporated. Care instructions adapted under license by [facility] ([facility]).  If you have questions about a medical condition or this instruction, always ask your healthcare professional. [address] any warranty or liability for your use of this information.  Labor: Care Instructions  Your Care Instructions     labor is the start of labor between 21 and 36 weeks of pregnancy. A full-term pregnancy lasts 37 to 42 weeks. In labor, the uterus contracts to open the cervix. This is the first stage of childbirth.  labor can be caused by a problem with the baby, the mother, or both. Often the cause is not known. In some cases, doctors use medicines to try to delay labor until 29 or more weeks of pregnancy. By this time, a baby has grown enough so that problems are not likely. In some cases--such as with a serious infection--it is healthier for the baby to be born early. Your treatment will depend on how far along you are in your pregnancy and on your health and your baby's health. Follow-up care is a key part of your treatment and safety. Be sure to make and go to all appointments, and call your doctor if you are having problems. It's also a good idea to know your test results and keep a list of the medicines you take. How can you care for yourself at home? · If your doctor prescribed medicines, take them exactly as directed. Call your doctor if you think you are having a problem with your medicine. · Rest until your doctor advises you about activity. He or she will tell you if you should stay in bed most of the time. You may need to arrange for  if you have young children. · Do not have sexual intercourse unless your doctor says it is safe. · Use pads, not tampons, if you have vaginal bleeding. · Make sure to drink plenty of fluids. Dehydration can lead to contractions. If you have kidney, heart, or liver disease and have to limit fluids, talk with your doctor before you increase the amount of fluids you drink.   · Do not smoke or allow others to smoke around you. If you need help quitting, talk to your doctor about stop-smoking programs and medicines. These can increase your chances of quitting for good. When should you call for help? Call 911 anytime you think you may need emergency care. For example, call if:    · You passed out (lost consciousness). · You have severe vaginal bleeding. · You have severe pain in your belly or pelvis. · You have had fluid gushing or leaking from your vagina and you know or think the umbilical cord is bulging into your vagina. If this happens, immediately get down on your knees so your rear end (buttocks) is higher than your head. This will decrease the pressure on the cord until help arrives. Call your doctor now or seek immediate medical care if:    · You have signs of preeclampsia, such as:  ? Sudden swelling of your face, hands, or feet. ? New vision problems (such as dimness or blurring). ? A severe headache. · You have any vaginal bleeding. · You have belly pain or cramping. · You have a fever. · You have had regular contractions (with or without pain) for an hour. This means that you have 6 or more within 1 hour after you change your position and drink fluids. · You have a sudden release of fluid from the vagina. · You have low back pain or pelvic pressure that does not go away. · You notice that your baby has stopped moving or is moving much less than normal.    Watch closely for changes in your health, and be sure to contact your doctor if you have any problems. Where can you learn more? Go to https://CaninesgauravThe Luxury Club.Jike Xueyuan. org and sign in to your ICONOGRAFICO account. Enter Q400 in the [employer] box to learn more about \" Labor: Care Instructions. \"     If you do not have an account, please click on the \"Sign Up Now\" link. Current as of: 2018  Content Version: 12.0  © 9206-9552 Healthwise, [facility].  Care

## 2020-09-02 NOTE — PROGRESS NOTES
one hour after a meal. The patient was instructed to lay down on her left side twenty minutes after eating and count movements for up to one hour with a target value of ten movements. She was instructed to notify the office if she did not make that target after two attempts or if after any attempt there was less than four movements. After hour numbers reviewed , along with labor signs if indicated. Contractions/cramping between 5-7 minutes and persisting even with attempts of increased water and laying on side. Fluid leakage, bleeding  NST information given yes - starting 32 weeks    The patient was counseled on the mandatory call ahead policy. She has been instructed to call the office at anytime prior to going into the hospital so the on-call physician may direct her to the appropriate facility for care. Exceptions were reviewed including but not limited to: Decreased fetal movement, vaginal Bleeding or hemorrhage, trauma, readily expectant delivery, or any instance where she feels 911 should be utilized. Of the 15 minute duration appointment visit, Romero Shepard CNP spent at least 50% of the face-to-face time in counseling, explanation of diagnosis, planning of further management, and answering all questions.

## 2020-09-04 ENCOUNTER — CARE COORDINATION (OUTPATIENT)
Dept: OTHER | Facility: CLINIC | Age: 25
End: 2020-09-04

## 2020-09-04 NOTE — CARE COORDINATION
Maria G Farrar, DO   blood glucose monitor strips Test 4 times a day & as needed for symptoms of irregular blood glucose. Dispense sufficient amount for indicated testing frequency plus additional to accommodate PRN testing needs. 8/10/20   Mayuri Riveralyric, DO   Alcohol Swabs PADS 1 tablet by Other route 4 times daily 8/10/20   Mayuri Riveralyric, DO   blood glucose monitor kit and supplies Dispense sufficient amount for indicated testing frequency plus additional to accommodate PRN testing needs. Dispense all needed supplies to include: monitor, strips, lancing device, lancets, control solutions, alcohol swabs. 8/10/20   Adela GAYTAN Karthik, DO   Insulin Pen Needle 31G X 5 MM MISC 1 each by Does not apply route daily Substitute with appropriate needle for injection of insulin NPH that is covered by the patient's insurance. 8/10/20   Mayuri Riveralyric, DO   Lancets MISC 1 each by Does not apply route daily Substitute as needed for insurance coverage 8/10/20   Mayuri Riveralyric, DO   Prenatal Vit-Fe Fumarate-FA (PRENATAL VITAMIN) 27-0.8 MG TABS Take 1 tablet by mouth daily May substitute with formulary covered by patient's insurance 8/10/20 9/9/20  Mayuri Riveralyric, DO   ferrous sulfate (IRON 325) 325 (65 Fe) MG tablet Take 1 tablet by mouth 2 times daily 8/10/20   Mayuri Riveralyric, DO   docusate sodium (COLACE) 100 MG capsule Take 1 capsule by mouth 2 times daily  Patient not taking: Reported on 9/1/2020 8/10/20 9/9/20  Mayuri Bootuyet Angeles DO   aspirin 81 MG chewable tablet Take 1 tablet by mouth daily 8/11/20   Luis Robbins MD   gabapentin (NEURONTIN) 300 MG capsule Take 1 capsule by mouth 3 times daily for 30 days.  8/10/20 9/9/20  Luis Robbins MD   polyethylene glycol (GLYCOLAX) 17 GM/SCOOP powder Take 17 g by mouth 2 times daily as needed (constipation)  Patient not taking: Reported on 9/1/2020 7/17/20 9/15/20  FATEMEH Blanco - CNP   Prenatal MV-Min-Fe Fum-FA-DHA (PRENATAL 1 PO) Take by mouth    Historical Provider, MD       Future Appointments   Date Time Provider Salma Oliva   9/28/2020  9:00 AM ULTRASONOGRAPHER 1 Mat Fetal [facility]   9/30/2020  2:00 PM DO [facility]   9/30/2020  4:30 PM Luis [facility]bins [facility]

## 2020-09-04 NOTE — CARE COORDINATION
ACM attempted to reach patient for F/U CM MATERNITY call. HIPAA compliant message left requesting a return phone call at patients convenience. Will continue to follow.

## 2020-09-08 RX ORDER — GABAPENTIN 300 MG/1
300 CAPSULE ORAL 3 TIMES DAILY
Qty: 90 CAPSULE | Refills: 0 | Status: SHIPPED | OUTPATIENT
Start: 2020-09-08 | End: 2020-10-07 | Stop reason: SDUPTHER

## 2020-09-08 RX ORDER — CYCLOBENZAPRINE HCL 10 MG
10 TABLET ORAL 3 TIMES DAILY PRN
Qty: 30 TABLET | Refills: 0 | Status: SHIPPED | OUTPATIENT
Start: 2020-09-08 | End: 2020-09-18

## 2020-09-08 NOTE — TELEPHONE ENCOUNTER
Last OV 08/18/2020    Health Maintenance   Topic Date Due    Hepatitis A vaccine (2 of 2 - 2-dose series) 12/06/2014    Flu vaccine (1) 09/01/2020    Chlamydia screen  05/19/2021    Potassium monitoring  08/08/2021    Creatinine monitoring  08/08/2021    Cervical cancer screen  05/19/2023    DTaP/Tdap/Td vaccine (8 - Td) 08/12/2026    Hepatitis B vaccine  Completed    Hib vaccine  Completed    HPV vaccine  Completed    Varicella vaccine  Completed    Meningococcal (ACWY) vaccine  Completed    HIV screen  Completed    Pneumococcal 0-64 years Vaccine  Aged Out             (applicable per patient's age: Cancer Screenings, Depression Screening, Fall Risk Screening, Immunizations)    Hemoglobin A1C (%)   Date Value   08/02/2020 5.0     AST (U/L)   Date Value   08/09/2020 19     ALT (U/L)   Date Value   08/09/2020 89 (H)     BUN (mg/dL)   Date Value   08/08/2020 4 (L)      (goal A1C is < 7)   (goal LDL is <100) need 30-50% reduction from baseline     BP Readings from Last 3 Encounters:   09/02/20 118/74   09/01/20 120/84   08/18/20 122/72    (goal /80)      All Future Testing planned in [facility]:  Lab Frequency Next Occurrence   PAP SMEAR Once 05/19/2020   Urinalysis Reflex to Culture Once 05/19/2020   Prenatal type and screen Once 05/19/2020   Drugs of Abuse Scr, Urine Once 06/02/2020   Urinalysis Once 09/16/2020   CBC Once 12/02/2020   Culture, Urine Once 09/02/2020       Next Visit Date:  Future Appointments   Date Time Provider Salma Oliva   9/28/2020  9:00 AM ULTRASONOGRAPHER 1 Mat Fetal [facility]   9/30/2020  2:00 PM Marylee Prows Roost, DO [facility]   9/30/2020  4:30 PM Boubacar Long MD [facility]            Patient Active Problem List:     Pregestational Diabetes     Family history of diabetes mellitus     Gestational diabetes     Iron deficiency anemia     Sarcoma ([address]     Thrombocytosis ([address]     Transaminitis     Chronic hypertension affecting pregnancy     History of disarticulation of right hip     Leukocytosis     History of blood transfusion     History of E.  Coli UTI 10-<100,000CFU (7/7/20)     Pseudomonas UTI     Malignant neoplasm affecting pregnancy in third trimester

## 2020-09-16 ENCOUNTER — TELEPHONE (OUTPATIENT)
Dept: PERINATAL CARE | Age: 25
End: 2020-09-16

## 2020-09-16 ENCOUNTER — CARE COORDINATION (OUTPATIENT)
Dept: OTHER | Facility: CLINIC | Age: 25
End: 2020-09-16

## 2020-09-16 NOTE — TELEPHONE ENCOUNTER
Call to Gisela Doe to discuss adding 4 units of Novolog before dinner to her 10 units of NPH at bedtime that she is currently taking and will continue to take. Gisela Doe agrees. She has no questions at this time.  The prescription wlll be called into [address] at her request.

## 2020-09-16 NOTE — CARE COORDINATION
Ambulatory Care Coordination Note  9/16/2020  CM Risk Score: 0  Charlson 10 Year Mortality Risk Score: 100%     ACC: Marie Archer RN    Summary Note: ACM attempted to reach patient for follow up call regarding fitting for prosthetic leg, incision appearance/pain at site. HIPAA compliant message left requesting a return phone call at patients convenience. Will continue to follow. Care Coordination Interventions    Program Enrollment:  Rising Risk  Referral from Primary Care Provider:  No  Suggested Interventions and Community Resources         Goals Addressed                 This Visit's Progress     Patient Stated (pt-stated)        Pt will continue to go to [place] [id number] for fitting of prosthetic leg    Barriers: none  Plan for overcoming my barriers: N/A  Confidence: 10/10  Anticipated Goal Completion Date: 10/30/2020            Prior to Admission medications    Medication Sig Start Date End Date Taking? Authorizing Provider   gabapentin (NEURONTIN) 300 MG capsule Take 1 capsule by mouth 3 times daily for 30 days. 9/8/20 10/8/20  Nory Quails APRN - CNP   cyclobenzaprine (FLEXERIL) 10 MG tablet Take 1 tablet by mouth 3 times daily as needed for Muscle spasms 9/8/20 9/18/20  Cueto Quails, APRN - CNP   Handicap Placard MISC Expiration: Lifetime 8/26/20   Cueto Quails, APRN - CNP   insulin lispro (HUMALOG) 100 UNIT/ML injection vial Inject 6 Units into the skin Daily with supper 7/6/20   Historical Provider, MD   oxyCODONE (ROXICODONE) 5 MG immediate release tablet Take 5 mg by mouth every 8 hours as needed for Pain. Historical Provider, MD   insulin NPH (HUMULIN N) 100 UNIT/ML injection vial Inject 8 Units into the skin nightly  Patient taking differently: Inject 8 Units into the skin nightly Indications: pt states \"4-6u\"  8/10/20   Rosi Angeles, DO   blood glucose monitor strips Test 4 times a day & as needed for symptoms of irregular blood glucose.  Dispense sufficient amount

## 2020-09-17 ENCOUNTER — TELEPHONE (OUTPATIENT)
Dept: PERINATAL CARE | Age: 25
End: 2020-09-17

## 2020-09-28 ENCOUNTER — HOSPITAL ENCOUNTER (OUTPATIENT)
Age: 25
Setting detail: SPECIMEN
Discharge: HOME | End: 2020-09-28
Payer: COMMERCIAL

## 2020-09-28 ENCOUNTER — ROUTINE PRENATAL (OUTPATIENT)
Dept: PERINATAL CARE | Age: 25
End: 2020-09-28
Payer: COMMERCIAL

## 2020-09-28 VITALS
DIASTOLIC BLOOD PRESSURE: 84 MMHG | TEMPERATURE: 98 F | WEIGHT: 172 LBS | RESPIRATION RATE: 16 BRPM | HEART RATE: 92 BPM | HEIGHT: 62 IN | BODY MASS INDEX: 31.65 KG/M2 | SYSTOLIC BLOOD PRESSURE: 131 MMHG

## 2020-09-28 LAB
-: ABNORMAL
ABDOMINAL CIRCUMFERENCE: NORMAL
AMORPHOUS: ABNORMAL
BACTERIA: ABNORMAL
BILIRUBIN URINE: NEGATIVE
BIPARIETAL DIAMETER: NORMAL
CASTS UA: ABNORMAL /LPF (ref 0–2)
COLOR: YELLOW
COMMENT UA: ABNORMAL
CRYSTALS, UA: ABNORMAL /HPF
EPITHELIAL CELLS UA: ABNORMAL /HPF (ref 0–5)
ESTIMATED FETAL WEIGHT: NORMAL
FEMORAL DIAMETER: NORMAL
GLUCOSE URINE: NEGATIVE
HC/AC: NORMAL
HCT VFR BLD CALC: 38 % (ref 36.3–47.1)
HEAD CIRCUMFERENCE: NORMAL
HEMOGLOBIN: 12.4 G/DL (ref 11.9–15.1)
KETONES, URINE: NEGATIVE
LEUKOCYTE ESTERASE, URINE: ABNORMAL
MCH RBC QN AUTO: 29 PG (ref 25.2–33.5)
MCHC RBC AUTO-ENTMCNC: 32.6 G/DL (ref 28.4–34.8)
MCV RBC AUTO: 88.8 FL (ref 82.6–102.9)
MUCUS: ABNORMAL
NITRITE, URINE: NEGATIVE
NRBC AUTOMATED: 0 PER 100 WBC
OTHER OBSERVATIONS UA: ABNORMAL
PDW BLD-RTO: 12.7 % (ref 11.8–14.4)
PH UA: 6.5 (ref 5–8)
PLATELET # BLD: NORMAL K/UL (ref 138–453)
PLATELET, FLUORESCENCE: 185 K/UL (ref 138–453)
PLATELET, IMMATURE FRACTION: 8.2 % (ref 1.1–10.3)
PMV BLD AUTO: NORMAL FL (ref 8.1–13.5)
PROTEIN UA: NEGATIVE
RBC # BLD: 4.28 M/UL (ref 3.95–5.11)
RBC UA: ABNORMAL /HPF (ref 0–2)
RENAL EPITHELIAL, UA: ABNORMAL /HPF
SPECIFIC GRAVITY UA: 1.02 (ref 1–1.03)
TRICHOMONAS: ABNORMAL
TURBIDITY: ABNORMAL
URINE HGB: NEGATIVE
UROBILINOGEN, URINE: NORMAL
WBC # BLD: 6.2 K/UL (ref 3.5–11.3)
WBC UA: ABNORMAL /HPF (ref 0–5)
YEAST: ABNORMAL

## 2020-09-28 PROCEDURE — 99211 OFF/OP EST MAY X REQ PHY/QHP: CPT | Performed by: OBSTETRICS & GYNECOLOGY

## 2020-09-28 PROCEDURE — 76820 UMBILICAL ARTERY ECHO: CPT | Performed by: OBSTETRICS & GYNECOLOGY

## 2020-09-28 PROCEDURE — 76819 FETAL BIOPHYS PROFIL W/O NST: CPT | Performed by: OBSTETRICS & GYNECOLOGY

## 2020-09-28 PROCEDURE — 76816 OB US FOLLOW-UP PER FETUS: CPT | Performed by: OBSTETRICS & GYNECOLOGY

## 2020-09-28 NOTE — PROGRESS NOTES
M Office Visit:  Blood sugars reviewed (insulin regimen adjusted, as below). Insulin regimen increased to: NPH Insulin subcutaneously 18 units before bedtime  (consistently elevated fasting blood sugars above 90's). Insulin regimen continue: 4 units subcutaneous (SQ) Novolog before dinner. Patient declined both invasive prenatal diagnostic testing and non-invasive prenatal testing (NIPT/NIPS) today.

## 2020-09-29 LAB
CULTURE: NORMAL
Lab: NORMAL
SPECIMEN DESCRIPTION: NORMAL

## 2020-09-30 ENCOUNTER — ROUTINE PRENATAL (OUTPATIENT)
Dept: OBGYN CLINIC | Age: 25
End: 2020-09-30

## 2020-09-30 ENCOUNTER — OFFICE VISIT (OUTPATIENT)
Dept: PHYSICAL MEDICINE AND REHAB | Age: 25
End: 2020-09-30
Payer: COMMERCIAL

## 2020-09-30 VITALS
TEMPERATURE: 98.6 F | HEART RATE: 100 BPM | SYSTOLIC BLOOD PRESSURE: 105 MMHG | BODY MASS INDEX: 31.46 KG/M2 | WEIGHT: 172 LBS | DIASTOLIC BLOOD PRESSURE: 65 MMHG

## 2020-09-30 VITALS — WEIGHT: 172 LBS | DIASTOLIC BLOOD PRESSURE: 70 MMHG | SYSTOLIC BLOOD PRESSURE: 120 MMHG | BODY MASS INDEX: 31.46 KG/M2

## 2020-09-30 PROCEDURE — 0502F SUBSEQUENT PRENATAL CARE: CPT | Performed by: OBSTETRICS & GYNECOLOGY

## 2020-09-30 PROCEDURE — 99213 OFFICE O/P EST LOW 20 MIN: CPT | Performed by: PHYSICAL MEDICINE & REHABILITATION

## 2020-09-30 NOTE — PROGRESS NOTES
Sav Sepulveda is a 26 y.o. female 32w1d        OB History    Para Term  AB Living   1 0 0 0 0     SAB TAB Ectopic Molar Multiple Live Births   0 0 0 0          # Outcome Date GA Lbr Zeferino/2nd Weight Sex Delivery Anes PTL Lv   1 Current                Vitals  BP: 120/70  Weight: 172 lb (78 kg)  Patient Position: Sitting  Albumin: Negative  Glucose: Negative  Fundal Height (cm): 31 cm  Fetal Heart Rate: 139  Movement: Present      The patient was seen and evaluated. There was positive fetal movements. No contractions or leakage of fluid. Signs and symptoms of  labor as well as labor were reviewed. The S/S of Pre-Eclampsia were reviewed with the patient in detail. She is to report any of these if they occur. She currently denies any of these. The patient had her 28 week labs completed. Hospital Outpatient Visit on 2020   Component Date Value Ref Range Status    Specimen Description 2020 . CLEAN CATCH URINE   Final    Special Requests 2020 NOT REPORTED   Final    Culture 2020 NO SIGNIFICANT GROWTH   Final    WBC 2020 6.2  3.5 - 11.3 k/uL Final    RBC 2020 4.28  3.95 - 5.11 m/uL Final    Hemoglobin 2020 12.4  11.9 - 15.1 g/dL Final    Hematocrit 2020 38.0  36.3 - 47.1 % Final    MCV 2020 88.8  82.6 - 102.9 fL Final    MCH 2020 29.0  25.2 - 33.5 pg Final    MCHC 2020 32.6  28.4 - 34.8 g/dL Final    RDW 2020 12.7  11.8 - 14.4 % Final    Platelets 10/42/9894 See Reflexed IPF Result  138 - 453 k/uL Final    MPV 2020 NOT REPORTED  8.1 - 13.5 fL Final    NRBC Automated 2020 0.0  0.0 per 100 WBC Final    Color, UA 2020 YELLOW  YELLOW Final    Turbidity UA 2020 CLOUDY* CLEAR Final    Glucose, Ur 2020 NEGATIVE  NEGATIVE Final    Bilirubin Urine 2020 NEGATIVE  NEGATIVE Final    Ketones, Urine 2020 NEGATIVE  NEGATIVE Final    Specific [place], UA 2020 1.018  1.005 - 1.030 Final    Urine Hgb 2020 NEGATIVE  NEGATIVE Final    pH, UA 2020 6.5  5.0 - 8.0 Final    Protein, UA 2020 NEGATIVE  NEGATIVE Final    Urobilinogen, Urine 2020 Normal  Normal Final    Nitrite, Urine 2020 NEGATIVE  NEGATIVE Final    Leukocyte Esterase, Urine 2020 SMALL* NEGATIVE Final    Urinalysis Comments 2020 NOT REPORTED   Final    Platelet, Immature Fraction 2020 8.2  1.1 - 10.3 % Final    ORDERED BY LAB    Platelet, Fluorescence 2020 185  138 - 453 k/uL Final    ORDERED BY LAB    - 2020        Final    WBC, UA 2020 10 TO 20  0 - 5 /HPF Final    RBC, UA 2020 2 TO 5  0 - 2 /HPF Final    Casts UA 2020 0 TO 2  0 - 2 /LPF Final    Casts UA 2020 COARSELY GRANULAR  0 - 2 /LPF Final    Casts UA 2020 HYALINE  0 - 2 /LPF Final    Casts UA 2020 0 TO 2  0 - 2 /LPF Final    Crystals, UA 2020 CALCIUM OXALATE* None /HPF Final    Crystals, UA 2020 FEW* None /HPF Final    Crystals, UA 2020 TRIPLE PHOSPHATE* None /HPF Final    Crystals, UA 2020 FEW* None /HPF Final    Epithelial Cells UA 2020 10 TO 20  0 - 5 /HPF Final    Renal Epithelial, UA 2020 NOT REPORTED  0 /HPF Final    Bacteria, UA 2020 FEW* None Final    Mucus, UA 2020 NOT REPORTED  None Final    Trichomonas, UA 2020 NOT REPORTED  None Final    Amorphous, UA 2020 NOT REPORTED  None Final    Other Observations UA 2020 NOT REPORTED  NOT REQ. Final    Yeast, UA 2020 FEW* None Final       ACC: Adri Ragsdale RN Adri Ragsdale RN MSN/ed Alonso   Cell [phone]  [email or web address]    Pt is enrolled into Maternity  20-10/22/20  - Saurabh Yost  Gestational diabetes 8u insulin at bedtime  Having a BABY BOY! Sarcoma of right leg- amputated, chemo after delivery? Margins were clear   testing: cHTN, GDMA. Unless indicated earlier rec delivery 39w-  being done at [facility], seen here for prenatal appts only  Dr. Cesar Espinal oncologist at [facility] (phone : [phone]) recommends chemo 12 weeks after surgery                         T-Dap Vaccine (27-36 weeks): awaiting    The patient was instructed on fetal kick counts and was given a kick sheet to complete every 8 hours. She was instructed that the baby should move at a minimum of ten times within one hour after a meal. The patient was instructed to lay down on her left side twenty minutes after eating and count movements for up to one hour with a target value of ten movements. She was instructed to notify the office if she did not make that target after two attempts or if after any attempt there was less than four movements. The patient reports that the targets have been made Yes.  Testing:  Yes, cHTN, GDMA2    Assessment:  1Phil Morris is a 26 y.o. female  2.   3. 32w1d    Patient Active Problem List    Diagnosis Date Noted    Malignant neoplasm affecting pregnancy in third trimester 2020    Pseudomonas UTI 2020 treated with Cefepime 2g IV for 10 days       History of E.  Coli UTI 10-<100,000CFU (20) 2020    History of blood transfusion 2020    History of disarticulation of right hip 2020    Leukocytosis 2020    Transaminitis 2020    Chronic hypertension affecting pregnancy 2020     Baseline preE labs wnl, P/C of 0.20 on 20      Iron deficiency anemia 2020     Last Assessment & Plan:   Hgb 8.2 g/dL  Asymptomatic   S/p iv iron on  and 1 u pRBC     Continue to monitor vitals  Last Assessment & Plan:   Hgb 8.2 g/dL  Asymptomatic   S/p iv iron on  and 1 u pRBC     Continue to monitor vitals      Gestational diabetes 2020     Last Assessment & Plan:   Formatting of this note might be different from the original.  GDMA2  Abnormal early 1 hour GTT (183)  Abnormal 3 hour GTT  - fasting 86  - 1 hour 224  - 2 hour 187  - 3 hour 164  HbA1c 7/1 5.8  Component      Latest Ref Rng & Units 7/5/2020 7/5/2020 7/5/2020 7/5/2020           8:06 AM 11:27 AM  4:48 PM  9:28 PM   Glucose, metered      50 - 140 mg/dL 93 135 99 131     Component      Latest Ref Rng & Units 7/6/2020           8:05 AM   Glucose, metered      50 - 140 mg/dL 98        Plan:  Insulin NPH 20/10 and novolog 6u w/ meals   BG checks 4x daily  Last Assessment & Plan:   Formatting of this note might be different from the original.  GDMA2  Abnormal early 1 hour GTT (183)  Abnormal 3 hour GTT  - fasting 86  - 1 hour 224  - 2 hour 187  - 3 hour 164  HbA1c 7/1 5.8  Component      Latest Ref Rng & Units 7/5/2020 7/5/2020 7/5/2020 7/5/2020           8:06 AM 11:27 AM  4:48 PM  9:28 PM   Glucose, metered      50 - 140 mg/dL 93 135 99 131     Component      Latest Ref Rng & Units 7/6/2020           8:05 AM   Glucose, metered      50 - 140 mg/dL 98        Plan:  Insulin NPH 20/10 and novolog 6u w/ meals   BG checks 4x daily      Thrombocytosis ([address] 06/30/2020     Last Assessment & Plan:   Plts elevated, 573 (07/02) --> 534 (07/03)  Etiology: secondary to iron deficiency   Patient found to have iron deficiency  Peripheral smear   -  Neutrophilia and monocytosis. -  Thrombocytosis. -  Normocytic normochromic anemia. -  No evidence of schistocytes, dysplasia or blasts.   -Iron studies: iron 19, iron saturation 11%  -patient given one 1 unit leukocyte-reduced, irradiated RBC 07/02    Plan:  - f/u CBC w/ daily      Sarcoma ([address] 06/29/2020     Last Assessment & Plan:   Hx of right thigh mass concerning for sarcoma  - MRI at OSH on 6/12/2020: Centered along the anteromedial aspect of the mid-distal thigh there is a large complex mass measuring at least 9.7 x 8.3 x 16.9 cm. Concerning for sarcoma  Patient follows with Dr. Carla Davis at [place] and wants to pursue her care at [facility]  S/p biopsy 6/29  Oxycodone for pain doing  testing in office, no indication per ACOG for doppler studies. Discussed MFM recommendations and patient states too difficult given physical limitations and unable to have spouse at appt. Delivery expected 34-35 weeks to allow for adjuvant chemotherapy  Celestone planned for early delivery  Route of delivery to be discussed in more detail.  Testing Indicated: Yes  Scheduled with Nursing-Pt notified: Yes      Patient was seen with total face to face time of 15 minutes. More than 50% of this visit was on counseling and education regarding her    Diagnosis Orders   1. High-risk pregnancy in third trimester     2. 32 weeks gestation of pregnancy     3. Malignant neoplasm affecting pregnancy in third trimester     4. Chronic hypertension affecting pregnancy     5. Sarcoma ([address]     6. Insulin controlled gestational diabetes mellitus (GDM) in third trimester      and her options. She was also counseled on her preventative health maintenance recommendations and follow-up.

## 2020-10-01 ENCOUNTER — INITIAL CONSULT (OUTPATIENT)
Dept: ONCOLOGY | Age: 25
End: 2020-10-01
Payer: COMMERCIAL

## 2020-10-01 VITALS
HEIGHT: 62 IN | DIASTOLIC BLOOD PRESSURE: 87 MMHG | WEIGHT: 174.9 LBS | HEART RATE: 93 BPM | RESPIRATION RATE: 18 BRPM | SYSTOLIC BLOOD PRESSURE: 133 MMHG | TEMPERATURE: 98.7 F | BODY MASS INDEX: 32.18 KG/M2

## 2020-10-01 PROCEDURE — 99201 HC NEW PT, E/M LEVEL 1: CPT

## 2020-10-01 PROCEDURE — 99245 OFF/OP CONSLTJ NEW/EST HI 55: CPT | Performed by: INTERNAL MEDICINE

## 2020-10-01 NOTE — PROGRESS NOTES
DIAGNOSIS:   1. Undifferentiated pleomorphic  Sarcoma, right leg, T4 N0 M0  (stage IIIb) diagnosed 05/2020  2. Current pregnancy . CURRENT THERAPY:  S/P full right leg amputation (Right hip disarticulation)   Adjuvant chemotherapy delayed per patient choice until after delivery   Plan for chemo 3-4 weeks post partum with ifosfamide and adriamycin      BRIEF CASE HISTORY:   Sherie Roberts is a very pleasant 26 y.o. female who is referred to us for sarcoma. She presented with rightt knee sprain at work 04/2020 but the pain and swelling worsened with palpable mass. She underwent MRI which showed mass and was seen at [facility] where biopsy was taken and showed sarcoma. She was transferred to [facility] and decided to undergo full leg amputation due to early stage pregnancy, tumor was 16 cm x 20 cm x 3 cm and removed with negative margins. She was following with [facility] and decided she would prefer to receive treatment locally. The patient is currently 32 weeks pregnant and delivery plans are expected to be finalized 10/02/2020. She has elected to proceed with chemo after delivery. She is working towards prosthesis with test socket. 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 PAST MEDICAL HISTORY: has a past medical history of Diabetes mellitus ([address] and Osteosarcoma. PAST SURGICAL HISTORY: has a past surgical history that includes amputation (Right, 07/23/2020). CURRENT MEDICATIONS:  has a current medication list which includes the following prescription(s): gabapentin, handicap placard, insulin lispro, insulin nph, blood glucose test strips, alcohol swabs, blood glucose monitor kit and supplies, insulin pen needle, lancets, ferrous sulfate, aspirin, prenatal mv-min-fe fum-fa-dha, and prenatal vitamin. ALLERGIES:  has No Known Allergies. FAMILY HISTORY: Negative for any hematological or oncological conditions.      SOCIAL HISTORY:  reports that she has never smoked. She has never used smokeless tobacco. She reports previous alcohol use. She reports that she does not use drugs. REVIEW OF SYSTEMS:   General: No fever or night sweats. Weight is stable. ENT: No double or blurred vision, no tinnitus or hearing problem, no dysphagia or sore throat   Respiratory: No chest pain, no shortness of breath, no cough or hemoptysis. Cardiovascular: Denies chest pain, PND or orthopnea. No L E swelling or palpitations. Gastrointestinal: No nausea or vomiting, abdominal pain, diarrhea or constipation. Genitourinary: Denies dysuria, hematuria, frequency, urgency or incontinence. Neurological: Denies headaches, decreased LOC, no sensory or motor focal deficits. +post surgical phantom pain  Musculoskeletal: No arthralgia no back pain or joint swelling. Skin: There are no rashes or bleeding. Psychiatric: No anxiety, no depression. Endocrine: No diabetes or thyroid disease. Hematologic: No bleeding, no adenopathy. PHYSICAL EXAM: Shows a well appearing 26y.o.-year-old female who is not in pain or distress. Vital Signs: Blood pressure 133/87, pulse 93, temperature 98.7 °F (37.1 °C), temperature source Oral, resp. rate 18, height 5' 2\" (1.575 m), weight 174 lb 14.4 oz (79.3 kg), last menstrual period 02/18/2020, not currently breastfeeding. HEENT: Normocephalic and atraumatic. Pupils are equal, round, reactive to light and accommodation. Extraocular muscles are intact. Neck: Showed no JVD, no carotid bruit . Lungs: Clear to auscultation bilaterally. Heart: Regular without any murmur. Abdomen: Soft, nontender. No hepatosplenomegaly. Extremities: Right leg amputation. Lower extremities show no edema, clubbing, or cyanosis. Breasts: Examination not done today.  Neuro exam: intact cranial nerves bilaterally no motor or sensory deficit, gait is normal. Lymphatic: no adenopathy appreciated in the supraclavicular, axillary, cervical or inguinal area    REVIEW OF LABORATORY DATA:     REVIEW OF RADIOLOGICAL RESULTS:     IMPRESSION:   1. 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 S/P right leg amputation  3. Current pregnancy  4. Plan for adjuvant chemo following delivery with ifosfamide and adriamycin (AIM)     PLAN:   1. We discussed in detail her presenting symptoms, findings, and amputation surgery, prognostic data was discussed. 2. We reviewed details of current pregnancy and potential plans for delivery. 3. The patient will review her plans for local treatment with [facility] and I will communicate discussion. 4. Unfortunately because of the tumor is about 15 cm, it would be staged at T4 N0 M0. This is undifferentiated tumor, even after best local therapy, the odds of recurrence is about 60%. Considering her age and good health, I am in agreement that adjuvant chemotherapy is appropriate  5. However, the benefit of adjuvant chemotherapy in the sixth sarcomas have always been under intense debate. The modest benefit has to be weighed against long-term toxicity including sterility, cardiomyopathy, nephrotoxicity among others. It seems that the patient is very well-informed about dose and wants to proceed with adjuvant therapy. But she is interested of doing them after delivery. 6. We will plan for chemo following delivery per her wishes and I am putting in orders for ifosfamide and adriamycin. 7. We will start treatment 3-4 weeks after delivery and plan for 4 cycles. 8. I am ordering restaging CT to be done after delivery and port insertion. 9. Return to commence with treatment in 6 weeks.

## 2020-10-01 NOTE — LETTER
Marlo Greer MD    10/1/2020     Chayito Quinonez, APRN - CNP   [address] [facility] [address]    Dear Tracee Mai : Thank you for referring Win Cheek, 1995, to me for evaluation. Below are the relevant portions of my assessment and plan of care. DIAGNOSIS:   1. Undifferentiated pleomorphic  Sarcoma, right leg, T4 N0 M0  (stage IIIb) diagnosed 05/2020  2. Current pregnancy . CURRENT THERAPY:  S/P full right leg amputation (Right hip disarticulation)   Adjuvant chemotherapy delayed per patient choice until after delivery   Plan for chemo 3-4 weeks post partum with ifosfamide and adriamycin      BRIEF CASE HISTORY:   Win Cheek is a very pleasant 26 y.o. female who is referred to us for sarcoma. She presented with rightt knee sprain at work 04/2020 but the pain and swelling worsened with palpable mass. She underwent MRI which showed mass and was seen at [facility] where biopsy was taken and showed sarcoma. She was transferred to [facility] and decided to undergo full leg amputation due to early stage pregnancy, tumor was 16 cm x 20 cm x 3 cm and removed with negative margins. [facility] and decided she would prefer to receive treatment locally. The patient is currently 32 weeks pregnant and delivery plans are expected to be finalized 10/02/2020. She has elected to proceed with chemo after delivery. She is working towards prosthesis with test socket. 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 PAST MEDICAL HISTORY: has a past medical history of Diabetes mellitus ([facility] [address] and Osteosarcoma. PAST SURGICAL HISTORY: has a past surgical history that includes amputation (Right, 07/23/2020).      CURRENT MEDICATIONS:  has a current medication list which includes the following prescription(s): gabapentin, handicap placard, insulin lispro, insulin nph, blood glucose test strips, alcohol swabs, blood glucose monitor kit and supplies, insulin pen needle, lancets, ferrous sulfate, aspirin, prenatal mv-min-fe fum-fa-dha, and prenatal vitamin. ALLERGIES:  has No Known Allergies. FAMILY HISTORY: Negative for any hematological or oncological conditions. SOCIAL HISTORY:  reports that she has never smoked. She has never used smokeless tobacco. She reports previous alcohol use. She reports that she does not use drugs. REVIEW OF SYSTEMS:   General: No fever or night sweats. Weight is stable. ENT: No double or blurred vision, no tinnitus or hearing problem, no dysphagia or sore throat   Respiratory: No chest pain, no shortness of breath, no cough or hemoptysis. Cardiovascular: Denies chest pain, PND or orthopnea. No L E swelling or palpitations. Gastrointestinal: No nausea or vomiting, abdominal pain, diarrhea or constipation. Genitourinary: Denies dysuria, hematuria, frequency, urgency or incontinence. Neurological: Denies headaches, decreased LOC, no sensory or motor focal deficits. +post surgical phantom pain  Musculoskeletal: No arthralgia no back pain or joint swelling. Skin: There are no rashes or bleeding. Psychiatric: No anxiety, no depression. Endocrine: No diabetes or thyroid disease. Hematologic: No bleeding, no adenopathy. PHYSICAL EXAM: Shows a well appearing 26y.o.-year-old female who is not in pain or distress. Vital Signs: Blood pressure 133/87, pulse 93, temperature 98.7 °F (37.1 °C), temperature source Oral, resp. rate 18, height 5' 2\" (1.575 m), weight 174 lb 14.4 oz (79.3 kg), last menstrual period 02/18/2020, not currently breastfeeding. HEENT: Normocephalic and atraumatic. Pupils are equal, round, reactive to light and accommodation. Extraocular muscles are intact. Neck: Showed no JVD, no carotid bruit . Lungs: Clear to auscultation bilaterally.  Heart: Regular without any 9. Return to commence with treatment in 6 weeks. If you have questions, please do not hesitate to call me. I look forward to following [place] along with you.     Sincerely,    Elio Baptiste MD  Hematology/ Oncology  Cell [phone]

## 2020-10-02 ENCOUNTER — TELEPHONE (OUTPATIENT)
Dept: OBGYN CLINIC | Age: 25
End: 2020-10-02

## 2020-10-02 NOTE — TELEPHONE ENCOUNTER
Discussed plan of care with Sarcoma specialist oncologist Dr. Alyssa Gamino at [facility]. Discussed plan for delivery, timing, and importance of postpartum chemotherapy following Radical resection (right hip disarticulation amputation) for osteosacroma. Margins negative, negative lymph nodes. She discussed optimal timing would be to begin this within 12 weeks of the surgery. This would mean delivery approx 34 weeks gestation. Discussed that this is strongly recommended, and it is much after 12 weeks it may not carry the same benefit. She did mention that it would be safe to start approx 1 week following  and up to a couple days following vaginal delivery. Important to take into account fertility preservation, so I will have this discussion with Darron Peacock and her . Dr. Alyssa Gamino recommendation for maximizing adjuvant chemotherapy benefit is administration within 12 weeks of surgery. This will mean patient will need Celestone for fetal lung maturation. I will discuss and clear with MFM given pregestational DM. Dr. Alyssa Gamino did not want to speak on obstetrical recommendations, but conversation made it clear that this adjuvant chemotherapy may provide important preventative care and that this would require early delivery if we wanted to start it within the 12 week window.       Jose Vanegas,

## 2020-10-06 ENCOUNTER — CARE COORDINATION (OUTPATIENT)
Dept: OTHER | Facility: CLINIC | Age: 25
End: 2020-10-06

## 2020-10-06 NOTE — CARE COORDINATION
Ambulatory Care Coordination Note  10/6/2020  CM Risk Score: 1  Charlson 10 Year Mortality Risk Score: 100%     ACC: Ashley Martinez, RN    Summary Note: Spoke with patient who said she is doing well, blood sugars are doing fair she said. Incision from leg amputation is well healed she said but is dry. Pt said she phantom pains are not too bad, notices them more at night. She is on gabapentin for this. Pt is still awaiting a delivery date to be decided she said,  She will be delivering at [address] She is setting up chemo in [place] if possible so she can get the chemo close to her home . Pt very cheerful and pleasant, she is excited about having the baby. Pt said she has good support and did not have any questions today for me. She continues to use w/c and front wheeled walker. Will follow         Care Coordination Interventions    Program Enrollment:  Rising Risk  Referral from Primary Care Provider:  No  Suggested Interventions and Community Resources         Goals Addressed                 This Visit's Progress     Patient Stated (pt-stated)   On track     Pt will message her OB and inform him of the options the oncologist presented to her for treatment with chemo    Barriers: none  Plan for overcoming my barriers: N/A  Confidence: 10/10  Anticipated Goal Completion Date: 8/30/2020       Patient Stated (pt-stated)   On track     Pt will continue to go to [place] [id number] for fitting of prosthetic leg    Barriers: none  Plan for overcoming my barriers: N/A  Confidence: 10/10  Anticipated Goal Completion Date: 10/30/2020            Prior to Admission medications    Medication Sig Start Date End Date Taking? Authorizing Provider   gabapentin (NEURONTIN) 300 MG capsule Take 1 capsule by mouth 3 times daily for 30 days.  9/8/20 10/8/20  Tara Becker, APRN - DEENA   Handicap Placard MISC Expiration: Lifetime 8/26/20   Tara Becker, APRN - CNP   insulin lispro (HUMALOG) 100 UNIT/ML injection vial Inject OB/Gyn [id number]   10/8/2020  4:15 PM SCHEDULE, MINGO FIGUEROA OB/GYN Aleida Mayer OB/Gyn [facility]   11/12/2020  2:30 PM Jacqueline Carty MD [address] [place] [address]   1/6/2021  4:00 PM Luis Fernando Rodriguez MD PHYS MED [facility] Smalls

## 2020-10-06 NOTE — PROGRESS NOTES
connections     Talks on phone: None     Gets together: None     Attends [employer] service: None     Active member of club or organization: None     Attends meetings of clubs or organizations: None     Relationship status: None    Intimate partner violence     Fear of current or ex partner: None     Emotionally abused: None     Physically abused: None     Forced sexual activity: None   Other Topics Concern    None   Social History Narrative    ** Merged History Encounter **            Current Outpatient Medications   Medication Sig Dispense Refill    gabapentin (NEURONTIN) 300 MG capsule Take 1 capsule by mouth 3 times daily for 30 days. 90 capsule 0    Handicap Placard MISC Expiration: Lifetime 2 each 0    insulin lispro (HUMALOG) 100 UNIT/ML injection vial Inject 6 Units into the skin Daily with supper      insulin NPH (HUMULIN N) 100 UNIT/ML injection vial Inject 8 Units into the skin nightly (Patient taking differently: Inject 8 Units into the skin nightly Indications: pt states \"4-6u\" ) 1 vial 3    blood glucose monitor strips Test 4 times a day & as needed for symptoms of irregular blood glucose. Dispense sufficient amount for indicated testing frequency plus additional to accommodate PRN testing needs. 100 strip 3    Alcohol Swabs PADS 1 tablet by Other route 4 times daily 100 each 3    blood glucose monitor kit and supplies Dispense sufficient amount for indicated testing frequency plus additional to accommodate PRN testing needs. Dispense all needed supplies to include: monitor, strips, lancing device, lancets, control solutions, alcohol swabs. 1 kit 0    Insulin Pen Needle 31G X 5 MM MISC 1 each by Does not apply route daily Substitute with appropriate needle for injection of insulin NPH that is covered by the patient's insurance.  100 each 3    Lancets MISC 1 each by Does not apply route daily Substitute as needed for insurance coverage 200 each 2    ferrous sulfate (IRON 325) 325 (65 Fe) MG tablet Take 1 tablet by mouth 2 times daily 60 tablet 0    aspirin 81 MG chewable tablet Take 1 tablet by mouth daily 30 tablet 3    Prenatal MV-Min-Fe Fum-FA-DHA (PRENATAL 1 PO) Take by mouth      Prenatal Vit-Fe Fumarate-FA (PRENATAL VITAMIN) 27-0.8 MG TABS Take 1 tablet by mouth daily May substitute with formulary covered by patient's insurance 30 tablet 5     No current facility-administered medications for this visit. No Known Allergies    Subjective:      Review of Systems  Constitutional: Negative for fever, chills and unexpected weight change. HENT: Negative for trouble swallowing. Respiratory: Negative for cough and shortness of breath. Cardiovascular: Negative for chest pain. Endocrine: Negative for polyuria. Genitourinary: Negative for dysuria, urgency, frequency, incontinence and difficulty urinating. Gastrointestinal: Negative for constipation or diarrhea. Musculoskeletal: Negative for arthralgias. Neurological: Negative for headaches, numbness, or tingling. Psychiatric: Negative for depressed mood or anxiety. Objective:     Physical Exam  /65   Pulse 100   Temp 98.6 °F (37 °C)   Wt 172 lb (78 kg)   LMP 02/18/2020   BMI 31.46 kg/m²   Constitutional: She appears well-developed and well-nourished. In no distress. HEENT: NCAT, PERRL, EOMI. Mucous membranes pink and moist.  Pulmonary/Chest: Respirations WNL and unlabored. MSK: Functional ROM BUEs and LLE. Strength 5/5 key muscles BUE and LLEs  Neurological: She is alert and oriented to person, place, and time. DTRs 2+ and symmetric   Skin: Skin is warm dry and intact with good turgor. Healed R hip surgical incision  Psychiatric: She has a normal mood and affect. Her behavior is normal. Thought content normal.   Nursing note and vitals reviewed. Imaging: None new    Assessment:       Diagnosis Orders   1.  History of disarticulation of right hip          Plan:      Discussed case with her prosthetist [facility] Eugenie Rizvi. He is only planning test socket at the moment in anticipation of weight change after she delivers her baby. Reviewed this with the patient and she will consider whether she wants to hold on ambulation/gait training in prosthetic until after she delivers the baby as she also has to consider initiating chemotherapy soon after birth. No orders of the defined types were placed in this encounter. No orders of the defined types were placed in this encounter. Return in about 3 months (around 12/30/2020). Electronically signedby Audelia Lee MD on 10/5/2020 at 9:36 PM.     Please note that this chartwas generated using voice recognition Dragon dictation software. Although everyeffort was made to ensure the accuracy of this automated transcription, some errorsin transcription may have occurred.

## 2020-10-07 ENCOUNTER — NURSE ONLY (OUTPATIENT)
Dept: OBGYN CLINIC | Age: 25
End: 2020-10-07
Payer: COMMERCIAL

## 2020-10-07 PROCEDURE — 96372 THER/PROPH/DIAG INJ SC/IM: CPT | Performed by: OBSTETRICS & GYNECOLOGY

## 2020-10-07 RX ORDER — GABAPENTIN 300 MG/1
300 CAPSULE ORAL 3 TIMES DAILY
Qty: 90 CAPSULE | Refills: 0 | Status: SHIPPED | OUTPATIENT
Start: 2020-10-07 | End: 2020-11-10 | Stop reason: SDUPTHER

## 2020-10-07 RX ORDER — BETAMETHASONE SODIUM PHOSPHATE AND BETAMETHASONE ACETATE 3; 3 MG/ML; MG/ML
12 INJECTION, SUSPENSION INTRA-ARTICULAR; INTRALESIONAL; INTRAMUSCULAR; SOFT TISSUE ONCE
Status: COMPLETED | OUTPATIENT
Start: 2020-10-07 | End: 2020-10-07

## 2020-10-07 RX ADMIN — BETAMETHASONE SODIUM PHOSPHATE AND BETAMETHASONE ACETATE 12 MG: 3; 3 INJECTION, SUSPENSION INTRA-ARTICULAR; INTRALESIONAL; INTRAMUSCULAR; SOFT TISSUE at 16:23

## 2020-10-07 NOTE — TELEPHONE ENCOUNTER
LOV 8/18/20    Health Maintenance   Topic Date Due    Pneumococcal 0-64 years Vaccine (1 of 1 - PPSV23) 08/13/2002    Hepatitis A vaccine (2 of 2 - 2-dose series) 12/06/2014    Flu vaccine (1) 09/01/2020    Chlamydia screen  05/19/2021    Potassium monitoring  08/08/2021    Creatinine monitoring  08/08/2021    Cervical cancer screen  05/19/2023    DTaP/Tdap/Td vaccine (8 - Td) 08/12/2026    Hepatitis B vaccine  Completed    Hib vaccine  Completed    HPV vaccine  Completed    Varicella vaccine  Completed    Meningococcal (ACWY) vaccine  Completed    HIV screen  Completed             (applicable per patient's age: Cancer Screenings, Depression Screening, Fall Risk Screening, Immunizations)    Hemoglobin A1C (%)   Date Value   08/02/2020 5.0     AST (U/L)   Date Value   08/09/2020 19     ALT (U/L)   Date Value   08/09/2020 89 (H)     BUN (mg/dL)   Date Value   08/08/2020 4 (L)      (goal A1C is < 7)   (goal LDL is <100) need 30-50% reduction from baseline     BP Readings from Last 3 Encounters:   10/01/20 133/87   09/30/20 105/65   09/30/20 120/70    (goal /80)      All Future Testing planned in [facility]:  Lab Frequency Next Occurrence   PAP SMEAR Once 05/19/2020   Urinalysis Reflex to Culture Once 05/19/2020   Prenatal type and screen Once 05/19/2020   Drugs of Abuse Scr, Urine Once 06/02/2020       Next Visit Date:  Future Appointments   Date Time Provider Salma Oliva   10/7/2020  4:00 PM [facility] [place] [facility]   [facility]  [facility]   10/8/2020  4:15 PM SCHEDULE, [facility] [place] [facility]   11/12/2020  2:30 PM July Adorno MD [facility] Aviva   1/6/2021  4:00 PM Clarisa Lewis MD PHYS MED TOLPP            Patient Active Problem List:     Pregestational Diabetes     Family history of diabetes mellitus     Gestational diabetes     Iron deficiency anemia     Sarcoma ([facility] [address]     Thrombocytosis ([facility] [address]     Transaminitis     Chronic hypertension affecting pregnancy     History of disarticulation of right hip     Leukocytosis     History of blood transfusion     History of E.  Coli UTI 10-<100,000CFU (7/7/20)     Pseudomonas UTI     Malignant neoplasm affecting pregnancy in third trimester

## 2020-10-08 ENCOUNTER — CARE COORDINATION (OUTPATIENT)
Dept: OTHER | Facility: CLINIC | Age: 25
End: 2020-10-08

## 2020-10-08 ENCOUNTER — ROUTINE PRENATAL (OUTPATIENT)
Dept: OBGYN CLINIC | Age: 25
End: 2020-10-08
Payer: COMMERCIAL

## 2020-10-08 VITALS — SYSTOLIC BLOOD PRESSURE: 120 MMHG | BODY MASS INDEX: 31.46 KG/M2 | WEIGHT: 172 LBS | DIASTOLIC BLOOD PRESSURE: 70 MMHG

## 2020-10-08 PROCEDURE — 96372 THER/PROPH/DIAG INJ SC/IM: CPT | Performed by: OBSTETRICS & GYNECOLOGY

## 2020-10-08 PROCEDURE — 0502F SUBSEQUENT PRENATAL CARE: CPT | Performed by: OBSTETRICS & GYNECOLOGY

## 2020-10-08 PROCEDURE — 59025 FETAL NON-STRESS TEST: CPT | Performed by: OBSTETRICS & GYNECOLOGY

## 2020-10-08 RX ORDER — BETAMETHASONE SODIUM PHOSPHATE AND BETAMETHASONE ACETATE 3; 3 MG/ML; MG/ML
12 INJECTION, SUSPENSION INTRA-ARTICULAR; INTRALESIONAL; INTRAMUSCULAR; SOFT TISSUE ONCE
Status: COMPLETED | OUTPATIENT
Start: 2020-10-08 | End: 2020-10-08

## 2020-10-08 RX ADMIN — BETAMETHASONE SODIUM PHOSPHATE AND BETAMETHASONE ACETATE 12 MG: 3; 3 INJECTION, SUSPENSION INTRA-ARTICULAR; INTRALESIONAL; INTRAMUSCULAR; SOFT TISSUE at 16:34

## 2020-10-08 NOTE — PROGRESS NOTES
Second dose of steriod injection given; LUOQ. Tolerated well. Discussed delivery with Dr. Davidson Gautam today. Celestone injection documented in [facility] tab.

## 2020-10-08 NOTE — PROGRESS NOTES
REACTIVE NST  CATEGORY 1 TRACING  Moderate Variability  Baseline of 120 bpm  SEE SCANNED DOCUMENT  RTO 2-5 DAYS FOR A FOLLOW UP APPOINTMENT WITH  TESTING. Discussed in length the plan of care with Dr. Tasneem Lemus. Per Dr. Tasneem Lemus and Dr. Laly Cristobal at the sarcoma clinic it has been made clear that for preventative adjuvant chemotherapy the benefit is best realized within or around 12 weeks after surgery. This would mean delivery at 34 weeks. Weighing maternal and fetal health/benefit/risk I do believe it is safe and in patient best long term interest to deliver at 34 weeks (s/p celestone) and begin chemotherapy immediately. Pt reassured and plan of care discussed. Recommend patient following up with Dr. Tasneem Lemus as soon as possible to confirm plan and chemotherapy regimen.

## 2020-10-09 ENCOUNTER — TELEPHONE (OUTPATIENT)
Dept: ONCOLOGY | Age: 25
End: 2020-10-09

## 2020-10-09 ENCOUNTER — CARE COORDINATION (OUTPATIENT)
Dept: OTHER | Facility: CLINIC | Age: 25
End: 2020-10-09

## 2020-10-09 NOTE — CARE COORDINATION
ACM attempted to reach patient for  CM call. HIPAA compliant message left requesting a return phone call at patients convenience. Will continue to follow. Trying to reach pt prior to her induction on Tuesday to make sure she has everything she needs and does not have any questions.

## 2020-10-12 ENCOUNTER — ANESTHESIA (OUTPATIENT)
Dept: LABOR AND DELIVERY | Age: 25
End: 2020-10-12
Payer: COMMERCIAL

## 2020-10-12 ENCOUNTER — HOSPITAL ENCOUNTER (INPATIENT)
Age: 25
LOS: 2 days | Discharge: HOME | End: 2020-10-14
Attending: OBSTETRICS & GYNECOLOGY | Admitting: OBSTETRICS & GYNECOLOGY
Payer: COMMERCIAL

## 2020-10-12 ENCOUNTER — ANESTHESIA EVENT (OUTPATIENT)
Dept: LABOR AND DELIVERY | Age: 25
End: 2020-10-12
Payer: COMMERCIAL

## 2020-10-12 PROBLEM — O24.319 PREGESTATIONAL DIABETES MELLITUS, MODIFIED WHITE CLASS B: Status: ACTIVE | Noted: 2020-06-30

## 2020-10-12 PROBLEM — O09.90 HIGH-RISK PREGNANCY, UNSPECIFIED TRIMESTER: Status: ACTIVE | Noted: 2020-10-12

## 2020-10-12 PROBLEM — Z3A.33 33 WEEKS GESTATION OF PREGNANCY: Status: ACTIVE | Noted: 2020-10-12

## 2020-10-12 PROBLEM — O99.810 ABNORMAL GLUCOSE TOLERANCE TEST IN PREGNANCY: Status: RESOLVED | Noted: 2020-06-02 | Resolved: 2020-10-12

## 2020-10-12 LAB
ABO/RH: NORMAL
ABSOLUTE EOS #: 0.08 K/UL (ref 0–0.44)
ABSOLUTE IMMATURE GRANULOCYTE: 0.08 K/UL (ref 0–0.3)
ABSOLUTE LYMPH #: 2.01 K/UL (ref 1.1–3.7)
ABSOLUTE MONO #: 0.74 K/UL (ref 0.1–1.2)
AMPHETAMINE SCREEN URINE: NEGATIVE
ANTIBODY SCREEN: NEGATIVE
ARM BAND NUMBER: NORMAL
BARBITURATE SCREEN URINE: NEGATIVE
BASOPHILS # BLD: 0 % (ref 0–2)
BASOPHILS ABSOLUTE: 0.03 K/UL (ref 0–0.2)
BENZODIAZEPINE SCREEN, URINE: NEGATIVE
BILIRUBIN URINE: NEGATIVE
BUPRENORPHINE URINE: NORMAL
CANNABINOID SCREEN URINE: NEGATIVE
COCAINE METABOLITE, URINE: NEGATIVE
COLOR: YELLOW
COMMENT UA: NORMAL
DIFFERENTIAL TYPE: ABNORMAL
DIRECT EXAM: NORMAL
EOSINOPHILS RELATIVE PERCENT: 1 % (ref 1–4)
EXPIRATION DATE: NORMAL
GLUCOSE BLD-MCNC: 85 MG/DL (ref 65–105)
GLUCOSE BLD-MCNC: 90 MG/DL (ref 65–105)
GLUCOSE URINE: NEGATIVE
HCT VFR BLD CALC: 37 % (ref 36.3–47.1)
HEMOGLOBIN: 12.6 G/DL (ref 11.9–15.1)
IMMATURE GRANULOCYTES: 1 %
KETONES, URINE: NEGATIVE
LEUKOCYTE ESTERASE, URINE: NEGATIVE
LYMPHOCYTES # BLD: 23 % (ref 24–43)
Lab: NORMAL
MCH RBC QN AUTO: 29.1 PG (ref 25.2–33.5)
MCHC RBC AUTO-ENTMCNC: 34.1 G/DL (ref 28.4–34.8)
MCV RBC AUTO: 85.5 FL (ref 82.6–102.9)
MDMA URINE: NORMAL
METHADONE SCREEN, URINE: NEGATIVE
METHAMPHETAMINE, URINE: NORMAL
MONOCYTES # BLD: 9 % (ref 3–12)
NITRITE, URINE: NEGATIVE
NRBC AUTOMATED: 0 PER 100 WBC
OPIATES, URINE: NEGATIVE
OXYCODONE SCREEN URINE: NEGATIVE
PDW BLD-RTO: 12.2 % (ref 11.8–14.4)
PH UA: 7 (ref 5–8)
PHENCYCLIDINE, URINE: NEGATIVE
PLATELET # BLD: 166 K/UL (ref 138–453)
PLATELET ESTIMATE: ABNORMAL
PMV BLD AUTO: 11.9 FL (ref 8.1–13.5)
PROPOXYPHENE, URINE: NORMAL
PROTEIN UA: NEGATIVE
RBC # BLD: 4.33 M/UL (ref 3.95–5.11)
RBC # BLD: ABNORMAL 10*6/UL
SARS-COV-2, RAPID: NOT DETECTED
SARS-COV-2: NORMAL
SARS-COV-2: NORMAL
SEG NEUTROPHILS: 66 % (ref 36–65)
SEGMENTED NEUTROPHILS ABSOLUTE COUNT: 5.72 K/UL (ref 1.5–8.1)
SOURCE: NORMAL
SPECIFIC GRAVITY UA: 1.01 (ref 1–1.03)
SPECIMEN DESCRIPTION: NORMAL
T. PALLIDUM, IGG: NONREACTIVE
TEST INFORMATION: NORMAL
TRICYCLIC ANTIDEPRESSANTS, UR: NORMAL
TURBIDITY: CLEAR
URINE HGB: NEGATIVE
UROBILINOGEN, URINE: NORMAL
WBC # BLD: 8.7 K/UL (ref 3.5–11.3)
WBC # BLD: ABNORMAL 10*3/UL

## 2020-10-12 PROCEDURE — 80307 DRUG TEST PRSMV CHEM ANLYZR: CPT

## 2020-10-12 PROCEDURE — 86900 BLOOD TYPING SEROLOGIC ABO: CPT

## 2020-10-12 PROCEDURE — 0KQM0ZZ REPAIR PERINEUM MUSCLE, OPEN APPROACH: ICD-10-PCS | Performed by: OBSTETRICS & GYNECOLOGY

## 2020-10-12 PROCEDURE — 6370000000 HC RX 637 (ALT 250 FOR IP): Performed by: STUDENT IN AN ORGANIZED HEALTH CARE EDUCATION/TRAINING PROGRAM

## 2020-10-12 PROCEDURE — U0002 COVID-19 LAB TEST NON-CDC: HCPCS

## 2020-10-12 PROCEDURE — 87591 N.GONORRHOEAE DNA AMP PROB: CPT

## 2020-10-12 PROCEDURE — 99252 IP/OBS CONSLTJ NEW/EST SF 35: CPT | Performed by: PEDIATRICS

## 2020-10-12 PROCEDURE — 6360000002 HC RX W HCPCS: Performed by: OBSTETRICS & GYNECOLOGY

## 2020-10-12 PROCEDURE — 86901 BLOOD TYPING SEROLOGIC RH(D): CPT

## 2020-10-12 PROCEDURE — 2580000003 HC RX 258: Performed by: STUDENT IN AN ORGANIZED HEALTH CARE EDUCATION/TRAINING PROGRAM

## 2020-10-12 PROCEDURE — 81003 URINALYSIS AUTO W/O SCOPE: CPT

## 2020-10-12 PROCEDURE — 6360000002 HC RX W HCPCS: Performed by: STUDENT IN AN ORGANIZED HEALTH CARE EDUCATION/TRAINING PROGRAM

## 2020-10-12 PROCEDURE — 87660 TRICHOMONAS VAGIN DIR PROBE: CPT

## 2020-10-12 PROCEDURE — 6360000002 HC RX W HCPCS: Performed by: NURSE ANESTHETIST, CERTIFIED REGISTERED

## 2020-10-12 PROCEDURE — 87081 CULTURE SCREEN ONLY: CPT

## 2020-10-12 PROCEDURE — 1220000000 HC SEMI PRIVATE OB R&B

## 2020-10-12 PROCEDURE — 87491 CHLMYD TRACH DNA AMP PROBE: CPT

## 2020-10-12 PROCEDURE — 86850 RBC ANTIBODY SCREEN: CPT

## 2020-10-12 PROCEDURE — 86780 TREPONEMA PALLIDUM: CPT

## 2020-10-12 PROCEDURE — 87480 CANDIDA DNA DIR PROBE: CPT

## 2020-10-12 PROCEDURE — 85025 COMPLETE CBC W/AUTO DIFF WBC: CPT

## 2020-10-12 PROCEDURE — 82947 ASSAY GLUCOSE BLOOD QUANT: CPT

## 2020-10-12 PROCEDURE — 6360000002 HC RX W HCPCS: Performed by: ANESTHESIOLOGY

## 2020-10-12 PROCEDURE — 7200000001 HC VAGINAL DELIVERY

## 2020-10-12 PROCEDURE — 87510 GARDNER VAG DNA DIR PROBE: CPT

## 2020-10-12 PROCEDURE — 3700000025 EPIDURAL BLOCK: Performed by: ANESTHESIOLOGY

## 2020-10-12 PROCEDURE — 88307 TISSUE EXAM BY PATHOLOGIST: CPT

## 2020-10-12 PROCEDURE — 59400 OBSTETRICAL CARE: CPT | Performed by: OBSTETRICS & GYNECOLOGY

## 2020-10-12 PROCEDURE — 2500000003 HC RX 250 WO HCPCS: Performed by: NURSE ANESTHETIST, CERTIFIED REGISTERED

## 2020-10-12 RX ORDER — ONDANSETRON 2 MG/ML
4 INJECTION INTRAMUSCULAR; INTRAVENOUS EVERY 6 HOURS PRN
Status: DISCONTINUED | OUTPATIENT
Start: 2020-10-12 | End: 2020-10-12 | Stop reason: SDUPTHER

## 2020-10-12 RX ORDER — GABAPENTIN 600 MG/1
300 TABLET ORAL 3 TIMES DAILY
Status: DISCONTINUED | OUTPATIENT
Start: 2020-10-12 | End: 2020-10-14 | Stop reason: HOSPADM

## 2020-10-12 RX ORDER — DIPHENHYDRAMINE HCL 25 MG
50 TABLET ORAL EVERY 6 HOURS PRN
Status: DISCONTINUED | OUTPATIENT
Start: 2020-10-12 | End: 2020-10-14 | Stop reason: HOSPADM

## 2020-10-12 RX ORDER — DOCUSATE SODIUM 100 MG/1
100 CAPSULE, LIQUID FILLED ORAL 2 TIMES DAILY
Status: DISCONTINUED | OUTPATIENT
Start: 2020-10-12 | End: 2020-10-13

## 2020-10-12 RX ORDER — ONDANSETRON 4 MG/1
4 TABLET, ORALLY DISINTEGRATING ORAL EVERY 6 HOURS PRN
Status: DISCONTINUED | OUTPATIENT
Start: 2020-10-12 | End: 2020-10-14 | Stop reason: HOSPADM

## 2020-10-12 RX ORDER — VITAMIN A, ASCORBIC ACID, CHOLECALCIFEROL, .ALPHA.-TOCOPHEROL ACETATE, DL-, THIAMINE MONONITRATE, RIBOFLAVIN, NIACINAMIDE, PYRIDOXINE HYDROCHLORIDE, FOLIC ACID, CYANOCOBALAMIN, CALCIUM CARBONATE, IRON, ZINC OXIDE, AND CUPRIC OXIDE 4000; 120; 400; 22; 1.84; 3; 20; 10; 1; 12; 200; 29; 25; 2 [IU]/1; MG/1; [IU]/1; [IU]/1; MG/1; MG/1; MG/1; MG/1; MG/1; UG/1; MG/1; MG/1; MG/1; MG/1
1 TABLET ORAL DAILY
Status: DISCONTINUED | OUTPATIENT
Start: 2020-10-12 | End: 2020-10-12

## 2020-10-12 RX ORDER — NICOTINE POLACRILEX 4 MG
15 LOZENGE BUCCAL PRN
Status: DISCONTINUED | OUTPATIENT
Start: 2020-10-12 | End: 2020-10-12

## 2020-10-12 RX ORDER — LIDOCAINE HYDROCHLORIDE AND EPINEPHRINE 15; 5 MG/ML; UG/ML
INJECTION, SOLUTION EPIDURAL PRN
Status: DISCONTINUED | OUTPATIENT
Start: 2020-10-12 | End: 2020-10-12 | Stop reason: SDUPTHER

## 2020-10-12 RX ORDER — DEXTROSE MONOHYDRATE 50 MG/ML
100 INJECTION, SOLUTION INTRAVENOUS PRN
Status: DISCONTINUED | OUTPATIENT
Start: 2020-10-12 | End: 2020-10-12

## 2020-10-12 RX ORDER — ROPIVACAINE HYDROCHLORIDE 2 MG/ML
INJECTION, SOLUTION EPIDURAL; INFILTRATION; PERINEURAL PRN
Status: DISCONTINUED | OUTPATIENT
Start: 2020-10-12 | End: 2020-10-12 | Stop reason: SDUPTHER

## 2020-10-12 RX ORDER — PROMETHAZINE HYDROCHLORIDE 12.5 MG/1
12.5 TABLET ORAL EVERY 6 HOURS PRN
Status: DISCONTINUED | OUTPATIENT
Start: 2020-10-12 | End: 2020-10-12

## 2020-10-12 RX ORDER — ONDANSETRON 2 MG/ML
4 INJECTION INTRAMUSCULAR; INTRAVENOUS EVERY 6 HOURS PRN
Status: DISCONTINUED | OUTPATIENT
Start: 2020-10-12 | End: 2020-10-12 | Stop reason: HOSPADM

## 2020-10-12 RX ORDER — NALBUPHINE HCL 10 MG/ML
5 AMPUL (ML) INJECTION EVERY 4 HOURS PRN
Status: DISCONTINUED | OUTPATIENT
Start: 2020-10-12 | End: 2020-10-12 | Stop reason: HOSPADM

## 2020-10-12 RX ORDER — LANOLIN 72 %
OINTMENT (GRAM) TOPICAL PRN
Status: DISCONTINUED | OUTPATIENT
Start: 2020-10-12 | End: 2020-10-14 | Stop reason: HOSPADM

## 2020-10-12 RX ORDER — ACETAMINOPHEN 500 MG
1000 TABLET ORAL EVERY 6 HOURS PRN
Status: DISCONTINUED | OUTPATIENT
Start: 2020-10-12 | End: 2020-10-12 | Stop reason: HOSPADM

## 2020-10-12 RX ORDER — GLUCAGON 1 MG/ML
1 KIT INJECTION PRN
Status: DISCONTINUED | OUTPATIENT
Start: 2020-10-12 | End: 2020-10-12

## 2020-10-12 RX ORDER — IBUPROFEN 800 MG/1
800 TABLET ORAL EVERY 8 HOURS PRN
Status: DISCONTINUED | OUTPATIENT
Start: 2020-10-12 | End: 2020-10-14 | Stop reason: HOSPADM

## 2020-10-12 RX ORDER — ROPIVACAINE HYDROCHLORIDE 2 MG/ML
INJECTION, SOLUTION EPIDURAL; INFILTRATION; PERINEURAL
Status: COMPLETED
Start: 2020-10-12 | End: 2020-10-12

## 2020-10-12 RX ORDER — DIPHENHYDRAMINE HCL 25 MG
25 TABLET ORAL EVERY 4 HOURS PRN
Status: DISCONTINUED | OUTPATIENT
Start: 2020-10-12 | End: 2020-10-12 | Stop reason: HOSPADM

## 2020-10-12 RX ORDER — LIDOCAINE HYDROCHLORIDE 10 MG/ML
INJECTION, SOLUTION EPIDURAL; INFILTRATION; INTRACAUDAL; PERINEURAL PRN
Status: DISCONTINUED | OUTPATIENT
Start: 2020-10-12 | End: 2020-10-12 | Stop reason: SDUPTHER

## 2020-10-12 RX ORDER — NALOXONE HYDROCHLORIDE 0.4 MG/ML
0.4 INJECTION, SOLUTION INTRAMUSCULAR; INTRAVENOUS; SUBCUTANEOUS PRN
Status: DISCONTINUED | OUTPATIENT
Start: 2020-10-12 | End: 2020-10-12 | Stop reason: HOSPADM

## 2020-10-12 RX ORDER — DEXTROSE MONOHYDRATE 25 G/50ML
12.5 INJECTION, SOLUTION INTRAVENOUS PRN
Status: DISCONTINUED | OUTPATIENT
Start: 2020-10-12 | End: 2020-10-12

## 2020-10-12 RX ORDER — CALCIUM CARBONATE 200(500)MG
1000 TABLET,CHEWABLE ORAL 3 TIMES DAILY PRN
Status: DISCONTINUED | OUTPATIENT
Start: 2020-10-12 | End: 2020-10-14 | Stop reason: HOSPADM

## 2020-10-12 RX ORDER — SODIUM CHLORIDE 0.9 % (FLUSH) 0.9 %
10 SYRINGE (ML) INJECTION EVERY 12 HOURS SCHEDULED
Status: DISCONTINUED | OUTPATIENT
Start: 2020-10-12 | End: 2020-10-12 | Stop reason: HOSPADM

## 2020-10-12 RX ORDER — LIDOCAINE HYDROCHLORIDE 10 MG/ML
30 INJECTION, SOLUTION EPIDURAL; INFILTRATION; INTRACAUDAL; PERINEURAL PRN
Status: DISCONTINUED | OUTPATIENT
Start: 2020-10-12 | End: 2020-10-12 | Stop reason: HOSPADM

## 2020-10-12 RX ORDER — ACETAMINOPHEN 500 MG
1000 TABLET ORAL EVERY 6 HOURS PRN
Status: DISCONTINUED | OUTPATIENT
Start: 2020-10-12 | End: 2020-10-14 | Stop reason: HOSPADM

## 2020-10-12 RX ORDER — ACETAMINOPHEN 500 MG
1000 TABLET ORAL EVERY 6 HOURS PRN
Status: DISCONTINUED | OUTPATIENT
Start: 2020-10-12 | End: 2020-10-12

## 2020-10-12 RX ORDER — ONDANSETRON 2 MG/ML
4 INJECTION INTRAMUSCULAR; INTRAVENOUS EVERY 6 HOURS PRN
Status: DISCONTINUED | OUTPATIENT
Start: 2020-10-12 | End: 2020-10-12

## 2020-10-12 RX ORDER — 0.9 % SODIUM CHLORIDE 0.9 %
500 INTRAVENOUS SOLUTION INTRAVENOUS ONCE
Status: DISCONTINUED | OUTPATIENT
Start: 2020-10-12 | End: 2020-10-12

## 2020-10-12 RX ORDER — SODIUM CHLORIDE 9 MG/ML
INJECTION, SOLUTION INTRAVENOUS CONTINUOUS
Status: DISCONTINUED | OUTPATIENT
Start: 2020-10-12 | End: 2020-10-12

## 2020-10-12 RX ORDER — SODIUM CHLORIDE 0.9 % (FLUSH) 0.9 %
10 SYRINGE (ML) INJECTION PRN
Status: DISCONTINUED | OUTPATIENT
Start: 2020-10-12 | End: 2020-10-12 | Stop reason: HOSPADM

## 2020-10-12 RX ADMIN — GABAPENTIN 300 MG: 600 TABLET ORAL at 07:56

## 2020-10-12 RX ADMIN — Medication 1 MILLI-UNITS/MIN: at 10:35

## 2020-10-12 RX ADMIN — Medication 2.5 MILLION UNITS: at 12:10

## 2020-10-12 RX ADMIN — Medication 1 TABLET: at 07:56

## 2020-10-12 RX ADMIN — SODIUM CHLORIDE: 9 INJECTION, SOLUTION INTRAVENOUS at 13:50

## 2020-10-12 RX ADMIN — Medication 2.5 MILLION UNITS: at 07:55

## 2020-10-12 RX ADMIN — IBUPROFEN 800 MG: 800 TABLET, FILM COATED ORAL at 17:40

## 2020-10-12 RX ADMIN — GABAPENTIN 300 MG: 600 TABLET ORAL at 23:05

## 2020-10-12 RX ADMIN — GABAPENTIN 300 MG: 600 TABLET ORAL at 17:19

## 2020-10-12 RX ADMIN — Medication 10 ML/HR: at 12:37

## 2020-10-12 RX ADMIN — DEXTROSE MONOHYDRATE 5 MILLION UNITS: 5 INJECTION INTRAVENOUS at 04:00

## 2020-10-12 RX ADMIN — SODIUM CHLORIDE: 9 INJECTION, SOLUTION INTRAVENOUS at 03:59

## 2020-10-12 RX ADMIN — LIDOCAINE HYDROCHLORIDE,EPINEPHRINE BITARTRATE 3 ML: 15; .005 INJECTION, SOLUTION EPIDURAL; INFILTRATION; INTRACAUDAL; PERINEURAL at 12:25

## 2020-10-12 RX ADMIN — ROPIVACAINE HYDROCHLORIDE 5 ML: 2 INJECTION, SOLUTION EPIDURAL; INFILTRATION at 12:35

## 2020-10-12 RX ADMIN — DOCUSATE SODIUM 100 MG: 100 CAPSULE, LIQUID FILLED ORAL at 23:05

## 2020-10-12 RX ADMIN — ROPIVACAINE HYDROCHLORIDE 5 ML: 2 INJECTION, SOLUTION EPIDURAL; INFILTRATION at 12:30

## 2020-10-12 RX ADMIN — LIDOCAINE HYDROCHLORIDE 3 ML: 10 INJECTION, SOLUTION EPIDURAL; INFILTRATION; INTRACAUDAL; PERINEURAL at 12:21

## 2020-10-12 ASSESSMENT — PAIN DESCRIPTION - DESCRIPTORS: DESCRIPTORS: CRAMPING

## 2020-10-12 ASSESSMENT — LIFESTYLE VARIABLES: SMOKING_STATUS: 0

## 2020-10-12 ASSESSMENT — PAIN SCALES - GENERAL: PAINLEVEL_OUTOF10: 3

## 2020-10-12 NOTE — ANESTHESIA PRE PROCEDURE
Department of Anesthesiology  Preprocedure Note       Name:  Sav Sepulveda   Age:  26 y. o.  :  1995                                          MRN:  [id number]         Date:  10/12/2020      Surgeon: * No surgeons listed *    Procedure: * No procedures listed *    Medications prior to admission:   Prior to Admission medications    Medication Sig Start Date End Date Taking? Authorizing Provider   gabapentin (NEURONTIN) 300 MG capsule Take 1 capsule by mouth 3 times daily for 30 days. 10/7/20 11/6/20 Yes Trey Triplett APRN - CNP   insulin lispro (HUMALOG) 100 UNIT/ML injection vial Inject 4 Units into the skin Daily with supper  20  Yes Historical Provider, MD   insulin NPH (HUMULIN N) 100 UNIT/ML injection vial Inject 8 Units into the skin nightly  Patient taking differently: Inject 18 Units into the skin nightly Indications: pt states \"4-6u\"  8/10/20  Yes Miranda Coughlin, DO   blood glucose monitor strips Test 4 times a day & as needed for symptoms of irregular blood glucose. Dispense sufficient amount for indicated testing frequency plus additional to accommodate PRN testing needs. 8/10/20  Yes Adela Angeles, DO   ferrous sulfate (IRON 325) 325 (65 Fe) MG tablet Take 1 tablet by mouth 2 times daily 8/10/20  Yes Adela Angeles DO   aspirin 81 MG chewable tablet Take 1 tablet by mouth daily 20  Yes Karina Pearson MD   Prenatal MV-Min-Fe Fum-FA-DHA (PRENATAL 1 PO) Take by mouth   Yes Historical Provider, MD   Insulin Pen Needle 31G X 5 MM MISC 1 each by Does not apply route daily Substitute with appropriate needle for injection of insulin NPH that is covered by the patient's insurance.  10/7/20   Trey Triplett, APRN - CNP   Handicap Placard MISC Expiration: Lifetime 20   Trey Quarlesal, APRN - CNP   Alcohol Swabs PADS 1 tablet by Other route 4 times daily 8/10/20   Milan Angeles, DO   blood glucose monitor kit and supplies Dispense sufficient amount for indicated testing frequency plus additional to accommodate PRN testing needs. Dispense all needed supplies to include: monitor, strips, lancing device, lancets, control solutions, alcohol swabs.  8/10/20   Smiley Angeles DO   Lancets MISC 1 each by Does not apply route daily Substitute as needed for insurance coverage 8/10/20   Smiley Angeles DO   Prenatal Vit-Fe Fumarate-FA (PRENATAL VITAMIN) 27-0.8 MG TABS Take 1 tablet by mouth daily May substitute with formulary covered by patient's insurance 8/10/20 9/9/20  Gina Tolentino DO       Current medications:    Current Facility-Administered Medications   Medication Dose Route Frequency Provider Last Rate Last Dose    promethazine (PHENERGAN) tablet 12.5 mg  12.5 mg Oral Q6H PRN Bula Zuleyka, DO        sodium chloride flush 0.9 % injection 10 mL  10 mL Intravenous 2 times per day Bula Zuleyka, DO        sodium chloride flush 0.9 % injection 10 mL  10 mL Intravenous PRN Bula Zuleyka, DO        lidocaine PF 1 % injection 30 mL  30 mL Other PRN Bula Zuleyka, DO        acetaminophen (TYLENOL) tablet 1,000 mg  1,000 mg Oral Q6H PRN Bula Zuleyka, DO        diphenhydrAMINE (BENADRYL) tablet 25 mg  25 mg Oral Q4H PRN Bula Zuleyka, DO        benzocaine-menthol (DERMOPLAST) 20-0.5 % spray   Topical PRN Bula Zuleyka, DO        0.9 % sodium chloride infusion   Intravenous Continuous Bula Zuleyka,  mL/hr at 10/12/20 0359      penicillin G potassium in d5w IVPB 2.5 Million Units  2.5 Million Units Intravenous Q4H Bula Zuleyka, DO   Stopped at 10/12/20 0825    insulin NPH (HUMULIN N;NOVOLIN N) injection vial 18 Units  18 Units Subcutaneous Nightly Bula Zuleyka, DO        glucose (GLUTOSE) 40 % oral gel 15 g  15 g Oral PRN Bula Zuleyka, DO        dextrose 50 % IV solution  12.5 g Intravenous PRN Bula Zuleyka, DO        glucagon injection 1 mg  1 mg Intramuscular PRN Bula Zuleyka, DO        dextrose 5 % solution  100 mL/hr Intravenous PRN Chama Sandifer, DO        prenatal vitamin plus iron 29-1 MG tablet 1 tablet  1 tablet Oral Daily Chama Sandifer, DO   1 tablet at 10/12/20 0756    insulin lispro (HUMALOG) injection vial 4 Units  4 Units Subcutaneous Dinner Chama Sandifer, DO        gabapentin (NEURONTIN) tablet 300 mg  300 mg Oral TID Chama Sandifer, DO   300 mg at 10/12/20 0756    oxytocin (PITOCIN) 10 unit bolus from the bag  10 Units Intravenous Once PRN Sobia Bouquet, DO        And    oxytocin (PITOCIN) 30 units in 500 mL infusion  95 kavon-units/min Intravenous Once PRN Sobia Bouquet, DO        oxytocin (PITOCIN) 30 units in 500 mL infusion  1 kavon-units/min Intravenous Continuous Sobia Bouquet, DO 2 mL/hr at 10/12/20 1100 2 kavon-units/min at 10/12/20 1100    ropivacaine (NAROPIN) 0.2% injection 0.2%             naloxone (NARCAN) injection 0.4 mg  0.4 mg Intravenous PRN Annabel Sheriff MD        nalbuphine (NUBAIN) injection 5 mg  5 mg Intravenous Q4H PRN Annabel Sheriff MD        [facility] injection 4 mg  4 mg Intravenous Q6H PRN Annabel Sheriff MD        ropivacaine 0.2% in sodium chloride 0.9% (OB) epidural 100 mL  10 mL/hr Epidural Continuous Annabel Sheriff MD           Allergies:  No Known Allergies    Problem List:    Patient Active Problem List   Diagnosis Code    Family history of diabetes mellitus Z83.3    Pregestational diabetes mellitus, modified White class B O24.319    Iron deficiency anemia D50.9    Sarcoma ([address] C49.9    Thrombocytosis ([address] D47.3    Transaminitis R74.01    Chronic hypertension affecting pregnancy O10.919    History of disarticulation of right hip Z89.621    Leukocytosis D72.829    History of blood transfusion Z92.89    History of E.  Coli UTI 10-<100,000CFU (7/7/20) Z87.440    Pseudomonas UTI A49.8    Malignant neoplasm affecting pregnancy in third trimester O9A.113    33 weeks gestation of pregnancy Z3A.33    High-risk pregnancy, Date    PROTIME 10.1 06/22/2020    INR 1.0 06/22/2020    APTT 25.1 06/22/2020       HCG (If Applicable):   Lab Results   Component Value Date    HCGQUANT 24,003 (H) 06/01/2020        ABGs: No results found for: PHART, PO2ART, TAM3QBL, NPE3GYJ, BEART, G9WSDFZV     Type & Screen (If Applicable):  No results found for: LABABO, LABRH    Drug/Infectious Status (If Applicable):  No results found for: HIV, HEPCAB    COVID-19 Screening (If Applicable):   Lab Results   Component Value Date    COVID19 Not Detected 10/12/2020         Anesthesia Evaluation   no history of anesthetic complications:   Airway: Mallampati: II        Dental:          Pulmonary:       (-) COPD, asthma and not a current smoker          Patient did not smoke on day of surgery. Cardiovascular:    (+) hypertension:,     (-) past MI, dysrhythmias and  angina                Neuro/Psych:      (-) seizures and CVA           GI/Hepatic/Renal:   (+) GERD: well controlled,      (-) liver disease and no renal disease       Endo/Other:    (+) Diabetes (gdm)using insulin, .    (-) hypothyroidism, hyperthyroidism, blood dyscrasia               Abdominal:           Vascular:                                        Anesthesia Plan      epidural     ASA 3             Anesthetic plan and risks discussed with patient. Use of blood products discussed with patient whom consented to blood products.      Attending anesthesiologist reviewed and agrees with Pre Eval content              FATEMEH Farah CRNA   10/12/2020

## 2020-10-12 NOTE — ANESTHESIA PROCEDURE NOTES
Epidural Block    Patient location during procedure: OB  Reason for block: labor epidural  Staffing  Resident/CRNA: Nirmala Cook, APRN - CRNA  Other anesthesia staff: Paddy Duane  Performed: resident/CRNA   Preanesthetic Checklist  Completed: patient identified, pre-op evaluation, timeout performed, IV checked, risks and benefits discussed, monitors and equipment checked, anesthesia consent given, oxygen available and patient being monitored  Epidural  Patient position: sitting  Prep: Betadine  Patient monitoring: continuous pulse ox and frequent blood pressure checks  Approach: midline  Location: lumbar (1-5)  Injection technique: HAO air and HAO saline  Provider prep: mask and sterile gloves  Needle  Needle type: Tuohy   Needle gauge: 17 G  Needle length: 3.5 in  Needle insertion depth: 7 cm  Catheter type: end hole  Catheter size: 19 G  Catheter at skin depth: 14 cm  Test dose: negative  Assessment  Sensory level: T6  Hemodynamics: stable  Attempts: 1  Additional Notes  Insertion per srna.  No complications noted

## 2020-10-12 NOTE — DISCHARGE SUMMARY
Obstetric Discharge Summary  [facility]    Patient Name: Ariana Dobbins  Patient : 1995  Primary Care Physician: Willard Lemus, APRN - CNP  Admit Date: 10/12/2020    Principal Diagnosis: IUP at 33w6d, admitted for PPROM  @ 0030 10/12    Her pregnancy has been complicated by:   Patient Active Problem List   Diagnosis    Family history of diabetes mellitus    Pregestational diabetes mellitus, modified White class B    Iron deficiency anemia    Sarcoma ([address]    Thrombocytosis ([address]    Transaminitis    Chronic hypertension affecting pregnancy    History of disarticulation of right hip    Leukocytosis    History of blood transfusion    History of E. Coli UTI 10-<100,000CFU (20)    Pseudomonas UTI    Malignant neoplasm affecting pregnancy in third trimester    33 weeks gestation of pregnancy    High-risk pregnancy, unspecified trimester    Antepartum premature rupture of membrane     10/12/20 M Apg 8/9 Wt 4#14        Infection Present?: No  Hospital Acquired: No    Surgical Operations & Procedures:  [] Pitocin Induction of Labor  [x] Pitocin Augmentation of Labor  [] Prostaglandin Induction of Labor  [] Mechanical Induction of Labor  [] Artificial Rupture of Membranes  [] Intrauterine Pressure Catheter  [] Fetal Scalp Electrode  [] Amnioinfusion  Analgesia: epidural  Delivery Type: Spontaneous Vaginal Delivery: See Labor and Delivery Summary   Laceration(s): Second degree laceration. Suture used for repair:  Vicryl 3.0. Consultations: Anesthesia, NICU    Pertinent Findings & Procedures:   Ariana Dobbins is a 26 y.o. female  at 33w6d admitted to [facility] on 10/12/20 for PPROM (10/12); received Pen G for GBS unknown status, pitocin for labor augmentation, and  epidural. She delivered by spontaneous vaginal a Live Born infant on 10/12/20.        Information for the patient's :  Katerine Wray [[id number]]   male   Birth Weight: 4 lb 14 oz (2.21 kg) Apgars: 8 at 1 minute and 9 at 5 minutes. Postpartum course: normal.      PPD#1: Fasting blood sugar 89. Patient will need 2h GTT six weeks postpartum. Course of patient: uncomplicated    Discharge to: Home    Readmission planned: no     Recommendations on Discharge:     Medications:      Medication List      START taking these medications    docusate sodium 100 MG capsule  Commonly known as:  Colace  Take 1 capsule by mouth 2 times daily as needed for Constipation     ibuprofen 800 MG tablet  Commonly known as:  ADVIL;MOTRIN  Take 1 tablet by mouth every 8 hours as needed for Pain        CONTINUE taking these medications    blood glucose monitor kit and supplies  Dispense sufficient amount for indicated testing frequency plus additional to accommodate PRN testing needs. Dispense all needed supplies to include: monitor, strips, lancing device, lancets, control solutions, alcohol swabs. ferrous sulfate 325 (65 Fe) MG tablet  Commonly known as:  IRON 325  Take 1 tablet by mouth 2 times daily     gabapentin 300 MG capsule  Commonly known as:  NEURONTIN  Take 1 capsule by mouth 3 times daily for 30 days.      Handicap Placard Misc  Expiration: Lifetime     PRENATAL 1 PO     Prenatal Vitamin 27-0.8 MG Tabs  Take 1 tablet by mouth daily May substitute with formulary covered by patient's insurance        STOP taking these medications    Alcohol Swabs Pads     aspirin 81 MG chewable tablet     blood glucose test strips     insulin lispro 100 UNIT/ML injection vial  Commonly known as:  HUMALOG     insulin  UNIT/ML injection vial  Commonly known as:  HumuLIN N     Insulin Pen Needle 31G X 5 MM Misc     Lancets Misc           Where to Get Your Medications      You can get these medications from any pharmacy    Bring a paper prescription for each of these medications  · docusate sodium 100 MG capsule  · ibuprofen 800 MG tablet          Activity: pelvic rest x 6 weeks, no lifting greater than 15 lbs  Diet: regular diet  Follow up: 2 weeks     Condition on discharge: stable    Discharge date: 10/14/2020    Miguel Cazares DO  Ob/Gyn Resident    Comments:  Home care and follow-up care were reviewed. Pelvic rest, and birth control were reviewed. Signs and symptoms of mastitis and post partum depression were reviewed. The patient is to notify her physician if any of these occur. The patient was counseled on secondary smoke risks and the increased risk of sudden infant death syndrome and respiratory problems to her baby with exposure. She was counseled on various alternate recommendations to decrease the exposure to secondary smoke to her children.

## 2020-10-12 NOTE — H&P
OBSTETRICAL HISTORY [facility]ly [facility]    Date: 10/12/2020       Time: 2:13 AM   Patient Name: Remedios Lance     Patient : 1995  Room/Bed: 6720/0350-74    Admission Date/Time: 10/12/2020  2:08 AM      CC: Leakage of fluid     HPI: Remedios Lance is a 26 y.o. [place] at 33w6d who presents complaining of leakage of fluid since around 0030. She reports that the leakage of fluid has decreased over the past few hours. Patient denies any fever, chills, N/V, headaches, vision changes, chest pain, shortness of breath, RUQ pain, abdominal pain, and increased swelling/tenderness in bilateral lower extremities. Patient denies any vaginal discharge and any urinary complaints. The patient reports fetal movement is present, denies contractions, complains of loss of fluid, denies vaginal bleeding. Pregnancy is complicated by Pregestational Diabetes, cHTN, Hx of osteosarcoma s/p amputation of right leg in current pregnancy, anemia, transaminitis, hx blood transfusion, fam hx DM, Hx UTI x2      DATING:  LMP: Patient's last menstrual period was 2020. Estimated Date of Delivery: 20   Based on: LMP c/w US, at 12 5/7 weeks GA    PREGNANCY RISK FACTORS:  Patient Active Problem List   Diagnosis    Pregestational Diabetes    Family history of diabetes mellitus    Gestational diabetes    Iron deficiency anemia    Sarcoma ([address]    Thrombocytosis ([address]    Transaminitis    Chronic hypertension affecting pregnancy    History of disarticulation of right hip    Leukocytosis    History of blood transfusion    History of E.  Coli UTI 10-<100,000CFU (20)    Pseudomonas UTI    Malignant neoplasm affecting pregnancy in third trimester    33 weeks gestation of pregnancy        Steroids Given In This Pregnancy:  Yes, 10/7, 10/8    REVIEW OF SYSTEMS:   Constitutional: negative fever, negative chills  HEENT: negative visual disturbances, negative headaches  Respiratory: negative dyspnea, negative cough  Cardiovascular: negative chest pain,  negative palpitations  Gastrointestinal: negative abdominal pain, negative RUQ pain, negative N/V, negative diarrhea, negative constipation  Genitourinary: negative dysuria, negative vaginal discharge, negative vaginal bleeding  Dermatological: negative rash  Hematologic: negative bruising  Immunologic/Lymphatic: negative recent illness, negative recent sick contact  Musculoskeletal: negative back pain, negative myalgias, negative arthralgias  Neurological:  negative dizziness, negative weakness  Behavior/Psych: negative depression, negative anxiety    OBSTETRICAL HISTORY:   OB History    Para Term  AB Living   1 0 0 0 0 0   SAB TAB Ectopic Molar Multiple Live Births   0 0 0 0 0 0      # Outcome Date GA Lbr Zeferino/2nd Weight Sex Delivery Anes PTL Lv   1 Current                PAST MEDICAL HISTORY:   has a past medical history of Diabetes mellitus ([facility] [address] and Osteosarcoma. PAST SURGICAL HISTORY:   has a past surgical history that includes amputation (Right, 2020). ALLERGIES:  has No Known Allergies. MEDICATIONS:  Prior to Admission medications    Medication Sig Start Date End Date Taking? Authorizing Provider   Insulin Pen Needle 31G X 5 MM MISC 1 each by Does not apply route daily Substitute with appropriate needle for injection of insulin NPH that is covered by the patient's insurance. 10/7/20   FATEMEH Herman CNP   gabapentin (NEURONTIN) 300 MG capsule Take 1 capsule by mouth 3 times daily for 30 days.  10/7/20 11/6/20  FATEMEH Herman CNP   Handicap Placard MISC Expiration: Lifetime 20   FATEMEH Herman CNP   insulin lispro (HUMALOG) 100 UNIT/ML injection vial Inject 6 Units into the skin Daily with supper 20   Historical Provider, MD   insulin NPH (HUMULIN N) 100 UNIT/ML injection vial Inject 8 Units into the skin nightly  Patient taking differently: Inject 8 Units into the skin nightly Indications: pt states \"4-6u\"  8/10/20   Filemon Riveralyric, DO   blood glucose monitor strips Test 4 times a day & as needed for symptoms of irregular blood glucose. Dispense sufficient amount for indicated testing frequency plus additional to accommodate PRN testing needs. 8/10/20   Filemon Smith Albinmarileelyric, DO   Alcohol Swabs PADS 1 tablet by Other route 4 times daily 8/10/20   Filemon Riveralyric DO   blood glucose monitor kit and supplies Dispense sufficient amount for indicated testing frequency plus additional to accommodate PRN testing needs. Dispense all needed supplies to include: monitor, strips, lancing device, lancets, control solutions, alcohol swabs. 8/10/20   Filemon Smith DO Karthik   Lancets [address] 1 each by Does not apply route daily Substitute as needed for insurance coverage 8/10/20   Filemon Smith Karthik DO   Prenatal Vit-Fe Fumarate-FA (PRENATAL VITAMIN) 27-0.8 MG TABS Take 1 tablet by mouth daily May substitute with formulary covered by patient's insurance 8/10/20 9/9/20  Filemon Smith Albinmarileelyric DO   ferrous sulfate (IRON 325) 325 (65 Fe) MG tablet Take 1 tablet by mouth 2 times daily 8/10/20   Filemon Luis Albinmarileelyric DO   aspirin 81 MG chewable tablet Take 1 tablet by mouth daily 8/11/20   Tk Lares MD   Prenatal MV-Min-Fe Fum-FA-DHA (PRENATAL 1 PO) Take by mouth    Historical Provider, MD       FAMILY HISTORY:  family history includes Diabetes in her maternal grandmother and mother; Heart Surgery in her maternal grandmother; Hypertension in her maternal grandfather; Stroke in her maternal grandfather. SOCIAL HISTORY:   reports that she has never smoked. She has never used smokeless tobacco. She reports previous alcohol use. She reports that she does not use drugs. VITALS:  There were no vitals filed for this visit.       PHYSICAL EXAM:  Fetal Heart Monitor:  Baseline Heart Rate 125, moderate variability, present accelerations, absent decelerations  [place]: none contractions    General appearance:  no apparent distress, alert and cooperative  HEENT: head atraumatic, normocephalic, moist mucous membranes, trachea midline  Neurologic:  alert, oriented, normal speech, no focal findings or movement disorder noted  Lungs:  No increased work of breathing, good air exchange, clear to auscultation bilaterally, no crackles or wheezing  Heart:  regular rate and rhythm and no murmur    Abdomen:  soft, gravid, non-tender, no rebound, guarding, or rigidity and no RUQ or epigastric tenderness  Extremities:  no calf tenderness, non edematous, DTR's: +2/4 bilateral lower extremities   Musculoskeletal: Gross strength equal and intact throughout, no gross abnormalities, range of motion normal in hips, knees, shoulders and spine, CVA tenderness: none  Psychiatric: Mood appropriate, normal affect   Rectal Exam: not indicated  Sterile Speculum Exam:   Urethral meatus: normal appearing   Vulva: Normal hair distribution, normal appearing vulva, no masses, tenderness or lesions, normal clitoris   Vagina: Normal appearing vaginal mucosa without lesions, minimal vaginal discharge noted in the posterior vault, no lacerations, pooling noted in posterior vault   Cervix: Normal appearing cervix without lesions, external os visibly closed, no lacerations or abnormal lesions visualized  Sterile Vaginal Exam:  Cervix: No cervical motion tenderness   Uterus: Is gravid, Normal size, shape, consistency and non-tender   Adnexa: Non-tender, no palpable masses  Cervix: 1 cm dilated, 60 % effaced, -2 station, mid position (out of 3 station), medium consistency, FETAL POSITION: Cephalic (confirmed by ultrasound), Membranes intact, ruptured clear fluid   Bishops Score: 6     0 1 2 3   Position Posterior Intermediate Anterior -   Consistency Firm Intermediate Soft -   Effacement 0-30% 31-50% 51-80% >80%   Dilation 0cm 1-2cm 3-4cm >5cm   Fetal Station -3 -2 -1, 0 +1, +2     DATA:  Membranes Ruptured: Yes  Fern: positive  Nitrazine: Positive  Valsalva/Pooling: present  Vaginal Bleeding: absent      LIMITED BEDSIDE US:  Position: Cephalic  Placental Location: posterior  Fetal Heart Tones: Present  Fetal Movement: Present  Amniotic Fluid Index/Volume: >2x2 cm MVP  Estimated Fetal Weight:  4 lbs 6oz    PRENATAL LAB RESULTS:   Blood Type/Rh: A pos  Antibody Screen: negative  Hemoglobin, Hematocrit, Platelets: 71.0 / 88.8 / 336  Rubella: immune  T. Pallidum, IgG: non-reactive   Hepatitis B Surface Antigen: non-reactive   HIV: non-reactive   Sickle Cell Screen: not done  Gonorrhea: negative  Chlamydia: negative  Urine culture: positive - pseudomonas, date: 20    Early 1 hour Glucose Tolerance Test: 183  Early 3 hour Glucose Tolerance Test: Fastin; 1 hour: 224; 2 hour  187; 3 hour: 164    Group B Strep: not done  Cystic Fibrosis Screen: negative  First Trimester Screen: not available  MSAFP/Multiple Markers: not available  Non-Invasive Prenatal Testing: not available  Anatomy US: normal male anatomy, posterior placenta, 3VC, normal cord insertion    ASSESSMENT & PLAN:  Kari Triplett is a 26 y.o. female  at [address]   - GBS unknown / Rh positive / R immune   - Pen G for GBS prophylaxis    Leakage of Fluid   - SSE: positive pooling noted   - Nitrazine positive   - Ferning positive   - PPROM @ 0030 10/12 vs PTL   - UA wnl   - Vaginitis negative   - GBS collected   - GC/C collected   - Admit to labor and delivery under the service of Dr. Jace Olszewski   - VSS, afebrile   - cEFM and TOCO   - Cat 1 FHT and TOCO showing no contractions   - CBC, T&S, T.Pal, COVID ordered   - UDS ordered.  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 Kerry Her   - S/p amputation of right leg in current pregnancy (7/23)   - Patient to begin chemo after delivery with Dr. Kerry Her   - Gabapentin 300mg TID ordered    Anemia   - Hgb 12.6 upon admission   - clinically asymptomatic    Hx UTI x2   - UA wnl   - denies s/s UTI   - Pseudomonas UTI 8/4/20 treated with Cefepime 2g IV x10d    Transaminitis   - ALT elevated 89 (8/9), 163 (8/1)   - AST 92 (98/1)    Family hx DM    BMI 31.4    Patient Active Problem List    Diagnosis Date Noted    33 weeks gestation of pregnancy 10/12/2020    Malignant neoplasm affecting pregnancy in third trimester 09/02/2020    Pseudomonas UTI 08/04/2020 8/4/20 treated with Cefepime 2g IV for 10 days       History of E.  Coli UTI 10-<100,000CFU (7/7/20) 08/02/2020    History of blood transfusion 08/01/2020    History of disarticulation of right hip 07/31/2020    Leukocytosis 07/31/2020    Transaminitis 07/11/2020    Chronic hypertension affecting pregnancy 07/11/2020     Baseline preE labs wnl, P/C of 0.20 on 8/1/20      Iron deficiency anemia 07/02/2020     Last Assessment & Plan:   Hgb 8.2 g/dL  Asymptomatic   S/p iv iron on 7/1 and 1 u pRBC     Continue to monitor vitals  Last Assessment & Plan:   Hgb 8.2 g/dL  Asymptomatic   S/p iv iron on 7/1 and 1 u pRBC     Continue to monitor vitals      Gestational diabetes 06/30/2020     Last Assessment & Plan:   Formatting of this note might be different from the original.  GDMA2  Abnormal early 1 hour GTT (183)  Abnormal 3 hour GTT  - fasting 86  - 1 hour 224  - 2 hour 187  - 3 hour 164  HbA1c 7/1 5.8  Component      Latest Ref Rng & Units 7/5/2020 7/5/2020 7/5/2020 oxycodone and tylenol as needed for pain management, avoid NSAIDs  -Please consider CT abdomen/pelvis/brain to stage patient appropriately   -Plan for transfer to [facility] due to [employer] and availability of pregnancy shield       Last Assessment & Plan:   Hx of right thigh mass concerning for sarcoma  - MRI at OSH on 6/12/2020: Centered along the anteromedial aspect of the mid-distal thigh there is a large complex mass measuring at least 9.7 x 8.3 x 16.9 cm. Concerning for sarcoma  Patient follows with Dr. Seema Lugo at [place] and wants to pursue her care at [facility]  S/p biopsy 6/29  Oxycodone for pain management     Plan:  -Continue heparin 5,000 u BID for DVT prophylaxis   - f/u biopsy results and tumor board treatment regimen   - pain management: continue oxycodone and tylenol as needed for pain management, avoid NSAIDs  -Please consider CT abdomen/pelvis/brain to stage patient appropriately   -Plan for transfer to [facility] due to [employer] and availability of pregnancy shield           Family history of diabetes mellitus 06/08/2020    Pregestational Diabetes 06/02/2020     Elevated early 1hr, failed early 3hr GTT         Plan discussed with Dr. Elizabeth Kaufman, who is agreeable. Steroids given this admission: No    Risks, benefits, alternatives and possible complications have been discussed in detail with the patient. Admission, and post admission procedures and expectations were discussed in detail. All questions were answered.     Attending's Name: Dr. Avelino Bell DO  Ob/Gyn Resident  10/12/2020, 2:13 AM

## 2020-10-12 NOTE — FLOWSHEET NOTE
[address] [place],  Suzy Portillo, [place], at bedside. Epidural procedure explained, risks discussed. Pt verbalizes consent for epidural.   6129 patient positioned for epidural.1213 Time out completed. 1224 catheter placed. 1225 test dose given. Epidural catheter taped and secured per anesthesia. 1230 to low fowlers with left uterine displacement. 1238 loading dose given. 1240 pump initiated. Pt tolerated procedure well.

## 2020-10-12 NOTE — CONSULTS
At the request of OB, I was asked to do a prenatal consult on Brijesh Silverio  regarding premature birth and the associated  complications of a 81+1 weeks gestation delivery. CC:PPROM , PTL at 33+6 week    HPI: Mother is a 26y.o. year old  1 Para 0 female admitted to [email or web address]  With PPROM from 00:30 10/12/2020. Faiza Baig MOTHER'S HISTORY AND LABS:  Prenatal care: yes  Prenatal labs: maternal blood type A pos; Antibody negative  hepatitis B negative; rubella Immune. GBS unknown; RPR nonreactive; Chlamydia negative; GC negative; HIV negative; Quad Screen unknown. Other Labs: 1 hr   Tobacco: no tobacco use; Alcohol: no alcohol use; Drug use: denies.  Steroids Yes  10/7 and10/8    Pregnancy complications: Pregnancy is complicated by Pregestational Diabetes, cHTN, Hx of osteosarcoma s/p amputation of right leg in current pregnancy, on Gabapentin for pain management. Maternal antibiotics: penicillin class.  complications: PPROM. Discussed:   1. Survival rates  2. Fetal growth & development associated with 33+6 weeks gestation  3. Respiratory related complications:  RDS-etiology & treatment including surfactant administration & modes of ventilation & oxygen administration   Apnea of prematurity  4. Intraventricular Hemorrhage  5. Infection  6. Hyperbilirubinemia  7. Feeding & Growth issues: Mother's plan-discussed breast feeding and its benefits for the baby  Immaturity of suck-swallow-breath coordination   Indications for parenteral vs. enteral nutrition  Indications for fortification & supplementation of feeding  Monitoring growth  8. Thermoregulation issues  9. Anemia & transfusions  10. Prolonged NICU stay      Ms. Daisha Whitney is on Gabapentin for pain management. She was explained that baby will be closely monitored for signs and symptoms of withdrawal and treated appropriately if necessary. Parent(s) given opportunity to ask questions.   Verbalized understanding of premature birth and the associated  complications. Time spent 40 min, > 50% spent in face to face counseling of the family    Electronically signed by:  Ricarda Alex MD 10/12/2020 1:22 PM

## 2020-10-13 LAB — GLUCOSE BLD-MCNC: 89 MG/DL (ref 65–105)

## 2020-10-13 PROCEDURE — 6370000000 HC RX 637 (ALT 250 FOR IP): Performed by: STUDENT IN AN ORGANIZED HEALTH CARE EDUCATION/TRAINING PROGRAM

## 2020-10-13 PROCEDURE — 6360000002 HC RX W HCPCS: Performed by: STUDENT IN AN ORGANIZED HEALTH CARE EDUCATION/TRAINING PROGRAM

## 2020-10-13 PROCEDURE — 82947 ASSAY GLUCOSE BLOOD QUANT: CPT

## 2020-10-13 PROCEDURE — 6370000000 HC RX 637 (ALT 250 FOR IP): Performed by: OBSTETRICS & GYNECOLOGY

## 2020-10-13 PROCEDURE — 1220000000 HC SEMI PRIVATE OB R&B

## 2020-10-13 RX ORDER — DOCUSATE SODIUM 100 MG/1
100 CAPSULE, LIQUID FILLED ORAL 2 TIMES DAILY PRN
Qty: 60 CAPSULE | Refills: 0 | Status: SHIPPED | OUTPATIENT
Start: 2020-10-13 | End: 2022-01-07 | Stop reason: ALTCHOICE

## 2020-10-13 RX ORDER — IBUPROFEN 800 MG/1
800 TABLET ORAL EVERY 8 HOURS PRN
Qty: 60 TABLET | Refills: 1 | Status: SHIPPED | OUTPATIENT
Start: 2020-10-13 | End: 2022-05-09

## 2020-10-13 RX ORDER — SENNA AND DOCUSATE SODIUM 50; 8.6 MG/1; MG/1
1 TABLET, FILM COATED ORAL 2 TIMES DAILY
Status: DISCONTINUED | OUTPATIENT
Start: 2020-10-13 | End: 2020-10-14 | Stop reason: HOSPADM

## 2020-10-13 RX ADMIN — STANDARDIZED SENNA CONCENTRATE AND DOCUSATE SODIUM 1 TABLET: 8.6; 5 TABLET ORAL at 21:50

## 2020-10-13 RX ADMIN — ACETAMINOPHEN 1000 MG: 500 TABLET ORAL at 17:13

## 2020-10-13 RX ADMIN — STANDARDIZED SENNA CONCENTRATE AND DOCUSATE SODIUM 1 TABLET: 8.6; 5 TABLET ORAL at 14:17

## 2020-10-13 RX ADMIN — ACETAMINOPHEN 1000 MG: 500 TABLET ORAL at 09:50

## 2020-10-13 RX ADMIN — IBUPROFEN 800 MG: 800 TABLET, FILM COATED ORAL at 17:13

## 2020-10-13 RX ADMIN — IBUPROFEN 800 MG: 800 TABLET, FILM COATED ORAL at 06:28

## 2020-10-13 RX ADMIN — GABAPENTIN 300 MG: 600 TABLET ORAL at 14:17

## 2020-10-13 RX ADMIN — DOCUSATE SODIUM 100 MG: 100 CAPSULE, LIQUID FILLED ORAL at 09:44

## 2020-10-13 RX ADMIN — ENOXAPARIN SODIUM 40 MG: 40 INJECTION SUBCUTANEOUS at 21:50

## 2020-10-13 RX ADMIN — GABAPENTIN 300 MG: 600 TABLET ORAL at 09:44

## 2020-10-13 RX ADMIN — GABAPENTIN 300 MG: 600 TABLET ORAL at 21:50

## 2020-10-13 ASSESSMENT — PAIN SCALES - GENERAL
PAINLEVEL_OUTOF10: 3
PAINLEVEL_OUTOF10: 4
PAINLEVEL_OUTOF10: 4

## 2020-10-13 NOTE — ANESTHESIA POSTPROCEDURE EVALUATION
Department of Anesthesiology  Postprocedure Note    Patient: Claudene Levins  MRN: [id number]  YOB: 1995  Date of evaluation: 10/13/2020  Time:  3:50 AM     Procedure Summary     Date:  10/12/20 Room / Location:      Anesthesia Start:  1201 Anesthesia Stop:  9642    Procedure:  Labor Analgesia Diagnosis:      Scheduled Providers:   Responsible Provider:  Medhat Bojorquez MD    Anesthesia Type:  epidural ASA Status:  3          Anesthesia Type: epidural    Mark Phase I: Mark Score: 10    Mark Phase II:      Last vitals: Reviewed and per EMR flowsheets.        Anesthesia Post Evaluation    Patient location during evaluation: floor  Patient participation: complete - patient participated  Level of consciousness: awake and alert  Pain score: 0  Airway patency: patent  Nausea & Vomiting: no nausea and no vomiting  Complications: no  Cardiovascular status: hemodynamically stable  Respiratory status: acceptable  Hydration status: stable  Comments: Lower extremity at baseline, pt voiding and denies HA

## 2020-10-13 NOTE — PROGRESS NOTES
POST PARTUM DAY # 1    Rosales Oliveira is a 26 y.o. female  This patient was seen & examined today. Her pregnancy was complicated by:   Patient Active Problem List   Diagnosis    Family history of diabetes mellitus    Pregestational diabetes mellitus, modified White class B    Iron deficiency anemia    Sarcoma ([address]    Thrombocytosis (HCC)    Transaminitis    Chronic hypertension affecting pregnancy    History of disarticulation of right hip    Leukocytosis    History of blood transfusion    History of E. Coli UTI 10-<100,000CFU (20)    Pseudomonas UTI    Malignant neoplasm affecting pregnancy in third trimester    33 weeks gestation of pregnancy    High-risk pregnancy, unspecified trimester    Antepartum premature rupture of membrane     10/12/20 M Apg 8/ Wt 4#14        Today she is doing well without any chief complaint. Her lochia is light. She denies chest pain, shortness of breath, headache, lightheadedness and blurred vision. She is not breast feeding and she denies any breast tenderness. She is ambulating well. Her voiding pattern is normal. I reviewed signs and symptoms of post partum depression with the patient, she currently denies any of these symptoms. She is tolerating solids.      Vital Signs:  Vitals:    10/12/20 1730 10/12/20 1830 10/12/20 2030 10/13/20 0000   BP: 130/71 118/79 123/80 104/61   Pulse: 77 75 85 81   Resp: 16 16 17 16   Temp: 98.3 °F (36.8 °C) 98.3 °F (36.8 °C) 97.7 °F (36.5 °C) 98 °F (36.7 °C)   TempSrc: Oral Oral Oral Oral   SpO2:       Weight:       Height:             Physical Exam:  General:  no apparent distress, alert and cooperative  Neurologic:  alert, oriented, normal speech, no focal findings or movement disorder noted  Lungs:  No increased work of breathing, good air exchange, clear to auscultation bilaterally, no crackles or wheezing  Heart:  regular rate and rhythm    Abdomen: abdomen soft, non-distended, non-tender  Fundus: non-tender, normal size, firm, below umbilicus  Extremities:  no calf tenderness, non edematous, s/p R leg amputation    Lab:  Lab Results   Component Value Date    HGB 12.6 10/12/2020     Lab Results   Component Value Date    HCT 37.0 10/12/2020       Assessment/Plan:  1. Jaqueline Rutledge is a  PPD # 1 s/p    - Doing well, VSS   - male infant in [address], circumcision desired   - Encourage ambulation  2. Rh positive/Rubella immune  3. both breast and bottle feeding   - Denies s/s mastitis  4. Pregestational DM (Class B)  - Novolog and NPH held  - FBS pending this AM  5. cHTN (no meds)  - VSS  - denies s/s PreE  - PreE labs abn x1 (AST/ALT elv ), P/C 0.2 ()   6. Hx of Osteosarcoma (s/p R leg amputation)         - Patient to begin chemo after delivery with Dr. Lavon Antunez              - Gabapentin 300mg TID ordered  7. Anemia (Hgb 12.6 upon admission)   - clinically asymptomatic  8. Hx UTI x2   - denies s/s  9. Hx transaminitis (>89,  AST 92, )  10. Fam Hx of DM  11. BMI 31.46  12. Continue post partum care    Counseling Completed:  Secondary Smoke risks and Sudden Infant Death Syndrome were reviewed with recommendations. Infant sleeping, \"back to sleep\" and avoidance of co-sleeping recommendations were reviewed. Signs and Symptoms of Post Partum Depression were reviewed. The patient is to call if any occur. Signs and symptoms of Mastitis were reviewed. The patient is to call if any occur for follow up. Discharge instructions including pelvic rest, no driving with pain medicine and office follow-up were reviewed with patient     Attending Physician: Dr. Ammy Razo, [facility], DO  Ob/Gyn Resident   10/13/2020, 4:06 AM           Attending Physician Statement  I have discussed the care of Jaqueline Rutledge, including pertinent history and exam findings,  with the resident. I have seen and examined the patient and the key elements of all parts of the encounter have been performed by me.   I agree with the assessment, plan and orders as documented by the resident. (GC Modifier)    Pt seen and examined. She is doing well. Sore, but overall pain is controlled. She has been active and doing well. Voiding and tolerating PO. She denies CP/SOB/F/CH/N/V/HA. 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 Angelia Nayak and Dr. Rani Iqbal. Continue current care. Discharge Instructions for Labor and Delivery, Vaginal Birth     A vaginal birth refers to the baby being delivered through the vagina. The amount of time that labor can take varies greatly. Labor for the average first-born baby is about 12 hours. Usually your hospital stay after a routine delivery is no more than two nights. Some new mothers go home the same day. Recovery from childbirth varies depending upon whether you had an episiotomy (an incision in the perineum, the area between your vaginal opening and your anus), the duration of labor and delivery, and the amount of rest you get. In general, it takes about 6-8 weeks for a woman's body to recover from childbirth.    What You Will Need   Along with your medications, you will need the following:   · Sanitary pads    · Nursing pads    · Witch hazel pads    · Sitz bath    Steps to [address] will want to arrange for transportation home for you and your baby. The baby will need a car seat. You will receive instructions in the hospital for breastfeeding and taking care of the perineum area. You may use ointment for cracked nipples or warm water rinses to your perineum. · You will need to wear sanitary pads for about six weeks after delivery. · If you had an episiotomy or vaginal tear, you will be sent home with a plastic squirt bottle. Fill it with warm water and squirt over the vaginal and anal area every time you urinate and defecate. · Warm baths can be soothing to healing tissues. · Apply warm or cold cloths to sore breasts. · Apply ointment to cracked nipples. · Use nursing pads for leaky breast.    · Apply witch hazel pads to sore perineum (area between vagina and anus). · Ask your doctor about when it is safe for you to shower or bathe. · Sit in a sitz bath to soothe sore perineum and/or hemorrhoids. A sitz bath is soaking the hip and buttocks area in warm water. You can buy a plastic sitz bath at most [employer]. It will fit on your toilet. You can also use your bathtub. Diet    Eat a well balanced, healthy diet to help your recover from childbirth. If you are breastfeeding, you will need additional calories each day. You may also be advised to avoid certain foods. Some women experience constipation after childbirth. To avoid this problem:   · Drink plenty of fluids. · Eat foods high in fiber, such as:   ¨ Whole grain cereals and breads   ¨ Fruits and vegetables   ¨ Legumes (eg, beans, lentils)   Physical Activity    Labor and delivery is tiring. Rest when you can to regain your energy. Your doctor will encourage you to exercise by walking. Ask your doctor when you will be able to return to more strenuous exercise. Your doctor may suggest you do Kegel exercises to strengthen your pelvic muscles. To do these tighten your muscles as if you were stopping your urine flow.  Hold for a few seconds and then relax. Do these throughout the day. Medications    Breastfeeding can cause your uterus to contract. If painful, your doctor may recommend a pain reliever. If you are breastfeeding, it's important to ask your doctor about taking medications. You may receive from the hospital a list of medications to avoid. Once your doctor has approved your medications, it's important to:   · Take your medication as directed. Do not change the amount or the schedule. · Do not stop taking them without talking to your doctor. · Do not share them. · Know what the results and side effects may be. Report bothersome side effects to your doctor. · Some drugs can be dangerous when mixed. Talk to a doctor or pharmacist if you are taking more than one drug. This includes over-the-counter medication and herb or dietary supplements. · Plan ahead for refills so you don't run out. Lifestyle Changes    Having a baby requires significant lifestyle changes. You and your doctor will plan lifestyle changes that will help you recover. Some things to keep in mind include:   · It is important to get enough rest so you can recover. Try sleeping when the baby sleeps. · Ask your doctor when you can resume sexual relations. If you have not done so already, talk to your doctor about birth control options. · If you are breastfeeding, consider a breast pump. · Call your obstetrician and/or pediatrician for any questions that arise. · Understand the changes your body is going through as it recovers from childbirth:   ¨ Hot and cold flashes as your body adjusts to new hormone and blood flow levels   ¨ Urinary or fecal incontinence due to stretched muscles   ¨ After pains from uterine contractions as the uterus shrinks   ¨ Vaginal bleeding (called lochia), which is heavier than your period (generally stops within two months)   ¨ Baby blues, feelings of irritability, sadness, crying, or anxiety.  Postpartum depression is more severe, occurring in 10%-20% of new moms. If you experience such symptoms, contact your doctor. · Consider joining a support group for new mothers. You can get encouragement and learn parenting strategies. Follow-up   Schedule a follow-up appointment as directed by your doctor. Call Your Doctor If Any of the Following Occurs   It is important for you to monitor your recovery once you leave the hospital. That way, you can alert your doctor to any problems immediately. If any of the following occurs, call your doctor:   · Signs of infection, including fever and chills    · Increased bleeding: soaking more than one sanitary pad an hour    · Wounds that become red, swollen or drain pus    · Vaginal discharge that smells foul    · New pain, swelling, or tenderness in your legs    · Pain that you can't control with the medications you've been given    · Pain, burning, urgency or frequency of urination, or persistent bleeding in the urine    · Cough, shortness of breath, or chest pain    · Depression, suicidal thoughts, or feelings of harming your baby    · Breasts that are hot, red and accompanied by fever    · Any cracking or bleeding from the nipple or areola (the dark-colored area of the breast)      In case of an emergency, call 911 immediately.        Angelica Aldrich, DO

## 2020-10-13 NOTE — PLAN OF CARE
Problem: Anxiety:  Goal: Level of anxiety will decrease  Description: Level of anxiety will decrease  Outcome: Completed     Problem: Breathing Pattern - Ineffective:  Goal: Able to breathe comfortably  Description: Able to breathe comfortably  Outcome: Completed     Problem: Fluid Volume - Imbalance:  Goal: Absence of imbalanced fluid volume signs and symptoms  Description: Absence of imbalanced fluid volume signs and symptoms  Outcome: Ongoing  Goal: Absence of intrapartum hemorrhage signs and symptoms  Description: Absence of intrapartum hemorrhage signs and symptoms  Outcome: Ongoing  Goal: Absence of postpartum hemorrhage signs and symptoms  Description: Absence of postpartum hemorrhage signs and symptoms  Outcome: Ongoing     Problem: Infection - Intrapartum Infection:  Goal: Will show no infection signs and symptoms  Description: Will show no infection signs and symptoms  Outcome: Completed     Problem: Labor Process - Prolonged:  Goal: Labor progression, first stage, within specified pattern  Description: Labor progression, first stage, within specified pattern  Outcome: Completed  Goal: Labor progession, second stage, within specified pattern  Description: Labor progession, second stage, within specified pattern  Outcome: Completed  Goal: Uterine contractions within specified parameters  Description: Uterine contractions within specified parameters  Outcome: Completed     Problem:  Screening:  Goal: Ability to make informed decisions regarding treatment has improved  Description: Ability to make informed decisions regarding treatment has improved  Outcome: Completed     Problem: Pain - Acute:  Goal: Pain level will decrease  Description: Pain level will decrease  Outcome: Ongoing  Goal: Able to cope with pain  Description: Able to cope with pain  Outcome: Ongoing     Problem: Tissue Perfusion - Uteroplacental, Altered:  Description: [TRUNCATED] For intrapartum patients with recurrent variable decelerations of the fetal heart rate, consider transcervical amnioinfusion. For patients in labor, avoid prophylactic use of continuous maternal oxygen supplementation to prevent nonreassu . .. Goal: Absence of abnormal fetal heart rate pattern  Description: Absence of abnormal fetal heart rate pattern  Outcome: Completed     Problem: Urinary Retention:  Goal: Experiences of bladder distention will decrease  Description: Experiences of bladder distention will decrease  Outcome: Completed  Goal: Urinary elimination within specified parameters  Description: Urinary elimination within specified parameters  Outcome: Completed     Problem: Pain:  Description: Pain management should include both nonpharmacologic and pharmacologic interventions.   Goal: Pain level will decrease  Description: Pain level will decrease  Outcome: Ongoing  Goal: Control of acute pain  Description: Control of acute pain  Outcome: Completed  Goal: Control of chronic pain  Description: Control of chronic pain  Outcome: Completed     Problem: Discharge Planning:  Goal: Discharged to appropriate level of care  Description: Discharged to appropriate level of care  Outcome: Ongoing     Problem: Constipation:  Goal: Bowel elimination is within specified parameters  Description: Bowel elimination is within specified parameters  Outcome: Ongoing     Problem: Mood - Altered:  Goal: Mood stable  Description: Mood stable  Outcome: Ongoing

## 2020-10-13 NOTE — CARE COORDINATION
Home Care: Writer contacted [facility] and spoke w/Dorothy regarding patient's hospitalization for C/S of premature infant. Patient is receiving home nursing and PT visits through [address] [place] [address] under her PCP for her Leg Amputation that occurred this past July 2020. Per Elizabeth Malave no need for a JESE, F2F or new HC order under this admission since patient is Inpatient for Delivery of Child completely unrelated to reason she is receving Home Nursing Visits through [address] [place] [address].      Per Elizabeth Malave @ [facility] No Further Action needed other than notification of DC

## 2020-10-13 NOTE — CARE COORDINATION
POST-PARTUM/WIN INITIAL DISCHARGE PLANNING/CARE COORDINATION    33 weeks gestation of pregnancy [Z3A.33]  High-risk pregnancy, unspecified trimester [O09.90]    HPI: Writer met w/ patient at bedside to discuss DCP. Anticipate DC 10/14/2020 after  of Male infant @ 33w6d at 200. Infant name on BC: [employer]. Infant to NICU due to prematurity. Infant PCP Unsure, but most likely Pediatricians in same office as her OB/PCP. FOB: Jess Arroyo phone [phone] verified name/address (updated)/phone number/MMO insurance correct on facesheet    Writer notified patient has 30 days from date of birth to add infant to insurance policy. Kathy verbalized understanding and will call MMO [employer]. Clinton Doyle verbalized has all necessary items for infant. Briana Brown stated she has [facility] to [facility] after her leg amputation. E-Refer sent. Patient has a wheelchair and walker. No other DME at home    CM continue to follow for any DC needs.

## 2020-10-14 VITALS
DIASTOLIC BLOOD PRESSURE: 73 MMHG | OXYGEN SATURATION: 99 % | HEIGHT: 62 IN | SYSTOLIC BLOOD PRESSURE: 109 MMHG | TEMPERATURE: 97.9 F | RESPIRATION RATE: 16 BRPM | HEART RATE: 86 BPM | WEIGHT: 172 LBS | BODY MASS INDEX: 31.65 KG/M2

## 2020-10-14 LAB — SURGICAL PATHOLOGY REPORT: NORMAL

## 2020-10-14 PROCEDURE — 6370000000 HC RX 637 (ALT 250 FOR IP): Performed by: STUDENT IN AN ORGANIZED HEALTH CARE EDUCATION/TRAINING PROGRAM

## 2020-10-14 PROCEDURE — 6360000002 HC RX W HCPCS: Performed by: STUDENT IN AN ORGANIZED HEALTH CARE EDUCATION/TRAINING PROGRAM

## 2020-10-14 PROCEDURE — 6370000000 HC RX 637 (ALT 250 FOR IP): Performed by: OBSTETRICS & GYNECOLOGY

## 2020-10-14 RX ADMIN — STANDARDIZED SENNA CONCENTRATE AND DOCUSATE SODIUM 1 TABLET: 8.6; 5 TABLET ORAL at 09:30

## 2020-10-14 RX ADMIN — ENOXAPARIN SODIUM 40 MG: 40 INJECTION SUBCUTANEOUS at 09:30

## 2020-10-14 RX ADMIN — IBUPROFEN 800 MG: 800 TABLET, FILM COATED ORAL at 09:32

## 2020-10-14 RX ADMIN — GABAPENTIN 300 MG: 600 TABLET ORAL at 09:30

## 2020-10-14 RX ADMIN — ACETAMINOPHEN 1000 MG: 500 TABLET ORAL at 09:32

## 2020-10-14 ASSESSMENT — PAIN SCALES - GENERAL: PAINLEVEL_OUTOF10: 3

## 2020-10-14 NOTE — FLOWSHEET NOTE
Discussed Post Birth Warning Signs, Postpartum Discharge Instructions and medication(s) with the patient and all questions fully answered. Educated on when to follow up with OBGYN in 1 weeks. No concerns noted.

## 2020-10-14 NOTE — PROGRESS NOTES
POST PARTUM DAY # 2    Sehrie Roberts is a 26 y.o. female  This patient was seen & examined today. Her pregnancy was complicated by:   Patient Active Problem List   Diagnosis    Family history of diabetes mellitus    Pregestational diabetes mellitus, modified White class B    Iron deficiency anemia    Sarcoma ([address]    Thrombocytosis (HCC)    Transaminitis    Chronic hypertension affecting pregnancy    History of disarticulation of right hip    Leukocytosis    History of blood transfusion    History of E. Coli UTI 10-<100,000CFU (20)    Pseudomonas UTI    Malignant neoplasm affecting pregnancy in third trimester    33 weeks gestation of pregnancy    High-risk pregnancy, unspecified trimester    Antepartum premature rupture of membrane     10/12/20 M Apg 8 Wt 4#14        Today she is doing well without any chief complaint. Her lochia is light. She denies chest pain, shortness of breath, headache, lightheadedness and blurred vision. She is not breast feeding and she denies any breast tenderness. She is ambulating well. Her voiding pattern is normal. I reviewed signs and symptoms of post partum depression with the patient, she currently denies any of these symptoms. She is tolerating solids.      Vital Signs:  Vitals:    10/13/20 0000 10/13/20 0400 10/13/20 0815 10/13/20 2100   BP: 104/61 109/69 111/69 118/73   Pulse: 81 86 75 86   Resp: 16 16 16 16   Temp: 98 °F (36.7 °C) 98.1 °F (36.7 °C) 98 °F (36.7 °C) 98.3 °F (36.8 °C)   TempSrc: Oral Oral Oral Oral   SpO2:       Weight:       Height:           Physical Exam:  General:  no apparent distress, alert and cooperative  Neurologic:  alert, oriented, normal speech, no focal findings or movement disorder noted  Lungs:  No increased work of breathing, good air exchange, clear to auscultation bilaterally, no crackles or wheezing  Heart:  regular rate and rhythm and regular rate and rhythm    Abdomen: abdomen soft, non-distended, non-tender  Fundus: non-tender, normal size, firm, below umbilicus  Extremities:  no calf tenderness, non edematous, s/p R leg amputation    Lab:  Lab Results   Component Value Date    HGB 12.6 10/12/2020     Lab Results   Component Value Date    HCT 37.0 10/12/2020       Assessment/Plan:  1. Amy Gee is a  PPD # 2 s/p    - Doing well, VSS   - male infant in NICU, circumcision desired   - Encourage ambulation  2. Rh positive/Rubella immune  3. both breast and bottle feeding              - Denies s/s mastitis  4. Pregestational DM (Class B)  - Novolog and NPH held  - FBS 89 @10/13/2020 6:14 AM  5. cHTN (no meds)  - VSS  - denies s/s PreE  - PreE labs abn x1 (AST/ALT elv ), P/C 0.2 ()   6. Hx of Osteosarcoma (s/p R leg amputation)                    - Patient to begin chemo after delivery, with Dr. Jessa Barbosa              - Gabapentin 300mg TID ordered  7. Anemia (Hgb 12.6 upon admission)              - clinically asymptomatic  8. Hx UTI x2              - denies s/s  9. Hx transaminitis (>89,  AST 92, )  10. Fam Hx of DM  11. BMI 31.46  12. Continue post partum care    Counseling Completed:  Secondary Smoke risks and Sudden Infant Death Syndrome were reviewed with recommendations. Infant sleeping, \"back to sleep\" and avoidance of co-sleeping recommendations were reviewed. Signs and Symptoms of Post Partum Depression were reviewed. The patient is to call if any occur. Signs and symptoms of Mastitis were reviewed. The patient is to call if any occur for follow up. Discharge instructions including pelvic rest, no driving with pain medicine and office follow-up were reviewed with patient     Attending Physician: Dr. Janett Marrero, [address] [place] [address], DO  Ob/Gyn Resident   10/14/2020, 3:15 AM           Attending Physician Statement  I have discussed the care of Amy Gee, including pertinent history and exam findings,  with the resident.  I have seen and examined the patient and the key elements of all parts of the encounter have been performed by me. I agree with the assessment, plan and orders as documented by the resident. (GC Modifier)    Pt seen and examined. She is doing well. Sore, but overall pain is controlled. She has been active and doing well. Voiding and tolerating PO. She denies CP/SOB/F/CH/N/V/HA.       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 Catrachita Cornell and Dr. Rox Somers. Continue current care.     Felix Mckeon DO

## 2020-10-14 NOTE — PROGRESS NOTES
CLINICAL PHARMACY NOTE: MEDS TO [address] Select Patient?: Yes  Total # of Prescriptions Filled: 1   The following medications were delivered to the patient:  · Ibuprofen 800mg  Total # of Interventions Completed: 0  Time Spent (min): 30    Additional Documentation:

## 2020-10-14 NOTE — CARE COORDINATION
Social Work    Sw met with parents in NICU to introduce self, assess needs and discuss Nicu Sw role. Parents were very interactive and polite. Parents denied any current social needs or questions at this time. Parents report they do have baby items, including safe place for baby to sleep. Parents report this is their first child, whom they consider a miracle. Mom was dx with caner in her leg and her leg was amputated during pregnancy ( 7/23). Mom will begin Chemo soon for 3 months since she has delivered. Parents were very positive as they discussed current circumstances and mom was open about being concerned about her Chemo. Mom explained it will be done as a safety measure and so far all markers are within normal range and she is doing well. Parents report a very strong support system with family, and mom's parents live 2 streets away. Dad is off work for the next month. Parents denied any barriers to visiting in NICU accept for mom's upcoming chemo. Parents report that Kaylah Tovar in [facility] will be pediatrician. Sw informed parents that Sw is available for support as needed. Sw encouraged family to reach out if Sw can assist in any way.

## 2020-10-15 ENCOUNTER — CARE COORDINATION (OUTPATIENT)
Dept: OTHER | Facility: CLINIC | Age: 25
End: 2020-10-15

## 2020-10-15 LAB
C TRACH DNA GENITAL QL NAA+PROBE: NEGATIVE
CULTURE: NORMAL
Lab: NORMAL
N. GONORRHOEAE DNA: NEGATIVE
SPECIMEN DESCRIPTION: NORMAL
SPECIMEN DESCRIPTION: NORMAL

## 2020-10-15 NOTE — CARE COORDINATION
Zeinab 45 Transitions Initial Follow Up Call    Call within 2 business days of discharge: Yes    Patient: Rosales Oliveira Patient : 1995   MRN: [id number]  Reason for Admission: Vaginal Delivery Induction  Discharge Date: 10/14/20 RARS: Readmission Risk Score: 8      Last Discharge [facility]       Complaint Diagnosis Description Type Department Provider    10/12/20   Admission (Discharged) STV[facility] 7C Bela Licona DO        Rosales Oliveira is a 26 y.o. female recently discharged  ( )  from [facility]  ([facility]) with a reported diagnosis of Vaginal Delivery of infant, boy  The patient was contacted post discharge and the answers were provided by . Patient/CG had  understanding of reason for admission. Medications were reviewed and the patient had changes to medications. Was unable to review all medications as patient was in the car on her way to the NICU to spend time with baby CalebCoaching for symptoms related to disease demonstrates  and demonstrates  self-management skills. The patient was coached to follow all discharge instructions and notify OB of any concerns . The patient will receive a follow up call to monitor adherence and understanding as part of Transitions of Care. 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 Pt said she is sore but doing well, urinating and bowels moving. Pt notes light vaginal discharge. No Pain, only soreness she said. Pt is going to bottle feed due to upcoming chemotherapy. Pt said karla is doing well, oxygen has been decreased to 2 l per nasal cannula, tolerating tube feedings well. . Pt is expected to be in NICU for 2-4 weeks.  Will follow     Valentina Norwood  10/15/2020      Non-face-to-face services provided:  Obtained and reviewed discharge summary and/or continuity of care documents    Care Transitions 24 Hour Call    Do you have any ongoing symptoms?:

## 2020-10-19 NOTE — L&D DELIVERY NOTE
Department of Obstetrics and Gynecology  Spontaneous Vaginal Delivery Note        Patient Name: Sherie Roberts  Patient : 1995  Room/Bed: 1250/7178-56  Admission Date/Time: 10/12/2020  2:08 AM  MRN #: [id number]  [facility] #: [id number]          Date: 10/19/2020  Time: 8:55 AM    Pre-operative Diagnosis:   Sherie Roberts is a 26 y.o. female at 32w10d     pregnancy <37 weeks and Spontaneous labor  Patient Active Problem List    Diagnosis Date Noted    33 weeks gestation of pregnancy 10/12/2020    High-risk pregnancy, unspecified trimester 10/12/2020     10/12/20 M Apg  Wt 4#14  10/12/2020    Antepartum premature rupture of membrane     Malignant neoplasm affecting pregnancy in third trimester 2020    Pseudomonas UTI 2020     Overview Note:     20 treated with Cefepime 2g IV for 10 days       History of E.  Coli UTI 10-<100,000CFU (20) 2020    History of blood transfusion 2020    History of disarticulation of right hip 2020    Leukocytosis 2020    Transaminitis 2020    Chronic hypertension affecting pregnancy 2020     Overview Note:     Baseline preE labs wnl, P/C of 0.20 on 20      Iron deficiency anemia 2020     Overview Note:     Last Assessment & Plan:   Hgb 8.2 g/dL  Asymptomatic   S/p iv iron on  and 1 u pRBC     Continue to monitor vitals  Last Assessment & Plan:   Hgb 8.2 g/dL  Asymptomatic   S/p iv iron on  and 1 u pRBC     Continue to monitor vitals      Pregestational diabetes mellitus, modified White class B 2020     Overview Note:     Last Assessment & Plan:   Formatting of this note might be different from the original.  GDMA2  Abnormal early 1 hour GTT (183)  Abnormal 3 hour GTT  - fasting 86  - 1 hour 224  - 2 hour 187  - 3 hour 164  HbA1c  5.8  Component      Latest Ref Rng & Units 2020           8:06 AM 11:27 AM  4:48 PM  9:28 PM   Glucose, metered      50 - avoid NSAIDs  -Please consider CT abdomen/pelvis/brain to stage patient appropriately   -Plan for transfer to [facility] due to [employer] and availability of pregnancy shield       Last Assessment & Plan:   Hx of right thigh mass concerning for sarcoma  - MRI at OSH on 2020: Centered along the anteromedial aspect of the mid-distal thigh there is a large complex mass measuring at least 9.7 x 8.3 x 16.9 cm. Concerning for sarcoma  Patient follows with Dr. Connie Pride at [place] and wants to pursue her care at [facility]  S/p biopsy   Oxycodone for pain management     Plan:  -Continue heparin 5,000 u BID for DVT prophylaxis   - f/u biopsy results and tumor board treatment regimen   - pain management: continue oxycodone and tylenol as needed for pain management, avoid NSAIDs  -Please consider CT abdomen/pelvis/brain to stage patient appropriately   -Plan for transfer to [facility] due to [employer] and availability of pregnancy shield           Family history of diabetes mellitus 2020           Post-operative Diagnosis:    Living  infant(s) and Male  Same as Pre-Op    Anesthesia:  epidural anesthesia    EBL: refer to delivery summary      Findings Summary:  Delivery Summary:  Mother's Information    Labor Events     labor?:  Yes  Rupture type:  Spontaneous=SROM  Fluid color:  Clear  Fluid odor:  None     Mother Delivery Information    Episiotomy:  None  Lacerations:  2nd  Surgical or Additional Est. Blood Loss (mL):  0 (View Only):  Edit in Flowsheets   Combined Est. Blood Loss (mL):  0        Cassandra Monterroso [[id number]]    Labor Events     labor?:  Yes   steroids?:  Full Course  Cervical ripening date/time:     Antibiotics received during labor?:  Yes  Rupture Identifier:  Sac 1   Rupture date/time: 10/12/20 00:30:00   Rupture type:  Spontaneous=SROM, Premature=PROM  Fluid color:  Clear  Fluid odor:  None  Induction:  None  Augmentation: Oxytocin  Indications for augmentation:  Prolonged ROM          Anesthesia    Method:  Epidural     Assisted Delivery Details    Forceps attempted?:  No  Vacuum extractor attempted?:  No     Document Additional Attempt       Document Additional Attempt             Shoulder Dystocia    Shoulder dystocia present?:  No  Add Second Maneuver  Add Third Maneuver  Add Fourth Maneuver  Add Fifth Maneuver  Add Sixth Maneuver  Add Seventh Maneuver  Add Eighth Maneuver  Add Ninth Maneuver      Presentation    Presentation:  [facility]     [place] Information    Head delivery date/time:  10/12/2020 15:13:00   Changing the 's delivery date/time could affect patient care.:     Delivery date/time:   10/12/20 1513   Delivery type:  Vaginal, Spontaneous    Details:         Delivery Providers    Delivering clinician:  Loree Newberry DO   Provider Role    Lorena Persaud, DO     Chris Be, DO Steffany Avila, STEPHANE Tay, APRN - DEENA Barrow RCP       Cord    Vessels:  3 Vessels  Complications:  Nuchal  Nuchal intervention:  reduced  Nuchal cord description:  loose nuchal cord  Delayed cord clamping?:  Yes  Cord clamped date/time:  10/12/2020 1514  Cord blood disposition:  Lab  Gases sent?:  Yes  Stem cell collection (by provider): No     Placenta    Date/time:  10/12/2020 15:17:00  Removal:  Spontaneous  Appearance:  Intact  Disposition:  Lab, Pathology     Delivery Resuscitation    Method:  Bulb Suction, Stimulation     Apgars    Living status:  Living  Apgars   1 Minute:   5 Minute:   10 Minute 15 Minute 20 Minute   Skin Color: 0  1       Heart Rate: 2  2       Reflex Irritability: 2  2       Muscle Tone: 2  2       Respiratory Effort: 2  2       Total: 8  9               Apgars Assigned By:  NICU     Skin to Skin    Skin to skin initiation date/time: 10/12/20 15:13:00   Skin to skin with:   Mother  Skin to skin end date/time:         Measurements    Weight:  2210 g Length:  43 cm   Head circumference:  32 cm    Abdominal girth:  25.5 cm       Delivery Information    Episiotomy:  None  Perineal lacerations:  2nd Repaired:  Yes   Vaginal laceration:  Yes Repaired:  Yes   Surgical or additional est. blood loss (mL):  0 (View Only):  Edit in Flowsheets   Combined est. blood loss (mL):  0     Vaginal Delivery Counts    Initial count personnel:  Deepika Correia  Initial count verified by:  TAMMY DE LA ROSA RN   4x4:   Needles:   Instruments:   Lap Pads:   Sponges:     Initial counts:          Final counts:          Final count personnel:  DR. Constantino Singh  Final count verified by:  DR. YATES  Accurate final count?:  Yes  Final vaginal sweep completed:  Yes     Other Procedures    Procedures:  None     Labor Length    3rd stage:  0h 04m            Living  infant(s) and Male    Cephalic      Amniotic Fluid was: Clear  A Nuchal Cord: was not present  A Spontaneous Cry Was Noted: Yes  The Baby: was suctioned        The Placenta Was Removed:  intact and that the umbilical cord had three vessels noted    cord gasses were obtained and sent to the lab, cord blood was obtained and sent to the lab and Pitocin, 20 milliunits in 1 liter of ringers lactate was administered, wide open, to assist with uterine contraction    The umbilical cord had delayed clamping of 1 minute: Yes      Episiotomy: (none)  Absent  2nd degree laceration repaired with 3-0 vicryl in standard fashion    The Cervix Vagina & Rectum were inspected after the repair and found to be intact without any defects. Good sphincter tone was present.    Yes      Condition:  infant stable to special care nursery and mother stable    Blood Type and Rh:   ABO/Rh   Date Value Ref Range Status   10/12/2020 A POSITIVE  Final         Rubella Immunity Status:     Rubella Antibody, IGG   Date Value Ref Range Status   2020 57.3 IU/mL Final     Comment:                 REFERENCE RANGE:  <5.0       NON-REACTIVE (non-immune)  5.0 TO 9.9 EQUIVOCAL  >=10.0 REACTIVE     (immune)                     Infant Feeding:    bottle    Circumcision Requested: Yes      Attending Attestation: I was present and scrubbed for the entire procedure.       A Vaginal Vault Sweep Was Completed-All Sponge Counts Were Correct: Yes    Attending Name: Felix Mckeon DO    Electronically signed by Felix Mckeon DO on 10/19/2020 at 8:55 AM

## 2020-10-20 RX ORDER — ONDANSETRON 8 MG/1
8 TABLET, ORALLY DISINTEGRATING ORAL EVERY 8 HOURS PRN
Qty: 30 TABLET | Refills: 3 | Status: SHIPPED | OUTPATIENT
Start: 2020-10-20 | End: 2022-01-07 | Stop reason: ALTCHOICE

## 2020-10-20 RX ORDER — OMEPRAZOLE 20 MG/1
20 CAPSULE, DELAYED RELEASE ORAL DAILY
Qty: 30 CAPSULE | Refills: 3 | Status: SHIPPED | OUTPATIENT
Start: 2020-10-20 | End: 2022-01-07 | Stop reason: ALTCHOICE

## 2020-10-20 RX ORDER — LIDOCAINE AND PRILOCAINE 25; 25 MG/G; MG/G
CREAM TOPICAL
Qty: 1 TUBE | Refills: 1 | Status: SHIPPED | OUTPATIENT
Start: 2020-10-20

## 2020-10-26 ENCOUNTER — TELEMEDICINE (OUTPATIENT)
Dept: OBGYN CLINIC | Age: 25
End: 2020-10-26

## 2020-10-26 ENCOUNTER — PREP FOR PROCEDURE (OUTPATIENT)
Dept: GENERAL RADIOLOGY | Age: 25
End: 2020-10-26

## 2020-10-26 PROCEDURE — 99024 POSTOP FOLLOW-UP VISIT: CPT | Performed by: OBSTETRICS & GYNECOLOGY

## 2020-10-26 RX ORDER — SODIUM CHLORIDE 9 MG/ML
INJECTION, SOLUTION INTRAVENOUS CONTINUOUS
Status: CANCELLED | OUTPATIENT
Start: 2020-10-26

## 2020-10-26 NOTE — PROGRESS NOTES
Ezio Savage  10/26/2020  2:04 PM      Ezio Savage (:  1995) has requested an audio/video evaluation for the following concern(s):    1.  10/12/20 M Apg 8/9 Wt 4#14           TELEHEALTH EVALUATION -- Audio/Visual ([facility]        Ezio Savage is a 26 y.o. female       She has no chief complaints today. She delivered vaginally on 10/12/20. She is not breast feeding and there is not any signs or symptoms of mastitis. She does not have any signs or symptoms of post partum depression. She is anxious as she is starting chemotherapy next week. She has some moments of tearfulness, but she states she is doing ok overall. She denies any suicidal thoughts with a plan, intent to harm others and delusional ideas. Today her lochia is light she denies any dizziness or shortness of breath. Her pregnancy was complicated by:  Patient Active Problem List    Diagnosis Date Noted    33 weeks gestation of pregnancy 10/12/2020    High-risk pregnancy, unspecified trimester 10/12/2020     10/12/20 M Apg 8/9 Wt 4#14  10/12/2020    Antepartum premature rupture of membrane     Malignant neoplasm affecting pregnancy in third trimester 2020    Pseudomonas UTI 2020     Overview Note:     20 treated with Cefepime 2g IV for 10 days       History of E.  Coli UTI 10-<100,000CFU (20) 2020    History of blood transfusion 2020    History of disarticulation of right hip 2020    Leukocytosis 2020    Transaminitis 2020    Chronic hypertension affecting pregnancy 2020     Overview Note:     Baseline preE labs wnl, P/C of 0.20 on 20      Iron deficiency anemia 2020     Overview Note:     Last Assessment & Plan:   Hgb 8.2 g/dL  Asymptomatic   S/p iv iron on  and 1 u pRBC     Continue to monitor vitals  Last Assessment & Plan:   Hgb 8.2 g/dL  Asymptomatic   S/p iv iron on  and 1 u pRBC     Continue to monitor vitals      Pregestational diabetes mellitus, modified White class B 06/30/2020     Overview Note:     Last Assessment & Plan:   Formatting of this note might be different from the original.  GDMA2  Abnormal early 1 hour GTT (183)  Abnormal 3 hour GTT  - fasting 86  - 1 hour 224  - 2 hour 187  - 3 hour 164  HbA1c 7/1 5.8  Component      Latest Ref Rng & Units 7/5/2020 7/5/2020 7/5/2020 7/5/2020           8:06 AM 11:27 AM  4:48 PM  9:28 PM   Glucose, metered      50 - 140 mg/dL 93 135 99 131     Component      Latest Ref Rng & Units 7/6/2020           8:05 AM   Glucose, metered      50 - 140 mg/dL 98        Plan:  Insulin NPH 20/10 and novolog 6u w/ meals   BG checks 4x daily  Last Assessment & Plan:   Formatting of this note might be different from the original.  GDMA2  Abnormal early 1 hour GTT (183)  Abnormal 3 hour GTT  - fasting 86  - 1 hour 224  - 2 hour 187  - 3 hour 164  HbA1c 7/1 5.8  Component      Latest Ref Rng & Units 7/5/2020 7/5/2020 7/5/2020 7/5/2020           8:06 AM 11:27 AM  4:48 PM  9:28 PM   Glucose, metered      50 - 140 mg/dL 93 135 99 131     Component      Latest Ref Rng & Units 7/6/2020           8:05 AM   Glucose, metered      50 - 140 mg/dL 98        Plan:  Insulin NPH 20/10 and novolog 6u w/ meals   BG checks 4x daily      Thrombocytosis ([address] 06/30/2020     Overview Note:     Last Assessment & Plan:   Plts elevated, 573 (07/02) --> 534 (07/03)  Etiology: secondary to iron deficiency   Patient found to have iron deficiency  Peripheral smear   -  Neutrophilia and monocytosis. -  Thrombocytosis. -  Normocytic normochromic anemia. -  No evidence of schistocytes, dysplasia or blasts.   -Iron studies: iron 19, iron saturation 11%  -patient given one 1 unit leukocyte-reduced, irradiated RBC 07/02    Plan:  - f/u CBC w/ daily      Sarcoma ([address] 06/29/2020     Overview Note:     Last Assessment & Plan:   Hx of right thigh mass concerning for sarcoma  - MRI at OSH on 2020: Centered along the anteromedial aspect of the mid-distal thigh there is a large complex mass measuring at least 9.7 x 8.3 x 16.9 cm. Concerning for sarcoma  Patient follows with Dr. Sho Rosales at [place] and wants to pursue her care at [facility]  S/p biopsy   Oxycodone for pain management     Plan:  -Continue heparin 5,000 u BID for DVT prophylaxis   - f/u biopsy results and tumor board treatment regimen   - pain management: continue oxycodone and tylenol as needed for pain management, avoid NSAIDs  -Please consider CT abdomen/pelvis/brain to stage patient appropriately   -Plan for transfer to [facility] due to [employer] and availability of pregnancy shield       Last Assessment & Plan:   Hx of right thigh mass concerning for sarcoma  - MRI at OSH on 2020: Centered along the anteromedial aspect of the mid-distal thigh there is a large complex mass measuring at least 9.7 x 8.3 x 16.9 cm. Concerning for sarcoma  Patient follows with Dr. Sho Rosales at [place] and wants to pursue her care at [facility]  S/p biopsy   Oxycodone for pain management     Plan:  -Continue heparin 5,000 u BID for DVT prophylaxis   - f/u biopsy results and tumor board treatment regimen   - pain management: continue oxycodone and tylenol as needed for pain management, avoid NSAIDs  -Please consider CT abdomen/pelvis/brain to stage patient appropriately   -Plan for transfer to [facility] due to [employer] and availability of pregnancy shield           Family history of diabetes mellitus 2020         She does admit to having good home support.       OB History    Para Term  AB Living   1 1 0 1 0 1   SAB TAB Ectopic Molar Multiple Live Births   0 0 0 0 0 1      # Outcome Date GA Lbr Zeferino/2nd Weight Sex Delivery Anes PTL Lv   1  10/12/20 33w6d  4 lb 14 oz (2.21 kg) M Vag-Spont EPI Y MAYCO      Name: Carey Likes: 8  Apgar5: 9       Patient Active Problem List anemia 07/02/2020     Last Assessment & Plan:   Hgb 8.2 g/dL  Asymptomatic   S/p iv iron on 7/1 and 1 u pRBC     Continue to monitor vitals  Last Assessment & Plan:   Hgb 8.2 g/dL  Asymptomatic   S/p iv iron on 7/1 and 1 u pRBC     Continue to monitor vitals      Pregestational diabetes mellitus, modified White class B 06/30/2020     Last Assessment & Plan:   Formatting of this note might be different from the original.  GDMA2  Abnormal early 1 hour GTT (183)  Abnormal 3 hour GTT  - fasting 86  - 1 hour 224  - 2 hour 187  - 3 hour 164  HbA1c 7/1 5.8  Component      Latest Ref Rng & Units 7/5/2020 7/5/2020 7/5/2020 7/5/2020           8:06 AM 11:27 AM  4:48 PM  9:28 PM   Glucose, metered      50 - 140 mg/dL 93 135 99 131     Component      Latest Ref Rng & Units 7/6/2020           8:05 AM   Glucose, metered      50 - 140 mg/dL 98        Plan:  Insulin NPH 20/10 and novolog 6u w/ meals   BG checks 4x daily  Last Assessment & Plan:   Formatting of this note might be different from the original.  GDMA2  Abnormal early 1 hour GTT (183)  Abnormal 3 hour GTT  - fasting 86  - 1 hour 224  - 2 hour 187  - 3 hour 164  HbA1c 7/1 5.8  Component      Latest Ref Rng & Units 7/5/2020 7/5/2020 7/5/2020 7/5/2020           8:06 AM 11:27 AM  4:48 PM  9:28 PM   Glucose, metered      50 - 140 mg/dL 93 135 99 131     Component      Latest Ref Rng & Units 7/6/2020           8:05 AM   Glucose, metered      50 - 140 mg/dL 98        Plan:  Insulin NPH 20/10 and novolog 6u w/ meals   BG checks 4x daily      Thrombocytosis ([address] 06/30/2020     Last Assessment & Plan:   Plts elevated, 573 (07/02) --> 534 (07/03)  Etiology: secondary to iron deficiency   Patient found to have iron deficiency  Peripheral smear   -  Neutrophilia and monocytosis. -  Thrombocytosis. -  Normocytic normochromic anemia. -  No evidence of schistocytes, dysplasia or blasts.   -Iron studies: iron 19, iron saturation 11%  -patient given one 1 unit leukocyte-reduced, irradiated RBC 07/02    Plan:  - f/u CBC w/ daily      Sarcoma ([address] 06/29/2020     Last Assessment & Plan:   Hx of right thigh mass concerning for sarcoma  - MRI at OSH on 6/12/2020: Centered along the anteromedial aspect of the mid-distal thigh there is a large complex mass measuring at least 9.7 x 8.3 x 16.9 cm. Concerning for sarcoma  Patient follows with Dr. Edward Woo at [place] and wants to pursue her care at [facility]  S/p biopsy 6/29  Oxycodone for pain management     Plan:  -Continue heparin 5,000 u BID for DVT prophylaxis   - f/u biopsy results and tumor board treatment regimen   - pain management: continue oxycodone and tylenol as needed for pain management, avoid NSAIDs  -Please consider CT abdomen/pelvis/brain to stage patient appropriately   -Plan for transfer to [facility] due to [employer] and availability of pregnancy shield       Last Assessment & Plan:   Hx of right thigh mass concerning for sarcoma  - MRI at OSH on 6/12/2020: Centered along the anteromedial aspect of the mid-distal thigh there is a large complex mass measuring at least 9.7 x 8.3 x 16.9 cm. Concerning for sarcoma  Patient follows with Dr. Edward Woo at [place] and wants to pursue her care at [facility]  S/p biopsy 6/29  Oxycodone for pain management     Plan:  -Continue heparin 5,000 u BID for DVT prophylaxis   - f/u biopsy results and tumor board treatment regimen   - pain management: continue oxycodone and tylenol as needed for pain management, avoid NSAIDs  -Please consider CT abdomen/pelvis/brain to stage patient appropriately   -Plan for transfer to [facility] due to [employer] and availability of pregnancy shield           Family history of diabetes mellitus 06/08/2020       Plan:  1. Return to the office in  3-4 weeks  2. Signs & Symptoms of mastitis reviewed; notify if occurs  3. Secondary smoke risks reviewed.  Increased risks of respiratory problems, Sudden     infant death syndrome, and potential malignancies. 4. Abstinence  5. Family planning counseling and STD counseling completed  6. Return in about 3 weeks (around 2020) for postpartum. Halima Davila is a 26 y.o. female female was evaluated by a Virtual Visit (video visit) encounter to address concerns as mentioned above. A caregiver was present when appropriate. Due to this being a TeleHealth encounter (During [facility]- public health emergency), evaluation of the following organ systems was limited: Vitals/Constitutional/EENT/Resp/CV/GI//MS/Neuro/Skin/Heme-Lymph-Imm. Pursuant to the emergency declaration under the [address] [place] [address], [address] [place] [address] authority and the [employer] Act, this Virtual Visit was conducted with patient's (and/or legal guardian's) consent, to reduce the patient's risk of exposure to COVID-19 and provide necessary medical care. The patient (and/or legal guardian) has also been advised to contact this office for worsening conditions or problems, and seek emergency medical treatment and/or call 911 if deemed necessary. Services were provided through a video synchronous discussion virtually to substitute for in-person clinic visit. Patient and provider were located at their individual homes. Electronically signed by Jose Vanegas DO on 10/26/20 at 2:00 PM EDT     An electronic signature was used to authenticate this note. The total Virtual Visit time of 15 minutes. More than 50% of this visit was on counseling and education regarding her    Diagnosis Orders   1.  10/12/20 M Apg  Wt 4#14       and her options. She was also counseled on her preventative health maintenance recommendations and follow-up.

## 2020-10-27 ENCOUNTER — TELEPHONE (OUTPATIENT)
Dept: ONCOLOGY | Age: 25
End: 2020-10-27

## 2020-10-27 ENCOUNTER — HOSPITAL ENCOUNTER (OUTPATIENT)
Dept: INTERVENTIONAL RADIOLOGY/VASCULAR | Age: 25
Discharge: HOME | End: 2020-10-29
Payer: COMMERCIAL

## 2020-10-27 VITALS
TEMPERATURE: 98 F | HEART RATE: 79 BPM | HEIGHT: 61 IN | WEIGHT: 172 LBS | BODY MASS INDEX: 32.47 KG/M2 | SYSTOLIC BLOOD PRESSURE: 115 MMHG | DIASTOLIC BLOOD PRESSURE: 72 MMHG | OXYGEN SATURATION: 99 % | RESPIRATION RATE: 16 BRPM

## 2020-10-27 LAB
INR BLD: 0.9
PARTIAL THROMBOPLASTIN TIME: 25.8 SEC (ref 20.5–30.5)
PLATELET # BLD: 231 K/UL (ref 138–453)
PROTHROMBIN TIME: 9.5 SEC (ref 9–12)

## 2020-10-27 PROCEDURE — 36561 INSERT TUNNELED CV CATH: CPT | Performed by: RADIOLOGY

## 2020-10-27 PROCEDURE — 6360000002 HC RX W HCPCS: Performed by: RADIOLOGY

## 2020-10-27 PROCEDURE — 6360000002 HC RX W HCPCS: Performed by: PHYSICIAN ASSISTANT

## 2020-10-27 PROCEDURE — C1769 GUIDE WIRE: HCPCS

## 2020-10-27 PROCEDURE — 77001 FLUOROGUIDE FOR VEIN DEVICE: CPT | Performed by: RADIOLOGY

## 2020-10-27 PROCEDURE — 85610 PROTHROMBIN TIME: CPT

## 2020-10-27 PROCEDURE — 7100000011 HC PHASE II RECOVERY - ADDTL 15 MIN

## 2020-10-27 PROCEDURE — C1788 PORT, INDWELLING, IMP: HCPCS

## 2020-10-27 PROCEDURE — 76937 US GUIDE VASCULAR ACCESS: CPT | Performed by: RADIOLOGY

## 2020-10-27 PROCEDURE — 85730 THROMBOPLASTIN TIME PARTIAL: CPT

## 2020-10-27 PROCEDURE — 85049 AUTOMATED PLATELET COUNT: CPT

## 2020-10-27 PROCEDURE — 7100000010 HC PHASE II RECOVERY - FIRST 15 MIN

## 2020-10-27 PROCEDURE — C1894 INTRO/SHEATH, NON-LASER: HCPCS

## 2020-10-27 PROCEDURE — 2580000003 HC RX 258: Performed by: PHYSICIAN ASSISTANT

## 2020-10-27 PROCEDURE — 2709999900 HC NON-CHARGEABLE SUPPLY

## 2020-10-27 PROCEDURE — 6370000000 HC RX 637 (ALT 250 FOR IP): Performed by: PHYSICIAN ASSISTANT

## 2020-10-27 RX ORDER — SODIUM CHLORIDE 9 MG/ML
INJECTION, SOLUTION INTRAVENOUS CONTINUOUS
Status: DISCONTINUED | OUTPATIENT
Start: 2020-10-27 | End: 2020-10-30 | Stop reason: HOSPADM

## 2020-10-27 RX ORDER — ACETAMINOPHEN 325 MG/1
650 TABLET ORAL EVERY 4 HOURS PRN
Status: DISCONTINUED | OUTPATIENT
Start: 2020-10-27 | End: 2020-10-30 | Stop reason: HOSPADM

## 2020-10-27 RX ORDER — CEFAZOLIN SODIUM 1 G/50ML
1 INJECTION, SOLUTION INTRAVENOUS
Status: COMPLETED | OUTPATIENT
Start: 2020-10-27 | End: 2020-10-27

## 2020-10-27 RX ORDER — HEPARIN SODIUM (PORCINE) LOCK FLUSH IV SOLN 100 UNIT/ML 100 UNIT/ML
SOLUTION INTRAVENOUS
Status: COMPLETED | OUTPATIENT
Start: 2020-10-27 | End: 2020-10-27

## 2020-10-27 RX ORDER — CEFAZOLIN SODIUM 1 G/50ML
1 INJECTION, SOLUTION INTRAVENOUS ONCE
Status: COMPLETED | OUTPATIENT
Start: 2020-10-27 | End: 2020-10-27

## 2020-10-27 RX ADMIN — CEFAZOLIN SODIUM 1 G: 1 INJECTION, SOLUTION INTRAVENOUS at 09:18

## 2020-10-27 RX ADMIN — CEFAZOLIN SODIUM 1 G: 1 INJECTION, SOLUTION INTRAVENOUS at 08:40

## 2020-10-27 RX ADMIN — ACETAMINOPHEN 650 MG: 325 TABLET ORAL at 10:27

## 2020-10-27 RX ADMIN — SODIUM CHLORIDE, PRESERVATIVE FREE 5 ML: 5 INJECTION INTRAVENOUS at 09:43

## 2020-10-27 RX ADMIN — SODIUM CHLORIDE: 9 INJECTION, SOLUTION INTRAVENOUS at 08:39

## 2020-10-27 ASSESSMENT — PAIN DESCRIPTION - PAIN TYPE
TYPE: ACUTE PAIN;SURGICAL PAIN
TYPE: ACUTE PAIN
TYPE: ACUTE PAIN;SURGICAL PAIN

## 2020-10-27 ASSESSMENT — PAIN SCALES - GENERAL
PAINLEVEL_OUTOF10: 3

## 2020-10-27 ASSESSMENT — PAIN DESCRIPTION - LOCATION
LOCATION: NECK

## 2020-10-27 ASSESSMENT — PAIN - FUNCTIONAL ASSESSMENT: PAIN_FUNCTIONAL_ASSESSMENT: 0-10

## 2020-10-27 ASSESSMENT — PAIN DESCRIPTION - ORIENTATION
ORIENTATION: RIGHT
ORIENTATION: RIGHT

## 2020-10-27 NOTE — TELEPHONE ENCOUNTER
PA completed on 10/22/20 per trever Galvez RN. Writer verified on covermymeds that PA is approved for Emla cream. Writer called [address] and they stated that they are processing rx.  Jessenia Haro

## 2020-10-27 NOTE — H&P
History and Physical    Pt Name: Sagar Tucker  MRN: [id number]  YOB: 1995  Date of evaluation: 10/27/2020  Primary Care Physician: FATEMEH Mota CNP    SUBJECTIVE:   History of Chief Complaint:    Sagar Tucker is a 26 y.o. female who is scheduled today for port placement for chemo. She is s/p full right leg amputation in July 2020 at [facility] (Right hip disarticulation) due to right leg sarcoma diagnosed 5/2020. She recently delivered (vaginal) her first child (son) 2 weeks ago, healthy without complications. She is now planning for chemo. Follows with Dr. Stevenson Matias locally, and also at [facility] where her amputation was performed. Allergies  has No Known Allergies. Medications  Prior to Admission medications    Medication Sig Start Date End Date Taking? Authorizing Provider   omeprazole (PRILOSEC) 20 MG delayed release capsule Take 1 capsule by mouth daily 10/20/20   Ehsan Chapman MD   ondansetron (ZOFRAN ODT) 8 MG TBDP disintegrating tablet Place 1 tablet under the tongue every 8 hours as needed for Nausea or Vomiting 10/20/20   Zenaida Chapman MD   lidocaine-prilocaine (EMLA) 2.5-2.5 % cream Apply topically Daily as needed. 10/20/20   Zenaida Chapman MD   ibuprofen (ADVIL;MOTRIN) 800 MG tablet Take 1 tablet by mouth every 8 hours as needed for Pain 10/13/20   Tato Lamar DO   docusate sodium (COLACE) 100 MG capsule Take 1 capsule by mouth 2 times daily as needed for Constipation 10/13/20   Tato Lamar DO   gabapentin (NEURONTIN) 300 MG capsule Take 1 capsule by mouth 3 times daily for 30 days. 10/7/20 11/6/20  FATEMEH Kunz CNP   Handicap Placard MISC Expiration: Lifetime 8/26/20   FATEMEH Kunz CNP   blood glucose monitor kit and supplies Dispense sufficient amount for indicated testing frequency plus additional to accommodate PRN testing needs.  Dispense all needed supplies to include: monitor, strips, lancing device, lancets, control rhinorrhea      MOUTH/THROAT:  Mucous membranes moist, tongue is pink, uvula midline, teeth appear to be intact  NECK:  Supple, no lymphadenopathy, full ROM  LUNGS: Respirations even and non-labored. Clear to auscultation bilaterally, no wheezes/rales/rhonchi   CARDIOVASCULAR: regular rate and rhythm, no murmurs/rubs/gallops   ABDOMEN: soft, non-tender, non-distended, bowel sounds active x 4   MUSCULOSKELETAL: Full ROM LLE. Full amputation on the right. Full ROM bilateral lower extremities. Strength of 5/5 bilateral upper extremities. VASCULAR:  Brisk cap refill bilateral fingers. Radial pulses are intact, 2+ bilaterally. Dorsalis pedis pulse 2+ bilaterally. No edema or varicosities bilateral lower extremities  NEUROLOGIC: CN II-XII are grossly intact. Gait not assessed. S/p Right leg amputation     IMPRESSIONS:   Sarcoma right leg, s/p right leg amputation       Diagnosis Date    Diabetes mellitus ([facility] [address]     Gestational    History of anemia     prior blood and iron transfusion, without reactions    Osteosarcoma     Wears glasses    1.    PLANS:   Port placement     JUAN LEE  Electronically signed 10/27/2020 at 8:25 AM

## 2020-10-27 NOTE — PRE SEDATION
Sedation Pre-Procedure Note    Patient Name: Kenneth Bell   YOB: 1995  Room/Bed: Room/bed info not found  Medical Record Number: [id number]  Date: 10/27/2020   Time: 9:05 AM       Indication:  Port    Consent: I have discussed with the patient and/or the patient representative the indication, alternatives, and the possible risks and/or complications of the planned procedure and the anesthesia methods. The patient and/or patient representative appear to understand and agree to proceed. Vital Signs:   Vitals:    10/27/20 0820   BP: (!) 139/100   Pulse: 86   Resp: 16   Temp: 96.9 °F (36.1 °C)   SpO2: 99%       Past Medical History:   has a past medical history of Diabetes mellitus ([address] History of anemia, Osteosarcoma, and Wears glasses. Past Surgical History:   has a past surgical history that includes Leg amputation at hip (Right, 07/23/2020). Medications:   Scheduled Meds:    ceFAZolin  1 g Intravenous On Call to OR     Continuous Infusions:    sodium chloride 20 mL/hr at 10/27/20 0839     PRN Meds:   Home Meds:   Prior to Admission medications    Medication Sig Start Date End Date Taking? Authorizing Provider   ibuprofen (ADVIL;MOTRIN) 800 MG tablet Take 1 tablet by mouth every 8 hours as needed for Pain 10/13/20  Yes Duncan Horvath, DO   docusate sodium (COLACE) 100 MG capsule Take 1 capsule by mouth 2 times daily as needed for Constipation 10/13/20  Yes Duncan Horvath, DO   gabapentin (NEURONTIN) 300 MG capsule Take 1 capsule by mouth 3 times daily for 30 days.  10/7/20 11/6/20 Yes Cori Reyes APRN - CNP   ferrous sulfate (IRON 325) 325 (65 Fe) MG tablet Take 1 tablet by mouth 2 times daily 8/10/20  Yes Rigo Lancaster DO   Prenatal MV-Min-Fe Fum-FA-DHA (PRENATAL 1 PO) Take by mouth   Yes Historical Provider, MD   omeprazole (PRILOSEC) 20 MG delayed release capsule Take 1 capsule by mouth daily 10/20/20   July Adorno MD   ondansetron (ZOFRAN ODT) 8 MG TBDP disintegrating tablet Place 1 tablet under the tongue every 8 hours as needed for Nausea or Vomiting 10/20/20   Zenaida Chapman MD   lidocaine-prilocaine (EMLA) 2.5-2.5 % cream Apply topically Daily as needed. 10/20/20   Eleazar Ludwig MD   Handicap Placard MISC Expiration: Lifetime 8/26/20   Cornel Tavarez, APRN - CNP   blood glucose monitor kit and supplies Dispense sufficient amount for indicated testing frequency plus additional to accommodate PRN testing needs. Dispense all needed supplies to include: monitor, strips, lancing device, lancets, control solutions, alcohol swabs. 8/10/20   Sunshine Angeles DO   Prenatal Vit-Fe Fumarate-FA (PRENATAL VITAMIN) 27-0.8 MG TABS Take 1 tablet by mouth daily May substitute with formulary covered by patient's insurance 8/10/20 9/9/20  Jimenez Jean-Baptiste DO     Coumadin Use Last 7 Days:  no  Antiplatelet drug therapy use last 7 days: no  Other anticoagulant use last 7 days: no  Additional Medication Information:  See med rec      Pre-Sedation Documentation and Exam:   I have personally completed a history, physical exam & review of systems for this patient (see notes).     Mallampati Airway Assessment:  Mallampati Class II - (soft palate, fauces & uvula are visible)    Prior History of Anesthesia Complications:   none    ASA Classification:  Class 2 - A normal healthy patient with mild systemic disease    Sedation/ Anesthesia Plan:   intravenous sedation    Medications Planned:   midazolam (Versed) intravenously and fentanyl intravenously    Patient is an appropriate candidate for plan of sedation: yes    Electronically signed by MARK Carbajal on 10/27/2020 at 9:05 AM

## 2020-10-29 ENCOUNTER — TELEPHONE (OUTPATIENT)
Dept: INFUSION THERAPY | Age: 25
End: 2020-10-29

## 2020-10-29 ENCOUNTER — HOSPITAL ENCOUNTER (OUTPATIENT)
Age: 25
Discharge: HOME | End: 2020-10-29
Payer: COMMERCIAL

## 2020-10-29 ENCOUNTER — OFFICE VISIT (OUTPATIENT)
Dept: ONCOLOGY | Age: 25
End: 2020-10-29
Payer: COMMERCIAL

## 2020-10-29 PROCEDURE — 99999 PR OFFICE/OUTPT VISIT,PROCEDURE ONLY: CPT | Performed by: INTERNAL MEDICINE

## 2020-10-29 RX ORDER — HEPARIN SODIUM (PORCINE) LOCK FLUSH IV SOLN 100 UNIT/ML 100 UNIT/ML
500 SOLUTION INTRAVENOUS PRN
Status: CANCELLED | OUTPATIENT
Start: 2020-11-04

## 2020-10-29 RX ORDER — METHYLPREDNISOLONE SODIUM SUCCINATE 125 MG/2ML
125 INJECTION, POWDER, LYOPHILIZED, FOR SOLUTION INTRAMUSCULAR; INTRAVENOUS ONCE
Status: CANCELLED | OUTPATIENT
Start: 2020-11-03

## 2020-10-29 RX ORDER — PALONOSETRON 0.05 MG/ML
0.25 INJECTION, SOLUTION INTRAVENOUS ONCE
Status: CANCELLED
Start: 2020-11-03

## 2020-10-29 RX ORDER — SODIUM CHLORIDE 0.9 % (FLUSH) 0.9 %
5 SYRINGE (ML) INJECTION PRN
Status: CANCELLED | OUTPATIENT
Start: 2020-11-04

## 2020-10-29 RX ORDER — SODIUM CHLORIDE 0.9 % (FLUSH) 0.9 %
10 SYRINGE (ML) INJECTION PRN
Status: CANCELLED | OUTPATIENT
Start: 2020-11-05

## 2020-10-29 RX ORDER — HEPARIN SODIUM (PORCINE) LOCK FLUSH IV SOLN 100 UNIT/ML 100 UNIT/ML
500 SOLUTION INTRAVENOUS PRN
Status: CANCELLED | OUTPATIENT
Start: 2020-11-03

## 2020-10-29 RX ORDER — METHYLPREDNISOLONE SODIUM SUCCINATE 125 MG/2ML
125 INJECTION, POWDER, LYOPHILIZED, FOR SOLUTION INTRAMUSCULAR; INTRAVENOUS ONCE
Status: CANCELLED | OUTPATIENT
Start: 2020-11-05

## 2020-10-29 RX ORDER — DOXORUBICIN HYDROCHLORIDE 2 MG/ML
25 INJECTION, SOLUTION INTRAVENOUS ONCE
Status: CANCELLED
Start: 2020-11-04

## 2020-10-29 RX ORDER — SODIUM CHLORIDE 0.9 % (FLUSH) 0.9 %
5 SYRINGE (ML) INJECTION PRN
Status: CANCELLED | OUTPATIENT
Start: 2020-11-05

## 2020-10-29 RX ORDER — DIPHENHYDRAMINE HYDROCHLORIDE 50 MG/ML
50 INJECTION INTRAMUSCULAR; INTRAVENOUS ONCE
Status: CANCELLED | OUTPATIENT
Start: 2020-11-04

## 2020-10-29 RX ORDER — METHYLPREDNISOLONE SODIUM SUCCINATE 125 MG/2ML
125 INJECTION, POWDER, LYOPHILIZED, FOR SOLUTION INTRAMUSCULAR; INTRAVENOUS ONCE
Status: CANCELLED | OUTPATIENT
Start: 2020-11-04

## 2020-10-29 RX ORDER — EPINEPHRINE 1 MG/ML
0.3 INJECTION, SOLUTION, CONCENTRATE INTRAVENOUS PRN
Status: CANCELLED | OUTPATIENT
Start: 2020-11-03

## 2020-10-29 RX ORDER — DIPHENHYDRAMINE HYDROCHLORIDE 50 MG/ML
50 INJECTION INTRAMUSCULAR; INTRAVENOUS ONCE
Status: CANCELLED | OUTPATIENT
Start: 2020-11-05

## 2020-10-29 RX ORDER — EPINEPHRINE 1 MG/ML
0.3 INJECTION, SOLUTION, CONCENTRATE INTRAVENOUS PRN
Status: CANCELLED | OUTPATIENT
Start: 2020-11-04

## 2020-10-29 RX ORDER — SODIUM CHLORIDE 0.9 % (FLUSH) 0.9 %
10 SYRINGE (ML) INJECTION PRN
Status: CANCELLED | OUTPATIENT
Start: 2020-11-04

## 2020-10-29 RX ORDER — DIPHENHYDRAMINE HYDROCHLORIDE 50 MG/ML
50 INJECTION INTRAMUSCULAR; INTRAVENOUS ONCE
Status: CANCELLED | OUTPATIENT
Start: 2020-11-03

## 2020-10-29 RX ORDER — SODIUM CHLORIDE 9 MG/ML
INJECTION, SOLUTION INTRAVENOUS CONTINUOUS
Status: CANCELLED | OUTPATIENT
Start: 2020-11-05

## 2020-10-29 RX ORDER — HEPARIN SODIUM (PORCINE) LOCK FLUSH IV SOLN 100 UNIT/ML 100 UNIT/ML
500 SOLUTION INTRAVENOUS PRN
Status: CANCELLED | OUTPATIENT
Start: 2020-11-05

## 2020-10-29 RX ORDER — SODIUM CHLORIDE 0.9 % (FLUSH) 0.9 %
10 SYRINGE (ML) INJECTION PRN
Status: CANCELLED | OUTPATIENT
Start: 2020-11-03

## 2020-10-29 RX ORDER — SODIUM CHLORIDE 0.9 % (FLUSH) 0.9 %
5 SYRINGE (ML) INJECTION PRN
Status: CANCELLED | OUTPATIENT
Start: 2020-11-03

## 2020-10-29 RX ORDER — DOXORUBICIN HYDROCHLORIDE 2 MG/ML
25 INJECTION, SOLUTION INTRAVENOUS ONCE
Status: CANCELLED
Start: 2020-11-05

## 2020-10-29 RX ORDER — SODIUM CHLORIDE 9 MG/ML
INJECTION, SOLUTION INTRAVENOUS CONTINUOUS
Status: CANCELLED | OUTPATIENT
Start: 2020-11-03

## 2020-10-29 RX ORDER — EPINEPHRINE 1 MG/ML
0.3 INJECTION, SOLUTION, CONCENTRATE INTRAVENOUS PRN
Status: CANCELLED | OUTPATIENT
Start: 2020-11-05

## 2020-10-29 RX ORDER — SODIUM CHLORIDE 9 MG/ML
INJECTION, SOLUTION INTRAVENOUS CONTINUOUS
Status: CANCELLED | OUTPATIENT
Start: 2020-11-04

## 2020-10-29 RX ORDER — DOXORUBICIN HYDROCHLORIDE 2 MG/ML
25 INJECTION, SOLUTION INTRAVENOUS ONCE
Status: CANCELLED
Start: 2020-11-03

## 2020-10-29 NOTE — TELEPHONE ENCOUNTER
Chemo orders received, ht 61\", wt 172lbs, and bsa 1.83 verified. Dose of chemotherapy verified;   Mesna 500m2/m2= 915mg  Ifosfamide 2500mg/m2= 4575mg  Adriamycin 25mg/m2= 45.7mg

## 2020-10-30 ENCOUNTER — CARE COORDINATION (OUTPATIENT)
Dept: OTHER | Facility: CLINIC | Age: 25
End: 2020-10-30

## 2020-11-03 ENCOUNTER — TELEPHONE (OUTPATIENT)
Dept: ONCOLOGY | Age: 25
End: 2020-11-03

## 2020-11-03 ENCOUNTER — HOSPITAL ENCOUNTER (OUTPATIENT)
Dept: INFUSION THERAPY | Age: 25
Discharge: HOME | End: 2020-11-03
Payer: COMMERCIAL

## 2020-11-03 ENCOUNTER — OFFICE VISIT (OUTPATIENT)
Dept: ONCOLOGY | Age: 25
End: 2020-11-03
Payer: COMMERCIAL

## 2020-11-03 VITALS
BODY MASS INDEX: 31.37 KG/M2 | RESPIRATION RATE: 16 BRPM | DIASTOLIC BLOOD PRESSURE: 81 MMHG | SYSTOLIC BLOOD PRESSURE: 128 MMHG | TEMPERATURE: 98.2 F | HEART RATE: 108 BPM | WEIGHT: 167.4 LBS

## 2020-11-03 DIAGNOSIS — C49.9 SARCOMA (HCC): Primary | ICD-10-CM

## 2020-11-03 LAB
-: ABNORMAL
ABSOLUTE EOS #: 0.1 K/UL (ref 0–0.4)
ABSOLUTE IMMATURE GRANULOCYTE: NORMAL K/UL (ref 0–0.3)
ABSOLUTE LYMPH #: 1.4 K/UL (ref 1–4.8)
ABSOLUTE MONO #: 0.2 K/UL (ref 0.1–1.2)
ALBUMIN SERPL-MCNC: 4.4 G/DL (ref 3.5–5.2)
ALBUMIN/GLOBULIN RATIO: 1.6 (ref 1–2.5)
ALP BLD-CCNC: 103 U/L (ref 35–104)
ALT SERPL-CCNC: 94 U/L (ref 5–33)
AMORPHOUS: ABNORMAL
ANION GAP SERPL CALCULATED.3IONS-SCNC: 11 MMOL/L (ref 9–17)
AST SERPL-CCNC: 42 U/L
BACTERIA: ABNORMAL
BASOPHILS # BLD: 1 % (ref 0–2)
BASOPHILS ABSOLUTE: 0 K/UL (ref 0–0.2)
BILIRUB SERPL-MCNC: 0.46 MG/DL (ref 0.3–1.2)
BILIRUBIN URINE: NEGATIVE
BUN BLDV-MCNC: 12 MG/DL (ref 6–20)
BUN/CREAT BLD: ABNORMAL (ref 9–20)
CALCIUM SERPL-MCNC: 9.4 MG/DL (ref 8.6–10.4)
CASTS UA: ABNORMAL /LPF
CHLORIDE BLD-SCNC: 104 MMOL/L (ref 98–107)
CO2: 25 MMOL/L (ref 20–31)
COLOR: YELLOW
COMMENT UA: ABNORMAL
CREAT SERPL-MCNC: 0.41 MG/DL (ref 0.5–0.9)
CRYSTALS, UA: ABNORMAL /HPF
DIFFERENTIAL TYPE: NORMAL
EOSINOPHILS RELATIVE PERCENT: 3 % (ref 1–4)
EPITHELIAL CELLS UA: ABNORMAL /HPF (ref 0–5)
GFR AFRICAN AMERICAN: >60 ML/MIN
GFR NON-AFRICAN AMERICAN: >60 ML/MIN
GFR SERPL CREATININE-BSD FRML MDRD: ABNORMAL ML/MIN/{1.73_M2}
GFR SERPL CREATININE-BSD FRML MDRD: ABNORMAL ML/MIN/{1.73_M2}
GLUCOSE BLD-MCNC: 133 MG/DL (ref 70–99)
GLUCOSE URINE: NEGATIVE
HCT VFR BLD CALC: 41 % (ref 36–46)
HEMOGLOBIN: 13.7 G/DL (ref 12–16)
IMMATURE GRANULOCYTES: NORMAL %
KETONES, URINE: NEGATIVE
LEUKOCYTE ESTERASE, URINE: NEGATIVE
LYMPHOCYTES # BLD: 33 % (ref 24–44)
MAGNESIUM: 2.2 MG/DL (ref 1.6–2.6)
MCH RBC QN AUTO: 29.1 PG (ref 26–34)
MCHC RBC AUTO-ENTMCNC: 33.4 G/DL (ref 31–37)
MCV RBC AUTO: 87 FL (ref 80–100)
MONOCYTES # BLD: 6 % (ref 2–11)
MUCUS: ABNORMAL
NITRITE, URINE: NEGATIVE
NRBC AUTOMATED: NORMAL PER 100 WBC
OTHER OBSERVATIONS UA: ABNORMAL
PDW BLD-RTO: 13.2 % (ref 12.5–15.4)
PH UA: 6 (ref 5–8)
PHOSPHORUS: 4.7 MG/DL (ref 2.6–4.5)
PLATELET # BLD: 247 K/UL (ref 140–450)
PLATELET ESTIMATE: NORMAL
PMV BLD AUTO: 8.6 FL (ref 6–12)
POTASSIUM SERPL-SCNC: 3.9 MMOL/L (ref 3.7–5.3)
PROTEIN UA: NEGATIVE
RBC # BLD: 4.71 M/UL (ref 4–5.2)
RBC # BLD: NORMAL 10*6/UL
RBC UA: ABNORMAL /HPF (ref 0–2)
RENAL EPITHELIAL, UA: ABNORMAL /HPF
SEG NEUTROPHILS: 57 % (ref 36–66)
SEGMENTED NEUTROPHILS ABSOLUTE COUNT: 2.5 K/UL (ref 1.8–7.7)
SODIUM BLD-SCNC: 140 MMOL/L (ref 135–144)
SPECIFIC GRAVITY UA: 1.02 (ref 1–1.03)
TOTAL PROTEIN: 7.1 G/DL (ref 6.4–8.3)
TRICHOMONAS: ABNORMAL
TURBIDITY: ABNORMAL
URINE HGB: ABNORMAL
UROBILINOGEN, URINE: NORMAL
WBC # BLD: 4.3 K/UL (ref 3.5–11)
WBC # BLD: NORMAL 10*3/UL
WBC UA: ABNORMAL /HPF (ref 0–5)
YEAST: ABNORMAL

## 2020-11-03 PROCEDURE — 36591 DRAW BLOOD OFF VENOUS DEVICE: CPT

## 2020-11-03 PROCEDURE — 96375 TX/PRO/DX INJ NEW DRUG ADDON: CPT

## 2020-11-03 PROCEDURE — 96367 TX/PROPH/DG ADDL SEQ IV INF: CPT

## 2020-11-03 PROCEDURE — 84100 ASSAY OF PHOSPHORUS: CPT

## 2020-11-03 PROCEDURE — 96361 HYDRATE IV INFUSION ADD-ON: CPT

## 2020-11-03 PROCEDURE — 80053 COMPREHEN METABOLIC PANEL: CPT

## 2020-11-03 PROCEDURE — 85025 COMPLETE CBC W/AUTO DIFF WBC: CPT

## 2020-11-03 PROCEDURE — 2580000003 HC RX 258: Performed by: INTERNAL MEDICINE

## 2020-11-03 PROCEDURE — 99211 OFF/OP EST MAY X REQ PHY/QHP: CPT | Performed by: INTERNAL MEDICINE

## 2020-11-03 PROCEDURE — 81001 URINALYSIS AUTO W/SCOPE: CPT

## 2020-11-03 PROCEDURE — 99214 OFFICE O/P EST MOD 30 MIN: CPT | Performed by: INTERNAL MEDICINE

## 2020-11-03 PROCEDURE — 83735 ASSAY OF MAGNESIUM: CPT

## 2020-11-03 PROCEDURE — 96413 CHEMO IV INFUSION 1 HR: CPT

## 2020-11-03 PROCEDURE — 96411 CHEMO IV PUSH ADDL DRUG: CPT

## 2020-11-03 PROCEDURE — 96360 HYDRATION IV INFUSION INIT: CPT

## 2020-11-03 PROCEDURE — 2500000003 HC RX 250 WO HCPCS: Performed by: INTERNAL MEDICINE

## 2020-11-03 PROCEDURE — 6360000002 HC RX W HCPCS: Performed by: INTERNAL MEDICINE

## 2020-11-03 RX ORDER — SODIUM CHLORIDE 9 MG/ML
INJECTION, SOLUTION INTRAVENOUS CONTINUOUS
Status: CANCELLED
Start: 2020-11-25

## 2020-11-03 RX ORDER — HEPARIN SODIUM (PORCINE) LOCK FLUSH IV SOLN 100 UNIT/ML 100 UNIT/ML
500 SOLUTION INTRAVENOUS PRN
Status: CANCELLED | OUTPATIENT
Start: 2020-11-24

## 2020-11-03 RX ORDER — HEPARIN SODIUM (PORCINE) LOCK FLUSH IV SOLN 100 UNIT/ML 100 UNIT/ML
500 SOLUTION INTRAVENOUS PRN
Status: CANCELLED | OUTPATIENT
Start: 2020-11-25

## 2020-11-03 RX ORDER — SODIUM CHLORIDE 9 MG/ML
INJECTION, SOLUTION INTRAVENOUS CONTINUOUS
Status: CANCELLED | OUTPATIENT
Start: 2020-11-25

## 2020-11-03 RX ORDER — METHYLPREDNISOLONE SODIUM SUCCINATE 125 MG/2ML
125 INJECTION, POWDER, LYOPHILIZED, FOR SOLUTION INTRAMUSCULAR; INTRAVENOUS ONCE
Status: CANCELLED | OUTPATIENT
Start: 2020-11-23

## 2020-11-03 RX ORDER — DIPHENHYDRAMINE HYDROCHLORIDE 50 MG/ML
50 INJECTION INTRAMUSCULAR; INTRAVENOUS ONCE
Status: CANCELLED | OUTPATIENT
Start: 2020-11-25

## 2020-11-03 RX ORDER — DOXORUBICIN HYDROCHLORIDE 2 MG/ML
25 INJECTION, SOLUTION INTRAVENOUS ONCE
Status: COMPLETED | OUTPATIENT
Start: 2020-11-03 | End: 2020-11-03

## 2020-11-03 RX ORDER — DOXORUBICIN HYDROCHLORIDE 2 MG/ML
25 INJECTION, SOLUTION INTRAVENOUS ONCE
Status: CANCELLED
Start: 2020-11-23

## 2020-11-03 RX ORDER — HEPARIN SODIUM (PORCINE) LOCK FLUSH IV SOLN 100 UNIT/ML 100 UNIT/ML
500 SOLUTION INTRAVENOUS PRN
Status: DISCONTINUED | OUTPATIENT
Start: 2020-11-03 | End: 2020-11-04 | Stop reason: HOSPADM

## 2020-11-03 RX ORDER — HEPARIN SODIUM (PORCINE) LOCK FLUSH IV SOLN 100 UNIT/ML 100 UNIT/ML
500 SOLUTION INTRAVENOUS PRN
Status: CANCELLED | OUTPATIENT
Start: 2020-11-23

## 2020-11-03 RX ORDER — SODIUM CHLORIDE 0.9 % (FLUSH) 0.9 %
10 SYRINGE (ML) INJECTION PRN
Status: DISCONTINUED | OUTPATIENT
Start: 2020-11-03 | End: 2020-11-04 | Stop reason: HOSPADM

## 2020-11-03 RX ORDER — EPINEPHRINE 1 MG/ML
0.3 INJECTION, SOLUTION, CONCENTRATE INTRAVENOUS PRN
Status: CANCELLED | OUTPATIENT
Start: 2020-11-25

## 2020-11-03 RX ORDER — SODIUM CHLORIDE 9 MG/ML
INJECTION, SOLUTION INTRAVENOUS CONTINUOUS
Status: CANCELLED
Start: 2020-11-24

## 2020-11-03 RX ORDER — SODIUM CHLORIDE 9 MG/ML
INJECTION, SOLUTION INTRAVENOUS CONTINUOUS
Status: CANCELLED | OUTPATIENT
Start: 2020-11-23

## 2020-11-03 RX ORDER — DIPHENHYDRAMINE HYDROCHLORIDE 50 MG/ML
50 INJECTION INTRAMUSCULAR; INTRAVENOUS ONCE
Status: CANCELLED | OUTPATIENT
Start: 2020-11-23

## 2020-11-03 RX ORDER — SODIUM CHLORIDE 0.9 % (FLUSH) 0.9 %
5 SYRINGE (ML) INJECTION PRN
Status: CANCELLED | OUTPATIENT
Start: 2020-11-24

## 2020-11-03 RX ORDER — EPINEPHRINE 1 MG/ML
0.3 INJECTION, SOLUTION, CONCENTRATE INTRAVENOUS PRN
Status: CANCELLED | OUTPATIENT
Start: 2020-11-23

## 2020-11-03 RX ORDER — SODIUM CHLORIDE 0.9 % (FLUSH) 0.9 %
10 SYRINGE (ML) INJECTION PRN
Status: CANCELLED | OUTPATIENT
Start: 2020-11-24

## 2020-11-03 RX ORDER — METHYLPREDNISOLONE SODIUM SUCCINATE 125 MG/2ML
125 INJECTION, POWDER, LYOPHILIZED, FOR SOLUTION INTRAMUSCULAR; INTRAVENOUS ONCE
Status: CANCELLED | OUTPATIENT
Start: 2020-11-25

## 2020-11-03 RX ORDER — PALONOSETRON 0.05 MG/ML
0.25 INJECTION, SOLUTION INTRAVENOUS ONCE
Status: COMPLETED | OUTPATIENT
Start: 2020-11-03 | End: 2020-11-03

## 2020-11-03 RX ORDER — METHYLPREDNISOLONE SODIUM SUCCINATE 125 MG/2ML
125 INJECTION, POWDER, LYOPHILIZED, FOR SOLUTION INTRAMUSCULAR; INTRAVENOUS ONCE
Status: CANCELLED | OUTPATIENT
Start: 2020-11-24

## 2020-11-03 RX ORDER — SODIUM CHLORIDE 9 MG/ML
INJECTION, SOLUTION INTRAVENOUS CONTINUOUS
Status: CANCELLED
Start: 2020-11-23

## 2020-11-03 RX ORDER — LORAZEPAM 1 MG/1
1 TABLET ORAL EVERY 8 HOURS PRN
Qty: 30 TABLET | Refills: 0 | Status: SHIPPED | OUTPATIENT
Start: 2020-11-03 | End: 2020-12-03

## 2020-11-03 RX ORDER — EPINEPHRINE 1 MG/ML
0.3 INJECTION, SOLUTION, CONCENTRATE INTRAVENOUS PRN
Status: CANCELLED | OUTPATIENT
Start: 2020-11-24

## 2020-11-03 RX ORDER — SODIUM CHLORIDE 0.9 % (FLUSH) 0.9 %
10 SYRINGE (ML) INJECTION PRN
Status: CANCELLED | OUTPATIENT
Start: 2020-11-23

## 2020-11-03 RX ORDER — SODIUM CHLORIDE 9 MG/ML
INJECTION, SOLUTION INTRAVENOUS CONTINUOUS
Status: CANCELLED
Start: 2020-11-05

## 2020-11-03 RX ORDER — SODIUM CHLORIDE 0.9 % (FLUSH) 0.9 %
10 SYRINGE (ML) INJECTION PRN
Status: CANCELLED | OUTPATIENT
Start: 2020-11-25

## 2020-11-03 RX ORDER — PALONOSETRON 0.05 MG/ML
0.25 INJECTION, SOLUTION INTRAVENOUS ONCE
Status: CANCELLED
Start: 2020-11-23

## 2020-11-03 RX ORDER — SODIUM CHLORIDE 0.9 % (FLUSH) 0.9 %
5 SYRINGE (ML) INJECTION PRN
Status: CANCELLED | OUTPATIENT
Start: 2020-11-23

## 2020-11-03 RX ORDER — DOXORUBICIN HYDROCHLORIDE 2 MG/ML
25 INJECTION, SOLUTION INTRAVENOUS ONCE
Status: CANCELLED
Start: 2020-11-25

## 2020-11-03 RX ORDER — DIPHENHYDRAMINE HYDROCHLORIDE 50 MG/ML
50 INJECTION INTRAMUSCULAR; INTRAVENOUS ONCE
Status: CANCELLED | OUTPATIENT
Start: 2020-11-24

## 2020-11-03 RX ORDER — DOXORUBICIN HYDROCHLORIDE 2 MG/ML
25 INJECTION, SOLUTION INTRAVENOUS ONCE
Status: CANCELLED
Start: 2020-11-24

## 2020-11-03 RX ORDER — SODIUM CHLORIDE 9 MG/ML
INJECTION, SOLUTION INTRAVENOUS CONTINUOUS
Status: CANCELLED | OUTPATIENT
Start: 2020-11-24

## 2020-11-03 RX ORDER — SODIUM CHLORIDE 9 MG/ML
INJECTION, SOLUTION INTRAVENOUS CONTINUOUS
Status: CANCELLED
Start: 2020-11-04

## 2020-11-03 RX ORDER — SODIUM CHLORIDE 9 MG/ML
INJECTION, SOLUTION INTRAVENOUS CONTINUOUS
Status: DISCONTINUED | OUTPATIENT
Start: 2020-11-03 | End: 2020-11-04 | Stop reason: HOSPADM

## 2020-11-03 RX ORDER — SODIUM CHLORIDE 0.9 % (FLUSH) 0.9 %
5 SYRINGE (ML) INJECTION PRN
Status: CANCELLED | OUTPATIENT
Start: 2020-11-25

## 2020-11-03 RX ORDER — DEXAMETHASONE SODIUM PHOSPHATE 100 MG/10ML
10 INJECTION INTRAMUSCULAR; INTRAVENOUS ONCE
Status: COMPLETED | OUTPATIENT
Start: 2020-11-03 | End: 2020-11-03

## 2020-11-03 RX ADMIN — PALONOSETRON 0.25 MG: 0.05 INJECTION, SOLUTION INTRAVENOUS at 10:08

## 2020-11-03 RX ADMIN — Medication 10 ML: at 15:21

## 2020-11-03 RX ADMIN — DOXORUBICIN HYDROCHLORIDE 46 MG: 2 INJECTION, SOLUTION INTRAVENOUS at 10:39

## 2020-11-03 RX ADMIN — MESNA 925 MG: 100 INJECTION, SOLUTION INTRAVENOUS at 10:16

## 2020-11-03 RX ADMIN — POTASSIUM CHLORIDE: 2 INJECTION, SOLUTION, CONCENTRATE INTRAVENOUS at 11:55

## 2020-11-03 RX ADMIN — POTASSIUM CHLORIDE: 2 INJECTION, SOLUTION, CONCENTRATE INTRAVENOUS at 08:56

## 2020-11-03 RX ADMIN — HEPARIN 500 UNITS: 100 SYRINGE at 15:21

## 2020-11-03 RX ADMIN — IFOSFAMIDE 4650 MG: 3 INJECTION, SOLUTION INTRAVENOUS at 10:46

## 2020-11-03 RX ADMIN — SODIUM CHLORIDE: 9 INJECTION, SOLUTION INTRAVENOUS at 10:00

## 2020-11-03 RX ADMIN — MESNA 925 MG: 100 INJECTION, SOLUTION INTRAVENOUS at 14:58

## 2020-11-03 RX ADMIN — Medication 10 MG: at 10:07

## 2020-11-03 NOTE — TELEPHONE ENCOUNTER
Pt was seen today by Dr. Simba Lorenzo. Per MD, pt is scheduled for f/u on 11-24-20 with tx to follow. AVS was not complete when pt was at checkout.

## 2020-11-03 NOTE — PROGRESS NOTES
DIAGNOSIS:   1. 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 female who is referred to us for sarcoma. She presented with rightt knee sprain at work 04/2020 but the pain and swelling worsened with palpable mass. She underwent MRI which showed mass and was seen at [facility] where biopsy was taken and showed sarcoma. She was transferred to [facility] and decided to undergo full leg amputation due to early stage pregnancy, tumor was 16 cm x 20 cm x 3 cm and removed with negative margins. She was following with [facility] and decided she would prefer to receive treatment locally. The patient is currently 32 weeks pregnant and delivery plans are expected to be finalized 10/02/2020. She has elected to proceed with chemo after delivery. She is working towards prosthesis with test socket. 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 Kathy delivered in late October, after delivery, we decided to start chemo with AIM 11/3/2020. INTERIM HISTORY: The patient presents for follow up today to commence with treatment for osteosarcoma. She had successful delivery last month and feels well recovered, her post partum bleeding is nearly resolved. She has had port put in place with no complications and has completed chemo education. She is feeling very anxious today. PAST MEDICAL HISTORY: has a past medical history of Diabetes mellitus ([address] History of anemia, Osteosarcoma, and Wears glasses.     PAST SURGICAL HISTORY: has a past surgical history that includes Leg amputation at hip (Right, 07/23/2020) and IR PORT PLACEMENT > 5 YEARS (10/27/2020). CURRENT MEDICATIONS:  has a current medication list which includes the following prescription(s): omeprazole, ondansetron, lidocaine-prilocaine, ibuprofen, docusate sodium, gabapentin, handicap placard, ferrous sulfate, prenatal mv-min-fe fum-fa-dha, blood glucose monitor kit and supplies, and prenatal vitamin, and the following Facility-Administered Medications: sodium bicarbonate 75 mEq, potassium chloride 20 mEq in sodium chloride 0.45 % 1,000 mL IVPB. ALLERGIES:  has No Known Allergies. FAMILY HISTORY: Negative for any hematological or oncological conditions. SOCIAL HISTORY:  reports that she has never smoked. She has never used smokeless tobacco. She reports previous alcohol use. She reports that she does not use drugs. REVIEW OF SYSTEMS:   General: No fever or night sweats. Weight is stable. ENT: No double or blurred vision, no tinnitus or hearing problem, no dysphagia or sore throat   Respiratory: No chest pain, no shortness of breath, no cough or hemoptysis. Cardiovascular: Denies chest pain, PND or orthopnea. No L E swelling or palpitations. Gastrointestinal: No nausea or vomiting, abdominal pain, diarrhea or constipation. Genitourinary: Denies dysuria, hematuria, frequency, urgency or incontinence. Gynecological: nearly resolved post partum bleeding  Neurological: Denies headaches, decreased LOC, no sensory or motor focal deficits. +post surgical phantom pain  Musculoskeletal: No arthralgia no back pain or joint swelling. Skin: There are no rashes or bleeding. Psychiatric: ++ anxiety, no depression. Endocrine: No diabetes or thyroid disease. Hematologic: No bleeding, no adenopathy. PHYSICAL EXAM: Shows a well appearing 26y.o.-year-old female who is not in pain or distress. Vital Signs: Blood pressure 128/81, pulse 108, temperature 98.2 °F (36.8 °C), temperature source Oral, resp.  rate 16, weight 167 lb 6.4 oz (75.9 kg), last menstrual period 02/18/2020, unknown if currently breastfeeding. HEENT: Normocephalic and atraumatic. Pupils are equal, round, reactive to light and accommodation. Extraocular muscles are intact. Neck: Showed no JVD, no carotid bruit . Lungs: Clear to auscultation bilaterally. Heart: Regular without any murmur. Abdomen: Soft, nontender. No hepatosplenomegaly. Extremities: Right leg amputation. Lower extremities show no edema, clubbing, or cyanosis. Breasts: Examination not done today. Neuro exam: intact cranial nerves bilaterally no motor or sensory deficit, gait is normal. Lymphatic: no adenopathy appreciated in the supraclavicular, axillary, cervical or inguinal area    REVIEW OF LABORATORY DATA:   Lab Results   Component Value Date    WBC 4.3 11/03/2020    HGB 13.7 11/03/2020    HCT 41.0 11/03/2020    MCV 87.0 11/03/2020     11/03/2020   '    Chemistry        Component Value Date/Time     08/08/2020 0657    K 3.9 08/08/2020 0657     08/08/2020 0657    CO2 22 08/08/2020 0657    BUN 4 (L) 08/08/2020 0657    CREATININE <0.40 (L) 08/08/2020 0657        Component Value Date/Time    CALCIUM 8.9 08/08/2020 0657    ALKPHOS 64 08/09/2020 0850    AST 19 08/09/2020 0850    ALT 89 (H) 08/09/2020 0850    BILITOT 0.35 08/09/2020 0850          REVIEW OF RADIOLOGICAL RESULTS:     IMPRESSION:   1. 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 S/P right leg amputation  3. Current pregnancy  4. Plan for adjuvant chemo following delivery with ifosfamide and adriamycin (AIM) - started 11/03/2020    PLAN:   1. Her lab work was reviewed, counts are in range. 2. I completed baseline review of systems. 3. We reviewed treatment plan and monitoring for side effects. 4. We will commence with treatment today as per orders. 5. I am writing for Ativan to be taken as needed. 6. Return in 3 weeks.

## 2020-11-03 NOTE — PROGRESS NOTES
Patient arrived for C1D1 mesna, ifex, adriamycin   Seen by Dr Gwendolyn Lozoya prior labs collected and reviewed   Neuro assessment complete WNL   Signature obtained   Tolerated tx w/o incident  Home oral mesna given and pt instructed to take at 7 pm, she verbalized understanding   Return 11/4 Dina Cruz RN

## 2020-11-03 NOTE — TELEPHONE ENCOUNTER
Marlyn Nutrition Note    PG-SGA screening tool reviewed with score of 1. Pt reports consuming less than normal amounts of food. Full nutrition assessment for scores > 4. Will continue to follow as requested.     Danial Lennox McDougle, MHA, RD, LD  Registered Dietitian  Salma Thomas  [phone]

## 2020-11-03 NOTE — PLAN OF CARE
Problem:  Activity:  Goal: Ability to perform activities at highest level will improve  Description: Ability to perform activities at highest level will improve  Outcome: Met This Shift

## 2020-11-04 ENCOUNTER — HOSPITAL ENCOUNTER (OUTPATIENT)
Dept: INFUSION THERAPY | Age: 25
Discharge: HOME | End: 2020-11-04
Payer: COMMERCIAL

## 2020-11-04 VITALS
TEMPERATURE: 98.1 F | SYSTOLIC BLOOD PRESSURE: 128 MMHG | DIASTOLIC BLOOD PRESSURE: 82 MMHG | RESPIRATION RATE: 16 BRPM | HEART RATE: 89 BPM

## 2020-11-04 DIAGNOSIS — C49.9 SARCOMA (HCC): Primary | ICD-10-CM

## 2020-11-04 LAB
ANION GAP SERPL CALCULATED.3IONS-SCNC: 11 MMOL/L (ref 9–17)
BUN BLDV-MCNC: 9 MG/DL (ref 6–20)
BUN/CREAT BLD: ABNORMAL (ref 9–20)
CALCIUM SERPL-MCNC: 9.3 MG/DL (ref 8.6–10.4)
CHLORIDE BLD-SCNC: 106 MMOL/L (ref 98–107)
CO2: 25 MMOL/L (ref 20–31)
CREAT SERPL-MCNC: <0.4 MG/DL (ref 0.5–0.9)
GFR AFRICAN AMERICAN: ABNORMAL ML/MIN
GFR NON-AFRICAN AMERICAN: ABNORMAL ML/MIN
GFR SERPL CREATININE-BSD FRML MDRD: ABNORMAL ML/MIN/{1.73_M2}
GFR SERPL CREATININE-BSD FRML MDRD: ABNORMAL ML/MIN/{1.73_M2}
GLUCOSE BLD-MCNC: 106 MG/DL (ref 70–99)
POTASSIUM SERPL-SCNC: 3.8 MMOL/L (ref 3.7–5.3)
SODIUM BLD-SCNC: 142 MMOL/L (ref 135–144)

## 2020-11-04 PROCEDURE — 96409 CHEMO IV PUSH SNGL DRUG: CPT

## 2020-11-04 PROCEDURE — 80048 BASIC METABOLIC PNL TOTAL CA: CPT

## 2020-11-04 PROCEDURE — 96361 HYDRATE IV INFUSION ADD-ON: CPT

## 2020-11-04 PROCEDURE — 2580000003 HC RX 258: Performed by: INTERNAL MEDICINE

## 2020-11-04 PROCEDURE — 96360 HYDRATION IV INFUSION INIT: CPT

## 2020-11-04 PROCEDURE — 6360000002 HC RX W HCPCS: Performed by: INTERNAL MEDICINE

## 2020-11-04 PROCEDURE — 96376 TX/PRO/DX INJ SAME DRUG ADON: CPT

## 2020-11-04 PROCEDURE — 96375 TX/PRO/DX INJ NEW DRUG ADDON: CPT

## 2020-11-04 PROCEDURE — 36591 DRAW BLOOD OFF VENOUS DEVICE: CPT

## 2020-11-04 PROCEDURE — 2500000003 HC RX 250 WO HCPCS: Performed by: INTERNAL MEDICINE

## 2020-11-04 PROCEDURE — 96413 CHEMO IV INFUSION 1 HR: CPT

## 2020-11-04 PROCEDURE — 96411 CHEMO IV PUSH ADDL DRUG: CPT

## 2020-11-04 PROCEDURE — 96368 THER/DIAG CONCURRENT INF: CPT

## 2020-11-04 PROCEDURE — 96367 TX/PROPH/DG ADDL SEQ IV INF: CPT

## 2020-11-04 RX ORDER — DOXORUBICIN HYDROCHLORIDE 2 MG/ML
25 INJECTION, SOLUTION INTRAVENOUS ONCE
Status: COMPLETED | OUTPATIENT
Start: 2020-11-04 | End: 2020-11-04

## 2020-11-04 RX ORDER — POTASSIUM CHLORIDE 29.8 MG/ML
20 INJECTION INTRAVENOUS ONCE
Status: COMPLETED | OUTPATIENT
Start: 2020-11-04 | End: 2020-11-04

## 2020-11-04 RX ORDER — DEXAMETHASONE SODIUM PHOSPHATE 100 MG/10ML
10 INJECTION INTRAMUSCULAR; INTRAVENOUS ONCE
Status: COMPLETED | OUTPATIENT
Start: 2020-11-04 | End: 2020-11-04

## 2020-11-04 RX ORDER — HEPARIN SODIUM (PORCINE) LOCK FLUSH IV SOLN 100 UNIT/ML 100 UNIT/ML
500 SOLUTION INTRAVENOUS PRN
Status: DISCONTINUED | OUTPATIENT
Start: 2020-11-04 | End: 2020-11-05 | Stop reason: HOSPADM

## 2020-11-04 RX ORDER — POTASSIUM CHLORIDE 7.45 MG/ML
20 INJECTION INTRAVENOUS
Status: DISCONTINUED | OUTPATIENT
Start: 2020-11-04 | End: 2020-11-04

## 2020-11-04 RX ORDER — SODIUM CHLORIDE 0.9 % (FLUSH) 0.9 %
10 SYRINGE (ML) INJECTION PRN
Status: DISCONTINUED | OUTPATIENT
Start: 2020-11-04 | End: 2020-11-05 | Stop reason: HOSPADM

## 2020-11-04 RX ORDER — SODIUM CHLORIDE 9 MG/ML
INJECTION, SOLUTION INTRAVENOUS CONTINUOUS
Status: DISCONTINUED | OUTPATIENT
Start: 2020-11-04 | End: 2020-11-05 | Stop reason: HOSPADM

## 2020-11-04 RX ORDER — POTASSIUM CHLORIDE 7.45 MG/ML
20 INJECTION INTRAVENOUS ONCE
Status: DISCONTINUED | OUTPATIENT
Start: 2020-11-04 | End: 2020-11-04

## 2020-11-04 RX ADMIN — SODIUM BICARBONATE: 84 INJECTION, SOLUTION INTRAVENOUS at 11:47

## 2020-11-04 RX ADMIN — MESNA 925 MG: 100 INJECTION, SOLUTION INTRAVENOUS at 09:56

## 2020-11-04 RX ADMIN — DOXORUBICIN HYDROCHLORIDE 46 MG: 2 INJECTION, SOLUTION INTRAVENOUS at 10:16

## 2020-11-04 RX ADMIN — IFOSFAMIDE 4650 MG: 3 INJECTION, SOLUTION INTRAVENOUS at 10:23

## 2020-11-04 RX ADMIN — Medication 10 ML: at 15:20

## 2020-11-04 RX ADMIN — Medication 500 UNITS: at 15:20

## 2020-11-04 RX ADMIN — POTASSIUM CHLORIDE 20 MEQ: 29.8 INJECTION, SOLUTION INTRAVENOUS at 11:47

## 2020-11-04 RX ADMIN — Medication 10 MG: at 09:52

## 2020-11-04 RX ADMIN — POTASSIUM CHLORIDE 20 MEQ: 400 INJECTION, SOLUTION INTRAVENOUS at 08:39

## 2020-11-04 RX ADMIN — Medication 10 ML: at 08:16

## 2020-11-04 RX ADMIN — Medication 10 ML: at 08:15

## 2020-11-04 RX ADMIN — MESNA 925 MG: 100 INJECTION, SOLUTION INTRAVENOUS at 14:53

## 2020-11-04 RX ADMIN — SODIUM BICARBONATE: 84 INJECTION, SOLUTION INTRAVENOUS at 08:39

## 2020-11-04 RX ADMIN — SODIUM CHLORIDE: 9 INJECTION, SOLUTION INTRAVENOUS at 08:15

## 2020-11-04 NOTE — PROGRESS NOTES
Patient arrived for C1D2 mesna, ifex, adriamycin    labs collected and reviewed   Neuro assessment complete WNL   Signature obtained   Tolerated tx w/o incident  Home oral mesna given and pt instructed to take at 7 pm, she verbalized understanding   Return 11/5 [address] RN

## 2020-11-05 ENCOUNTER — HOSPITAL ENCOUNTER (OUTPATIENT)
Dept: INFUSION THERAPY | Age: 25
Discharge: HOME | End: 2020-11-05
Payer: COMMERCIAL

## 2020-11-05 VITALS
TEMPERATURE: 97.7 F | HEART RATE: 78 BPM | DIASTOLIC BLOOD PRESSURE: 84 MMHG | SYSTOLIC BLOOD PRESSURE: 131 MMHG | RESPIRATION RATE: 18 BRPM

## 2020-11-05 DIAGNOSIS — C49.9 SARCOMA (HCC): Primary | ICD-10-CM

## 2020-11-05 LAB
ANION GAP SERPL CALCULATED.3IONS-SCNC: 12 MMOL/L (ref 9–17)
BUN BLDV-MCNC: 9 MG/DL (ref 6–20)
BUN/CREAT BLD: ABNORMAL (ref 9–20)
CALCIUM SERPL-MCNC: 9.3 MG/DL (ref 8.6–10.4)
CHLORIDE BLD-SCNC: 104 MMOL/L (ref 98–107)
CO2: 25 MMOL/L (ref 20–31)
CREAT SERPL-MCNC: 0.41 MG/DL (ref 0.5–0.9)
GFR AFRICAN AMERICAN: >60 ML/MIN
GFR NON-AFRICAN AMERICAN: >60 ML/MIN
GFR SERPL CREATININE-BSD FRML MDRD: ABNORMAL ML/MIN/{1.73_M2}
GFR SERPL CREATININE-BSD FRML MDRD: ABNORMAL ML/MIN/{1.73_M2}
GLUCOSE BLD-MCNC: 132 MG/DL (ref 70–99)
POTASSIUM SERPL-SCNC: 3.2 MMOL/L (ref 3.7–5.3)
SODIUM BLD-SCNC: 141 MMOL/L (ref 135–144)

## 2020-11-05 PROCEDURE — 96375 TX/PRO/DX INJ NEW DRUG ADDON: CPT

## 2020-11-05 PROCEDURE — 96361 HYDRATE IV INFUSION ADD-ON: CPT

## 2020-11-05 PROCEDURE — 96366 THER/PROPH/DIAG IV INF ADDON: CPT

## 2020-11-05 PROCEDURE — 80048 BASIC METABOLIC PNL TOTAL CA: CPT

## 2020-11-05 PROCEDURE — 2500000003 HC RX 250 WO HCPCS: Performed by: INTERNAL MEDICINE

## 2020-11-05 PROCEDURE — 6370000000 HC RX 637 (ALT 250 FOR IP): Performed by: INTERNAL MEDICINE

## 2020-11-05 PROCEDURE — 6360000002 HC RX W HCPCS: Performed by: INTERNAL MEDICINE

## 2020-11-05 PROCEDURE — 96367 TX/PROPH/DG ADDL SEQ IV INF: CPT

## 2020-11-05 PROCEDURE — 96411 CHEMO IV PUSH ADDL DRUG: CPT

## 2020-11-05 PROCEDURE — 36591 DRAW BLOOD OFF VENOUS DEVICE: CPT

## 2020-11-05 PROCEDURE — 2580000003 HC RX 258: Performed by: INTERNAL MEDICINE

## 2020-11-05 PROCEDURE — 96377 APPLICATON ON-BODY INJECTOR: CPT

## 2020-11-05 PROCEDURE — 96413 CHEMO IV INFUSION 1 HR: CPT

## 2020-11-05 RX ORDER — DOXORUBICIN HYDROCHLORIDE 2 MG/ML
25 INJECTION, SOLUTION INTRAVENOUS ONCE
Status: COMPLETED | OUTPATIENT
Start: 2020-11-05 | End: 2020-11-05

## 2020-11-05 RX ORDER — SODIUM CHLORIDE 9 MG/ML
INJECTION, SOLUTION INTRAVENOUS CONTINUOUS
Status: DISCONTINUED | OUTPATIENT
Start: 2020-11-05 | End: 2020-11-06 | Stop reason: HOSPADM

## 2020-11-05 RX ORDER — DEXAMETHASONE SODIUM PHOSPHATE 10 MG/ML
10 INJECTION, SOLUTION INTRAMUSCULAR; INTRAVENOUS ONCE
Status: COMPLETED | OUTPATIENT
Start: 2020-11-05 | End: 2020-11-05

## 2020-11-05 RX ORDER — SODIUM CHLORIDE 0.9 % (FLUSH) 0.9 %
10 SYRINGE (ML) INJECTION PRN
Status: DISCONTINUED | OUTPATIENT
Start: 2020-11-05 | End: 2020-11-06 | Stop reason: HOSPADM

## 2020-11-05 RX ORDER — HEPARIN SODIUM (PORCINE) LOCK FLUSH IV SOLN 100 UNIT/ML 100 UNIT/ML
500 SOLUTION INTRAVENOUS PRN
Status: DISCONTINUED | OUTPATIENT
Start: 2020-11-05 | End: 2020-11-06 | Stop reason: HOSPADM

## 2020-11-05 RX ORDER — POTASSIUM CHLORIDE 20 MEQ/1
40 TABLET, EXTENDED RELEASE ORAL ONCE
Status: COMPLETED | OUTPATIENT
Start: 2020-11-05 | End: 2020-11-05

## 2020-11-05 RX ADMIN — POTASSIUM CHLORIDE 40 MEQ: 1500 TABLET, EXTENDED RELEASE ORAL at 10:07

## 2020-11-05 RX ADMIN — MESNA 925 MG: 100 INJECTION, SOLUTION INTRAVENOUS at 15:27

## 2020-11-05 RX ADMIN — PEGFILGRASTIM 6 MG: KIT SUBCUTANEOUS at 15:32

## 2020-11-05 RX ADMIN — Medication 10 ML: at 08:16

## 2020-11-05 RX ADMIN — SODIUM CHLORIDE: 9 INJECTION, SOLUTION INTRAVENOUS at 08:20

## 2020-11-05 RX ADMIN — IFOSFAMIDE 4650 MG: 3 INJECTION, SOLUTION INTRAVENOUS at 10:50

## 2020-11-05 RX ADMIN — MESNA 925 MG: 100 INJECTION, SOLUTION INTRAVENOUS at 10:13

## 2020-11-05 RX ADMIN — DOXORUBICIN HYDROCHLORIDE 46 MG: 2 INJECTION, SOLUTION INTRAVENOUS at 10:43

## 2020-11-05 RX ADMIN — HEPARIN 500 UNITS: 100 SYRINGE at 15:54

## 2020-11-05 RX ADMIN — Medication 10 ML: at 15:54

## 2020-11-05 RX ADMIN — Medication 10 ML: at 08:15

## 2020-11-05 RX ADMIN — DEXAMETHASONE SODIUM PHOSPHATE 10 MG: 10 INJECTION INTRAMUSCULAR; INTRAVENOUS at 09:57

## 2020-11-05 RX ADMIN — POTASSIUM CHLORIDE: 149 INJECTION, SOLUTION, CONCENTRATE INTRAVENOUS at 08:44

## 2020-11-05 RX ADMIN — POTASSIUM CHLORIDE: 149 INJECTION, SOLUTION, CONCENTRATE INTRAVENOUS at 11:59

## 2020-11-05 RX ADMIN — Medication 10 ML: at 08:14

## 2020-11-05 NOTE — FLOWSHEET NOTE
Situation:  Writer visited with Patient and Coworker in the treatment cubicle of the [facility] clinic. Assessment:  Ms. Denise Aceves was joined by her coworker, [employer], in the treatment cubicle. She shared that today was her first treatment. Coworker affirmed Pt's strength and shared that Pt \"lost her leg to save her baby's life. \" Ms. Denise Aceves confirmed, sharing about her amputation and the birth of her son. She expressed gratitude for the role her leeann had in making decisions and in the birth of her son. Coworker described Pt as an \"inspiration. \" Pt and Coworker were receptive to a copy of \"Our Daily Bread. \"     Intervention:  Writer provided supportive presence and explored Pt's coping and needs; inquired about Pt's sources of support and strength; offered words of support and encouragement; affirmed Pt's strengths; gave Pt a copy of \"Our Daily Bread. \"    Outcome:  Ms. Denise Aceves and Huber Tapia thanked writer for the visit. 11/05/20 1048   Encounter Summary   Services provided to: Patient and family together   Referral/Consult From: [facility] Family members;Friends/neighbors;Spouse;Parent; Children   Continue Visiting   (11/5/20)   Complexity of Encounter Moderate   Length of Encounter 15 minutes   Spiritual Assessment Completed Yes   Routine   Type Initial   Spiritual/Islam   Type Spiritual support   Assessment Calm; Approachable; Hopeful;Coping   Intervention Active listening;Explored feelings, thoughts, concerns;Explored coping resources;Provided reading materials/devotional materials;Sustaining presence/ Ministry of presence   Outcome Expressed gratitude; Connection/belonging;Engaged in conversation;Expressed feelings/needs/concerns;Coping;Encouraged; Hopeful;Receptive     Electronically signed by Idalmis Tavarez, Oncology Outpatient [address], [id number]  11/5/2020  10:53 AM

## 2020-11-05 NOTE — PROGRESS NOTES
Pt here for C1D3. Denies any new complaints. Neuro check completed and WNL. Labs drawn from port and results reviewed. Potassium 3.2 discussed with Dr. Sumit Pierre. Order received to give 40 meq KCL orally. Pt was treated without incident and d/c'd in stable condition. Oral mesna given to pt to take at home at 7pm. Pt returns 11/23/20 for C2D1.

## 2020-11-09 ENCOUNTER — TELEPHONE (OUTPATIENT)
Dept: ONCOLOGY | Age: 25
End: 2020-11-09

## 2020-11-09 NOTE — TELEPHONE ENCOUNTER
called patient to introduce self and services. Patient reports no concerns at this time.  provided contact information and encouraged patient to call if needed.

## 2020-11-10 RX ORDER — GABAPENTIN 300 MG/1
300 CAPSULE ORAL 3 TIMES DAILY
Qty: 90 CAPSULE | Refills: 0 | Status: SHIPPED | OUTPATIENT
Start: 2020-11-10 | End: 2020-12-10 | Stop reason: SDUPTHER

## 2020-11-10 NOTE — TELEPHONE ENCOUNTER
Last OV 08/18/2020    Health Maintenance   Topic Date Due    Pneumococcal 0-64 years Vaccine (1 of 1 - PPSV23) 08/13/2002    Hepatitis A vaccine (2 of 2 - 2-dose series) 12/06/2014    Flu vaccine (1) 09/01/2020    Chlamydia screen  10/12/2021    Potassium monitoring  11/05/2021    Creatinine monitoring  11/05/2021    Cervical cancer screen  05/19/2023    DTaP/Tdap/Td vaccine (8 - Td) 08/12/2026    Hepatitis B vaccine  Completed    Hib vaccine  Completed    HPV vaccine  Completed    Varicella vaccine  Completed    Meningococcal (ACWY) vaccine  Completed    HIV screen  Completed             (applicable per patient's age: Cancer Screenings, Depression Screening, Fall Risk Screening, Immunizations)    Hemoglobin A1C (%)   Date Value   08/02/2020 5.0     AST (U/L)   Date Value   11/03/2020 42 (H)     ALT (U/L)   Date Value   11/03/2020 94 (H)     BUN (mg/dL)   Date Value   11/05/2020 9      (goal A1C is < 7)   (goal LDL is <100) need 30-50% reduction from baseline     BP Readings from Last 3 Encounters:   11/05/20 131/84   11/04/20 128/82   11/03/20 128/81    (goal /80)      All Future Testing planned in [facility]:  Lab Frequency Next Occurrence   PAP SMEAR Once 05/19/2020   Urinalysis Reflex to Culture Once 05/19/2020   Prenatal type and screen Once 05/19/2020   Drugs of Abuse Scr, Urine Once 06/02/2020   CBC With Auto Differential     Comprehensive Metabolic Panel     Magnesium     Phosphorus     Urinalysis     Basic Metabolic Panel         Next Visit Date:  Future Appointments   Date Time Provider Salma Oliva   11/23/2020  9:00 AM STV PB MED ONC CHAIR 03 STVZ PB [facility] [place]   11/24/2020  8:30 AM Zack Chapman MD [facility]   11/24/2020  9:00 AM STV PB MED ONC CHAIR 03 STVZ PB [facility] [place]   11/25/2020  9:00 AM STV PB MED ONC CHAIR 13 STVZ PB [facility] [place] Saeed   1/6/2021  4:00 PM Dominguez Valle MD PHYS MED [facility]            Patient Active Problem List:     Family

## 2020-11-11 ENCOUNTER — CARE COORDINATION (OUTPATIENT)
Dept: OTHER | Facility: CLINIC | Age: 25
End: 2020-11-11

## 2020-11-11 NOTE — CARE COORDINATION
Ambulatory Care Coordination Note  11/11/2020  CM Risk Score: 1  Charlson 10 Year Mortality Risk Score: 100%     ACC: Damián Coello RN    Summary Note: Pt returned call from earlier voice message. Pt started chemo last week for first treatment. She is expected to continue til possibly February. Pt is on x 3days and then off chemo for  2 1/2 weeks. Next tx is week of Thanksgiving. Said she has Zofran for nausea and Lorazepam for anxiety if needed, said she is eating differently as some things do not taste as good anymore, no vomiting. Pt is drinking fluids. Aileen Ramirez baby is doing well, said she is tired from chemo and with combination of being a new mom. Her mother is very helpful she said and she has transport to all her appts. Pt is agreeable to continued support from Associate CM. Will follow            Goals Addressed                 This Visit's Progress     Conditions and Symptoms   On track     I will schedule office visits, as directed by my provider. I will keep my appointment or reschedule if I have to cancel. I will notify my provider of any barriers to my plan of care. I will notify my provider of any symptoms that indicate a worsening of my condition.     Barriers: overwhelmed by complexity of regimen  Plan for overcoming my barriers: working on utilizing support persons  Confidence: 10/10  Anticipated Goal Completion Date: 11/30/20         COMPLETED: Patient Stated (pt-stated)        Pt will message her OB and inform him of the options the oncologist presented to her for treatment with chemo    Barriers: none  Plan for overcoming my barriers: N/A  Confidence: 10/10  Anticipated Goal Completion Date: 8/30/2020       Patient Stated (pt-stated)        Pt will continue to keep her oncologist informed of any unmanageable side effects of chemo    Barriers: none  Plan for overcoming my barriers: N/A  Confidence: 10/10  Anticipated Goal Completion Date: 11/4/2020 and going til end of treatment - tentative end date of feb 2021            Prior to Admission medications    Medication Sig Start Date End Date Taking? Authorizing Provider   gabapentin (NEURONTIN) 300 MG capsule Take 1 capsule by mouth 3 times daily for 30 days. 11/10/20 12/10/20  FATEMEH Richards CNP   LORazepam (ATIVAN) 1 MG tablet Take 1 tablet by mouth every 8 hours as needed for Anxiety for up to 30 days. 11/3/20 12/3/20  Zenaida Chapman MD   omeprazole (PRILOSEC) 20 MG delayed release capsule Take 1 capsule by mouth daily 10/20/20   Eusebio Chapman MD   ondansetron (ZOFRAN ODT) 8 MG TBDP disintegrating tablet Place 1 tablet under the tongue every 8 hours as needed for Nausea or Vomiting 10/20/20   Zenaida Chapman MD   lidocaine-prilocaine (EMLA) 2.5-2.5 % cream Apply topically Daily as needed. 10/20/20   Zenaida Chapman MD   ibuprofen (ADVIL;MOTRIN) 800 MG tablet Take 1 tablet by mouth every 8 hours as needed for Pain 10/13/20   Alfredo Haile DO   docusate sodium (COLACE) 100 MG capsule Take 1 capsule by mouth 2 times daily as needed for Constipation 10/13/20   Alfredo Haile DO   Handicap Placard MISC Expiration: Lifetime 8/26/20   FATEMEH Richards CNP   blood glucose monitor kit and supplies Dispense sufficient amount for indicated testing frequency plus additional to accommodate PRN testing needs. Dispense all needed supplies to include: monitor, strips, lancing device, lancets, control solutions, alcohol swabs.   Patient not taking: Reported on 10/30/2020 8/10/20   Chantal [address], DO   Prenatal Vit-Fe Fumarate-FA (PRENATAL VITAMIN) 27-0.8 MG TABS Take 1 tablet by mouth daily May substitute with formulary covered by patient's insurance 8/10/20 9/9/20  Chantal Angeles DO   ferrous sulfate (IRON 325) 325 (65 Fe) MG tablet Take 1 tablet by mouth 2 times daily 8/10/20   Chantal Angeles DO   Prenatal MV-Min-Fe Fum-FA-DHA (PRENATAL 1 PO) Take by mouth    Historical Provider, MD       Future Appointments   Date Time Provider Salma Oliva   11/23/2020  9:00 AM STV PB MED ONC CHAIR 03 STVZ [facility] [place]   11/24/2020  8:30 AM Karmen Chapman MD [facility]   11/24/2020  9:00 AM STV PB MED ONC CHAIR 03 ST[facility]Z [facility] [place]   11/25/2020  9:00 AM [facility]   1/6/2021  4:00 PM Sharon Suero MD PHYS MED [facility]

## 2020-11-13 ENCOUNTER — PATIENT MESSAGE (OUTPATIENT)
Dept: PRIMARY CARE CLINIC | Age: 25
End: 2020-11-13

## 2020-11-13 NOTE — TELEPHONE ENCOUNTER
From: Brijesh Silverio  To: FATEMEH Lambert CNP  Sent: 11/13/2020 7:41 AM EST  Subject: Non-Urgent Medical Question    Nilda Flores,    I am messaging you to see if you can possibly issue us another paper for the handicap card. I believe we lost it during our move and I completely apologize for that.      Brijesh Silverio :)

## 2020-11-16 ENCOUNTER — TELEPHONE (OUTPATIENT)
Dept: INFUSION THERAPY | Age: 25
End: 2020-11-16

## 2020-11-16 RX ORDER — METHYLPREDNISOLONE 4 MG/1
TABLET ORAL
Qty: 1 KIT | Refills: 0 | Status: SHIPPED | OUTPATIENT
Start: 2020-11-16 | End: 2020-11-22

## 2020-11-16 NOTE — TELEPHONE ENCOUNTER
Pt called in reporting rash on bilat hands and forearms. States started Saturday. Raised, red bumps and itchy. Spoke with Dr. Tabatha Dugan, Medrol dose pk ordered and pt to have VV with him tomorrow. Ronda Loza Notified. VV Appt tomorrow at 4:00. Pt notified of all above.

## 2020-11-17 ENCOUNTER — VIRTUAL VISIT (OUTPATIENT)
Dept: ONCOLOGY | Age: 25
End: 2020-11-17
Payer: COMMERCIAL

## 2020-11-17 PROCEDURE — 99211 OFF/OP EST MAY X REQ PHY/QHP: CPT

## 2020-11-17 PROCEDURE — 99213 OFFICE O/P EST LOW 20 MIN: CPT | Performed by: INTERNAL MEDICINE

## 2020-11-17 NOTE — PROGRESS NOTES
DIAGNOSIS:   1. 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 female who is referred to us for sarcoma. She presented with rightt knee sprain at work 04/2020 but the pain and swelling worsened with palpable mass. She underwent MRI which showed mass and was seen at [facility] where biopsy was taken and showed sarcoma. She was transferred to [facility] and decided to undergo full leg amputation due to early stage pregnancy, tumor was 16 cm x 20 cm x 3 cm and removed with negative margins. She was following with [facility] and decided she would prefer to receive treatment locally. The patient is currently 32 weeks pregnant and delivery plans are expected to be finalized 10/02/2020. She has elected to proceed with chemo after delivery. She is working towards prosthesis with test socket. 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 Kathy delivered in late October, after delivery, we decided to start chemo with AIM 11/3/2020. INTERIM HISTORY: The patient presents for follow up today , this is a virtual visit. Duncan Lopez called yesterday and she has significant rash on her arms. We will start her on steroids. Today I wanted to see the rash. It is more of a papular rash on the back of the hands and the forearms. It is more papular but it is quite pruritic. It has a great response to steroid and is almost 50% better in 24 hours. Going over chemotherapy toxicity, she had some nausea controlled with medication.   She had no vomiting, no diarrhea, she is starting to lose her hair  PAST MEDICAL HISTORY: has a past medical history of Diabetes mellitus ([facility] [address] History of anemia, Osteosarcoma, and Wears glasses. PAST SURGICAL HISTORY: has a past surgical history that includes Leg amputation at hip (Right, 07/23/2020) and IR PORT PLACEMENT > 5 YEARS (10/27/2020). CURRENT MEDICATIONS:  has a current medication list which includes the following prescription(s): methylprednisolone, handicap placard, gabapentin, lorazepam, omeprazole, ondansetron, lidocaine-prilocaine, ibuprofen, docusate sodium, ferrous sulfate, prenatal mv-min-fe fum-fa-dha, blood glucose monitor kit and supplies, and prenatal vitamin. ALLERGIES:  has No Known Allergies. FAMILY HISTORY: Negative for any hematological or oncological conditions. SOCIAL HISTORY:  reports that she has never smoked. She has never used smokeless tobacco. She reports previous alcohol use. She reports that she does not use drugs. REVIEW OF SYSTEMS:   General: No fever or night sweats. Weight is stable. ENT: No double or blurred vision, no tinnitus or hearing problem, no dysphagia or sore throat   Respiratory: No chest pain, no shortness of breath, no cough or hemoptysis. Cardiovascular: Denies chest pain, PND or orthopnea. No L E swelling or palpitations. Gastrointestinal: No nausea or vomiting, abdominal pain, diarrhea or constipation. Genitourinary: Denies dysuria, hematuria, frequency, urgency or incontinence. Gynecological: nearly resolved post partum bleeding  Neurological: Denies headaches, decreased LOC, no sensory or motor focal deficits. +post surgical phantom pain  Musculoskeletal: No arthralgia no back pain or joint swelling. Skin: Papular rash as discussed  Psychiatric: ++ anxiety, no depression. Endocrine: No diabetes or thyroid disease. Hematologic: No bleeding, no adenopathy. PHYSICAL EXAM: Shows a well appearing 26y.o.-year-old female who is not in pain or distress.      PHYSICAL EXAMINATION:    Vital Signs: (As obtained by patient/caregiver or practitioner observation)    Blood pressure-  Heart rate-    Respiratory rate-    Temperature-  Pulse oximetry-     Constitutional: [x] Appears well-developed and well-nourished [x] No apparent distress      [] Abnormal-   Mental status  [x] Alert and awake  [x] Oriented to person/place/time [x]Able to follow commands      Eyes:  EOM    [x]  Normal  [] Abnormal-  Sclera  [x]  Normal  [] Abnormal -         Discharge [x]  None visible  [] Abnormal -    HENT:   [x] Normocephalic, atraumatic.   [] Abnormal   [x] Mouth/Throat: Mucous membranes are moist.     External Ears [x] Normal  [] Abnormal-     Neck: [x] No visualized mass     Pulmonary/Chest: [x] Respiratory effort normal.  [x] No visualized signs of difficulty breathing or respiratory distress        [] Abnormal-      Musculoskeletal:   [x] Normal gait with no signs of ataxia         [x] Normal range of motion of neck        [] Abnormal-       Neurological:        [x] No Facial Asymmetry (Cranial nerve 7 motor function) (limited exam to video visit)          [x] No gaze palsy        [] Abnormal-         Skin:        [] No significant exanthematous lesions or discoloration noted on facial skin         [x] Abnormal-papular rash on the back of the hands and forearms as discussed           Psychiatric:       [x] Normal Affect [x] No Hallucinations        [] Abnormal-     Other pertinent observable physical exam findings-                            REVIEW OF LABORATORY DATA:   Lab Results   Component Value Date    WBC 4.3 11/03/2020    HGB 13.7 11/03/2020    HCT 41.0 11/03/2020    MCV 87.0 11/03/2020     11/03/2020   '    Chemistry        Component Value Date/Time     11/05/2020 0815    K 3.2 (L) 11/05/2020 0815     11/05/2020 0815    CO2 25 11/05/2020 0815    BUN 9 11/05/2020 0815    CREATININE 0.41 (L) 11/05/2020 0815        Component Value Date/Time    CALCIUM 9.3 11/05/2020 0815    ALKPHOS 103 11/03/2020 0820    AST 42 (H) 11/03/2020 0820    ALT 94 (H) 11/03/2020 0820    BILITOT 0.46 11/03/2020 0820          REVIEW OF RADIOLOGICAL RESULTS:     IMPRESSION:   1. 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 S/P right leg amputation  3. Current pregnancy  4. Plan for adjuvant chemo following delivery with ifosfamide and adriamycin (AIM) - started 11/03/2020  5. Papular rash, likely related to chemo, controlled with steroid  6. Chemotherapy-induced nausea, mild and controlled    PLAN:   1. Mikel Allen is doing very well and handling chemotherapy well. Toxicity has been manageable so far  2. We will continue current therapy without changes continue supportive care  3. Agree with steroids for her skin rash as discussed  4. We will evaluate her next week for cycle #2 of chemo    Attestation   The patient  being evaluated by a Virtual Visit (video visit) encounter to address concerns as mentioned above. A caregiver was present when appropriate. Due to this being a TeleHealth encounter (During [place] [address] public health emergency), evaluation of the following organ systems was limited: Vitals/Constitutional/EENT/Resp/CV/GI//MS/Neuro/Skin/Heme-Lymph-Imm. Pursuant to the emergency declaration under the [address] [place] [address] authority and the [employer] Act, this Virtual Visit was conducted with patient's (and/or legal guardian's) consent, to reduce the patient's risk of exposure to COVID-19 and provide necessary medical care. The patient (and/or legal guardian) has also been advised to contact this office for worsening conditions or problems, and seek emergency medical treatment and/or call 911 if deemed necessary. Services were provided through a video synchronous discussion virtually to substitute for in-person clinic visit. Patient and provider were located at their individual homes.     --Boom Ortiz MD on

## 2020-11-23 ENCOUNTER — HOSPITAL ENCOUNTER (OUTPATIENT)
Dept: INFUSION THERAPY | Age: 25
Discharge: HOME | End: 2020-11-23
Payer: COMMERCIAL

## 2020-11-23 VITALS
WEIGHT: 174 LBS | SYSTOLIC BLOOD PRESSURE: 119 MMHG | HEART RATE: 106 BPM | DIASTOLIC BLOOD PRESSURE: 77 MMHG | BODY MASS INDEX: 32.61 KG/M2 | TEMPERATURE: 97.9 F | RESPIRATION RATE: 18 BRPM

## 2020-11-23 DIAGNOSIS — C49.9 SARCOMA (HCC): Primary | ICD-10-CM

## 2020-11-23 LAB
ABSOLUTE EOS #: 0 K/UL (ref 0–0.4)
ABSOLUTE IMMATURE GRANULOCYTE: ABNORMAL K/UL (ref 0–0.3)
ABSOLUTE LYMPH #: 2.31 K/UL (ref 1–4.8)
ABSOLUTE MONO #: 0.62 K/UL (ref 0.1–0.8)
ALBUMIN SERPL-MCNC: 4.4 G/DL (ref 3.5–5.2)
ALBUMIN/GLOBULIN RATIO: 1.9 (ref 1–2.5)
ALP BLD-CCNC: 65 U/L (ref 35–104)
ALT SERPL-CCNC: 11 U/L (ref 5–33)
ANION GAP SERPL CALCULATED.3IONS-SCNC: 11 MMOL/L (ref 9–17)
AST SERPL-CCNC: 10 U/L
BASOPHILS # BLD: 0 % (ref 0–2)
BASOPHILS ABSOLUTE: 0 K/UL (ref 0–0.2)
BILIRUB SERPL-MCNC: 0.2 MG/DL (ref 0.3–1.2)
BILIRUBIN URINE: NEGATIVE
BUN BLDV-MCNC: 13 MG/DL (ref 6–20)
BUN/CREAT BLD: ABNORMAL (ref 9–20)
CALCIUM SERPL-MCNC: 9.4 MG/DL (ref 8.6–10.4)
CHLORIDE BLD-SCNC: 101 MMOL/L (ref 98–107)
CO2: 27 MMOL/L (ref 20–31)
COLOR: YELLOW
COMMENT UA: NORMAL
CREAT SERPL-MCNC: <0.4 MG/DL (ref 0.5–0.9)
DIFFERENTIAL TYPE: ABNORMAL
EOSINOPHILS RELATIVE PERCENT: 0 % (ref 1–4)
GFR AFRICAN AMERICAN: ABNORMAL ML/MIN
GFR NON-AFRICAN AMERICAN: ABNORMAL ML/MIN
GFR SERPL CREATININE-BSD FRML MDRD: ABNORMAL ML/MIN/{1.73_M2}
GFR SERPL CREATININE-BSD FRML MDRD: ABNORMAL ML/MIN/{1.73_M2}
GLUCOSE BLD-MCNC: 101 MG/DL (ref 70–99)
GLUCOSE URINE: NEGATIVE
HCT VFR BLD CALC: 32.9 % (ref 36–46)
HEMOGLOBIN: 11.3 G/DL (ref 12–16)
IMMATURE GRANULOCYTES: ABNORMAL %
KETONES, URINE: NEGATIVE
LEUKOCYTE ESTERASE, URINE: NEGATIVE
LYMPHOCYTES # BLD: 26 % (ref 24–44)
MAGNESIUM: 2.1 MG/DL (ref 1.6–2.6)
MCH RBC QN AUTO: 29.2 PG (ref 26–34)
MCHC RBC AUTO-ENTMCNC: 34.3 G/DL (ref 31–37)
MCV RBC AUTO: 85.1 FL (ref 80–100)
METAMYELOCYTES ABSOLUTE COUNT: 0.53 K/UL
METAMYELOCYTES: 6 %
MONOCYTES # BLD: 7 % (ref 1–7)
MORPHOLOGY: NORMAL
MYELOCYTES ABSOLUTE COUNT: 0.09 K/UL
MYELOCYTES: 1 %
NITRITE, URINE: NEGATIVE
NRBC AUTOMATED: ABNORMAL PER 100 WBC
PDW BLD-RTO: 12.9 % (ref 12.5–15.4)
PH UA: 6 (ref 5–8)
PHOSPHORUS: 5.2 MG/DL (ref 2.6–4.5)
PLATELET # BLD: 296 K/UL (ref 140–450)
PLATELET ESTIMATE: ABNORMAL
PMV BLD AUTO: 8.4 FL (ref 6–12)
POTASSIUM SERPL-SCNC: 4.1 MMOL/L (ref 3.7–5.3)
PROTEIN UA: NEGATIVE
RBC # BLD: 3.86 M/UL (ref 4–5.2)
RBC # BLD: ABNORMAL 10*6/UL
SEG NEUTROPHILS: 60 % (ref 36–66)
SEGMENTED NEUTROPHILS ABSOLUTE COUNT: 5.35 K/UL (ref 1.8–7.7)
SODIUM BLD-SCNC: 139 MMOL/L (ref 135–144)
SPECIFIC GRAVITY UA: 1.02 (ref 1–1.03)
TOTAL PROTEIN: 6.7 G/DL (ref 6.4–8.3)
TURBIDITY: CLEAR
URINE HGB: NEGATIVE
UROBILINOGEN, URINE: NORMAL
WBC # BLD: 8.9 K/UL (ref 3.5–11)
WBC # BLD: ABNORMAL 10*3/UL

## 2020-11-23 PROCEDURE — 2500000003 HC RX 250 WO HCPCS: Performed by: INTERNAL MEDICINE

## 2020-11-23 PROCEDURE — 96411 CHEMO IV PUSH ADDL DRUG: CPT

## 2020-11-23 PROCEDURE — 96375 TX/PRO/DX INJ NEW DRUG ADDON: CPT

## 2020-11-23 PROCEDURE — 2580000003 HC RX 258: Performed by: INTERNAL MEDICINE

## 2020-11-23 PROCEDURE — 96413 CHEMO IV INFUSION 1 HR: CPT

## 2020-11-23 PROCEDURE — 84100 ASSAY OF PHOSPHORUS: CPT

## 2020-11-23 PROCEDURE — 96360 HYDRATION IV INFUSION INIT: CPT

## 2020-11-23 PROCEDURE — 96361 HYDRATE IV INFUSION ADD-ON: CPT

## 2020-11-23 PROCEDURE — 96367 TX/PROPH/DG ADDL SEQ IV INF: CPT

## 2020-11-23 PROCEDURE — 81003 URINALYSIS AUTO W/O SCOPE: CPT

## 2020-11-23 PROCEDURE — 36591 DRAW BLOOD OFF VENOUS DEVICE: CPT

## 2020-11-23 PROCEDURE — 83735 ASSAY OF MAGNESIUM: CPT

## 2020-11-23 PROCEDURE — 80053 COMPREHEN METABOLIC PANEL: CPT

## 2020-11-23 PROCEDURE — 6360000002 HC RX W HCPCS: Performed by: INTERNAL MEDICINE

## 2020-11-23 PROCEDURE — 85025 COMPLETE CBC W/AUTO DIFF WBC: CPT

## 2020-11-23 RX ORDER — SODIUM CHLORIDE 0.9 % (FLUSH) 0.9 %
10 SYRINGE (ML) INJECTION PRN
Status: DISCONTINUED | OUTPATIENT
Start: 2020-11-23 | End: 2020-11-24 | Stop reason: HOSPADM

## 2020-11-23 RX ORDER — DEXAMETHASONE SODIUM PHOSPHATE 10 MG/ML
10 INJECTION INTRAMUSCULAR; INTRAVENOUS ONCE
Status: COMPLETED | OUTPATIENT
Start: 2020-11-23 | End: 2020-11-23

## 2020-11-23 RX ORDER — SODIUM CHLORIDE 9 MG/ML
INJECTION, SOLUTION INTRAVENOUS CONTINUOUS
Status: DISCONTINUED | OUTPATIENT
Start: 2020-11-23 | End: 2020-11-24 | Stop reason: HOSPADM

## 2020-11-23 RX ORDER — HEPARIN SODIUM (PORCINE) LOCK FLUSH IV SOLN 100 UNIT/ML 100 UNIT/ML
500 SOLUTION INTRAVENOUS PRN
Status: DISCONTINUED | OUTPATIENT
Start: 2020-11-23 | End: 2020-11-24 | Stop reason: HOSPADM

## 2020-11-23 RX ORDER — PALONOSETRON 0.05 MG/ML
0.25 INJECTION, SOLUTION INTRAVENOUS ONCE
Status: COMPLETED | OUTPATIENT
Start: 2020-11-23 | End: 2020-11-23

## 2020-11-23 RX ORDER — DOXORUBICIN HYDROCHLORIDE 2 MG/ML
25 INJECTION, SOLUTION INTRAVENOUS ONCE
Status: COMPLETED | OUTPATIENT
Start: 2020-11-23 | End: 2020-11-23

## 2020-11-23 RX ADMIN — HEPARIN 500 UNITS: 100 SYRINGE at 15:44

## 2020-11-23 RX ADMIN — IFOSFAMIDE 4650 MG: 3 INJECTION, SOLUTION INTRAVENOUS at 11:07

## 2020-11-23 RX ADMIN — Medication 10 ML: at 09:13

## 2020-11-23 RX ADMIN — SODIUM CHLORIDE: 9 INJECTION, SOLUTION INTRAVENOUS at 10:02

## 2020-11-23 RX ADMIN — MESNA 925 MG: 100 INJECTION, SOLUTION INTRAVENOUS at 10:37

## 2020-11-23 RX ADMIN — POTASSIUM CHLORIDE: 149 INJECTION, SOLUTION, CONCENTRATE INTRAVENOUS at 09:22

## 2020-11-23 RX ADMIN — Medication 10 MG: at 10:31

## 2020-11-23 RX ADMIN — PALONOSETRON 0.25 MG: 0.05 INJECTION, SOLUTION INTRAVENOUS at 10:31

## 2020-11-23 RX ADMIN — MESNA 925 MG: 100 INJECTION, SOLUTION INTRAVENOUS at 15:25

## 2020-11-23 RX ADMIN — Medication 10 ML: at 15:44

## 2020-11-23 RX ADMIN — POTASSIUM CHLORIDE: 149 INJECTION, SOLUTION, CONCENTRATE INTRAVENOUS at 12:09

## 2020-11-23 RX ADMIN — DOXORUBICIN HYDROCHLORIDE 46 MG: 2 INJECTION, SOLUTION INTRAVENOUS at 11:03

## 2020-11-23 NOTE — PROGRESS NOTES
Patient here for mesna Ifex sergey C2D1. Labs drawn from port and reviewed. Neuro checks completed. Blood return noted before during and after sergey infusion. She tolerated treatment well and was discharged home in stable condition. She is due to return tomorrow for C2D2.

## 2020-11-24 ENCOUNTER — HOSPITAL ENCOUNTER (OUTPATIENT)
Dept: INFUSION THERAPY | Age: 25
Discharge: HOME | End: 2020-11-24
Payer: COMMERCIAL

## 2020-11-24 ENCOUNTER — TELEPHONE (OUTPATIENT)
Dept: ONCOLOGY | Age: 25
End: 2020-11-24

## 2020-11-24 ENCOUNTER — OFFICE VISIT (OUTPATIENT)
Dept: ONCOLOGY | Age: 25
End: 2020-11-24
Payer: COMMERCIAL

## 2020-11-24 VITALS
HEART RATE: 96 BPM | RESPIRATION RATE: 18 BRPM | DIASTOLIC BLOOD PRESSURE: 77 MMHG | TEMPERATURE: 97.9 F | SYSTOLIC BLOOD PRESSURE: 114 MMHG

## 2020-11-24 VITALS
TEMPERATURE: 97.9 F | DIASTOLIC BLOOD PRESSURE: 77 MMHG | WEIGHT: 174 LBS | BODY MASS INDEX: 32.61 KG/M2 | HEART RATE: 96 BPM | SYSTOLIC BLOOD PRESSURE: 114 MMHG

## 2020-11-24 DIAGNOSIS — C49.9 SARCOMA (HCC): Primary | ICD-10-CM

## 2020-11-24 PROBLEM — C49.21: Status: ACTIVE | Noted: 2020-06-29

## 2020-11-24 LAB
ANION GAP SERPL CALCULATED.3IONS-SCNC: 8 MMOL/L (ref 9–17)
BUN BLDV-MCNC: 13 MG/DL (ref 6–20)
BUN/CREAT BLD: ABNORMAL (ref 9–20)
CALCIUM SERPL-MCNC: 9.2 MG/DL (ref 8.6–10.4)
CHLORIDE BLD-SCNC: 105 MMOL/L (ref 98–107)
CO2: 26 MMOL/L (ref 20–31)
CREAT SERPL-MCNC: <0.4 MG/DL (ref 0.5–0.9)
GFR AFRICAN AMERICAN: ABNORMAL ML/MIN
GFR NON-AFRICAN AMERICAN: ABNORMAL ML/MIN
GFR SERPL CREATININE-BSD FRML MDRD: ABNORMAL ML/MIN/{1.73_M2}
GFR SERPL CREATININE-BSD FRML MDRD: ABNORMAL ML/MIN/{1.73_M2}
GLUCOSE BLD-MCNC: 101 MG/DL (ref 70–99)
POTASSIUM SERPL-SCNC: 3.8 MMOL/L (ref 3.7–5.3)
SODIUM BLD-SCNC: 139 MMOL/L (ref 135–144)

## 2020-11-24 PROCEDURE — 80048 BASIC METABOLIC PNL TOTAL CA: CPT

## 2020-11-24 PROCEDURE — 96413 CHEMO IV INFUSION 1 HR: CPT

## 2020-11-24 PROCEDURE — 96411 CHEMO IV PUSH ADDL DRUG: CPT

## 2020-11-24 PROCEDURE — 96360 HYDRATION IV INFUSION INIT: CPT

## 2020-11-24 PROCEDURE — 36591 DRAW BLOOD OFF VENOUS DEVICE: CPT

## 2020-11-24 PROCEDURE — 2500000003 HC RX 250 WO HCPCS: Performed by: INTERNAL MEDICINE

## 2020-11-24 PROCEDURE — 2580000003 HC RX 258: Performed by: INTERNAL MEDICINE

## 2020-11-24 PROCEDURE — 99214 OFFICE O/P EST MOD 30 MIN: CPT | Performed by: INTERNAL MEDICINE

## 2020-11-24 PROCEDURE — 96361 HYDRATE IV INFUSION ADD-ON: CPT

## 2020-11-24 PROCEDURE — 96375 TX/PRO/DX INJ NEW DRUG ADDON: CPT

## 2020-11-24 PROCEDURE — 96367 TX/PROPH/DG ADDL SEQ IV INF: CPT

## 2020-11-24 PROCEDURE — 6360000002 HC RX W HCPCS: Performed by: INTERNAL MEDICINE

## 2020-11-24 PROCEDURE — 99211 OFF/OP EST MAY X REQ PHY/QHP: CPT | Performed by: INTERNAL MEDICINE

## 2020-11-24 RX ORDER — HEPARIN SODIUM (PORCINE) LOCK FLUSH IV SOLN 100 UNIT/ML 100 UNIT/ML
500 SOLUTION INTRAVENOUS PRN
Status: CANCELLED | OUTPATIENT
Start: 2020-12-17

## 2020-11-24 RX ORDER — DOXORUBICIN HYDROCHLORIDE 2 MG/ML
25 INJECTION, SOLUTION INTRAVENOUS ONCE
Status: CANCELLED | OUTPATIENT
Start: 2021-01-06

## 2020-11-24 RX ORDER — METHYLPREDNISOLONE SODIUM SUCCINATE 125 MG/2ML
125 INJECTION, POWDER, LYOPHILIZED, FOR SOLUTION INTRAMUSCULAR; INTRAVENOUS ONCE
Status: CANCELLED | OUTPATIENT
Start: 2020-12-17

## 2020-11-24 RX ORDER — DEXAMETHASONE SODIUM PHOSPHATE 10 MG/ML
10 INJECTION INTRAMUSCULAR; INTRAVENOUS ONCE
Status: COMPLETED | OUTPATIENT
Start: 2020-11-24 | End: 2020-11-24

## 2020-11-24 RX ORDER — HEPARIN SODIUM (PORCINE) LOCK FLUSH IV SOLN 100 UNIT/ML 100 UNIT/ML
500 SOLUTION INTRAVENOUS PRN
Status: DISCONTINUED | OUTPATIENT
Start: 2020-11-24 | End: 2020-11-25 | Stop reason: HOSPADM

## 2020-11-24 RX ORDER — SODIUM CHLORIDE 9 MG/ML
INJECTION, SOLUTION INTRAVENOUS CONTINUOUS
Status: CANCELLED | OUTPATIENT
Start: 2021-01-07

## 2020-11-24 RX ORDER — SODIUM CHLORIDE 0.9 % (FLUSH) 0.9 %
5 SYRINGE (ML) INJECTION PRN
Status: CANCELLED | OUTPATIENT
Start: 2021-01-07

## 2020-11-24 RX ORDER — DOXORUBICIN HYDROCHLORIDE 2 MG/ML
25 INJECTION, SOLUTION INTRAVENOUS ONCE
Status: CANCELLED
Start: 2021-01-07

## 2020-11-24 RX ORDER — HEPARIN SODIUM (PORCINE) LOCK FLUSH IV SOLN 100 UNIT/ML 100 UNIT/ML
500 SOLUTION INTRAVENOUS PRN
Status: CANCELLED | OUTPATIENT
Start: 2020-12-15

## 2020-11-24 RX ORDER — SODIUM CHLORIDE 9 MG/ML
INJECTION, SOLUTION INTRAVENOUS CONTINUOUS
Status: DISCONTINUED | OUTPATIENT
Start: 2020-11-24 | End: 2020-11-25 | Stop reason: HOSPADM

## 2020-11-24 RX ORDER — EPINEPHRINE 1 MG/ML
0.3 INJECTION, SOLUTION, CONCENTRATE INTRAVENOUS PRN
Status: CANCELLED | OUTPATIENT
Start: 2020-12-17

## 2020-11-24 RX ORDER — SODIUM CHLORIDE 0.9 % (FLUSH) 0.9 %
10 SYRINGE (ML) INJECTION PRN
Status: CANCELLED | OUTPATIENT
Start: 2021-01-07

## 2020-11-24 RX ORDER — EPINEPHRINE 1 MG/ML
0.3 INJECTION, SOLUTION, CONCENTRATE INTRAVENOUS PRN
Status: CANCELLED | OUTPATIENT
Start: 2021-01-05

## 2020-11-24 RX ORDER — DOXORUBICIN HYDROCHLORIDE 2 MG/ML
25 INJECTION, SOLUTION INTRAVENOUS ONCE
Status: CANCELLED | OUTPATIENT
Start: 2020-12-16

## 2020-11-24 RX ORDER — METHYLPREDNISOLONE SODIUM SUCCINATE 125 MG/2ML
125 INJECTION, POWDER, LYOPHILIZED, FOR SOLUTION INTRAMUSCULAR; INTRAVENOUS ONCE
Status: CANCELLED | OUTPATIENT
Start: 2020-12-16

## 2020-11-24 RX ORDER — EPINEPHRINE 1 MG/ML
0.3 INJECTION, SOLUTION, CONCENTRATE INTRAVENOUS PRN
Status: CANCELLED | OUTPATIENT
Start: 2021-01-07

## 2020-11-24 RX ORDER — SODIUM CHLORIDE 9 MG/ML
INJECTION, SOLUTION INTRAVENOUS CONTINUOUS
Status: CANCELLED | OUTPATIENT
Start: 2020-12-17

## 2020-11-24 RX ORDER — METHYLPREDNISOLONE SODIUM SUCCINATE 125 MG/2ML
125 INJECTION, POWDER, LYOPHILIZED, FOR SOLUTION INTRAMUSCULAR; INTRAVENOUS ONCE
Status: CANCELLED | OUTPATIENT
Start: 2020-12-15

## 2020-11-24 RX ORDER — SODIUM CHLORIDE 9 MG/ML
INJECTION, SOLUTION INTRAVENOUS CONTINUOUS
Status: CANCELLED
Start: 2021-01-07

## 2020-11-24 RX ORDER — DOXORUBICIN HYDROCHLORIDE 2 MG/ML
25 INJECTION, SOLUTION INTRAVENOUS ONCE
Status: CANCELLED | OUTPATIENT
Start: 2021-01-05

## 2020-11-24 RX ORDER — DIPHENHYDRAMINE HYDROCHLORIDE 50 MG/ML
50 INJECTION INTRAMUSCULAR; INTRAVENOUS ONCE
Status: CANCELLED | OUTPATIENT
Start: 2021-01-05

## 2020-11-24 RX ORDER — METHYLPREDNISOLONE SODIUM SUCCINATE 125 MG/2ML
125 INJECTION, POWDER, LYOPHILIZED, FOR SOLUTION INTRAMUSCULAR; INTRAVENOUS ONCE
Status: CANCELLED | OUTPATIENT
Start: 2021-01-06

## 2020-11-24 RX ORDER — SODIUM CHLORIDE 0.9 % (FLUSH) 0.9 %
10 SYRINGE (ML) INJECTION PRN
Status: DISCONTINUED | OUTPATIENT
Start: 2020-11-24 | End: 2020-11-25 | Stop reason: HOSPADM

## 2020-11-24 RX ORDER — SODIUM CHLORIDE 9 MG/ML
INJECTION, SOLUTION INTRAVENOUS CONTINUOUS
Status: CANCELLED | OUTPATIENT
Start: 2020-12-15

## 2020-11-24 RX ORDER — EPINEPHRINE 1 MG/ML
0.3 INJECTION, SOLUTION, CONCENTRATE INTRAVENOUS PRN
Status: CANCELLED | OUTPATIENT
Start: 2020-12-16

## 2020-11-24 RX ORDER — SODIUM CHLORIDE 0.9 % (FLUSH) 0.9 %
5 SYRINGE (ML) INJECTION PRN
Status: CANCELLED | OUTPATIENT
Start: 2021-01-06

## 2020-11-24 RX ORDER — SODIUM CHLORIDE 0.9 % (FLUSH) 0.9 %
10 SYRINGE (ML) INJECTION PRN
Status: CANCELLED | OUTPATIENT
Start: 2021-01-05

## 2020-11-24 RX ORDER — DOXORUBICIN HYDROCHLORIDE 2 MG/ML
25 INJECTION, SOLUTION INTRAVENOUS ONCE
Status: CANCELLED | OUTPATIENT
Start: 2020-12-15

## 2020-11-24 RX ORDER — METHYLPREDNISOLONE SODIUM SUCCINATE 125 MG/2ML
125 INJECTION, POWDER, LYOPHILIZED, FOR SOLUTION INTRAMUSCULAR; INTRAVENOUS ONCE
Status: CANCELLED | OUTPATIENT
Start: 2021-01-07

## 2020-11-24 RX ORDER — SODIUM CHLORIDE 9 MG/ML
INJECTION, SOLUTION INTRAVENOUS CONTINUOUS
Status: CANCELLED | OUTPATIENT
Start: 2021-01-05

## 2020-11-24 RX ORDER — SODIUM CHLORIDE 9 MG/ML
INJECTION, SOLUTION INTRAVENOUS CONTINUOUS
Status: CANCELLED
Start: 2020-12-17

## 2020-11-24 RX ORDER — HEPARIN SODIUM (PORCINE) LOCK FLUSH IV SOLN 100 UNIT/ML 100 UNIT/ML
500 SOLUTION INTRAVENOUS PRN
Status: CANCELLED | OUTPATIENT
Start: 2020-12-16

## 2020-11-24 RX ORDER — SODIUM CHLORIDE 9 MG/ML
INJECTION, SOLUTION INTRAVENOUS CONTINUOUS
Status: CANCELLED | OUTPATIENT
Start: 2021-01-06

## 2020-11-24 RX ORDER — PALONOSETRON 0.05 MG/ML
0.25 INJECTION, SOLUTION INTRAVENOUS ONCE
Status: CANCELLED | OUTPATIENT
Start: 2020-12-15

## 2020-11-24 RX ORDER — DOXORUBICIN HYDROCHLORIDE 2 MG/ML
25 INJECTION, SOLUTION INTRAVENOUS ONCE
Status: CANCELLED
Start: 2020-12-17

## 2020-11-24 RX ORDER — SODIUM CHLORIDE 0.9 % (FLUSH) 0.9 %
10 SYRINGE (ML) INJECTION PRN
Status: CANCELLED | OUTPATIENT
Start: 2021-01-06

## 2020-11-24 RX ORDER — HEPARIN SODIUM (PORCINE) LOCK FLUSH IV SOLN 100 UNIT/ML 100 UNIT/ML
500 SOLUTION INTRAVENOUS PRN
Status: CANCELLED | OUTPATIENT
Start: 2021-01-05

## 2020-11-24 RX ORDER — HEPARIN SODIUM (PORCINE) LOCK FLUSH IV SOLN 100 UNIT/ML 100 UNIT/ML
500 SOLUTION INTRAVENOUS PRN
Status: CANCELLED | OUTPATIENT
Start: 2021-01-07

## 2020-11-24 RX ORDER — SODIUM CHLORIDE 9 MG/ML
INJECTION, SOLUTION INTRAVENOUS CONTINUOUS
Status: CANCELLED | OUTPATIENT
Start: 2020-12-16

## 2020-11-24 RX ORDER — SODIUM CHLORIDE 0.9 % (FLUSH) 0.9 %
5 SYRINGE (ML) INJECTION PRN
Status: CANCELLED | OUTPATIENT
Start: 2020-12-15

## 2020-11-24 RX ORDER — SODIUM CHLORIDE 0.9 % (FLUSH) 0.9 %
10 SYRINGE (ML) INJECTION PRN
Status: CANCELLED | OUTPATIENT
Start: 2020-12-16

## 2020-11-24 RX ORDER — EPINEPHRINE 1 MG/ML
0.3 INJECTION, SOLUTION, CONCENTRATE INTRAVENOUS PRN
Status: CANCELLED | OUTPATIENT
Start: 2021-01-06

## 2020-11-24 RX ORDER — METHYLPREDNISOLONE SODIUM SUCCINATE 125 MG/2ML
125 INJECTION, POWDER, LYOPHILIZED, FOR SOLUTION INTRAMUSCULAR; INTRAVENOUS ONCE
Status: CANCELLED | OUTPATIENT
Start: 2021-01-05

## 2020-11-24 RX ORDER — SODIUM CHLORIDE 0.9 % (FLUSH) 0.9 %
10 SYRINGE (ML) INJECTION PRN
Status: CANCELLED | OUTPATIENT
Start: 2020-12-15

## 2020-11-24 RX ORDER — PALONOSETRON 0.05 MG/ML
0.25 INJECTION, SOLUTION INTRAVENOUS ONCE
Status: CANCELLED | OUTPATIENT
Start: 2021-01-05

## 2020-11-24 RX ORDER — DOXORUBICIN HYDROCHLORIDE 2 MG/ML
25 INJECTION, SOLUTION INTRAVENOUS ONCE
Status: COMPLETED | OUTPATIENT
Start: 2020-11-24 | End: 2020-11-24

## 2020-11-24 RX ORDER — SODIUM CHLORIDE 0.9 % (FLUSH) 0.9 %
10 SYRINGE (ML) INJECTION PRN
Status: CANCELLED | OUTPATIENT
Start: 2020-12-17

## 2020-11-24 RX ORDER — SODIUM CHLORIDE 0.9 % (FLUSH) 0.9 %
5 SYRINGE (ML) INJECTION PRN
Status: CANCELLED | OUTPATIENT
Start: 2021-01-05

## 2020-11-24 RX ORDER — HEPARIN SODIUM (PORCINE) LOCK FLUSH IV SOLN 100 UNIT/ML 100 UNIT/ML
500 SOLUTION INTRAVENOUS PRN
Status: CANCELLED | OUTPATIENT
Start: 2021-01-06

## 2020-11-24 RX ORDER — DIPHENHYDRAMINE HYDROCHLORIDE 50 MG/ML
50 INJECTION INTRAMUSCULAR; INTRAVENOUS ONCE
Status: CANCELLED | OUTPATIENT
Start: 2020-12-17

## 2020-11-24 RX ORDER — DIPHENHYDRAMINE HYDROCHLORIDE 50 MG/ML
50 INJECTION INTRAMUSCULAR; INTRAVENOUS ONCE
Status: CANCELLED | OUTPATIENT
Start: 2021-01-07

## 2020-11-24 RX ORDER — SODIUM CHLORIDE 0.9 % (FLUSH) 0.9 %
5 SYRINGE (ML) INJECTION PRN
Status: CANCELLED | OUTPATIENT
Start: 2020-12-16

## 2020-11-24 RX ORDER — DIPHENHYDRAMINE HYDROCHLORIDE 50 MG/ML
50 INJECTION INTRAMUSCULAR; INTRAVENOUS ONCE
Status: CANCELLED | OUTPATIENT
Start: 2020-12-16

## 2020-11-24 RX ORDER — DIPHENHYDRAMINE HYDROCHLORIDE 50 MG/ML
50 INJECTION INTRAMUSCULAR; INTRAVENOUS ONCE
Status: CANCELLED | OUTPATIENT
Start: 2021-01-06

## 2020-11-24 RX ORDER — EPINEPHRINE 1 MG/ML
0.3 INJECTION, SOLUTION, CONCENTRATE INTRAVENOUS PRN
Status: CANCELLED | OUTPATIENT
Start: 2020-12-15

## 2020-11-24 RX ORDER — SODIUM CHLORIDE 0.9 % (FLUSH) 0.9 %
5 SYRINGE (ML) INJECTION PRN
Status: CANCELLED | OUTPATIENT
Start: 2020-12-17

## 2020-11-24 RX ORDER — DIPHENHYDRAMINE HYDROCHLORIDE 50 MG/ML
50 INJECTION INTRAMUSCULAR; INTRAVENOUS ONCE
Status: CANCELLED | OUTPATIENT
Start: 2020-12-15

## 2020-11-24 RX ADMIN — IFOSFAMIDE 4650 MG: 3 INJECTION, SOLUTION INTRAVENOUS at 10:23

## 2020-11-24 RX ADMIN — Medication 10 ML: at 15:16

## 2020-11-24 RX ADMIN — POTASSIUM CHLORIDE: 149 INJECTION, SOLUTION, CONCENTRATE INTRAVENOUS at 08:38

## 2020-11-24 RX ADMIN — MESNA 925 MG: 100 INJECTION, SOLUTION INTRAVENOUS at 09:55

## 2020-11-24 RX ADMIN — POTASSIUM CHLORIDE: 149 INJECTION, SOLUTION, CONCENTRATE INTRAVENOUS at 11:31

## 2020-11-24 RX ADMIN — DOXORUBICIN HYDROCHLORIDE 46 MG: 2 INJECTION, SOLUTION INTRAVENOUS at 10:20

## 2020-11-24 RX ADMIN — HEPARIN 500 UNITS: 100 SYRINGE at 15:16

## 2020-11-24 RX ADMIN — SODIUM CHLORIDE: 9 INJECTION, SOLUTION INTRAVENOUS at 08:36

## 2020-11-24 RX ADMIN — MESNA 925 MG: 100 INJECTION, SOLUTION INTRAVENOUS at 14:48

## 2020-11-24 RX ADMIN — Medication 10 MG: at 09:51

## 2020-11-24 RX ADMIN — Medication 10 ML: at 08:35

## 2020-11-24 NOTE — PROGRESS NOTES
Patient here for mesna ifex sergey C2D2. Labs drawn from port and reviewed. Neuro checks completed. She saw Dr. Bj Park today see his dictation. Blood return noted prior during and after sergey infusion. She tolerated treatment well and was discharged home in stable condition with family. She is due to return tomorrow for C2D3. She was given mesna to take at home and advised on time to take it. Verbalized understanding.

## 2020-11-24 NOTE — PROGRESS NOTES
DIAGNOSIS:   1. 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 female who is referred to us for sarcoma. She presented with rightt knee sprain at work 04/2020 but the pain and swelling worsened with palpable mass. She underwent MRI which showed mass and was seen at [facility] where biopsy was taken and showed sarcoma. She was transferred to [facility] and decided to undergo full leg amputation due to early stage pregnancy, tumor was 16 cm x 20 cm x 3 cm and removed with negative margins. She was following with [facility] and decided she would prefer to receive treatment locally. The patient is currently 32 weeks pregnant and delivery plans are expected to be finalized 10/02/2020. She has elected to proceed with chemo after delivery. She is working towards prosthesis with test socket. 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 Kathy delivered in late October, after delivery, we decided to start chemo with AIM 11/3/2020. INTERIM HISTORY: The patient presents for follow up today , she received the first cycle of chemotherapy complicated by mild nausea and she had a grade 1 skin rash. Now things have resolved and she is ready for cycle 2. Nausea is well controlled with Zofran. She seems to be doing well. No fever or chills. She unfortunately lost her hair. PAST MEDICAL HISTORY: has a past medical history of Diabetes mellitus ([address] History of anemia, Osteosarcoma, and Wears glasses.     PAST SURGICAL HISTORY: has a past surgical history that includes Leg amputation at hip (Right, 07/23/2020) and IR PORT PLACEMENT > 5 YEARS (10/27/2020). CURRENT MEDICATIONS:  has a current medication list which includes the following prescription(s): handicap placard, gabapentin, lorazepam, omeprazole, ondansetron, lidocaine-prilocaine, ibuprofen, docusate sodium, blood glucose monitor kit and supplies, ferrous sulfate, prenatal mv-min-fe fum-fa-dha, and prenatal vitamin, and the following Facility-Administered Medications: sodium chloride, sodium bicarbonate 75 mEq, potassium chloride 20 mEq in sodium chloride 0.45 % 1,000 mL IVPB, mesna (MESNEX) 925 mg in sodium chloride 0.9 % 50 mL IVPB, sodium chloride flush, heparin flush, and mesna (MESNEX) 1,850 mg in Ora-Sweet 20 mL compounded oral suspension. ALLERGIES:  has No Known Allergies. FAMILY HISTORY: Negative for any hematological or oncological conditions. SOCIAL HISTORY:  reports that she has never smoked. She has never used smokeless tobacco. She reports previous alcohol use. She reports that she does not use drugs. REVIEW OF SYSTEMS:   General: No fever or night sweats. Weight is stable. ENT: No double or blurred vision, no tinnitus or hearing problem, no dysphagia or sore throat   Respiratory: No chest pain, no shortness of breath, no cough or hemoptysis. Cardiovascular: Denies chest pain, PND or orthopnea. No L E swelling or palpitations. Gastrointestinal: Mild nausea but no vomiting, abdominal pain, diarrhea or constipation. Genitourinary: Denies dysuria, hematuria, frequency, urgency or incontinence. Gynecological: nearly resolved post partum bleeding  Neurological: Denies headaches, decreased LOC, no sensory or motor focal deficits. +post surgical phantom pain  Musculoskeletal: No arthralgia no back pain or joint swelling. Skin: There are no rashes or bleeding. Psychiatric: ++ anxiety, no depression. Endocrine: No diabetes or thyroid disease. Hematologic: No bleeding, no adenopathy.     PHYSICAL EXAM: Shows a well appearing 26y.o.-year-old without any changes  4.  As mentioned above, we'll plan for cycles of adjuvant chemotherapy hopefully to could be completed by early January

## 2020-11-24 NOTE — PATIENT INSTRUCTIONS
Proceed with chemotherapy per orders, return in 3 weeks with chemotherapy and labs, please notice that chemotherapy will start on Tuesday for cycles 3 and 4

## 2020-11-25 ENCOUNTER — HOSPITAL ENCOUNTER (OUTPATIENT)
Dept: INFUSION THERAPY | Age: 25
Discharge: HOME | End: 2020-11-25
Payer: COMMERCIAL

## 2020-11-25 VITALS
SYSTOLIC BLOOD PRESSURE: 112 MMHG | HEART RATE: 96 BPM | DIASTOLIC BLOOD PRESSURE: 76 MMHG | RESPIRATION RATE: 18 BRPM | TEMPERATURE: 98.2 F

## 2020-11-25 DIAGNOSIS — C49.9 SARCOMA (HCC): ICD-10-CM

## 2020-11-25 DIAGNOSIS — C49.21 PRIMARY LOWER EXTREMITY SARCOMA, RIGHT (HCC): Primary | ICD-10-CM

## 2020-11-25 LAB
ANION GAP SERPL CALCULATED.3IONS-SCNC: 9 MMOL/L (ref 9–17)
BUN BLDV-MCNC: 11 MG/DL (ref 6–20)
BUN/CREAT BLD: ABNORMAL (ref 9–20)
CALCIUM SERPL-MCNC: 9 MG/DL (ref 8.6–10.4)
CHLORIDE BLD-SCNC: 106 MMOL/L (ref 98–107)
CO2: 25 MMOL/L (ref 20–31)
CREAT SERPL-MCNC: <0.4 MG/DL (ref 0.5–0.9)
GFR AFRICAN AMERICAN: ABNORMAL ML/MIN
GFR NON-AFRICAN AMERICAN: ABNORMAL ML/MIN
GFR SERPL CREATININE-BSD FRML MDRD: ABNORMAL ML/MIN/{1.73_M2}
GFR SERPL CREATININE-BSD FRML MDRD: ABNORMAL ML/MIN/{1.73_M2}
GLUCOSE BLD-MCNC: 96 MG/DL (ref 70–99)
POTASSIUM SERPL-SCNC: 3.9 MMOL/L (ref 3.7–5.3)
SODIUM BLD-SCNC: 140 MMOL/L (ref 135–144)

## 2020-11-25 PROCEDURE — 96375 TX/PRO/DX INJ NEW DRUG ADDON: CPT

## 2020-11-25 PROCEDURE — 96413 CHEMO IV INFUSION 1 HR: CPT

## 2020-11-25 PROCEDURE — 96411 CHEMO IV PUSH ADDL DRUG: CPT

## 2020-11-25 PROCEDURE — 2580000003 HC RX 258: Performed by: INTERNAL MEDICINE

## 2020-11-25 PROCEDURE — 36591 DRAW BLOOD OFF VENOUS DEVICE: CPT

## 2020-11-25 PROCEDURE — 96360 HYDRATION IV INFUSION INIT: CPT

## 2020-11-25 PROCEDURE — 2500000003 HC RX 250 WO HCPCS: Performed by: INTERNAL MEDICINE

## 2020-11-25 PROCEDURE — 6360000002 HC RX W HCPCS: Performed by: INTERNAL MEDICINE

## 2020-11-25 PROCEDURE — 96377 APPLICATON ON-BODY INJECTOR: CPT

## 2020-11-25 PROCEDURE — 96361 HYDRATE IV INFUSION ADD-ON: CPT

## 2020-11-25 PROCEDURE — 96367 TX/PROPH/DG ADDL SEQ IV INF: CPT

## 2020-11-25 PROCEDURE — 80048 BASIC METABOLIC PNL TOTAL CA: CPT

## 2020-11-25 RX ORDER — DOXORUBICIN HYDROCHLORIDE 2 MG/ML
25 INJECTION, SOLUTION INTRAVENOUS ONCE
Status: COMPLETED | OUTPATIENT
Start: 2020-11-25 | End: 2020-11-25

## 2020-11-25 RX ORDER — DEXAMETHASONE SODIUM PHOSPHATE 100 MG/10ML
10 INJECTION INTRAMUSCULAR; INTRAVENOUS ONCE
Status: COMPLETED | OUTPATIENT
Start: 2020-11-25 | End: 2020-11-25

## 2020-11-25 RX ORDER — SODIUM CHLORIDE 0.9 % (FLUSH) 0.9 %
10 SYRINGE (ML) INJECTION PRN
Status: DISCONTINUED | OUTPATIENT
Start: 2020-11-25 | End: 2020-11-26 | Stop reason: HOSPADM

## 2020-11-25 RX ORDER — SODIUM CHLORIDE 9 MG/ML
INJECTION, SOLUTION INTRAVENOUS CONTINUOUS
Status: DISCONTINUED | OUTPATIENT
Start: 2020-11-25 | End: 2020-11-26 | Stop reason: HOSPADM

## 2020-11-25 RX ORDER — HEPARIN SODIUM (PORCINE) LOCK FLUSH IV SOLN 100 UNIT/ML 100 UNIT/ML
500 SOLUTION INTRAVENOUS PRN
Status: DISCONTINUED | OUTPATIENT
Start: 2020-11-25 | End: 2020-11-26 | Stop reason: HOSPADM

## 2020-11-25 RX ADMIN — HEPARIN 500 UNITS: 100 SYRINGE at 14:37

## 2020-11-25 RX ADMIN — DOXORUBICIN HYDROCHLORIDE 46 MG: 2 INJECTION, SOLUTION INTRAVENOUS at 10:07

## 2020-11-25 RX ADMIN — SODIUM CHLORIDE: 9 INJECTION, SOLUTION INTRAVENOUS at 08:04

## 2020-11-25 RX ADMIN — Medication 10 MG: at 09:35

## 2020-11-25 RX ADMIN — Medication 10 ML: at 14:37

## 2020-11-25 RX ADMIN — POTASSIUM CHLORIDE: 149 INJECTION, SOLUTION, CONCENTRATE INTRAVENOUS at 08:26

## 2020-11-25 RX ADMIN — PEGFILGRASTIM 6 MG: KIT SUBCUTANEOUS at 14:19

## 2020-11-25 RX ADMIN — MESNA 925 MG: 100 INJECTION, SOLUTION INTRAVENOUS at 14:19

## 2020-11-25 RX ADMIN — IFOSFAMIDE 4650 MG: 3 INJECTION, SOLUTION INTRAVENOUS at 10:10

## 2020-11-25 RX ADMIN — MESNA 925 MG: 100 INJECTION, SOLUTION INTRAVENOUS at 09:47

## 2020-11-25 RX ADMIN — POTASSIUM CHLORIDE: 149 INJECTION, SOLUTION, CONCENTRATE INTRAVENOUS at 11:19

## 2020-11-25 RX ADMIN — Medication 10 ML: at 08:04

## 2020-11-25 NOTE — PROGRESS NOTES
Patient here for Mesna Ifex Caden hydration and neulasta C2D3. Labs drawn from port and reviewed. Vitals stable. Blood return noted prior to during and after Caden infusion,  She tolerated treatment well and was discharged home in stable condition. She is due to return 12/15 for MD visit. Mesna given to take at home and she was advised of time to take. Verbalized understanding.

## 2020-11-30 ENCOUNTER — POSTPARTUM VISIT (OUTPATIENT)
Dept: OBGYN CLINIC | Age: 25
End: 2020-11-30
Payer: COMMERCIAL

## 2020-11-30 ENCOUNTER — CARE COORDINATION (OUTPATIENT)
Dept: OTHER | Facility: CLINIC | Age: 25
End: 2020-11-30

## 2020-11-30 VITALS
BODY MASS INDEX: 31.72 KG/M2 | SYSTOLIC BLOOD PRESSURE: 120 MMHG | HEIGHT: 61 IN | WEIGHT: 168 LBS | DIASTOLIC BLOOD PRESSURE: 60 MMHG

## 2020-11-30 PROCEDURE — 0503F POSTPARTUM CARE VISIT: CPT | Performed by: OBSTETRICS & GYNECOLOGY

## 2020-11-30 NOTE — CARE COORDINATION
Ambulatory Care Coordination Note  11/30/2020  CM Risk Score: 1  Charlson 10 Year Mortality Risk Score: 100%     ACC: Damián Coello RN    Summary Note: ACM attempted to reach patient for follow up call regarding chemotherapy treatments . HIPAA compliant message left requesting a return phone call at patients convenience. Will continue to follow. Goals Addressed    None         Prior to Admission medications    Medication Sig Start Date End Date Taking? Authorizing Provider   Handicap Placard MISC Expiration: Lifetime 11/13/20   Jade Wilkinson APRN - CNP   gabapentin (NEURONTIN) 300 MG capsule Take 1 capsule by mouth 3 times daily for 30 days. 11/10/20 12/10/20  FATEMEH Barragna - CNP   LORazepam (ATIVAN) 1 MG tablet Take 1 tablet by mouth every 8 hours as needed for Anxiety for up to 30 days. 11/3/20 12/3/20  Zenaida Chapman MD   omeprazole (PRILOSEC) 20 MG delayed release capsule Take 1 capsule by mouth daily 10/20/20   Johnathan Chapman MD   ondansetron (ZOFRAN ODT) 8 MG TBDP disintegrating tablet Place 1 tablet under the tongue every 8 hours as needed for Nausea or Vomiting 10/20/20   Zenaida Chapman MD   lidocaine-prilocaine (EMLA) 2.5-2.5 % cream Apply topically Daily as needed. 10/20/20   Zenaida Chapman MD   ibuprofen (ADVIL;MOTRIN) 800 MG tablet Take 1 tablet by mouth every 8 hours as needed for Pain 10/13/20   Mel Sharif DO   docusate sodium (COLACE) 100 MG capsule Take 1 capsule by mouth 2 times daily as needed for Constipation 10/13/20   Mel Sharif DO   blood glucose monitor kit and supplies Dispense sufficient amount for indicated testing frequency plus additional to accommodate PRN testing needs. Dispense all needed supplies to include: monitor, strips, lancing device, lancets, control solutions, alcohol swabs.  8/10/20   Rajan Home DO Karthik   Prenatal Vit-Fe Fumarate-FA (PRENATAL VITAMIN) 27-0.8 MG TABS Take 1 tablet by mouth daily May substitute with formulary covered by patient's insurance 8/10/20 9/9/20  Holly Angeles DO   ferrous sulfate (IRON 325) 325 (65 Fe) MG tablet Take 1 tablet by mouth 2 times daily 8/10/20   Holly Angeles DO   Prenatal MV-Min-Fe Fum-FA-DHA (PRENATAL 1 PO) Take by mouth    Historical Provider, MD       Future Appointments   Date Time Provider Salma Oliva   12/15/2020  9:45 AM [facility] MD Ari [address] [place] [address]   1/6/2021  4:00 PM Emilia Lee MD PHYS MED Paul Stable

## 2020-12-01 RX ORDER — DROSPIRENONE 4 MG/1
1 TABLET, FILM COATED ORAL DAILY
Qty: 28 TABLET | Refills: 6 | Status: SHIPPED | OUTPATIENT
Start: 2020-12-01 | End: 2021-01-20

## 2020-12-01 NOTE — PROGRESS NOTES
Ariana Dobbins  2020  11:01 AM        Ariana Dobbins is a 26 y.o. female       The patient was seen. She has no chief complaints today. She delivered vaginally on 10/12/20. She is not breast feeding and there is not any signs or symptoms of mastitis. She is going through chemotherapy and she is overwhelmed and stressed. This does bring about some depression and anxiety, however, overall She does not have any signs or symptoms of post partum depression. She denies any suicidal thoughts with a plan, intent to harm others and delusional ideas. Today her lochia is light she denies any dizziness or shortness of breath. Her pregnancy was complicated by:  Patient Active Problem List    Diagnosis Date Noted    33 weeks gestation of pregnancy 10/12/2020    High-risk pregnancy, unspecified trimester 10/12/2020     10/12/20 M Apg  Wt 4#14  10/12/2020    Antepartum premature rupture of membrane     Malignant neoplasm affecting pregnancy in third trimester 2020    Pseudomonas UTI 2020     Overview Note:     20 treated with Cefepime 2g IV for 10 days       History of E.  Coli UTI 10-<100,000CFU (20) 2020    History of blood transfusion 2020    History of disarticulation of right hip 2020    Leukocytosis 2020    Transaminitis 2020    Chronic hypertension affecting pregnancy 2020     Overview Note:     Baseline preE labs wnl, P/C of 0.20 on 20      Iron deficiency anemia 2020     Overview Note:     Last Assessment & Plan:   Hgb 8.2 g/dL  Asymptomatic   S/p iv iron on  and 1 u pRBC     Continue to monitor vitals  Last Assessment & Plan:   Hgb 8.2 g/dL  Asymptomatic   S/p iv iron on  and 1 u pRBC     Continue to monitor vitals      Pregestational diabetes mellitus, modified White class B 2020     Overview Note:     Last Assessment & Plan:   Formatting of this note might be different from the original.  GDMA2  Abnormal early 1 hour GTT (183)  Abnormal 3 hour GTT  - fasting 86  - 1 hour 224  - 2 hour 187  - 3 hour 164  HbA1c 7/1 5.8  Component      Latest Ref Rng & Units 7/5/2020 7/5/2020 7/5/2020 7/5/2020           8:06 AM 11:27 AM  4:48 PM  9:28 PM   Glucose, metered      50 - 140 mg/dL 93 135 99 131     Component      Latest Ref Rng & Units 7/6/2020           8:05 AM   Glucose, metered      50 - 140 mg/dL 98        Plan:  Insulin NPH 20/10 and novolog 6u w/ meals   BG checks 4x daily  Last Assessment & Plan:   Formatting of this note might be different from the original.  GDMA2  Abnormal early 1 hour GTT (183)  Abnormal 3 hour GTT  - fasting 86  - 1 hour 224  - 2 hour 187  - 3 hour 164  HbA1c 7/1 5.8  Component      Latest Ref Rng & Units 7/5/2020 7/5/2020 7/5/2020 7/5/2020           8:06 AM 11:27 AM  4:48 PM  9:28 PM   Glucose, metered      50 - 140 mg/dL 93 135 99 131     Component      Latest Ref Rng & Units 7/6/2020           8:05 AM   Glucose, metered      50 - 140 mg/dL 98        Plan:  Insulin NPH 20/10 and novolog 6u w/ meals   BG checks 4x daily      Thrombocytosis ([address] 06/30/2020     Overview Note:     Last Assessment & Plan:   Plts elevated, 573 (07/02) --> 534 (07/03)  Etiology: secondary to iron deficiency   Patient found to have iron deficiency  Peripheral smear   -  Neutrophilia and monocytosis. -  Thrombocytosis. -  Normocytic normochromic anemia. -  No evidence of schistocytes, dysplasia or blasts.   -Iron studies: iron 19, iron saturation 11%  -patient given one 1 unit leukocyte-reduced, irradiated RBC 07/02    Plan:  - f/u CBC w/ daily      Primary lower extremity sarcoma, right ([address] 06/29/2020     Overview Note:     Last Assessment & Plan:   Hx of right thigh mass concerning for sarcoma  - MRI at OSH on 6/12/2020: Centered along the anteromedial aspect of the mid-distal thigh there is a large complex mass measuring at least 9.7 x 8.3 x 16.9 cm. Concerning for sarcoma  Patient follows with Dr. Azucena Alfonso at [place] and wants to pursue her care at [facility]  S/p biopsy   Oxycodone for pain management     Plan:  -Continue heparin 5,000 u BID for DVT prophylaxis   - f/u biopsy results and tumor board treatment regimen   - pain management: continue oxycodone and tylenol as needed for pain management, avoid NSAIDs  -Please consider CT abdomen/pelvis/brain to stage patient appropriately   -Plan for transfer to [facility] due to [employer] and availability of pregnancy shield       Last Assessment & Plan:   Hx of right thigh mass concerning for sarcoma  - MRI at OSH on 2020: Centered along the anteromedial aspect of the mid-distal thigh there is a large complex mass measuring at least 9.7 x 8.3 x 16.9 cm. Concerning for sarcoma  Patient follows with Dr. Azucena Alfonso at [place] and wants to pursue her care at [facility]  S/p biopsy   Oxycodone for pain management     Plan:  -Continue heparin 5,000 u BID for DVT prophylaxis   - f/u biopsy results and tumor board treatment regimen   - pain management: continue oxycodone and tylenol as needed for pain management, avoid NSAIDs  -Please consider CT abdomen/pelvis/brain to stage patient appropriately   -Plan for transfer to [facility] due to [employer] and availability of pregnancy shield           Family history of diabetes mellitus 2020         She does admit to having good home support.       OB History    Para Term  AB Living   1 1 0 1 0 1   SAB TAB Ectopic Molar Multiple Live Births   0 0 0 0 0 1      # Outcome Date GA Lbr Zeferino/2nd Weight Sex Delivery Anes PTL Lv   1  10/12/20 33w6d  4 lb 14 oz (2.21 kg) M Vag-Spont EPI Y MAYCO      Name: Terra Patel: 8  Apgar5: 9       Patient Active Problem List   Diagnosis    Family history of diabetes mellitus    Pregestational diabetes mellitus, modified White class B    Iron deficiency anemia    Primary lower extremity sarcoma, right (HCC)    Thrombocytosis (HCC)    Transaminitis    Chronic hypertension affecting pregnancy    History of disarticulation of right hip    Leukocytosis    History of blood transfusion    History of E. Coli UTI 10-<100,000CFU (20)    Pseudomonas UTI    Malignant neoplasm affecting pregnancy in third trimester    33 weeks gestation of pregnancy    High-risk pregnancy, unspecified trimester    Antepartum premature rupture of membrane     10/12/20 M Apg 8/9 Wt 4#14        Blood pressure 120/60, height 5' 1.25\" (1.556 m), weight 168 lb (76.2 kg), last menstrual period 2020, not currently breastfeeding. Abdomen: Soft and non-tender; good bowel sounds; no guarding, rebound or rigidity; no CVA tenderness bilaterally. Extremities: No calf tenderness bilaterally. DTR 2/4 bilaterally. No edema. Perineum: not done, no complaint    Assessment:   Diagnosis Orders   1.  10/12/20 M Apg 8/9 Wt 4#14        Chief Complaint   Patient presents with    Postpartum Care     [facility] 10/12     Patient Active Problem List    Diagnosis Date Noted    33 weeks gestation of pregnancy 10/12/2020    High-risk pregnancy, unspecified trimester 10/12/2020     10/12/20 M Apg 8/9 Wt 4#14  10/12/2020    Antepartum premature rupture of membrane     Malignant neoplasm affecting pregnancy in third trimester 2020    Pseudomonas UTI 2020 treated with Cefepime 2g IV for 10 days       History of E.  Coli UTI 10-<100,000CFU (20) 2020    History of blood transfusion 2020    History of disarticulation of right hip 2020    Leukocytosis 2020    Transaminitis 2020    Chronic hypertension affecting pregnancy 2020     Baseline preE labs wnl, P/C of 0.20 on 20      Iron deficiency anemia 2020     Last Assessment & Plan:   Hgb 8.2 g/dL  Asymptomatic   S/p iv iron on  and 1 u pRBC     Continue to monitor vitals  Last Assessment & Plan:   Hgb 8.2 g/dL  Asymptomatic   S/p iv iron on 7/1 and 1 u pRBC     Continue to monitor vitals      Pregestational diabetes mellitus, modified White class B 06/30/2020     Last Assessment & Plan:   Formatting of this note might be different from the original.  GDMA2  Abnormal early 1 hour GTT (183)  Abnormal 3 hour GTT  - fasting 86  - 1 hour 224  - 2 hour 187  - 3 hour 164  HbA1c 7/1 5.8  Component      Latest Ref Rng & Units 7/5/2020 7/5/2020 7/5/2020 7/5/2020           8:06 AM 11:27 AM  4:48 PM  9:28 PM   Glucose, metered      50 - 140 mg/dL 93 135 99 131     Component      Latest Ref Rng & Units 7/6/2020           8:05 AM   Glucose, metered      50 - 140 mg/dL 98        Plan:  Insulin NPH 20/10 and novolog 6u w/ meals   BG checks 4x daily  Last Assessment & Plan:   Formatting of this note might be different from the original.  GDMA2  Abnormal early 1 hour GTT (183)  Abnormal 3 hour GTT  - fasting 86  - 1 hour 224  - 2 hour 187  - 3 hour 164  HbA1c 7/1 5.8  Component      Latest Ref Rng & Units 7/5/2020 7/5/2020 7/5/2020 7/5/2020           8:06 AM 11:27 AM  4:48 PM  9:28 PM   Glucose, metered      50 - 140 mg/dL 93 135 99 131     Component      Latest Ref Rng & Units 7/6/2020           8:05 AM   Glucose, metered      50 - 140 mg/dL 98        Plan:  Insulin NPH 20/10 and novolog 6u w/ meals   BG checks 4x daily      Thrombocytosis ([address] 06/30/2020     Last Assessment & Plan:   Plts elevated, 573 (07/02) --> 534 (07/03)  Etiology: secondary to iron deficiency   Patient found to have iron deficiency  Peripheral smear   -  Neutrophilia and monocytosis. -  Thrombocytosis. -  Normocytic normochromic anemia. -  No evidence of schistocytes, dysplasia or blasts.   -Iron studies: iron 19, iron saturation 11%  -patient given one 1 unit leukocyte-reduced, irradiated RBC 07/02    Plan:  - f/u CBC w/ daily      Primary lower extremity sarcoma, right ([address] 06/29/2020     Last Assessment & Plan:   Hx of right thigh mass concerning for sarcoma  - MRI at OSH on 6/12/2020: Centered along the anteromedial aspect of the mid-distal thigh there is a large complex mass measuring at least 9.7 x 8.3 x 16.9 cm. Concerning for sarcoma  Patient follows with Dr. Ramses Chavez at [place] and wants to pursue her care at [facility]  S/p biopsy 6/29  Oxycodone for pain management     Plan:  -Continue heparin 5,000 u BID for DVT prophylaxis   - f/u biopsy results and tumor board treatment regimen   - pain management: continue oxycodone and tylenol as needed for pain management, avoid NSAIDs  -Please consider CT abdomen/pelvis/brain to stage patient appropriately   -Plan for transfer to [facility] due to [employer] and availability of pregnancy shield       Last Assessment & Plan:   Hx of right thigh mass concerning for sarcoma  - MRI at OSH on 6/12/2020: Centered along the anteromedial aspect of the mid-distal thigh there is a large complex mass measuring at least 9.7 x 8.3 x 16.9 cm. Concerning for sarcoma  Patient follows with Dr. Ramses Chavez at [place] and wants to pursue her care at [facility]  S/p biopsy 6/29  Oxycodone for pain management     Plan:  -Continue heparin 5,000 u BID for DVT prophylaxis   - f/u biopsy results and tumor board treatment regimen   - pain management: continue oxycodone and tylenol as needed for pain management, avoid NSAIDs  -Please consider CT abdomen/pelvis/brain to stage patient appropriately   -Plan for transfer to [facility] due to [employer] and availability of pregnancy shield           Family history of diabetes mellitus 06/08/2020       Plan:  1. Return to the office for annual/PRN  2. Signs & Symptoms of mastitis reviewed; notify if occurs  3. Secondary smoke risks reviewed. Increased risks of respiratory problems, Sudden     infant death syndrome, and potential malignancies. 4. Restrictions lifted  5. Family planning counseling and STD counseling completed - [facility]  6. Return in about 1 year (around 2021) for annual.    Patient was seen with total face to face time of 15 minutes. More than 50% of this visit was on counseling and education regarding her    Diagnosis Orders   1.  10/12/20 M Apg  Wt 4#14       and her options. She was also counseled on her preventative health maintenance recommendations and follow-up.

## 2020-12-02 ENCOUNTER — CARE COORDINATION (OUTPATIENT)
Dept: OTHER | Facility: CLINIC | Age: 25
End: 2020-12-02

## 2020-12-02 NOTE — CARE COORDINATION
ACM attempted to reach patient for Care Transitions call. HIPAA compliant message left requesting a return phone call at patients convenience. Will continue to follow.

## 2020-12-10 RX ORDER — GABAPENTIN 300 MG/1
300 CAPSULE ORAL 3 TIMES DAILY
Qty: 90 CAPSULE | Refills: 0 | Status: SHIPPED | OUTPATIENT
Start: 2020-12-10 | End: 2021-01-06 | Stop reason: SDUPTHER

## 2020-12-15 ENCOUNTER — CARE COORDINATION (OUTPATIENT)
Dept: OTHER | Facility: CLINIC | Age: 25
End: 2020-12-15

## 2020-12-15 ENCOUNTER — OFFICE VISIT (OUTPATIENT)
Dept: ONCOLOGY | Age: 25
End: 2020-12-15
Payer: COMMERCIAL

## 2020-12-15 ENCOUNTER — HOSPITAL ENCOUNTER (OUTPATIENT)
Dept: INFUSION THERAPY | Age: 25
Discharge: HOME | End: 2020-12-15
Payer: COMMERCIAL

## 2020-12-15 VITALS
WEIGHT: 178.8 LBS | HEART RATE: 93 BPM | DIASTOLIC BLOOD PRESSURE: 77 MMHG | RESPIRATION RATE: 18 BRPM | TEMPERATURE: 97.5 F | BODY MASS INDEX: 33.51 KG/M2 | SYSTOLIC BLOOD PRESSURE: 121 MMHG

## 2020-12-15 VITALS
SYSTOLIC BLOOD PRESSURE: 121 MMHG | DIASTOLIC BLOOD PRESSURE: 77 MMHG | WEIGHT: 178.8 LBS | RESPIRATION RATE: 16 BRPM | BODY MASS INDEX: 33.51 KG/M2 | HEART RATE: 93 BPM | TEMPERATURE: 97.5 F

## 2020-12-15 DIAGNOSIS — C49.9 SARCOMA (HCC): Primary | ICD-10-CM

## 2020-12-15 LAB
-: ABNORMAL
ABSOLUTE EOS #: 0 K/UL (ref 0–0.4)
ABSOLUTE IMMATURE GRANULOCYTE: ABNORMAL K/UL (ref 0–0.3)
ABSOLUTE LYMPH #: 0.88 K/UL (ref 1–4.8)
ABSOLUTE MONO #: 0.44 K/UL (ref 0.1–0.8)
ALBUMIN SERPL-MCNC: 4.4 G/DL (ref 3.5–5.2)
ALBUMIN/GLOBULIN RATIO: 2 (ref 1–2.5)
ALP BLD-CCNC: 58 U/L (ref 35–104)
ALT SERPL-CCNC: 13 U/L (ref 5–33)
AMORPHOUS: ABNORMAL
ANION GAP SERPL CALCULATED.3IONS-SCNC: 11 MMOL/L (ref 9–17)
AST SERPL-CCNC: 12 U/L
BACTERIA: ABNORMAL
BASOPHILS # BLD: 2 % (ref 0–2)
BASOPHILS ABSOLUTE: 0.11 K/UL (ref 0–0.2)
BILIRUB SERPL-MCNC: 0.2 MG/DL (ref 0.3–1.2)
BILIRUBIN URINE: NEGATIVE
BUN BLDV-MCNC: 10 MG/DL (ref 6–20)
BUN/CREAT BLD: ABNORMAL (ref 9–20)
CALCIUM SERPL-MCNC: 8.7 MG/DL (ref 8.6–10.4)
CASTS UA: ABNORMAL /LPF
CHLORIDE BLD-SCNC: 106 MMOL/L (ref 98–107)
CO2: 21 MMOL/L (ref 20–31)
COLOR: YELLOW
COMMENT UA: ABNORMAL
CREAT SERPL-MCNC: <0.4 MG/DL (ref 0.5–0.9)
CRYSTALS, UA: ABNORMAL /HPF
DIFFERENTIAL TYPE: ABNORMAL
EOSINOPHILS RELATIVE PERCENT: 0 % (ref 1–4)
EPITHELIAL CELLS UA: ABNORMAL /HPF (ref 0–5)
GFR AFRICAN AMERICAN: ABNORMAL ML/MIN
GFR NON-AFRICAN AMERICAN: ABNORMAL ML/MIN
GFR SERPL CREATININE-BSD FRML MDRD: ABNORMAL ML/MIN/{1.73_M2}
GFR SERPL CREATININE-BSD FRML MDRD: ABNORMAL ML/MIN/{1.73_M2}
GLUCOSE BLD-MCNC: 177 MG/DL (ref 70–99)
GLUCOSE URINE: NEGATIVE
HCT VFR BLD CALC: 28 % (ref 36–46)
HEMOGLOBIN: 9.6 G/DL (ref 12–16)
IMMATURE GRANULOCYTES: ABNORMAL %
KETONES, URINE: NEGATIVE
LEUKOCYTE ESTERASE, URINE: NEGATIVE
LYMPHOCYTES # BLD: 16 % (ref 24–44)
MAGNESIUM: 1.9 MG/DL (ref 1.6–2.6)
MCH RBC QN AUTO: 30.5 PG (ref 26–34)
MCHC RBC AUTO-ENTMCNC: 34.2 G/DL (ref 31–37)
MCV RBC AUTO: 89.1 FL (ref 80–100)
METAMYELOCYTES ABSOLUTE COUNT: 0.06 K/UL
METAMYELOCYTES: 1 %
MONOCYTES # BLD: 8 % (ref 1–7)
MORPHOLOGY: ABNORMAL
MORPHOLOGY: ABNORMAL
MUCUS: ABNORMAL
NITRITE, URINE: NEGATIVE
NRBC AUTOMATED: ABNORMAL PER 100 WBC
OTHER OBSERVATIONS UA: ABNORMAL
PDW BLD-RTO: 17.7 % (ref 12.5–15.4)
PH UA: 5.5 (ref 5–8)
PHOSPHORUS: 3.6 MG/DL (ref 2.6–4.5)
PLATELET # BLD: 361 K/UL (ref 140–450)
PLATELET ESTIMATE: ABNORMAL
PMV BLD AUTO: 8.2 FL (ref 6–12)
POTASSIUM SERPL-SCNC: 3.7 MMOL/L (ref 3.7–5.3)
PROTEIN UA: NEGATIVE
RBC # BLD: 3.15 M/UL (ref 4–5.2)
RBC # BLD: ABNORMAL 10*6/UL
RBC UA: ABNORMAL /HPF (ref 0–2)
RENAL EPITHELIAL, UA: ABNORMAL /HPF
SEG NEUTROPHILS: 73 % (ref 36–66)
SEGMENTED NEUTROPHILS ABSOLUTE COUNT: 4.01 K/UL (ref 1.8–7.7)
SODIUM BLD-SCNC: 138 MMOL/L (ref 135–144)
SPECIFIC GRAVITY UA: 1.02 (ref 1–1.03)
TOTAL PROTEIN: 6.6 G/DL (ref 6.4–8.3)
TRICHOMONAS: ABNORMAL
TURBIDITY: ABNORMAL
URINE HGB: NEGATIVE
UROBILINOGEN, URINE: NORMAL
WBC # BLD: 5.5 K/UL (ref 3.5–11)
WBC # BLD: ABNORMAL 10*3/UL
WBC UA: ABNORMAL /HPF (ref 0–5)
YEAST: ABNORMAL

## 2020-12-15 PROCEDURE — 99214 OFFICE O/P EST MOD 30 MIN: CPT | Performed by: INTERNAL MEDICINE

## 2020-12-15 PROCEDURE — 96413 CHEMO IV INFUSION 1 HR: CPT

## 2020-12-15 PROCEDURE — 36591 DRAW BLOOD OFF VENOUS DEVICE: CPT

## 2020-12-15 PROCEDURE — 6370000000 HC RX 637 (ALT 250 FOR IP): Performed by: INTERNAL MEDICINE

## 2020-12-15 PROCEDURE — 96367 TX/PROPH/DG ADDL SEQ IV INF: CPT

## 2020-12-15 PROCEDURE — 96411 CHEMO IV PUSH ADDL DRUG: CPT

## 2020-12-15 PROCEDURE — 80053 COMPREHEN METABOLIC PANEL: CPT

## 2020-12-15 PROCEDURE — 85025 COMPLETE CBC W/AUTO DIFF WBC: CPT

## 2020-12-15 PROCEDURE — 83735 ASSAY OF MAGNESIUM: CPT

## 2020-12-15 PROCEDURE — 84100 ASSAY OF PHOSPHORUS: CPT

## 2020-12-15 PROCEDURE — 96409 CHEMO IV PUSH SNGL DRUG: CPT

## 2020-12-15 PROCEDURE — 96361 HYDRATE IV INFUSION ADD-ON: CPT

## 2020-12-15 PROCEDURE — 2500000003 HC RX 250 WO HCPCS: Performed by: INTERNAL MEDICINE

## 2020-12-15 PROCEDURE — 96375 TX/PRO/DX INJ NEW DRUG ADDON: CPT

## 2020-12-15 PROCEDURE — 6360000002 HC RX W HCPCS: Performed by: INTERNAL MEDICINE

## 2020-12-15 PROCEDURE — 99211 OFF/OP EST MAY X REQ PHY/QHP: CPT | Performed by: INTERNAL MEDICINE

## 2020-12-15 PROCEDURE — 81001 URINALYSIS AUTO W/SCOPE: CPT

## 2020-12-15 PROCEDURE — 2580000003 HC RX 258: Performed by: INTERNAL MEDICINE

## 2020-12-15 PROCEDURE — 96360 HYDRATION IV INFUSION INIT: CPT

## 2020-12-15 RX ORDER — SODIUM CHLORIDE 0.9 % (FLUSH) 0.9 %
10 SYRINGE (ML) INJECTION PRN
Status: DISCONTINUED | OUTPATIENT
Start: 2020-12-15 | End: 2020-12-16 | Stop reason: HOSPADM

## 2020-12-15 RX ORDER — PALONOSETRON 0.05 MG/ML
0.25 INJECTION, SOLUTION INTRAVENOUS ONCE
Status: COMPLETED | OUTPATIENT
Start: 2020-12-15 | End: 2020-12-15

## 2020-12-15 RX ORDER — DOXORUBICIN HYDROCHLORIDE 2 MG/ML
25 INJECTION, SOLUTION INTRAVENOUS ONCE
Status: COMPLETED | OUTPATIENT
Start: 2020-12-15 | End: 2020-12-15

## 2020-12-15 RX ORDER — HEPARIN SODIUM (PORCINE) LOCK FLUSH IV SOLN 100 UNIT/ML 100 UNIT/ML
500 SOLUTION INTRAVENOUS PRN
Status: DISCONTINUED | OUTPATIENT
Start: 2020-12-15 | End: 2020-12-16 | Stop reason: HOSPADM

## 2020-12-15 RX ORDER — DEXAMETHASONE SODIUM PHOSPHATE 100 MG/10ML
10 INJECTION INTRAMUSCULAR; INTRAVENOUS ONCE
Status: COMPLETED | OUTPATIENT
Start: 2020-12-15 | End: 2020-12-15

## 2020-12-15 RX ADMIN — Medication 10 ML: at 15:23

## 2020-12-15 RX ADMIN — HEPARIN 500 UNITS: 100 SYRINGE at 15:23

## 2020-12-15 RX ADMIN — MESNA: 100 INJECTION, SOLUTION INTRAVENOUS at 15:24

## 2020-12-15 RX ADMIN — Medication 10 MG: at 10:17

## 2020-12-15 RX ADMIN — IFOSFAMIDE 4650 MG: 3 INJECTION, SOLUTION INTRAVENOUS at 11:03

## 2020-12-15 RX ADMIN — MESNA 925 MG: 100 INJECTION, SOLUTION INTRAVENOUS at 10:25

## 2020-12-15 RX ADMIN — POTASSIUM CHLORIDE: 149 INJECTION, SOLUTION, CONCENTRATE INTRAVENOUS at 12:24

## 2020-12-15 RX ADMIN — Medication 10 ML: at 08:13

## 2020-12-15 RX ADMIN — MESNA 925 MG: 100 INJECTION, SOLUTION INTRAVENOUS at 12:06

## 2020-12-15 RX ADMIN — POTASSIUM CHLORIDE: 149 INJECTION, SOLUTION, CONCENTRATE INTRAVENOUS at 08:42

## 2020-12-15 RX ADMIN — PALONOSETRON 0.25 MG: 0.25 INJECTION, SOLUTION INTRAVENOUS at 10:17

## 2020-12-15 RX ADMIN — DOXORUBICIN HYDROCHLORIDE 46 MG: 2 INJECTION, SOLUTION INTRAVENOUS at 10:59

## 2020-12-15 NOTE — PROGRESS NOTES
Patient here for mesna ifed caden C3D1. Labs drawn from port and reviewed. She saw Dr. Shelley Lizama today see his dictation. Blood return noted before during and after Caden infusion. She tolerated treatment well and was discharged home in stable condition. She is due to return tomorrow for C3D2.

## 2020-12-15 NOTE — PROGRESS NOTES
DIAGNOSIS:   1. Undifferentiated pleomorphic  Sarcoma, right leg, T4 N0 M0  (stage IIIb) diagnosed 05/2020      CURRENT THERAPY:  S/P full right leg amputation (Right hip disarticulation)   Adjuvant chemotherapy delayed per patient choice until after delivery   Plan for chemo 3-4 weeks post partum with ifosfamide and adriamycin  (AIM) to start 11/3/2020     BRIEF CASE HISTORY:   Sasha Camejo is a very pleasant 26 y.o. female who is referred to us for sarcoma. She presented with rightt knee sprain at work 04/2020 but the pain and swelling worsened with palpable mass. She underwent MRI which showed mass and was seen at [facility] where biopsy was taken and showed sarcoma. She was transferred to [facility] and decided to undergo full leg amputation due to early stage pregnancy, tumor was 16 cm x 20 cm x 3 cm and removed with negative margins. She was following with [facility] and decided she would prefer to receive treatment locally. The patient is currently 32 weeks pregnant and delivery plans are expected to be finalized 10/02/2020. She has elected to proceed with chemo after delivery. She is working towards prosthesis with test socket. 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 Kathy delivered in late October, after delivery, we decided to start chemo with AIM 11/3/2020. INTERIM HISTORY: The patient presents for follow up today for cycle #3. She has some nausea with first 7 days after the cycle that is managed with medication. She does have dysgeusia which is affecting her appetite. She denies any diarrhea, changes in handwriting or urine output. She did have recurrent hives that resolved with Claritin. She denies any leg swelling or shortness of breath. PAST MEDICAL HISTORY: has a past medical history of Diabetes mellitus ([address] History of anemia, Osteosarcoma, and Wears glasses. PAST SURGICAL HISTORY: has a past surgical history that includes Leg amputation at hip (Right, 07/23/2020) and IR PORT PLACEMENT > 5 YEARS (10/27/2020). CURRENT MEDICATIONS:  has a current medication list which includes the following prescription(s): gabapentin, slynd, handicap placard, omeprazole, ondansetron, lidocaine-prilocaine, ibuprofen, docusate sodium, blood glucose monitor kit and supplies, ferrous sulfate, prenatal mv-min-fe fum-fa-dha, and prenatal vitamin, and the following Facility-Administered Medications: sodium chloride flush, heparin flush, mesna (MESNEX) 925 mg in sodium chloride 0.9 % 50 mL IVPB, ifosfamide (IFEX) 4,650 mg in sodium chloride 0.9 % 250 mL chemo IVPB, doxorubicin hcl, mesna (MESNEX) 1,850 mg in Ora-Sweet 20 mL compounded oral suspension, and sodium bicarbonate 75 mEq, potassium chloride 20 mEq in sodium chloride 0.45 % 1,000 mL IVPB. ALLERGIES:  has No Known Allergies. FAMILY HISTORY: Negative for any hematological or oncological conditions. SOCIAL HISTORY:  reports that she has never smoked. She has never used smokeless tobacco. She reports previous alcohol use. She reports that she does not use drugs. REVIEW OF SYSTEMS:   General: No fever or night sweats. Weight is stable. +dysgeusia   ENT: No double or blurred vision, no tinnitus or hearing problem, no dysphagia or sore throat   Respiratory: No chest pain, no shortness of breath, no cough or hemoptysis. Cardiovascular: Denies chest pain, PND or orthopnea. No L E swelling or palpitations. Gastrointestinal: Mild nausea but no vomiting, abdominal pain, diarrhea or constipation. Genitourinary: Denies dysuria, hematuria, frequency, urgency or incontinence. Gynecological: nearly resolved post partum bleeding  Neurological: Denies headaches, decreased LOC, no sensory or motor focal deficits. +post surgical phantom pain  Musculoskeletal: No arthralgia no back pain or joint swelling. Skin: There are no rashes or bleeding. Psychiatric: ++ anxiety, no depression. Endocrine: No diabetes or thyroid disease. Hematologic: No bleeding, no adenopathy. PHYSICAL EXAM: Shows a well appearing 26y.o.-year-old female who is not in pain or distress. Vital Signs: Blood pressure 121/77, pulse 93, temperature 97.5 °F (36.4 °C), temperature source Oral, resp. rate 16, weight 178 lb 12.8 oz (81.1 kg), not currently breastfeeding. HEENT: Normocephalic and atraumatic. Pupils are equal, round, reactive to light and accommodation. Extraocular muscles are intact. Neck: Showed no JVD, no carotid bruit . Lungs: Clear to auscultation bilaterally. Heart: Regular without any murmur. Abdomen: Soft, nontender. No hepatosplenomegaly. Extremities: Right leg amputation. Lower extremities show no edema, clubbing, or cyanosis. Breasts: Examination not done today. Neuro exam: intact cranial nerves bilaterally no motor or sensory deficit, gait is normal. Lymphatic: no adenopathy appreciated in the supraclavicular, axillary, cervical or inguinal area    REVIEW OF LABORATORY DATA:   Lab Results   Component Value Date    WBC 5.5 12/15/2020    HGB 9.6 (L) 12/15/2020    HCT 28.0 (L) 12/15/2020    MCV 89.1 12/15/2020     12/15/2020   '    Chemistry        Component Value Date/Time     12/15/2020 0800    K 3.7 12/15/2020 0800     12/15/2020 0800    CO2 21 12/15/2020 0800    BUN 10 12/15/2020 0800    CREATININE <0.40 (L) 12/15/2020 0800        Component Value Date/Time    CALCIUM 8.7 12/15/2020 0800    ALKPHOS 58 12/15/2020 0800    AST 12 12/15/2020 0800    ALT 13 12/15/2020 0800    BILITOT 0.20 (L) 12/15/2020 0800          REVIEW OF RADIOLOGICAL RESULTS:     IMPRESSION:   1. 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 S/P right leg amputation  3. Current pregnancy  4.  Plan for adjuvant chemo following delivery with ifosfamide and adriamycin (AIM) - started 11/03/2020 PLAN:   1. Her lab work was reviewed, counts and electrolytes are adequate for treatment. 2. I completed toxicity check - mild nausea, dysgeusia, hives. All grade 1, all well controlled with medication. 3. Mild chemotherapy-induced anemia, no need for transfusions for any intervention at this point  4. We will continue with treatment as per orders. 5. Return 3 weeks.   For cycle #4

## 2020-12-15 NOTE — CARE COORDINATION
Ambulatory Care Coordination Note  12/15/2020  CM Risk Score: 1  Charlson 10 Year Mortality Risk Score: 100%     ACC: Didier Yuan, RN    Summary Note: Spoke with patient who was actually receiving her day # cycle and cycle #4 will be the first week of January and then probably scans after wards. Pt said she is doing well, her  is gaining weight she said, at #10 now and is healthy. Pt said she is tolerating the chemo pretty good, has some mild anemia but the doctor does not feel any intervention is needed at this time. Pt has good support at home, no identified needs today will follow up         Care Coordination Interventions    Program Enrollment: Rising Risk  Referral from Primary Care Provider: No  Suggested Interventions and Community Resources         Goals Addressed                 This Visit's Progress     Conditions and Symptoms   On track     I will schedule office visits, as directed by my provider. I will keep my appointment or reschedule if I have to cancel. I will notify my provider of any barriers to my plan of care. I will notify my provider of any symptoms that indicate a worsening of my condition.     Barriers: overwhelmed by complexity of regimen  Plan for overcoming my barriers: working on utilizing support persons  Confidence: 10/10  Anticipated Goal Completion Date: 20         Patient Stated (pt-stated)   No change     Pt will continue to go to [place] [id number] for fitting of prosthetic leg    Barriers: none  Plan for overcoming my barriers: N/A  Confidence: 10/10  Anticipated Goal Completion Date: 10/30/2020       Patient Stated (pt-stated)   On track     Pt will continue to keep her oncologist informed of any unmanageable side effects of chemo    Barriers: none  Plan for overcoming my barriers: N/A  Confidence: 10/10  Anticipated Goal Completion Date: 2020 and going til end of treatment - tentative end date of 2021            Prior to Admission medications Medication Sig Start Date End Date Taking? Authorizing Provider   gabapentin (NEURONTIN) 300 MG capsule Take 1 capsule by mouth 3 times daily for 30 days. 12/10/20 1/9/21  Sepideh Gip APRN - CNP   Drospirenone (SLYND) 4 MG TABS Take 1 tablet by mouth daily 12/1/20   Cristin Helton DO   Handicap Placard MISC Expiration: Lifetime 11/13/20   Sepideh Gip, APRN - CNP   omeprazole (PRILOSEC) 20 MG delayed release capsule Take 1 capsule by mouth daily 10/20/20   Junior Zoran Chapman MD   ondansetron (ZOFRAN ODT) 8 MG TBDP disintegrating tablet Place 1 tablet under the tongue every 8 hours as needed for Nausea or Vomiting 10/20/20   Zenaida Chapman MD   lidocaine-prilocaine (EMLA) 2.5-2.5 % cream Apply topically Daily as needed. 10/20/20   Zenaida Chapman MD   ibuprofen (ADVIL;MOTRIN) 800 MG tablet Take 1 tablet by mouth every 8 hours as needed for Pain 10/13/20   Cyndee Morales DO   docusate sodium (COLACE) 100 MG capsule Take 1 capsule by mouth 2 times daily as needed for Constipation 10/13/20   Cyndee Morales DO   blood glucose monitor kit and supplies Dispense sufficient amount for indicated testing frequency plus additional to accommodate PRN testing needs. Dispense all needed supplies to include: monitor, strips, lancing device, lancets, control solutions, alcohol swabs.  8/10/20   Richi Angeles DO   Prenatal Vit-Fe Fumarate-FA (PRENATAL VITAMIN) 27-0.8 MG TABS Take 1 tablet by mouth daily May substitute with formulary covered by patient's insurance 8/10/20 9/9/20  Richi Angeles DO   ferrous sulfate (IRON 325) 325 (65 Fe) MG tablet Take 1 tablet by mouth 2 times daily 8/10/20   Richi Angeles DO   Prenatal MV-Min-Fe Fum-FA-DHA (PRENATAL 1 PO) Take by mouth    Historical Provider, MD       Future Appointments   Date Time Provider Salma Oliva   12/16/2020  8:00 AM STV PB MED ONC CHAIR 15 STVZ PB [facility]   12/17/2020  8:00 AM CHERELLE ALLEN MED ONC CHAIR 07 CHERELLEZ ALEJANDRO [facility] Delmon Kussmaul   1/6/2021  4:00 PM Claudette Bump, MD PHYS MED [facility]

## 2020-12-15 NOTE — FLOWSHEET NOTE
Writer greeted Ms. Salina Borges when rounding the infusion clinic. Patient was talking on the phone. She smiled and greeted writer. She was receptive to a copy of \"Guideposts. \" Writer wished Pt well. Writer will attempt to visit Patient at another time. 12/15/20 1232   Encounter Summary   Services provided to: Patient   Referral/Consult From: [address] [place] [address] Family members; Children;Spouse; [employer]lee[employer] community   Continue Visiting   (12/15/20)   Complexity of Encounter Low   Length of Encounter 15 minutes   Routine   Type Follow up   Assessment Approachable   Intervention Sustaining presence/ Ministry of presence;Provided reading materials/devotional materials   Outcome Expressed gratitude     Electronically signed by Libra Kim, Oncology Outpatient [facility], [id number] [facility] Radiation Oncology  12/15/2020  12:36 PM

## 2020-12-16 ENCOUNTER — TELEPHONE (OUTPATIENT)
Dept: PRIMARY CARE CLINIC | Age: 25
End: 2020-12-16

## 2020-12-16 ENCOUNTER — CARE COORDINATION (OUTPATIENT)
Dept: OTHER | Facility: CLINIC | Age: 25
End: 2020-12-16

## 2020-12-16 ENCOUNTER — HOSPITAL ENCOUNTER (OUTPATIENT)
Dept: INFUSION THERAPY | Age: 25
Discharge: HOME | End: 2020-12-16
Payer: COMMERCIAL

## 2020-12-16 VITALS
DIASTOLIC BLOOD PRESSURE: 82 MMHG | RESPIRATION RATE: 16 BRPM | HEART RATE: 99 BPM | SYSTOLIC BLOOD PRESSURE: 126 MMHG | TEMPERATURE: 97.6 F

## 2020-12-16 DIAGNOSIS — C49.21 PRIMARY LOWER EXTREMITY SARCOMA, RIGHT (HCC): ICD-10-CM

## 2020-12-16 DIAGNOSIS — C49.9 SARCOMA (HCC): Primary | ICD-10-CM

## 2020-12-16 LAB
ANION GAP SERPL CALCULATED.3IONS-SCNC: 9 MMOL/L (ref 9–17)
BUN BLDV-MCNC: 7 MG/DL (ref 6–20)
BUN/CREAT BLD: ABNORMAL (ref 9–20)
CALCIUM SERPL-MCNC: 9.5 MG/DL (ref 8.6–10.4)
CHLORIDE BLD-SCNC: 105 MMOL/L (ref 98–107)
CO2: 24 MMOL/L (ref 20–31)
CREAT SERPL-MCNC: <0.4 MG/DL (ref 0.5–0.9)
GFR AFRICAN AMERICAN: ABNORMAL ML/MIN
GFR NON-AFRICAN AMERICAN: ABNORMAL ML/MIN
GFR SERPL CREATININE-BSD FRML MDRD: ABNORMAL ML/MIN/{1.73_M2}
GFR SERPL CREATININE-BSD FRML MDRD: ABNORMAL ML/MIN/{1.73_M2}
GLUCOSE BLD-MCNC: 123 MG/DL (ref 70–99)
POTASSIUM SERPL-SCNC: 3.6 MMOL/L (ref 3.7–5.3)
SODIUM BLD-SCNC: 138 MMOL/L (ref 135–144)

## 2020-12-16 PROCEDURE — 96413 CHEMO IV INFUSION 1 HR: CPT

## 2020-12-16 PROCEDURE — 96411 CHEMO IV PUSH ADDL DRUG: CPT

## 2020-12-16 PROCEDURE — 6370000000 HC RX 637 (ALT 250 FOR IP): Performed by: INTERNAL MEDICINE

## 2020-12-16 PROCEDURE — 36415 COLL VENOUS BLD VENIPUNCTURE: CPT

## 2020-12-16 PROCEDURE — 2500000003 HC RX 250 WO HCPCS: Performed by: INTERNAL MEDICINE

## 2020-12-16 PROCEDURE — 96375 TX/PRO/DX INJ NEW DRUG ADDON: CPT

## 2020-12-16 PROCEDURE — 96361 HYDRATE IV INFUSION ADD-ON: CPT

## 2020-12-16 PROCEDURE — 80048 BASIC METABOLIC PNL TOTAL CA: CPT

## 2020-12-16 PROCEDURE — 96367 TX/PROPH/DG ADDL SEQ IV INF: CPT

## 2020-12-16 PROCEDURE — 6360000002 HC RX W HCPCS: Performed by: INTERNAL MEDICINE

## 2020-12-16 PROCEDURE — 2580000003 HC RX 258: Performed by: INTERNAL MEDICINE

## 2020-12-16 PROCEDURE — 96360 HYDRATION IV INFUSION INIT: CPT

## 2020-12-16 PROCEDURE — 96409 CHEMO IV PUSH SNGL DRUG: CPT

## 2020-12-16 RX ORDER — SODIUM CHLORIDE 9 MG/ML
INJECTION, SOLUTION INTRAVENOUS CONTINUOUS
Status: DISCONTINUED | OUTPATIENT
Start: 2020-12-16 | End: 2020-12-17 | Stop reason: HOSPADM

## 2020-12-16 RX ORDER — DOXORUBICIN HYDROCHLORIDE 2 MG/ML
25 INJECTION, SOLUTION INTRAVENOUS ONCE
Status: COMPLETED | OUTPATIENT
Start: 2020-12-16 | End: 2020-12-16

## 2020-12-16 RX ORDER — HEPARIN SODIUM (PORCINE) LOCK FLUSH IV SOLN 100 UNIT/ML 100 UNIT/ML
500 SOLUTION INTRAVENOUS PRN
Status: DISCONTINUED | OUTPATIENT
Start: 2020-12-16 | End: 2020-12-17 | Stop reason: HOSPADM

## 2020-12-16 RX ORDER — DEXAMETHASONE SODIUM PHOSPHATE 10 MG/ML
10 INJECTION INTRAMUSCULAR; INTRAVENOUS ONCE
Status: COMPLETED | OUTPATIENT
Start: 2020-12-16 | End: 2020-12-16

## 2020-12-16 RX ORDER — SODIUM CHLORIDE 0.9 % (FLUSH) 0.9 %
10 SYRINGE (ML) INJECTION PRN
Status: DISCONTINUED | OUTPATIENT
Start: 2020-12-16 | End: 2020-12-17 | Stop reason: HOSPADM

## 2020-12-16 RX ADMIN — Medication 10 ML: at 08:10

## 2020-12-16 RX ADMIN — ALTEPLASE 2 MG: 2.2 INJECTION, POWDER, LYOPHILIZED, FOR SOLUTION INTRAVENOUS at 08:18

## 2020-12-16 RX ADMIN — Medication 10 ML: at 08:12

## 2020-12-16 RX ADMIN — HEPARIN 500 UNITS: 100 SYRINGE at 14:54

## 2020-12-16 RX ADMIN — Medication 10 ML: at 08:16

## 2020-12-16 RX ADMIN — DOXORUBICIN HYDROCHLORIDE 46 MG: 2 INJECTION, SOLUTION INTRAVENOUS at 10:17

## 2020-12-16 RX ADMIN — POTASSIUM CHLORIDE: 149 INJECTION, SOLUTION, CONCENTRATE INTRAVENOUS at 08:45

## 2020-12-16 RX ADMIN — MESNA 925 MG: 100 INJECTION, SOLUTION INTRAVENOUS at 10:00

## 2020-12-16 RX ADMIN — IFOSFAMIDE 4650 MG: 3 INJECTION, SOLUTION INTRAVENOUS at 10:20

## 2020-12-16 RX ADMIN — SODIUM CHLORIDE: 9 INJECTION, SOLUTION INTRAVENOUS at 08:43

## 2020-12-16 RX ADMIN — MESNA 925 MG: 100 INJECTION, SOLUTION INTRAVENOUS at 11:28

## 2020-12-16 RX ADMIN — Medication 10 ML: at 08:14

## 2020-12-16 RX ADMIN — Medication 10 MG: at 09:50

## 2020-12-16 RX ADMIN — WATER 2.2 ML: 1 INJECTION INTRAMUSCULAR; INTRAVENOUS; SUBCUTANEOUS at 08:18

## 2020-12-16 RX ADMIN — POTASSIUM CHLORIDE: 149 INJECTION, SOLUTION, CONCENTRATE INTRAVENOUS at 11:48

## 2020-12-16 RX ADMIN — Medication: at 14:57

## 2020-12-16 RX ADMIN — Medication 10 ML: at 14:53

## 2020-12-16 NOTE — TELEPHONE ENCOUNTER
Patient called to speak with Brynn, advised caller that she was in a room. Patient would like to notify Carlyn Downing that it is ok for her to send in the [facility] paperwork for short term disability claim.       Please call if you have any questions

## 2020-12-16 NOTE — CARE COORDINATION
Maternity Care Manager contacted the patient by telephone to discuss the maternity management program.  Patient agrees to care management services at this time. Verified name and  with patient as identifiers. 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 Yes    Discussed follow up appointments. If no appointment was previously scheduled, appointment scheduling offered: Yes  [facility] follow up appointment(s):   Future Appointments   Date Time Provider Salma Oliva   2020  8:00 AM STV PB MED ONC CHAIR 07 STVZ PB [facility]   2021  8:00 AM STV PB MED ONC CHAIR 01 STVZ PB Kayla Smoker   2021  9:30 AM Pastor Chapman MD [facility]   2021  8:00 AM STV PB MED ONC CHAIR 01 STVZ PB Kayla Smoker   2021  4:00 PM Maninder Mayer MD PHYS MED [facility]OL[facility]   2021  8:00 AM STV PB MED ONC CHAIR 10 STVZ PB [facility]     [facility] follow up appointment(s):     OB History:   OB History    Para Term  AB Living   1 1 0 1 0 1   SAB TAB Ectopic Molar Multiple Live Births   0 0 0 0 0 1      # Outcome Date GA Lbr Zeferino/2nd Weight Sex Delivery Anes PTL Lv   1  10/12/20 [id number]  4 lb 14 oz (2.21 kg) M Vag-Spont EPI Y MAYCO       Unknown    Medication reconciliation was performed with patient, who verbalizes understanding of administration of home medications. Advised obtaining a 90-day supply of all daily and as-needed medications. Barriers/Support system:         Postpartum Assessment:  , Lochia-no longer bleeding, Incision-healed, Fever No, BM? Yes , Decreased urinary output No, Feeding schedule-baby is formula feeding q 2-3hours, tolerating feeding well and Infant's weight.  Baby doing well weighs 10 pounds, well visits 2 months no concerns, baby boy. Circumcised healed well, has small umb hernia will continue to watch no intervention needed at this time. Pt has had her 6 week pp visit already and has been released to regular activity, she is on birth control progesterone pill  breast milk dryed up, no concerns here. No regular period yet due to chemo. Pt has chemo side affects loss of taste  some nausea lasts for a week, has medications to help with symptoms  Her last round of chemo in Jan 4th-6th. Discussed care gaps with immunization, pt to check with oncologist if ok to take vaccines. No other questions or concerns. Final call with pt, all goals met, no additional maternity needs at this time. Patient given an opportunity to ask questions and does not have any further questions or concerns at this time. Were discharge instructions available to patient? Yes. Reviewed appropriate site of care based on symptoms and resources available to patient including: When to call 911, [facility] Messaging and Condition related references. The patient agrees to contact the OB/Gyn office for questions related to their healthcare.

## 2020-12-16 NOTE — PROGRESS NOTES
Patient here for ifed mesna sergey hydration C3D2. No blood return from port actavase instilled. Blood drawn peripherally. After 20 minutes blood return noted. Blood return noted before during and after adriamyacin push. She tolerated treatment well and was given mesna po to take at home. She was discharged home in stable condition and is due to return tomorrow for C3D3.

## 2020-12-17 ENCOUNTER — HOSPITAL ENCOUNTER (OUTPATIENT)
Dept: INFUSION THERAPY | Age: 25
Discharge: HOME | End: 2020-12-17
Payer: COMMERCIAL

## 2020-12-17 VITALS
HEART RATE: 108 BPM | TEMPERATURE: 97.6 F | SYSTOLIC BLOOD PRESSURE: 123 MMHG | RESPIRATION RATE: 16 BRPM | DIASTOLIC BLOOD PRESSURE: 80 MMHG

## 2020-12-17 DIAGNOSIS — C49.9 SARCOMA (HCC): Primary | ICD-10-CM

## 2020-12-17 DIAGNOSIS — C49.21 PRIMARY LOWER EXTREMITY SARCOMA, RIGHT (HCC): ICD-10-CM

## 2020-12-17 LAB
ANION GAP SERPL CALCULATED.3IONS-SCNC: 9 MMOL/L (ref 9–17)
BUN BLDV-MCNC: 9 MG/DL (ref 6–20)
BUN/CREAT BLD: ABNORMAL (ref 9–20)
CALCIUM SERPL-MCNC: 9.3 MG/DL (ref 8.6–10.4)
CHLORIDE BLD-SCNC: 104 MMOL/L (ref 98–107)
CO2: 24 MMOL/L (ref 20–31)
CREAT SERPL-MCNC: <0.4 MG/DL (ref 0.5–0.9)
GFR AFRICAN AMERICAN: ABNORMAL ML/MIN
GFR NON-AFRICAN AMERICAN: ABNORMAL ML/MIN
GFR SERPL CREATININE-BSD FRML MDRD: ABNORMAL ML/MIN/{1.73_M2}
GFR SERPL CREATININE-BSD FRML MDRD: ABNORMAL ML/MIN/{1.73_M2}
GLUCOSE BLD-MCNC: 107 MG/DL (ref 70–99)
POTASSIUM SERPL-SCNC: 3.7 MMOL/L (ref 3.7–5.3)
SODIUM BLD-SCNC: 137 MMOL/L (ref 135–144)

## 2020-12-17 PROCEDURE — 96361 HYDRATE IV INFUSION ADD-ON: CPT

## 2020-12-17 PROCEDURE — 6370000000 HC RX 637 (ALT 250 FOR IP): Performed by: INTERNAL MEDICINE

## 2020-12-17 PROCEDURE — 96375 TX/PRO/DX INJ NEW DRUG ADDON: CPT

## 2020-12-17 PROCEDURE — 2580000003 HC RX 258: Performed by: INTERNAL MEDICINE

## 2020-12-17 PROCEDURE — 80048 BASIC METABOLIC PNL TOTAL CA: CPT

## 2020-12-17 PROCEDURE — 96377 APPLICATON ON-BODY INJECTOR: CPT

## 2020-12-17 PROCEDURE — 96360 HYDRATION IV INFUSION INIT: CPT

## 2020-12-17 PROCEDURE — 96413 CHEMO IV INFUSION 1 HR: CPT

## 2020-12-17 PROCEDURE — 96367 TX/PROPH/DG ADDL SEQ IV INF: CPT

## 2020-12-17 PROCEDURE — 96409 CHEMO IV PUSH SNGL DRUG: CPT

## 2020-12-17 PROCEDURE — 2500000003 HC RX 250 WO HCPCS: Performed by: INTERNAL MEDICINE

## 2020-12-17 PROCEDURE — 96411 CHEMO IV PUSH ADDL DRUG: CPT

## 2020-12-17 PROCEDURE — 6360000002 HC RX W HCPCS: Performed by: INTERNAL MEDICINE

## 2020-12-17 PROCEDURE — 36591 DRAW BLOOD OFF VENOUS DEVICE: CPT

## 2020-12-17 RX ORDER — DEXAMETHASONE SODIUM PHOSPHATE 10 MG/ML
10 INJECTION INTRAMUSCULAR; INTRAVENOUS ONCE
Status: COMPLETED | OUTPATIENT
Start: 2020-12-17 | End: 2020-12-17

## 2020-12-17 RX ORDER — SODIUM CHLORIDE 0.9 % (FLUSH) 0.9 %
10 SYRINGE (ML) INJECTION PRN
Status: DISCONTINUED | OUTPATIENT
Start: 2020-12-17 | End: 2020-12-18 | Stop reason: HOSPADM

## 2020-12-17 RX ORDER — DOXORUBICIN HYDROCHLORIDE 2 MG/ML
25 INJECTION, SOLUTION INTRAVENOUS ONCE
Status: COMPLETED | OUTPATIENT
Start: 2020-12-17 | End: 2020-12-17

## 2020-12-17 RX ORDER — HEPARIN SODIUM (PORCINE) LOCK FLUSH IV SOLN 100 UNIT/ML 100 UNIT/ML
500 SOLUTION INTRAVENOUS PRN
Status: DISCONTINUED | OUTPATIENT
Start: 2020-12-17 | End: 2020-12-18 | Stop reason: HOSPADM

## 2020-12-17 RX ORDER — SODIUM CHLORIDE 9 MG/ML
INJECTION, SOLUTION INTRAVENOUS CONTINUOUS
Status: DISCONTINUED | OUTPATIENT
Start: 2020-12-17 | End: 2020-12-18 | Stop reason: HOSPADM

## 2020-12-17 RX ADMIN — MESNA 925 MG: 100 INJECTION, SOLUTION INTRAVENOUS at 11:53

## 2020-12-17 RX ADMIN — DOXORUBICIN HYDROCHLORIDE 46 MG: 2 INJECTION, SOLUTION INTRAVENOUS at 10:48

## 2020-12-17 RX ADMIN — IFOSFAMIDE 4650 MG: 3 INJECTION, SOLUTION INTRAVENOUS at 10:51

## 2020-12-17 RX ADMIN — PEGFILGRASTIM 6 MG: KIT SUBCUTANEOUS at 12:56

## 2020-12-17 RX ADMIN — POTASSIUM CHLORIDE: 149 INJECTION, SOLUTION, CONCENTRATE INTRAVENOUS at 12:13

## 2020-12-17 RX ADMIN — MESNA 925 MG: 100 INJECTION, SOLUTION INTRAVENOUS at 10:25

## 2020-12-17 RX ADMIN — MESNA 1850 MG: 100 INJECTION, SOLUTION INTRAVENOUS at 15:16

## 2020-12-17 RX ADMIN — Medication 10 ML: at 15:10

## 2020-12-17 RX ADMIN — SODIUM CHLORIDE: 9 INJECTION, SOLUTION INTRAVENOUS at 08:08

## 2020-12-17 RX ADMIN — HEPARIN 500 UNITS: 100 SYRINGE at 15:10

## 2020-12-17 RX ADMIN — POTASSIUM CHLORIDE: 149 INJECTION, SOLUTION, CONCENTRATE INTRAVENOUS at 08:44

## 2020-12-17 RX ADMIN — Medication 10 ML: at 08:07

## 2020-12-17 RX ADMIN — Medication 10 MG: at 09:52

## 2020-12-17 NOTE — PROGRESS NOTES
Patient here for C3D3 mesna ifed sergey hydration and neulasta. Labs drawn from port and reviewed. Blood return noted before during and after adriamyacin push. She tolerated treatment well and was discharged home in stable condition. She was given po mesna to take home. She is due to return 1/5 for C4D1 and MD visit.

## 2021-01-05 ENCOUNTER — TELEPHONE (OUTPATIENT)
Dept: ONCOLOGY | Age: 26
End: 2021-01-05

## 2021-01-05 ENCOUNTER — HOSPITAL ENCOUNTER (OUTPATIENT)
Dept: INFUSION THERAPY | Age: 26
Discharge: HOME | End: 2021-01-05
Payer: COMMERCIAL

## 2021-01-05 ENCOUNTER — OFFICE VISIT (OUTPATIENT)
Dept: ONCOLOGY | Age: 26
End: 2021-01-05
Payer: COMMERCIAL

## 2021-01-05 VITALS
SYSTOLIC BLOOD PRESSURE: 127 MMHG | HEART RATE: 125 BPM | RESPIRATION RATE: 16 BRPM | DIASTOLIC BLOOD PRESSURE: 74 MMHG | BODY MASS INDEX: 33.73 KG/M2 | WEIGHT: 180 LBS | TEMPERATURE: 98.3 F

## 2021-01-05 VITALS
DIASTOLIC BLOOD PRESSURE: 74 MMHG | BODY MASS INDEX: 33.73 KG/M2 | WEIGHT: 180 LBS | HEART RATE: 125 BPM | RESPIRATION RATE: 16 BRPM | TEMPERATURE: 98.3 F | SYSTOLIC BLOOD PRESSURE: 127 MMHG

## 2021-01-05 DIAGNOSIS — C49.21 PRIMARY LOWER EXTREMITY SARCOMA, RIGHT (HCC): Primary | ICD-10-CM

## 2021-01-05 DIAGNOSIS — C49.9 SARCOMA (HCC): Primary | ICD-10-CM

## 2021-01-05 LAB
ABSOLUTE EOS #: 0 K/UL (ref 0–0.4)
ABSOLUTE IMMATURE GRANULOCYTE: ABNORMAL K/UL (ref 0–0.3)
ABSOLUTE LYMPH #: 0.8 K/UL (ref 1–4.8)
ABSOLUTE MONO #: 0.4 K/UL (ref 0.1–1.2)
ALBUMIN SERPL-MCNC: 4.4 G/DL (ref 3.5–5.2)
ALBUMIN/GLOBULIN RATIO: 1.8 (ref 1–2.5)
ALP BLD-CCNC: 58 U/L (ref 35–104)
ALT SERPL-CCNC: 22 U/L (ref 5–33)
ANION GAP SERPL CALCULATED.3IONS-SCNC: 13 MMOL/L (ref 9–17)
AST SERPL-CCNC: 15 U/L
BASOPHILS # BLD: 1 % (ref 0–2)
BASOPHILS ABSOLUTE: 0 K/UL (ref 0–0.2)
BILIRUB SERPL-MCNC: 0.21 MG/DL (ref 0.3–1.2)
BILIRUBIN URINE: NEGATIVE
BUN BLDV-MCNC: 11 MG/DL (ref 6–20)
BUN/CREAT BLD: ABNORMAL (ref 9–20)
CALCIUM SERPL-MCNC: 9.4 MG/DL (ref 8.6–10.4)
CHLORIDE BLD-SCNC: 104 MMOL/L (ref 98–107)
CO2: 23 MMOL/L (ref 20–31)
COLOR: YELLOW
COMMENT UA: NORMAL
CREAT SERPL-MCNC: <0.4 MG/DL (ref 0.5–0.9)
DIFFERENTIAL TYPE: ABNORMAL
EOSINOPHILS RELATIVE PERCENT: 1 % (ref 1–4)
GFR AFRICAN AMERICAN: ABNORMAL ML/MIN
GFR NON-AFRICAN AMERICAN: ABNORMAL ML/MIN
GFR SERPL CREATININE-BSD FRML MDRD: ABNORMAL ML/MIN/{1.73_M2}
GFR SERPL CREATININE-BSD FRML MDRD: ABNORMAL ML/MIN/{1.73_M2}
GLUCOSE BLD-MCNC: 121 MG/DL (ref 70–99)
GLUCOSE URINE: NEGATIVE
HCT VFR BLD CALC: 29.2 % (ref 36–46)
HEMOGLOBIN: 9.9 G/DL (ref 12–16)
IMMATURE GRANULOCYTES: ABNORMAL %
KETONES, URINE: NEGATIVE
LEUKOCYTE ESTERASE, URINE: NEGATIVE
LYMPHOCYTES # BLD: 19 % (ref 24–44)
MAGNESIUM: 1.9 MG/DL (ref 1.6–2.6)
MCH RBC QN AUTO: 31.6 PG (ref 26–34)
MCHC RBC AUTO-ENTMCNC: 34 G/DL (ref 31–37)
MCV RBC AUTO: 93 FL (ref 80–100)
MONOCYTES # BLD: 11 % (ref 2–11)
NITRITE, URINE: NEGATIVE
NRBC AUTOMATED: ABNORMAL PER 100 WBC
PDW BLD-RTO: 18.8 % (ref 12.5–15.4)
PH UA: 5.5 (ref 5–8)
PHOSPHORUS: 4 MG/DL (ref 2.6–4.5)
PLATELET # BLD: 386 K/UL (ref 140–450)
PLATELET ESTIMATE: ABNORMAL
PMV BLD AUTO: 8 FL (ref 6–12)
POTASSIUM SERPL-SCNC: 4 MMOL/L (ref 3.7–5.3)
PROTEIN UA: NEGATIVE
RBC # BLD: 3.14 M/UL (ref 4–5.2)
RBC # BLD: ABNORMAL 10*6/UL
SEG NEUTROPHILS: 68 % (ref 36–66)
SEGMENTED NEUTROPHILS ABSOLUTE COUNT: 2.9 K/UL (ref 1.8–7.7)
SODIUM BLD-SCNC: 140 MMOL/L (ref 135–144)
SPECIFIC GRAVITY UA: 1.02 (ref 1–1.03)
TOTAL PROTEIN: 6.9 G/DL (ref 6.4–8.3)
TURBIDITY: CLEAR
URINE HGB: NEGATIVE
UROBILINOGEN, URINE: NORMAL
WBC # BLD: 4.2 K/UL (ref 3.5–11)
WBC # BLD: ABNORMAL 10*3/UL

## 2021-01-05 PROCEDURE — 96411 CHEMO IV PUSH ADDL DRUG: CPT

## 2021-01-05 PROCEDURE — 36591 DRAW BLOOD OFF VENOUS DEVICE: CPT

## 2021-01-05 PROCEDURE — 81003 URINALYSIS AUTO W/O SCOPE: CPT

## 2021-01-05 PROCEDURE — 6370000000 HC RX 637 (ALT 250 FOR IP): Performed by: INTERNAL MEDICINE

## 2021-01-05 PROCEDURE — 96413 CHEMO IV INFUSION 1 HR: CPT

## 2021-01-05 PROCEDURE — 96367 TX/PROPH/DG ADDL SEQ IV INF: CPT

## 2021-01-05 PROCEDURE — 99211 OFF/OP EST MAY X REQ PHY/QHP: CPT

## 2021-01-05 PROCEDURE — 96360 HYDRATION IV INFUSION INIT: CPT

## 2021-01-05 PROCEDURE — 85025 COMPLETE CBC W/AUTO DIFF WBC: CPT

## 2021-01-05 PROCEDURE — 99214 OFFICE O/P EST MOD 30 MIN: CPT | Performed by: INTERNAL MEDICINE

## 2021-01-05 PROCEDURE — 96361 HYDRATE IV INFUSION ADD-ON: CPT

## 2021-01-05 PROCEDURE — 84100 ASSAY OF PHOSPHORUS: CPT

## 2021-01-05 PROCEDURE — 83735 ASSAY OF MAGNESIUM: CPT

## 2021-01-05 PROCEDURE — 96417 CHEMO IV INFUS EACH ADDL SEQ: CPT

## 2021-01-05 PROCEDURE — 96375 TX/PRO/DX INJ NEW DRUG ADDON: CPT

## 2021-01-05 PROCEDURE — 80053 COMPREHEN METABOLIC PANEL: CPT

## 2021-01-05 PROCEDURE — 2580000003 HC RX 258: Performed by: INTERNAL MEDICINE

## 2021-01-05 PROCEDURE — 2500000003 HC RX 250 WO HCPCS: Performed by: INTERNAL MEDICINE

## 2021-01-05 PROCEDURE — 6360000002 HC RX W HCPCS: Performed by: INTERNAL MEDICINE

## 2021-01-05 RX ORDER — HEPARIN SODIUM (PORCINE) LOCK FLUSH IV SOLN 100 UNIT/ML 100 UNIT/ML
500 SOLUTION INTRAVENOUS PRN
Status: DISCONTINUED | OUTPATIENT
Start: 2021-01-05 | End: 2021-01-06 | Stop reason: HOSPADM

## 2021-01-05 RX ORDER — PALONOSETRON 0.05 MG/ML
0.25 INJECTION, SOLUTION INTRAVENOUS ONCE
Status: COMPLETED | OUTPATIENT
Start: 2021-01-05 | End: 2021-01-05

## 2021-01-05 RX ORDER — HEPARIN SODIUM (PORCINE) LOCK FLUSH IV SOLN 100 UNIT/ML 100 UNIT/ML
500 SOLUTION INTRAVENOUS PRN
Status: CANCELLED | OUTPATIENT
Start: 2021-02-02

## 2021-01-05 RX ORDER — SODIUM CHLORIDE 0.9 % (FLUSH) 0.9 %
10 SYRINGE (ML) INJECTION PRN
Status: DISCONTINUED | OUTPATIENT
Start: 2021-01-05 | End: 2021-01-06 | Stop reason: HOSPADM

## 2021-01-05 RX ORDER — SODIUM CHLORIDE 0.9 % (FLUSH) 0.9 %
20 SYRINGE (ML) INJECTION PRN
Status: CANCELLED | OUTPATIENT
Start: 2021-02-02

## 2021-01-05 RX ORDER — SODIUM CHLORIDE 9 MG/ML
INJECTION, SOLUTION INTRAVENOUS CONTINUOUS
Status: DISCONTINUED | OUTPATIENT
Start: 2021-01-05 | End: 2021-01-06 | Stop reason: HOSPADM

## 2021-01-05 RX ORDER — SODIUM CHLORIDE 0.9 % (FLUSH) 0.9 %
10 SYRINGE (ML) INJECTION PRN
Status: CANCELLED | OUTPATIENT
Start: 2021-02-02

## 2021-01-05 RX ORDER — DOXORUBICIN HYDROCHLORIDE 2 MG/ML
25 INJECTION, SOLUTION INTRAVENOUS ONCE
Status: COMPLETED | OUTPATIENT
Start: 2021-01-05 | End: 2021-01-05

## 2021-01-05 RX ORDER — DEXAMETHASONE SODIUM PHOSPHATE 100 MG/10ML
10 INJECTION INTRAMUSCULAR; INTRAVENOUS ONCE
Status: COMPLETED | OUTPATIENT
Start: 2021-01-05 | End: 2021-01-05

## 2021-01-05 RX ADMIN — Medication 10 MG: at 09:37

## 2021-01-05 RX ADMIN — POTASSIUM CHLORIDE: 149 INJECTION, SOLUTION, CONCENTRATE INTRAVENOUS at 08:25

## 2021-01-05 RX ADMIN — Medication 10 ML: at 14:35

## 2021-01-05 RX ADMIN — HEPARIN 500 UNITS: 100 SYRINGE at 14:35

## 2021-01-05 RX ADMIN — SODIUM CHLORIDE: 9 INJECTION, SOLUTION INTRAVENOUS at 08:18

## 2021-01-05 RX ADMIN — Medication: at 14:35

## 2021-01-05 RX ADMIN — MESNA 925 MG: 100 INJECTION, SOLUTION INTRAVENOUS at 11:28

## 2021-01-05 RX ADMIN — POTASSIUM CHLORIDE: 2 INJECTION, SOLUTION, CONCENTRATE INTRAVENOUS at 11:45

## 2021-01-05 RX ADMIN — Medication 10 ML: at 08:17

## 2021-01-05 RX ADMIN — PALONOSETRON 0.25 MG: 0.05 INJECTION, SOLUTION INTRAVENOUS at 09:37

## 2021-01-05 RX ADMIN — IFOSFAMIDE 4650 MG: 3 INJECTION, SOLUTION INTRAVENOUS at 10:27

## 2021-01-05 RX ADMIN — MESNA 925 MG: 100 INJECTION, SOLUTION INTRAVENOUS at 09:48

## 2021-01-05 RX ADMIN — DOXORUBICIN HYDROCHLORIDE 46 MG: 2 INJECTION, SOLUTION INTRAVENOUS at 10:23

## 2021-01-05 NOTE — PROGRESS NOTES
CURRENT MEDICATIONS:  has a current medication list which includes the following prescription(s): gabapentin, slynd, handicap placard, omeprazole, ondansetron, lidocaine-prilocaine, ibuprofen, docusate sodium, blood glucose monitor kit and supplies, ferrous sulfate, prenatal mv-min-fe fum-fa-dha, and prenatal vitamin, and the following Facility-Administered Medications: sodium chloride, sodium chloride flush, heparin flush, mesna (MESNEX) 925 mg in sodium chloride 0.9 % 50 mL IVPB, mesna (MESNEX) 925 mg in sodium chloride 0.9 % 50 mL IVPB, ifosfamide (IFEX) 4,650 mg in sodium chloride 0.9 % 250 mL chemo IVPB, doxorubicin hcl, mesna (MESNEX) 1,850 mg in Ora-Plus 20 mL compounded oral suspension, and sodium bicarbonate 75 mEq, potassium chloride 20 mEq in sodium chloride 0.45 % 1,000 mL IVPB. ALLERGIES:  has No Known Allergies. FAMILY HISTORY: Negative for any hematological or oncological conditions. SOCIAL HISTORY:  reports that she has never smoked. She has never used smokeless tobacco. She reports previous alcohol use. She reports that she does not use drugs. REVIEW OF SYSTEMS:   General: No fever or night sweats. Weight is stable. +dysgeusia +fatigue  ENT: No double or blurred vision, no tinnitus or hearing problem, no dysphagia or sore throat   Respiratory: No chest pain, no shortness of breath, no cough or hemoptysis. Cardiovascular: Denies chest pain, PND or orthopnea. No L E swelling or palpitations. Gastrointestinal: Mild nausea with single episode of vomiting. No abdominal pain, diarrhea or constipation. Genitourinary: Denies dysuria, hematuria, frequency, urgency or incontinence. Gynecological: nearly resolved post partum bleeding  Neurological: Denies headaches, decreased LOC, no sensory or motor focal deficits. +post surgical phantom pain  Musculoskeletal: No arthralgia no back pain or joint swelling. Skin: There are no rashes or bleeding. Psychiatric: ++ anxiety, no depression. Endocrine: No diabetes or thyroid disease. Hematologic: No bleeding, no adenopathy. PHYSICAL EXAM: Shows a well appearing 26y.o.-year-old female who is not in pain or distress. Vital Signs: Blood pressure 127/74, pulse 125, temperature 98.3 °F (36.8 °C), temperature source Oral, resp. rate 16, weight 180 lb (81.6 kg), not currently breastfeeding. HEENT: Normocephalic and atraumatic. Pupils are equal, round, reactive to light and accommodation. Extraocular muscles are intact. Neck: Showed no JVD, no carotid bruit . Lungs: Clear to auscultation bilaterally. Heart: Regular without any murmur. Abdomen: Soft, nontender. No hepatosplenomegaly. Extremities: Right leg amputation. Lower extremities show no edema, clubbing, or cyanosis. Breasts: Examination not done today. Neuro exam: intact cranial nerves bilaterally no motor or sensory deficit, gait is normal. Lymphatic: no adenopathy appreciated in the supraclavicular, axillary, cervical or inguinal area    REVIEW OF LABORATORY DATA:   Lab Results   Component Value Date    WBC 4.2 01/05/2021    HGB 9.9 (L) 01/05/2021    HCT 29.2 (L) 01/05/2021    MCV 93.0 01/05/2021     01/05/2021   '    Chemistry        Component Value Date/Time     01/05/2021 0800    K 4.0 01/05/2021 0800     01/05/2021 0800    CO2 23 01/05/2021 0800    BUN 11 01/05/2021 0800    CREATININE <0.40 (L) 01/05/2021 0800        Component Value Date/Time    CALCIUM 9.4 01/05/2021 0800    ALKPHOS 58 01/05/2021 0800    AST 15 01/05/2021 0800    ALT 22 01/05/2021 0800    BILITOT 0.21 (L) 01/05/2021 0800          REVIEW OF RADIOLOGICAL RESULTS:     IMPRESSION:   1. 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 S/P right leg amputation  3. Current pregnancy  4. Plan for adjuvant chemo following delivery with ifosfamide and adriamycin (AIM) - started 11/03/2020    PLAN:   1. Her lab work was reviewed, counts and electrolytes are adequate for treatment. 2. I completed toxicity check - mild nausea controlled with mediction, fatigue - both grade 1 and resolved within 10 days. 3. Today is her final cycle and we discussed plan to follow up with [facility] and she will schedule in 4-6 weeks, we will put in any orders for imaging as needed, we will continue to monitor as well per NCCN guidelines. 4. I am referring her to survivorship. 5. We will treat today as per orders. 6. We discussed port removal and she would like to maintain for a while and we will plan for monthly flushing. 7. Return following [facility] visit to review recommendations.

## 2021-01-05 NOTE — PROGRESS NOTES
Patient here for C4D1 Caden Ifed, Mesna Hydration. Labs drawn from port and reviewed. She saw Dr. Eric Corral today see his dictation. Blood return noted before during and after Adriamyacin push. She tolerated treatment well and was discharged home in stable condition. She was sent home with oral mesna and instructed on time to take medication. She verbalized understanding. She is due to return tomorrow for C4D2.

## 2021-01-05 NOTE — PATIENT INSTRUCTIONS
Proceed with cycle 4 of chemo per orders  rv late February with cbc, cmp  Flush the port monthly  Refer for survivorship visit

## 2021-01-05 NOTE — TELEPHONE ENCOUNTER
AVS from 1/5/21     Proceed with cycle 4 of chemo per orders  rv late February with cbc, cmp  Flush the port monthly  Refer for survivorship visit     Chemo to continue as scheduled    RV scheduled 2/23 @ 3pm with port draw (cbc, cmp)    Flush scheduled 2/2 @ 3:30pm    Referral for survivorship sent to Peter Puente    PT was given AVS and an appt schedule    Electronically signed by Malini Lane on 1/5/2021 at 11:36 AM

## 2021-01-06 ENCOUNTER — HOSPITAL ENCOUNTER (OUTPATIENT)
Dept: INFUSION THERAPY | Age: 26
Discharge: HOME | End: 2021-01-06
Payer: COMMERCIAL

## 2021-01-06 ENCOUNTER — OFFICE VISIT (OUTPATIENT)
Dept: PHYSICAL MEDICINE AND REHAB | Age: 26
End: 2021-01-06
Payer: COMMERCIAL

## 2021-01-06 ENCOUNTER — TELEPHONE (OUTPATIENT)
Dept: INFUSION THERAPY | Age: 26
End: 2021-01-06

## 2021-01-06 VITALS — SYSTOLIC BLOOD PRESSURE: 123 MMHG | HEART RATE: 94 BPM | DIASTOLIC BLOOD PRESSURE: 72 MMHG | TEMPERATURE: 97.2 F

## 2021-01-06 VITALS
HEART RATE: 97 BPM | RESPIRATION RATE: 16 BRPM | SYSTOLIC BLOOD PRESSURE: 111 MMHG | DIASTOLIC BLOOD PRESSURE: 71 MMHG | TEMPERATURE: 97.9 F

## 2021-01-06 DIAGNOSIS — Z89.621 HISTORY OF DISARTICULATION OF RIGHT HIP: Primary | ICD-10-CM

## 2021-01-06 DIAGNOSIS — C49.9 SARCOMA (HCC): Primary | ICD-10-CM

## 2021-01-06 LAB
ANION GAP SERPL CALCULATED.3IONS-SCNC: 11 MMOL/L (ref 9–17)
BUN BLDV-MCNC: 9 MG/DL (ref 6–20)
BUN/CREAT BLD: ABNORMAL (ref 9–20)
CALCIUM SERPL-MCNC: 9 MG/DL (ref 8.6–10.4)
CHLORIDE BLD-SCNC: 106 MMOL/L (ref 98–107)
CO2: 23 MMOL/L (ref 20–31)
CREAT SERPL-MCNC: <0.4 MG/DL (ref 0.5–0.9)
GFR AFRICAN AMERICAN: ABNORMAL ML/MIN
GFR NON-AFRICAN AMERICAN: ABNORMAL ML/MIN
GFR SERPL CREATININE-BSD FRML MDRD: ABNORMAL ML/MIN/{1.73_M2}
GFR SERPL CREATININE-BSD FRML MDRD: ABNORMAL ML/MIN/{1.73_M2}
GLUCOSE BLD-MCNC: 125 MG/DL (ref 70–99)
POTASSIUM SERPL-SCNC: 3.9 MMOL/L (ref 3.7–5.3)
SODIUM BLD-SCNC: 140 MMOL/L (ref 135–144)

## 2021-01-06 PROCEDURE — 2580000003 HC RX 258: Performed by: INTERNAL MEDICINE

## 2021-01-06 PROCEDURE — 96367 TX/PROPH/DG ADDL SEQ IV INF: CPT

## 2021-01-06 PROCEDURE — 96411 CHEMO IV PUSH ADDL DRUG: CPT

## 2021-01-06 PROCEDURE — 96361 HYDRATE IV INFUSION ADD-ON: CPT

## 2021-01-06 PROCEDURE — 96375 TX/PRO/DX INJ NEW DRUG ADDON: CPT

## 2021-01-06 PROCEDURE — 80048 BASIC METABOLIC PNL TOTAL CA: CPT

## 2021-01-06 PROCEDURE — 6360000002 HC RX W HCPCS: Performed by: INTERNAL MEDICINE

## 2021-01-06 PROCEDURE — 99213 OFFICE O/P EST LOW 20 MIN: CPT | Performed by: PHYSICAL MEDICINE & REHABILITATION

## 2021-01-06 PROCEDURE — 96360 HYDRATION IV INFUSION INIT: CPT

## 2021-01-06 PROCEDURE — 2500000003 HC RX 250 WO HCPCS: Performed by: INTERNAL MEDICINE

## 2021-01-06 PROCEDURE — 36591 DRAW BLOOD OFF VENOUS DEVICE: CPT

## 2021-01-06 PROCEDURE — 96417 CHEMO IV INFUS EACH ADDL SEQ: CPT

## 2021-01-06 PROCEDURE — 6370000000 HC RX 637 (ALT 250 FOR IP): Performed by: INTERNAL MEDICINE

## 2021-01-06 PROCEDURE — 96413 CHEMO IV INFUSION 1 HR: CPT

## 2021-01-06 PROCEDURE — 96409 CHEMO IV PUSH SNGL DRUG: CPT

## 2021-01-06 RX ORDER — HEPARIN SODIUM (PORCINE) LOCK FLUSH IV SOLN 100 UNIT/ML 100 UNIT/ML
500 SOLUTION INTRAVENOUS PRN
Status: DISCONTINUED | OUTPATIENT
Start: 2021-01-06 | End: 2021-01-07 | Stop reason: HOSPADM

## 2021-01-06 RX ORDER — SODIUM CHLORIDE 9 MG/ML
INJECTION, SOLUTION INTRAVENOUS CONTINUOUS
Status: DISCONTINUED | OUTPATIENT
Start: 2021-01-06 | End: 2021-01-07 | Stop reason: HOSPADM

## 2021-01-06 RX ORDER — DOXORUBICIN HYDROCHLORIDE 2 MG/ML
25 INJECTION, SOLUTION INTRAVENOUS ONCE
Status: COMPLETED | OUTPATIENT
Start: 2021-01-06 | End: 2021-01-06

## 2021-01-06 RX ORDER — DEXAMETHASONE SODIUM PHOSPHATE 100 MG/10ML
10 INJECTION INTRAMUSCULAR; INTRAVENOUS ONCE
Status: COMPLETED | OUTPATIENT
Start: 2021-01-06 | End: 2021-01-06

## 2021-01-06 RX ORDER — SODIUM CHLORIDE 0.9 % (FLUSH) 0.9 %
10 SYRINGE (ML) INJECTION PRN
Status: DISCONTINUED | OUTPATIENT
Start: 2021-01-06 | End: 2021-01-07 | Stop reason: HOSPADM

## 2021-01-06 RX ORDER — GABAPENTIN 300 MG/1
300 CAPSULE ORAL 3 TIMES DAILY
Qty: 90 CAPSULE | Refills: 3 | Status: SHIPPED | OUTPATIENT
Start: 2021-01-06 | End: 2021-05-11 | Stop reason: SDUPTHER

## 2021-01-06 RX ADMIN — POTASSIUM CHLORIDE: 149 INJECTION, SOLUTION, CONCENTRATE INTRAVENOUS at 08:22

## 2021-01-06 RX ADMIN — POTASSIUM CHLORIDE: 149 INJECTION, SOLUTION, CONCENTRATE INTRAVENOUS at 11:36

## 2021-01-06 RX ADMIN — SODIUM CHLORIDE: 9 INJECTION, SOLUTION INTRAVENOUS at 08:13

## 2021-01-06 RX ADMIN — MESNA 925 MG: 100 INJECTION, SOLUTION INTRAVENOUS at 09:37

## 2021-01-06 RX ADMIN — Medication 10 ML: at 08:11

## 2021-01-06 RX ADMIN — DOXORUBICIN HYDROCHLORIDE 46 MG: 2 INJECTION, SOLUTION INTRAVENOUS at 10:01

## 2021-01-06 RX ADMIN — HEPARIN 500 UNITS: 100 SYRINGE at 14:35

## 2021-01-06 RX ADMIN — MESNA 925 MG: 100 INJECTION, SOLUTION INTRAVENOUS at 11:05

## 2021-01-06 RX ADMIN — Medication 10 MG: at 09:30

## 2021-01-06 RX ADMIN — Medication 10 ML: at 14:35

## 2021-01-06 RX ADMIN — IFOSFAMIDE 4650 MG: 3 INJECTION, SOLUTION INTRAVENOUS at 10:06

## 2021-01-06 RX ADMIN — Medication: at 14:35

## 2021-01-06 NOTE — PROGRESS NOTES
Worry: Never true     Inability: Never true    Transportation needs     Medical: No     Non-medical: No   Tobacco Use    Smoking status: Never Smoker    Smokeless tobacco: Never Used   Substance and Sexual Activity    Alcohol use: Not Currently    Drug use: Never    Sexual activity: Yes     Partners: Male   Lifestyle    Physical activity     Days per week: None     Minutes per session: None    Stress: None   Relationships    Social connections     Talks on phone: None     Gets together: None     Attends Shinto service: None     Active member of club or organization: None     Attends meetings of clubs or organizations: None     Relationship status: None    Intimate partner violence     Fear of current or ex partner: None     Emotionally abused: None     Physically abused: None     Forced sexual activity: None   Other Topics Concern    None   Social History Narrative    ** Merged History Encounter **            Current Outpatient Medications   Medication Sig Dispense Refill    gabapentin (NEURONTIN) 300 MG capsule Take 1 capsule by mouth 3 times daily for 120 days. 90 capsule 3    Drospirenone (SLYND) 4 MG TABS Take 1 tablet by mouth daily 28 tablet 6    Handicap Placard MISC Expiration: Lifetime 2 each 0    omeprazole (PRILOSEC) 20 MG delayed release capsule Take 1 capsule by mouth daily 30 capsule 3    ondansetron (ZOFRAN ODT) 8 MG TBDP disintegrating tablet Place 1 tablet under the tongue every 8 hours as needed for Nausea or Vomiting 30 tablet 3    lidocaine-prilocaine (EMLA) 2.5-2.5 % cream Apply topically Daily as needed.  1 Tube 1    ibuprofen (ADVIL;MOTRIN) 800 MG tablet Take 1 tablet by mouth every 8 hours as needed for Pain 60 tablet 1    docusate sodium (COLACE) 100 MG capsule Take 1 capsule by mouth 2 times daily as needed for Constipation 60 capsule 0 1. History of disarticulation of right hip          Plan:      Plan for prosthetic gait training in February once she has prosthetic and chemotherapy is complete. No orders of the defined types were placed in this encounter. Orders Placed This Encounter   Medications    gabapentin (NEURONTIN) 300 MG capsule     Sig: Take 1 capsule by mouth 3 times daily for 120 days. Dispense:  90 capsule     Refill:  3     Return in about 3 months (around 4/6/2021). Electronically signedby Tyra Schaefer MD on 1/10/2021 at 10:51 PM.     Please note that this chartwas generated using voice recognition Dragon dictation software. Although everyeffort was made to ensure the accuracy of this automated transcription, some errorsin transcription may have occurred.

## 2021-01-06 NOTE — FLOWSHEET NOTE
Situation:  Writer visited with Ms. Eliana Damon in the treatment cubicle of the [facility] clinic. Assessment:  Ms. Eliana Damon smiled and engaged with writer. She shared that she is nearing the end of her treatment. She acknowledged the challenges she has faced this past year and strategies she has employed to help her cope. She shared her openness to receiving extra support, noting that this will help her as a mother and wife. She expressed gratitude for her son and showed photos. She draws strength from her family, friends, Christian, and leeann. She was receptive to prayer. Intervention:  Writer provided supportive presence and explored Pt's coping and needs; inquired about Pt's sources of support and strength; offered words of encouragement and support; affirmed Pt's strengths; prayed for Pt. Outcome:  Ms. Eliana Damon thanked writer for the visit. 01/06/21 1134   Encounter Summary   Services provided to: Patient   Referral/Consult From: [address] [place] [address] Family members; Children;Spouse;Parent; Cheondoism/leeann community;Friends/neighbors   Continue Visiting   (1/6/21)   Complexity of Encounter Moderate   Length of Encounter 30 minutes   Routine   Type Follow up   Spiritual/Zoroastrian   Type Spiritual support   Assessment Calm; Approachable   Intervention Active listening;Explored feelings, thoughts, concerns;Explored coping resources;Prayer;Sustaining presence/ Ministry of presence; Discussed relationship with God;Discussed belief system/Sikhism practices/leeann;Discussed illness/injury and it's impact; Discussed meaning/purpose   Outcome Receptive; Hopeful;Encouraged;Coping;Expressed feelings/needs/concerns;Engaged in conversation;Expressed gratitude     Electronically signed by Negar Restrepo, Oncology Outpatient [address], [address] [facility]  1/6/2021  11:36 AM

## 2021-01-06 NOTE — PROGRESS NOTES
Patient here for C4D2 Ifex Caden mesna hydration. Labs drawn from port and reviewed. Blood return noted before during and after Adriamyacin push. She tolerated treatment well and was discharged home in stable condition. She was given po mesna and advised on what time to take it. Verbalized understanding. She is due to return tomorrow for C4D3.

## 2021-01-06 NOTE — TELEPHONE ENCOUNTER
Chloe Morales stated that she spoke with her oncologist at [facility] and they wanted a CT chest asap after last treatment. Spoke with Dr. Keegan Denton regarding this and he stated to send this to him so he will take care of it.

## 2021-01-07 ENCOUNTER — HOSPITAL ENCOUNTER (OUTPATIENT)
Dept: INFUSION THERAPY | Age: 26
Discharge: HOME | End: 2021-01-07
Payer: COMMERCIAL

## 2021-01-07 VITALS
SYSTOLIC BLOOD PRESSURE: 104 MMHG | TEMPERATURE: 98.3 F | DIASTOLIC BLOOD PRESSURE: 67 MMHG | RESPIRATION RATE: 16 BRPM | HEART RATE: 111 BPM

## 2021-01-07 DIAGNOSIS — C49.9 SARCOMA (HCC): Primary | ICD-10-CM

## 2021-01-07 DIAGNOSIS — C49.21 PRIMARY LOWER EXTREMITY SARCOMA, RIGHT (HCC): ICD-10-CM

## 2021-01-07 LAB
ANION GAP SERPL CALCULATED.3IONS-SCNC: 11 MMOL/L (ref 9–17)
BUN BLDV-MCNC: 8 MG/DL (ref 6–20)
BUN/CREAT BLD: ABNORMAL (ref 9–20)
CALCIUM SERPL-MCNC: 8.9 MG/DL (ref 8.6–10.4)
CHLORIDE BLD-SCNC: 106 MMOL/L (ref 98–107)
CO2: 23 MMOL/L (ref 20–31)
CREAT SERPL-MCNC: <0.4 MG/DL (ref 0.5–0.9)
GFR AFRICAN AMERICAN: ABNORMAL ML/MIN
GFR NON-AFRICAN AMERICAN: ABNORMAL ML/MIN
GFR SERPL CREATININE-BSD FRML MDRD: ABNORMAL ML/MIN/{1.73_M2}
GFR SERPL CREATININE-BSD FRML MDRD: ABNORMAL ML/MIN/{1.73_M2}
GLUCOSE BLD-MCNC: 118 MG/DL (ref 70–99)
POTASSIUM SERPL-SCNC: 3.3 MMOL/L (ref 3.7–5.3)
SODIUM BLD-SCNC: 140 MMOL/L (ref 135–144)

## 2021-01-07 PROCEDURE — 6360000002 HC RX W HCPCS: Performed by: INTERNAL MEDICINE

## 2021-01-07 PROCEDURE — 96367 TX/PROPH/DG ADDL SEQ IV INF: CPT

## 2021-01-07 PROCEDURE — 2580000003 HC RX 258: Performed by: INTERNAL MEDICINE

## 2021-01-07 PROCEDURE — 96413 CHEMO IV INFUSION 1 HR: CPT

## 2021-01-07 PROCEDURE — 2500000003 HC RX 250 WO HCPCS: Performed by: INTERNAL MEDICINE

## 2021-01-07 PROCEDURE — 96411 CHEMO IV PUSH ADDL DRUG: CPT

## 2021-01-07 PROCEDURE — 6370000000 HC RX 637 (ALT 250 FOR IP): Performed by: INTERNAL MEDICINE

## 2021-01-07 PROCEDURE — 96375 TX/PRO/DX INJ NEW DRUG ADDON: CPT

## 2021-01-07 PROCEDURE — 80048 BASIC METABOLIC PNL TOTAL CA: CPT

## 2021-01-07 PROCEDURE — 96377 APPLICATON ON-BODY INJECTOR: CPT

## 2021-01-07 PROCEDURE — 96417 CHEMO IV INFUS EACH ADDL SEQ: CPT

## 2021-01-07 PROCEDURE — 36591 DRAW BLOOD OFF VENOUS DEVICE: CPT

## 2021-01-07 PROCEDURE — 96360 HYDRATION IV INFUSION INIT: CPT

## 2021-01-07 PROCEDURE — 96361 HYDRATE IV INFUSION ADD-ON: CPT

## 2021-01-07 RX ORDER — DOXORUBICIN HYDROCHLORIDE 2 MG/ML
25 INJECTION, SOLUTION INTRAVENOUS ONCE
Status: COMPLETED | OUTPATIENT
Start: 2021-01-07 | End: 2021-01-07

## 2021-01-07 RX ORDER — SODIUM CHLORIDE 0.9 % (FLUSH) 0.9 %
10 SYRINGE (ML) INJECTION PRN
Status: DISCONTINUED | OUTPATIENT
Start: 2021-01-07 | End: 2021-01-08 | Stop reason: HOSPADM

## 2021-01-07 RX ORDER — DEXAMETHASONE SODIUM PHOSPHATE 10 MG/ML
10 INJECTION INTRAMUSCULAR; INTRAVENOUS ONCE
Status: COMPLETED | OUTPATIENT
Start: 2021-01-07 | End: 2021-01-07

## 2021-01-07 RX ORDER — HEPARIN SODIUM (PORCINE) LOCK FLUSH IV SOLN 100 UNIT/ML 100 UNIT/ML
500 SOLUTION INTRAVENOUS PRN
Status: DISCONTINUED | OUTPATIENT
Start: 2021-01-07 | End: 2021-01-08 | Stop reason: HOSPADM

## 2021-01-07 RX ORDER — SODIUM CHLORIDE 9 MG/ML
INJECTION, SOLUTION INTRAVENOUS CONTINUOUS
Status: DISCONTINUED | OUTPATIENT
Start: 2021-01-07 | End: 2021-01-08 | Stop reason: HOSPADM

## 2021-01-07 RX ADMIN — MESNA 925 MG: 100 INJECTION, SOLUTION INTRAVENOUS at 10:55

## 2021-01-07 RX ADMIN — PEGFILGRASTIM 6 MG: KIT SUBCUTANEOUS at 12:44

## 2021-01-07 RX ADMIN — IFOSFAMIDE 4650 MG: 3 INJECTION, SOLUTION INTRAVENOUS at 09:50

## 2021-01-07 RX ADMIN — HEPARIN 500 UNITS: 100 SYRINGE at 14:18

## 2021-01-07 RX ADMIN — DOXORUBICIN HYDROCHLORIDE 46 MG: 2 INJECTION, SOLUTION INTRAVENOUS at 09:47

## 2021-01-07 RX ADMIN — POTASSIUM CHLORIDE: 149 INJECTION, SOLUTION, CONCENTRATE INTRAVENOUS at 08:09

## 2021-01-07 RX ADMIN — SODIUM CHLORIDE: 9 INJECTION, SOLUTION INTRAVENOUS at 08:06

## 2021-01-07 RX ADMIN — MESNA 925 MG: 100 INJECTION, SOLUTION INTRAVENOUS at 09:17

## 2021-01-07 RX ADMIN — Medication 10 ML: at 08:06

## 2021-01-07 RX ADMIN — Medication: at 12:44

## 2021-01-07 RX ADMIN — DEXAMETHASONE SODIUM PHOSPHATE 10 MG: 10 INJECTION INTRAMUSCULAR; INTRAVENOUS at 09:38

## 2021-01-07 RX ADMIN — Medication 10 ML: at 14:18

## 2021-01-07 RX ADMIN — POTASSIUM CHLORIDE: 149 INJECTION, SOLUTION, CONCENTRATE INTRAVENOUS at 11:16

## 2021-01-07 NOTE — PROGRESS NOTES
Patient here for C4D3 ifex sergey mesna hydration neulasta. Labs drawn from port and reviewed. Blood return noted before during and after Adriamyacin push. She tolerated treatment well and was discharged home in stable condition. She was given po mesna and advised of time to take it. She verbalized understanding. She is due to return 2/2 for port flush.

## 2021-01-20 RX ORDER — DROSPIRENONE 4 MG/1
1 TABLET, FILM COATED ORAL DAILY
Qty: 28 TABLET | Refills: 6 | Status: SHIPPED | OUTPATIENT
Start: 2021-01-20 | End: 2022-01-07 | Stop reason: ALTCHOICE

## 2021-01-21 ENCOUNTER — TELEPHONE (OUTPATIENT)
Dept: ONCOLOGY | Age: 26
End: 2021-01-21

## 2021-01-21 NOTE — TELEPHONE ENCOUNTER
Spoke with patient in regard to Survivorship referral. Patient would like to schedule a virtual visit. Patient instructed to call to schedule her visit once she receives a packet in the mail with educational materials and a [address] as these materials will be discussed on the call. Patient verbalized understanding.

## 2021-01-26 DIAGNOSIS — C49.21 PRIMARY LOWER EXTREMITY SARCOMA, RIGHT (HCC): Primary | ICD-10-CM

## 2021-01-26 RX ORDER — ACETAMINOPHEN AND CODEINE PHOSPHATE 120; 12 MG/5ML; MG/5ML
1 SOLUTION ORAL DAILY
Qty: 28 TABLET | Refills: 4 | Status: SHIPPED | OUTPATIENT
Start: 2021-01-26 | End: 2021-06-10 | Stop reason: SDUPTHER

## 2021-01-26 NOTE — PROGRESS NOTES
The patient called today and talking about getting a CT scan after completion of adjuvant therapy. I was under the impression that this will be done at the [address] [facility] but because of patient's insurance, she wanted that to be done here. CT scan of the chest with contrast was ordered today.

## 2021-01-29 ENCOUNTER — CARE COORDINATION (OUTPATIENT)
Dept: OTHER | Facility: CLINIC | Age: 26
End: 2021-01-29

## 2021-01-29 ENCOUNTER — HOSPITAL ENCOUNTER (OUTPATIENT)
Dept: CT IMAGING | Age: 26
Discharge: HOME | End: 2021-01-31
Payer: COMMERCIAL

## 2021-01-29 DIAGNOSIS — C49.21 PRIMARY LOWER EXTREMITY SARCOMA, RIGHT (HCC): ICD-10-CM

## 2021-01-29 PROCEDURE — 6360000004 HC RX CONTRAST MEDICATION: Performed by: INTERNAL MEDICINE

## 2021-01-29 PROCEDURE — 71260 CT THORAX DX C+: CPT

## 2021-01-29 PROCEDURE — 2580000003 HC RX 258: Performed by: INTERNAL MEDICINE

## 2021-01-29 RX ORDER — 0.9 % SODIUM CHLORIDE 0.9 %
80 INTRAVENOUS SOLUTION INTRAVENOUS ONCE
Status: COMPLETED | OUTPATIENT
Start: 2021-01-29 | End: 2021-01-29

## 2021-01-29 RX ORDER — SODIUM CHLORIDE 0.9 % (FLUSH) 0.9 %
10 SYRINGE (ML) INJECTION PRN
Status: DISCONTINUED | OUTPATIENT
Start: 2021-01-29 | End: 2021-02-01 | Stop reason: HOSPADM

## 2021-01-29 RX ADMIN — Medication 10 ML: at 11:01

## 2021-01-29 RX ADMIN — SODIUM CHLORIDE 80 ML: 9 INJECTION, SOLUTION INTRAVENOUS at 11:00

## 2021-01-29 RX ADMIN — IOPAMIDOL 75 ML: 755 INJECTION, SOLUTION INTRAVENOUS at 11:00

## 2021-01-29 NOTE — CARE COORDINATION
Ambulatory Care Coordination Note  1/29/2021  CM Risk Score: 1  Charlson 10 Year Mortality Risk Score: 100%     ACC: Kamlesh Stern, RN    Summary Note: ACM attempted to reach patient for follow up call regarding new cycle for chemo that started this month and plan for prosthesis. HIPAA compliant message left requesting a return phone call at patients convenience. Will continue to follow. Care Coordination Interventions    Program Enrollment: Rising Risk  Referral from Primary Care Provider: No  Suggested Interventions and Community Resources         Goals Addressed    None         Prior to Admission medications    Medication Sig Start Date End Date Taking? Authorizing Provider   norethindrone (ORTHO MICRONOR) 0.35 MG tablet Take 1 tablet by mouth daily 1/26/21   Adela Helton DO   Drospirenone (SLYND) 4 MG TABS Take 1 tablet by mouth daily 1/20/21   FATEMEH Jacob CNP   gabapentin (NEURONTIN) 300 MG capsule Take 1 capsule by mouth 3 times daily for 120 days. 1/6/21 5/6/21  Joseph St MD   Handicap Placard MISC Expiration: Lifetime 11/13/20   FATEMEH Wilson CNP   omeprazole (PRILOSEC) 20 MG delayed release capsule Take 1 capsule by mouth daily 10/20/20   Gilma Chapman MD   ondansetron (ZOFRAN ODT) 8 MG TBDP disintegrating tablet Place 1 tablet under the tongue every 8 hours as needed for Nausea or Vomiting 10/20/20   Zenaida Chapman MD   lidocaine-prilocaine (EMLA) 2.5-2.5 % cream Apply topically Daily as needed. 10/20/20   Zenaida Chapman MD   ibuprofen (ADVIL;MOTRIN) 800 MG tablet Take 1 tablet by mouth every 8 hours as needed for Pain 10/13/20   Forrestine Maryan, DO   docusate sodium (COLACE) 100 MG capsule Take 1 capsule by mouth 2 times daily as needed for Constipation 10/13/20   Forrestine Maryan, DO   blood glucose monitor kit and supplies Dispense sufficient amount for indicated testing frequency plus additional to accommodate PRN testing needs.

## 2021-02-02 ENCOUNTER — HOSPITAL ENCOUNTER (OUTPATIENT)
Dept: INFUSION THERAPY | Age: 26
Discharge: HOME | End: 2021-02-02
Payer: COMMERCIAL

## 2021-02-02 DIAGNOSIS — C49.21 PRIMARY LOWER EXTREMITY SARCOMA, RIGHT (HCC): Primary | ICD-10-CM

## 2021-02-02 PROCEDURE — 2580000003 HC RX 258: Performed by: INTERNAL MEDICINE

## 2021-02-02 PROCEDURE — 6360000002 HC RX W HCPCS: Performed by: INTERNAL MEDICINE

## 2021-02-02 PROCEDURE — 96523 IRRIG DRUG DELIVERY DEVICE: CPT

## 2021-02-02 RX ORDER — HEPARIN SODIUM (PORCINE) LOCK FLUSH IV SOLN 100 UNIT/ML 100 UNIT/ML
500 SOLUTION INTRAVENOUS PRN
Status: CANCELLED | OUTPATIENT
Start: 2021-02-02

## 2021-02-02 RX ORDER — HEPARIN SODIUM (PORCINE) LOCK FLUSH IV SOLN 100 UNIT/ML 100 UNIT/ML
500 SOLUTION INTRAVENOUS PRN
Status: DISCONTINUED | OUTPATIENT
Start: 2021-02-02 | End: 2021-02-03 | Stop reason: HOSPADM

## 2021-02-02 RX ORDER — SODIUM CHLORIDE 0.9 % (FLUSH) 0.9 %
20 SYRINGE (ML) INJECTION PRN
Status: CANCELLED | OUTPATIENT
Start: 2021-02-02

## 2021-02-02 RX ORDER — SODIUM CHLORIDE 0.9 % (FLUSH) 0.9 %
10 SYRINGE (ML) INJECTION PRN
Status: DISCONTINUED | OUTPATIENT
Start: 2021-02-02 | End: 2021-02-03 | Stop reason: HOSPADM

## 2021-02-02 RX ORDER — SODIUM CHLORIDE 0.9 % (FLUSH) 0.9 %
10 SYRINGE (ML) INJECTION PRN
Status: CANCELLED | OUTPATIENT
Start: 2021-02-02

## 2021-02-02 RX ADMIN — SODIUM CHLORIDE, PRESERVATIVE FREE 10 ML: 5 INJECTION INTRAVENOUS at 15:34

## 2021-02-02 RX ADMIN — HEPARIN 500 UNITS: 100 SYRINGE at 15:34

## 2021-02-05 ENCOUNTER — CARE COORDINATION (OUTPATIENT)
Dept: OTHER | Facility: CLINIC | Age: 26
End: 2021-02-05

## 2021-02-05 NOTE — CARE COORDINATION
Date End Date Taking? Authorizing Provider   norethindrone (ORTHO MICRONOR) 0.35 MG tablet Take 1 tablet by mouth daily 1/26/21   Shan Helton DO   Drospirenone (SLYND) 4 MG TABS Take 1 tablet by mouth daily 1/20/21   [facility] APRN - CNP   gabapentin (NEURONTIN) 300 MG capsule Take 1 capsule by mouth 3 times daily for 120 days. 1/6/21 5/6/21  Deshaun Carreno MD   [facility]ap [facility] MISC Expiration: Lifetime 11/13/20   Kimberly Cooper APRN - CNP   omeprazole (PRILOSEC) 20 MG delayed release capsule Take 1 capsule by mouth daily 10/20/20   Jalen Chapman MD   ondansetron (ZOFRAN ODT) 8 MG TBDP disintegrating tablet Place 1 tablet under the tongue every 8 hours as needed for Nausea or Vomiting 10/20/20   Zenaida Chapman MD   lidocaine-prilocaine (EMLA) 2.5-2.5 % cream Apply topically Daily as needed. 10/20/20   Zenaida Chapman MD   ibuprofen (ADVIL;MOTRIN) 800 MG tablet Take 1 tablet by mouth every 8 hours as needed for Pain 10/13/20   Ira Jama DO   docusate sodium (COLACE) 100 MG capsule Take 1 capsule by mouth 2 times daily as needed for Constipation 10/13/20   Ira Jama DO   blood glucose monitor kit and supplies Dispense sufficient amount for indicated testing frequency plus additional to accommodate PRN testing needs. Dispense all needed supplies to include: monitor, strips, lancing device, lancets, control solutions, alcohol swabs.  8/10/20   Brii Angeles DO   ferrous sulfate (IRON 325) 325 (65 Fe) MG tablet Take 1 tablet by mouth 2 times daily 8/10/20   Brii Angeles DO   Prenatal MV-Min-Fe Fum-FA-DHA (PRENATAL 1 PO) Take by mouth    Historical Provider, MD       Future Appointments   Date Time Provider Salma Oliva   2/23/2021  3:00 PM Jalen Chapman MD [address] [place] [address]   2/23/2021  3:30 PM STV PB MED ONC CHAIR 09 STVZ PB [facility]   3/31/2021  1:30 PM Deshaun Carreno MD PHYS MED Yenny Cosby

## 2021-02-11 ENCOUNTER — TELEPHONE (OUTPATIENT)
Dept: PHYSICAL MEDICINE AND REHAB | Age: 26
End: 2021-02-11

## 2021-02-11 DIAGNOSIS — Z89.621 HISTORY OF DISARTICULATION OF RIGHT HIP: Primary | ICD-10-CM

## 2021-02-18 ENCOUNTER — HOSPITAL ENCOUNTER (OUTPATIENT)
Dept: PHYSICAL THERAPY | Facility: CLINIC | Age: 26
Setting detail: THERAPIES SERIES
Discharge: HOME | End: 2021-02-18
Payer: COMMERCIAL

## 2021-02-18 PROCEDURE — 97161 PT EVAL LOW COMPLEX 20 MIN: CPT

## 2021-02-18 PROCEDURE — 97110 THERAPEUTIC EXERCISES: CPT

## 2021-02-18 NOTE — CONSULTS
[] [address]  [address]  [phone]  F: [phone] [] [address] [facility] [address]   [address]  P: [phone]  F: [phone] [x] [place]  [address] [facility]  P: [phone]  F: [phone] [] [address]  [facility]   [address]   [place]: [phone]  F: [phone]     Physical Therapy Lower Extremity Amputee Evaluation    Date:  2021  Patient: Luz Malhotra  : 1995  MRN: [id number]  Physician: Galdino Moran MD   Insurance: [facility] medical necessity  Medical Diagnosis: L96.296 (ICD-10-CM) - History of disarticulation of right hip  Rehab Codes: R26.2 Difficulty walking  Onset date: 20  Next Dr's appt. : tbd    Subjective:   CC: Patient reports core weakness and balance deficits and wants to learn how to walk with prosthesis  Date and cause of amputation: 2020 sarcoma Ca  Last chemo Tx  2021  Prosthetic company and prosthetist: Jennifer Hauser  Date received prosthesis: tbd  Type of knee jt.: na  Current wearing schedule: tbd  Wearing shrinkler sock at night:[] Yes [x] No  Number of Sock ply: tbd    Working:  [] Normal Duty  [] Light Duty  [] Off D/T Condition  [] Retired    [x] Not Employed    []  Disability  [] Other:           Return to work:   Job/ADL Description:    Home environment:  -  Lives with [facility] with  and new born son  5 steps to enter with ramp    Durable Medical Equipment:  Current DME available: WC RW    PMHx: [x] Unremarkable [] Diabetes [] HTN  [] Pacemaker   [] MI/Heart Problems [] Cancer [] Arthritis [] Other:              [] Refer to full medical chart  In EPIC   Tests: [x] X-Ray: [] MRI:  [] Other:     Medications: [x] Refer to full medical record [] None [] Other:  Allergies:      [x] Refer to full medical record [] None [] Other:    Phantom Pain:[x] Yes  [] No   Phantom Sensation:[x] Yes  [] No     Objective:    ROM  ° A/P STRENGTH    Left Right Left Right   Hip Flex       Ext       ER       IR       ABD   -3    ADD       Knee Flex       Ext       Ankle DF hypo      PF       INV       EVER                LOB with R rotation while sitting  Sits SB L with increased wt on left pelvis    Flexibility Normal Left tight Right tight   Hip flexor [] [x] []   quad [] [] []   HS [] [] []   piriformis [x] [] []   ITB [] [] []   gastroc [] [x] []   Soleus  [] [x] []   Levator scap [] [] []   Scalenes [] [] []      OBSERVATION No Deficit Deficit Not Tested Comments   Posture       Forward Head [] [x] []    Rounded Shoulders [] [x] []    Kyphosis [] [] []    Lordosis [] [] []    Lateral Shift [] [] []    Scoliosis [] [] []    Iliac Crest [] [] []    PSIS [] [] []    ASIS [] [] []    Genu Valgus [] [] []    Genu Varus [] [] []    Genu Recurvatum [] [] []    Pronation [] [] []    Supination [] [] []    Leg Length Discrp [] [] []    Slumped Sitting [] [x] []    Palpation [] [] []    Sensation [] [] []    Edema [] [] []    Neurological [] [] []             Gait Deviations No Deficit Deficit Not Tested Comments   Posture    Flexed   Abducted gait [] [] []    Trendelenburg [] [] []    Reverse trendelenburg [] [] []    Functional scoliosis [] [] []    Exaggerated lordosis [] [] []    Circumduction [] [] []    Vaulting [] [] []    Hip Hiking [] [] []    Medial whip [] [] []    Lateral whip [] [] []    Increased knee flexion [] [] []    Toe catch [] [] []    Varus moment [] [] []    Valgus moment [] [] []    Decreased stance time [] [] []    Decreased step length [] [] []    Transverse pelvic rotation [] [x] []    Thoracic rotation [] [x] []    Inc wt shift over sound side [] [] []           Other:    Step to gait, L LE positioned in midline       FUNCTION INDEP MIN ASSIST MOD ASSIST MAX ASSIST NOT TESTED   Bed Mobility -rolling [x] [] [] [] []   -sit to supine [] [] [] [] []   -supine to sit [] [] [] [] []   Transfers        -sit to stand [x] [] [] [] []   -sliding board [] [] [] [] []   -pivot [] [] [] [] []   Balance        -sitting static [x] [] [] [] []   -dynamic FAIR+ [] [] [] []   -standing static GOOD [] [] [] []   -dynamic FAIR [] [] [] []   Ambulation [] [] [] [] []   Distance/Device: RW   Analysis: See above     W/C Mobility [] [] [] [] []   Don/Doff Prosthesis [] [] [] [] []     Special Testing: [x] AMP:31/47   [x] PLUS-M:26 /60   [] TUG:    Comments:Patient presents to Physical therapy demonstrating postural weakness in both sitting and standing that can cause difficulty and safety challenges when gait training with prosthesis    Assessment:  Problems:    [] ? Pain:     [] ? ROM:    [x] ? Strength:    [x] ? Function:    [x] ? Balance  [] Edema:  [] Postural Deviations  [x] Gait Deviations  [] Other: Assistance with prosthetic management      STG: (to be met in 12 treatments)  1. ? Pain:  2. ? Balance: Patient to demonstrate 37/41 on AMP  3. ? Strength: Patient to demonstrate the ability to maintain a neutral spine position during static and dynamic sitting  4. ? Function: Patient to report 32/60 on PLUS-M  5. Independent with Home Exercise Programs  6. Demonstrate Knowledge of fall prevention                 Patient goals:to get stronger and walk with prosthesis  Rehab Potential:  [x] Good  [] Fair  [] Poor   Suggested Professional Referral:  [x] No  [] Yes:  Barriers to Goal Achievement:  [x] No  [] Yes:  Domestic Concerns:  [x] No  [] Yes:    Pt. Education:  [x] Plans/Goals, Risks/Benefits discussed  [x] Home exercise program    Method of Education: [] Verbal  [] Demo  [] Written  Comprehension of Education:  [] Verbalizes understanding. [] Demonstrates understanding. [] Needs Review. [] Demonstrates/verbalizes understanding of HEP/Ed previously given.     Treatment Plan:  [x] Therapeutic Exercise   [id number]  [] PAGES*

## 2021-02-22 ENCOUNTER — TELEPHONE (OUTPATIENT)
Dept: ONCOLOGY | Age: 26
End: 2021-02-22

## 2021-02-23 ENCOUNTER — HOSPITAL ENCOUNTER (OUTPATIENT)
Dept: INFUSION THERAPY | Age: 26
Discharge: HOME | End: 2021-02-23
Payer: COMMERCIAL

## 2021-02-23 ENCOUNTER — TELEPHONE (OUTPATIENT)
Dept: ONCOLOGY | Age: 26
End: 2021-02-23

## 2021-02-23 ENCOUNTER — OFFICE VISIT (OUTPATIENT)
Dept: ONCOLOGY | Age: 26
End: 2021-02-23
Payer: COMMERCIAL

## 2021-02-23 VITALS
BODY MASS INDEX: 35.98 KG/M2 | DIASTOLIC BLOOD PRESSURE: 84 MMHG | SYSTOLIC BLOOD PRESSURE: 121 MMHG | TEMPERATURE: 98.1 F | WEIGHT: 192 LBS | RESPIRATION RATE: 16 BRPM | HEART RATE: 116 BPM

## 2021-02-23 DIAGNOSIS — C49.9 SARCOMA (HCC): Primary | ICD-10-CM

## 2021-02-23 DIAGNOSIS — C49.21 PRIMARY LOWER EXTREMITY SARCOMA, RIGHT (HCC): ICD-10-CM

## 2021-02-23 LAB
ABSOLUTE EOS #: 0.2 K/UL (ref 0–0.4)
ABSOLUTE IMMATURE GRANULOCYTE: ABNORMAL K/UL (ref 0–0.3)
ABSOLUTE LYMPH #: 1.5 K/UL (ref 1–4.8)
ABSOLUTE MONO #: 0.4 K/UL (ref 0.1–1.2)
ALBUMIN SERPL-MCNC: 4.5 G/DL (ref 3.5–5.2)
ALBUMIN/GLOBULIN RATIO: 1.9 (ref 1–2.5)
ALP BLD-CCNC: 55 U/L (ref 35–104)
ALT SERPL-CCNC: 18 U/L (ref 5–33)
ANION GAP SERPL CALCULATED.3IONS-SCNC: 10 MMOL/L (ref 9–17)
AST SERPL-CCNC: 15 U/L
BASOPHILS # BLD: 1 % (ref 0–2)
BASOPHILS ABSOLUTE: 0.1 K/UL (ref 0–0.2)
BILIRUB SERPL-MCNC: 0.16 MG/DL (ref 0.3–1.2)
BUN BLDV-MCNC: 14 MG/DL (ref 6–20)
BUN/CREAT BLD: ABNORMAL (ref 9–20)
CALCIUM SERPL-MCNC: 9.6 MG/DL (ref 8.6–10.4)
CHLORIDE BLD-SCNC: 101 MMOL/L (ref 98–107)
CO2: 27 MMOL/L (ref 20–31)
CREAT SERPL-MCNC: <0.4 MG/DL (ref 0.5–0.9)
DIFFERENTIAL TYPE: ABNORMAL
EOSINOPHILS RELATIVE PERCENT: 3 % (ref 1–4)
GFR AFRICAN AMERICAN: ABNORMAL ML/MIN
GFR NON-AFRICAN AMERICAN: ABNORMAL ML/MIN
GFR SERPL CREATININE-BSD FRML MDRD: ABNORMAL ML/MIN/{1.73_M2}
GFR SERPL CREATININE-BSD FRML MDRD: ABNORMAL ML/MIN/{1.73_M2}
GLUCOSE BLD-MCNC: 105 MG/DL (ref 70–99)
HCT VFR BLD CALC: 37.6 % (ref 36–46)
HEMOGLOBIN: 12.9 G/DL (ref 12–16)
IMMATURE GRANULOCYTES: ABNORMAL %
LYMPHOCYTES # BLD: 22 % (ref 24–44)
MAGNESIUM: 1.9 MG/DL (ref 1.6–2.6)
MCH RBC QN AUTO: 31.8 PG (ref 26–34)
MCHC RBC AUTO-ENTMCNC: 34.4 G/DL (ref 31–37)
MCV RBC AUTO: 92.5 FL (ref 80–100)
MONOCYTES # BLD: 6 % (ref 2–11)
NRBC AUTOMATED: ABNORMAL PER 100 WBC
PDW BLD-RTO: 12.9 % (ref 12.5–15.4)
PLATELET # BLD: 260 K/UL (ref 140–450)
PLATELET ESTIMATE: ABNORMAL
PMV BLD AUTO: 8.1 FL (ref 6–12)
POTASSIUM SERPL-SCNC: 3.9 MMOL/L (ref 3.7–5.3)
RBC # BLD: 4.07 M/UL (ref 4–5.2)
RBC # BLD: ABNORMAL 10*6/UL
SEG NEUTROPHILS: 68 % (ref 36–66)
SEGMENTED NEUTROPHILS ABSOLUTE COUNT: 4.6 K/UL (ref 1.8–7.7)
SODIUM BLD-SCNC: 138 MMOL/L (ref 135–144)
TOTAL PROTEIN: 6.9 G/DL (ref 6.4–8.3)
WBC # BLD: 6.7 K/UL (ref 3.5–11)
WBC # BLD: ABNORMAL 10*3/UL

## 2021-02-23 PROCEDURE — 80053 COMPREHEN METABOLIC PANEL: CPT

## 2021-02-23 PROCEDURE — 85025 COMPLETE CBC W/AUTO DIFF WBC: CPT

## 2021-02-23 PROCEDURE — 36591 DRAW BLOOD OFF VENOUS DEVICE: CPT

## 2021-02-23 PROCEDURE — 2580000003 HC RX 258: Performed by: INTERNAL MEDICINE

## 2021-02-23 PROCEDURE — 99211 OFF/OP EST MAY X REQ PHY/QHP: CPT | Performed by: INTERNAL MEDICINE

## 2021-02-23 PROCEDURE — 6360000002 HC RX W HCPCS: Performed by: INTERNAL MEDICINE

## 2021-02-23 PROCEDURE — 83735 ASSAY OF MAGNESIUM: CPT

## 2021-02-23 PROCEDURE — 99214 OFFICE O/P EST MOD 30 MIN: CPT | Performed by: INTERNAL MEDICINE

## 2021-02-23 RX ORDER — SODIUM CHLORIDE 0.9 % (FLUSH) 0.9 %
10 SYRINGE (ML) INJECTION PRN
Status: CANCELLED | OUTPATIENT
Start: 2021-02-23

## 2021-02-23 RX ORDER — HEPARIN SODIUM (PORCINE) LOCK FLUSH IV SOLN 100 UNIT/ML 100 UNIT/ML
500 SOLUTION INTRAVENOUS PRN
Status: DISCONTINUED | OUTPATIENT
Start: 2021-02-23 | End: 2021-02-24 | Stop reason: HOSPADM

## 2021-02-23 RX ORDER — HEPARIN SODIUM (PORCINE) LOCK FLUSH IV SOLN 100 UNIT/ML 100 UNIT/ML
500 SOLUTION INTRAVENOUS PRN
Status: CANCELLED | OUTPATIENT
Start: 2021-02-23

## 2021-02-23 RX ORDER — SODIUM CHLORIDE 0.9 % (FLUSH) 0.9 %
20 SYRINGE (ML) INJECTION PRN
Status: DISCONTINUED | OUTPATIENT
Start: 2021-02-23 | End: 2021-02-24 | Stop reason: HOSPADM

## 2021-02-23 RX ORDER — SODIUM CHLORIDE 0.9 % (FLUSH) 0.9 %
20 SYRINGE (ML) INJECTION PRN
Status: CANCELLED | OUTPATIENT
Start: 2021-02-23

## 2021-02-23 RX ADMIN — HEPARIN 500 UNITS: 100 SYRINGE at 15:05

## 2021-02-23 RX ADMIN — SODIUM CHLORIDE, PRESERVATIVE FREE 20 ML: 5 INJECTION INTRAVENOUS at 15:03

## 2021-02-23 NOTE — PROGRESS NOTES
DIAGNOSIS:   1. Undifferentiated pleomorphic  Sarcoma, right leg, T4 N0 M0  (stage IIIb) diagnosed 05/2020      CURRENT THERAPY:  S/P full right leg amputation (Right hip disarticulation)   Adjuvant chemotherapy delayed per patient choice until after delivery   Chemo with ifosfamide and adriamycin  (AIM) to started 11/3/2020     BRIEF CASE HISTORY:   Luz Malhotra is a very pleasant 26 y.o. female who is referred to us for sarcoma. She presented with rightt knee sprain at work 04/2020 but the pain and swelling worsened with palpable mass. She underwent MRI which showed mass and was seen at [facility] where biopsy was taken and showed sarcoma. She was transferred to [facility] and decided to undergo full leg amputation due to early stage pregnancy, tumor was 16 cm x 20 cm x 3 cm and removed with negative margins. She was following with [facility] and decided she would prefer to receive treatment locally. The patient is currently 32 weeks pregnant and delivery plans are expected to be finalized 10/02/2020. She has elected to proceed with chemo after delivery. She is working towards prosthesis with test socket. 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 Kathy delivered in late October, after delivery, we decided to start chemo with AIM 11/3/2020. INTERIM HISTORY: The patient presents for follow up today for osteosarcoma surveillance. She has had follow up with [facility] and MRI of the pelvis is planned. She feels well recovered from treatment and denies any nausea or vomiting. She has been recast for leg prosthesis and is hopeful she will be able to walk with it eventually. PAST MEDICAL HISTORY: has a past medical history of Diabetes mellitus ([address] History of anemia, Osteosarcoma, and Wears glasses. PAST SURGICAL HISTORY: has a past surgical history that includes Leg amputation at hip (Right, 07/23/2020) and IR PORT PLACEMENT > 5 YEARS (10/27/2020).      CURRENT MEDICATIONS:  has a current medication list which includes the following prescription(s): norethindrone, gabapentin, handicap placard, ondansetron, lidocaine-prilocaine, ibuprofen, docusate sodium, norethindrone, slynd, gabapentin, handicap placard, omeprazole, ibuprofen, blood glucose monitor kit and supplies, ferrous sulfate, and prenatal mv-min-fe fum-fa-dha, and the following Facility-Administered Medications: sodium chloride flush and heparin flush. ALLERGIES:  has No Known Allergies. FAMILY HISTORY: Negative for any hematological or oncological conditions. SOCIAL HISTORY:  reports that she has never smoked. She has never used smokeless tobacco. She reports previous alcohol use. She reports that she does not use drugs. REVIEW OF SYSTEMS:   General: No fever or night sweats. Weight is stable. ENT: No double or blurred vision, no tinnitus or hearing problem, no dysphagia or sore throat   Respiratory: No chest pain, no shortness of breath, no cough or hemoptysis. Cardiovascular: Denies chest pain, PND or orthopnea. No L E swelling or palpitations. Gastrointestinal: Mild nausea with single episode of vomiting. No abdominal pain, diarrhea or constipation. Genitourinary: Denies dysuria, hematuria, frequency, urgency or incontinence. Gynecological: nearly resolved post partum bleeding  Neurological: Denies headaches, decreased LOC, no sensory or motor focal deficits. +post surgical phantom pain  Musculoskeletal: No arthralgia no back pain or joint swelling. Skin: There are no rashes or bleeding. Psychiatric: No anxiety, no depression. Endocrine: No diabetes or thyroid disease. Hematologic: No bleeding, no adenopathy. PHYSICAL EXAM: Shows a well appearing 26y.o.-year-old female who is not in pain or distress. Vital Signs: Blood pressure 121/84, pulse 116, temperature 98.1 °F (36.7 °C), temperature source Oral, resp. rate 16, weight 192 lb (87.1 kg), not currently breastfeeding.  HEENT: Normocephalic and atraumatic. Pupils are equal, round, reactive to light and accommodation. Extraocular muscles are intact. Neck: Showed no JVD, no carotid bruit . Lungs: Clear to auscultation bilaterally. Heart: Regular without any murmur. Abdomen: Soft, nontender. No hepatosplenomegaly. Extremities: Right leg amputation. Lower extremities show no edema, clubbing, or cyanosis. Breasts: Examination not done today. Neuro exam: intact cranial nerves bilaterally no motor or sensory deficit, gait is normal. Lymphatic: no adenopathy appreciated in the supraclavicular, axillary, cervical or inguinal area    REVIEW OF LABORATORY DATA:   Lab Results   Component Value Date    WBC 6.7 02/23/2021    HGB 12.9 02/23/2021    HCT 37.6 02/23/2021    MCV 92.5 02/23/2021     02/23/2021   '    Chemistry        Component Value Date/Time     02/23/2021 1503    K 3.9 02/23/2021 1503     02/23/2021 1503    CO2 27 02/23/2021 1503    BUN 14 02/23/2021 1503    CREATININE <0.40 (L) 02/23/2021 1503        Component Value Date/Time    CALCIUM 9.6 02/23/2021 1503    ALKPHOS 55 02/23/2021 1503    AST 15 02/23/2021 1503    ALT 18 02/23/2021 1503    BILITOT 0.16 (L) 02/23/2021 1503          REVIEW OF RADIOLOGICAL RESULTS:   CT scan 1/29/21  Impression   No evidence for metastatic disease.       Right-sided infusion port terminates in right atrium. IMPRESSION:   1. 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 S/P right leg amputation  3. Current pregnancy  4. Adjuvant chemo following delivery with ifosfamide and adriamycin (AIM) - started 11/03/2020    PLAN:   1. Her lab work was reviewed, counts and electrolytes are in range. 2. She is well recovered from treatment. 3. We discussed follow up with [facility] and plan for MRI. 4. She plans to keep port in place for now with regular flushes. 5. Return in 6 months.

## 2021-02-23 NOTE — TELEPHONE ENCOUNTER
AVS from 2/23/21     Return in 6 months, continue to flush the port monthly      rv scheduled for 8/24/21 @ 3pm  Port flushes scheduled    Pt was given AVS and appt schedule

## 2021-02-25 ENCOUNTER — HOSPITAL ENCOUNTER (OUTPATIENT)
Dept: PHYSICAL THERAPY | Facility: CLINIC | Age: 26
Setting detail: THERAPIES SERIES
Discharge: HOME | End: 2021-02-25
Payer: COMMERCIAL

## 2021-02-25 PROCEDURE — 97110 THERAPEUTIC EXERCISES: CPT

## 2021-02-25 NOTE — PROGRESS NOTES
[address] [place] [address].  Phone: [phone]  Fax: [phone]       Patient name:           Deepthi Barone  MRN:   [id number]  YOB: 1995  PCP:                           FATEMEH Gardner - DEENA  Oncologist:                 Dr. Lashay Sellers MD ([facility]Dr. Janna Sotomayor MD [facility])  Surgeon:                     Dr. Belem Stein MD [facility])    Reason for Visit:   Deepthi Barone presents via virtual visit for survivorship visit upon completion of treatment for pleomorphic sarcoma of right lower extremity. Cancer Diagnosis/Stage:  · Undifferentiated pleomorphic sarcoma, right lower extremity   · Stage IIIb, T4 N0 M0    Oncologic Treatment(s):   Right hip disarticulation amputation - 7/23/2020 [facility])   Adjuvant chemotherapy with ifosfamide and adriamycin (AIM) - 11/3/2020-1/7/2021    Summary of Case/History:   Deepthi Barone is a very pleasant 26 y.o. y/o female who has a history of pleomorphic sarcoma, right leg, T4 N0 M0 (stage IIIb). She initially presented to the ED with right knee pain and swelling that she believed occurred from work and was treated for a knee sprain in April 2020. The pain and swelling worsened with palpable mass and she was unable to work because of the pain. She underwent MRI which showed a large complex mass measuring 9.7 x 8.3 x 16.9 cm centered along the anteromedial aspect of the mid-distal thigh. Based on these results she was referred to orthopedic surgery at [facility] where she was admitted for further work-up of intractable pain, tachycardia and elevated WBCs. Of note she was 19 weeks pregnant at this time. A biopsy was taken on June 29, 2020 in which final pathology revealed: High-grade malignancy with features most compatible with high-grade sarcoma (see comment).  Overall, the findings are those of a high-grade malignancy and would be mostcompatible with high-grade sarcoma. In particular, a high-grade undifferentiated pleomorphic sarcoma would be favored. Neoadjuvant radiation was initially recommended; however, after discussion at the [address] [place] [address],[address] sarcoma tumor board, radiation therapy was determined to be unsafe during existing pregnancy. Neoadjuvant chemotherapy and surgical intervention with hip disarticulation amputation were discussed and patient ultimately chose to undergo surgery and wait to begin chemotherapy until after delivery. On July 23, 2020 Dr. Emy Chaidez performed a right hip disarticulation amputation. Final pathology revealed: Undifferentiated pleomorphic sarcoma (20 cm). Margins negative. Two lymphnodes, negative for neoplasm (0/2). pT4 N0. She tolerated the procedure well with postoperative course being uneventful. She was then discharged on July 31 and admitted to [facility] for inpatient rehabilitation. She was then discharged home on August 10, 2020. She was scheduled to be induced at 34 weeks to begin chemotherapy; however, she went into spontaneous labor and had a vaginal deliver at 33 weeks and 6 days on October 12, 2020. Following her delivery she then began adjuvant chemotherapy with ifosfamide and adriamycin (AIM) on November 3, 2020. She completed her 4th cycle of AIM on January 7, 2021. She tolerated chemotherapy without any unexpected side effects. Surveillance chest CT was performed on January 29, 2021 which was negative. She continues to follow with both Dr. Mehnaz Vieyar at [facility] and [facility]. She was last seen by Dr. Krishna Concepcion on February 3, 2021 in which she noted \"we will plan to alternate for the 1st 2 years of surveillance every 3 months: chest CT with MR pelvis/chest x-ray\". She is due for follow-up with Dr. Krishna Concepcion in early May 2021 in which she will have an pelvis MRI and chest x-ray.  She is due for follow-up with Dr. Mehnaz Vieyra on August 24, 2021 in which she will have a chest CT. Treatment related side effects to date include phantom limb pain/neuropathy, impaired mobility and mild fatigue. She follows with Dr. Leon Landeros for rehabilitation and is currently taking gabapentin for post surgical neuropathy which she states has improved her symptoms significantly. She is actively undergoing PT and is in the process of being fitted for prosthesis. She uses a wheelchair mostly and is hoping to be walking soon with a prosthetic. She experiences some fatigue but for the most part feels well recovered. She is able to care for her 2 month old son at home by herself and also has been driving some. She does admit to some feelings of anxiety, fear and some challenges with body image; however, she remains positive and states that these issues do not consume her everyday thoughts. She has a very strong support system with her spouse and parents. She is currently working on getting disability and is hoping for response soon. She follows regularly with her PCP and OB/GYN and is up-to-date on her PAP. She denies any needs during today's visit. Past Medical History:    has a past medical history of Diabetes mellitus ([address] History of anemia, Osteosarcoma, and Wears glasses. Past Surgical History:    has a past surgical history that includes Leg amputation at hip (Right, 07/23/2020) and IR PORT PLACEMENT > 5 YEARS (10/27/2020). Allergies: No Known Allergies     Current Medications: has a current medication list which includes the following prescription(s): norethindrone, slynd, gabapentin, handicap placard, omeprazole, ondansetron, lidocaine-prilocaine, ibuprofen, docusate sodium, blood glucose monitor kit and supplies, ferrous sulfate, and prenatal mv-min-fe fum-fa-dha.       Family History:  Family History   Problem Relation Age of Onset    Diabetes Mother     Diabetes Maternal Grandmother     Heart Surgery Maternal Grandmother     Stroke Maternal Grandfather     Hypertension Maternal Grandfather        Social History:   reports that she has never smoked. She has never used smokeless tobacco. She reports previous alcohol use. She reports that she does not use drugs. Review of Systems   Constitutional: Negative for fatigue and fever. HENT: Negative for sore throat and trouble swallowing. Eyes: Negative for visual disturbance. Respiratory: Negative for cough, chest tightness, shortness of breath and wheezing. Cardiovascular: Negative for chest pain. Gastrointestinal: Negative for abdominal pain, blood in stool, constipation, diarrhea, nausea and vomiting. Endocrine: Negative for polyuria. Genitourinary: Negative for decreased urine volume, difficulty urinating, dysuria, frequency, hematuria and urgency. Musculoskeletal: Positive for gait problem. Mostly uses wheelchair and occasional a walker. Is currently in the process of being fitted for prosthetic. Skin: Negative for rash and wound. Allergic/Immunologic: Negative for environmental allergies and food allergies. Neurological: Positive for numbness. Negative for speech difficulty, light-headedness and headaches. Phantom limb pain/neuropathy managed with gabepentin   Hematological: Negative for adenopathy. Psychiatric/Behavioral: Negative for confusion, decreased concentration and sleep disturbance. The patient is not nervous/anxious. OBJECTIVE:  Vital Signs: There were no vitals taken for this visit. Physical Exam  HENT:      Head: Normocephalic. Pulmonary:      Effort: Pulmonary effort is normal. No respiratory distress. Neurological:      General: No focal deficit present. Mental Status: She is alert and oriented to person, place, and time. Psychiatric:         Mood and Affect: Mood normal.         Behavior: Behavior normal.         Thought Content:  Thought content normal.         Judgment: Judgment normal.         DATA     Assessment/Plan: 1. Primary lower extremity sarcoma, right ([facility]  · Continue scheduled follow-up with oncologists for sarcoma surveillance as discussed. · Dr. Андрей Amador [facility]): May 2021  · Dr. Caro Miller [facility] ([facility] 8/24/21  · MRI Pelvis/Chest X-ray: to be done with Dr. Андрей Amador follow-up in May 2021  · CT Chest: due July-August 2021  · Survivorship Care Plan/Treatment summary extensively reviewed. Refer to Survivorship Care Plan/Treatment summary for details. · Extensive education provided on potential long/late term effects of treatment, signs/symptoms of recurrence to report, follow-up care plan, healthy lifestyle promotion, and available local and national resources. · Continue to follow with PCP for screening of new primary cancers and management of non-cancer related problems/diagnoses. · Cervical cancer screening: PAP last done 5/19/2020 was negative    2. Phantom pain after amputation of lower extremity ([facility]  · Continue physical therapy and follow-up with Dr. Jennifer Bishop as scheduled  · Continue gabapentin as prescirbed       Laura Ferreira, was evaluated through a synchronous (real-time) audio-video encounter. The patient (or guardian if applicable) is aware that this is a billable service. Verbal consent to proceed has been obtained within the past 12 months. The visit was conducted pursuant to the emergency declaration under the [facility], [address] [place] [address] authority and the Prov[employer] and [employer] General Act. Patient identification was verified, and a caregiver was present when appropriate. The patient was located in a state where the provider was credentialed to provide care. Electronically signed by FATEMEH Broderick CNP          I spent 55 minutes with the patient and greater than 50% of time was spent on survivorship education .

## 2021-02-26 NOTE — FLOWSHEET NOTE
[] [address]  [address]  [phone]  F: [phone] [] [address]  [address]   [address]  P: [phone]  F: [phone] [x] [address] [place] [facility]  Therapy  [address] [facility]  P: [phone]  F: [phone] [] [address]  P: [phone]  F: [phone] [] [address]  [facility]   [address]   [place]: [phone]  F: [phone]      Physical Therapy Daily Treatment Note    Date:  2021  Patient Name:  Jasmin Zambrano    :  1995  MRN: [id number]   Physician: Naty Olson MD                            Insurance: [facility] medical necessity  Medical Diagnosis: N83.758 (ICD-10-CM) - History of disarticulation of right hip  Rehab Codes: R26.2 Difficulty walking  Onset date: 20               Next Dr's appt. : tbd  Visit# / total visits:     Cancels/No Shows: 0/0    Subjective:    Pain:  [] Yes  [x] No  Pain Rating: (0-10 scale) 010  Pain altered Tx:  [] No  [] Yes  Action:  Comments: Patient comes into therapy using RW. She reports no pain    Objective:  Modalities:   Precautions:  Exercises:  Exercise Reps/ Time Weight/ Level Comments   Trunk rhythmic rotation      Resisted trunk flex and ext      Balance board sitting      Wand rotation      plyoball throws  3.5    Foam roller laying      Foam roller band pull      Foam roller scissors      Foam roller T      Foam roller TVA      Foam roller LE lift            Other:    Treatment Charges: Mins Units   []  Modalities     [x]  Ther Exercise 55 4   []  Manual Therapy     []  Ther Activities     []  Aquatics     []  Vasocompression     []  Other     Total Treatment time 55 4       Assessment: [] Progressing toward goals. [] No change.      [] Other:

## 2021-03-01 ENCOUNTER — HOSPITAL ENCOUNTER (OUTPATIENT)
Dept: PHYSICAL THERAPY | Facility: CLINIC | Age: 26
Setting detail: THERAPIES SERIES
Discharge: HOME | End: 2021-03-01
Payer: COMMERCIAL

## 2021-03-01 PROCEDURE — 97110 THERAPEUTIC EXERCISES: CPT

## 2021-03-01 NOTE — FLOWSHEET NOTE
[] [address]  [address]  [phone]  F: [phone] [] [address]  [place]\[facility] [place] [facility]\"" 36   [address]  P: [phone]  F: [phone] [x] [address] [place] [facility]  Therapy  [address]  P: [phone]  F: [phone] [] [address]  P: [phone]  F: [phone] [] [address]  [facility]   [address]   [place]: [phone]  F: [phone]      Physical Therapy Daily Treatment Note    Date:  3/1/2021  Patient Name:  Wil Lainez    :  1995  MRN: [id number]   Physician: Yvonna Rinne, MD                            Insurance: [facility] medical necessity  Medical Diagnosis: I24.822 (ICD-10-CM) - History of disarticulation of right hip  Rehab Codes: R26.2 Difficulty walking  Onset date: 20               Next 's appt. : tbd  Visit# / total visits: 3/12    Cancels/No Shows: 0/0    Subjective:    Pain:  [] Yes  [x] No  Pain Rating: (0-10 scale) 010  Pain altered Tx:  [] No  [] Yes  Action:  Comments: Patient comes into therapy in .  She reports no pain after last treatment only minimal fatigue    Objective:  Modalities:   Precautions:  Exercises:  Exercise Reps/ Time Weight/ Level Comments Completed   Today   Trunk rhythmic rotation x4   x   Resisted trunk flex and ext       Balance board sitting 2' x2   x   Wand rotation fatigue   x   plyoball throws fatigue 3.5  x   Foam roller laying 2'   x   Foam roller band pull 3x10 lime  x   Foam roller scissors 20x   x   Foam roller T 20x   x   Foam roller alphabet   Caps, lower case x   Prone gluteal squeezes 10x10\"   x   Prone UE slide x10   x   Other:    Treatment Charges: Mins Units   []  Modalities     [x]  Ther Exercise 52 3   []  Manual Therapy     []  Ther Activities []  Aquatics     []  Vasocompression     []  Other     Total Treatment time 52 3       Assessment: [] Progressing toward goals. [] No change. [] Other: Patient responded to balance and core exercises well with no reports of pain  fatigue only. Multi VC's to correct alignment and eliminate compensatory patterns. Phase 1: 1-3 RPE (resting to easy) until 3 mo ( April 7) post chemo/rad/immuno ( progress to phase 2)  Educated pt on benefits of working in suggested RPE to ensure safe range for exercise during/post chemotherapy/radiation to maximize gains from exercise and to minimize acute and latent side effects of cancer treatment. Patient was educated as to the importance of gradual progression as well as recovery days and issued HO and RPE scale for home. minimize stress on heart, to much/little will tank immune system and compromise immune function    *based on research from the [facility]    STG: (to be met in 12 treatments)  1. ? Pain:  2. ? Balance: Patient to demonstrate 37/41 on AMP  3. ? Strength: Patient to demonstrate the ability to maintain a neutral spine position during static and dynamic sitting  4. ? Function: Patient to report 32/60 on PLUS-M  5. Independent with Home Exercise Programs  6. Demonstrate Knowledge of fall prevention           Pt. Education:  [x] Yes  [] No  [] Reviewed Prior HEP/Ed  Method of Education: [] Verbal  [] Demo  [] Written  Comprehension of Education:  [] Verbalizes understanding. [] Demonstrates understanding. [] Needs review. [] Demonstrates/verbalizes HEP/Ed previously given. Plan: [x] Continue current frequency toward long and short term goals.         Time In: 508         Time Out: 600    Electronically signed by:  Rebeka Poe, PT

## 2021-03-03 ENCOUNTER — VIRTUAL VISIT (OUTPATIENT)
Dept: ONCOLOGY | Age: 26
End: 2021-03-03
Payer: COMMERCIAL

## 2021-03-03 DIAGNOSIS — G54.6 PHANTOM PAIN AFTER AMPUTATION OF LOWER EXTREMITY (HCC): ICD-10-CM

## 2021-03-03 DIAGNOSIS — C49.21 PRIMARY LOWER EXTREMITY SARCOMA, RIGHT (HCC): Primary | ICD-10-CM

## 2021-03-03 PROCEDURE — 99215 OFFICE O/P EST HI 40 MIN: CPT | Performed by: NURSE PRACTITIONER

## 2021-03-03 ASSESSMENT — ENCOUNTER SYMPTOMS
ABDOMINAL PAIN: 0
SORE THROAT: 0
VOMITING: 0
BLOOD IN STOOL: 0
NAUSEA: 0
COUGH: 0
CHEST TIGHTNESS: 0
CONSTIPATION: 0
WHEEZING: 0
TROUBLE SWALLOWING: 0
SHORTNESS OF BREATH: 0
DIARRHEA: 0

## 2021-03-04 ENCOUNTER — HOSPITAL ENCOUNTER (OUTPATIENT)
Dept: PHYSICAL THERAPY | Facility: CLINIC | Age: 26
Setting detail: THERAPIES SERIES
Discharge: HOME | End: 2021-03-04
Payer: COMMERCIAL

## 2021-03-04 PROBLEM — G54.6 PHANTOM PAIN AFTER AMPUTATION OF LOWER EXTREMITY (HCC): Status: ACTIVE | Noted: 2021-03-04

## 2021-03-04 PROCEDURE — 97110 THERAPEUTIC EXERCISES: CPT

## 2021-03-04 NOTE — FLOWSHEET NOTE
[] [address]  [address]  [phone]  F: [phone] [] [address]  [employer] [address]   [address]  P: [phone]  F: [phone] [x] [address] [place] [address]  Therapy  [facility]  P: [phone]  F: [phone] [] [address]  P: [phone]  F: [phone] [] [address]  [facility]   [address]   [place]: [phone]  F: [phone]      Physical Therapy Daily Treatment Note    Date:  3/4/2021  Patient Name:  Jaime Argueta    :  1995  MRN: [id number]   Physician: Yasir Gandhi MD                            Insurance: [facility] medical necessity  Medical Diagnosis: I76.718 (ICD-10-CM) - History of disarticulation of right hip  Rehab Codes: R26.2 Difficulty walking  Onset date: 20               Next Dr's appt. : tbd  Visit# / total visits:     Cancels/No Shows: 0/0    Subjective:    Pain:  [] Yes  [x] No  Pain Rating: (0-10 scale) 010  Pain altered Tx:  [] No  [] Yes  Action:  Comments: Patient comes into therapy in .      Objective:  Modalities:   Precautions:  Exercises:  Exercise Reps/ Time Weight/ Level Comments Completed   Today   SB  x10   x   Balance board alphabet    x   Balance board scaption    x   Balance board sitting 2' x2   x   Cone reaching    x   Ball change  4.5  x   plyoball throws fatigue SB  x   Foam roller laying 2'   x   Foam roller band pull 3x10 lime     Foam roller scissors 20x   x   Foam roller T 20x      Foam roller alphabet   Caps, lower case x   Prone gluteal squeezes 10x10\"   x   Prone UE slide x10   x   Other:    Treatment Charges: Mins Units   []  Modalities     [x]  Ther Exercise 52 3   []  Manual Therapy     []  Ther Activities     []  Aquatics     []  Vasocompression []  Other     Total Treatment time 52 3       Assessment: [x] Progressing toward goals. continued with stabilization exercises adding prone thoracic strengthening Patient given additional rest breaks due to fatigue  [] No change. Other: Patient responded to balance and core exercises well with no reports of pain  fatigue only. Multi VC's to correct alignment and eliminate compensatory patterns. Phase 1: 1-3 RPE (resting to easy) until 3 mo ( April 7) post chemo/rad/immuno ( progress to phase 2)  Educated pt on benefits of working in suggested RPE to ensure safe range for exercise during/post chemotherapy/radiation to maximize gains from exercise and to minimize acute and latent side effects of cancer treatment. Patient was educated as to the importance of gradual progression as well as recovery days and issued HO and RPE scale for home. minimize stress on heart, to much/little will tank immune system and compromise immune function    *based on research from the [facility]    STG: (to be met in 12 treatments)  1. ? Pain:  2. ? Balance: Patient to demonstrate 37/41 on AMP  3. ? Strength: Patient to demonstrate the ability to maintain a neutral spine position during static and dynamic sitting  4. ? Function: Patient to report 32/60 on PLUS-M  5. Independent with Home Exercise Programs  6. Demonstrate Knowledge of fall prevention           Pt. Education:  [x] Yes  [] No  [] Reviewed Prior HEP/Ed  Method of Education: [] Verbal  [] Demo  [] Written  Comprehension of Education:  [] Verbalizes understanding. [] Demonstrates understanding. [] Needs review. [] Demonstrates/verbalizes HEP/Ed previously given. Plan: [x] Continue current frequency toward long and short term goals.         Time In: 530       Time Out: [address]    Electronically signed by:  Romy Kingston, PT

## 2021-03-04 NOTE — PROGRESS NOTES
Cancer Treatment Summary and  Survivorship Care Plan    Provided by RAYNE Howard      This Cancer Treatment Summary is a brief record of your cancer treatment. The forms provide a way for a cancer survivor to review and retain information about their cancer, cancer treatment, and follow-up care. The forms are also meant to provide basic information about a survivor's care to future healthcare providers. General Information   Patient name Arthur Bowser    Patient ID [id number]    Phone [phone] (home)    Date of birth 1995    Age 26 y.o. Support contact Extended Emergency Contact Information  Primary Emergency Contact: Hanna Peng *Mariana weaver  [facility] [address] [place], [facility] [place]. [address] Phone: [phone]  Relation: Spouse  Secondary Emergency Contact: Alyssa Ayala  Address: n/a   [facility]  [employer] Phone: [phone]  Relation: Christopher Motley Oncologist Dr. Nadege Poole MD         Phone: [phone] [facility])  Dr. Celeste Keller MD                   Phone: [phone] [facility]   Surgeon Dr. Emmanuel Cornelius MD               Phone: [phone]   Primary Care Physician Quentin Candelario, [facility] [address] [place] [address], [phone]   Survivorship Coordinator RAYNE Howard                       Phone: [phone]     Cancer Diagnosis Information    Cancer Type/Location Undifferentiated pleomorphic sarcoma, right lower extremity   Diagnosis date 6/29/2020   Age at diagnosis 22 y.o. Stage at Diagnosis Cancer Staging  Stage IIIb, T4 N0 M0    Cancer related surgical procedure/location/  findings Procedure/Findings Date   BIOPSY SOFT TISSUE MASS RIGHT THIGH performed at [facility]     Final Pathologic Diagnosis:    A. Soft tissue mass, right thigh, biopsy:         High-grade malignancy with features most compatible with high-grade sarcoma (see comment).        COMMENT The biopsy demonstrates spindled and pleomorphic cells with multiple areas ofcellular necrosis (less than 50%). Numerous mitoses (approximately 25 per 10hpf) are noted. Immunohistochemical stains were performed on separateunstained slides from block A1. The tumor cells are positive for CD10, CD99  and negative for AFP, Cam5.2, OCT3/4, CD31, CD2, SOX10, CD34, desmin, Melan A,HMB45, CD68, , ER(diagnostic), SMA, S100, AE1/AE3, CD20, CD3, Pax5,, , myeloperoxidase, synaptophysin, chromogranin, tryptase, CD45,CD43, CD30, and Alk-1. An INI-1 stain shows retained nuclear expression. Overall, the findings are those of a high-grade malignancy and would be mostcompatible with high-grade sarcoma. In particular, a high-grade  undifferentiated pleomorphic sarcoma would be favored. 6/29/20   RIGHT HIP DISARTICULATION AMPUTATION performed at [facility] by Dr. Salinas Toussaint MD     Final Pathologic Diagnosis:    A. Right lower extremity, right hip disarticulation amputation:  -Undifferentiated pleomorphic sarcoma (20 cm). Margins negative. Two lymphnodes, negative for neoplasm (0/2). SOFT TISSUE SARCOMA    Procedure: Radical resection (right hip disarticulation amputation)    Tumor type (WHO classification): Undifferentiated sarcoma    Tumor site: Knee and femur    Tumor size: 20 x 16 x 3 cm    Localization: Deep - subfascial    Mitotic rate: 26 / 10 HPF    Percent necrosis: Present. Extent: ~80%    Grade (FNCLCC): 3    Margin status: Wide resection (layer of normal tissue surrounding tumor).     Site and distance to closest margin: inferior soft tissue margin; >1 cm    Regional lymph nodes: Number of nodes positive / number examined:  0/2    Post-treatment effect and percentage (based on combined gross and  microscopic examination): N/A    Pathologic stage classification (AJCC 8th edition):  pT4 N0 7/23/20   XCELA PORT PLACEMENT INTO RIGHT INTERNAL JUGULAR performed at [facility]) 10/27/20          Background Information   Genetic/hereditary risk factor(s) or predisposing conditions None known   Genetic counseling performed No     Treatment Summary   CHEMOTHERAPY Yes   Treatment Summary   Plan Name IP Soft Tissue Sarcoma AIM: DOXOrubicin/Ifosfamide/Mesna/G-CSF   Treatment goal Curative   Provider Lorean Ganser, MD   Status Inactive   Start Date 11/3/2020   End Date 1/7/2021   Treatment plan weight/height/BSA Weight type: Documented (effective on 10/9/2020), 78 kg (entered on 10/8/2020), 157.5 cm (entered on 10/1/2020), BSA 1.85 m2   Most recent weight/height/BSA Weight: Weight: 180 lb (81.6 kg)  Height: 5' 1.25\" (1.556 m)   BSA (Calculated - sq m): 1.81 sq meters   Treatment on Clinical Trial? [This plan is not part of a research study]   Reason for stopping treatment [Plan is still active]   Treatment Plan Medications DOXOrubicin HCl (ADRIAMYCIN) chemo syringe 46 mg, 25 mg/m2 = 46 mg (100 % of original dose 25 mg/m2), Intravenous, ONCE, 4 of 4 cycles  Dose modification: 25 mg/m2 (original dose 25 mg/m2, Cycle 1)  Administration: 46 mg (11/3/2020), 46 mg (11/4/2020), 46 mg (11/5/2020), 46 mg (11/23/2020), 46 mg (11/24/2020), 46 mg (11/25/2020), 46 mg (12/15/2020), 46 mg (12/16/2020), 46 mg (12/17/2020), 46 mg (1/5/2021), 46 mg (1/6/2021), 46 mg (1/7/2021)    alteplase (CATHFLO) injection 2 mg, 2 mg, Intracatheter, PRN, 4 of 4 cycles  Administration: 2 mg (12/16/2020)    hydrocortisone sodium succinate PF (SOLU-CORTEF) injection 100 mg, 100 mg, Intravenous, ONCE, 4 of 4 cycles    pegfilgrastim (NEULASTA) on-body injector 6 mg, 6 mg, Subcutaneous, ONCE, 4 of 4 cycles  Administration: 6 mg (11/5/2020), 6 mg (11/25/2020), 6 mg (12/17/2020), 6 mg (1/7/2021)    ifosfamide (IFEX) 4,650 mg in sodium chloride 0.9 % 250 mL chemo IVPB, 2,500 mg/m2 = 4,650 mg, Intravenous, ONCE, 4 of 4 cycles Administration: 4,650 mg (11/3/2020), 4,650 mg (11/4/2020), 4,650 mg (11/5/2020), 4,650 mg (11/23/2020), 4,650 mg (11/24/2020), 4,650 mg (11/25/2020), 4,650 mg (12/15/2020), 4,650 mg (12/16/2020), 4,650 mg (1/5/2021), 4,650 mg (1/6/2021), 4,650 mg (1/7/2021)    mesna (MESNEX) 925 mg in sodium chloride 0.9 % 50 mL IVPB, 500 mg/m2 = 925 mg, Intravenous, ONCE, 4 of 4 cycles  Administration: 925 mg (11/3/2020), 925 mg (11/3/2020), 925 mg (11/4/2020), 925 mg (11/4/2020), 925 mg (11/5/2020), 925 mg (11/5/2020), 925 mg (11/23/2020), 925 mg (11/23/2020), 925 mg (11/24/2020), 925 mg (11/24/2020), 925 mg (11/25/2020), 925 mg (11/25/2020), 925 mg (12/15/2020), 925 mg (12/15/2020), 925 mg (12/16/2020), 925 mg (12/16/2020), 925 mg (12/17/2020), 925 mg (12/17/2020), 925 mg (1/5/2021), 925 mg (1/5/2021), 925 mg (1/6/2021), 925 mg (1/6/2021), 925 mg (1/7/2021), 925 mg (1/7/2021)    dexamethasone (DECADRON) injection 10 mg, 10 mg (100 % of original dose 10 mg), Intravenous, ONCE, 4 of 4 cycles  Dose modification: 10 mg (original dose 10 mg, Cycle 1)  Administration: 10 mg (11/3/2020), 10 mg (11/4/2020), 10 mg (11/5/2020), 10 mg (11/23/2020), 10 mg (11/24/2020), 10 mg (11/25/2020), 10 mg (12/15/2020), 10 mg (12/16/2020), 10 mg (12/17/2020), 10 mg (1/5/2021), 10 mg (1/6/2021), 10 mg (1/7/2021)    methylPREDNISolone sodium (SOLU-MEDROL) injection 125 mg, 125 mg, Intravenous, ONCE, 4 of 4 cycles    famotidine (PEPCID) injection 20 mg, 20 mg, Intravenous, ONCE, 4 of 4 cycles    palonosetron (ALOXI) injection 0.25 mg, 0.25 mg, Intravenous, ONCE, 4 of 4 cycles  Administration: 0.25 mg (11/3/2020), 0.25 mg (11/23/2020), 0.25 mg (12/15/2020), 0.25 mg (1/5/2021)    sodium bicarbonate 75 mEq, potassium chloride 20 mEq in sodium chloride 0.45 % 1,000 mL IVPB, , Intravenous, ONCE, 4 of 4 cycles Administration:  (11/3/2020),  (11/3/2020),  (11/5/2020),  (11/5/2020),  (11/23/2020),  (11/23/2020),  (11/24/2020),  (11/24/2020),  (11/25/2020),  (11/25/2020),  (12/15/2020),  (12/15/2020),  (12/16/2020),  (12/16/2020),  (12/17/2020),  (12/17/2020),  (1/5/2021),  (1/5/2021),  (1/6/2021),  (1/6/2021),  (1/7/2021),  (1/7/2021)    mesna (MESNEX) 1,850 mg in Ora-Plus 20 mL compounded oral suspension, , Oral, ONCE, 4 of 4 cycles  Administration:  (12/15/2020),  (12/16/2020), 1,850 mg (12/17/2020),  (1/5/2021),  (1/6/2021),  (1/7/2021)         Cumulative Dose Lifetime Dose Tracking    DOXOrubicin: 224.634 mg/m2 (552 mg)  -- Actual total dose might be higher than it appears here, due to incomplete documentation    Ifosfamide: 22,707.616 mg/m2 (55,800 mg)  -- Actual total dose might be higher than it appears here, due to incomplete documentation    Total Anthracycline: 224.634 mg/m2 (552 mg)  -- Actual total dose might be higher than it appears here, due to incomplete documentation         RADIATION No      SURGERY/CHEMOTHERAPY/  RADIATION    Treatment-related hospitalization required 6/29/2020-7/6/2020: [facility] to [facility] following right thigh biopsy (final pathology sarcoma). Decision was made to transfer to [address] [place] [address],[facility] for radiation therapy  7/7/2020-7/12/2020: After consultation at [address] [place] [address],[facility] Tumor Board, it was decided that radiation therapy was unsafe for fetus  7/23/2020-7/31/2020: Admitted to [address] [place] [address],[facility] for right hip disarticulation amputation. Procedure tolerated well with postoperative course being uneventful  7/31/2020-8/10/2020: Ad[facility] to [facility] inpatient rehabilitation following amputation   Ongoing toxicity or side-effects of all treatments received at the completion of treatment. Treatment related side effects to date include phantom limb pain/neuropathy, impaired mobility and mild fatigue.      Ongoing Treatment Need for ongoing (adjuvant) treatment for cancer No     Follow-Up Care Plan    Continue all standard non-cancer related health care with your Primary Care Provider. Any new, unusual and/or persistent symptoms should be brought to the attention of your provider. Primary Care Physician Maribel Mojica, [facility], [phone]    Coordinating Provider When / How Often? Medical Oncology visits Dr. Ivette Warren MD           Phone: [phone]    Dr. Deedee Kayser, MD                     Phone: [phone] ([address] [place] [address],[address])    Every 3-6 months for 2-3 yrs, then every 6 months for 2 years, then annually per NCCN Guidelines      Dr. Esha Tavera: 8/24/2021   Dr. Ella Hughes: 5/5/21   Orthopedic Surgeon visits Dr. Marcelo Blanc MD                 Phone: [phone] As needed/scheduled   Lab tests Dr. Ivette Warren MD/  Dr. Deedee Kayser, MD   As clinically indicated    Scheduled to be drawn with next follow-up with Dr. Ella Hughes in May 2021   Janelle Warren MD/  Dr. Deedee Kayser, MD Obtain imaging of the primary site after neoadjuvant therapy, postoperatively and periodically based on estimated risk of locoregional recurrence. MRI with and without contrast and/or CT with contrast is recommended.  In patients with no radiographic evidence of disease, imaging of primary site, chest, and other sites at risk of metastatic disease is recommended every 3-6 months for 2-3 years, then every 6 months for the next 2 years, then annually per NCCN Guidelines    *Per Dr. Jacelyn Cogan to alternate for the 1st 2yrs of surveillance every 3 months: Chest CT with MRI Pelvis/Chest-x-ray    Last chest CT with contrast done on 1/29/2021    MRI/Chest X-ray due May 2021   Health Maintenance Health Maintenance Due   Topic Date Due    Hepatitis C screen  1995    Pneumococcal 0-64 years Vaccine (1 of 1 - PPSV23) 08/13/2002    Hepatitis A vaccine (2 of 2 - 2-dose series) 12/06/2014    Flu vaccine (1) 09/01/2020 Follow-up with PCP to discuss health maintenance         Potential late- and long-term effects of treatment(s) Surgery Long-term Effects  ? Body image issues  ? Issues with balance/mobility  ? Pain/phantom pain  ? Neuropathy  ? Generalized weakness  Surgery Late Effects  ? Neuropathy  Chemotherapy Long-term Effects  ? Cognitive impairment  ? Fatigue  ? Ovarian failure with or without menopausal symptoms  ? Sexual dysfunction  ? Change in libido  ? Infertility  ? Weight gain  ? Obesity  ? Oral health issues  ? Hair loss Chemotherapy late Effects  ? Osteoporosis/osteopenia  ? Increased risk of cardiovascular disease with anthracycline-based chemotherapy  ? Increased risk of leukemia and myelodysplastic syndrome with alkylating agents, anthracyclines, other topoisomerase II inhibitors, and other agents with immunosuppressive potential  General Psychological   ? Depression  ? Distressmultifactorial unpleasant experience of psychological, social, and/or spiritual nature  ? Worry, anxiety  ? Fear of recurrence  ? Fear of pain  ? End-of-life concerns: death and dying  ? Changes in sexual function and/or desire  ? Challenges with body image (secondary to surgery)  ? Challenges with self-image  ? Relationship and other social role difficulties  ? Mesxha-ko-quiv concerns and financial challenges               Call your doctor if you have any of these signs or symptoms   ? A new lump or lump that's growing (anywhere on your body)  ? Abdominal pain  ? Vomiting  ? Constipation  ? Feeling full quickly  ? Loss of appetite  ? Bone pain  ? Blood in your stool or vomit  ? Black/tarry stool  ? Shortness of breath   ? Unexplained weight loss      Plan/Issues/Concerns   Assessment/Plan:  1. Primary lower extremity sarcoma, right ([facility] [address]  · Continue scheduled follow-up with oncologists for sarcoma surveillance as discussed. · Dr. Obinna Nelson [facility]):  May 2021  · Dr. Truman Jacobs [facility] [address]): 8/24/21 · MRI Pelvis/Chest X-ray: to be done with Dr. Pawan Mccabe follow-up in May 2021  · CT Chest: due July-2021  · Survivorship Care Plan/Treatment summary extensively reviewed. Refer to Survivorship Care Plan/Treatment summary for details. · Extensive education provided on potential long/late term effects of treatment, signs/symptoms of recurrence to report, follow-up care plan, healthy lifestyle promotion, and available local and national resources. · Continue to follow with PCP for screening of new primary cancers and management of non-cancer related problems/diagnoses. · Cervical cancer screening: PAP last done 2020 was negative    2. Phantom pain after amputation of lower extremity ([address]  · Continue physical therapy and follow-up with Dr. Sultana Chen as scheduled  · Continue gabapentin as prescirbed      Lifestyle/Behaviors   Education provided on healthy lifestyle promotion. See handouts provided at [facility]   See handout provided at Survivorship Visit      Survivorship Patient [address] [place] (www.cancer. org/SurvivorshipCenter)  Cancer Survivors Network (csn.cancer. org)  [employer] (www.livestrong. org)  [facility] (www.survivorship.cancer.gov/springboard)  [employer] (www.canceradvocacy. org)   [address] (www.nccn.org/patients/resources/life_after_cancer/)       *Young Survivor's Support Group is held on the first Thursday of every month. Is currently held via zoom. If you would like to join in the future, contact:    IGNACIA Johnson    [facility]   [address] [facility],  [facility]  cell: [phone]  fax: [phone]  [email or web address]       Copies of this Survivorship Care Plan sent to: Dr. Remonia Apgar, Dr. Pawan Mccabe and Philly Torres, CNP

## 2021-03-05 ENCOUNTER — CARE COORDINATION (OUTPATIENT)
Dept: OTHER | Facility: CLINIC | Age: 26
End: 2021-03-05

## 2021-03-05 NOTE — CARE COORDINATION
Ambulatory Care Coordination Note  3/5/2021  CM Risk Score: 1  Charlson 10 Year Mortality Risk Score: 100%     ACC: Jessica Parikh RN    Summary Note: ACM attempted to reach patient for follow up call regarding prosthetic fitting, physical therapy, chemo, . HIPAA compliant message left requesting a return phone call at patients convenience. Will continue to follow. Care Coordination Interventions    Program Enrollment: Rising Risk  Referral from Primary Care Provider: No  Suggested Interventions and Community Resources         Goals Addressed    None         Prior to Admission medications    Medication Sig Start Date End Date Taking? Authorizing Provider   norethindrone (ORTHO MICRONOR) 0.35 MG tablet Take 1 tablet by mouth daily 1/26/21   Major Helton,    Drospirenone (SLYND) 4 MG TABS Take 1 tablet by mouth daily  Patient not taking: Reported on 2/23/2021 1/20/21   FATEMEH Montes CNP   gabapentin (NEURONTIN) 300 MG capsule Take 1 capsule by mouth 3 times daily for 120 days. 1/6/21 5/6/21  Landy Juárez MD   Handicap Placard MISC Expiration: Lifetime 11/13/20   FATEMEH Jaffe CNP   omeprazole (PRILOSEC) 20 MG delayed release capsule Take 1 capsule by mouth daily  Patient not taking: Reported on 2/23/2021 10/20/20   Lupe Chapman MD   ondansetron (ZOFRAN ODT) 8 MG TBDP disintegrating tablet Place 1 tablet under the tongue every 8 hours as needed for Nausea or Vomiting 10/20/20   Zenaida Chapman MD   lidocaine-prilocaine (EMLA) 2.5-2.5 % cream Apply topically Daily as needed.  10/20/20   Zenaida Chapman MD   ibuprofen (ADVIL;MOTRIN) 800 MG tablet Take 1 tablet by mouth every 8 hours as needed for Pain 10/13/20   Raul Re, DO   docusate sodium (COLACE) 100 MG capsule Take 1 capsule by mouth 2 times daily as needed for Constipation 10/13/20   Raul Re, DO   blood glucose monitor kit and supplies Dispense sufficient amount for indicated testing frequency plus additional to accommodate PRN testing needs. Dispense all needed supplies to include: monitor, strips, lancing device, lancets, control solutions, alcohol swabs.  8/10/20   Dixon Angeles DO   ferrous sulfate (IRON 325) 325 (65 Fe) MG tablet Take 1 tablet by mouth 2 times daily 8/10/20   Dixon Angeles DO   Prenatal MV-Min-Fe Fum-FA-DHA (PRENATAL 1 PO) Take by mouth    Historical Provider, MD       Future Appointments   Date Time Provider Salma Oliva   3/8/2021  4:00 PM Jaxon Palacios, [address]   3/11/2021  3:30 PM Jaxon Palacios, [address]   3/15/2021  5:30 PM Jaxon Palacios, [address]   3/18/2021  6:00 PM Jaxon Palacios, [address]   3/22/2021  5:00 PM Jaxon Palacios, PT [facility]   3/25/2021  5:00 PM Jaxon Palacios, PT [facility]   3/29/2021  5:00 PM Jaxon Palacios, PT [facility]   3/31/2021  1:30 PM Mk Munoz MD PHYS MED [facility]   4/1/2021  5:00 PM Jaxon Palacios, PT [facility]   4/6/2021  3:30 PM [facility] ONC CHAIR 10 [facility] [place]   8/24/2021  3:00 PM Harsh Lock MD [address] [place] [address]

## 2021-03-08 ENCOUNTER — HOSPITAL ENCOUNTER (OUTPATIENT)
Dept: PHYSICAL THERAPY | Facility: CLINIC | Age: 26
Setting detail: THERAPIES SERIES
Discharge: HOME | End: 2021-03-08
Payer: COMMERCIAL

## 2021-03-08 PROCEDURE — 97110 THERAPEUTIC EXERCISES: CPT

## 2021-03-08 NOTE — FLOWSHEET NOTE
".  SUBJECTIVE:   Timothy Zayas is a 16 year old male who presents to clinic today with mother because of:    Chief Complaint   Patient presents with     Behavioral Problem     pt recently diagnosed with ADHD     Derm Problem     acne onset x 2 years        HPI  Had neuropsych testing.  Confirms ADHD inattentive type.  Also just little below average on some cognition issues.  Report reviewed.    No FH heart issues, heart rhythm issues.      Also has acne, not improving much with clindamycin and OTC products.  Present couple years.        ROS  Constitutional, eye, ENT, skin, respiratory, cardiac, and GI are normal except as otherwise noted.    PROBLEM LIST  Patient Active Problem List    Diagnosis Date Noted     Concussion with loss of consciousness of 30 minutes or less, sequela (H) 11/29/2017     Priority: Medium      MEDICATIONS  Current Outpatient Prescriptions   Medication Sig Dispense Refill     NO ACTIVE MEDICATIONS .        ALLERGIES  Allergies   Allergen Reactions     No Known Drug Allergies        Reviewed and updated as needed this visit by clinical staff  Tobacco  Allergies  Meds  Med Hx  Surg Hx  Fam Hx  Soc Hx        Reviewed and updated as needed this visit by Provider       OBJECTIVE:     /74  Pulse 94  Temp 97.7  F (36.5  C) (Oral)  Ht 5' 9.75\" (1.772 m)  Wt 153 lb 2 oz (69.5 kg)  SpO2 98%  BMI 22.13 kg/m2  71 %ile based on CDC 2-20 Years stature-for-age data using vitals from 9/7/2018.  78 %ile based on CDC 2-20 Years weight-for-age data using vitals from 9/7/2018.  71 %ile based on CDC 2-20 Years BMI-for-age data using vitals from 9/7/2018.  Blood pressure percentiles are 55.6 % systolic and 72.7 % diastolic based on the August 2017 AAP Clinical Practice Guideline.    GENERAL: Active, alert, in no acute distress.  SKIN: many comedones and few pustules forehead, chin, chest and back.    HEAD: Normocephalic.  EYES:  No discharge or erythema. Normal pupils and EOM.  EARS: Normal " [] [facility] [facility]  [address]  [phone]  F: [phone] [] [address]  [address]   [address]  P: [phone]  F: [phone] [x] [address] &  Therapy  [address] [facility]  P: [phone]  F: [phone] [] [address]  P: [phone]  F: [phone] [] [address]  [facility]   [address]   [place]: [phone]  F: [phone]      Physical Therapy Daily Treatment Note    Date:  3/8/2021  Patient Name:  Niko Nguyen    :  1995  MRN: [id number]   Physician: Manuel Arenas MD                            Insurance: [facility] medical necessity  Medical Diagnosis: V59.264 (ICD-10-CM) - History of disarticulation of right hip  Rehab Codes: R26.2 Difficulty walking  Onset date: 20               Next Dr's appt. : tbd  Visit# / total visits:     Cancels/No Shows: 0/0    Subjective:    Pain:  [] Yes  [x] No  Pain Rating: (0-10 scale) 010  Pain altered Tx:  [] No  [] Yes  Action:  Comments: Patient comes into therapy in .      Objective:  Modalities:   Precautions:  Exercises:  Exercise Reps/ Time Weight/ Level Comments Completed   Today   SB  x10   x   Balance board alphabet    x   Balance board scaption 20   x   Balance board sitting 2' x2  EC x   Balance board alphabet    x   Cone reaching    x   Ball change  4.5  x   Plyoball throws fatigue SB  x   Foam roller laying 2'   x   Foam roller band pull 3x10 lime     Foam roller scissors 20x   x   Foam roller circles       Foam roller T 20x      Foam roller alphabet   Caps, lower case x   Prone gluteal squeezes 20x10\"   x   Thoracic lift w/ ABD  1.5  x   side lying thoracic lift 10x5\"   x   Other:    Treatment Charges: Mins Units   []  Modalities     [x] Ther Exercise 52 3   []  Manual Therapy     []  Ther Activities     []  Aquatics     []  Vasocompression     []  Other     Total Treatment time 52 3       Assessment: [] Progressing toward goals. Progressed balance exercises to challenge stability in different plans with good response. Minimal VC's to correct alignment    [] No change. [] Other: Patient responded to balance and core exercises well with no reports of pain  fatigue only. Multi VC's to correct alignment and eliminate compensatory patterns. Phase 1: 1-3 RPE (resting to easy) until 3 mo ( April 7) post chemo/rad/immuno ( progress to phase 2)  Educated pt on benefits of working in suggested RPE to ensure safe range for exercise during/post chemotherapy/radiation to maximize gains from exercise and to minimize acute and latent side effects of cancer treatment. Patient was educated as to the importance of gradual progression as well as recovery days and issued HO and RPE scale for home. minimize stress on heart, to much/little will tank immune system and compromise immune function    *based on research from the [facility]    STG: (to be met in 12 treatments)  1. ? Pain:  2. ? Balance: Patient to demonstrate 37/41 on AMP  3. ? Strength: Patient to demonstrate the ability to maintain a neutral spine position during static and dynamic sitting  4. ? Function: Patient to report 32/60 on PLUS-M  5. Independent with Home Exercise Programs  6. Demonstrate Knowledge of fall prevention           Pt. Education:  [x] Yes  [] No  [] Reviewed Prior HEP/Ed  Method of Education: [] Verbal  [] Demo  [] Written  Comprehension of Education:  [] Verbalizes understanding. [] Demonstrates understanding. [] Needs review. [] Demonstrates/verbalizes HEP/Ed previously given. Plan: [x] Continue current frequency toward long and short term goals.         Time In: 4pm         Time Out: 5pm    Electronically signed by: canals. Tympanic membranes are normal; gray and translucent.  NOSE: Normal without discharge.  MOUTH/THROAT: Clear. No oral lesions. Teeth intact without obvious abnormalities.  NECK: Supple, no masses.  LYMPH NODES: No adenopathy  LUNGS: Clear. No rales, rhonchi, wheezing or retractions  HEART: Regular rhythm. Normal S1/S2. No murmurs.  ABDOMEN: Soft, non-tender, not distended, no masses or hepatosplenomegaly. Bowel sounds normal.     DIAGNOSTICS: None    ASSESSMENT/PLAN:   1. ADHD (attention deficit hyperactivity disorder), inattentive type  Discussed options for intervention, medication options and side effects.  How to initiate medicaiton.    Follow up 3 weeks.    - methylphenidate (METADATE CD) 10 MG CR capsule; Take 1 capsule (10 mg) by mouth every morning  Dispense: 30 capsule; Refill: 0    2. Acne vulgaris  Discussed.  Will add Retin A, continue clindamycin.    - tretinoin (RETIN-A) 0.025 % cream; Apply topically At Bedtime  Dispense: 20 g; Refill: 0    FOLLOW UP:   Plan:  Symptomatic treatment reviewed.  Prescription(s) given today as per orders.  Follow up 3 weeks.    > 25 minutes over 1/2 on counseling.         Morris Olivia MD        Romy Kingston, PT

## 2021-03-11 ENCOUNTER — HOSPITAL ENCOUNTER (OUTPATIENT)
Dept: PHYSICAL THERAPY | Facility: CLINIC | Age: 26
Setting detail: THERAPIES SERIES
Discharge: HOME | End: 2021-03-11
Payer: COMMERCIAL

## 2021-03-11 PROCEDURE — 97110 THERAPEUTIC EXERCISES: CPT

## 2021-03-11 NOTE — FLOWSHEET NOTE
[] [facility]) - [facility] &  Therapy  [address]  [phone]  F: [phone] [] [address]  [place] [address]   [address]  P: [phone]  F: [phone] [x] [address] &  Therapy  [address] [facility] [address] [address]  P: [phone]  F: [phone] [] [address]  P: [phone]  F: [phone] [] [address]  [facility]   [address]   [place]: [phone]  F: [phone]      Physical Therapy Daily Treatment Note    Date:  3/11/2021  Patient Name:  Joanne Leon    :  1995  MRN: [id number]   Physician: Manjula Truong MD                            Insurance: [facility] medical necessity  Medical Diagnosis: L17.821 (ICD-10-CM) - History of disarticulation of right hip  Rehab Codes: R26.2 Difficulty walking  Onset date: 20               Next Dr's appt. : tbd  Visit# / total visits:     Cancels/No Shows: 0/0    Subjective:    Pain:  [] Yes  [x] No  Pain Rating: (0-10 scale) 010  Pain altered Tx:  [] No  [] Yes  Action:  Comments: Patient comes into therapy in .      Objective:  Modalities:   Precautions:  Exercises:  Exercise Reps/ Time Weight/ Level Comments Completed   Today   BOSU sitting fatigue  Maintain TVA x   BOSU head nods x20   x   BOSU head rotations x20   x   BOSU trunk rotation x20   x   BOSU UE flexion 20 3.5#  x   BOSU diagonals 20 3.5# Foot on stool x   Mid back series  lime  x   Cone reaching              Ball change  4.5     Plyoball throws fatigue 3.5#  x          Foam roller band pull 3x10 lime  x   Half Foam roller scissors 20x   x   Foam roller circles       Foam roller T 20x      Foam roller alphabet   Caps, lower case    Prone gluteal squeezes 20x10\"      Thoracic lift w/ ABD  1.5     side lying thoracic lift 10x5\" Other:    Treatment Charges: Mins Units   []  Modalities     [x]  Ther Exercise 52 3   []  Manual Therapy     []  Ther Activities     []  Aquatics     []  Vasocompression     []  Other     Total Treatment time 52 3       Assessment: [] Progressing toward goals. Added new core challenges with BOSU to progress postural and balance strengthening. VC's needed to maintain activation of TVA and prevent anterior pelvic tilting. Increased rest breaks needed due to prevent fatigue     [] Other:   Phase 1: 1-3 RPE (resting to easy) until 3 mo ( April 7) post chemo/rad/immuno ( progress to phase 2)  Educated pt on benefits of working in suggested RPE to ensure safe range for exercise during/post chemotherapy/radiation to maximize gains from exercise and to minimize acute and latent side effects of cancer treatment. Patient was educated as to the importance of gradual progression as well as recovery days and issued HO and RPE scale for home. minimize stress on heart, to much/little will tank immune system and compromise immune function    *based on research from the [facility]    STG: (to be met in 12 treatments)  1. ? Pain:  2. ? Balance: Patient to demonstrate 37/41 on AMP  3. ? Strength: Patient to demonstrate the ability to maintain a neutral spine position during static and dynamic sitting  4. ? Function: Patient to report 32/60 on PLUS-M  5. Independent with Home Exercise Programs  6. Demonstrate Knowledge of fall prevention           Pt. Education:  [x] Yes  [] No  [] Reviewed Prior HEP/Ed  Method of Education: [] Verbal  [] Demo  [] Written  Comprehension of Education:  [] Verbalizes understanding. [] Demonstrates understanding. [] Needs review. [] Demonstrates/verbalizes HEP/Ed previously given. Plan: [x] Continue current frequency toward long and short term goals.         Time In: 330pm         Time Out: 430pm    Electronically signed by:  Floridalma Rooney Melisa Santoro

## 2021-03-15 ENCOUNTER — HOSPITAL ENCOUNTER (OUTPATIENT)
Dept: PHYSICAL THERAPY | Facility: CLINIC | Age: 26
Setting detail: THERAPIES SERIES
Discharge: HOME | End: 2021-03-15
Payer: COMMERCIAL

## 2021-03-15 PROCEDURE — 97110 THERAPEUTIC EXERCISES: CPT

## 2021-03-15 NOTE — FLOWSHEET NOTE
[] [facility]) - [facility] &  Therapy  [address]  [phone]  F: [phone] [] [address]  [place] [address]   [address]  P: [phone]  F: [phone] [x] [address]  Therapy  [address] [facility] [address]  P: [phone]  F: [phone] [] [address]  P: [phone]  F: [phone] [] [address]  [facility]   [address]   [place]: [phone]  F: [phone]      Physical Therapy Daily Treatment Note    Date:  3/15/2021  Patient Name:  Jaime Lang    :  1995  MRN: [id number]   Physician: Aman Bianchi MD                            Insurance: [facility] medical necessity  Medical Diagnosis: S83.758 (ICD-10-CM) - History of disarticulation of right hip  Rehab Codes: R26.2 Difficulty walking  Onset date: 20               Next Dr's appt. : tbd  Visit# / total visits:     Cancels/No Shows: 0/0    Subjective:    Pain:  [] Yes  [x] No  Pain Rating: (0-10 scale) 010  Pain altered Tx:  [] No  [] Yes  Action:  Comments: Patient comes into therapy in .  No new issues to report    Objective:  Modalities:   Precautions:  Exercises:  Exercise Reps/ Time Weight/ Level Comments Completed   Today   BOSU sitting 2x2'  Maintain TVA x   BOSU head nods x20   x   BOSU head rotations x20   x   BOSU trunk rotation x20   x   BOSU UE flexion 20 3.5#  x   BOSU diagonals 20 3.5# Foot on stool x   Mid back series  lime  x   Cone reaching              Ball under pelvis   UE slide x   Plyoball throws fatigue 3.5#  x   Alphabet sitting   Caps and lower x   Foam roller band pull 3x10 lime  x    Foam roller scissors 20x 1#  x   Foam roller circles       Foam roller T 20x      Foam roller alphabet   Caps, lower case    Prone pelvic stability w/ UE flexion 20x10\"   x   Thoracic lift w/ ABD  1.5     side lying thoracic lift 10x5\"      Other:    Treatment Charges: Mins Units   []  Modalities     [x]  Ther Exercise 55 4   []  Manual Therapy     []  Ther Activities     []  Aquatics     []  Vasocompression     []  Other     Total Treatment time 55 4       Assessment: [] Progressing toward goals. Continued with core challenges to improve stability in preparation for gait training with prosthesis. VC's needed to correct movement pattern deficits. Patient is improving in ability to self correct   [] Other:   Phase 1: 1-3 RPE (resting to easy) until 3 mo ( April 7) post chemo/rad/immuno ( progress to phase 2)  Educated pt on benefits of working in suggested RPE to ensure safe range for exercise during/post chemotherapy/radiation to maximize gains from exercise and to minimize acute and latent side effects of cancer treatment. Patient was educated as to the importance of gradual progression as well as recovery days and issued HO and RPE scale for home. minimize stress on heart, to much/little will tank immune system and compromise immune function    *based on research from the [facility]    STG: (to be met in 12 treatments)  1. ? Pain:  2. ? Balance: Patient to demonstrate 37/41 on AMP  3. ? Strength: Patient to demonstrate the ability to maintain a neutral spine position during static and dynamic sitting  4. ? Function: Patient to report 32/60 on PLUS-M  5. Independent with Home Exercise Programs  6. Demonstrate Knowledge of fall prevention           Pt. Education:  [x] Yes  [] No  [] Reviewed Prior HEP/Ed  Method of Education: [] Verbal  [] Demo  [] Written  Comprehension of Education:  [] Verbalizes understanding. [] Demonstrates understanding. [] Needs review. [] Demonstrates/verbalizes HEP/Ed previously given. Plan: [x] Continue current frequency toward long and short term goals.         Time In: 530pm Time Out: 630pm    Electronically signed by:  Odalys Strickland PT

## 2021-03-18 ENCOUNTER — TELEPHONE (OUTPATIENT)
Dept: ONCOLOGY | Age: 26
End: 2021-03-18

## 2021-03-18 ENCOUNTER — HOSPITAL ENCOUNTER (OUTPATIENT)
Dept: PHYSICAL THERAPY | Facility: CLINIC | Age: 26
Setting detail: THERAPIES SERIES
Discharge: HOME | End: 2021-03-18
Payer: COMMERCIAL

## 2021-03-18 PROCEDURE — 97110 THERAPEUTIC EXERCISES: CPT

## 2021-03-18 NOTE — TELEPHONE ENCOUNTER
Pt called stating her MD at [facility] ordered scans for her, but she would like them done locally. She is asking for Dr. Diaz Husbands to order these. Pt states she will have [facility] fax orders here so we can see what was ordered; then have Dr. Diaz Husbands order them for her to complete locally.

## 2021-03-18 NOTE — FLOWSHEET NOTE
x   Thoracic lift w/ ABD  1.5     side lying thoracic lift 10x5\"      Other:      Treatment Charges: Mins Units   []  Modalities     [x]  Ther Exercise 55 4   []  Manual Therapy     []  Ther Activities     []  Aquatics     []  Vasocompression     []  Other     Total Treatment time 55 4       Assessment: [] Progressing toward goals: Continued with balance and strengthening exercises to improve core strength. P VC's given to correct alignment and eliminate lumbar compromise   [] Other:   Phase 1: 1-3 RPE (resting to easy) until 3 mo ( April 7) post chemo/rad/immuno ( progress to phase 2)  Educated pt on benefits of working in suggested RPE to ensure safe range for exercise during/post chemotherapy/radiation to maximize gains from exercise and to minimize acute and latent side effects of cancer treatment. Patient was educated as to the importance of gradual progression as well as recovery days and issued HO and RPE scale for home. minimize stress on heart, to much/little will tank immune system and compromise immune function    *based on research from the [facility]    STG: (to be met in 12 treatments)  1. ? Pain:  2. ? Balance: Patient to demonstrate 37/41 on AMP  3. ? Strength: Patient to demonstrate the ability to maintain a neutral spine position during static and dynamic sitting  4. ? Function: Patient to report 32/60 on PLUS-M  5. Independent with Home Exercise Programs  6. Demonstrate Knowledge of fall prevention           Pt. Education:  [x] Yes  [] No  [] Reviewed Prior HEP/Ed  Method of Education: [] Verbal  [] Demo  [] Written  Comprehension of Education:  [] Verbalizes understanding. [] Demonstrates understanding. [] Needs review. [] Demonstrates/verbalizes HEP/Ed previously given. Plan: [x] Continue current frequency toward long and short term goals.         Time In: 600pm         Time Out: 7pm    Electronically signed by:  Madeleine Enciso

## 2021-03-22 ENCOUNTER — HOSPITAL ENCOUNTER (OUTPATIENT)
Dept: PHYSICAL THERAPY | Facility: CLINIC | Age: 26
Setting detail: THERAPIES SERIES
Discharge: HOME | End: 2021-03-22
Payer: COMMERCIAL

## 2021-03-22 PROCEDURE — 97110 THERAPEUTIC EXERCISES: CPT

## 2021-03-22 NOTE — FLOWSHEET NOTE
[] [facility]  [address]  [phone]  F: [phone] [] [address]  [facility]   [address]  P: [phone]  F: [phone] [x] [address] &  Therapy  [address]  P: [phone]  F: [phone] [] [address]  P: [phone]  F: [phone] [] [address]  [facility]   [address]   [place]: [phone]  F: [phone]      Physical Therapy Daily Treatment Note    Date:  3/22/2021  Patient Name:  Jaime Argueta    :  1995  MRN: [id number]   Physician: Yasir Gandhi MD                            Insurance: [facility] medical necessity  Medical Diagnosis: D18.602 (ICD-10-CM) - History of disarticulation of right hip  Rehab Codes: R26.2 Difficulty walking  Onset date: 20               Next Dr's appt. : tbd  Visit# / total visits:     Cancels/No Shows: 0/0    Subjective:    Pain:  [] Yes  [x] No  Pain Rating: (0-10 scale) 0/10  Pain altered Tx:  [] No  [] Yes  Action:  Comments: Patient reports fneeding to rest on  as she thinks she pushed herself too much saturday    Objective:  Modalities:   Precautions:  Exercises:  Exercise Reps/ Time Weight/ Level Comments Completed   Today   BOSU sitting 3'  Maintain TVA    Fig 8 x10   x          BOSU trunk rotation x20  W/ ball    BOSU UE flexion 20 4.5#  x   BOSU diagonals 20 3.5# Foot on stool x   Mid back series  lime     Prone press up 10   x   Band pull aparts    x   Ball under pelvis   UE slide no ball today    Plyoball throws fatigue 34.5#     Alphabet sitting   Caps and lower x   Foam roller ball change  1.5  x   Foam roller scissors 10x 1.5#  x   Foam roller circles       Foam roller scaption x10   x   Foam roller T stretch x10   x   Prone signed by:  Maurice Mendoza, PT

## 2021-03-25 ENCOUNTER — HOSPITAL ENCOUNTER (OUTPATIENT)
Dept: PHYSICAL THERAPY | Facility: CLINIC | Age: 26
Setting detail: THERAPIES SERIES
Discharge: HOME | End: 2021-03-25
Payer: COMMERCIAL

## 2021-03-25 ENCOUNTER — CARE COORDINATION (OUTPATIENT)
Dept: OTHER | Facility: CLINIC | Age: 26
End: 2021-03-25

## 2021-03-25 PROCEDURE — 97110 THERAPEUTIC EXERCISES: CPT

## 2021-03-25 NOTE — CARE COORDINATION
Ambulatory Care Coordination Note  3/25/2021  CM Risk Score: 1  Charlson 10 Year Mortality Risk Score: 100%     ACC: Riya De Luna RN    Summary Note: [facility]) contacted the patient by telephone to follow up on progress, discuss new issues or concerns, and reinforce/ provide patient education. Verified name and  with patient as identifiers. Summary Note: Pt is having minimal phantom pain and only intermittently . She continues with therapy has had 9 tx's so far, she was fitted with the acrylic prosthetic yesterday and tomorrow they will be trying it with the hip component. She is keeping her port for any future needed chemo, chemo was completed 2 months ago. She gets her port flushed at the infusion center every 6 weeks. Reinforced/ Provided Education:  Discussed red flags and appropriate site of care based on symptoms and resources available to patient including: PCP, Specialist and [address]. Importance and benefits of: Follow up with PCP and specialist, medication adherence, self monitoring and reporting of symptoms. Plan:  Continue biweekly to monthly  outreaches to provide telephonic support, education and resources as needed. Discuss / follow up on: Goal progress and therapy progress      Pt verbalized understanding and is agreeable to follow up contact. Care Coordination Interventions    Program Enrollment: Rising Risk  Referral from Primary Care Provider: No  Suggested Interventions and Community Resources         Goals Addressed                 This Visit's Progress     Conditions and Symptoms   On track     I will schedule office visits, as directed by my provider. I will keep my appointment or reschedule if I have to cancel. I will notify my provider of any barriers to my plan of care. I will notify my provider of any symptoms that indicate a worsening of my condition.     Barriers: overwhelmed by complexity of regimen  Plan for overcoming 10/13/20   Illa Art, DO   blood glucose monitor kit and supplies Dispense sufficient amount for indicated testing frequency plus additional to accommodate PRN testing needs. Dispense all needed supplies to include: monitor, strips, lancing device, lancets, control solutions, alcohol swabs.  8/10/20   Lakisha Angeles, DO   ferrous sulfate (IRON 325) 325 (65 Fe) MG tablet Take 1 tablet by mouth 2 times daily 8/10/20   Lakisha Angeles DO   Prenatal MV-Min-Fe Fum-FA-DHA (PRENATAL 1 PO) Take by mouth    Historical Provider, MD       Future Appointments   Date Time Provider Salma Oliva   3/25/2021  5:00 PM Dala Resides, [address]   3/29/2021  5:00 PM Dala Resides, [address]   3/31/2021  1:30 PM Waleska Salgado MD PHYS MED [facility]   4/1/2021  5:00 PM Dala Resides, PT [facility]   4/5/2021  4:00 PM Dala Resides, [address]   4/6/2021  3:30 PM STV PB MED ONC CHAIR 11 ST[facility] PB [facility]   4/8/2021  6:00 PM Dala Resides, [address]   4/12/2021  3:30 PM Dala Resides, [address]   4/15/2021  6:00 PM [facility]ala Resides, PT [facility]   4/19/2021  3:30 PM Dala Resides, [address]   4/22/2021  6:00 PM Dala Resides, PT [facility]   4/26/2021  3:30 PM Dala Resides, [address]   4/29/2021  6:00 PM Dala Resides, PT [facility] [place]   5/18/2021  3:30 PM STV PB MED ONC CHAIR 14 STVZ PB [place]   8/24/2021  3:00 PM Rigoberto Henao MD [address] [place] [address]

## 2021-03-25 NOTE — FLOWSHEET NOTE
[] [address]  [address]  [phone]  F: [phone] [] [address]  [facility]   [address]  P: [phone]  F: [phone] [x] [address] [place] &  Therapy  [address]  P: [phone]  F: [phone] [] [address]  P: [phone]  F: [phone] [] [address]  [facility]   [address]   [place]: [phone]  F: [phone]      Physical Therapy Daily Treatment Note    Date:  3/25/2021  Patient Name:  Farhan Cheney    :  1995  MRN: [id number]   Physician: Lyle Plummer MD                            Insurance: [facility] medical necessity  Medical Diagnosis: D59.091 (ICD-10-CM) - History of disarticulation of right hip  Rehab Codes: R26.2 Difficulty walking  Onset date: 20               Next 's appt. : tbd  Visit# / total visits: 10/12    Cancels/No Shows: 0/0    Subjective:    Pain:  [] Yes  [x] No  Pain Rating: (0-10 scale) 0/10  Pain altered Tx:  [] No  [] Yes  Action:  Comments: No new issues since last session.       Objective:  Modalities:   Precautions:  Exercises:  Exercise Reps/ Time Weight/ Level Comments Completed   Today   BOSU sitting 3'  Maintain TVA    HEP Review    x   Scapular retraction 20 lime  x   Arm diagonals 15 lime  x   SB bounce 1'   x   SB balance 2'   x   SB flexion 2'   x   SB HABD    x   SB pelvic tilt 2x30   x   Prone UE slides    x   Prone superman    x   Prone IR/ER    x   BOSU trunk rotation x20  W/ ball        Treatment Charges: Mins Units   []  Modalities     [x]  Ther Exercise 50 3   []  Manual Therapy     []  Ther Activities     []  Aquatics     []  Vasocompression     []  Other     Total Treatment time 50 3       Assessment: [] Progressing toward goals: Progressed HEP and patient given ideas on how to improve postural challenge. Patient required minimal VC's to activate the correct muscle firing pattern without compensatory moveements    [] Other:   Phase 1: 1-3 RPE (resting to easy) until 3 mo ( April 7) post chemo/rad/immuno ( progress to phase 2)  Educated pt on benefits of working in suggested RPE to ensure safe range for exercise during/post chemotherapy/radiation to maximize gains from exercise and to minimize acute and latent side effects of cancer treatment. Patient was educated as to the importance of gradual progression as well as recovery days and issued HO and RPE scale for home. minimize stress on heart, to much/little will tank immune system and compromise immune function    *based on research from the [facility]: (to be met in 12 treatments)  1. ? Pain:  2. ? Balance: Patient to demonstrate 37/41 on AMP  3. ? Strength: Patient to demonstrate the ability to maintain a neutral spine position during static and dynamic sitting  4. ? Function: Patient to report 32/60 on PLUS-M  5. Independent with Home Exercise Programs  6. Demonstrate Knowledge of fall prevention           Pt. Education:  [x] Yes  [] No  [] Reviewed Prior HEP/Ed  Method of Education: [] Verbal  [] Demo  [] Written  Comprehension of Education:  [] Verbalizes understanding. [] Demonstrates understanding. [] Needs review. [] Demonstrates/verbalizes HEP/Ed previously given. Plan: [x] Continue current frequency toward long and short term goals.         Time In: 1700       Time Out: 1800    Electronically signed by:  Nba Price, PT

## 2021-03-29 ENCOUNTER — APPOINTMENT (OUTPATIENT)
Dept: PHYSICAL THERAPY | Facility: CLINIC | Age: 26
End: 2021-03-29
Payer: COMMERCIAL

## 2021-03-30 ENCOUNTER — TELEPHONE (OUTPATIENT)
Dept: ONCOLOGY | Age: 26
End: 2021-03-30

## 2021-03-30 DIAGNOSIS — C49.21 PRIMARY LOWER EXTREMITY SARCOMA, RIGHT (HCC): Primary | ICD-10-CM

## 2021-03-30 NOTE — TELEPHONE ENCOUNTER
Dr Siddharth Barba stated he placed orders for CT scan and stated that he will need to see patient for f/u after CT scan and stated patient will need a CBC/CMP drawn on day of CT scan. Mami Damon, called patient to inform her of orders for CT scan. Patient to contact outpatient radiology scheduling to schedule CT scan. Patient to get a cbc/cmp drawn on day of CT scan. Bushra Denton, , to schedule f/u appt after scan with Dr Siddharth Barba.  Morro Fox

## 2021-03-30 NOTE — TELEPHONE ENCOUNTER
Orders printed for Jaime Argueta from Dr. Ciara Hinds, checked notes and saw Yasmine RN had asked Dr. Ciara Hinds to order Ct that patient was to have done at [facility] as patient preferred to have scans done locally    Called patient to inform her orders were in the system, gave number for scheduling    PT to schedule in May    Electronically signed by Martha Pedersen on 3/30/2021 at 12:58 PM

## 2021-03-31 ENCOUNTER — OFFICE VISIT (OUTPATIENT)
Dept: PHYSICAL MEDICINE AND REHAB | Age: 26
End: 2021-03-31
Payer: COMMERCIAL

## 2021-03-31 VITALS — TEMPERATURE: 98.6 F | HEART RATE: 111 BPM | DIASTOLIC BLOOD PRESSURE: 85 MMHG | SYSTOLIC BLOOD PRESSURE: 132 MMHG

## 2021-03-31 DIAGNOSIS — Z89.621 HISTORY OF DISARTICULATION OF RIGHT HIP: Primary | ICD-10-CM

## 2021-03-31 PROCEDURE — G8417 CALC BMI ABV UP PARAM F/U: HCPCS | Performed by: PHYSICAL MEDICINE & REHABILITATION

## 2021-03-31 PROCEDURE — 99212 OFFICE O/P EST SF 10 MIN: CPT | Performed by: PHYSICAL MEDICINE & REHABILITATION

## 2021-03-31 PROCEDURE — G8427 DOCREV CUR MEDS BY ELIG CLIN: HCPCS | Performed by: PHYSICAL MEDICINE & REHABILITATION

## 2021-03-31 PROCEDURE — G8484 FLU IMMUNIZE NO ADMIN: HCPCS | Performed by: PHYSICAL MEDICINE & REHABILITATION

## 2021-03-31 PROCEDURE — 1036F TOBACCO NON-USER: CPT | Performed by: PHYSICAL MEDICINE & REHABILITATION

## 2021-03-31 NOTE — PROGRESS NOTES
to accommodate PRN testing needs. Dispense all needed supplies to include: monitor, strips, lancing device, lancets, control solutions, alcohol swabs. 1 kit 0    ferrous sulfate (IRON 325) 325 (65 Fe) MG tablet Take 1 tablet by mouth 2 times daily 60 tablet 0    Prenatal MV-Min-Fe Fum-FA-DHA (PRENATAL 1 PO) Take by mouth       No current facility-administered medications for this visit. No Known Allergies    Subjective:      Review of Systems  Constitutional: Negative for fever, chills and unexpected weight change. HENT: Negative for trouble swallowing. Respiratory: Negative for cough and shortness of breath. Cardiovascular: Negative for chest pain. Endocrine: Negative for polyuria. Genitourinary: Negative for dysuria, urgency, frequency, incontinence and difficulty urinating. Gastrointestinal: Negative for constipation or diarrhea. Musculoskeletal: Negative for arthralgias. Neurological: Negative for headaches, numbness, or tingling. Psychiatric: Negative for depressed mood or anxiety. Objective:     Physical Exam  /85   Pulse 111   Temp 98.6 °F (37 °C)   Constitutional: She appears well-developed and well-nourished. In no distress. HEENT: NCAT, PERRL, EOMI. Mucous membranes pink and moist.  Pulmonary/Chest: Respirations WNL and unlabored. MSK: Functional ROM BUE and LLEs. Strength 5/5 key muscles BUE and LLEs. Performs sit to stand using arm rests of wheelchair. Stands on L leg with good balance for 1 minute. No ambulation today as she does not have prosthetic yet. Neurological: She is alert and oriented to person, place, and time. Skin: Skin is warm dry and intact with good turgor. Well healed R hip disarticulation surgical incision. Psychiatric: She has a normal mood and affect. Her behavior is normal. Thought content normal.   Nursing note and vitals reviewed.     Diagnostic Studies:   CT OF THE CHEST WITH CONTRAST 1/29/2021 10:37 am       TECHNIQUE:   CT of the chest was performed with the administration of intravenous   contrast. Multiplanar reformatted images are provided for review. Dose   modulation, iterative reconstruction, and/or weight based adjustment of the   mA/kV was utilized to reduce the radiation dose to as low as reasonably   achievable.       COMPARISON:   No priors       HISTORY:   ORDERING SYSTEM PROVIDED HISTORY: Primary lower extremity sarcoma, right ([address]   TECHNOLOGIST PROVIDED HISTORY:   sarcoma, end of therapy  to establish baseline   Reason for Exam: f/u LE Sarcoma   Acuity: Chronic   Type of Exam: Ongoing   Relevant Medical/Surgical History: f/u LE Sarcoma       FINDINGS:   Mediastinum:  Normal cardiac size.  Aorta enhances normally and is normal in   caliber.  The main pulmonary arteries are grossly normal.  No significant   mediastinal, hilar or axillary lymphadenopathy.  Thyroid gland shows no   significant abnormalities. Esophagus shows no significant abnormalities.       Lungs/pleura: There are no significant nodules or masses.  No focal   consolidation.   There is no pleural effusion or pneumothorax.  Normal   tracheobronchial tree.       Upper Abdomen: No significant abnormalities.       Soft Tissues/Bones: No acute bony abnormalities.  No aggressive lytic or   blastic lesions.  No significant superficial soft tissue lesion.  Right-sided   infusion port terminating in right atrium.           Impression   No evidence for metastatic disease.       Right-sided infusion port terminates in right atrium. Assessment:       Diagnosis Orders   1. History of disarticulation of right hip          Plan:      She is doing well from a functional standpoint and will continue to advance when she has prosthetic. Will evaluate gait in 3 months after she has some training in therapy with prosthetic. If she is stable at that time, can plan to follow annually for medication refills of gabapentin.    No orders of the defined types were placed in this encounter. No orders of the defined types were placed in this encounter. Return in about 3 months (around 6/30/2021). Electronically signedby Gillian Montez MD on 4/7/2021 at 10:33 PM.     Please note that this chartwas generated using voice recognition Dragon dictation software. Although everyeffort was made to ensure the accuracy of this automated transcription, some errorsin transcription may have occurred.

## 2021-04-01 ENCOUNTER — HOSPITAL ENCOUNTER (OUTPATIENT)
Dept: PHYSICAL THERAPY | Facility: CLINIC | Age: 26
Setting detail: THERAPIES SERIES
Discharge: HOME | End: 2021-04-01
Payer: COMMERCIAL

## 2021-04-01 NOTE — FLOWSHEET NOTE
[] [address]  [address]    [phone]  F: [phone]   [] [address]  [facility]   [address]  P: [phone]  F: [phone]  [x] [facility] &  Therapy  [address]  P: [phone]  F: [phone] [] [address]  P: [phone]  F: [phone]  [] [address]  [facility]   [address]   [place]: [phone]  F: [phone]   [] [facility]  [address] [facility] [address]  [place]: [phone]   F: [phone]     Physical Therapy Cancel/No Show note    Date: 2021  Patient: Kun Gonzalez  : 1995  MRN: [id number]    Cancels/No Shows to date:     For today's appointment patient:    [x]  Cancelled    [] Rescheduled appointment    [] No-show     Reason given by patient:    []  Patient ill    []  Conflicting appointment    [] No transportation      [] Conflict with work    [] No reason given    [] Weather related    [] [id number]    [x] Other:      Comments: Patient cancelled appointments due to insurance change      [x] Next appointment was confirmed    Electronically signed by: Jay Mosley PT

## 2021-04-05 ENCOUNTER — APPOINTMENT (OUTPATIENT)
Dept: PHYSICAL THERAPY | Facility: CLINIC | Age: 26
End: 2021-04-05
Payer: COMMERCIAL

## 2021-04-06 ENCOUNTER — HOSPITAL ENCOUNTER (OUTPATIENT)
Dept: INFUSION THERAPY | Age: 26
Discharge: HOME | End: 2021-04-06
Payer: COMMERCIAL

## 2021-04-06 DIAGNOSIS — C49.21 PRIMARY LOWER EXTREMITY SARCOMA, RIGHT (HCC): Primary | ICD-10-CM

## 2021-04-06 PROCEDURE — 96523 IRRIG DRUG DELIVERY DEVICE: CPT

## 2021-04-06 PROCEDURE — 6360000002 HC RX W HCPCS: Performed by: INTERNAL MEDICINE

## 2021-04-06 PROCEDURE — 2580000003 HC RX 258: Performed by: INTERNAL MEDICINE

## 2021-04-06 RX ORDER — SODIUM CHLORIDE 0.9 % (FLUSH) 0.9 %
10 SYRINGE (ML) INJECTION PRN
Status: DISCONTINUED | OUTPATIENT
Start: 2021-04-06 | End: 2021-04-07 | Stop reason: HOSPADM

## 2021-04-06 RX ORDER — SODIUM CHLORIDE 0.9 % (FLUSH) 0.9 %
20 SYRINGE (ML) INJECTION PRN
Status: CANCELLED | OUTPATIENT
Start: 2021-04-06

## 2021-04-06 RX ORDER — HEPARIN SODIUM (PORCINE) LOCK FLUSH IV SOLN 100 UNIT/ML 100 UNIT/ML
500 SOLUTION INTRAVENOUS PRN
Status: DISCONTINUED | OUTPATIENT
Start: 2021-04-06 | End: 2021-04-07 | Stop reason: HOSPADM

## 2021-04-06 RX ORDER — HEPARIN SODIUM (PORCINE) LOCK FLUSH IV SOLN 100 UNIT/ML 100 UNIT/ML
500 SOLUTION INTRAVENOUS PRN
Status: CANCELLED | OUTPATIENT
Start: 2021-04-06

## 2021-04-06 RX ORDER — SODIUM CHLORIDE 0.9 % (FLUSH) 0.9 %
10 SYRINGE (ML) INJECTION PRN
Status: CANCELLED | OUTPATIENT
Start: 2021-04-06

## 2021-04-06 RX ADMIN — SODIUM CHLORIDE, PRESERVATIVE FREE 10 ML: 5 INJECTION INTRAVENOUS at 15:21

## 2021-04-06 RX ADMIN — HEPARIN 500 UNITS: 100 SYRINGE at 15:26

## 2021-04-06 RX ADMIN — SODIUM CHLORIDE, PRESERVATIVE FREE 10 ML: 5 INJECTION INTRAVENOUS at 15:26

## 2021-04-06 NOTE — PROGRESS NOTES
Patient here for port flush. Port flushes and draws well. She was discharged home in stable condition. She is due to return 5/5 for port access for CT.

## 2021-04-08 ENCOUNTER — APPOINTMENT (OUTPATIENT)
Dept: PHYSICAL THERAPY | Facility: CLINIC | Age: 26
End: 2021-04-08
Payer: COMMERCIAL

## 2021-04-12 ENCOUNTER — APPOINTMENT (OUTPATIENT)
Dept: PHYSICAL THERAPY | Facility: CLINIC | Age: 26
End: 2021-04-12
Payer: COMMERCIAL

## 2021-04-15 ENCOUNTER — HOSPITAL ENCOUNTER (OUTPATIENT)
Dept: PHYSICAL THERAPY | Facility: CLINIC | Age: 26
Setting detail: THERAPIES SERIES
Discharge: HOME | End: 2021-04-15
Payer: COMMERCIAL

## 2021-04-15 PROCEDURE — 97110 THERAPEUTIC EXERCISES: CPT

## 2021-04-15 NOTE — FLOWSHEET NOTE
[]  Ther Activities     []  Aquatics     []  Vasocompression     []  Other     Total Treatment time 50 3       Assessment: [] Progressing toward goals: Reviewed HEP and patient given modifications on how to progress. Continued with core strengthening with VC's to improve TVA activation without gripping      [] Other:   Phase 1: 1-3 RPE (resting to easy) until 3 mo ( April 7) post chemo/rad/immuno ( progress to phase 2)  Educated pt on benefits of working in suggested RPE to ensure safe range for exercise during/post chemotherapy/radiation to maximize gains from exercise and to minimize acute and latent side effects of cancer treatment. Patient was educated as to the importance of gradual progression as well as recovery days and issued HO and RPE scale for home. minimize stress on heart, to much/little will tank immune system and compromise immune function    *based on research from the [facility]    STG: (to be met in 12 treatments)  1. ? Pain:  2. ? Balance: Patient to demonstrate 37/41 on AMP  3. ? Strength: Patient to demonstrate the ability to maintain a neutral spine position during static and dynamic sitting  4. ? Function: Patient to report 32/60 on PLUS-M  5. Independent with Home Exercise Programs  6. Demonstrate Knowledge of fall prevention           Pt. Education:  [x] Yes  [] No  [] Reviewed Prior HEP/Ed  Method of Education: [] Verbal  [] Demo  [] Written  Comprehension of Education:  [] Verbalizes understanding. [] Demonstrates understanding. [] Needs review. [] Demonstrates/verbalizes HEP/Ed previously given. Plan: [x] Continue current frequency toward long and short term goals.         Time In: 6pm      Time Out: 7pm    Electronically signed by:  Jovita Sierra, PT

## 2021-04-19 ENCOUNTER — HOSPITAL ENCOUNTER (OUTPATIENT)
Dept: PHYSICAL THERAPY | Facility: CLINIC | Age: 26
Setting detail: THERAPIES SERIES
End: 2021-04-19
Payer: COMMERCIAL

## 2021-04-21 ENCOUNTER — CARE COORDINATION (OUTPATIENT)
Dept: OTHER | Facility: CLINIC | Age: 26
End: 2021-04-21

## 2021-04-21 NOTE — CARE COORDINATION
Ambulatory Care Coordination Note  4/21/2021  CM Risk Score: 1  Charlson 10 Year Mortality Risk Score: 100%     ACC: Irena Hernandez RN    Summary Note: ACM attempted to reach patient for follow up call regarding prosthetic and therapy . HIPAA compliant message left requesting a return phone call at patients convenience. Will continue to follow. Care Coordination Interventions    Program Enrollment: Rising Risk  Referral from Primary Care Provider: No  Suggested Interventions and Community Resources         Goals Addressed    None         Prior to Admission medications    Medication Sig Start Date End Date Taking? Authorizing Provider   norethindrone (ORTHO MICRONOR) 0.35 MG tablet Take 1 tablet by mouth daily 1/26/21   Buddy Helton DO   Drospirenone (SLYND) 4 MG TABS Take 1 tablet by mouth daily 1/20/21   FATEMEH Daniel CNP   gabapentin (NEURONTIN) 300 MG capsule Take 1 capsule by mouth 3 times daily for 120 days. 1/6/21 5/6/21  Gina De MD   Handicap [facility] M[facility] Expiration: Lifetime 11/13/20   Maretta Dakin, APRN - CNP   omeprazole (PRILOSEC) 20 MG delayed release capsule Take 1 capsule by mouth daily 10/20/20   Esteban Chapman MD   ondansetron (ZOFRAN ODT) 8 MG TBDP disintegrating tablet Place 1 tablet under the tongue every 8 hours as needed for Nausea or Vomiting 10/20/20   Zenaida Chapman MD   lidocaine-prilocaine (EMLA) 2.5-2.5 % cream Apply topically Daily as needed. 10/20/20   Zenaida Chapman MD   ibuprofen (ADVIL;MOTRIN) 800 MG tablet Take 1 tablet by mouth every 8 hours as needed for Pain 10/13/20   Fred Ortega DO   docusate sodium (COLACE) 100 MG capsule Take 1 capsule by mouth 2 times daily as needed for Constipation 10/13/20   Fred Ortega DO   blood glucose monitor kit and supplies Dispense sufficient amount for indicated testing frequency plus additional to accommodate PRN testing needs.  Dispense all needed supplies to include: monitor, strips, lancing device, lancets, control solutions, alcohol swabs.  8/10/20   Navi Angeles DO   ferrous sulfate (IRON 325) 325 (65 Fe) MG tablet Take 1 tablet by mouth 2 times daily 8/10/20   Navi Angeles DO   Prenatal MV-Min-Fe Fum-FA-DHA (PRENATAL 1 PO) Take by mouth    Historical Provider, MD       Future Appointments   Date Time Provider Salma Oliva   5/3/2021  5:00 PM Dela Cruz Salvage, [address]   5/5/2021  9:30 AM STV PB MED ONC CHAIR 14 [facility]   5/5/2021 10:00 AM STV [place] RM STVZ PB CT [facility] [place]   5/6/2021  6:00 PM Dela Cruz Salvage, PT [facility]   5/10/2021  5:00 PM Dela Cruz Salvage, [address]   5/11/2021  3:00 PM Jorene Crigler, MD [address] [place] [address]   5/13/2021  5:00 PM Dela Cruz Salvage, [address]   6/16/2021 11:30 AM STV PB MED ONC CHAIR 11 [facility] PB [facility]   6/23/2021  2:00 PM Kevin Rey MD PHYS MED Ken Biswas

## 2021-04-22 ENCOUNTER — APPOINTMENT (OUTPATIENT)
Dept: PHYSICAL THERAPY | Facility: CLINIC | Age: 26
End: 2021-04-22
Payer: COMMERCIAL

## 2021-04-26 ENCOUNTER — APPOINTMENT (OUTPATIENT)
Dept: PHYSICAL THERAPY | Facility: CLINIC | Age: 26
End: 2021-04-26
Payer: COMMERCIAL

## 2021-04-29 ENCOUNTER — APPOINTMENT (OUTPATIENT)
Dept: PHYSICAL THERAPY | Facility: CLINIC | Age: 26
End: 2021-04-29
Payer: COMMERCIAL

## 2021-05-03 ENCOUNTER — APPOINTMENT (OUTPATIENT)
Dept: PHYSICAL THERAPY | Facility: CLINIC | Age: 26
End: 2021-05-03
Payer: COMMERCIAL

## 2021-05-05 ENCOUNTER — HOSPITAL ENCOUNTER (OUTPATIENT)
Dept: INFUSION THERAPY | Age: 26
Discharge: HOME | End: 2021-05-05
Payer: COMMERCIAL

## 2021-05-05 ENCOUNTER — HOSPITAL ENCOUNTER (OUTPATIENT)
Dept: CT IMAGING | Age: 26
Discharge: HOME | End: 2021-05-07
Payer: COMMERCIAL

## 2021-05-05 ENCOUNTER — CARE COORDINATION (OUTPATIENT)
Dept: OTHER | Facility: CLINIC | Age: 26
End: 2021-05-05

## 2021-05-05 DIAGNOSIS — C49.21 PRIMARY LOWER EXTREMITY SARCOMA, RIGHT (HCC): Primary | ICD-10-CM

## 2021-05-05 DIAGNOSIS — C49.21 PRIMARY LOWER EXTREMITY SARCOMA, RIGHT (HCC): ICD-10-CM

## 2021-05-05 PROCEDURE — 6360000004 HC RX CONTRAST MEDICATION: Performed by: INTERNAL MEDICINE

## 2021-05-05 PROCEDURE — 2580000003 HC RX 258: Performed by: INTERNAL MEDICINE

## 2021-05-05 PROCEDURE — 96523 IRRIG DRUG DELIVERY DEVICE: CPT | Performed by: NURSE PRACTITIONER

## 2021-05-05 PROCEDURE — 74177 CT ABD & PELVIS W/CONTRAST: CPT

## 2021-05-05 PROCEDURE — 6360000002 HC RX W HCPCS: Performed by: INTERNAL MEDICINE

## 2021-05-05 RX ORDER — SODIUM CHLORIDE 0.9 % (FLUSH) 0.9 %
10 SYRINGE (ML) INJECTION PRN
Status: DISCONTINUED | OUTPATIENT
Start: 2021-05-05 | End: 2021-05-08 | Stop reason: HOSPADM

## 2021-05-05 RX ORDER — HEPARIN SODIUM (PORCINE) LOCK FLUSH IV SOLN 100 UNIT/ML 100 UNIT/ML
500 SOLUTION INTRAVENOUS PRN
Status: DISCONTINUED | OUTPATIENT
Start: 2021-05-05 | End: 2021-05-06 | Stop reason: HOSPADM

## 2021-05-05 RX ORDER — HEPARIN SODIUM (PORCINE) LOCK FLUSH IV SOLN 100 UNIT/ML 100 UNIT/ML
500 SOLUTION INTRAVENOUS PRN
Status: CANCELLED | OUTPATIENT
Start: 2021-05-05

## 2021-05-05 RX ORDER — SODIUM CHLORIDE 0.9 % (FLUSH) 0.9 %
10 SYRINGE (ML) INJECTION PRN
Status: DISCONTINUED | OUTPATIENT
Start: 2021-05-05 | End: 2021-05-06 | Stop reason: HOSPADM

## 2021-05-05 RX ORDER — 0.9 % SODIUM CHLORIDE 0.9 %
80 INTRAVENOUS SOLUTION INTRAVENOUS ONCE
Status: COMPLETED | OUTPATIENT
Start: 2021-05-05 | End: 2021-05-05

## 2021-05-05 RX ORDER — SODIUM CHLORIDE 0.9 % (FLUSH) 0.9 %
20 SYRINGE (ML) INJECTION PRN
Status: CANCELLED | OUTPATIENT
Start: 2021-05-05

## 2021-05-05 RX ORDER — SODIUM CHLORIDE 0.9 % (FLUSH) 0.9 %
10 SYRINGE (ML) INJECTION PRN
Status: CANCELLED | OUTPATIENT
Start: 2021-05-05

## 2021-05-05 RX ADMIN — SODIUM CHLORIDE 80 ML: 9 INJECTION, SOLUTION INTRAVENOUS at 11:16

## 2021-05-05 RX ADMIN — SODIUM CHLORIDE, PRESERVATIVE FREE 10 ML: 5 INJECTION INTRAVENOUS at 11:40

## 2021-05-05 RX ADMIN — SODIUM CHLORIDE, PRESERVATIVE FREE 10 ML: 5 INJECTION INTRAVENOUS at 09:31

## 2021-05-05 RX ADMIN — SODIUM CHLORIDE, PRESERVATIVE FREE 10 ML: 5 INJECTION INTRAVENOUS at 11:16

## 2021-05-05 RX ADMIN — IOPAMIDOL 100 ML: 755 INJECTION, SOLUTION INTRAVENOUS at 11:16

## 2021-05-05 RX ADMIN — IOHEXOL 50 ML: 240 INJECTION, SOLUTION INTRATHECAL; INTRAVASCULAR; INTRAVENOUS; ORAL at 11:15

## 2021-05-05 RX ADMIN — HEPARIN 500 UNITS: 100 SYRINGE at 11:40

## 2021-05-05 RX ADMIN — SODIUM CHLORIDE, PRESERVATIVE FREE 10 ML: 5 INJECTION INTRAVENOUS at 09:26

## 2021-05-05 NOTE — CARE COORDINATION
Ambulatory Care Coordination Note  5/5/2021  CM Risk Score: 1  Charlson 10 Year Mortality Risk Score: 100%     ACC: Melissa Wells RN    Summary Note: ACM attempted to reach patient for follow up call regarding therapy and prosthesis . HIPAA compliant message left requesting a return phone call at patients convenience. Will continue to follow. Care Coordination Interventions    Program Enrollment: Rising Risk  Referral from Primary Care Provider: No  Suggested Interventions and Community Resources         Goals Addressed    None         Prior to Admission medications    Medication Sig Start Date End Date Taking? Authorizing Provider   norethindrone (ORTHO MICRONOR) 0.35 MG tablet Take 1 tablet by mouth daily 1/26/21   Karmen Helton DO   Drospirenone (SLYND) 4 MG TABS Take 1 tablet by mouth daily 1/20/21   FATEMEH Flores CNP   gabapentin (NEURONTIN) 300 MG capsule Take 1 capsule by mouth 3 times daily for 120 days. 1/6/21 5/6/21  Debbie Cote MD   Handicap Placard MISC Expiration: Lifetime 11/13/20   FATEMEH Blood CNP   omeprazole (PRILOSEC) 20 MG delayed release capsule Take 1 capsule by mouth daily 10/20/20   Berta Chapman MD   ondansetron (ZOFRAN ODT) 8 MG TBDP disintegrating tablet Place 1 tablet under the tongue every 8 hours as needed for Nausea or Vomiting 10/20/20   Zenaida Chapman MD   lidocaine-prilocaine (EMLA) 2.5-2.5 % cream Apply topically Daily as needed. 10/20/20   Zenaida Chapman MD   ibuprofen (ADVIL;MOTRIN) 800 MG tablet Take 1 tablet by mouth every 8 hours as needed for Pain 10/13/20   Zaida Egan DO   docusate sodium (COLACE) 100 MG capsule Take 1 capsule by mouth 2 times daily as needed for Constipation 10/13/20   Zaida Egan DO   blood glucose monitor kit and supplies Dispense sufficient amount for indicated testing frequency plus additional to accommodate PRN testing needs.  Dispense all needed supplies to include: monitor, strips, lancing device, lancets, control solutions, alcohol swabs.  8/10/20   Tanesha Angeles DO   ferrous sulfate (IRON 325) 325 (65 Fe) MG tablet Take 1 tablet by mouth 2 times daily 8/10/20   Tanesha Angeles DO   Prenatal MV-Min-Fe Fum-FA-DHA (PRENATAL 1 PO) Take by mouth    Historical Provider, MD       Future Appointments   Date Time Provider Salma Oliva   5/5/2021 10:00 AM STV [place] RM STVZ PB CT STV [place]   5/6/2021  6:00 PM Tita Comp, PT [facility]   5/11/2021  3:00 PM Dimitri Gaona MD [address] [place] [address]   5/13/2021  5:00 PM Tita Comp, PT [facility]   6/16/2021 11:30 AM STV PB MED ONC CHAIR 11 STVZ PB [facility]   6/23/2021  2:00 PM Tim Alvarado MD PHYS MED [facility]

## 2021-05-06 ENCOUNTER — HOSPITAL ENCOUNTER (OUTPATIENT)
Dept: PHYSICAL THERAPY | Facility: CLINIC | Age: 26
Setting detail: THERAPIES SERIES
Discharge: HOME | End: 2021-05-06
Payer: COMMERCIAL

## 2021-05-06 PROCEDURE — 97110 THERAPEUTIC EXERCISES: CPT

## 2021-05-10 ENCOUNTER — APPOINTMENT (OUTPATIENT)
Dept: PHYSICAL THERAPY | Facility: CLINIC | Age: 26
End: 2021-05-10
Payer: COMMERCIAL

## 2021-05-11 ENCOUNTER — TELEPHONE (OUTPATIENT)
Dept: ONCOLOGY | Age: 26
End: 2021-05-11

## 2021-05-11 ENCOUNTER — OFFICE VISIT (OUTPATIENT)
Dept: ONCOLOGY | Age: 26
End: 2021-05-11
Payer: COMMERCIAL

## 2021-05-11 VITALS
WEIGHT: 204.8 LBS | TEMPERATURE: 97.5 F | BODY MASS INDEX: 38.38 KG/M2 | DIASTOLIC BLOOD PRESSURE: 85 MMHG | HEART RATE: 98 BPM | SYSTOLIC BLOOD PRESSURE: 120 MMHG

## 2021-05-11 DIAGNOSIS — G54.6 PHANTOM PAIN AFTER AMPUTATION OF LOWER EXTREMITY (HCC): ICD-10-CM

## 2021-05-11 DIAGNOSIS — C49.21 PRIMARY LOWER EXTREMITY SARCOMA, RIGHT (HCC): Primary | ICD-10-CM

## 2021-05-11 PROCEDURE — 1036F TOBACCO NON-USER: CPT | Performed by: INTERNAL MEDICINE

## 2021-05-11 PROCEDURE — 99214 OFFICE O/P EST MOD 30 MIN: CPT | Performed by: INTERNAL MEDICINE

## 2021-05-11 PROCEDURE — G8417 CALC BMI ABV UP PARAM F/U: HCPCS | Performed by: INTERNAL MEDICINE

## 2021-05-11 PROCEDURE — G8427 DOCREV CUR MEDS BY ELIG CLIN: HCPCS | Performed by: INTERNAL MEDICINE

## 2021-05-11 RX ORDER — GABAPENTIN 300 MG/1
300 CAPSULE ORAL 3 TIMES DAILY
Qty: 90 CAPSULE | Refills: 3 | Status: SHIPPED | OUTPATIENT
Start: 2021-05-11 | End: 2021-06-23 | Stop reason: SDUPTHER

## 2021-05-11 NOTE — PROGRESS NOTES
are intact. Neck: Showed no JVD, no carotid bruit . Lungs: Clear to auscultation bilaterally. Heart: Regular without any murmur. Abdomen: Soft, nontender. No hepatosplenomegaly. Extremities: Right leg amputation. Lower extremities show no edema, clubbing, or cyanosis. Breasts: Examination not done today. Neuro exam: intact cranial nerves bilaterally no motor or sensory deficit, gait is normal. Lymphatic: no adenopathy appreciated in the supraclavicular, axillary, cervical or inguinal area    REVIEW OF LABORATORY DATA:   Lab Results   Component Value Date    WBC 6.7 02/23/2021    HGB 12.9 02/23/2021    HCT 37.6 02/23/2021    MCV 92.5 02/23/2021     02/23/2021   '    Chemistry        Component Value Date/Time     02/23/2021 1503    K 3.9 02/23/2021 1503     02/23/2021 1503    CO2 27 02/23/2021 1503    BUN 14 02/23/2021 1503    CREATININE <0.40 (L) 02/23/2021 1503        Component Value Date/Time    CALCIUM 9.6 02/23/2021 1503    ALKPHOS 55 02/23/2021 1503    AST 15 02/23/2021 1503    ALT 18 02/23/2021 1503    BILITOT 0.16 (L) 02/23/2021 1503          REVIEW OF RADIOLOGICAL RESULTS:   CT scan 5/2021  Impression   No evidence for metastatic disease in the chest, abdomen or pelvis. IMPRESSION:   1. 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 Current pregnancy  4. Adjuvant chemo following delivery with ifosfamide and adriamycin ([facility]) - started 11/03/2020    PLAN:   1. We discussed her CT which is negative for evidence of disease. 2. She remains asymptomatic and is doing well in remission. 3. We reviewed follow up plan. 4. I am refilling her Neurontin. 5. I encouraged her to consider swimming as part of reconditioning,   6. Return in 3 months with lab work.

## 2021-05-11 NOTE — TELEPHONE ENCOUNTER
AVS from 5/11/21    Return in 3 months with CBC CMP    RV scheduled 8/10/21 @ 3:30pm    Port flushes scheduled up to RV    PT was given AVS and an appt schedule    Electronically signed by Cornelio Marshall on 5/11/2021 at 4:00 PM

## 2021-05-13 ENCOUNTER — HOSPITAL ENCOUNTER (OUTPATIENT)
Dept: PHYSICAL THERAPY | Facility: CLINIC | Age: 26
Setting detail: THERAPIES SERIES
Discharge: HOME | End: 2021-05-13
Payer: COMMERCIAL

## 2021-05-13 PROCEDURE — 97110 THERAPEUTIC EXERCISES: CPT

## 2021-05-14 NOTE — FLOWSHEET NOTE
became fatigued toward the end of treatment and had difficulty flexing knee unit. Phase 1: 1-3 RPE (resting to easy) until 3 mo ( April 7) post chemo/rad/immuno ( progress to phase 2)  Educated pt on benefits of working in suggested RPE to ensure safe range for exercise during/post chemotherapy/radiation to maximize gains from exercise and to minimize acute and latent side effects of cancer treatment. Patient was educated as to the importance of gradual progression as well as recovery days and issued HO and RPE scale for home. minimize stress on heart, to much/little will tank immune system and compromise immune function    *based on research from the [facility]    STG: (to be met in 12 treatments)  1. ? Pain:  2. ? Balance: Patient to demonstrate 37/41 on AMP  3. ? Strength: Patient to demonstrate the ability to maintain a neutral spine position during static and dynamic sitting  4. ? Function: Patient to report 32/60 on PLUS-M  5. Independent with Home Exercise Programs  6. Demonstrate Knowledge of fall prevention           Pt. Education:  [x] Yes  [] No  [] Reviewed Prior HEP/Ed  Method of Education: [] Verbal  [] Demo  [] Written  Comprehension of Education:  [] Verbalizes understanding. [] Demonstrates understanding. [] Needs review. [] Demonstrates/verbalizes HEP/Ed previously given. Plan: [x] Continue current frequency toward long and short term goals.         Time In: 5pm      Time Out: 6pm    Electronically signed by:  Jay Mosley, PT

## 2021-05-18 ENCOUNTER — HOSPITAL ENCOUNTER (OUTPATIENT)
Dept: PHYSICAL THERAPY | Facility: CLINIC | Age: 26
Setting detail: THERAPIES SERIES
Discharge: HOME | End: 2021-05-18
Payer: COMMERCIAL

## 2021-05-18 PROCEDURE — 97110 THERAPEUTIC EXERCISES: CPT

## 2021-05-18 NOTE — FLOWSHEET NOTE
[] [address]  [address]  [phone]  F: [phone] [] [address]  [facility]   [address]  P: [phone]  F: [phone] [x] [address]  Therapy  [facility]  P: [phone]  F: [phone] [] [address]  P: [phone]  F: [phone] [] [address]  [facility]   [address]   [place]: [phone]  F: [phone]      Physical Therapy Daily Treatment Note    Date:  2021  Patient Name:  Raciel Cummins    :  1995  MRN: [id number]   Physician: Micki Guidry MD                            Insurance: [facility] medical necessity  Medical Diagnosis: P47.970 (ICD-10-CM) - History of disarticulation of right hip  Rehab Codes: R26.2 Difficulty walking  Onset date: 20               Next Dr's appt. : tbd  Visit# / total visits:     Cancels/No Shows: 0/0    Subjective:    Pain:  [] Yes  [x] No  Pain Rating: (0-10 scale) 0/10  Pain altered Tx:  [] No  [] Yes  Action:  Comments: Patient arrives to therapy today with prosthesis    Objective:  Modalities:   Precautions:  Exercises:  Exercise Reps/ Time Weight/ Level Comments Completed   Today   Wt. shifting   s-s, f-b x   Gait training   In p bars x   Step tap    x   alphabet    x   UE ER  3.5 unilateral    Ball toss  3.5 unilateral    Cone reach       Balance EO EC   No UE support x                     Treatment Charges: Mins Units   []  Modalities     [x]  Ther Exercise 50 3   []  Manual Therapy     []  Ther Activities     []  Aquatics     []  Vasocompression     []  Other     Total Treatment time 50 3       Assessment: [] Progressing toward goals:     [x] Other: Continued with gait training and stability training in parallel bars.  Patient

## 2021-05-19 ENCOUNTER — CARE COORDINATION (OUTPATIENT)
Dept: OTHER | Facility: CLINIC | Age: 26
End: 2021-05-19

## 2021-05-19 NOTE — CARE COORDINATION
Ambulatory Care Coordination Note  2021  CM Risk Score: 1  Charlson 10 Year Mortality Risk Score: 100%     ACC: Delfina Webb RN    Summary Note: [facility]) contacted the patient by telephone to follow up on progress, discuss new issues or concerns, and reinforce/ provide patient education. Verified name and  with patient as identifiers. Pt is now doing the test sockets with her final prosthetic. Pt will eventually get whats called a hip socket it is like a pair of shorts which will add lining and cushion to the area. 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 Scans are clear on recent scans. Pt said the scans are every 3 months for 2 years. Then after year 5 then its yearly. Pt said she feels pretty good most all days Pt said her baby is doing well. She is going to stay local for her oncology care and not go to [facility]. Reinforced/ Provided Education:  Discussed red flags and appropriate site of care based on symptoms and resources available to patient including: Specialist and [address]. Importance and benefits of: Follow up with PCP and specialist, medication adherence, self monitoring and reporting of symptoms. Plan:  Continue monthly  outreaches to provide telephonic support, education and resources as needed. Discuss / follow up on: Goal progress and progress with prosthetic and therapy. Pt verbalized understanding and is agreeable to follow up contact. Care Coordination Interventions    Program Enrollment: Rising Risk  Referral from Primary Care Provider: No  Suggested Interventions and Community Resources         Goals Addressed                    This Visit's Progress      Conditions and Symptoms   On track      I will schedule office visits, as directed by my provider.   I will keep my appointment or reschedule frequency plus additional to accommodate PRN testing needs. Dispense all needed supplies to include: monitor, strips, lancing device, lancets, control solutions, alcohol swabs.  8/10/20   Angelica Angeles DO   ferrous sulfate (IRON 325) 325 (65 Fe) MG tablet Take 1 tablet by mouth 2 times daily 8/10/20   Angelica Angeles DO   Prenatal MV-Min-Fe Fum-FA-DHA (PRENATAL 1 PO) Take by mouth    Historical Provider, MD       Future Appointments   Date Time Provider Salma Oliva   5/20/2021  6:00 PM Carey Furnish, [address]   5/27/2021  6:00 PM Carey Furnish, [address]   6/1/2021 10:00 AM Carey Furnish, [address]   6/2/2021  3:30 PM [facility] MED ONC CHAIR 11 [facility] PB [facility]  Belinda Crooked   6/3/2021  6:00 PM Carey Furnish, [address]   6/7/2021  6:00 PM Carey Furnish, [address]   6/10/2021  6:00 PM Carey Furnish, [address]   6/14/2021  6:00 PM Carey Furnish, [address]   6/17/2021  6:00 PM Carey Furnish, [address]   6/21/2021  6:00 PM Carey Furnish, [address]   6/23/2021  2:00 PM Stevo Hooper MD PHYS MED [facility]   6/24/2021  6:00 PM Carey Yanaish, PT [facility] [place]   7/7/2021  3:30 PM [facility] MED ONC CHAIR 07 ST[facility] PB Zelalem Hector   8/10/2021  3:30 PM [facility] MED ONC CHAIR 13 [facility] PB [facility]   8/10/2021  3:30 PM Jolene Russell MD [address] [place] [address]

## 2021-05-20 ENCOUNTER — HOSPITAL ENCOUNTER (OUTPATIENT)
Dept: PHYSICAL THERAPY | Facility: CLINIC | Age: 26
Setting detail: THERAPIES SERIES
Discharge: HOME | End: 2021-05-20
Payer: COMMERCIAL

## 2021-05-20 PROCEDURE — 97110 THERAPEUTIC EXERCISES: CPT

## 2021-05-27 ENCOUNTER — HOSPITAL ENCOUNTER (OUTPATIENT)
Dept: PHYSICAL THERAPY | Facility: CLINIC | Age: 26
Setting detail: THERAPIES SERIES
Discharge: HOME | End: 2021-05-27
Payer: COMMERCIAL

## 2021-05-27 PROCEDURE — 97110 THERAPEUTIC EXERCISES: CPT

## 2021-05-27 NOTE — FLOWSHEET NOTE
[] [address]  [address]  [phone]  F: [phone] [] [address]  [employer] 36   [address]  P: [phone]  F: [phone] [x] [address]  Therapy  [facility]  P: [phone]  F: [phone] [] [address]  P: [phone]  F: [phone] [] [address]  [facility]   [address]   [place]: [phone]  F: [phone]      Physical Therapy Daily Treatment Note    Date:  2021  Patient Name:  Kelvin Beaulieu    :  1995  MRN: [id number]   Physician: Rica Hernandez MD                            Insurance: [facility] medical necessity  Medical Diagnosis: H05.288 (ICD-10-CM) - History of disarticulation of right hip  Rehab Codes: R26.2 Difficulty walking  Onset date: 20               Next Dr's appt. : tbd  Visit# / total visits: 15    Cancels/No Shows: 0/0    Subjective:    Pain:  [] Yes  [x] No  Pain Rating: (0-10 scale) 0/10  Pain altered Tx:  [] No  [] Yes  Action:  Comments: Patient arrives to therapy today with prosthesis    Objective:  Modalities:   Precautions:  Exercises:  Exercise Reps/ Time Weight/ Level Comments Completed   Today   Wt. shifting   s-s, f-b x   Gait training   In p bars x   Step tap    x   alphabet    x   UE ER  3.5 unilateral    Ball toss  3.5 unilateral    Cone reach       Balance EO EC   No UE support x                     Treatment Charges: Mins Units   []  Modalities     [x]  Ther Exercise 50 3   []  Manual Therapy     []  Ther Activities     []  Aquatics     []  Vasocompression     []  Other     Total Treatment time 50 3       Assessment: [] Progressing toward goals:     [x] Other: Patient having fit challenges today with mild discomfort reported in socket. Multi adjustments  Needed for comfort  Phase 1: 1-3 RPE (resting to easy) until 3 mo ( April 7) post chemo/rad/immuno ( progress to phase 2)  Educated pt on benefits of working in suggested RPE to ensure safe range for exercise during/post chemotherapy/radiation to maximize gains from exercise and to minimize acute and latent side effects of cancer treatment. Patient was educated as to the importance of gradual progression as well as recovery days and issued HO and RPE scale for home. minimize stress on heart, to much/little will tank immune system and compromise immune function    *based on research from the [facility]    STG: (to be met in 12 treatments)  1. ? Pain:  2. ? Balance: Patient to demonstrate 37/41 on AMP  3. ? Strength: Patient to demonstrate the ability to maintain a neutral spine position during static and dynamic sitting  4. ? Function: Patient to report 32/60 on PLUS-M  5. Independent with Home Exercise Programs  6. Demonstrate Knowledge of fall prevention           Pt. Education:  [x] Yes  [] No  [] Reviewed Prior HEP/Ed  Method of Education: [] Verbal  [] Demo  [] Written  Comprehension of Education:  [] Verbalizes understanding. [] Demonstrates understanding. [] Needs review. [] Demonstrates/verbalizes HEP/Ed previously given. Plan: [x] Continue current frequency toward long and short term goals.         Time In: 6pm     Time Out: 7pm    Electronically signed by:  Lanie Moss, PT

## 2021-05-27 NOTE — FLOWSHEET NOTE
[] [address]  [address]  [phone]  F: [phone] [] [address]  [facility]   [address]  P: [phone]  F: [phone] [x] [address]  Therapy  1500 [facility]  P: [phone]  F: [phone] [] [address]  P: [phone]  F: [phone] [] [address]  [facility]   [address]   [place]: [phone]  F: [phone]      Physical Therapy Daily Treatment Note    Date:  2021  Patient Name:  Candy Rowe    :  1995  MRN: [id number]   Physician: Ambrose Riley MD                            Insurance: [facility] medical necessity  Medical Diagnosis: G68.569 (ICD-10-CM) - History of disarticulation of right hip  Rehab Codes: R26.2 Difficulty walking  Onset date: 20               Next 's appt. : tbd  Visit# / total visits: 16    Cancels/No Shows: 0/0    Subjective:    Pain:  [] Yes  [x] No  Pain Rating: (0-10 scale) 0/10  Pain altered Tx:  [] No  [] Yes  Action:  Comments: Patient arrives to therapy today with prosthesis    Objective:  Modalities:   Precautions:  Exercises:  Exercise Reps/ Time Weight/ Level Comments Completed   Today   Wt. shifting   s-s, f-b x   Gait training   In p bars x   Step tap    x   alphabet    x   UE ER  3.5 unilateral    Ball toss  3.5 unilateral    Cone reach       Balance EO EC   No UE support x                   Standing balance activities completed on uneven surfaces    Treatment Charges: Mins Units   []  Modalities     [x]  Ther Exercise 50 3   []  Manual Therapy     []  Ther Activities     []  Aquatics     []  Vasocompression     []  Other     Total Treatment time 50 3       Assessment: [] Progressing toward goals:     [x] Other: Focused treatment on gait training and walking tin P bars. VC's given to  Reduce rotation and increase AP pelvic movement to propel forward. Patient saw prosthetist today and prosthesis is fitting better    Phase 1: 1-3 RPE (resting to easy) until 3 mo ( April 7) post chemo/rad/immuno ( progress to phase 2)  Educated pt on benefits of working in suggested RPE to ensure safe range for exercise during/post chemotherapy/radiation to maximize gains from exercise and to minimize acute and latent side effects of cancer treatment. Patient was educated as to the importance of gradual progression as well as recovery days and issued HO and RPE scale for home. minimize stress on heart, to much/little will tank immune system and compromise immune function    *based on research from the [facility]: (to be met in 12 treatments)  1. ? Pain:  2. ? Balance: Patient to demonstrate 37/41 on AMP  3. ? Strength: Patient to demonstrate the ability to maintain a neutral spine position during static and dynamic sitting  4. ? Function: Patient to report 32/60 on PLUS-M  5. Independent with Home Exercise Programs  6. Demonstrate Knowledge of fall prevention           Pt. Education:  [x] Yes  [] No  [] Reviewed Prior HEP/Ed  Method of Education: [] Verbal  [] Demo  [] Written  Comprehension of Education:  [] Verbalizes understanding. [] Demonstrates understanding. [] Needs review. [] Demonstrates/verbalizes HEP/Ed previously given. Plan: [x] Continue current frequency toward long and short term goals.         Time In: 6pm     Time Out: 7pm    Electronically signed by:  Tiara Virk PT

## 2021-06-01 ENCOUNTER — HOSPITAL ENCOUNTER (OUTPATIENT)
Dept: PHYSICAL THERAPY | Facility: CLINIC | Age: 26
Setting detail: THERAPIES SERIES
Discharge: HOME | End: 2021-06-01
Payer: COMMERCIAL

## 2021-06-01 PROCEDURE — 97110 THERAPEUTIC EXERCISES: CPT

## 2021-06-01 NOTE — PROGRESS NOTES
[] [address]  [address]  [phone]  F: [phone] [] [address]  [employer] 36   [address]  P: [phone]  F: [phone] [x] [address]  Therapy  [address] [facility]  P: [phone]  F: [phone] [] [email or web address]  P: [phone]  F: [phone] [] [address]  [facility]   [address]   [place]: [phone]  F: [phone]      Physical Therapy Progress Note  Date: 2021      Patient: Raciel Cummins  : 1995  MRN: [id number]   Physician: Elodia Valle MD                            Insurance: O medical necessity  Medical Diagnosis: Z89.621 (ICD-10-CM) - History of disarticulation of right hip  Rehab Codes: R26.2 Difficulty walking  Onset date: 20               Next 's appt. : tbd  Visit# / total visits: 16                    Cancels/No Shows: 0/0    Subjective:  Pain:  [] Yes  [x] No Pain Rating: (0-10 scale) 0/10  Pain altered Tx:  [] No  [] Yes  Action:  Comments: Patient reports no changes since last session    Objective:  Test Measurements:  Ambulation= in P bars with prosthesis  Function:   AMP=34/47 waas 31/47  PLUS-M=40/60 was 26/40  Standing dynamic  Balance FAIR patient requires UE assist  Exercises:  Exercise Reps/ Time Weight/ Level Comments Completed   Today   Wt. shifting     s-s, f-b x   Gait training     In p bars x   Step tap       x   alphabet       x   UE ER   3.5 unilateral     Ball toss   3.5 unilateral     Cone reach           Balance static and dynamic     No UE support x                              Treatment Charges: Mins Units   []? Modalities       [x]? Ther Exercise 50 3   []? Manual Therapy       []? Ther Activities       []? Aquatics       []? Vasocompression       []? Other       Total Treatment time 50 3      Assessment:  STG: (to be met in 12 treatments)  1. ? Pain:  2. ? Balance: Patient to demonstrate 37/41 on AMP-ON GOING  3. ? Strength: Patient to demonstrate the ability to maintain a neutral spine position during static and dynamic sitting-MET  4. ? Function: Patient to report 32/60 on PLUS-M-MET  5. Independent with Home Exercise Programs-MET  6. Demonstrate Knowledge of fall prevention-MET     New STG 10 visist  1. Patient to demonstrate FAIR+ dynamic standing balance  2. Patient to demonstrate ambulation with LRD SBA for 25ft         Treatment Plan:  [x] Therapeutic Exercise   73846  [] Iontophoresis: 4 mg/mL Dexamethasone Sodium Phosphate  mAmin  39695   [] Therapeutic Activity  48760 [] Vasopneumatic cold with compression  65045    [x] Gait Training   17881 [] Ultrasound   07597   [x] Neuromuscular Re-education  41845 [] Electrical Stimulation Unattended  09410   [x] Manual Therapy  73549 [] Electrical Stimulation Attended  97399   [x] Instruction in HEP  [] Lumbar/Cervical Traction  [id number]   [] Aquatic Therapy   75144 [] Cold/hotpack      Patient Status:     [] Continue per initial plan of care. [x] Additional visits necessary. [x] Other: Patient has progressed well with pre prosthetic training and demonstrates improved control in core with VC's to correct alignment when in prosthesis. She is IND with donning doffing and good static standing  Balance. Patient would benefit from additional skilled therapy to improve functional mobility and safety with ambulation. Requested Frequency/Duration: 2 times per week for 10 treatments. Electronically signed by Paradise Jeff PT on 6/1/2021 at 10:10 AM  If you have any questions or concerns, please don't hesitate to call.   Thank you for your referral.    Physician Signature:________________________________Date:__________________  By signing above or cosigning this note, I have

## 2021-06-03 ENCOUNTER — HOSPITAL ENCOUNTER (OUTPATIENT)
Dept: PHYSICAL THERAPY | Facility: CLINIC | Age: 26
Setting detail: THERAPIES SERIES
Discharge: HOME | End: 2021-06-03
Payer: COMMERCIAL

## 2021-06-03 PROCEDURE — 97110 THERAPEUTIC EXERCISES: CPT

## 2021-06-04 NOTE — FLOWSHEET NOTE
[] [address]  [address]  [phone]  F: [phone] [] [address]  [facility] 36   [address]  P: [phone]  F: [phone] [x] [address]  Therapy  [address]  P: [phone]  F: [phone] [] [address]  P: [phone]  F: [phone] [] [address]  [facility]   [address]   [place]: [phone]  F: [phone]      Physical Therapy Daily Treatment Note    Date: 6/3/2021  Patient Name:  Rush Metzger    :  1995  MRN: [id number]   Physician: Dave Ruiz MD                            Insurance: [facility] medical necessity  Medical Diagnosis: F86.646 (ICD-10-CM) - History of disarticulation of right hip  Rehab Codes: R26.2 Difficulty walking  Onset date: 20               Next Dr's appt. : tbd  Visit# / total visits:     Cancels/No Shows: 0/0    Subjective:    Pain:  [] Yes  [x] No  Pain Rating: (0-10 scale) 0/10  Pain altered Tx:  [] No  [] Yes  Action:  Comments: Patient arrives to therapy today with prosthesis    Objective:  Modalities:   Precautions:  Exercises:  Exercise Reps/ Time Weight/ Level Comments Completed   Today   Warm up    x   Gait training   In p bars x   Step tap    x   Static balance    x            Treatment Charges: Mins Units   []  Modalities     [x]  Ther Exercise 50 3   []  Manual Therapy     []  Ther Activities     []  Aquatics     []  Vasocompression     []  Other     Total Treatment time 50 3       Assessment: [] Progressing toward goals:     [x] Other: Continued with gait training in parallel bars focusing on increasing abdominal assist with prosthetic progression. Improved turns with no UE assist toward prosthetic one UE assist with turns away.  Patient is improving in confidence and consistency  Phase 1: 1-3 RPE (resting to easy) until 3 mo ( April 7) post chemo/rad/immuno ( progress to phase 2)  Educated pt on benefits of working in suggested RPE to ensure safe range for exercise during/post chemotherapy/radiation to maximize gains from exercise and to minimize acute and latent side effects of cancer treatment. Patient was educated as to the importance of gradual progression as well as recovery days and issued HO and RPE scale for home. minimize stress on heart, to much/little will tank immune system and compromise immune function    *based on research from the [facility]    STG: (to be met in 27 treatments)  New STG 10 visist  1. Patient to demonstrate FAIR+ dynamic standing balance  2. Patient to demonstrate ambulation with LRD SBA for 25ft            Pt. Education:  [x] Yes  [] No  [] Reviewed Prior HEP/Ed  Method of Education: [] Verbal  [] Demo  [] Written  Comprehension of Education:  [] Verbalizes understanding. [] Demonstrates understanding. [] Needs review. [] Demonstrates/verbalizes HEP/Ed previously given. Plan: [x] Continue current frequency toward long and short term goals.         Time In: 6pm     Time Out: 7pm    Electronically signed by:  Rosalee Woodard, PT

## 2021-06-07 ENCOUNTER — APPOINTMENT (OUTPATIENT)
Dept: PHYSICAL THERAPY | Facility: CLINIC | Age: 26
End: 2021-06-07
Payer: COMMERCIAL

## 2021-06-07 ENCOUNTER — HOSPITAL ENCOUNTER (OUTPATIENT)
Dept: INFUSION THERAPY | Age: 26
Discharge: HOME | End: 2021-06-07
Payer: COMMERCIAL

## 2021-06-07 DIAGNOSIS — C49.21 PRIMARY LOWER EXTREMITY SARCOMA, RIGHT (HCC): Primary | ICD-10-CM

## 2021-06-07 PROCEDURE — 96523 IRRIG DRUG DELIVERY DEVICE: CPT

## 2021-06-07 PROCEDURE — 6360000002 HC RX W HCPCS: Performed by: INTERNAL MEDICINE

## 2021-06-07 PROCEDURE — 2580000003 HC RX 258: Performed by: INTERNAL MEDICINE

## 2021-06-07 RX ORDER — SODIUM CHLORIDE 0.9 % (FLUSH) 0.9 %
20 SYRINGE (ML) INJECTION PRN
Status: CANCELLED | OUTPATIENT
Start: 2021-06-07

## 2021-06-07 RX ORDER — SODIUM CHLORIDE 0.9 % (FLUSH) 0.9 %
10 SYRINGE (ML) INJECTION PRN
Status: CANCELLED | OUTPATIENT
Start: 2021-06-07

## 2021-06-07 RX ORDER — HEPARIN SODIUM (PORCINE) LOCK FLUSH IV SOLN 100 UNIT/ML 100 UNIT/ML
500 SOLUTION INTRAVENOUS PRN
Status: DISCONTINUED | OUTPATIENT
Start: 2021-06-07 | End: 2021-06-08 | Stop reason: HOSPADM

## 2021-06-07 RX ORDER — SODIUM CHLORIDE 0.9 % (FLUSH) 0.9 %
10 SYRINGE (ML) INJECTION PRN
Status: DISCONTINUED | OUTPATIENT
Start: 2021-06-07 | End: 2021-06-08 | Stop reason: HOSPADM

## 2021-06-07 RX ORDER — HEPARIN SODIUM (PORCINE) LOCK FLUSH IV SOLN 100 UNIT/ML 100 UNIT/ML
500 SOLUTION INTRAVENOUS PRN
Status: CANCELLED | OUTPATIENT
Start: 2021-06-07

## 2021-06-07 RX ADMIN — HEPARIN 500 UNITS: 100 SYRINGE at 10:37

## 2021-06-07 RX ADMIN — SODIUM CHLORIDE, PRESERVATIVE FREE 10 ML: 5 INJECTION INTRAVENOUS at 10:36

## 2021-06-10 ENCOUNTER — HOSPITAL ENCOUNTER (OUTPATIENT)
Dept: PHYSICAL THERAPY | Facility: CLINIC | Age: 26
Setting detail: THERAPIES SERIES
Discharge: HOME | End: 2021-06-10
Payer: COMMERCIAL

## 2021-06-10 PROCEDURE — 97110 THERAPEUTIC EXERCISES: CPT

## 2021-06-10 NOTE — FLOWSHEET NOTE
given dur to demonstration of fatigue. Phase 1: 1-3 RPE (resting to easy) until 3 mo ( April 7) post chemo/rad/immuno ( progress to phase 2)  Educated pt on benefits of working in suggested RPE to ensure safe range for exercise during/post chemotherapy/radiation to maximize gains from exercise and to minimize acute and latent side effects of cancer treatment. Patient was educated as to the importance of gradual progression as well as recovery days and issued HO and RPE scale for home. minimize stress on heart, to much/little will tank immune system and compromise immune function    *based on research from the [facility]    STG: (to be met in 27 treatments)  New STG 10 visist  1. Patient to demonstrate FAIR+ dynamic standing balance  2. Patient to demonstrate ambulation with LRD SBA for 25ft            Pt. Education:  [x] Yes  [] No  [] Reviewed Prior HEP/Ed  Method of Education: [] Verbal  [] Demo  [] Written  Comprehension of Education:  [] Verbalizes understanding. [] Demonstrates understanding. [] Needs review. [] Demonstrates/verbalizes HEP/Ed previously given. Plan: [x] Continue current frequency toward long and short term goals.         Time In: 6pm     Time Out: 7pm    Electronically signed by:  Kayla Singh, PT

## 2021-06-13 RX ORDER — ACETAMINOPHEN AND CODEINE PHOSPHATE 120; 12 MG/5ML; MG/5ML
1 SOLUTION ORAL DAILY
Qty: 28 TABLET | Refills: 4 | Status: SHIPPED | OUTPATIENT
Start: 2021-06-13 | End: 2021-07-15

## 2021-06-14 ENCOUNTER — HOSPITAL ENCOUNTER (OUTPATIENT)
Dept: PHYSICAL THERAPY | Facility: CLINIC | Age: 26
Setting detail: THERAPIES SERIES
Discharge: HOME | End: 2021-06-14
Payer: COMMERCIAL

## 2021-06-14 PROCEDURE — 97110 THERAPEUTIC EXERCISES: CPT

## 2021-06-16 NOTE — FLOWSHEET NOTE
[] [address]  [address]  [phone]  F: [phone] [] [address]  [facility] 36   [address]  P: [phone]  F: [phone] [x] [address]  Therapy  [address]  P: [phone]  F: [phone] [] [address]  P: [phone]  F: [phone] [] [address]  [facility]   [address]   [place]: [phone]  F: [phone]      Physical Therapy Daily Treatment Note    Date: 2021  Patient Name:  Sigrid Mcgovern    :  1995  MRN: [id number]   Physician: Le Mueller MD                            Insurance: [facility] medical necessity  Medical Diagnosis: K65.543 (ICD-10-CM) - History of disarticulation of right hip  Rehab Codes: R26.2 Difficulty walking  Onset date: 20               Next Dr's appt. : tbd  Visit# / total visits:     Cancels/No Shows: 0/0    Subjective:    Pain:  [] Yes  [x] No  Pain Rating: (0-10 scale) 0/10  Pain altered Tx:  [] No  [] Yes  Action:  Comments: Patient reports no changes today    Objective:  Modalities:   Precautions:  Exercises:  Exercise Reps/ Time Weight/ Level Comments Completed   Today   Warm up    x   Gait training   In p bars x          Static balance    x            Treatment Charges: Mins Units   []  Modalities     [x]  Ther Exercise 50 3   []  Manual Therapy     []  Ther Activities     []  Aquatics     []  Vasocompression     []  Other     Total Treatment time 50 3       Assessment: [] Progressing toward goals:     [x] Other: Patient demonstrated improvement in ability to complete turns without UE assistance and completed 2 passes in P bars with only 1 UE assist. VC's used throughout to improve sequencing and stability.        Phase 1: 1-3 RPE (resting to easy) until 3 mo ( April 7) post chemo/rad/immuno ( progress to phase 2)  Educated pt on benefits of working in suggested RPE to ensure safe range for exercise during/post chemotherapy/radiation to maximize gains from exercise and to minimize acute and latent side effects of cancer treatment. Patient was educated as to the importance of gradual progression as well as recovery days and issued HO and RPE scale for home. minimize stress on heart, to much/little will tank immune system and compromise immune function    *based on research from the [facility]    STG: (to be met in 27 treatments)  New STG 10 visist  1. Patient to demonstrate FAIR+ dynamic standing balance  2. Patient to demonstrate ambulation with LRD SBA for 25ft            Pt. Education:  [x] Yes  [] No  [] Reviewed Prior HEP/Ed  Method of Education: [] Verbal  [] Demo  [] Written  Comprehension of Education:  [] Verbalizes understanding. [] Demonstrates understanding. [] Needs review. [] Demonstrates/verbalizes HEP/Ed previously given. Plan: [x] Continue current frequency toward long and short term goals.         Time In: 6pm     Time Out: 7pm    Electronically signed by:  Darell Morales, PT

## 2021-06-17 ENCOUNTER — HOSPITAL ENCOUNTER (OUTPATIENT)
Dept: PHYSICAL THERAPY | Facility: CLINIC | Age: 26
Setting detail: THERAPIES SERIES
Discharge: HOME | End: 2021-06-17
Payer: COMMERCIAL

## 2021-06-17 PROCEDURE — 97110 THERAPEUTIC EXERCISES: CPT

## 2021-06-21 ENCOUNTER — HOSPITAL ENCOUNTER (OUTPATIENT)
Dept: PHYSICAL THERAPY | Facility: CLINIC | Age: 26
Setting detail: THERAPIES SERIES
Discharge: HOME | End: 2021-06-21
Payer: COMMERCIAL

## 2021-06-21 PROCEDURE — 97110 THERAPEUTIC EXERCISES: CPT

## 2021-06-21 NOTE — FLOWSHEET NOTE
[] [address]  [address]  [phone]  F: [phone] [] [address]  [facility] 36   [address]  P: [phone]  F: [phone] [x] [address]  Therapy  [address] [facility]  P: [phone]  F: [phone] [] [address]  P: [phone]  F: [phone] [] [address]  [facility]   [address]   [place]: [phone]  F: [phone]      Physical Therapy Daily Treatment Note    Date: 2021  Patient Name:  Anne St    :  1995  MRN: [id number]   Physician: Kevin Rey MD                            Insurance: [address] [place] [address] medical necessity  Medical Diagnosis: E78.857 (ICD-10-CM) - History of disarticulation of right hip  Rehab Codes: R26.2 Difficulty walking  Onset date: 20               Next 's appt. : tbd  Visit# / total visits:     Cancels/No Shows: 0/0    Subjective:    Pain:  [] Yes  [x] No  Pain Rating: (0-10 scale) 0/10  Pain altered Tx:  [] No  [] Yes  Action:  Comments: Patient reports no changes today    Objective:  Modalities:   Precautions:  Exercises:  Exercise Reps/ Time Weight/ Level Comments Completed   Today   Warm up    x   Gait training   In p bars x          Static balance    x            Treatment Charges: Mins Units   []  Modalities     [x]  Ther Exercise 50 3   []  Manual Therapy     []  Ther Activities     []  Aquatics     []  Vasocompression     []  Other     Total Treatment time 50 3       Assessment: [] Progressing toward goals:     [x] Other: Patient having difficulty with sequencing today and catching foot with early knee flexion.  Continued good ability and increasing confidence in turning without UE assist    Phase 1: 1-3 RPE (resting to easy) until 3 mo ( ) post chemo/rad/immuno ( progress to phase 2)  Educated pt on benefits of working in suggested RPE to ensure safe range for exercise during/post chemotherapy/radiation to maximize gains from exercise and to minimize acute and latent side effects of cancer treatment. Patient was educated as to the importance of gradual progression as well as recovery days and issued HO and RPE scale for home. minimize stress on heart, to much/little will tank immune system and compromise immune function    *based on research from the [facility]    STG: (to be met in 27 treatments)  New STG 10 visist  1. Patient to demonstrate FAIR+ dynamic standing balance  2. Patient to demonstrate ambulation with LRD SBA for 25ft            Pt. Education:  [x] Yes  [] No  [] Reviewed Prior HEP/Ed  Method of Education: [] Verbal  [] Demo  [] Written  Comprehension of Education:  [] Verbalizes understanding. [] Demonstrates understanding. [] Needs review. [] Demonstrates/verbalizes HEP/Ed previously given. Plan: [x] Continue current frequency toward long and short term goals.         Time In: 6pm     Time Out: 7pm    Electronically signed by:  Mal Litten, PT

## 2021-06-23 ENCOUNTER — CARE COORDINATION (OUTPATIENT)
Dept: OTHER | Facility: CLINIC | Age: 26
End: 2021-06-23

## 2021-06-23 ENCOUNTER — OFFICE VISIT (OUTPATIENT)
Dept: PHYSICAL MEDICINE AND REHAB | Age: 26
End: 2021-06-23
Payer: COMMERCIAL

## 2021-06-23 VITALS
TEMPERATURE: 97.4 F | SYSTOLIC BLOOD PRESSURE: 133 MMHG | WEIGHT: 202.3 LBS | HEIGHT: 61 IN | HEART RATE: 96 BPM | BODY MASS INDEX: 38.19 KG/M2 | DIASTOLIC BLOOD PRESSURE: 85 MMHG

## 2021-06-23 DIAGNOSIS — Z89.621 HISTORY OF DISARTICULATION OF RIGHT HIP: Primary | ICD-10-CM

## 2021-06-23 PROCEDURE — 99213 OFFICE O/P EST LOW 20 MIN: CPT | Performed by: PHYSICAL MEDICINE & REHABILITATION

## 2021-06-23 RX ORDER — GABAPENTIN 300 MG/1
300 CAPSULE ORAL 2 TIMES DAILY
Qty: 60 CAPSULE | Refills: 3 | Status: SHIPPED | OUTPATIENT
Start: 2021-06-23 | End: 2021-06-23

## 2021-06-23 RX ORDER — GABAPENTIN 300 MG/1
300 CAPSULE ORAL 2 TIMES DAILY
Qty: 60 CAPSULE | Refills: 3 | Status: SHIPPED | OUTPATIENT
Start: 2021-06-23 | End: 2022-03-02 | Stop reason: SDUPTHER

## 2021-06-23 NOTE — FLOWSHEET NOTE
[] [address]  [address]  [phone]  F: [phone] [] [address]  [facility] 36   [address]  P: [phone]  F: [phone] [x] [address]  Therapy  [address] [facility]  P: [phone]  F: [phone] [] [address]  P: [phone]  F: [phone] [] [address]  [facility]   [address]   [place]: [phone]  F: [phone]      Physical Therapy Daily Treatment Note    Date: 2021  Patient Name:  Luci Garrido    :  1995  MRN: [id number]   Physician: Saurav Thurston MD                            Insurance: [facility]  Medical Diagnosis: Z11.469 (ICD-10-CM) - History of disarticulation of right hip  Rehab Codes: R26.2 Difficulty walking  Onset date: 20               Next Dr's appt. : tbd  Visit# / total visits:     Cancels/No Shows: 0/0    Subjective:    Pain:  [] Yes  [x] No  Pain Rating: (0-10 scale) 0/10  Pain altered Tx:  [] No  [] Yes  Action:  Comments: Patient reports no changes today    Objective:  Modalities:   Precautions:  Exercises:  Exercise Reps/ Time Weight/ Level Comments Completed   Today   Warm up    x   Gait training   In p bars x          Static balance    x   Dynamic balance    x     Treatment Charges: Mins Units   []  Modalities     [x]  Ther Exercise 50 3   []  Manual Therapy     []  Ther Activities     []  Aquatics     []  Vasocompression     []  Other     Total Treatment time 50 3       Assessment: [] Progressing toward goals:     [x] Other: Continued with VC's to improve gait sequencing.  Patient is demonstrating improved endurance and postural strength    Phase 1: 1-3 RPE (resting to easy) until 3 mo ( ) post chemo/rad/immuno ( progress to phase 2)  Educated pt on benefits of working in suggested RPE to ensure safe range for exercise during/post chemotherapy/radiation to maximize gains from exercise and to minimize acute and latent side effects of cancer treatment. Patient was educated as to the importance of gradual progression as well as recovery days and issued HO and RPE scale for home. minimize stress on heart, to much/little will tank immune system and compromise immune function    *based on research from the [facility]    STG: (to be met in 27 treatments)  New STG 10 visist  1. Patient to demonstrate FAIR+ dynamic standing balance  2. Patient to demonstrate ambulation with LRD SBA for 25ft            Pt. Education:  [x] Yes  [] No  [] Reviewed Prior HEP/Ed  Method of Education: [] Verbal  [] Demo  [] Written  Comprehension of Education:  [] Verbalizes understanding. [] Demonstrates understanding. [] Needs review. [] Demonstrates/verbalizes HEP/Ed previously given. Plan: [x] Continue current frequency toward long and short term goals.         Time In: 6pm     Time Out: 7pm    Electronically signed by:  Kayla Singh, PT

## 2021-06-23 NOTE — PROGRESS NOTES
[address] PHYSICAL MEDICINE & REHABILITATION  [address] [facility]  Dept: [phone]  Dept Fax: [phone]    Outpatient Followup Note    Sasha Camejo, 26 y.o., female, presents for follow up c/o of Leg amputation (Right hip disarticulation)  . HPI:     HPI  Patient with history R hip disarticulation for treatment of stage 3 osteosarcoma. She is being seen in follow up today. She has been performing therapy and has taken some steps in the parallel bars. She has some looseness of her socket and suspension so the prosthetic is at her prosthetists office and she does not have it with her today. She would like to renew her drivers license in August and has questions about requirements to drive with her L leg.      Past Medical History:   Diagnosis Date    Diabetes mellitus ([facility] [address]     Gestational    History of anemia     prior blood and iron transfusion, without reactions    Osteosarcoma     Wears glasses       Past Surgical History:   Procedure Laterality Date    IR PORT PLACEMENT EQUAL OR GREATER THAN 5 YEARS  10/27/2020    IR PORT PLACEMENT EQUAL OR GREATER THAN 5 YEARS 10/27/2020 Angela Oliva MD [facility]    LEG AMPUTATION AT HIP Right 07/23/2020    [facility], osteosarcoma     Family History   Problem Relation Age of Onset    Diabetes Mother     Diabetes Maternal Grandmother     Heart Surgery Maternal Grandmother     Stroke Maternal Grandfather     Hypertension Maternal Grandfather      Social History     Socioeconomic History    Marital status:      Spouse name: None    Number of children: 0    Years of education: None    Highest education level: None   Occupational History    Occupation: [employer]    Tobacco Use    Smoking status: Never Smoker    Smokeless tobacco: Never Used   Vaping Use    Vaping Use: Never used   Substance and Sexual Activity    Alcohol use: Not Currently    Drug use: Never    Sexual activity: Yes     Partners: Male   Other Topics Concern    None   Social History Narrative    ** Merged History Encounter **          Social Determinants of Health     Financial Resource Strain:     Difficulty of Paying Living Expenses:    Food Insecurity: No Food Insecurity    Worried About Running Out of Food in the Last Year: Never true    Angeli of Food in the Last Year: Never true   Transportation Needs: No Transportation Needs    Lack of Transportation (Medical): No    Lack of Transportation (Non-Medical): No   Physical Activity:     Days of Exercise per Week:     Minutes of Exercise per Session:    Stress:     Feeling of Stress :    Social Connections:     Frequency of Communication with Friends and Family:     Frequency of Social Gatherings with Friends and Family:     Attends [employer] Services:     Active Member of Clubs or Organizations:     Attends Club or Organization Meetings:     Marital Status:    Intimate Partner Violence:     Fear of Current or Ex-Partner:     Emotionally Abused:     Physically Abused:     Sexually Abused:        Current Outpatient Medications   Medication Sig Dispense Refill    gabapentin (NEURONTIN) 300 MG capsule Take 1 capsule by mouth 2 times daily for 120 days. 60 capsule 3    norethindrone (ORTHO MICRONOR) 0.35 MG tablet Take 1 tablet by mouth daily 28 tablet 4    Drospirenone (SLYND) 4 MG TABS Take 1 tablet by mouth daily 28 tablet 6    Handicap Placard MISC Expiration: Lifetime 2 each 0    omeprazole (PRILOSEC) 20 MG delayed release capsule Take 1 capsule by mouth daily 30 capsule 3    ondansetron (ZOFRAN ODT) 8 MG TBDP disintegrating tablet Place 1 tablet under the tongue every 8 hours as needed for Nausea or Vomiting 30 tablet 3    lidocaine-prilocaine (EMLA) 2.5-2.5 % cream Apply topically Daily as needed.  1 Tube 1    ibuprofen (ADVIL;MOTRIN) 800 MG tablet Take 1 tablet by mouth every 8 hours as needed for Pain 60 tablet 1    docusate sodium (COLACE) 100 MG capsule Take 1 capsule by mouth 2 times daily as needed for Constipation 60 capsule 0    blood glucose monitor kit and supplies Dispense sufficient amount for indicated testing frequency plus additional to accommodate PRN testing needs. Dispense all needed supplies to include: monitor, strips, lancing device, lancets, control solutions, alcohol swabs. 1 kit 0    ferrous sulfate (IRON 325) 325 (65 Fe) MG tablet Take 1 tablet by mouth 2 times daily 60 tablet 0    Prenatal MV-Min-Fe Fum-FA-DHA (PRENATAL 1 PO) Take by mouth       No current facility-administered medications for this visit. No Known Allergies    Subjective:      Review of Systems  Constitutional: Negative for fever, chills and unexpected weight change. HENT: Negative for trouble swallowing. Respiratory: Negative for cough and shortness of breath. Cardiovascular: Negative for chest pain. Endocrine: Negative for polyuria. Genitourinary: Negative for dysuria, urgency, frequency, incontinence and difficulty urinating. Gastrointestinal: Negative for constipation or diarrhea. Musculoskeletal: Negative for arthralgias. Neurological: Negative for headaches, numbness, or tingling. Psychiatric: Negative for depressed mood or anxiety. Objective:     Physical Exam  /85   Pulse 96   Temp 97.4 °F (36.3 °C) (Temporal)   Ht 5' 1\" (1.549 m)   Wt 202 lb 4.8 oz (91.8 kg)   BMI 38.22 kg/m²   Constitutional: She appears well-developed and well-nourished. In no distress. HEENT: NCAT, PERRL, EOMI. Mucous membranes pink and moist.  Pulmonary/Chest: Respirations WNL and unlabored. MSK: Functional ROM LLE. Strength 5/5 key muscles LLE. Neurological: She is alert and oriented to person, place, and time. DTRs 2+ and symmetric LLE  Skin: Skin is warm dry and intact with good turgor. Psychiatric: She has a normal mood and affect. Her behavior is normal. Thought content normal.   Nursing note and vitals reviewed. Diagnostic Studies: None new    Assessment:       Diagnosis Orders   1. History of disarticulation of right hip          Plan: Will refill gabapentin. Will reassess when she has her prosthetic. Will research driving requirements with drivers specialist and update patient. No orders of the defined types were placed in this encounter. Orders Placed This Encounter   Medications    DISCONTD: gabapentin (NEURONTIN) 300 MG capsule     Sig: Take 1 capsule by mouth 2 times daily for 120 days. Dispense:  60 capsule     Refill:  3    gabapentin (NEURONTIN) 300 MG capsule     Sig: Take 1 capsule by mouth 2 times daily for 120 days. Dispense:  60 capsule     Refill:  3     Return in about 3 months (around 9/23/2021). Electronically signedby Olena St MD on 7/5/2021 at 11:55 PM.     Please note that this chartwas generated using voice recognition Dragon dictation software. Although everyeffort was made to ensure the accuracy of this automated transcription, some errorsin transcription may have occurred.

## 2021-06-23 NOTE — CARE COORDINATION
Ambulatory Care Coordination Note  2021  CM Risk Score: 1  Charlson 10 Year Mortality Risk Score: 100%     ACC: Lisa Tinoco RN    Summary Note: [facility]) contacted the patient by telephone to follow up on progress, discuss new issues or concerns, and reinforce/ provide patient education. Verified name and  with patient as identifiers. Pt is doing well, progressing in therapy. She is continuing to walk on parallel bars with prosthetic. She is using w/c at times at home. States her baby is now 7 months old and crawling and doing well. Pt is fully engaged with all care team members and physicians. She is compliant with tx plan and medication adherence. She monitors her blood sugars closely   Reinforced/ Provided Education:  Discussed red flags and appropriate site of care based on symptoms and resources available to patient including: Specialist, When to call 911 and [employer]. Importance and benefits of: Follow up with PCP and specialist, medication adherence, self monitoring and reporting of symptoms. Plan:  Pt and I discussed graduation from program. She feels she has everything in place she needs and all services are in place and going well. She is doing well she said, feels good and the goal is for her to just be more consistent with walking. She was very thankful for all the calls and support and said she will call me if anything changes. ACM will discharge patient at this time. She is in agreement     Suyapa MOSES, RN- 2014 [facility]  [phone]          Care Coordination Interventions    Program Enrollment: Rising Risk  Referral from Primary Care Provider: No  Suggested Interventions and Community Resources         Goals Addressed                    This Visit's Progress      COMPLETED: Conditions and Symptoms         I will schedule office visits, as directed by my provider.   I will keep my appointment or reschedule if I have to cancel. I will notify my provider of any barriers to my plan of care. I will notify my provider of any symptoms that indicate a worsening of my condition. Barriers: overwhelmed by complexity of regimen  Plan for overcoming my barriers: working on utilizing support persons  Confidence: 10/10  Anticipated Goal Completion Date: 11/30/20  Goal Met 6/23/21          COMPLETED: Patient Stated (pt-stated)         Pt will continue to go to [place] [address] for fitting of prosthetic leg    Barriers: none  Plan for overcoming my barriers: N/A  Confidence: 10/10  Anticipated Goal Completion Date: 10/30/2020  Goal Met:             Prior to Admission medications    Medication Sig Start Date End Date Taking? Authorizing Provider   gabapentin (NEURONTIN) 300 MG capsule Take 1 capsule by mouth 2 times daily for 120 days. 6/23/21 10/21/21  Andrea Zayas MD   norethindrone (ORTHO MICRONOR) 0.35 MG tablet Take 1 tablet by mouth daily 6/13/21   Lupe Helton DO   Drospirenone (SLYND) 4 MG TABS Take 1 tablet by mouth daily 1/20/21   [facility], APRN - CNP   Handicap Placard MISC Expiration: Lifetime 11/13/20   Hudson Eliazar, APRN - CNP   omeprazole (PRILOSEC) 20 MG delayed release capsule Take 1 capsule by mouth daily 10/20/20   Jose Chapman MD   ondansetron (ZOFRAN ODT) 8 MG TBDP disintegrating tablet Place 1 tablet under the tongue every 8 hours as needed for Nausea or Vomiting 10/20/20   Zenaida Chapman MD   lidocaine-prilocaine (EMLA) 2.5-2.5 % cream Apply topically Daily as needed.  10/20/20   Zenaida Chapman MD   ibuprofen (ADVIL;MOTRIN) 800 MG tablet Take 1 tablet by mouth every 8 hours as needed for Pain 10/13/20   Myrla Common, DO   docusate sodium (COLACE) 100 MG capsule Take 1 capsule by mouth 2 times daily as needed for Constipation 10/13/20   Myrla Common, DO   blood glucose monitor kit and supplies Dispense sufficient amount for indicated testing frequency plus additional to accommodate PRN testing needs. Dispense all needed supplies to include: monitor, strips, lancing device, lancets, control solutions, alcohol swabs.  8/10/20   Frankey Honer Cacciotti, DO   ferrous sulfate (IRON 325) 325 (65 Fe) MG tablet Take 1 tablet by mouth 2 times daily 8/10/20   Frankey Honer Cacciotti, DO   Prenatal MV-Min-Fe Fum-FA-DHA (PRENATAL 1 PO) Take by mouth    Historical Provider, MD       Future Appointments   Date Time Provider Salma Oliva   6/24/2021  6:00 PM Virginia Hughes [address]   6/28/2021  6:00 PM Virginia Hughes [address]   7/7/2021  3:30 PM [facility] MED ONC CHAIR 07 [facility] [address]O [facility]   8/10/2021  3:30 PM [facility] MED ONC CHAIR 13 [facility] [address]O Young   8/10/2021  3:30 PM Tabatha Pickett MD [address] [place] [address]   9/15/2021  2:00 PM Keyana Villalobos MD PHYS MED [facility] [address] [facility]

## 2021-06-24 ENCOUNTER — HOSPITAL ENCOUNTER (OUTPATIENT)
Dept: PHYSICAL THERAPY | Facility: CLINIC | Age: 26
Setting detail: THERAPIES SERIES
Discharge: HOME | End: 2021-06-24
Payer: COMMERCIAL

## 2021-06-24 PROCEDURE — 97110 THERAPEUTIC EXERCISES: CPT

## 2021-06-25 NOTE — FLOWSHEET NOTE
[] [address]  [address]  [phone]  F: [phone] [] [address]  [facility] 36   [address]  P: [phone]  F: [phone] [x] [address] &  Therapy  [address] [facility]  P: [phone]  F: [phone] [] [address]  P: [phone]  F: [phone] [] [address]  [facility]   [address]   [place]: [phone]  F: [phone]      Physical Therapy Daily Treatment Note    Date: 2021  Patient Name:  Bethany Hernandez    :  1995  MRN: [id number]   Physician: Bharat Meehan MD                            Insurance: [facility] medical necessity  Medical Diagnosis: U98.881 (ICD-10-CM) - History of disarticulation of right hip  Rehab Codes: R26.2 Difficulty walking  Onset date: 20               Next Dr's appt. : tbd  Visit# / total visits:     Cancels/No Shows: 0/0    Subjective:    Pain:  [] Yes  [x] No  Pain Rating: (0-10 scale) 0/10  Pain altered Tx:  [] No  [] Yes  Action:  Comments: Patient presents with new prosthesis    Objective:  Modalities:   Precautions:  Exercises:  Exercise Reps/ Time Weight/ Level Comments Completed   Today   Warm up    x   Gait training   In p bars x          Static balance    x            Treatment Charges: Mins Units   []  Modalities     [x]  Ther Exercise 50 3   []  Manual Therapy     []  Ther Activities     []  Aquatics     []  Vasocompression     []  Other     Total Treatment time 50 3       Assessment: [] Progressing toward goals:     [x] Other: Patient demonstrates improved fitting prosthesis and is able to maintain position in the socket without multi re-adjustments. Trial ambulation in P bars using bilateral Lofstrand crutches and CGA.  VC's used to improve knee flexion timing and sequencing of crutches     Phase 1: 1-3 RPE (resting to easy) until 3 mo ( April 7) post chemo/rad/immuno ( progress to phase 2)  Educated pt on benefits of working in suggested RPE to ensure safe range for exercise during/post chemotherapy/radiation to maximize gains from exercise and to minimize acute and latent side effects of cancer treatment. Patient was educated as to the importance of gradual progression as well as recovery days and issued HO and RPE scale for home. minimize stress on heart, to much/little will tank immune system and compromise immune function    *based on research from the [facility]    STG: (to be met in 27 treatments)  New STG 10 visist  1. Patient to demonstrate FAIR+ dynamic standing balance  2. Patient to demonstrate ambulation with LRD SBA for 25ft            Pt. Education:  [x] Yes  [] No  [] Reviewed Prior HEP/Ed  Method of Education: [] Verbal  [] Demo  [] Written  Comprehension of Education:  [] Verbalizes understanding. [] Demonstrates understanding. [] Needs review. [] Demonstrates/verbalizes HEP/Ed previously given. Plan: [x] Continue current frequency toward long and short term goals.         Time In: 6pm     Time Out: 7pm    Electronically signed by:  Rosalee Woodard, PT

## 2021-06-28 ENCOUNTER — TELEPHONE (OUTPATIENT)
Dept: ONCOLOGY | Age: 26
End: 2021-06-28

## 2021-06-28 ENCOUNTER — HOSPITAL ENCOUNTER (OUTPATIENT)
Dept: PHYSICAL THERAPY | Facility: CLINIC | Age: 26
Setting detail: THERAPIES SERIES
Discharge: HOME | End: 2021-06-28
Payer: COMMERCIAL

## 2021-06-28 PROCEDURE — 97110 THERAPEUTIC EXERCISES: CPT

## 2021-06-28 NOTE — FLOWSHEET NOTE
[] [address]  [address]  [phone]  F: [phone] [] [address]  [facility] 36   [address]  P: [phone]  F: [phone] [x] [address]  Therapy  [address] [facility]  P: [phone]  F: [phone] [] [address]  P: [phone]  F: [phone] [] [address]  [facility]   [address]   [place]: [phone]  F: [phone]      Physical Therapy Daily Treatment Note    Date: 2021  Patient Name:  Aminata Artis    :  1995  MRN: [id number]   Physician: Skylar Bah MD                            Insurance: [facility] medical necessity  Medical Diagnosis: Q04.547 (ICD-10-CM) - History of disarticulation of right hip  Rehab Codes: R26.2 Difficulty walking  Onset date: 20               Next Dr's appt. : tbd  Visit# / total visits:     Cancels/No Shows: 0/0    Subjective:    Pain:  [] Yes  [x] No  Pain Rating: (0-10 scale) 0/10  Pain altered Tx:  [] No  [] Yes  Action:  Comments: Patient reports no new issues today    Objective:  Modalities:   Precautions:  Exercises:  Exercise Reps/ Time Weight/ Level Comments Completed   Today   Warm up    x   Gait training   In p bars x          Static balance    x            Treatment Charges: Mins Units   []  Modalities     [x]  Ther Exercise 40 3   []  Manual Therapy     []  Ther Activities     []  Aquatics     []  Vasocompression     []  Other     Total Treatment time 40 3       Assessment: [] Progressing toward goals:     [x] Other: Patient completed the majority of gait training using only one UE assist. VC's used to improve postural stabilization.  Patient demonstrated improved confidence with reduced assistance    Phase 1: 1-3 RPE (resting to easy) until 3 mo ( April 7) post chemo/rad/immuno ( progress to phase 2)  Educated pt on benefits of working in suggested RPE to ensure safe range for exercise during/post chemotherapy/radiation to maximize gains from exercise and to minimize acute and latent side effects of cancer treatment. Patient was educated as to the importance of gradual progression as well as recovery days and issued HO and RPE scale for home. minimize stress on heart, to much/little will tank immune system and compromise immune function    *based on research from the [facility]    STG: (to be met in 27 treatments)  New STG 10 visist  1. Patient to demonstrate FAIR+ dynamic standing balance  2. Patient to demonstrate ambulation with LRD SBA for 25ft            Pt. Education:  [x] Yes  [] No  [] Reviewed Prior HEP/Ed  Method of Education: [] Verbal  [] Demo  [] Written  Comprehension of Education:  [] Verbalizes understanding. [] Demonstrates understanding. [] Needs review. [] Demonstrates/verbalizes HEP/Ed previously given. Plan: [x] Continue current frequency toward long and short term goals.         Time In: 615pm     Time Out: 7pm    Electronically signed by:  Anahi Huerta, PT

## 2021-06-29 ENCOUNTER — TELEPHONE (OUTPATIENT)
Dept: ONCOLOGY | Age: 26
End: 2021-06-29

## 2021-06-29 NOTE — TELEPHONE ENCOUNTER
received referral from Survivorship Nurse stating patient had questions about returning to work/disability and medical bill assistance.  called patient to discuss.  sent financial assistance information for [employer]a [employer] and [employer]. Patient states she was approved for Social Security Disability.  discuss options with returning to work.  encouraged patient to call with any questions.

## 2021-07-07 ENCOUNTER — HOSPITAL ENCOUNTER (OUTPATIENT)
Dept: INFUSION THERAPY | Age: 26
Discharge: HOME | End: 2021-07-07
Payer: COMMERCIAL

## 2021-07-07 DIAGNOSIS — C49.21 PRIMARY LOWER EXTREMITY SARCOMA, RIGHT (HCC): Primary | ICD-10-CM

## 2021-07-07 PROCEDURE — 6360000002 HC RX W HCPCS: Performed by: INTERNAL MEDICINE

## 2021-07-07 PROCEDURE — 2580000003 HC RX 258: Performed by: INTERNAL MEDICINE

## 2021-07-07 PROCEDURE — 96523 IRRIG DRUG DELIVERY DEVICE: CPT

## 2021-07-07 RX ORDER — HEPARIN SODIUM (PORCINE) LOCK FLUSH IV SOLN 100 UNIT/ML 100 UNIT/ML
500 SOLUTION INTRAVENOUS PRN
Status: CANCELLED | OUTPATIENT
Start: 2021-07-07

## 2021-07-07 RX ORDER — SODIUM CHLORIDE 0.9 % (FLUSH) 0.9 %
20 SYRINGE (ML) INJECTION PRN
Status: CANCELLED | OUTPATIENT
Start: 2021-07-07

## 2021-07-07 RX ORDER — HEPARIN SODIUM (PORCINE) LOCK FLUSH IV SOLN 100 UNIT/ML 100 UNIT/ML
500 SOLUTION INTRAVENOUS PRN
Status: DISCONTINUED | OUTPATIENT
Start: 2021-07-07 | End: 2021-07-08 | Stop reason: HOSPADM

## 2021-07-07 RX ORDER — SODIUM CHLORIDE 0.9 % (FLUSH) 0.9 %
10 SYRINGE (ML) INJECTION PRN
Status: DISCONTINUED | OUTPATIENT
Start: 2021-07-07 | End: 2021-07-08 | Stop reason: HOSPADM

## 2021-07-07 RX ORDER — SODIUM CHLORIDE 0.9 % (FLUSH) 0.9 %
10 SYRINGE (ML) INJECTION PRN
Status: CANCELLED | OUTPATIENT
Start: 2021-07-07

## 2021-07-07 RX ADMIN — HEPARIN 500 UNITS: 100 SYRINGE at 15:26

## 2021-07-07 RX ADMIN — SODIUM CHLORIDE, PRESERVATIVE FREE 10 ML: 5 INJECTION INTRAVENOUS at 15:26

## 2021-07-12 ENCOUNTER — HOSPITAL ENCOUNTER (OUTPATIENT)
Dept: PHYSICAL THERAPY | Facility: CLINIC | Age: 26
Setting detail: THERAPIES SERIES
Discharge: HOME | End: 2021-07-12
Payer: COMMERCIAL

## 2021-07-12 PROCEDURE — 97110 THERAPEUTIC EXERCISES: CPT

## 2021-07-12 NOTE — FLOWSHEET NOTE
[] [address]  [address]  [phone]  F: [phone] [] [address]  [facility] 36   [address]  P: [phone]  F: [phone] [x] [address]  Therapy  [address] [facility] [address]  P: [phone]  F: [phone] [] [address]  P: [phone]  F: [phone] [] [address]  [facility]   [address]   [place]: [phone]  F: [phone]      Physical Therapy Daily Treatment Note    Date: 2021  Patient Name:  Edwardo Oh    :  1995  MRN: [id number]   Physician: Yessica Jennings MD                            Insurance: [facility] medical necessity  Medical Diagnosis: J63.589 (ICD-10-CM) - History of disarticulation of right hip  Rehab Codes: R26.2 Difficulty walking  Onset date: 20               Next Dr's appt. : tbd  Visit# / total visits:     Cancels/No Shows: 0/0    Subjective:    Pain:  [] Yes  [x] No  Pain Rating: (0-10 scale) 0/10  Pain altered Tx:  [] No  [] Yes  Action:  Comments: Patient reports no new issues today    Objective:  Modalities:   Precautions:  Exercises:  Exercise Reps/ Time Weight/ Level Comments Completed   Today   Warm up    x   Gait training   In p bars x          Static balance    x            Treatment Charges: Mins Units   []  Modalities     [x]  Ther Exercise 50 3   []  Manual Therapy     []  Ther Activities     []  Aquatics     []  Vasocompression     []  Other     Total Treatment time 50 3       Assessment: [] Progressing toward goals:     [x] Other: Continued with gait training with one UE assist with improved confidence.  VC's used to improve sequencing    Phase 1: 1-3 RPE (resting to easy) until 3 mo ( ) post chemo/rad/immuno ( progress to phase 2)  Educated pt on benefits of working in suggested RPE to ensure safe range for exercise during/post chemotherapy/radiation to maximize gains from exercise and to minimize acute and latent side effects of cancer treatment. Patient was educated as to the importance of gradual progression as well as recovery days and issued HO and RPE scale for home. minimize stress on heart, to much/little will tank immune system and compromise immune function    *based on research from the [facility]    STG: (to be met in 27 treatments)  New STG 10 visist  1. Patient to demonstrate FAIR+ dynamic standing balance  2. Patient to demonstrate ambulation with LRD SBA for 25ft            Pt. Education:  [x] Yes  [] No  [] Reviewed Prior HEP/Ed  Method of Education: [] Verbal  [] Demo  [] Written  Comprehension of Education:  [] Verbalizes understanding. [] Demonstrates understanding. [] Needs review. [] Demonstrates/verbalizes HEP/Ed previously given. Plan: [x] Continue current frequency toward long and short term goals.         Time In: 6pm     Time Out: 7pm    Electronically signed by:  Adolfo Mejia, PT

## 2021-07-15 ENCOUNTER — HOSPITAL ENCOUNTER (OUTPATIENT)
Dept: PHYSICAL THERAPY | Facility: CLINIC | Age: 26
Setting detail: THERAPIES SERIES
Discharge: HOME | End: 2021-07-15
Payer: COMMERCIAL

## 2021-07-15 PROCEDURE — 97110 THERAPEUTIC EXERCISES: CPT

## 2021-07-15 RX ORDER — NORETHINDRONE
KIT
Qty: 84 TABLET | Refills: 3 | Status: SHIPPED | OUTPATIENT
Start: 2021-07-15 | End: 2022-06-13 | Stop reason: SDUPTHER

## 2021-07-15 NOTE — FLOWSHEET NOTE
[] [address]  [address]  [phone]  F: [phone] [] [address]  [place] 36   [address]  P: [phone]  F: [phone] [x] [address]  Therapy  1500 [facility]  P: [phone]  F: [phone] [] [address]  P: [phone]  F: [phone] [] [address]  [facility]   [address]   [place]: [phone]  F: [phone]      Physical Therapy Daily Treatment Note    Date: 7/15/2021  Patient Name:  Dawson Morris    :  1995  MRN: [id number]   Physician: Erica Hall MD                            Insurance: [facility] medical necessity  Medical Diagnosis: P32.236 (ICD-10-CM) - History of disarticulation of right hip  Rehab Codes: R26.2 Difficulty walking  Onset date: 20               Next 's appt. : tbd  Visit# / total visits:     Cancels/No Shows: 0/0    Subjective:    Pain:  [] Yes  [x] No  Pain Rating: (0-10 scale) 0/10  Pain altered Tx:  [] No  [] Yes  Action:  Comments: Patient reports no new issues today    Objective:  Modalities:   Precautions:  Exercises:  Exercise Reps/ Time Weight/ Level Comments Completed   Today   Warm up program    x   Gait training   In p bars w/ and wo forearm crutch x          Static balance    x            Treatment Charges: Mins Units   []  Modalities     [x]  Ther Exercise 50 3   []  Manual Therapy     []  Ther Activities     []  Aquatics     []  Vasocompression     []  Other     Total Treatment time 50 3       Assessment: [] Progressing toward goals:     [x] Other: Continued with gait training with one UE assist with improved confidence. Initiated ambulating with forearm crutch today.  Patient with difficulty maintaining sequence and neutral posture with crutch. VC's used to improve sequencing and encourage consistent step length. Phase 1: 1-3 RPE (resting to easy) until 3 mo ( April 7) post chemo/rad/immuno ( progress to phase 2)  Educated pt on benefits of working in suggested RPE to ensure safe range for exercise during/post chemotherapy/radiation to maximize gains from exercise and to minimize acute and latent side effects of cancer treatment. Patient was educated as to the importance of gradual progression as well as recovery days and issued HO and RPE scale for home. minimize stress on heart, to much/little will tank immune system and compromise immune function    *based on research from the [facility]    STG: (to be met in 27 treatments)  New STG 10 visist  1. Patient to demonstrate FAIR+ dynamic standing balance  2. Patient to demonstrate ambulation with LRD SBA for 25ft            Pt. Education:  [x] Yes  [] No  [] Reviewed Prior HEP/Ed  Method of Education: [] Verbal  [] Demo  [] Written  Comprehension of Education:  [] Verbalizes understanding. [] Demonstrates understanding. [] Needs review. [] Demonstrates/verbalizes HEP/Ed previously given. Plan: [x] Continue current frequency toward long and short term goals.         Time In: 6pm     Time Out: 7pm    Electronically signed by:  Manjit Bueno, PT

## 2021-07-19 ENCOUNTER — HOSPITAL ENCOUNTER (OUTPATIENT)
Dept: PHYSICAL THERAPY | Facility: CLINIC | Age: 26
Setting detail: THERAPIES SERIES
Discharge: HOME | End: 2021-07-19
Payer: COMMERCIAL

## 2021-07-19 NOTE — FLOWSHEET NOTE
[] [address]  [address]    [phone]  F: [phone]   [] [address] [facility] 36   [address]  P: [phone]  F: [phone]  [x] [address]  [address] [facility]  P: [phone]  F: [phone] [] [address]  P: [phone]  F: [phone]  [] [address]  [address] [facility] 70   [address]   [place]: [phone]  F: [phone]   [] [address]  [place]: [phone]   F: [phone]     Physical Therapy Cancel/No Show note    Date: 2021  Patient: Jaimie Dela Cruz  : 1995  MRN: [id number]    Cancels/No Shows to date: 1    For today's appointment patient:    [x]  Cancelled    [] Rescheduled appointment    [] No-show     Reason given by patient:    []  Patient ill    []  Conflicting appointment    [] No transportation      [] Conflict with work    [] No reason given    [] Weather related    [] COVID-19    [x] Other:      Comments: Patient arrived to Therapy and while donning her prosthesis realized the socket attachments were broken and she was unable to wear prosthesis.  Patient to call prosthetist in morning for adjustments       [] Next appointment was confirmed    Electronically signed by: Cassy Gonzalez PT

## 2021-07-22 ENCOUNTER — HOSPITAL ENCOUNTER (OUTPATIENT)
Dept: PHYSICAL THERAPY | Facility: CLINIC | Age: 26
Setting detail: THERAPIES SERIES
Discharge: HOME | End: 2021-07-22
Payer: COMMERCIAL

## 2021-07-22 PROCEDURE — 97110 THERAPEUTIC EXERCISES: CPT

## 2021-07-26 ENCOUNTER — HOSPITAL ENCOUNTER (OUTPATIENT)
Dept: PHYSICAL THERAPY | Facility: CLINIC | Age: 26
Setting detail: THERAPIES SERIES
Discharge: HOME | End: 2021-07-26
Payer: COMMERCIAL

## 2021-07-26 PROCEDURE — 97116 GAIT TRAINING THERAPY: CPT

## 2021-07-26 PROCEDURE — 97110 THERAPEUTIC EXERCISES: CPT

## 2021-07-29 ENCOUNTER — HOSPITAL ENCOUNTER (OUTPATIENT)
Dept: PHYSICAL THERAPY | Facility: CLINIC | Age: 26
Setting detail: THERAPIES SERIES
Discharge: HOME | End: 2021-07-29
Payer: COMMERCIAL

## 2021-07-29 PROCEDURE — 97116 GAIT TRAINING THERAPY: CPT

## 2021-07-31 NOTE — PROGRESS NOTES
[] [address]  [address]  [phone]  F: [phone] [] [address]  [facility] 36   [address]  P: [phone]  F: [phone] [x] [address]  Therapy  1500 [facility]  P: [phone]  F: [phone] [] [address]  P: [phone]  F: [phone] [] [address]  [facility]   [address]   [place]: [phone]  F: [phone]      Physical Therapy Progress Note    Date: 2021      Patient: Eligio Stovall  : 1995  MRN: [id number]   Physician: Elodia Coleman MD                            Insurance: [employer] medical necessity  Medical Diagnosis: Z89.621 (ICD-10-CM) - History of disarticulation of right hip  Rehab Codes: R26.2 Difficulty walking  Onset date: 20               Next Dr's appt. : tbd  Visit# / total visits:                     Cancels/No Shows: 0/0     Subjective:  Pain:  [] Yes  [x] No    Pain altered Tx:  [] No  [] Yes  Action:  Comments: Patient reports no new issues since last treatment she has been able to practice standing in her prosthetic more at home and is feeling mor comfortable    Objective:  Test Measurements:  TUG=1min    Function:   Ambulation around gym with bilateral forearm crutches-SBA/CGA   Ambulation around cones/CGA      Balance: Good static and dynamic balance    Assessment:  STG: (to be met in 27 treatments)  New STG 10 visist  1. Patient to demonstrate FAIR+ dynamic standing balance--MET  2. Patient to demonstrate ambulation with LRD SBA for 25ft-MET     New LTG  1. Patient to demonstrate IND ambulating in community with LRD    2. Patient to demonstrate TUG=30sec      3.  Patient to complete AMP and score = 34/47     Treatment Plan:  [x] Therapeutic Exercise 89838  [] Iontophoresis: 4 mg/mL Dexamethasone Sodium Phosphate  mAmin  18707   [] Therapeutic Activity  70127 [] Vasopneumatic cold with compression  60291    [x] Gait Training   07027 [] Ultrasound   16055   [x] Neuromuscular Re-education  00968 [] Electrical Stimulation Unattended  93958   [x] Manual Therapy  92880 [] Electrical Stimulation Attended  17218   [x] Instruction in HEP  [] Lumbar/Cervical Traction  [id number]     Patient Status:     [x] Continue per initial plan of care. [x] Additional visits necessary. [] Other:     Requested Frequency/Duration: 1-2 times per week for 10 treatments. Electronically signed by Liu Stanton PT on 7/29/2021 at 3:41 PM    If you have any questions or concerns, please don't hesitate to call. Thank you for your referral.    Physician Signature:________________________________Date:__________________  By signing above or cosigning this note, I have reviewed this plan of care and certify a need for medically necessary rehabilitation services.      *PLEASE SIGN ABOVE AND FAX BACK ALL PAGES*

## 2021-08-02 ENCOUNTER — HOSPITAL ENCOUNTER (OUTPATIENT)
Dept: PHYSICAL THERAPY | Facility: CLINIC | Age: 26
Setting detail: THERAPIES SERIES
Discharge: HOME | End: 2021-08-02
Payer: COMMERCIAL

## 2021-08-02 PROCEDURE — 97110 THERAPEUTIC EXERCISES: CPT

## 2021-08-02 PROCEDURE — 97116 GAIT TRAINING THERAPY: CPT

## 2021-08-03 NOTE — FLOWSHEET NOTE
increased fatigue. Difficulty maintain fluid sequencing with direction changes. STG: (to be met in 10 treatments)  New LTG  1. Patient to demonstrate IND ambulating in community with LRD    2. Patient to demonstrate TUG=30sec      3. Patient to complete AMP and score = 34/47               Pt. Education:  [x] Yes  [] No  [] Reviewed Prior HEP/Ed  Method of Education: [] Verbal  [] Demo  [] Written  Comprehension of Education:  [] Verbalizes understanding. [] Demonstrates understanding. [] Needs review. [] Demonstrates/verbalizes HEP/Ed previously given. Plan: [x] Continue current frequency toward long and short term goals.         Time In: 6pm     Time Out: 7pm    Electronically signed by:  Thais Spaulding, PT

## 2021-08-05 ENCOUNTER — HOSPITAL ENCOUNTER (OUTPATIENT)
Dept: PHYSICAL THERAPY | Facility: CLINIC | Age: 26
Setting detail: THERAPIES SERIES
Discharge: HOME | End: 2021-08-05
Payer: COMMERCIAL

## 2021-08-05 PROCEDURE — 97110 THERAPEUTIC EXERCISES: CPT

## 2021-08-05 PROCEDURE — 97116 GAIT TRAINING THERAPY: CPT

## 2021-08-09 ENCOUNTER — HOSPITAL ENCOUNTER (OUTPATIENT)
Dept: PHYSICAL THERAPY | Facility: CLINIC | Age: 26
Setting detail: THERAPIES SERIES
Discharge: HOME | End: 2021-08-09
Payer: COMMERCIAL

## 2021-08-09 PROCEDURE — 97110 THERAPEUTIC EXERCISES: CPT

## 2021-08-09 PROCEDURE — 97116 GAIT TRAINING THERAPY: CPT

## 2021-08-09 NOTE — FLOWSHEET NOTE
[] [address]  [address]  [phone]  F: [phone] [] [address]  [employer] 36   [address]  P: [phone]  F: [phone] [x] [address]  Therapy  [address] [facility]  P: [phone]  F: [phone] [] [address]  P: [phone]  F: [phone] [] [address]  [facility]   [address]   [place]: [phone]  F: [phone]      Physical Therapy Daily Treatment Note    Date: 2021  Patient Name:  Claudene Levins    :  1995  MRN: [id number]   Physician: Randee Jackson MD                            Insurance: [facility] medical necessity  Medical Diagnosis: H21.653 (ICD-10-CM) - History of disarticulation of right hip  Rehab Codes: R26.2 Difficulty walking  Onset date: 20               Next Dr's appt. : tbd  Visit# / total visits:     Cancels/No Shows: 0/0    Subjective:    Pain:  [] Yes  [x] No  Pain Rating: (0-10 scale) 0/10  Pain altered Tx:  [] No  [] Yes  Action:  Comments: Patient reports no new issues today    Objective:  Modalities:   Precautions:  Exercises:  Exercise Reps/ Time Weight/ Level Comments Completed   Today   Warm up program    x   Gait training   Bilateral forearm  outside P bars x          Static balance    x   Dynamic balance    x     Treatment Charges: Mins Units   []  Modalities     [x]  Ther Exercise 20 1   []  Manual Therapy     []  Ther Activities     []  Aquatics     []  Vasocompression     [x]  Other: gait training 30 2   Total Treatment time 50 3       Assessment: [] Progressing toward goals:     [x] Other:  Continued with gait training around gym using bilateral forearm crutches.  Increased repetitions of direction changes to improve transitions and confidence

## 2021-08-10 ENCOUNTER — TELEPHONE (OUTPATIENT)
Dept: ONCOLOGY | Age: 26
End: 2021-08-10

## 2021-08-10 ENCOUNTER — HOSPITAL ENCOUNTER (OUTPATIENT)
Dept: INFUSION THERAPY | Age: 26
Discharge: HOME | End: 2021-08-10
Payer: COMMERCIAL

## 2021-08-10 ENCOUNTER — OFFICE VISIT (OUTPATIENT)
Dept: ONCOLOGY | Age: 26
End: 2021-08-10
Payer: COMMERCIAL

## 2021-08-10 VITALS
BODY MASS INDEX: 38.36 KG/M2 | RESPIRATION RATE: 16 BRPM | DIASTOLIC BLOOD PRESSURE: 85 MMHG | SYSTOLIC BLOOD PRESSURE: 132 MMHG | TEMPERATURE: 98.2 F | WEIGHT: 203 LBS | HEART RATE: 104 BPM

## 2021-08-10 DIAGNOSIS — C49.21 PRIMARY LOWER EXTREMITY SARCOMA, RIGHT (HCC): Primary | ICD-10-CM

## 2021-08-10 DIAGNOSIS — C49.9 SARCOMA (HCC): ICD-10-CM

## 2021-08-10 LAB
ABSOLUTE EOS #: 0.2 K/UL (ref 0–0.4)
ABSOLUTE IMMATURE GRANULOCYTE: ABNORMAL K/UL (ref 0–0.3)
ABSOLUTE LYMPH #: 1.4 K/UL (ref 1–4.8)
ABSOLUTE MONO #: 0.5 K/UL (ref 0.1–1.2)
ALBUMIN SERPL-MCNC: 4.7 G/DL (ref 3.5–5.2)
ALBUMIN/GLOBULIN RATIO: 2 (ref 1–2.5)
ALP BLD-CCNC: 60 U/L (ref 35–104)
ALT SERPL-CCNC: 19 U/L (ref 5–33)
ANION GAP SERPL CALCULATED.3IONS-SCNC: 12 MMOL/L (ref 9–17)
AST SERPL-CCNC: 14 U/L
BASOPHILS # BLD: 1 % (ref 0–2)
BASOPHILS ABSOLUTE: 0 K/UL (ref 0–0.2)
BILIRUB SERPL-MCNC: 0.29 MG/DL (ref 0.3–1.2)
BUN BLDV-MCNC: 11 MG/DL (ref 6–20)
BUN/CREAT BLD: ABNORMAL (ref 9–20)
CALCIUM SERPL-MCNC: 9.5 MG/DL (ref 8.6–10.4)
CHLORIDE BLD-SCNC: 101 MMOL/L (ref 98–107)
CO2: 23 MMOL/L (ref 20–31)
CREAT SERPL-MCNC: 0.4 MG/DL (ref 0.5–0.9)
DIFFERENTIAL TYPE: ABNORMAL
EOSINOPHILS RELATIVE PERCENT: 3 % (ref 1–4)
GFR AFRICAN AMERICAN: >60 ML/MIN
GFR NON-AFRICAN AMERICAN: >60 ML/MIN
GFR SERPL CREATININE-BSD FRML MDRD: ABNORMAL ML/MIN/{1.73_M2}
GFR SERPL CREATININE-BSD FRML MDRD: ABNORMAL ML/MIN/{1.73_M2}
GLUCOSE BLD-MCNC: 103 MG/DL (ref 70–99)
HCT VFR BLD CALC: 38.2 % (ref 36–46)
HEMOGLOBIN: 13 G/DL (ref 12–16)
IMMATURE GRANULOCYTES: ABNORMAL %
LYMPHOCYTES # BLD: 23 % (ref 24–44)
MCH RBC QN AUTO: 29.5 PG (ref 26–34)
MCHC RBC AUTO-ENTMCNC: 34.2 G/DL (ref 31–37)
MCV RBC AUTO: 86.5 FL (ref 80–100)
MONOCYTES # BLD: 9 % (ref 2–11)
NRBC AUTOMATED: ABNORMAL PER 100 WBC
PDW BLD-RTO: 12.7 % (ref 12.5–15.4)
PLATELET # BLD: 260 K/UL (ref 140–450)
PLATELET ESTIMATE: ABNORMAL
PMV BLD AUTO: 9.1 FL (ref 6–12)
POTASSIUM SERPL-SCNC: 3.8 MMOL/L (ref 3.7–5.3)
RBC # BLD: 4.41 M/UL (ref 4–5.2)
RBC # BLD: ABNORMAL 10*6/UL
SEG NEUTROPHILS: 64 % (ref 36–66)
SEGMENTED NEUTROPHILS ABSOLUTE COUNT: 4 K/UL (ref 1.8–7.7)
SODIUM BLD-SCNC: 136 MMOL/L (ref 135–144)
TOTAL PROTEIN: 7.1 G/DL (ref 6.4–8.3)
WBC # BLD: 6.1 K/UL (ref 3.5–11)
WBC # BLD: ABNORMAL 10*3/UL

## 2021-08-10 PROCEDURE — 80053 COMPREHEN METABOLIC PANEL: CPT

## 2021-08-10 PROCEDURE — 99211 OFF/OP EST MAY X REQ PHY/QHP: CPT

## 2021-08-10 PROCEDURE — 85025 COMPLETE CBC W/AUTO DIFF WBC: CPT

## 2021-08-10 PROCEDURE — 99213 OFFICE O/P EST LOW 20 MIN: CPT | Performed by: INTERNAL MEDICINE

## 2021-08-10 NOTE — FLOWSHEET NOTE
[] [address]  [address]  [phone]  F: [phone] [] [address]  [facility]   [address]  P: [phone]  F: [phone] [x] [address]  Therapy  1500 [facility]  P: [phone]  F: [phone] [] [address]  P: [phone]  F: [phone] [] [address]  [facility]   [address]   [place]: [phone]  F: [phone]      Physical Therapy Daily Treatment Note    Date: 2021  Patient Name:  Ariana Dobbins    :  1995  MRN: [id number]   Physician: Sharon Suero MD                            Insurance: [employer]  Medical Diagnosis: Y28.607 (ICD-10-CM) - History of disarticulation of right hip  Rehab Codes: R26.2 Difficulty walking  Onset date: 20               Next 's appt. : tbd  Visit# / total visits:     Cancels/No Shows: 0/0    Subjective:    Pain:  [] Yes  [x] No  Pain Rating: (0-10 scale) 0/10  Pain altered Tx:  [] No  [] Yes  Action:  Comments: Patient reports no new issues today    Objective:  Modalities:   Precautions:  Exercises:  Exercise Reps/ Time Weight/ Level Comments Completed   Today   Warm up program    x   Gait training   Bilateral forearm  outside P bars x   Turns       Static balance    x   Dynamic balance    x     Treatment Charges: Mins Units   []  Modalities     [x]  Ther Exercise 15 1   []  Manual Therapy     []  Ther Activities     []  Aquatics     []  Vasocompression     [x]  Other: gait training 35 2   Total Treatment time 45 3       Assessment: [] Progressing toward goals:     [x] Other:  Continued with gait training around SBA only. Minimal VC's for prosthetic swing through and maintaining neutral posture. additional seated rest breaks needed.

## 2021-08-10 NOTE — PROGRESS NOTES
months. PAST MEDICAL HISTORY: has a past medical history of Diabetes mellitus ([address] History of anemia, Osteosarcoma, and Wears glasses. PAST SURGICAL HISTORY: has a past surgical history that includes Leg amputation at hip (Right, 07/23/2020) and IR PORT PLACEMENT > 5 YEARS (10/27/2020). CURRENT MEDICATIONS:  has a current medication list which includes the following prescription(s): norlyda, gabapentin, handicap placard, lidocaine-prilocaine, ibuprofen, ferrous sulfate, prenatal mv-min-fe fum-fa-dha, slynd, omeprazole, ondansetron, docusate sodium, and blood glucose monitor kit and supplies. ALLERGIES:  has No Known Allergies. FAMILY HISTORY: Negative for any hematological or oncological conditions. SOCIAL HISTORY:  reports that she has never smoked. She has never used smokeless tobacco. She reports previous alcohol use. She reports that she does not use drugs. REVIEW OF SYSTEMS:   General: No fever or night sweats. Weight is stable. ENT: No double or blurred vision, no tinnitus or hearing problem, no dysphagia or sore throat   Respiratory: No chest pain, no shortness of breath, no cough or hemoptysis. Cardiovascular: Denies chest pain, PND or orthopnea. No L E swelling or palpitations. Gastrointestinal: No nausea, vomiting, abdominal pain, diarrhea or constipation. Genitourinary: Denies dysuria, hematuria, frequency, urgency or incontinence. Gynecological: nearly resolved post partum bleeding  Neurological: Denies headaches, decreased LOC, no sensory or motor focal deficits. +post surgical phantom pain improving  Musculoskeletal: No arthralgia no back pain or joint swelling. Skin: There are no rashes or bleeding. Psychiatric: No anxiety, no depression. Endocrine: No diabetes or thyroid disease. Hematologic: No bleeding, no adenopathy. PHYSICAL EXAM: Shows a well appearing 26y.o.-year-old female who is not in pain or distress.  Vital Signs: Blood pressure 132/85, pulse 104, temperature 98.2 °F (36.8 °C), temperature source Oral, resp. rate 16, weight 203 lb (92.1 kg), not currently breastfeeding. HEENT: Normocephalic and atraumatic. Pupils are equal, round, reactive to light and accommodation. Extraocular muscles are intact. Neck: Showed no JVD, no carotid bruit . Lungs: Clear to auscultation bilaterally. Heart: Regular without any murmur. Abdomen: Soft, nontender. No hepatosplenomegaly. Extremities: Right leg amputation. Lower extremities show no edema, clubbing, or cyanosis. Breasts: Examination not done today. Neuro exam: intact cranial nerves bilaterally no motor or sensory deficit, gait is normal. Lymphatic: no adenopathy appreciated in the supraclavicular, axillary, cervical or inguinal area    REVIEW OF LABORATORY DATA:   Lab Results   Component Value Date    WBC 6.1 08/10/2021    HGB 13.0 08/10/2021    HCT 38.2 08/10/2021    MCV 86.5 08/10/2021     08/10/2021   '    Chemistry        Component Value Date/Time     02/23/2021 1503    K 3.9 02/23/2021 1503     02/23/2021 1503    CO2 27 02/23/2021 1503    BUN 14 02/23/2021 1503    CREATININE <0.40 (L) 02/23/2021 1503        Component Value Date/Time    CALCIUM 9.6 02/23/2021 1503    ALKPHOS 55 02/23/2021 1503    AST 15 02/23/2021 1503    ALT 18 02/23/2021 1503    BILITOT 0.16 (L) 02/23/2021 1503          REVIEW OF RADIOLOGICAL RESULTS:   CT scan 5/2021  Impression   No evidence for metastatic disease in the chest, abdomen or pelvis. IMPRESSION:   1. 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 Current pregnancy  4. Adjuvant chemo following delivery with ifosfamide and adriamycin (AIM) - started 11/03/2020, received 4 cycles    PLAN:   Lanie Varela continues to do very well and she is in remission  We will continue observation/surveillance at this point  We will see her back in 3 months with CT scan and lab testing.   Unfortunately she has high risk for recurrence. We will watch her closely over the next few years.

## 2021-08-10 NOTE — TELEPHONE ENCOUNTER
AVS from 8/10/21    rv in 3 months need cbc cmp and CT scan prior to RV    RV scheduled 11/2/21 @ 11:15am    PT to schedule CT scan one week prior to RV     PT will have labs drawn one week prior to return visit     PT was given AVS and an appt schedule    Electronically signed by Rodrigue Isbell on 8/10/2021 at 4:09 PM

## 2021-08-16 ENCOUNTER — HOSPITAL ENCOUNTER (OUTPATIENT)
Dept: PHYSICAL THERAPY | Facility: CLINIC | Age: 26
Setting detail: THERAPIES SERIES
Discharge: HOME | End: 2021-08-16
Payer: COMMERCIAL

## 2021-08-16 NOTE — FLOWSHEET NOTE
[] [address]  [address]    [phone]  F: [phone]   [] [address]  [facility] 36   [address]  P: [phone]  F: [phone]  [x] [address] &  Therapy  [address] [facility] [address]  P: [phone]  F: [phone] [] [address]  P: [phone]  F: [phone]  [] [address]  [facility]   [address]   [place]: [phone]  F: [phone]   [] [address]  [place]: [phone]   F: [phone]     Physical Therapy Cancel/No Show note    Date: 2021  Patient: Ezio Savage  : 1995  MRN: [id number]    Cancels/No Shows to date: 20    For today's appointment patient:    [x]  Cancelled    [] Rescheduled appointment    [] No-show     Reason given by patient:    []  Patient ill    []  Conflicting appointment    [] No transportation      [] Conflict with work    [] No reason given    [] Weather related    [] [id number]    [x] Other:      Comments: Due to insurance issues will cancel today's appointment    [x] Next appointment was confirmed    Electronically signed by: Tully Primrose, PT

## 2021-08-19 ENCOUNTER — HOSPITAL ENCOUNTER (OUTPATIENT)
Dept: PHYSICAL THERAPY | Facility: CLINIC | Age: 26
Setting detail: THERAPIES SERIES
End: 2021-08-19
Payer: COMMERCIAL

## 2021-08-26 ENCOUNTER — HOSPITAL ENCOUNTER (OUTPATIENT)
Dept: PHYSICAL THERAPY | Facility: CLINIC | Age: 26
Setting detail: THERAPIES SERIES
Discharge: HOME | End: 2021-08-26
Payer: COMMERCIAL

## 2021-08-26 PROCEDURE — 97110 THERAPEUTIC EXERCISES: CPT

## 2021-08-30 NOTE — FLOWSHEET NOTE
[] [address]  [address]  [phone]  F: [phone] [] [address]  [address]   [address]  P: [phone]  F: [phone] [x] [address] [place] [address]  Therapy  [address] [facility]  P: [phone]  F: [phone] [] [address]  P: [phone]  F: [phone] [] [address]  [facility]   [address]   [place]: [phone]  F: [phone]      Physical Therapy Daily Treatment Note    Date: 2021  Patient Name:  Nikki Perez    :  1995  MRN: [id number]   Physician: Luis Fernando Rodriguez MD                            Insurance: M[employer] 40v hard max  Medical Diagnosis: P88.548 (ICD-10-CM) - History of disarticulation of right hip  Rehab Codes: R26.2 Difficulty walking  Onset date: 20               Next Dr's appt. : tbd  Visit# / total visits:     Cancels/No Shows: 0/0    Subjective:    Pain:  [] Yes  [x] No  Pain Rating: (0-10 scale) 0/10  Pain altered Tx:  [] No  [] Yes  Action:  Comments: Patient reports no new issues today. She comes in with her own forearm crutches and had driven herself to therapy.  She states that she spoke with Wale Noel from [facility] office and was told that her initial insurance was billed for the month of March and her current insurance was billed after that    Objective:  Modalities:   Precautions:  Exercises:  Exercise Reps/ Time Weight/ Level Comments Completed   Today   Warm up program    x   Gait training   Bilateral forearm  outside P bars x   Turns    x   Static balance    x   Dynamic balance    x     Treatment Charges: Mins Units   []  Modalities     [x]  Ther Exercise 15 1   []  Manual Therapy     []  Ther Activities     []  Aquatics     []  Vasocompression     [x]  Other: gait training 35 2   Total Treatment time 45 3       Assessment: [] Progressing toward goals:     [x] Other:  Focused on improving WB onto prosthetic side and swing through straight ahead eliminating circumduction. Patient inconsistent with technique but improved with VC's. Able to do more before needing to sit down  New LTG  1. Patient to demonstrate IND ambulating in community with LRD    2. Patient to demonstrate TUG=30sec      3. Patient to complete AMP and score = 34/47                  Pt. Education:  [x] Yes  [] No  [] Reviewed Prior HEP/Ed  Method of Education: [] Verbal  [] Demo  [] Written  Comprehension of Education:  [] Verbalizes understanding. [] Demonstrates understanding. [] Needs review. [] Demonstrates/verbalizes HEP/Ed previously given. Plan: [x] Continue current frequency toward long and short term goals.         Time In: 6pm     Time Out: 7pm    Electronically signed by:  Alek Johnson, PT

## 2021-08-31 ENCOUNTER — APPOINTMENT (OUTPATIENT)
Dept: PHYSICAL THERAPY | Facility: CLINIC | Age: 26
End: 2021-08-31
Payer: COMMERCIAL

## 2021-09-01 ENCOUNTER — HOSPITAL ENCOUNTER (EMERGENCY)
Age: 26
Discharge: HOME | End: 2021-09-01
Attending: EMERGENCY MEDICINE
Payer: COMMERCIAL

## 2021-09-01 ENCOUNTER — APPOINTMENT (OUTPATIENT)
Dept: GENERAL RADIOLOGY | Age: 26
End: 2021-09-01
Payer: COMMERCIAL

## 2021-09-01 ENCOUNTER — APPOINTMENT (OUTPATIENT)
Dept: CT IMAGING | Age: 26
End: 2021-09-01
Payer: COMMERCIAL

## 2021-09-01 VITALS
BODY MASS INDEX: 37.79 KG/M2 | SYSTOLIC BLOOD PRESSURE: 145 MMHG | TEMPERATURE: 98.3 F | HEART RATE: 118 BPM | RESPIRATION RATE: 14 BRPM | WEIGHT: 200 LBS | OXYGEN SATURATION: 97 % | DIASTOLIC BLOOD PRESSURE: 90 MMHG

## 2021-09-01 DIAGNOSIS — S32.591A CLOSED FRACTURE OF SINGLE PUBIC RAMUS OF PELVIS, RIGHT, INITIAL ENCOUNTER (HCC): ICD-10-CM

## 2021-09-01 DIAGNOSIS — S32.424A CLOSED NONDISPLACED FRACTURE OF POSTERIOR WALL OF RIGHT ACETABULUM, INITIAL ENCOUNTER (HCC): Primary | ICD-10-CM

## 2021-09-01 PROCEDURE — 72220 X-RAY EXAM SACRUM TAILBONE: CPT

## 2021-09-01 PROCEDURE — 72192 CT PELVIS W/O DYE: CPT

## 2021-09-01 PROCEDURE — 73502 X-RAY EXAM HIP UNI 2-3 VIEWS: CPT

## 2021-09-01 PROCEDURE — 99283 EMERGENCY DEPT VISIT LOW MDM: CPT

## 2021-09-01 RX ORDER — HYDROCODONE BITARTRATE AND ACETAMINOPHEN 5; 325 MG/1; MG/1
1 TABLET ORAL EVERY 8 HOURS PRN
Qty: 10 TABLET | Refills: 0 | Status: SHIPPED | OUTPATIENT
Start: 2021-09-01 | End: 2021-09-04

## 2021-09-01 NOTE — ED PROVIDER NOTES
[address] [facility]  [address] [facility] [place] [address]  Phone: [phone]  Fax: Glenn [phone]      Pt Name: Elham Atkinson  MRN: [id number]  [place] 1995  Date of evaluation: 9/1/2021  Provider: Isa Winslow PA-C    CHIEF COMPLAINT       Chief Complaint   Patient presents with    Hip Pain     right sided - at site of amputation- mechanical s/f           HISTORY OF PRESENT ILLNESS  (Location/Symptom, Timing/Onset, Context/Setting, Quality, Duration, Modifying Factors, Severity.)   Elham Atkinson is a 23 y.o. female who presents to the emergency department for evaluation of right hip pain after fall yesterday, she slipped on wet floor while using her crutches. 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 Pain of stump and pelvis when she sits up. No significant swelling reported. No abd pain/urinary or stooling symptoms. No chest/resp symptoms. Nursing Notes were reviewed. REVIEW OF SYSTEMS    (2-9 systems for level 4, 10 or more for level 5)     Review of Systems   Constitutional: Negative. HENT: Negative. Eyes: Negative. Respiratory: Negative. Cardiovascular: Negative. Gastrointestinal: Negative. Musculoskeletal: Negative. Endocrine: Negative. Genitourinary: Negative. Skin: Negative. Allergic/Immunologic: Negative. Neurological: Negative. Hematological: Negative. Psychiatric/Behavioral: Negative. Except as noted above the remainder of the review of systems was reviewed and negative. PAST MEDICAL HISTORY   History reviewed. Past Medical History:   Diagnosis Date    Diabetes mellitus ([address]     Gestational    History of anemia     prior blood and iron transfusion, without reactions    Osteosarcoma     Wears glasses          SURGICAL HISTORY     History reviewed.     Past Surgical History:   Procedure Laterality Date    IR PORT PLACEMENT EQUAL OR GREATER THAN 5 YEARS  10/27/2020    IR PORT PLACEMENT EQUAL OR GREATER THAN 5 YEARS 10/27/2020 MD [facility] SPECIAL PROCEDURES    LEG AMPUTATION AT HIP Right 07/23/2020    [facility], osteosarcoma         CURRENT MEDICATIONS       Previous Medications    BLOOD GLUCOSE MONITOR KIT AND SUPPLIES    Dispense sufficient amount for indicated testing frequency plus additional to accommodate PRN testing needs. Dispense all needed supplies to include: monitor, strips, lancing device, lancets, control solutions, alcohol swabs. DOCUSATE SODIUM (COLACE) 100 MG CAPSULE    Take 1 capsule by mouth 2 times daily as needed for Constipation    DROSPIRENONE (SLYND) 4 MG TABS    Take 1 tablet by mouth daily    FERROUS SULFATE (IRON 325) 325 (65 FE) MG TABLET    Take 1 tablet by mouth 2 times daily    GABAPENTIN (NEURONTIN) 300 MG CAPSULE    Take 1 capsule by mouth 2 times daily for 120 days. HANDICAP PLACARD MISC    Expiration: Lifetime    IBUPROFEN (ADVIL;MOTRIN) 800 MG TABLET    Take 1 tablet by mouth every 8 hours as needed for Pain    LIDOCAINE-PRILOCAINE (EMLA) 2.5-2.5 % CREAM    Apply topically Daily as needed. NORLYDA 0.35 MG TABLET    TAKE 1 TABLET BY MOUTH DAILY    OMEPRAZOLE (PRILOSEC) 20 MG DELAYED RELEASE CAPSULE    Take 1 capsule by mouth daily    ONDANSETRON (ZOFRAN ODT) 8 MG TBDP DISINTEGRATING TABLET    Place 1 tablet under the tongue every 8 hours as needed for Nausea or Vomiting    PRENATAL MV-MIN-FE FUM-FA-DHA (PRENATAL 1 PO)    Take by mouth       ALLERGIES     Patient has no known allergies. FAMILY HISTORY           Problem Relation Age of Onset    Diabetes Mother     Diabetes Maternal Grandmother     Heart Surgery Maternal Grandmother     Stroke Maternal Grandfather     Hypertension Maternal Grandfather      Family Status   Relation Name Status    Mother  Alive    [place]  (Not Specified)    [facility]  (Not Specified)    Father  Alive          SOCIAL HISTORY      reports that she has never smoked.  She has never used smokeless tobacco. She reports previous alcohol use. She reports that she does not use drugs. lives at home with other     PHYSICAL EXAM    (up to 7 for level 4, 8 or more for level 5)     ED Triage Vitals [09/01/21 1646]   BP Temp Temp Source Pulse Resp SpO2 Height Weight   (!) 145/90 98.3 °F (36.8 °C) Oral 118 14 97 % -- 200 lb (90.7 kg)       Physical Exam   Nursing note and vitals reviewed. Constitutional:   Well-developed and well-nourished. No lethargy. Nontoxic. Head: Normocephalic and atraumatic. Ear: External ears normal.   Nose: Nose normal and midline. Eyes: Conjunctivae and EOM are normal. Pupils are equal/round  Cardiovascular: Normal rate, regular rhythm, normal heart sounds   Pulmonary/Chest: Effort normal and breath sounds normal. No wheezes/rales/rhonchi. Abdominal: Soft. Bowel sounds are normal. No distension or obvious mass/herniation. No TTP. Musculoskeletal: Normal to inspection with exception of RLE amputation at pelvis. Mild TTP of pelvic stump, no ecchymosis/induration or bony deformity. No pain with ilial compression. LLE wnl.    NV intact x 4. No signs of acute limb ischemia. Neurological: Alert, age appropriate mentation and interaction. Skin: Skin is warm and dry. No rash noted. Psychiatric: Mood, memory, affect and judgment normal.       DIAGNOSTIC RESULTS     EKG: All EKG's are interpreted by the Emergency Department Physician who either signs or Co-signs this chart in the absence of a cardiologist.    Not indicated OR per attending note    RADIOLOGY:   Non-plain film images such as CT, Ultrasound and MRI are read by the radiologist. Plain radiographic images are visualized and preliminarily interpreted by the emergency physician with the below findings:      Interpretation per the Radiologist below, if available at the time of this note:    CT PELVIS WO CONTRAST Additional Contrast? None   Final Result   1. Acute fracture involving the right posteroinferior acetabulum.    2. Acute fracture of the right inferior pubic ramus. XR SACRUM COCCYX (MIN 2 VIEWS)   Final Result      XR HIP 2-3 VW W PELVIS RIGHT   Final Result   Status post below hip right amputation. Soft tissue stranding about the   right acetabulum is noted. Hairline fracture through the acetabulum   difficult to exclude multiple findings are likely artifactual.  No other   findings of note are present. LABS:  Labs Reviewed - No data to display      All other labs were within normal range or not returned as of this dictation. EMERGENCY DEPARTMENT COURSE and DIFFERENTIAL DIAGNOSIS/MDM:   Vitals:    Vitals:    09/01/21 1646   BP: (!) 145/90   Pulse: 118   Resp: 14   Temp: 98.3 °F (36.8 °C)   TempSrc: Oral   SpO2: 97%   Weight: 90.7 kg (200 lb)       1927  Pelvic/hip XR ordered. Declined pain med. Abd benign. 1800  Getting CT Pelvis to r/o occult fracture. 1921  Pelvic fractures, WB as tolerated. Will f/u with her PT doc or PCP. Ortho in [facility] Rx presently. ProvidWed appt tomorrow for Ortho f/u here. [place] for pain. She may continue to use her walking crutches. Controlled Substance Monitoring:    Acute and Chronic Pain Monitoring:   RX Monitoring 7/17/2020   Periodic Controlled Substance Monitoring Possible medication side effects, risk of tolerance/dependence & alternative treatments discussed. ;No signs of potential drug abuse or diversion identified. I have reviewed the disposition diagnosis with the patient and or their family/guardian. I have answered their questions and given discharge instructions. They voiced understanding of these instructions and did not have any further questions or complaints. CONSULTS:  None    PROCEDURES:  None    Patient instructed to return to the emergency room if symptoms worsen, return, or any other concern right away which is agreed. FINAL IMPRESSION      1.  Closed nondisplaced fracture of posterior wall of right acetabulum, initial encounter ([facility] [address]    2. Closed fracture of single pubic ramus of pelvis, right, initial encounter [facility])            DISPOSITION/PLAN   DISPOSITION Decision To Discharge    CONDITION:  Stable    PATIENT REFERRED TO:  [facility] ED  [address] [address]  [phone]  Go to   for worsening of symptoms      DISCHARGE MEDICATIONS:  New Prescriptions    HYDROCODONE-ACETAMINOPHEN (NORCO) 5-325 MG PER TABLET    Take 1 tablet by mouth every 8 hours as needed for Pain for up to 3 days.        (Please note that portions of this note were completed with a voice recognition program.  Efforts were made to edit the dictations but occasionally words are mis-transcribed.)    SARAHI Moreno PA-C  09/01/21 9017

## 2021-09-01 NOTE — ED PROVIDER NOTES
[address] [facility] ED    [address] [facility] [address]  Phone: [phone]  Fax: [phone]  Emergency Department  Faculty Attestation    I performed a history and physical examination of the patient and discussed management with the mid level provideer. I reviewed the mid level provider's note and agree with the documented findings and plan of care. Any areas of disagreement are noted on the chart. I was personally present for the key portions of any procedures. I have documented in the chart those procedures where I was not present during the key portions. I have reviewed the emergency nurses triage note. I agree with the chief complaint, past medical history, past surgical history, allergies, medications, social and family history as documented unless otherwise noted below. Documentation of the HPI, Physical Exam and Medical Decision Making performed by medical students or scribes is based on my personal performance of the HPI, PE and MDM. For Physician Assistant/ Nurse Practitioner cases/documentation I have personally evaluated this patient and have completed at least one if not all key elements of the E/M (history, physical exam, and MDM). Additional findings are as noted. Primary Care Physician:  Lucero Collins, [address] [place] [address]       Chief Complaint   Patient presents with    Hip Pain     right sided - at site of amputation- mechanical s/f       RECENT VITALS:   Temp: 98.3 °F (36.8 °C),  Pulse: 118, Resp: 14, BP: (!) 145/90    LABS:  Labs Reviewed - No data to display      CT PELVIS WO CONTRAST Additional Contrast? None (Final result)  Result time 09/01/21 18:46:48  Final result by Salvatore Linda MD (09/01/21 18:46:48)                Impression:    1. Acute fracture involving the right posteroinferior acetabulum. 2. Acute fracture of the right inferior pubic ramus.              Narrative:    EXAMINATION:   CT OF THE PELVIS WITHOUT CONTRAST 9/1/2021 6:07 pm TECHNIQUE:   CT of the pelvis was performed without the administration of intravenous   contrast.  Multiplanar reformatted images are provided for review. Dose   modulation, iterative reconstruction, and/or weight based adjustment of the   mA/kV was utilized to reduce the radiation dose to as low as reasonably   achievable. COMPARISON:   Pelvis and right hip radiograph same day     HISTORY:   ORDERING SYSTEM PROVIDED HISTORY: r/o pelvic fracture s/p fall   TECHNOLOGIST PROVIDED HISTORY:   r/o pelvic fracture s/p fall     Decision Support Exception - unselect if not a suspected or confirmed   emergency medical condition->Emergency Medical Condition (MA)   Is the patient pregnant? ->Yes   Reason for Exam: Pt fell today, pt has pelvic pain and rt hip pain   Acuity: Acute   Type of Exam: Initial   Mechanism of Injury: fall     FINDINGS:   Bones: Acute fracture of the right inferior pubic ramus (images 80 and 81   series 2; image 70 series 601).  Acute fracture of the right posteroinferior   acetabulum (images 42 through 51 series 601; images 52 through 68 series 2). The right femur is surgically absent. Soft tissues: The visualized intrapelvic structures demonstrate follicular   change of the right ovary.  Imaged bowel loops are normal in caliber.  Soft   tissue edema adjacent to the right acetabulum.  No organized collection.  No   subcutaneous gas. Joints: Remote disarticulation of the right hip.  Anatomic alignment of the   left hip.                     XR SACRUM COCCYX (MIN 2 VIEWS) (Final result)  Result time 09/01/21 17:46:29  Final result by Aiden You MD (09/01/21 17:46:29)                Narrative:    EXAMINATION:   THREE XRAY VIEWS OF THE SACRUM/COCCYX     9/1/2021 5:16 pm     COMPARISON:   None. HISTORY:   ORDERING SYSTEM PROVIDED HISTORY: Pain, fall   TECHNOLOGIST PROVIDED HISTORY:   Pain, fall   Reason for Exam: Pt states fall onto \"stump\", pain laterally.  Previous BHA x   1 year ago Acuity: Unknown   Type of Exam: Unknown     FINDINGS:   Sacrum and coccyx are intact without fracture.  SI joints are symmetrical.   Patient is status post right below hip amputation.  Stranding in the soft   tissues over the right acetabulum is noted on frontal view.  Findings likely   due to artifact secondary to overlying soft tissues but hairline fracture   would be difficult to exclude.  Regional osteopenia over the right acetabulum   is noted, likely due to non use.                     XR HIP 2-3 VW W PELVIS RIGHT (Final result)  Result time 09/01/21 17:45:24  Procedure changed from XR HIP 2-3 VW W PELVIS LEFT  Final result by Maicol Jacques MD (09/01/21 17:45:24)                Impression:    Status post below hip right amputation.  Soft tissue stranding about the   right acetabulum is noted.  Hairline fracture through the acetabulum   difficult to exclude multiple findings are likely artifactual.  No other   findings of note are present. Narrative:    EXAMINATION:   ONE XRAY VIEW OF THE PELVIS AND TWO XRAY VIEWS RIGHT HIP     9/1/2021 5:16 pm     COMPARISON:   None. HISTORY:   ORDERING SYSTEM PROVIDED HISTORY: fall, previous BHA   TECHNOLOGIST PROVIDED HISTORY:   fall, previous [address]   Reason for Exam: Pt states fall onto \"stump\", pain laterally. Previous BHA x   1 year ago   Acuity: Unknown   Type of Exam: Unknown     FINDINGS:   And amputation of the right lower extremity is evident.  Status post right   below hip amputation left hip is intact.  SI joints are symmetrical.  There   are lucencies over the right acetabulum which appear likely secondary to   overlying soft tissues.  Hairline fracture through the acetabulum is   difficult to exclude.                       PERTINENT ATTENDING PHYSICIAN COMMENTS:    The patient presents with a fall. She fell and is complaining of pain in the right hip. The patient is status post right femoral amputation over a year ago.   She is just getting used to using her prosthesis. She fell while wearing it. On exam, patient has discomfort in the residual stump. She is tolerating her pain well however. She has no significant skin breakdown. An x-ray does not show a complete picture so a CT was obtained. This does show a fracture in the acetabulum. The patient has opted to see our orthopedic surgeons. We have made an appointment for her. She will be nonweightbearing on the prosthesis until that time. She was written for pain medication. The patient is discharged in good condition.          Radha Rae MD  09/04/21 0229

## 2021-09-02 ENCOUNTER — OFFICE VISIT (OUTPATIENT)
Dept: ORTHOPEDIC SURGERY | Age: 26
End: 2021-09-02
Payer: COMMERCIAL

## 2021-09-02 VITALS — HEIGHT: 61 IN | WEIGHT: 200 LBS | BODY MASS INDEX: 37.76 KG/M2 | RESPIRATION RATE: 12 BRPM

## 2021-09-02 DIAGNOSIS — S32.591D CLOSED FRACTURE OF RIGHT INFERIOR PUBIC RAMUS WITH ROUTINE HEALING, SUBSEQUENT ENCOUNTER: ICD-10-CM

## 2021-09-02 DIAGNOSIS — S32.401D CLOSED NONDISPLACED FRACTURE OF RIGHT ACETABULUM WITH ROUTINE HEALING, UNSPECIFIED PORTION OF ACETABULUM, SUBSEQUENT ENCOUNTER: Primary | ICD-10-CM

## 2021-09-02 PROCEDURE — G8427 DOCREV CUR MEDS BY ELIG CLIN: HCPCS | Performed by: PHYSICIAN ASSISTANT

## 2021-09-02 PROCEDURE — G8417 CALC BMI ABV UP PARAM F/U: HCPCS | Performed by: PHYSICIAN ASSISTANT

## 2021-09-02 PROCEDURE — 99203 OFFICE O/P NEW LOW 30 MIN: CPT | Performed by: PHYSICIAN ASSISTANT

## 2021-09-02 PROCEDURE — 1036F TOBACCO NON-USER: CPT | Performed by: PHYSICIAN ASSISTANT

## 2021-09-02 RX ORDER — HYDROCODONE BITARTRATE AND ACETAMINOPHEN 5; 325 MG/1; MG/1
1 TABLET ORAL EVERY 8 HOURS PRN
Qty: 20 TABLET | Refills: 0 | Status: SHIPPED | OUTPATIENT
Start: 2021-09-02 | End: 2021-09-09

## 2021-09-02 NOTE — PROGRESS NOTES
[address] [place] [address], [address] [place] [address], [address] [facility]           Dept Phone: [phone]           Dept Fax:  [phone]           Hari Luna          Dept Phone: [phone]           Dept Fax:  [phone]      Patient ID: Sagar Tucker is a 23 y.o. female    Chief Compliant:  Chief Complaint   Patient presents with    Hip Injury     right        HPI:  This is a 23 y.o. female who presents to the clinic today with her  and son for follow-up on her injury that she sustained yesterday. Patient was at her friend's house using crutches and slipped on water falling on her right hip. Patient is status post complete right leg removal due to osteosarcoma last year. Patient went to the emergency room, had x-rays and CT scan which revealed fractures. She was given pain medication and sent home with follow-up here today. She states that she has been taking pain medication that has been helping with the pain. Review of Systems     Right hip pain  Denies any lacerations  Denies any back pain abdominal pain nausea vomiting fevers or chills      All other systems reviewed and are negative. Past History:    Current Outpatient Medications:     HYDROcodone-acetaminophen (NORCO) 5-325 MG per tablet, Take 1 tablet by mouth every 8 hours as needed for Pain for up to 7 days. Intended supply: 7 days. Take lowest dose possible to manage pain, Disp: 20 tablet, Rfl: 0    HYDROcodone-acetaminophen (NORCO) 5-325 MG per tablet, Take 1 tablet by mouth every 8 hours as needed for Pain for up to 3 days. , Disp: 10 tablet, Rfl: 0    NORLYDA 0.35 MG tablet, TAKE 1 TABLET BY MOUTH DAILY, Disp: 84 tablet, Rfl: 3    gabapentin (NEURONTIN) 300 MG capsule, Take 1 capsule by mouth 2 times daily for 120 days. , Disp: 60 capsule, Rfl: 3   Drospirenone (SLYND) 4 MG TABS, Take 1 tablet by mouth daily (Patient not taking: Reported on 8/10/2021), Disp: 28 tablet, Rfl: 6    Handicap Placard MISC, Expiration: Lifetime, Disp: 2 each, Rfl: 0    omeprazole (PRILOSEC) 20 MG delayed release capsule, Take 1 capsule by mouth daily (Patient not taking: Reported on 8/10/2021), Disp: 30 capsule, Rfl: 3    ondansetron (ZOFRAN ODT) 8 MG TBDP disintegrating tablet, Place 1 tablet under the tongue every 8 hours as needed for Nausea or Vomiting (Patient not taking: Reported on 8/10/2021), Disp: 30 tablet, Rfl: 3    lidocaine-prilocaine (EMLA) 2.5-2.5 % cream, Apply topically Daily as needed. , Disp: 1 Tube, Rfl: 1    ibuprofen (ADVIL;MOTRIN) 800 MG tablet, Take 1 tablet by mouth every 8 hours as needed for Pain, Disp: 60 tablet, Rfl: 1    docusate sodium (COLACE) 100 MG capsule, Take 1 capsule by mouth 2 times daily as needed for Constipation (Patient not taking: Reported on 8/10/2021), Disp: 60 capsule, Rfl: 0    blood glucose monitor kit and supplies, Dispense sufficient amount for indicated testing frequency plus additional to accommodate PRN testing needs. Dispense all needed supplies to include: monitor, strips, lancing device, lancets, control solutions, alcohol swabs.  (Patient not taking: Reported on 8/10/2021), Disp: 1 kit, Rfl: 0    ferrous sulfate (IRON 325) 325 (65 Fe) MG tablet, Take 1 tablet by mouth 2 times daily, Disp: 60 tablet, Rfl: 0    Prenatal MV-Min-Fe Fum-FA-DHA (PRENATAL 1 PO), Take by mouth, Disp: , Rfl:   No Known Allergies  Social History     Socioeconomic History    Marital status:      Spouse name: Not on file    Number of children: 0    Years of education: Not on file    Highest education level: Not on file   Occupational History    Occupation: [employer]    Tobacco Use    Smoking status: Never Smoker    Smokeless tobacco: Never Used   Vaping Use    Vaping Use: Never used   Substance and Sexual Activity    Alcohol use: Not Currently    Drug use: Never    Sexual activity: Yes     Partners: Male   Other Topics Concern    Not on file   Social History Narrative    ** Merged History Encounter **          Social Determinants of Health     Financial Resource Strain:     Difficulty of Paying Living Expenses:    Food Insecurity:     Worried About Running Out of Food in the Last Year:     Ran Out of Food in the Last Year:    Transportation Needs:     Lack of Transportation (Medical):  Lack of Transportation (Non-Medical):    Physical Activity:     Days of Exercise per Week:     Minutes of Exercise per Session:    Stress:     Feeling of Stress :    Social Connections:     Frequency of Communication with Friends and Family:     Frequency of Social Gatherings with Friends and Family:     Attends Oriental orthodox Services:     Active Member of Clubs or Organizations:     Attends Club or Organization Meetings:     Marital Status:    Intimate Partner Violence:     Fear of Current or Ex-Partner:     Emotionally Abused:     Physically Abused:     Sexually Abused:      Past Medical History:   Diagnosis Date    Diabetes mellitus ([facility] [address]     Gestational    History of anemia     prior blood and iron transfusion, without reactions    Osteosarcoma     Wears glasses      Past Surgical History:   Procedure Laterality Date    IR PORT PLACEMENT EQUAL OR GREATER THAN 5 YEARS  10/27/2020    IR PORT PLACEMENT EQUAL OR GREATER THAN 5 YEARS 10/27/2020 Jerome Burt MD [facility]    LEG AMPUTATION AT HIP Right 07/23/2020    [facility], osteosarcoma     Family History   Problem Relation Age of Onset    Diabetes Mother     Diabetes Maternal Grandmother     Heart Surgery Maternal Grandmother     Stroke Maternal Grandfather     Hypertension Maternal Grandfather         Physical Exam:  Vitals signs and nursing note reviewed. Appearance: well-developed, no distress. Head: Normocephalic and atraumatic.       Nose: Nose normal. Conjunctiva/sclera: Conjunctivae normal.      Musculoskeletal: At pelvis right leg complete amputation, wheelchair use  Lungs: effort is normal. No respiratory distress. Skin: warm and dry. Mental Status: Alert and oriented to person, place, and time. Sensory: No sensory deficit. Behavior: Behavior normal.         Thought Content: Thought content normal.        Diagnostic imaging:  Yesterday's ER xrays reviewed. fx of right posterior acetabulum  Fracture of right inferior pubic ramus        Assessment and Plan:  1. Closed nondisplaced fracture of right acetabulum with routine healing, unspecified portion of acetabulum, subsequent encounter    2. Closed fracture of right inferior pubic ramus with routine healing, subsequent encounter            Pain control  4 week f/u  Consider PT if needed next visit      Provider Attestation:  Gabriela Franks, personally performed the services described in this documentation. All medical record entries made by the scribe were at my direction and in my presence. I have reviewed the chart and discharge instructions and agree that the records reflect my personal performance and is accurate and complete. Jose Luz PA-C 9/2/21     Please note that this chart was generated using voice recognition Dragon dictation software. Although every effort was made to ensure the accuracy of this automated transcription, some errors in transcription may have occurred.

## 2021-09-21 ENCOUNTER — HOSPITAL ENCOUNTER (OUTPATIENT)
Dept: INFUSION THERAPY | Age: 26
Discharge: HOME | End: 2021-09-21
Payer: COMMERCIAL

## 2021-09-21 DIAGNOSIS — C49.21 PRIMARY LOWER EXTREMITY SARCOMA, RIGHT (HCC): Primary | ICD-10-CM

## 2021-09-21 PROCEDURE — 2580000003 HC RX 258: Performed by: INTERNAL MEDICINE

## 2021-09-21 PROCEDURE — 96523 IRRIG DRUG DELIVERY DEVICE: CPT

## 2021-09-21 PROCEDURE — 6360000002 HC RX W HCPCS: Performed by: INTERNAL MEDICINE

## 2021-09-21 RX ORDER — SODIUM CHLORIDE 0.9 % (FLUSH) 0.9 %
10 SYRINGE (ML) INJECTION PRN
Status: DISCONTINUED | OUTPATIENT
Start: 2021-09-21 | End: 2021-09-22 | Stop reason: HOSPADM

## 2021-09-21 RX ORDER — HEPARIN SODIUM (PORCINE) LOCK FLUSH IV SOLN 100 UNIT/ML 100 UNIT/ML
500 SOLUTION INTRAVENOUS PRN
Status: CANCELLED | OUTPATIENT
Start: 2021-09-21

## 2021-09-21 RX ORDER — SODIUM CHLORIDE 0.9 % (FLUSH) 0.9 %
10 SYRINGE (ML) INJECTION PRN
Status: CANCELLED | OUTPATIENT
Start: 2021-09-21

## 2021-09-21 RX ORDER — HEPARIN SODIUM (PORCINE) LOCK FLUSH IV SOLN 100 UNIT/ML 100 UNIT/ML
500 SOLUTION INTRAVENOUS PRN
Status: DISCONTINUED | OUTPATIENT
Start: 2021-09-21 | End: 2021-09-22 | Stop reason: HOSPADM

## 2021-09-21 RX ORDER — SODIUM CHLORIDE 0.9 % (FLUSH) 0.9 %
20 SYRINGE (ML) INJECTION PRN
Status: CANCELLED | OUTPATIENT
Start: 2021-09-21

## 2021-09-21 RX ADMIN — SODIUM CHLORIDE, PRESERVATIVE FREE 10 ML: 5 INJECTION INTRAVENOUS at 14:36

## 2021-09-21 RX ADMIN — HEPARIN 500 UNITS: 100 SYRINGE at 14:36

## 2021-09-21 RX ADMIN — SODIUM CHLORIDE, PRESERVATIVE FREE 10 ML: 5 INJECTION INTRAVENOUS at 14:37

## 2021-09-30 ENCOUNTER — OFFICE VISIT (OUTPATIENT)
Dept: ORTHOPEDIC SURGERY | Age: 26
End: 2021-09-30
Payer: COMMERCIAL

## 2021-09-30 DIAGNOSIS — S32.401D CLOSED NONDISPLACED FRACTURE OF RIGHT ACETABULUM WITH ROUTINE HEALING, UNSPECIFIED PORTION OF ACETABULUM, SUBSEQUENT ENCOUNTER: Primary | ICD-10-CM

## 2021-09-30 DIAGNOSIS — S32.591D CLOSED FRACTURE OF RIGHT INFERIOR PUBIC RAMUS WITH ROUTINE HEALING, SUBSEQUENT ENCOUNTER: ICD-10-CM

## 2021-09-30 PROCEDURE — G8417 CALC BMI ABV UP PARAM F/U: HCPCS | Performed by: PHYSICIAN ASSISTANT

## 2021-09-30 PROCEDURE — 1036F TOBACCO NON-USER: CPT | Performed by: PHYSICIAN ASSISTANT

## 2021-09-30 PROCEDURE — G8427 DOCREV CUR MEDS BY ELIG CLIN: HCPCS | Performed by: PHYSICIAN ASSISTANT

## 2021-09-30 PROCEDURE — 99213 OFFICE O/P EST LOW 20 MIN: CPT | Performed by: PHYSICIAN ASSISTANT

## 2021-09-30 NOTE — PROGRESS NOTES
[address] [place] [address], [address] [place] [address] Rd, [address]           Dept Phone: [phone]           Dept Fax:  [phone]           Hari Luna          Dept Phone: [phone]           Dept Fax:  [phone]      Patient ID: Ariana Dobbins is a 23 y.o. female    Chief Compliant:  Chief Complaint   Patient presents with    Follow-up     right hip        HPI:  This is a 23 y.o. female who presents to the clinic today for follow-up visit of her pelvic fractures. Patient was here 4 weeks ago after being emergency room for fall at home. Patient is right lower extremity full amputation. Today she states that she is doing much better, she has stopped taking her pain medication, she was able to rest and get help with her new child    She has not contacted physical therapy yet, but does have a personal physical therapist        Review of Systems     Denies cp, sob  Denies fevers/chills  Denies abd pain/n/v/d/c  Denies worsening of symptoms  C/o mild right hip pain/pelvic pain    All other systems reviewed and are negative. Past History:    Current Outpatient Medications:     NORLYDA 0.35 MG tablet, TAKE 1 TABLET BY MOUTH DAILY, Disp: 84 tablet, Rfl: 3    gabapentin (NEURONTIN) 300 MG capsule, Take 1 capsule by mouth 2 times daily for 120 days. , Disp: 60 capsule, Rfl: 3    Drospirenone (SLYND) 4 MG TABS, Take 1 tablet by mouth daily, Disp: 28 tablet, Rfl: 6    Handicap Placard MISC, Expiration: Lifetime, Disp: 2 each, Rfl: 0    omeprazole (PRILOSEC) 20 MG delayed release capsule, Take 1 capsule by mouth daily, Disp: 30 capsule, Rfl: 3    ondansetron (ZOFRAN ODT) 8 MG TBDP disintegrating tablet, Place 1 tablet under the tongue every 8 hours as needed for Nausea or Vomiting, Disp: 30 tablet, Rfl: 3    lidocaine-prilocaine (EMLA) 2.5-2.5 % cream, Apply topically Daily as needed. , Disp: 1 Tube, Rfl: 1    ibuprofen (ADVIL;MOTRIN) 800 MG tablet, Take 1 tablet by mouth every 8 hours as needed for Pain, Disp: 60 tablet, Rfl: 1    docusate sodium (COLACE) 100 MG capsule, Take 1 capsule by mouth 2 times daily as needed for Constipation, Disp: 60 capsule, Rfl: 0    blood glucose monitor kit and supplies, Dispense sufficient amount for indicated testing frequency plus additional to accommodate PRN testing needs. Dispense all needed supplies to include: monitor, strips, lancing device, lancets, control solutions, alcohol swabs., Disp: 1 kit, Rfl: 0    ferrous sulfate (IRON 325) 325 (65 Fe) MG tablet, Take 1 tablet by mouth 2 times daily, Disp: 60 tablet, Rfl: 0    Prenatal MV-Min-Fe Fum-FA-DHA (PRENATAL 1 PO), Take by mouth, Disp: , Rfl:   No Known Allergies  Social History     Socioeconomic History    Marital status:      Spouse name: Not on file    Number of children: 0    Years of education: Not on file    Highest education level: Not on file   Occupational History    Occupation: [employer]    Tobacco Use    Smoking status: Never Smoker    Smokeless tobacco: Never Used   Vaping Use    Vaping Use: Never used   Substance and Sexual Activity    Alcohol use: Not Currently    Drug use: Never    Sexual activity: Yes     Partners: Male   Other Topics Concern    Not on file   Social History Narrative    ** Merged History Encounter **          Social Determinants of Health     Financial Resource Strain:     Difficulty of Paying Living Expenses:    Food Insecurity:     Worried About Running Out of Food in the Last Year:     Ran Out of Food in the Last Year:    Transportation Needs:     Lack of Transportation (Medical):      Lack of Transportation (Non-Medical):    Physical Activity:     Days of Exercise per Week:     Minutes of Exercise per Session:    Stress:     Feeling of Stress :    Social Connections:     Frequency of right acetabulum with routine healing, unspecified portion of acetabulum, subsequent encounter    2. Closed fracture of right inferior pubic ramus with routine healing, subsequent encounter          Patient states that she will contact her physical therapist for fitting of her prosthetic leg as she has not been using it since the injury happened    Follow-up 12 weeks      Sonya Edwards PA-C   [facility]    Please note that this chart was generated using voice recognition Dragon dictation software. Although every effort was made to ensure the accuracy of this automated transcription, some errors in transcription may have occurred.

## 2021-10-18 ENCOUNTER — TELEPHONE (OUTPATIENT)
Dept: INFUSION THERAPY | Age: 26
End: 2021-10-18

## 2021-10-18 NOTE — TELEPHONE ENCOUNTER
Rachel Sutherland called in and wanted to make sure which xray she needed. Chart shows CT chest with contrast and she states the last time he dis abd and pelvis also. She would like us to ask Dr Yazan Quintanilla and call her back.  [phone]

## 2021-10-19 DIAGNOSIS — C49.21 PRIMARY LOWER EXTREMITY SARCOMA, RIGHT (HCC): Primary | ICD-10-CM

## 2021-11-01 ENCOUNTER — HOSPITAL ENCOUNTER (OUTPATIENT)
Dept: INFUSION THERAPY | Age: 26
Discharge: HOME | End: 2021-11-01
Payer: COMMERCIAL

## 2021-11-01 ENCOUNTER — HOSPITAL ENCOUNTER (OUTPATIENT)
Dept: CT IMAGING | Age: 26
Discharge: HOME | End: 2021-11-03
Payer: COMMERCIAL

## 2021-11-01 DIAGNOSIS — C49.21 PRIMARY LOWER EXTREMITY SARCOMA, RIGHT (HCC): Primary | ICD-10-CM

## 2021-11-01 DIAGNOSIS — C49.21 PRIMARY LOWER EXTREMITY SARCOMA, RIGHT (HCC): ICD-10-CM

## 2021-11-01 LAB
ABSOLUTE EOS #: 0.1 K/UL (ref 0–0.4)
ABSOLUTE IMMATURE GRANULOCYTE: ABNORMAL K/UL (ref 0–0.3)
ABSOLUTE LYMPH #: 1.2 K/UL (ref 1–4.8)
ABSOLUTE MONO #: 0.4 K/UL (ref 0.1–1.2)
ALBUMIN SERPL-MCNC: 4.9 G/DL (ref 3.5–5.2)
ALBUMIN/GLOBULIN RATIO: 2.3 (ref 1–2.5)
ALP BLD-CCNC: 60 U/L (ref 35–104)
ALT SERPL-CCNC: 27 U/L (ref 5–33)
ANION GAP SERPL CALCULATED.3IONS-SCNC: 14 MMOL/L (ref 9–17)
AST SERPL-CCNC: 17 U/L
BASOPHILS # BLD: 1 % (ref 0–2)
BASOPHILS ABSOLUTE: 0 K/UL (ref 0–0.2)
BILIRUB SERPL-MCNC: 0.42 MG/DL (ref 0.3–1.2)
BUN BLDV-MCNC: 8 MG/DL (ref 6–20)
BUN/CREAT BLD: ABNORMAL (ref 9–20)
CALCIUM SERPL-MCNC: 9.2 MG/DL (ref 8.6–10.4)
CHLORIDE BLD-SCNC: 101 MMOL/L (ref 98–107)
CO2: 22 MMOL/L (ref 20–31)
CREAT SERPL-MCNC: <0.4 MG/DL (ref 0.5–0.9)
DIFFERENTIAL TYPE: ABNORMAL
EOSINOPHILS RELATIVE PERCENT: 3 % (ref 1–4)
GFR AFRICAN AMERICAN: ABNORMAL ML/MIN
GFR NON-AFRICAN AMERICAN: ABNORMAL ML/MIN
GFR SERPL CREATININE-BSD FRML MDRD: ABNORMAL ML/MIN/{1.73_M2}
GFR SERPL CREATININE-BSD FRML MDRD: ABNORMAL ML/MIN/{1.73_M2}
GLUCOSE BLD-MCNC: 145 MG/DL (ref 70–99)
HCT VFR BLD CALC: 40.5 % (ref 36–46)
HEMOGLOBIN: 13.9 G/DL (ref 12–16)
IMMATURE GRANULOCYTES: ABNORMAL %
LYMPHOCYTES # BLD: 22 % (ref 24–44)
MCH RBC QN AUTO: 29.4 PG (ref 26–34)
MCHC RBC AUTO-ENTMCNC: 34.4 G/DL (ref 31–37)
MCV RBC AUTO: 85.5 FL (ref 80–100)
MONOCYTES # BLD: 7 % (ref 2–11)
NRBC AUTOMATED: ABNORMAL PER 100 WBC
PDW BLD-RTO: 13 % (ref 12.5–15.4)
PLATELET # BLD: 265 K/UL (ref 140–450)
PLATELET ESTIMATE: ABNORMAL
PMV BLD AUTO: 9.2 FL (ref 6–12)
POTASSIUM SERPL-SCNC: 4 MMOL/L (ref 3.7–5.3)
RBC # BLD: 4.73 M/UL (ref 4–5.2)
RBC # BLD: ABNORMAL 10*6/UL
SEG NEUTROPHILS: 67 % (ref 36–66)
SEGMENTED NEUTROPHILS ABSOLUTE COUNT: 3.7 K/UL (ref 1.8–7.7)
SODIUM BLD-SCNC: 137 MMOL/L (ref 135–144)
TOTAL PROTEIN: 7 G/DL (ref 6.4–8.3)
WBC # BLD: 5.5 K/UL (ref 3.5–11)
WBC # BLD: ABNORMAL 10*3/UL

## 2021-11-01 PROCEDURE — 6360000004 HC RX CONTRAST MEDICATION: Performed by: INTERNAL MEDICINE

## 2021-11-01 PROCEDURE — 2580000003 HC RX 258: Performed by: INTERNAL MEDICINE

## 2021-11-01 PROCEDURE — 85025 COMPLETE CBC W/AUTO DIFF WBC: CPT

## 2021-11-01 PROCEDURE — 36591 DRAW BLOOD OFF VENOUS DEVICE: CPT

## 2021-11-01 PROCEDURE — 6360000002 HC RX W HCPCS: Performed by: INTERNAL MEDICINE

## 2021-11-01 PROCEDURE — 80053 COMPREHEN METABOLIC PANEL: CPT

## 2021-11-01 PROCEDURE — 74177 CT ABD & PELVIS W/CONTRAST: CPT

## 2021-11-01 RX ORDER — SODIUM CHLORIDE 0.9 % (FLUSH) 0.9 %
10 SYRINGE (ML) INJECTION PRN
Status: DISCONTINUED | OUTPATIENT
Start: 2021-11-01 | End: 2021-11-04 | Stop reason: HOSPADM

## 2021-11-01 RX ORDER — HEPARIN SODIUM (PORCINE) LOCK FLUSH IV SOLN 100 UNIT/ML 100 UNIT/ML
500 SOLUTION INTRAVENOUS PRN
Status: DISCONTINUED | OUTPATIENT
Start: 2021-11-01 | End: 2021-11-02 | Stop reason: HOSPADM

## 2021-11-01 RX ORDER — 0.9 % SODIUM CHLORIDE 0.9 %
80 INTRAVENOUS SOLUTION INTRAVENOUS ONCE
Status: COMPLETED | OUTPATIENT
Start: 2021-11-01 | End: 2021-11-01

## 2021-11-01 RX ORDER — SODIUM CHLORIDE 0.9 % (FLUSH) 0.9 %
20 SYRINGE (ML) INJECTION PRN
Status: CANCELLED | OUTPATIENT
Start: 2021-11-01

## 2021-11-01 RX ORDER — SODIUM CHLORIDE 0.9 % (FLUSH) 0.9 %
10 SYRINGE (ML) INJECTION PRN
Status: DISCONTINUED | OUTPATIENT
Start: 2021-11-01 | End: 2021-11-02 | Stop reason: HOSPADM

## 2021-11-01 RX ORDER — HEPARIN SODIUM (PORCINE) LOCK FLUSH IV SOLN 100 UNIT/ML 100 UNIT/ML
500 SOLUTION INTRAVENOUS PRN
Status: CANCELLED | OUTPATIENT
Start: 2021-11-01

## 2021-11-01 RX ORDER — SODIUM CHLORIDE 0.9 % (FLUSH) 0.9 %
10 SYRINGE (ML) INJECTION PRN
Status: CANCELLED | OUTPATIENT
Start: 2021-11-01

## 2021-11-01 RX ADMIN — SODIUM CHLORIDE 80 ML: 9 INJECTION, SOLUTION INTRAVENOUS at 10:54

## 2021-11-01 RX ADMIN — IOPAMIDOL 100 ML: 755 INJECTION, SOLUTION INTRAVENOUS at 10:53

## 2021-11-01 RX ADMIN — HEPARIN 500 UNITS: 100 SYRINGE at 11:25

## 2021-11-01 RX ADMIN — SODIUM CHLORIDE, PRESERVATIVE FREE 10 ML: 5 INJECTION INTRAVENOUS at 10:55

## 2021-11-01 RX ADMIN — SODIUM CHLORIDE, PRESERVATIVE FREE 10 ML: 5 INJECTION INTRAVENOUS at 11:25

## 2021-11-01 RX ADMIN — SODIUM CHLORIDE, PRESERVATIVE FREE 10 ML: 5 INJECTION INTRAVENOUS at 09:28

## 2021-11-01 RX ADMIN — IOHEXOL 50 ML: 240 INJECTION, SOLUTION INTRATHECAL; INTRAVASCULAR; INTRAVENOUS; ORAL at 10:54

## 2021-11-01 NOTE — PROGRESS NOTES
Pt here for port draw/access for CT scan. Pt returns after scan for de-access. D/c'd in stable condition. Returns tomorrow for f/u with Dr Lorena Perez.

## 2021-11-02 ENCOUNTER — OFFICE VISIT (OUTPATIENT)
Dept: ONCOLOGY | Age: 26
End: 2021-11-02
Payer: COMMERCIAL

## 2021-11-02 ENCOUNTER — TELEPHONE (OUTPATIENT)
Dept: ONCOLOGY | Age: 26
End: 2021-11-02

## 2021-11-02 VITALS
TEMPERATURE: 98.1 F | HEART RATE: 105 BPM | DIASTOLIC BLOOD PRESSURE: 84 MMHG | SYSTOLIC BLOOD PRESSURE: 125 MMHG | WEIGHT: 203.1 LBS | BODY MASS INDEX: 38.38 KG/M2

## 2021-11-02 DIAGNOSIS — O9A.113 MALIGNANT NEOPLASM AFFECTING PREGNANCY IN THIRD TRIMESTER: ICD-10-CM

## 2021-11-02 PROCEDURE — G8427 DOCREV CUR MEDS BY ELIG CLIN: HCPCS | Performed by: INTERNAL MEDICINE

## 2021-11-02 PROCEDURE — 99211 OFF/OP EST MAY X REQ PHY/QHP: CPT | Performed by: INTERNAL MEDICINE

## 2021-11-02 PROCEDURE — G8484 FLU IMMUNIZE NO ADMIN: HCPCS | Performed by: INTERNAL MEDICINE

## 2021-11-02 PROCEDURE — G8417 CALC BMI ABV UP PARAM F/U: HCPCS | Performed by: INTERNAL MEDICINE

## 2021-11-02 PROCEDURE — 1036F TOBACCO NON-USER: CPT | Performed by: INTERNAL MEDICINE

## 2021-11-02 PROCEDURE — 99214 OFFICE O/P EST MOD 30 MIN: CPT | Performed by: INTERNAL MEDICINE

## 2021-11-02 NOTE — PROGRESS NOTES
DIAGNOSIS:   1. 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 female who is referred to us for sarcoma. She presented with rightt knee sprain at work 04/2020 but the pain and swelling worsened with palpable mass. She underwent MRI which showed mass and was seen at [facility] where biopsy was taken and showed sarcoma. She was transferred to [facility] and decided to undergo full leg amputation due to early stage pregnancy, tumor was 16 cm x 20 cm x 3 cm and removed with negative margins. She was following with [facility] and decided she would prefer to receive treatment locally. The patient is currently 32 weeks pregnant and delivery plans are expected to be finalized 10/02/2020. She has elected to proceed with chemo after delivery. She is working towards prosthesis with test socket. 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 Kathy delivered in late October, after delivery, we decided to start chemo with AIM 11/3/2020. Patient finished 4 cycles of chemotherapy and she was declared in remission and was started surveillance  INTERIM HISTORY: The patient presents for follow up today for osteosarcoma surveillance and review labs, she is feeling well and has no complaints. Specifically, she denies any nausea or vomiting, fever or chills or night sweats. She is now walking with the help of crutches. She is off the wheelchair. She is struggling to lose weight. She fell in September and fractured her acetabulum and inferior pubic ramus.   She was offered crutches for a few weeks but now she is healed enough to go back to walking with the crutches. She is hoping to be transition to a cane in a few months  PAST MEDICAL HISTORY: has a past medical history of Diabetes mellitus ([address] History of anemia, Osteosarcoma, and Wears glasses. PAST SURGICAL HISTORY: has a past surgical history that includes Leg amputation at hip (Right, 07/23/2020) and IR PORT PLACEMENT > 5 YEARS (10/27/2020). CURRENT MEDICATIONS:  has a current medication list which includes the following prescription(s): norlyda, handicap placard, omeprazole, ondansetron, lidocaine-prilocaine, ibuprofen, docusate sodium, blood glucose monitor kit and supplies, ferrous sulfate, prenatal mv-min-fe fum-fa-dha, gabapentin, and slynd, and the following Facility-Administered Medications: sodium chloride flush. ALLERGIES:  has No Known Allergies. FAMILY HISTORY: Negative for any hematological or oncological conditions. SOCIAL HISTORY:  reports that she has never smoked. She has never used smokeless tobacco. She reports previous alcohol use. She reports that she does not use drugs. REVIEW OF SYSTEMS:   General: No fever or night sweats. Weight is stable. ENT: No double or blurred vision, no tinnitus or hearing problem, no dysphagia or sore throat   Respiratory: No chest pain, no shortness of breath, no cough or hemoptysis. Cardiovascular: Denies chest pain, PND or orthopnea. No L E swelling or palpitations. Gastrointestinal: No nausea, vomiting, abdominal pain, diarrhea or constipation. Genitourinary: Denies dysuria, hematuria, frequency, urgency or incontinence. Gynecological: nearly resolved post partum bleeding  Neurological: Denies headaches, decreased LOC, no sensory or motor focal deficits. +post surgical phantom pain improving  Musculoskeletal: No arthralgia no back pain or joint swelling. Skin: There are no rashes or bleeding. Psychiatric: No anxiety, no depression. Endocrine: No diabetes or thyroid disease.    Hematologic: No bleeding, no adenopathy. PHYSICAL EXAM: Shows a well appearing 23y.o.-year-old female who is not in pain or distress. Vital Signs: Blood pressure 125/84, pulse 105, temperature 98.1 °F (36.7 °C), temperature source Temporal, weight 203 lb 1.6 oz (92.1 kg), not currently breastfeeding. HEENT: Normocephalic and atraumatic. Pupils are equal, round, reactive to light and accommodation. Extraocular muscles are intact. Neck: Showed no JVD, no carotid bruit . Lungs: Clear to auscultation bilaterally. Heart: Regular without any murmur. Abdomen: Soft, nontender. No hepatosplenomegaly. Extremities: Right leg amputation. Lower extremities show no edema, clubbing, or cyanosis. Breasts: Examination not done today. Neuro exam: intact cranial nerves bilaterally no motor or sensory deficit, gait is normal. Lymphatic: no adenopathy appreciated in the supraclavicular, axillary, cervical or inguinal area    REVIEW OF LABORATORY DATA:   Lab Results   Component Value Date    WBC 5.5 11/01/2021    HGB 13.9 11/01/2021    HCT 40.5 11/01/2021    MCV 85.5 11/01/2021     11/01/2021   '    Chemistry        Component Value Date/Time     11/01/2021 0920    K 4.0 11/01/2021 0920     11/01/2021 0920    CO2 22 11/01/2021 0920    BUN 8 11/01/2021 0920    CREATININE <0.40 (L) 11/01/2021 0920        Component Value Date/Time    CALCIUM 9.2 11/01/2021 0920    ALKPHOS 60 11/01/2021 0920    AST 17 11/01/2021 0920    ALT 27 11/01/2021 0920    BILITOT 0.42 11/01/2021 0920          REVIEW OF RADIOLOGICAL RESULTS:   CT scan 5/2021  Impression   No evidence for metastatic disease in the chest, abdomen or pelvis. CT 11/1/2021  Impression   1.  No evidence for metastatic disease in the chest, abdomen or pelvis.       2.  Interval progress in healing of the recently demonstrated fractures of   the right acetabulum and inferior pubic ramus. IMPRESSION:   1. 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 Current pregnancy  4. Adjuvant chemo following delivery with ifosfamide and adriamycin (AIM) - started 11/03/2020, received 4 cycles    PLAN:   Patient continues to do very well  She is in remission and she was reassured  She is starting to lose weight, I offered referral to nutritionist as an outpatient and will do that  We will see her back in 3 months. Since it has been more than 16 months since her surgery and she is induration, will decrease the frequency of CT scan to every 6 months.   Unless she has symptoms

## 2021-11-02 NOTE — TELEPHONE ENCOUNTER
AVS from 11/2/21     rv in 3 months with cbc, cmp  (VV is ok)     VV scheduled for 2/1/22 @ 4pm. Pt will have labs drawn a couple days before    Pt was given AVS and appt schedule

## 2021-11-08 ENCOUNTER — OFFICE VISIT (OUTPATIENT)
Dept: PRIMARY CARE CLINIC | Age: 26
End: 2021-11-08
Payer: COMMERCIAL

## 2021-11-08 VITALS
RESPIRATION RATE: 16 BRPM | DIASTOLIC BLOOD PRESSURE: 66 MMHG | SYSTOLIC BLOOD PRESSURE: 110 MMHG | BODY MASS INDEX: 38.33 KG/M2 | HEIGHT: 61 IN | HEART RATE: 93 BPM | WEIGHT: 203 LBS | OXYGEN SATURATION: 99 %

## 2021-11-08 DIAGNOSIS — Z89.621 HISTORY OF DISARTICULATION OF RIGHT HIP: ICD-10-CM

## 2021-11-08 DIAGNOSIS — Z13.29 SCREENING FOR THYROID DISORDER: ICD-10-CM

## 2021-11-08 DIAGNOSIS — Z23 NEED FOR INFLUENZA VACCINATION: ICD-10-CM

## 2021-11-08 DIAGNOSIS — C49.21 PRIMARY LOWER EXTREMITY SARCOMA, RIGHT (HCC): ICD-10-CM

## 2021-11-08 DIAGNOSIS — G54.6 PHANTOM PAIN AFTER AMPUTATION OF LOWER EXTREMITY (HCC): ICD-10-CM

## 2021-11-08 DIAGNOSIS — Z13.6 SCREENING FOR CARDIOVASCULAR CONDITION: ICD-10-CM

## 2021-11-08 DIAGNOSIS — E66.9 OBESITY (BMI 35.0-39.9 WITHOUT COMORBIDITY): Primary | ICD-10-CM

## 2021-11-08 PROBLEM — Z92.89 HISTORY OF BLOOD TRANSFUSION: Status: RESOLVED | Noted: 2020-08-01 | Resolved: 2021-11-08

## 2021-11-08 PROBLEM — D72.829 LEUKOCYTOSIS: Status: RESOLVED | Noted: 2020-07-31 | Resolved: 2021-11-08

## 2021-11-08 PROBLEM — Z87.440 HISTORY OF UTI: Status: RESOLVED | Noted: 2020-08-02 | Resolved: 2021-11-08

## 2021-11-08 PROBLEM — O09.90 HIGH-RISK PREGNANCY, UNSPECIFIED TRIMESTER: Status: RESOLVED | Noted: 2020-10-12 | Resolved: 2021-11-08

## 2021-11-08 PROBLEM — O24.319 PREGESTATIONAL DIABETES MELLITUS, MODIFIED WHITE CLASS B: Status: RESOLVED | Noted: 2020-06-30 | Resolved: 2021-11-08

## 2021-11-08 PROBLEM — O10.919 CHRONIC HYPERTENSION AFFECTING PREGNANCY: Status: RESOLVED | Noted: 2020-07-11 | Resolved: 2021-11-08

## 2021-11-08 PROBLEM — A49.8 PSEUDOMONAS INFECTION: Status: RESOLVED | Noted: 2020-08-04 | Resolved: 2021-11-08

## 2021-11-08 PROBLEM — Z3A.33 33 WEEKS GESTATION OF PREGNANCY: Status: RESOLVED | Noted: 2020-10-12 | Resolved: 2021-11-08

## 2021-11-08 PROCEDURE — G8482 FLU IMMUNIZE ORDER/ADMIN: HCPCS | Performed by: NURSE PRACTITIONER

## 2021-11-08 PROCEDURE — G8417 CALC BMI ABV UP PARAM F/U: HCPCS | Performed by: NURSE PRACTITIONER

## 2021-11-08 PROCEDURE — 99214 OFFICE O/P EST MOD 30 MIN: CPT | Performed by: NURSE PRACTITIONER

## 2021-11-08 PROCEDURE — G8427 DOCREV CUR MEDS BY ELIG CLIN: HCPCS | Performed by: NURSE PRACTITIONER

## 2021-11-08 PROCEDURE — 1036F TOBACCO NON-USER: CPT | Performed by: NURSE PRACTITIONER

## 2021-11-08 RX ORDER — PHENTERMINE HYDROCHLORIDE 37.5 MG/1
37.5 TABLET ORAL
Qty: 30 TABLET | Refills: 0 | Status: SHIPPED | OUTPATIENT
Start: 2021-11-08 | End: 2021-11-17 | Stop reason: SDUPTHER

## 2021-11-08 SDOH — ECONOMIC STABILITY: FOOD INSECURITY: WITHIN THE PAST 12 MONTHS, THE FOOD YOU BOUGHT JUST DIDN'T LAST AND YOU DIDN'T HAVE MONEY TO GET MORE.: NEVER TRUE

## 2021-11-08 SDOH — ECONOMIC STABILITY: FOOD INSECURITY: WITHIN THE PAST 12 MONTHS, YOU WORRIED THAT YOUR FOOD WOULD RUN OUT BEFORE YOU GOT MONEY TO BUY MORE.: NEVER TRUE

## 2021-11-08 ASSESSMENT — ENCOUNTER SYMPTOMS
SHORTNESS OF BREATH: 0
SORE THROAT: 0
RHINORRHEA: 0
ABDOMINAL PAIN: 0
DIARRHEA: 0
CHEST TIGHTNESS: 0
NAUSEA: 0
COLOR CHANGE: 0
VOMITING: 0

## 2021-11-08 ASSESSMENT — PATIENT HEALTH QUESTIONNAIRE - PHQ9
SUM OF ALL RESPONSES TO PHQ QUESTIONS 1-9: 0
SUM OF ALL RESPONSES TO PHQ QUESTIONS 1-9: 0
SUM OF ALL RESPONSES TO PHQ9 QUESTIONS 1 & 2: 0
1. LITTLE INTEREST OR PLEASURE IN DOING THINGS: 0
2. FEELING DOWN, DEPRESSED OR HOPELESS: 0
SUM OF ALL RESPONSES TO PHQ QUESTIONS 1-9: 0

## 2021-11-08 ASSESSMENT — SOCIAL DETERMINANTS OF HEALTH (SDOH): HOW HARD IS IT FOR YOU TO PAY FOR THE VERY BASICS LIKE FOOD, HOUSING, MEDICAL CARE, AND HEATING?: NOT VERY HARD

## 2021-11-08 NOTE — PROGRESS NOTES
[address] PRIMARY CARE  [address] [facility] [address]  [address] [address]  Dept: [phone]  Dept Fax: [phone]    Gaby Johnson is a 23 y.o. female who presentstoday for her medical conditions/complaints as noted below. Gaby Johnson is c/o of  Chief Complaint   Patient presents with    Annual Exam     discuss weightloss strategies         HPI:     Here to discuss weight loss options, weight has been stable but she does feel like she has had difficultly losing weight especially since chemotherapy and impaired mobility. Has prothesis for RLE, learning to walk with it, currently using crutches with midarm handles, is doing well with this. Still using wheelchair at home, this helps her manage with her 3year old son. She does continues to have phantom pain to RLE, mostly at night when she is laying down and ready for bed, it is tolerable, she is on reduced dose of gabapentin and is working well for her    S/p right hip disarticulation due to primary lower extremity sarcoma- following with oncology locally and at [facility]. Encouraged by oncology to lose weight, she is motivated to do this, just has not been successful with present diet, does not count calories or weigh food portions, admits it is difficult to cook often, does eat pre-packaged meals and large portions at times. Does have cravings also. She is independent in ADLs, driving herself and reports doing well. Has support of family and spouse, they purchased a home near her parents and they help her often. No other complaints or concerns.        Hemoglobin A1C (%)   Date Value   2020 5.0             ( goal A1C is < 7)   No results found for: LABMICR  No results found for: LDLCHOLESTEROL, LDLCALC    (goal LDL is <100)   AST (U/L)   Date Value   2021 17     ALT (U/L)   Date Value   2021 27     BUN (mg/dL)   Date Value   2021 8     BP Readings from Last 3 Encounters:   21 110/66 11/02/21 125/84   09/01/21 (!) 145/90          ([id number])    Past Medical History:   Diagnosis Date    33 weeks gestation of pregnancy 10/12/2020    Antepartum premature rupture of membrane     Diabetes mellitus ([facility] [address]     Gestational    High-risk pregnancy, unspecified trimester 10/12/2020    History of anemia     prior blood and iron transfusion, without reactions    History of blood transfusion 8/1/2020    History of E. 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 GDMA2 Abnormal early 1 hour GTT (183) Abnormal 3 hour GTT - fasting 86 - 1 hour 224 - 2 hour 187 - 3 hour 164 HbA1c 7/1 5.8 Component     Latest Ref Rng & Units 7/5/2020 7/5/2020 7/5/2020 7/5/2020         8:06 AM 11:27 AM  4:48 PM  9:28 PM  Glucose, metered     50 - 140 mg/dL 93 135 99 131   Component     Latest    Pseudomonas UTI 8/4/2020 8/4/20 treated with Cefepime 2g IV for 10 days     Wears glasses       Past Surgical History:   Procedure Laterality Date    IR PORT PLACEMENT EQUAL OR GREATER THAN 5 YEARS  10/27/2020    IR PORT PLACEMENT EQUAL OR GREATER THAN 5 YEARS 10/27/2020 Rafael Sanders MD [facility]    LEG AMPUTATION AT HIP Right 07/23/2020    [facility], osteosarcoma       Family History   Problem Relation Age of Onset    Diabetes Mother     Diabetes Maternal Grandmother     Heart Surgery Maternal Grandmother     Stroke Maternal Grandfather     Hypertension Maternal Grandfather        Social History     Tobacco Use    Smoking status: Never Smoker    Smokeless tobacco: Never Used   Substance Use Topics    Alcohol use: Not Currently      Current Outpatient Medications   Medication Sig Dispense Refill    phentermine (ADIPEX-P) 37.5 MG tablet Take 1 tablet by mouth every morning (before breakfast) for 30 days.  30 tablet 0    NORLYDA 0.35 MG tablet TAKE 1 TABLET BY MOUTH DAILY 84 tablet 3    Drospirenone (SLYND) 4 MG TABS Take 1 tablet by mouth daily 28 tablet 6    Handicap Placard MISC Expiration: Lifetime 2 each 0    omeprazole (PRILOSEC) 20 MG delayed release capsule Take 1 capsule by mouth daily 30 capsule 3    ondansetron (ZOFRAN ODT) 8 MG TBDP disintegrating tablet Place 1 tablet under the tongue every 8 hours as needed for Nausea or Vomiting 30 tablet 3    lidocaine-prilocaine (EMLA) 2.5-2.5 % cream Apply topically Daily as needed. 1 Tube 1    ibuprofen (ADVIL;MOTRIN) 800 MG tablet Take 1 tablet by mouth every 8 hours as needed for Pain 60 tablet 1    docusate sodium (COLACE) 100 MG capsule Take 1 capsule by mouth 2 times daily as needed for Constipation 60 capsule 0    blood glucose monitor kit and supplies Dispense sufficient amount for indicated testing frequency plus additional to accommodate PRN testing needs. Dispense all needed supplies to include: monitor, strips, lancing device, lancets, control solutions, alcohol swabs. 1 kit 0    ferrous sulfate (IRON 325) 325 (65 Fe) MG tablet Take 1 tablet by mouth 2 times daily 60 tablet 0    Prenatal MV-Min-Fe Fum-FA-DHA (PRENATAL 1 PO) Take by mouth      gabapentin (NEURONTIN) 300 MG capsule Take 1 capsule by mouth 2 times daily for 120 days. 60 capsule 3     No current facility-administered medications for this visit.      No Known Allergies    Health Maintenance   Topic Date Due    Hepatitis C screen  Never done    Hepatitis A vaccine (2 of 2 - 2-dose series) 12/06/2014    Flu vaccine (1) 09/01/2021    COVID-19 Vaccine (2 - Pfizer 2-dose series) 10/29/2021    Pap smear  05/19/2023    DTaP/Tdap/Td vaccine (8 - Td or Tdap) 08/12/2026    Hepatitis B vaccine  Completed    Hib vaccine  Completed    HPV vaccine  Completed    Varicella vaccine  Completed    Meningococcal (ACWY) vaccine  Completed    HIV screen  Completed    Pneumococcal 0-64 years Vaccine  Aged Out Subjective:      Review of Systems   Constitutional: Negative for activity change, fatigue and fever. Difficulty losing weight    HENT: Negative for congestion, rhinorrhea and sore throat. Eyes: Negative for visual disturbance. Respiratory: Negative for chest tightness and shortness of breath. Cardiovascular: Negative for chest pain and palpitations. Gastrointestinal: Negative for abdominal pain, diarrhea, nausea and vomiting. Endocrine: Negative for polydipsia. Genitourinary: Negative for difficulty urinating. Musculoskeletal: Negative for arthralgias and myalgias. Skin: Negative for color change. Neurological: Negative for weakness and headaches. Psychiatric/Behavioral: Negative for behavioral problems. The patient is not nervous/anxious. All other systems reviewed and are negative. Objective:   /66 (Site: Left Upper Arm, Position: Sitting, Cuff Size: Large Adult)   Pulse 93   Resp 16   Ht 5' 1\" (1.549 m)   Wt 203 lb (92.1 kg)   SpO2 99%   Breastfeeding No   BMI 38.36 kg/m²   Physical Exam  Vitals reviewed. Constitutional:       General: She is not in acute distress. Appearance: Normal appearance. She is obese. HENT:      Head: Normocephalic. Eyes:      Pupils: Pupils are equal, round, and reactive to light. Cardiovascular:      Rate and Rhythm: Normal rate and regular rhythm. Pulses: Normal pulses. Heart sounds: Normal heart sounds. Pulmonary:      Effort: Pulmonary effort is normal.      Breath sounds: Normal breath sounds. Abdominal:      General: There is no distension. Musculoskeletal:         General: Normal range of motion. Left lower leg: No edema. Comments: S/p right hip disarticulation, right extremity amputation   Skin:     General: Skin is warm and dry. Capillary Refill: Capillary refill takes less than 2 seconds. Neurological:      General: No focal deficit present.       Mental Status: She is alert and oriented to person, place, and time. Psychiatric:         Mood and Affect: Mood normal.         Behavior: Behavior normal.           :       Diagnosis Orders   1. Obesity (BMI 35.0-39.9 without comorbidity)  phentermine (ADIPEX-P) 37.5 MG tablet   2. Primary lower extremity sarcoma, right ([address]     3. History of disarticulation of right hip     4. Screening for thyroid disorder  TSH without Reflex   5. Screening for cardiovascular condition  Lipid Panel   6. Need for influenza vaccination  INFLUENZA, MDCK QUADV, 2 YRS AND OLDER, IM, PF, PREFILL SYR OR SDV, 0.5ML (FLUCELVAX QUADV, PF)   7. Phantom pain after amputation of lower extremity ([facility] [address]               :          1. Obesity (BMI 35.0-39.9 without comorbidity)  Initiate adipex, discussed this in depth and all questions answered. Adverse s/s discussed, rto 1 month for follow up. Recommend 1500 calorie diet daily, limit carbs and sugars, low salt and increase activity as able. - phentermine (ADIPEX-P) 37.5 MG tablet; Take 1 tablet by mouth every morning (before breakfast) for 30 days. Dispense: 30 tablet; Refill: 0    2. Primary lower extremity sarcoma, right ([facility] [address]  3. History of disarticulation of right hip  Following with oncology and prosthetics, continues to learn to walk with RLE prothesis, more recently, may need refitted due to atrophy of right hip/stump. Has standing labs every 3 months- add lipid panel and thyroid    4. Screening for thyroid disorder  - TSH without Reflex; Future    5. Screening for cardiovascular condition  - Lipid Panel; Future    6. Need for influenza vaccination  - INFLUENZA, MDCK QUADV, 2 YRS AND OLDER, IM, PF, PREFILL SYR OR SDV, 0.5ML (FLUCELVAX QUADV, PF)    7. Phantom pain after amputation of lower extremity (HCC)  Improving but persistent intermittently, gabapentin 300mg BID. Return in about 1 month (around 12/8/2021). Patient given educational materials - see patient instructions.   Discussed use, benefit, and side effects of prescribed medications. All patient questions answered. Pt voiced understanding. Reviewed health maintenance. Instructed to continue current medications, diet and exercise. Patient agreed with treatment plan. Follow up as directed.        Electronicallysigned by FATEMEH Fontenot CNP on 11/9/2021 at 10:25 AM

## 2021-11-08 NOTE — PATIENT INSTRUCTIONS
Patient Education        Food as Fuel: Care Instructions  Your Care Instructions     A healthy, balanced diet gives your body nutrients. Nutrients are like fuel for your body. They give you energy. And they keep your heart beating, your brain active, and your muscles working. They also help to build and strengthen bones, muscles, and other body tissues. Your body needs three major nutrients for energy. These are carbohydrate, protein, and fat. · Carbohydrate provides energy for your brain, muscles, heart, and lungs. It is found in bread, cereal, rice, pasta, fruits, vegetables, milk, yogurt, and sugar. · Protein provides energy and helps build and repair your body's cells. It is found in meat, poultry, fish, cooked dry beans, cheese, tofu and other soy products, nuts and seeds, and milk and milk products. · Fat provides energy, helps build the covering around nerves in your body, and helps make hormones. Fat is found in butter, margarine, oil, mayonnaise, salad dressing, and nuts. It is also found in most foods that come from animals, such as meat and milk products. Many foods also have fat added to them. Your body needs all three of these nutrients to be healthy. If you choose a good mix of foods, you can help your body get the right amounts of carbohydrate, protein, and fat. It can also keep you at a healthy weight. Follow-up care is a key part of your treatment and safety. Be sure to make and go to all appointments, and call your doctor if you are having problems. It's also a good idea to know your test results and keep a list of the medicines you take. How can you care for yourself at home? Eat a balanced diet  · Try to eat variety of healthy foods every day. These include:  ? 5 to 8 ounces of bread, cereal, crackers, rice, or pasta. An ounce is about 1 slice of bread, 1 cup of breakfast cereal, or ½ cup of cooked rice, cereal, or pasta.  Choose whole-grain products for at least half of your grain servings. ? 2 to 3 cups of vegetables. Be sure to include:  § Dark green vegetables such as broccoli and spinach. § Orange vegetables such as carrots and sweet potatoes. ? 1½ to 2 cups of fresh, frozen, or canned fruit. A large banana, a large orange, or a small apple equals 1 cup. ? 3 cups of nonfat or low-fat milk, yogurt, or other milk products. ? 5 to 6½ ounces of protein foods. These include chicken, fish, lean meat, beans, nuts, and seeds. One egg equals 1 ounce of meat, poultry, or fish. A ½ cup of cooked beans equals 2 ounces of protein. Stay fueled all day  · Start your day with breakfast. If you don't have time to sit down for a bowl of cereal in the morning, try something that you can eat \"on the go. \" Try a piece of whole wheat bread with peanut butter or a container of yogurt with frozen berries mixed in. · Eat regularly scheduled meals and snacks. If you miss a meal, you may overeat at the next meal. Or you may choose a less healthy snack. · Drink enough water. (If you have kidney, heart, or liver disease and have to limit fluids, talk with your doctor before you increase your fluid intake.)  Where can you learn more? Go to [email or web address] [email or web address] org and sign in to your [employer] account. Enter W397 in the [facility] box to learn more about \"Food as Fuel: Care Instructions. \"     If you do not have an account, please click on the \"Sign Up Now\" link. Current as of: December 17, 2020               Content Version: 13.0  © 2264-0842 [employer]. Care instructions adapted under license by [facility] ([facility]). If you have questions about a medical condition or this instruction, always ask your healthcare professional. [place] [address] any warranty or liability for your use of this information.

## 2021-11-09 PROCEDURE — 90674 CCIIV4 VAC NO PRSV 0.5 ML IM: CPT | Performed by: NURSE PRACTITIONER

## 2021-11-09 PROCEDURE — 90471 IMMUNIZATION ADMIN: CPT | Performed by: NURSE PRACTITIONER

## 2021-11-17 ENCOUNTER — TELEPHONE (OUTPATIENT)
Dept: PRIMARY CARE CLINIC | Age: 26
End: 2021-11-17

## 2021-11-17 DIAGNOSIS — E66.9 OBESITY (BMI 35.0-39.9 WITHOUT COMORBIDITY): ICD-10-CM

## 2021-11-17 RX ORDER — PHENTERMINE HYDROCHLORIDE 37.5 MG/1
37.5 TABLET ORAL
Qty: 30 TABLET | Refills: 0 | Status: SHIPPED | OUTPATIENT
Start: 2021-11-17 | End: 2021-12-20 | Stop reason: SDUPTHER

## 2021-11-17 NOTE — TELEPHONE ENCOUNTER
Patient notified and verbalized understanding. Pt. Stated she received a text notification from her pharmacy that the prescription has been received and is being processed. Pt. Was advised to contact us tomorrow if the RX has any more complications being processed. Pt. Verbalized understanding.

## 2021-11-17 NOTE — TELEPHONE ENCOUNTER
Pt. Called stating the dosing needs updated on her prescription for Adipex. Pt. Stated she received the prior authorization approval letter in the mail. The pharmacy cannot process the fill on the RX, as it reads for her dosing schedule that she was to have already received the first week dose, over two weeks ago. Pt. Stated once the RX is updated she should be able to fill the medication.

## 2021-11-19 NOTE — TELEPHONE ENCOUNTER
Patient called and stated that she had called the pharmacy this morning about the Adipex and they stated that they need a PA.  Thank you

## 2021-11-19 NOTE — TELEPHONE ENCOUNTER
Patient called to let office know that on line pharmacy states Adipex is still pending even robb FLORES was approved and she received approval letter. She will call pharmacy now.

## 2021-11-22 ENCOUNTER — TELEPHONE (OUTPATIENT)
Dept: ONCOLOGY | Age: 26
End: 2021-11-22

## 2021-11-22 NOTE — TELEPHONE ENCOUNTER
Patient called stating that her pharmacy still doesn't have the PA. Writer faxed PA Approval and asked patient to call her pharmacy in 10-15 minutes to verify they received it.     If they need anything more from us ,writer asked patient to have her pharmacy call us

## 2021-11-22 NOTE — TELEPHONE ENCOUNTER
NUTRITION NOTE    Called pt on listed phone number r/t referral from Dr Trey Villasenor. Pt stated is busy at this time.  RD and pt loosely scheduled phone call for Wed 11/24 am.     ISAC Martin, RD, LD  Registered [address]  [phone]

## 2021-12-02 ENCOUNTER — TELEPHONE (OUTPATIENT)
Dept: ONCOLOGY | Age: 26
End: 2021-12-02

## 2021-12-02 NOTE — TELEPHONE ENCOUNTER
NUTRITION NOTE    Called pt on listed phone number r/t MD referral. No answer. Left detailed message with return number.     ISAC Villar, RD, LD  Registered Dietitian  Salma Thomas  [phone]

## 2021-12-17 ENCOUNTER — HOSPITAL ENCOUNTER (OUTPATIENT)
Dept: INFUSION THERAPY | Age: 26
Discharge: HOME | End: 2021-12-17
Payer: COMMERCIAL

## 2021-12-17 DIAGNOSIS — C49.21 PRIMARY LOWER EXTREMITY SARCOMA, RIGHT (HCC): Primary | ICD-10-CM

## 2021-12-17 PROCEDURE — 6360000002 HC RX W HCPCS: Performed by: INTERNAL MEDICINE

## 2021-12-17 PROCEDURE — 2580000003 HC RX 258: Performed by: INTERNAL MEDICINE

## 2021-12-17 PROCEDURE — 96523 IRRIG DRUG DELIVERY DEVICE: CPT

## 2021-12-17 RX ORDER — SODIUM CHLORIDE 0.9 % (FLUSH) 0.9 %
10 SYRINGE (ML) INJECTION PRN
Status: DISCONTINUED | OUTPATIENT
Start: 2021-12-17 | End: 2021-12-18 | Stop reason: HOSPADM

## 2021-12-17 RX ORDER — HEPARIN SODIUM (PORCINE) LOCK FLUSH IV SOLN 100 UNIT/ML 100 UNIT/ML
500 SOLUTION INTRAVENOUS PRN
Status: DISCONTINUED | OUTPATIENT
Start: 2021-12-17 | End: 2021-12-18 | Stop reason: HOSPADM

## 2021-12-17 RX ORDER — SODIUM CHLORIDE 0.9 % (FLUSH) 0.9 %
10 SYRINGE (ML) INJECTION PRN
Status: CANCELLED | OUTPATIENT
Start: 2021-12-17

## 2021-12-17 RX ORDER — HEPARIN SODIUM (PORCINE) LOCK FLUSH IV SOLN 100 UNIT/ML 100 UNIT/ML
500 SOLUTION INTRAVENOUS PRN
Status: CANCELLED | OUTPATIENT
Start: 2021-12-17

## 2021-12-17 RX ORDER — SODIUM CHLORIDE 0.9 % (FLUSH) 0.9 %
20 SYRINGE (ML) INJECTION PRN
Status: CANCELLED | OUTPATIENT
Start: 2021-12-17

## 2021-12-17 RX ADMIN — SODIUM CHLORIDE, PRESERVATIVE FREE 10 ML: 5 INJECTION INTRAVENOUS at 09:35

## 2021-12-17 RX ADMIN — SODIUM CHLORIDE, PRESERVATIVE FREE 10 ML: 5 INJECTION INTRAVENOUS at 09:34

## 2021-12-17 RX ADMIN — HEPARIN 500 UNITS: 100 SYRINGE at 09:34

## 2021-12-20 ENCOUNTER — TELEPHONE (OUTPATIENT)
Dept: PRIMARY CARE CLINIC | Age: 26
End: 2021-12-20

## 2021-12-20 ENCOUNTER — OFFICE VISIT (OUTPATIENT)
Dept: ORTHOPEDIC SURGERY | Age: 26
End: 2021-12-20
Payer: COMMERCIAL

## 2021-12-20 ENCOUNTER — TELEMEDICINE (OUTPATIENT)
Dept: PRIMARY CARE CLINIC | Age: 26
End: 2021-12-20
Payer: COMMERCIAL

## 2021-12-20 VITALS — WEIGHT: 203 LBS | HEIGHT: 61 IN | BODY MASS INDEX: 38.33 KG/M2 | RESPIRATION RATE: 12 BRPM

## 2021-12-20 DIAGNOSIS — S32.591D CLOSED FRACTURE OF RIGHT INFERIOR PUBIC RAMUS WITH ROUTINE HEALING, SUBSEQUENT ENCOUNTER: Primary | ICD-10-CM

## 2021-12-20 DIAGNOSIS — E66.9 OBESITY (BMI 35.0-39.9 WITHOUT COMORBIDITY): ICD-10-CM

## 2021-12-20 PROCEDURE — 1036F TOBACCO NON-USER: CPT | Performed by: PHYSICIAN ASSISTANT

## 2021-12-20 PROCEDURE — G8417 CALC BMI ABV UP PARAM F/U: HCPCS | Performed by: PHYSICIAN ASSISTANT

## 2021-12-20 PROCEDURE — G8482 FLU IMMUNIZE ORDER/ADMIN: HCPCS | Performed by: PHYSICIAN ASSISTANT

## 2021-12-20 PROCEDURE — 99213 OFFICE O/P EST LOW 20 MIN: CPT | Performed by: PHYSICIAN ASSISTANT

## 2021-12-20 PROCEDURE — 99213 OFFICE O/P EST LOW 20 MIN: CPT | Performed by: NURSE PRACTITIONER

## 2021-12-20 PROCEDURE — G8427 DOCREV CUR MEDS BY ELIG CLIN: HCPCS | Performed by: PHYSICIAN ASSISTANT

## 2021-12-20 PROCEDURE — G8427 DOCREV CUR MEDS BY ELIG CLIN: HCPCS | Performed by: NURSE PRACTITIONER

## 2021-12-20 RX ORDER — PHENTERMINE HYDROCHLORIDE 37.5 MG/1
37.5 TABLET ORAL
Qty: 30 TABLET | Refills: 0 | Status: SHIPPED | OUTPATIENT
Start: 2021-12-20 | End: 2022-01-19

## 2021-12-20 ASSESSMENT — ENCOUNTER SYMPTOMS
ABDOMINAL PAIN: 0
COLOR CHANGE: 0
SHORTNESS OF BREATH: 0
CHEST TIGHTNESS: 0
RHINORRHEA: 0
SORE THROAT: 0
DIARRHEA: 0
NAUSEA: 0
VOMITING: 0
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
## 2022-01-07 ENCOUNTER — OFFICE VISIT (OUTPATIENT)
Dept: OBGYN CLINIC | Age: 27
End: 2022-01-07
Payer: COMMERCIAL

## 2022-01-07 ENCOUNTER — HOSPITAL ENCOUNTER (OUTPATIENT)
Age: 27
Setting detail: SPECIMEN
Discharge: HOME | End: 2022-01-07

## 2022-01-07 VITALS
BODY MASS INDEX: 36.84 KG/M2 | DIASTOLIC BLOOD PRESSURE: 80 MMHG | WEIGHT: 195 LBS | RESPIRATION RATE: 16 BRPM | SYSTOLIC BLOOD PRESSURE: 118 MMHG

## 2022-01-07 DIAGNOSIS — Z01.419 WOMEN'S ANNUAL ROUTINE GYNECOLOGICAL EXAMINATION: Primary | ICD-10-CM

## 2022-01-07 PROCEDURE — G8482 FLU IMMUNIZE ORDER/ADMIN: HCPCS | Performed by: OBSTETRICS & GYNECOLOGY

## 2022-01-07 PROCEDURE — 99395 PREV VISIT EST AGE 18-39: CPT | Performed by: OBSTETRICS & GYNECOLOGY

## 2022-01-07 NOTE — PROGRESS NOTES
History and Physical    Milagros De Jesus  2022              26 y.o. Chief Complaint   Patient presents with    Gynecologic Exam       Patient's last menstrual period was 2021 (exact date). Primary Care Physician: FATEMEH Dixon CNP    The patient was seen and examined. She has no chief complaint today and is here for her annual exam.  Her bowels are regular. There are no voiding complaints. She denies any bloating. She denies vaginal discharge and was counseled on STD's and the need for barrier contraception. HPI : Milagros De Jesus is a 23 y.o. female     Here for annual exam.  She is feeling well and happy with her birth control. She is keep up with Dr. Jeanmarie Holter for her osteosarcoma follow up. Declining cultures today. no Bloating  no Early Satiety  no Unexplained weight change of more than 15 lbs  no  PMB  no  PCB  ________________________________________________________________________  OB History    Para Term  AB Living   1 1 0 1 0 1   SAB IAB Ectopic Molar Multiple Live Births   0 0 0 0 0 1      # Outcome Date GA Lbr Zeferino/2nd Weight Sex Delivery Anes PTL Lv   1  10/12/20 33w6d  4 lb 14 oz (2.21 kg) M Vag-Spont EPI Y MAYCO      Name: Magdaleno Croft: 8  Apgar5: 9     Past Medical History:   Diagnosis Date    33 weeks gestation of pregnancy 10/12/2020    Antepartum premature rupture of membrane     Diabetes mellitus ([facility] [address]     Gestational    High-risk pregnancy, unspecified trimester 10/12/2020    History of anemia     prior blood and iron transfusion, without reactions    History of blood transfusion 2020    History of E.  Coli UTI 10-<100,000CFU (20) 2020    Osteosarcoma     Pregestational diabetes mellitus, modified White class B 2020    Last Assessment & Plan:  Formatting of this note might be different from the original. GDMA2 Abnormal early 1 hour GTT (183) Abnormal 3 hour GTT - fasting 86 - 1 hour 224 - 2 hour 187 - 3 hour 164 HbA1c 7/1 5.8 Component     Latest Ref Rng & Units 7/5/2020 7/5/2020 7/5/2020 7/5/2020         8:06 AM 11:27 AM  4:48 PM  9:28 PM  Glucose, metered     50 - 140 mg/dL 93 135 99 131   Component     Latest    Pseudomonas UTI 8/4/2020 8/4/20 treated with Cefepime 2g IV for 10 days     Wears glasses                                                                    Past Surgical History:   Procedure Laterality Date    IR PORT PLACEMENT EQUAL OR GREATER THAN 5 YEARS  10/27/2020    IR PORT PLACEMENT EQUAL OR GREATER THAN 5 YEARS 10/27/2020 Manual MD [facility]    LEG AMPUTATION AT HIP Right 07/23/2020    [facility], osteosarcoma     Family History   Problem Relation Age of Onset    Diabetes Mother     Diabetes Maternal Grandmother     Heart Surgery Maternal Grandmother     Stroke Maternal Grandfather     Hypertension Maternal Grandfather      Social History     Socioeconomic History    Marital status:      Spouse name: Not on file    Number of children: 0    Years of education: Not on file    Highest education level: Not on file   Occupational History    Occupation: [employer]    Tobacco Use    Smoking status: Never Smoker    Smokeless tobacco: Never Used   Vaping Use    Vaping Use: Never used   Substance and Sexual Activity    Alcohol use: Not Currently    Drug use: Never    Sexual activity: Yes     Partners: Male   Other Topics Concern    Not on file   Social History Narrative    ** Merged History Encounter **          Social Determinants of Health     Financial Resource Strain: Low Risk     Difficulty of Paying Living Expenses: Not very hard   Food Insecurity: No Food Insecurity    Worried About Running Out of Food in the Last Year: Never true    Angeli of Food in the Last Year: Never true   Transportation Needs: No Transportation Needs    Lack of Transportation (Medical): No    Lack of Transportation (Non-Medical):  No   Physical Activity:  Days of Exercise per Week: Not on file    Minutes of Exercise per Session: Not on file   Stress:     Feeling of Stress : Not on file   Social Connections:     Frequency of Communication with Friends and Family: Not on file    Frequency of Social Gatherings with Friends and Family: Not on file    Attends Hinduism Services: Not on file    Active Member of [address] [place] [address] [employer] or Organizations: Not on file    Attends Club or Organization Meetings: Not on file    Marital Status: Not on file   Intimate Partner Violence:     Fear of Current or Ex-Partner: Not on file    Emotionally Abused: Not on file    Physically Abused: Not on file    Sexually Abused: Not on file   Housing Stability:     Unable to Pay for Housing in the Last Year: Not on file    Number of [place] in the Last Year: Not on file    Unstable Housing in the Last Year: Not on file       MEDICATIONS:  Current Outpatient Medications   Medication Sig Dispense Refill    phentermine (ADIPEX-P) 37.5 MG tablet Take 1 tablet by mouth every morning (before breakfast) for 30 days. 30 tablet 0    NORLYDA 0.35 MG tablet TAKE 1 TABLET BY MOUTH DAILY 84 tablet 3    gabapentin (NEURONTIN) 300 MG capsule Take 1 capsule by mouth 2 times daily for 120 days. 60 capsule 3    Handicap Placard MISC Expiration: Lifetime 2 each 0    lidocaine-prilocaine (EMLA) 2.5-2.5 % cream Apply topically Daily as needed. 1 Tube 1    ibuprofen (ADVIL;MOTRIN) 800 MG tablet Take 1 tablet by mouth every 8 hours as needed for Pain 60 tablet 1    Prenatal MV-Min-Fe Fum-FA-DHA (PRENATAL 1 PO) Take by mouth       No current facility-administered medications for this visit. ALLERGIES:  Allergies as of 01/07/2022    (No Known Allergies)       Symptoms of decreased mood absent    Immunization status: stated as current, but no records available. Gynecologic History:     Patient's last menstrual period was 12/27/2021 (exact date).     Sexually Active: Yes    STD History: No Permanent Sterilization: No   Reversible Birth Control: Yes OCP        Hormone Replacement Exposure: No      Genetic Qualified Family History of Breast, Ovarian , Colon or Uterine Cancer: No     If YES see scanned worksheet. Preventative Health Testing:    Health Maintenance:  Health Maintenance Due   Topic Date Due    Hepatitis C screen  Never done    Hepatitis A vaccine (2 of 2 - 2-dose series) 12/06/2014       Date of Last Pap Smear: 5/2020  Abnormal Pap Smear History: denies  Colposcopy History:   Date of Last Mammogram: none  Date of Last Colonoscopy:   Date of Last Bone Density:      ________________________________________________________________________        REVIEW OF SYSTEMS:       A minimum of an eleven point review of systems was completed. Review Of Systems (11 point):  Constitutional: No fever, chills or malaise; No weight change or fatigue  Head and Eyes: No vision changes, Headache, Dizziness or trauma in last 12 months  ENT ROS: No hearing, Tinnitis, sinus or taste problems  Hematological and Lymphatic ROS:No Lymphoma, Von Willebrand's, Hemophillia or Bleeding History  Psych ROS: No Depression, Homicidal thoughts,suicidal thoughts, or anxiety  Breast ROS: No breast abnormalities or lumps  Respiratory ROS: No SOB, Pneumoniae,Cough, or Pulmonary Embolism   Cardiovascular ROS: No Chest Pain with Exertion, Palpitations, Syncope, Edema, Arrhythmia  Gastrointestinal ROS: No Indigestion, Heartburn, Nausea, vomiting, Diarrhea, Constipation,or Bowel Changes; No Bloody Stools or melena  Genito-Urinary ROS: No Dysuria, Hematuria or Nocturia.  No Urinary Incontinence or Vaginal Discharge  Musculoskeletal ROS: No Arthralgia, Arthritis,Gout,Osteoporosis or Rheumatism  Neurological ROS: No CVA, Migraines, Epilepsy, Seizure Hx, or Limb Weakness  Dermatological ROS: No Rash, Itching, Hives, Mole Changes or Cancer PHYSICAL Exam:     Constitutional:  Vitals:    01/07/22 1113   BP: 118/80   Site: Left Upper Arm   Position: Sitting   Cuff Size: Large Adult   Resp: 16   Weight: 195 lb (88.5 kg)       Chaperone for Intimate Exam   Chaperone was offered and accepted as part of the rooming process.  Chaperone: Delio Garcia          General Appearance: This  is a well Developed, well Nourished, well groomed female. Her BMI was reviewed. Nutritional decision making was discussed. Skin:  There was a Normal Inspection of the skin without rashes or lesions. There were no rashes. Lymphatic:  No Lymph Nodes were Palpable in the neck , axilla or groin.  0 # Of Lymph Nodes     Neck and EENT:  The neck was supple. There were no masses   The thyroid was not enlarged and had no masses. EOMI B/L      Respiratory: The lungs were auscultated and found to be clear. There were no rales, rhonchi or wheezes. There was a good respiratory effort. Cardiovascular: The heart was in a regular rate and rhythm. . No S3 or S4. There was no murmur appreciated. Location, grade, and radiation are not applicable. Extremities: The patients extremities were without calf tenderness, edema, or varicosities. There was full range of motion in all four extremities. Pulses in all four extremities were appreciated and are 2/4. Abdomen: The abdomen was soft and non-tender. There were good bowel sounds in all quadrants and there was no guarding, rebound or rigidity. Psych: The patient had a normal Orientation to: Time, Place, Person, and Situation  There is no Mood / Affect changes    Breast:  (Chest)  normal appearance, no masses or tenderness  Self breast exams were reviewed in detail. Literature was given.     Pelvic Exam:  External genitalia: normal general appearance  Urinary system: urethral meatus normal  Vaginal: normal mucosa without prolapse or lesions  Cervix: normal appearance  Adnexa: normal bimanual exam  Uterus: normal single, nontender    Rectal Exam:  exam declined by patient          Musculosk:  H/o right leg amputation at hip        ASSESSMENT:      23 y. o. Annual   Diagnosis Orders   1. Women's annual routine gynecological examination  PAP Smear          Chief Complaint   Patient presents with    Gynecologic Exam          Past Medical History:   Diagnosis Date    33 weeks gestation of pregnancy 10/12/2020    Antepartum premature rupture of membrane     Diabetes mellitus ([facility] [address]     Gestational    High-risk pregnancy, unspecified trimester 10/12/2020    History of anemia     prior blood and iron transfusion, without reactions    History of blood transfusion 2020    History of E.  Coli UTI 10-<100,000CFU (20) 2020    Osteosarcoma     Pregestational diabetes mellitus, modified White class B 2020    Last Assessment & Plan:  Formatting of this note might be different from the original. GDMA2 Abnormal early 1 hour GTT (183) Abnormal 3 hour GTT - fasting 86 - 1 hour 224 - 2 hour 187 - 3 hour 164 HbA1c  5.8 Component     Latest Ref Rng & Units 2020         8:06 AM 11:27 AM  4:48 PM  9:28 PM  Glucose, metered     50 - 140 mg/dL 93 135 99 131   Component     Latest    Pseudomonas UTI 2020 treated with Cefepime 2g IV for 10 days     Wears glasses          Patient Active Problem List    Diagnosis Date Noted    Phantom pain after amputation of lower extremity ([facility] [address] 2021     10/12/20 M Apg 8/9 Wt 4#14  10/12/2020    Malignant neoplasm affecting pregnancy in third trimester 2020    History of disarticulation of right hip 2020    Transaminitis 2020    Iron deficiency anemia 2020     Last Assessment & Plan:   Hgb 8.2 g/dL  Asymptomatic   S/p iv iron on  and 1 u pRBC     Continue to monitor vitals  Last Assessment & Plan:   Hgb 8.2 g/dL  Asymptomatic   S/p iv iron on 7/1 and 1 u pRBC     Continue to monitor vitals      Thrombocytosis 06/30/2020     Last Assessment & Plan:   Plts elevated, 573 (07/02) --> 534 (07/03)  Etiology: secondary to iron deficiency   Patient found to have iron deficiency  Peripheral smear   -  Neutrophilia and monocytosis. -  Thrombocytosis. -  Normocytic normochromic anemia. -  No evidence of schistocytes, dysplasia or blasts.   -Iron studies: iron 19, iron saturation 11%  -patient given one 1 unit leukocyte-reduced, irradiated RBC 07/02    Plan:  - f/u CBC w/ daily      Primary lower extremity sarcoma, right ([address] 06/29/2020     Last Assessment & Plan:   Hx of right thigh mass concerning for sarcoma  - MRI at OSH on 6/12/2020: Centered along the anteromedial aspect of the mid-distal thigh there is a large complex mass measuring at least 9.7 x 8.3 x 16.9 cm. Concerning for sarcoma  Patient follows with Dr. Jenna Jordan at [place] and wants to pursue her care at [facility]  S/p biopsy 6/29  Oxycodone for pain management     Plan:  -Continue heparin 5,000 u BID for DVT prophylaxis   - f/u biopsy results and tumor board treatment regimen   - pain management: continue oxycodone and tylenol as needed for pain management, avoid NSAIDs  -Please consider CT abdomen/pelvis/brain to stage patient appropriately   -Plan for [facility] due to [employer] and availability of pregnancy shield       Last Assessment & Plan:   Hx of right thigh mass concerning for sarcoma  - MRI at OSH on 6/12/2020: Centered along the anteromedial aspect of the mid-distal thigh there is a large complex mass measuring at least 9.7 x 8.3 x 16.9 cm. Concerning for sarcoma  Patient follows with Dr. Jenna Jordan at [place] and wants to pursue her care at [facility]  S/p biopsy 6/29  Oxycodone for pain management     Plan:  -Continue heparin 5,000 u BID for DVT prophylaxis   - f/u biopsy results and tumor Question:   Prior Abnormal Pap Test     Answer:   No     Order Specific Question:   Screening or Diagnostic     Answer:   Screening     Order Specific Question:   HPV Requested? Answer:   Yes -  If ASCUS Reflex HPV     Order Specific Question:   High Risk Patient     Answer:   N/A           The patient, Alethea Reyes is a 23 y.o. female, was seen with a total time spent of 20 minutes for the visit on this date of service by the E/M provider. The time component had both face to face and non face to face time spent in determining the total time component. Counseling and education regarding her diagnosis listed below and her options regarding those diagnoses were also included in determining her time component. Diagnosis Orders   1. Women's annual routine gynecological examination  PAP Smear        The patient had her preventative health maintenance recommendations and follow-up reviewed with her at the completion of her visit.

## 2022-01-10 ENCOUNTER — HOSPITAL ENCOUNTER (OUTPATIENT)
Dept: PHYSICAL THERAPY | Facility: CLINIC | Age: 27
Setting detail: THERAPIES SERIES
Discharge: HOME | End: 2022-01-10
Payer: COMMERCIAL

## 2022-01-10 PROCEDURE — 97110 THERAPEUTIC EXERCISES: CPT

## 2022-01-12 NOTE — PROGRESS NOTES
[] [facility] [address] [facility]  [address]  [phone]  F: [phone] [] [address]  [facility]   [address]  P: [phone]  F: [phone] [x] [address] [facility]  [address] [facility]  P: [phone]  F: [phone] [] [address] [facility]  P: [phone]  F: [phone] [] [address]  [facility]   [address]   [place]: [phone]  F: [phone]      Physical Therapy Progress Note  Date: 1/10/2022      Patient: Vera Sicard  : 1995  MRN: [id number]   Physician: Elodia Ma MD                            Insurance: [employer] 40[employer] hard max  Medical Diagnosis: Z89.621 (ICD-10-CM) - History of disarticulation of right hip  Rehab Codes: R26.2 Difficulty walking  Onset date: 20               Next 's appt. : tbd  Visit# / total visits: 32                   Cancels/No Shows: 0/0    Subjective:  Pain:  [] Yes  [x] No  Location:  N/A Pain Rating: (0-10 scale) 0/10  Pain altered Tx:  [] No  [] Yes  Action:  Comments: Patient returns to physical therapy to resume gait training after needing to pause due to fractures sustained from a fall and INS limitations. She reports having difficulty ambulating with bilateral axillary crutches in home, completing housework and caring for her son while wearing prosthesis.      Objective:  Test Measurements:  AMP=35/47    Function:   Ambulation: bilateral forearm crutches, increased vaulting  Balance: ambulation without AD POOR with increased FOF and multi movement pattern deficits    Treatment Today:  Gait training: VC's used to reduce vaulting and trial reciprocal gait pattern in parallel bars to improve confidence and facilitate progression to a single crutch     Assessment:

## 2022-01-17 LAB — CYTOLOGY REPORT: NORMAL

## 2022-01-24 ENCOUNTER — HOSPITAL ENCOUNTER (OUTPATIENT)
Dept: PHYSICAL THERAPY | Facility: CLINIC | Age: 27
Setting detail: THERAPIES SERIES
Discharge: HOME | End: 2022-01-24
Payer: COMMERCIAL

## 2022-01-24 PROCEDURE — 97116 GAIT TRAINING THERAPY: CPT

## 2022-01-24 NOTE — FLOWSHEET NOTE
[] [facility] [address]  [address]  [phone]  F: [phone] [] [address]  [facility]   [address]  P: [phone]  F: [phone] [x] [place]  [address]  P: [phone]  F: [phone] [] [address]  P: [phone]  F: [phone] [] [address]  [facility]   [address]   [place]: [phone]  F: [phone]      Physical Therapy Daily Treatment Note    Date:  2022  Patient Name:  Jocelynn Luciano    :  1995  MRN: [id number]   Kaye Rose MD                            Insurance: [facility] [employer] hard max  Medical Diagnosis: Z89.621 (ICD-10-CM) - History of disarticulation of right hip  Rehab Codes: R26.2 Difficulty walking  Onset date: 20               Next Dr's appt. : tbd  Visit# / total visits: 39                   Cancels/No Shows: 0/0     Subjective:    Pain:  [] Yes  [x] No Location:  N/A Pain Rating: (0-10 scale) 0/10  Pain altered Tx:  [] No  [] Yes  Action:  Comments:Patient reports that she has been doing better about eliminating vaulting since last session    Objective:  Modalities:   Precautions:  Exercises:  Exercise Reps/ Time Weight/ Level Comments   Gait training   reciprocal and 1 crutch                           Other:      Treatment Charges: Mins Units   []  Modalities     [x]  Ther Exercise     []  Manual Therapy     []  Ther Activities     []  Aquatics     []  Vasocompression     [x]  Other: gait 45 3   Total Treatment time 45 3       Assessment: [] Progressing toward goals. [] No change. [x] Other: Patient demonstrated improved confidence ambulating in parallel bars with only one forearm crutch.  VC's given to decrease sound side step length and land heel toe to improve medial column control in mid stance  [] Patient would continue to benefit from skilled physical therapy services in order to: improve safety ambulating with prosthesis    New LTG's  1. Patient to demonstrate the ability to ambulate with 1 forearm crutch  2. Patient to complete AMP with score of 38/47       Pt. Education:  [] Yes  [] No  [] Reviewed Prior HEP/Ed  Method of Education: [] Verbal  [] Demo  [] Written  Comprehension of Education:  [] Verbalizes understanding. [] Demonstrates understanding. [] Needs review. [] Demonstrates/verbalizes HEP/Ed previously given. Plan: [x] Continue current frequency toward long and short term goals.           Time In: 3pm            Time Out: 4pm    Electronically signed by:  Kacey Leon, PT

## 2022-01-28 ENCOUNTER — HOSPITAL ENCOUNTER (OUTPATIENT)
Age: 27
Discharge: HOME | End: 2022-01-28
Payer: COMMERCIAL

## 2022-01-28 ENCOUNTER — HOSPITAL ENCOUNTER (OUTPATIENT)
Dept: INFUSION THERAPY | Age: 27
Discharge: HOME | End: 2022-01-28
Payer: COMMERCIAL

## 2022-01-28 DIAGNOSIS — C49.21 PRIMARY LOWER EXTREMITY SARCOMA, RIGHT (HCC): ICD-10-CM

## 2022-01-28 DIAGNOSIS — Z13.29 SCREENING FOR THYROID DISORDER: ICD-10-CM

## 2022-01-28 DIAGNOSIS — Z13.6 SCREENING FOR CARDIOVASCULAR CONDITION: Primary | ICD-10-CM

## 2022-01-28 LAB
ABSOLUTE EOS #: 0.1 K/UL (ref 0–0.4)
ABSOLUTE IMMATURE GRANULOCYTE: NORMAL K/UL (ref 0–0.3)
ABSOLUTE LYMPH #: 1.4 K/UL (ref 1–4.8)
ABSOLUTE MONO #: 0.4 K/UL (ref 0.1–1.2)
ALBUMIN SERPL-MCNC: 4.6 G/DL (ref 3.5–5.2)
ALBUMIN/GLOBULIN RATIO: 1.8 (ref 1–2.5)
ALP BLD-CCNC: 63 U/L (ref 35–104)
ALT SERPL-CCNC: 25 U/L (ref 5–33)
ANION GAP SERPL CALCULATED.3IONS-SCNC: 12 MMOL/L (ref 9–17)
AST SERPL-CCNC: 17 U/L
BASOPHILS # BLD: 1 % (ref 0–2)
BASOPHILS ABSOLUTE: 0 K/UL (ref 0–0.2)
BILIRUB SERPL-MCNC: 0.5 MG/DL (ref 0.3–1.2)
BUN BLDV-MCNC: 12 MG/DL (ref 6–20)
BUN/CREAT BLD: ABNORMAL (ref 9–20)
CALCIUM SERPL-MCNC: 9.3 MG/DL (ref 8.6–10.4)
CHLORIDE BLD-SCNC: 102 MMOL/L (ref 98–107)
CHOLESTEROL/HDL RATIO: 4.6
CHOLESTEROL: 175 MG/DL
CO2: 22 MMOL/L (ref 20–31)
CREAT SERPL-MCNC: <0.4 MG/DL (ref 0.5–0.9)
DIFFERENTIAL TYPE: NORMAL
EOSINOPHILS RELATIVE PERCENT: 3 % (ref 1–4)
GFR AFRICAN AMERICAN: ABNORMAL ML/MIN
GFR NON-AFRICAN AMERICAN: ABNORMAL ML/MIN
GFR SERPL CREATININE-BSD FRML MDRD: ABNORMAL ML/MIN/{1.73_M2}
GFR SERPL CREATININE-BSD FRML MDRD: ABNORMAL ML/MIN/{1.73_M2}
GLUCOSE BLD-MCNC: 117 MG/DL (ref 70–99)
HCT VFR BLD CALC: 39.4 % (ref 36–46)
HDLC SERPL-MCNC: 38 MG/DL
HEMOGLOBIN: 13.6 G/DL (ref 12–16)
IMMATURE GRANULOCYTES: NORMAL %
LDL CHOLESTEROL: 104 MG/DL (ref 0–130)
LYMPHOCYTES # BLD: 26 % (ref 24–44)
MCH RBC QN AUTO: 29.7 PG (ref 26–34)
MCHC RBC AUTO-ENTMCNC: 34.6 G/DL (ref 31–37)
MCV RBC AUTO: 85.7 FL (ref 80–100)
MONOCYTES # BLD: 7 % (ref 2–11)
NRBC AUTOMATED: NORMAL PER 100 WBC
PDW BLD-RTO: 12.9 % (ref 12.5–15.4)
PLATELET # BLD: 233 K/UL (ref 140–450)
PLATELET ESTIMATE: NORMAL
PMV BLD AUTO: 9.5 FL (ref 6–12)
POTASSIUM SERPL-SCNC: 4.1 MMOL/L (ref 3.7–5.3)
RBC # BLD: 4.59 M/UL (ref 4–5.2)
RBC # BLD: NORMAL 10*6/UL
SEG NEUTROPHILS: 63 % (ref 36–66)
SEGMENTED NEUTROPHILS ABSOLUTE COUNT: 3.5 K/UL (ref 1.8–7.7)
SODIUM BLD-SCNC: 136 MMOL/L (ref 135–144)
TOTAL PROTEIN: 7.1 G/DL (ref 6.4–8.3)
TRIGL SERPL-MCNC: 167 MG/DL
TSH SERPL DL<=0.05 MIU/L-ACNC: 1.33 MIU/L (ref 0.3–5)
VLDLC SERPL CALC-MCNC: ABNORMAL MG/DL (ref 1–30)
WBC # BLD: 5.5 K/UL (ref 3.5–11)
WBC # BLD: NORMAL 10*3/UL

## 2022-01-28 PROCEDURE — 36591 DRAW BLOOD OFF VENOUS DEVICE: CPT

## 2022-01-28 PROCEDURE — 80061 LIPID PANEL: CPT

## 2022-01-28 PROCEDURE — 84443 ASSAY THYROID STIM HORMONE: CPT

## 2022-01-28 PROCEDURE — 2580000003 HC RX 258: Performed by: INTERNAL MEDICINE

## 2022-01-28 PROCEDURE — 6360000002 HC RX W HCPCS: Performed by: INTERNAL MEDICINE

## 2022-01-28 PROCEDURE — 80053 COMPREHEN METABOLIC PANEL: CPT

## 2022-01-28 PROCEDURE — 85025 COMPLETE CBC W/AUTO DIFF WBC: CPT

## 2022-01-28 PROCEDURE — 96523 IRRIG DRUG DELIVERY DEVICE: CPT

## 2022-01-28 RX ORDER — SODIUM CHLORIDE 0.9 % (FLUSH) 0.9 %
10 SYRINGE (ML) INJECTION PRN
Status: DISCONTINUED | OUTPATIENT
Start: 2022-01-28 | End: 2022-01-29 | Stop reason: HOSPADM

## 2022-01-28 RX ORDER — HEPARIN SODIUM (PORCINE) LOCK FLUSH IV SOLN 100 UNIT/ML 100 UNIT/ML
500 SOLUTION INTRAVENOUS PRN
Status: DISCONTINUED | OUTPATIENT
Start: 2022-01-28 | End: 2022-01-29 | Stop reason: HOSPADM

## 2022-01-28 RX ADMIN — HEPARIN 500 UNITS: 100 SYRINGE at 09:50

## 2022-01-28 RX ADMIN — SODIUM CHLORIDE, PRESERVATIVE FREE 10 ML: 5 INJECTION INTRAVENOUS at 09:50

## 2022-01-28 RX ADMIN — SODIUM CHLORIDE, PRESERVATIVE FREE 10 ML: 5 INJECTION INTRAVENOUS at 09:45

## 2022-01-28 NOTE — PROGRESS NOTES
Patient here for port draw. Port flushes and draws well and she was discharged home in stable condition. She is due to return 2/1 for VV with MD and 3/11 for next port flush.

## 2022-02-01 ENCOUNTER — VIRTUAL VISIT (OUTPATIENT)
Dept: ONCOLOGY | Age: 27
End: 2022-02-01
Payer: COMMERCIAL

## 2022-02-01 DIAGNOSIS — C49.21 PRIMARY LOWER EXTREMITY SARCOMA, RIGHT (HCC): Primary | ICD-10-CM

## 2022-02-01 PROCEDURE — G8482 FLU IMMUNIZE ORDER/ADMIN: HCPCS | Performed by: INTERNAL MEDICINE

## 2022-02-01 PROCEDURE — 99214 OFFICE O/P EST MOD 30 MIN: CPT | Performed by: INTERNAL MEDICINE

## 2022-02-01 PROCEDURE — G8427 DOCREV CUR MEDS BY ELIG CLIN: HCPCS | Performed by: INTERNAL MEDICINE

## 2022-02-01 PROCEDURE — 1036F TOBACCO NON-USER: CPT | Performed by: INTERNAL MEDICINE

## 2022-02-01 PROCEDURE — G8417 CALC BMI ABV UP PARAM F/U: HCPCS | Performed by: INTERNAL MEDICINE

## 2022-02-01 RX ORDER — FERROUS SULFATE 325(65) MG
325 TABLET ORAL
COMMUNITY

## 2022-02-01 NOTE — PROGRESS NOTES
DIAGNOSIS:   1. 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 female who is referred to us for sarcoma. She presented with rightt knee sprain at work 04/2020 but the pain and swelling worsened with palpable mass. She underwent MRI which showed mass and was seen at [facility] where biopsy was taken and showed sarcoma. She was transferred to [facility] and decided to undergo full leg amputation due to early stage pregnancy, tumor was 16 cm x 20 cm x 3 cm and removed with negative margins. She was following with [facility] and decided she would prefer to receive treatment locally. The patient is currently 32 weeks pregnant and delivery plans are expected to be finalized 10/02/2020. She has elected to proceed with chemo after delivery. She is working towards prosthesis with test socket. 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 Kathy delivered in late October, after delivery, we decided to start chemo with AIM 11/3/2020. Patient finished 4 cycles of chemotherapy and she was declared in remission and was started surveillance  INTERIM HISTORY: The patient presents for follow up today through virtual visit for osteosarcoma surveillance and review labs, she is feeling well and has no complaints. Specifically, she denies any nausea or vomiting, fever or chills or night sweats. She is now walking with the help of crutches. She is down to 1 crutch. She is trying to do more active. She is healing from the fracture and doing much better.   PAST MEDICAL HISTORY: has a past medical history of 33 weeks gestation of pregnancy, Antepartum premature rupture of membrane, Diabetes mellitus ([facility] [address] High-risk pregnancy, unspecified trimester, History of anemia, History of blood transfusion, History of E. Coli UTI 10-<100,000CFU (7/7/20), Osteosarcoma, Pregestational diabetes mellitus, modified White class B, Pseudomonas UTI, and Wears glasses. PAST SURGICAL HISTORY: has a past surgical history that includes Leg amputation at hip (Right, 07/23/2020) and IR PORT PLACEMENT > 5 YEARS (10/27/2020). CURRENT MEDICATIONS:  has a current medication list which includes the following prescription(s): ferrous sulfate, norlyda, handicap placard, lidocaine-prilocaine, ibuprofen, prenatal mv-min-fe fum-fa-dha, and gabapentin. ALLERGIES:  has No Known Allergies. FAMILY HISTORY: Negative for any hematological or oncological conditions. SOCIAL HISTORY:  reports that she has never smoked. She has never used smokeless tobacco. She reports previous alcohol use. She reports that she does not use drugs. REVIEW OF SYSTEMS:   General: No fever or night sweats. Weight is stable. ENT: No double or blurred vision, no tinnitus or hearing problem, no dysphagia or sore throat   Respiratory: No chest pain, no shortness of breath, no cough or hemoptysis. Cardiovascular: Denies chest pain, PND or orthopnea. No L E swelling or palpitations. Gastrointestinal: No nausea, vomiting, abdominal pain, diarrhea or constipation. Genitourinary: Denies dysuria, hematuria, frequency, urgency or incontinence. Gynecological: nearly resolved post partum bleeding  Neurological: Denies headaches, decreased LOC, no sensory or motor focal deficits. +post surgical phantom pain improving  Musculoskeletal: No arthralgia no back pain or joint swelling. Skin: There are no rashes or bleeding. Psychiatric: No anxiety, no depression. Endocrine: No diabetes or thyroid disease. Hematologic: No bleeding, no adenopathy.     PHYSICAL EXAM: Shows a well appearing 23y.o.-year-old female who is not in pain or distress. PHYSICAL EXAMINATION:    Vital Signs: (As obtained by patient/caregiver or practitioner observation)    Blood pressure-  Heart rate-    Respiratory rate-    Temperature-  Pulse oximetry-     Constitutional: [x] Appears well-developed and well-nourished [x] No apparent distress      [] Abnormal-   Mental status  [x] Alert and awake  [x] Oriented to person/place/time [x]Able to follow commands      Eyes:  EOM    [x]  Normal  [] Abnormal-  Sclera  [x]  Normal  [] Abnormal -         Discharge [x]  None visible  [] Abnormal -    HENT:   [x] Normocephalic, atraumatic.   [] Abnormal   [x] Mouth/Throat: Mucous membranes are moist.     External Ears [x] Normal  [] Abnormal-     Neck: [x] No visualized mass     Pulmonary/Chest: [x] Respiratory effort normal.  [x] No visualized signs of difficulty breathing or respiratory distress        [] Abnormal-      Musculoskeletal:   [x] Normal gait with no signs of ataxia         [x] Normal range of motion of neck        [] Abnormal-       Neurological:        [x] No Facial Asymmetry (Cranial nerve 7 motor function) (limited exam to video visit)          [x] No gaze palsy        [] Abnormal-         Skin:        [x] No significant exanthematous lesions or discoloration noted on facial skin         [] Abnormal-            Psychiatric:       [x] Normal Affect [x] No Hallucinations        [] Abnormal-     Other pertinent observable physical exam findings-                          REVIEW OF LABORATORY DATA:   Lab Results   Component Value Date    WBC 5.5 01/28/2022    HGB 13.6 01/28/2022    HCT 39.4 01/28/2022    MCV 85.7 01/28/2022     01/28/2022   '    Chemistry        Component Value Date/Time     01/28/2022 0940    K 4.1 01/28/2022 0940     01/28/2022 0940    CO2 22 01/28/2022 0940    BUN 12 01/28/2022 0940    CREATININE <0.40 (L) 01/28/2022 0940        Component Value Date/Time    CALCIUM 9.3 01/28/2022 0940    ALKPHOS 63 01/28/2022 0940 AST 17 01/28/2022 0940    ALT 25 01/28/2022 0940    BILITOT 0.50 01/28/2022 0940          REVIEW OF RADIOLOGICAL RESULTS:   CT scan 5/2021  Impression   No evidence for metastatic disease in the chest, abdomen or pelvis. CT 11/1/2021  Impression   1.  No evidence for metastatic disease in the chest, abdomen or pelvis.       2.  Interval progress in healing of the recently demonstrated fractures of   the right acetabulum and inferior pubic ramus. IMPRESSION:   1. 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 Current pregnancy  4. Adjuvant chemo following delivery with ifosfamide and adriamycin (AIM) - started 11/03/2020, received 4 cycles    PLAN:   The patient continues to be in remission and she is doing very well. Labs were reviewed and she is doing well. We will continue to see her every 3 months. CT scan be done every 6 months. We will continue surveillance. 40 she will remain in remission. Continue to work aggressively to lose weight and be active    Attestation   The patient  being evaluated by a Virtual Visit (video visit) encounter to address concerns as mentioned above. A caregiver was present when appropriate. Due to this being a TeleHealth encounter (During [facility] public health emergency), evaluation of the following organ systems was limited: Vitals/Constitutional/EENT/Resp/CV/GI//MS/Neuro/Skin/Heme-Lymph-Imm. Pursuant to the emergency declaration under the [address] [place] [address], [address] [place] [address] authority and the [employer] Act, this Virtual Visit was conducted with patient's (and/or legal guardian's) consent, to reduce the patient's risk of exposure to COVID-19 and provide necessary medical care.   The patient (and/or legal guardian) has also been advised to contact this office for worsening conditions or problems, and seek emergency medical treatment and/or call 911 if deemed necessary. Services were provided through a video synchronous discussion virtually to substitute for in-person clinic visit. Patient and provider were located at their individual homes. --Garnett Cranker, MD on 4/6/2020 at 3:50 PM    An electronic signature was used to authenticate this note.

## 2022-02-07 ENCOUNTER — HOSPITAL ENCOUNTER (OUTPATIENT)
Dept: PHYSICAL THERAPY | Facility: CLINIC | Age: 27
Setting detail: THERAPIES SERIES
Discharge: HOME | End: 2022-02-07
Payer: COMMERCIAL

## 2022-02-07 PROCEDURE — 97116 GAIT TRAINING THERAPY: CPT

## 2022-02-09 ENCOUNTER — OFFICE VISIT (OUTPATIENT)
Dept: PRIMARY CARE CLINIC | Age: 27
End: 2022-02-09
Payer: COMMERCIAL

## 2022-02-09 VITALS
SYSTOLIC BLOOD PRESSURE: 132 MMHG | BODY MASS INDEX: 36.28 KG/M2 | DIASTOLIC BLOOD PRESSURE: 80 MMHG | OXYGEN SATURATION: 99 % | WEIGHT: 192 LBS | HEART RATE: 100 BPM

## 2022-02-09 DIAGNOSIS — F06.30 MENSTRUAL-RELATED MOOD DISORDER: ICD-10-CM

## 2022-02-09 DIAGNOSIS — G54.6 PHANTOM PAIN AFTER AMPUTATION OF LOWER EXTREMITY (HCC): ICD-10-CM

## 2022-02-09 DIAGNOSIS — E66.09 CLASS 2 OBESITY DUE TO EXCESS CALORIES WITHOUT SERIOUS COMORBIDITY WITH BODY MASS INDEX (BMI) OF 36.0 TO 36.9 IN ADULT: Primary | ICD-10-CM

## 2022-02-09 DIAGNOSIS — R73.01 ELEVATED FASTING GLUCOSE: ICD-10-CM

## 2022-02-09 LAB — HBA1C MFR BLD: 5.4 %

## 2022-02-09 PROCEDURE — 1036F TOBACCO NON-USER: CPT | Performed by: NURSE PRACTITIONER

## 2022-02-09 PROCEDURE — G8417 CALC BMI ABV UP PARAM F/U: HCPCS | Performed by: NURSE PRACTITIONER

## 2022-02-09 PROCEDURE — G8482 FLU IMMUNIZE ORDER/ADMIN: HCPCS | Performed by: NURSE PRACTITIONER

## 2022-02-09 PROCEDURE — 83036 HEMOGLOBIN GLYCOSYLATED A1C: CPT | Performed by: NURSE PRACTITIONER

## 2022-02-09 PROCEDURE — 99214 OFFICE O/P EST MOD 30 MIN: CPT | Performed by: NURSE PRACTITIONER

## 2022-02-09 PROCEDURE — G8427 DOCREV CUR MEDS BY ELIG CLIN: HCPCS | Performed by: NURSE PRACTITIONER

## 2022-02-09 RX ORDER — SEMAGLUTIDE 0.25 MG/.5ML
0.25 INJECTION, SOLUTION SUBCUTANEOUS
Qty: 2 ML | Refills: 0 | Status: SHIPPED | OUTPATIENT
Start: 2022-02-09 | End: 2022-03-31

## 2022-02-09 ASSESSMENT — PATIENT HEALTH QUESTIONNAIRE - PHQ9
SUM OF ALL RESPONSES TO PHQ QUESTIONS 1-9: 0
SUM OF ALL RESPONSES TO PHQ9 QUESTIONS 1 & 2: 0
SUM OF ALL RESPONSES TO PHQ QUESTIONS 1-9: 0
2. FEELING DOWN, DEPRESSED OR HOPELESS: 0
SUM OF ALL RESPONSES TO PHQ QUESTIONS 1-9: 0
SUM OF ALL RESPONSES TO PHQ QUESTIONS 1-9: 0
1. LITTLE INTEREST OR PLEASURE IN DOING THINGS: 0

## 2022-02-09 ASSESSMENT — ENCOUNTER SYMPTOMS
CHEST TIGHTNESS: 0
RHINORRHEA: 0
COLOR CHANGE: 0
VOMITING: 0
DIARRHEA: 0
SORE THROAT: 0
ABDOMINAL PAIN: 0
SHORTNESS OF BREATH: 0
NAUSEA: 0

## 2022-02-09 NOTE — PATIENT INSTRUCTIONS
Patient Education        Starting a Weight Loss Plan: Care Instructions  Overview     If you're thinking about losing weight, it can be hard to know where to start. Your doctor can help you set up a weight loss plan that best meets your needs. You may want to take a class on nutrition or exercise, or you could join a weight loss support group. If you have questions about how to make changes to your eating or exercise habits, ask your doctor about seeing a registered dietitian or an exercise specialist.  It can be a big challenge to lose weight. But you don't have to make huge changes at once. Make small changes, and stick with them. When those changes become habit, add a few more changes. If you don't think you're ready to make changes right now, try to pick a date in the future. Make an appointment to see your doctor to discuss whether the time is right for you to start a plan. Follow-up care is a key part of your treatment and safety. Be sure to make and go to all appointments, and call your doctor if you are having problems. It's also a good idea to know your test results and keep a list of the medicines you take. How can you care for yourself at home? · Set realistic goals. Many people expect to lose much more weight than is likely. A weight loss of 5% to 10% of your body weight may be enough to improve your health. · Get family and friends involved to provide support. Talk to them about why you are trying to lose weight, and ask them to help. They can help by participating in exercise and having meals with you, even if they may be eating something different. · Find what works best for you. If you do not have time or do not like to cook, a program that offers meal replacement bars or shakes may be better for you.  Or if you like to prepare meals, finding a plan that includes daily menus and recipes may be best.  · Ask your doctor about other health professionals who can help you achieve your weight loss goals.  ? A dietitian can help you make healthy changes in your diet. ? An exercise specialist or  can help you develop a safe and effective exercise program.  ? A counselor or psychiatrist can help you cope with issues such as depression, anxiety, or family problems that can make it hard to focus on weight loss. · Consider joining a support group for people who are trying to lose weight. Your doctor can suggest groups in your area. Where can you learn more? Go to [email or web address] org and sign in to your [email or web address] account. Enter G437 in the Page [employer] box to learn more about \"Starting a Weight Loss Plan: Care Instructions. \"     If you do not have an account, please click on the \"Sign Up Now\" link. Current as of: March 17, 2021               Content Version: 13.1  © 9049-5806 Healthwise, Incorporated. Care instructions adapted under license by [facility] ([facility]). If you have questions about a medical condition or this instruction, always ask your healthcare professional. [address] any warranty or liability for your use of this information.

## 2022-02-09 NOTE — PROGRESS NOTES
[address] \[facility]\"" PRIMARY CARE  [address] [facility] [address]  [address] [address]  Dept: [phone]  Dept Fax: [phone]    Preeti Mcgrath is a 23 y.o. female who presentstoday for her medical conditions/complaints as noted below. Preeti Mcgrath is c/o of  Chief Complaint   Patient presents with    Weight Loss         HPI:     Here for routine follow up for weight loss/adipex. Unfortunately we had delay in follow up due to her being ill and young son being ill. We did discuss that since we did not fill adipex within the month timeframe, we would not be able to continue to 3rd month. We had discussed wegovy in the past, she would like to see if she can start this. Continues to weigh and measure food, limit carbs and sugars and increase activity as able. She is limited by R AKA, continues to do physical therapy regularly. Has complaint of mood swings around menses, improves when not on period. Does not feel antidepressant is necessary as symptoms are short lived and mild. Does notice increase in phantom pain around menses also. Continues Neurontin for this, working well. Has elevated fasting glucose on recent labs, hba1c 5.4 today.         Hemoglobin A1C (%)   Date Value   2022 5.4   2020 5.0             ( goal A1C is < 7)   No results found for: LABMICR  LDL Cholesterol (mg/dL)   Date Value   2022 104       (goal LDL is <100)   AST (U/L)   Date Value   2022 17     ALT (U/L)   Date Value   2022 25     BUN (mg/dL)   Date Value   2022 12     BP Readings from Last 3 Encounters:   22 132/80   22 118/80   21 110/66          ([id number])    Past Medical History:   Diagnosis Date    33 weeks gestation of pregnancy 10/12/2020    Antepartum premature rupture of membrane     Diabetes mellitus ([facility] [address]     Gestational    High-risk pregnancy, unspecified trimester 10/12/2020    History of anemia     prior blood and iron transfusion, mouth every 8 hours as needed for Pain 60 tablet 1    Prenatal MV-Min-Fe Fum-FA-DHA (PRENATAL 1 PO) Take by mouth      gabapentin (NEURONTIN) 300 MG capsule Take 1 capsule by mouth 2 times daily for 120 days. 60 capsule 3     No current facility-administered medications for this visit. No Known Allergies    Health Maintenance   Topic Date Due    Hepatitis A vaccine (2 of 2 - 2-dose series) 12/06/2014    COVID-19 Vaccine (3 - Booster for Pfizer series) 04/04/2022    Depression Screen  11/08/2022    Pap smear  01/07/2025    DTaP/Tdap/Td vaccine (8 - Td or Tdap) 08/12/2026    Hepatitis B vaccine  Completed    Hib vaccine  Completed    HPV vaccine  Completed    Varicella vaccine  Completed    Meningococcal (ACWY) vaccine  Completed    Flu vaccine  Completed    HIV screen  Completed    Pneumococcal 0-64 years Vaccine  Aged Out    Hepatitis C screen  Discontinued       Subjective:      Review of Systems   Constitutional: Negative for activity change, fatigue and fever. HENT: Negative for congestion, rhinorrhea and sore throat. Eyes: Negative for visual disturbance. Respiratory: Negative for chest tightness and shortness of breath. Cardiovascular: Negative for chest pain and palpitations. Gastrointestinal: Negative for abdominal pain, diarrhea, nausea and vomiting. Endocrine: Negative for polydipsia. Genitourinary: Negative for difficulty urinating. Musculoskeletal: Negative for arthralgias and myalgias. Skin: Negative for color change. Neurological: Negative for weakness and headaches. Psychiatric/Behavioral: Negative for behavioral problems. The patient is not nervous/anxious. All other systems reviewed and are negative. Objective:   /80   Pulse 100   Wt 192 lb (87.1 kg)   SpO2 99%   BMI 36.28 kg/m²   Physical Exam  Vitals reviewed. Constitutional:       General: She is not in acute distress. Appearance: Normal appearance. She is obese.    HENT: Head: Normocephalic. Eyes:      Pupils: Pupils are equal, round, and reactive to light. Cardiovascular:      Rate and Rhythm: Normal rate and regular rhythm. Pulses: Normal pulses. Heart sounds: Normal heart sounds. Pulmonary:      Effort: Pulmonary effort is normal.      Breath sounds: Normal breath sounds. Abdominal:      General: There is no distension. Musculoskeletal:         General: Normal range of motion. Right lower leg: No edema. Left lower leg: No edema. Comments: R AKA, hip disarticulation   Skin:     General: Skin is warm and dry. Capillary Refill: Capillary refill takes less than 2 seconds. Neurological:      General: No focal deficit present. Mental Status: She is alert and oriented to person, place, and time. Psychiatric:         Mood and Affect: Mood normal.         Behavior: Behavior normal.           :       Diagnosis Orders   1. Class 2 obesity due to excess calories without serious comorbidity with body mass index (BMI) of 36.0 to 36.9 in adult  Semaglutide-Weight Management (WEGOVY) 0.25 MG/0.5ML SOAJ SC injection   2. Elevated fasting glucose  POCT glycosylated hemoglobin (Hb A1C)   3. Phantom pain after amputation of lower extremity ([facility] [address]     4. Menstrual-related mood disorder               :          1. Class 2 obesity due to excess calories without serious comorbidity with body mass index (BMI) of 36.0 to 36.9 in adult  Improving- down 3 lbs since last OV. Congratulated!! Continue healthy diet and exercise as able, low carb, high protein. Rx sent for wegovy, did well on adipex but unable to continue 3rd month due to prolonged time between refills. RTO 3 months    - Semaglutide-Weight Management (WEGOVY) 0.25 MG/0.5ML SOAJ SC injection; Inject 0.25 mg into the skin every 7 days  Dispense: 2 mL; Refill: 0    2. Elevated fasting glucose  POC hba1c 5.4, non diabetic, continue weight loss efforts and low carb diet.    - POCT glycosylated hemoglobin (Hb A1C)    3. 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 Worse with menses, may consider discussing with GYN. Can increase gabapentin if needed. Continues to follow with oncology- Dr. Padmini Mcintosh. Continues rehab and prosthetic fitting/use. 4. Menstrual-related mood disorder  New, worse with menses. Offered antidepressant, declines for now. May discuss with GYN    Return in about 3 months (around 5/9/2022). Patient given educational materials - see patient instructions. Discussed use, benefit, and side effects of prescribed medications. All patient questions answered. Pt voiced understanding. Reviewed health maintenance. Instructed to continue current medications, diet and exercise. Patient agreed with treatment plan. Follow up as directed.        Electronicallysigned by FATEMEH Gusman CNP on 2/9/2022 at 10:53 AM

## 2022-02-11 ENCOUNTER — TELEPHONE (OUTPATIENT)
Dept: PRIMARY CARE CLINIC | Age: 27
End: 2022-02-11

## 2022-02-11 NOTE — TELEPHONE ENCOUNTER
Patient called in office, states that they received patients script for [facility]SHAKIR MANUEL, but insurance needs a prior authorization.  Will send to [facility] AND [facility] Medical Assistant

## 2022-02-28 ENCOUNTER — HOSPITAL ENCOUNTER (OUTPATIENT)
Dept: PHYSICAL THERAPY | Facility: CLINIC | Age: 27
Setting detail: THERAPIES SERIES
Discharge: HOME | End: 2022-02-28
Payer: COMMERCIAL

## 2022-02-28 PROCEDURE — 97116 GAIT TRAINING THERAPY: CPT

## 2022-03-01 NOTE — FLOWSHEET NOTE
[] [address]  [address]  [phone]  F: [phone] [] [address]  [facility]   [address]  P: [phone]  F: [phone] [x] [place]  [address] [facility]  P: [phone]  F: [phone] [] [address]  P: [phone]  F: [phone] [] [address]  [facility]   [address]   [place]: [phone]  F: [phone]      Physical Therapy Daily Treatment Note    Date:  2022  Patient Name:  Gustavo Warren    :  1995  MRN: [id number]   Physician: Elodia Ibarra MD                            Insurance: MMO 40v hard max  Medical Diagnosis: Z89.621 (ICD-10-CM) - History of disarticulation of right hip  Rehab Codes: R26.2 Difficulty walking  Onset date: 20               Next Dr's appt. : tbd  Visit# / total visits: 62                   Cancels/No Shows: 0/0     Subjective:    Pain:  [] Yes  [x] No Location:  N/A Pain Rating: (0-10 scale) 0/10  Pain altered Tx:  [] No  [] Yes  Action:  Comments:Patient reports no new issues since last session    Objective:  Modalities:   Precautions:  Exercises:  Exercise Reps/ Time Weight/ Level Comments   Gait training   reciprocal and 1 crutch   Getting up off floor                        Other:    Treatment Charges: Mins Units   []  Modalities     []  Ther Exercise     []  Manual Therapy     []  Ther Activities     []  Aquatics     []  Vasocompression     [x]  Other: gait 45 3   Total Treatment time 45 3       Assessment: [] Progressing toward goals. [] No change. [x] Other: Continued gait training with single crutch VC's for wt shifting over prosthesis.  Patient able to ambulate out of parallel bars with SBA/CGA for confidence rest breaks needed. Patient educated in how to get up from floor using a chair. Multi trials repeated with CGA to improve patient confidence. [] Patient would continue to benefit from skilled physical therapy services in order to: improve safety ambulating with prosthesis    New LTG's  1. Patient to demonstrate the ability to ambulate with 1 forearm crutch  2. Patient to complete AMP with score of 38/47       Pt. Education:  [] Yes  [] No  [] Reviewed Prior HEP/Ed  Method of Education: [] Verbal  [] Demo  [] Written  Comprehension of Education:  [] Verbalizes understanding. [] Demonstrates understanding. [] Needs review. [] Demonstrates/verbalizes HEP/Ed previously given. Plan: [x] Continue current frequency toward long and short term goals.           Time In: 6pm            Time Out: 7pm    Electronically signed by:  Alec Gonzalez PT

## 2022-03-02 RX ORDER — GABAPENTIN 300 MG/1
300 CAPSULE ORAL 2 TIMES DAILY
Qty: 60 CAPSULE | Refills: 3 | Status: SHIPPED | OUTPATIENT
Start: 2022-03-02 | End: 2022-05-03 | Stop reason: ALTCHOICE

## 2022-03-02 RX ORDER — GABAPENTIN 300 MG/1
CAPSULE ORAL
Qty: 90 CAPSULE | OUTPATIENT
Start: 2022-03-02

## 2022-03-10 RX ORDER — SODIUM CHLORIDE 0.9 % (FLUSH) 0.9 %
20 SYRINGE (ML) INJECTION PRN
Status: CANCELLED | OUTPATIENT
Start: 2022-03-10

## 2022-03-14 ENCOUNTER — HOSPITAL ENCOUNTER (OUTPATIENT)
Dept: INFUSION THERAPY | Age: 27
Discharge: HOME | End: 2022-03-14
Payer: COMMERCIAL

## 2022-03-14 DIAGNOSIS — C49.21 PRIMARY LOWER EXTREMITY SARCOMA, RIGHT (HCC): Primary | ICD-10-CM

## 2022-03-14 PROCEDURE — 96523 IRRIG DRUG DELIVERY DEVICE: CPT

## 2022-03-14 PROCEDURE — 6360000002 HC RX W HCPCS: Performed by: INTERNAL MEDICINE

## 2022-03-14 PROCEDURE — 2580000003 HC RX 258: Performed by: INTERNAL MEDICINE

## 2022-03-14 RX ORDER — HEPARIN SODIUM (PORCINE) LOCK FLUSH IV SOLN 100 UNIT/ML 100 UNIT/ML
500 SOLUTION INTRAVENOUS PRN
Status: CANCELLED | OUTPATIENT
Start: 2022-03-14

## 2022-03-14 RX ORDER — SODIUM CHLORIDE 0.9 % (FLUSH) 0.9 %
10 SYRINGE (ML) INJECTION PRN
Status: DISCONTINUED | OUTPATIENT
Start: 2022-03-14 | End: 2022-03-15 | Stop reason: HOSPADM

## 2022-03-14 RX ORDER — SODIUM CHLORIDE 0.9 % (FLUSH) 0.9 %
20 SYRINGE (ML) INJECTION PRN
Status: CANCELLED | OUTPATIENT
Start: 2022-03-14

## 2022-03-14 RX ORDER — SODIUM CHLORIDE 0.9 % (FLUSH) 0.9 %
10 SYRINGE (ML) INJECTION PRN
Status: CANCELLED | OUTPATIENT
Start: 2022-03-14

## 2022-03-14 RX ORDER — HEPARIN SODIUM (PORCINE) LOCK FLUSH IV SOLN 100 UNIT/ML 100 UNIT/ML
500 SOLUTION INTRAVENOUS PRN
Status: DISCONTINUED | OUTPATIENT
Start: 2022-03-14 | End: 2022-03-15 | Stop reason: HOSPADM

## 2022-03-14 RX ADMIN — SODIUM CHLORIDE, PRESERVATIVE FREE 10 ML: 5 INJECTION INTRAVENOUS at 09:50

## 2022-03-14 RX ADMIN — SODIUM CHLORIDE, PRESERVATIVE FREE 10 ML: 5 INJECTION INTRAVENOUS at 09:51

## 2022-03-14 RX ADMIN — HEPARIN 500 UNITS: 100 SYRINGE at 09:51

## 2022-03-14 NOTE — PROGRESS NOTES
Patient arrived for port flush, tolerated w/o incident   Return 5/2 port access 5/3 dr Seymour Lewis, RN

## 2022-03-29 ENCOUNTER — PATIENT MESSAGE (OUTPATIENT)
Dept: PRIMARY CARE CLINIC | Age: 27
End: 2022-03-29

## 2022-03-29 DIAGNOSIS — L21.0 DANDRUFF: Primary | ICD-10-CM

## 2022-03-29 NOTE — TELEPHONE ENCOUNTER
From: Miranda Reardon  To: Jackie Eli  Sent: 3/29/2022 9:53 AM EDT  Subject: Dandruff     Morning Faiza,    Ever sense my hair has come back in after chemotherapy, March Jareth been having really bad dandruff. To the point where my scalp feels like its on fire in between washes. Would you recommend seeing a dermatologist? And if so, who? Im assuming I have some sort of dermatitis.  Hope you are doing well :)

## 2022-03-31 ENCOUNTER — OFFICE VISIT (OUTPATIENT)
Dept: PHYSICAL MEDICINE AND REHAB | Age: 27
End: 2022-03-31
Payer: COMMERCIAL

## 2022-03-31 VITALS
SYSTOLIC BLOOD PRESSURE: 146 MMHG | BODY MASS INDEX: 36.25 KG/M2 | HEIGHT: 61 IN | TEMPERATURE: 97.2 F | DIASTOLIC BLOOD PRESSURE: 89 MMHG | WEIGHT: 192 LBS

## 2022-03-31 DIAGNOSIS — G54.6 PHANTOM PAIN AFTER AMPUTATION OF LOWER EXTREMITY (HCC): ICD-10-CM

## 2022-03-31 DIAGNOSIS — Z89.621 HISTORY OF DISARTICULATION OF RIGHT HIP: Primary | ICD-10-CM

## 2022-03-31 PROCEDURE — 99214 OFFICE O/P EST MOD 30 MIN: CPT | Performed by: PHYSICAL MEDICINE & REHABILITATION

## 2022-03-31 PROCEDURE — G8482 FLU IMMUNIZE ORDER/ADMIN: HCPCS | Performed by: PHYSICAL MEDICINE & REHABILITATION

## 2022-03-31 PROCEDURE — G8427 DOCREV CUR MEDS BY ELIG CLIN: HCPCS | Performed by: PHYSICAL MEDICINE & REHABILITATION

## 2022-03-31 PROCEDURE — 1036F TOBACCO NON-USER: CPT | Performed by: PHYSICAL MEDICINE & REHABILITATION

## 2022-03-31 PROCEDURE — G8417 CALC BMI ABV UP PARAM F/U: HCPCS | Performed by: PHYSICAL MEDICINE & REHABILITATION

## 2022-03-31 NOTE — PROGRESS NOTES
[address] Graciela Pettit [address] [facility] [address]  Dept: [phone]  Dept Fax: [phone]      Dasia Elizabeth, 23 y.o., female, is c/o of Follow-up (right hip disarticulation follow up)  . HPI:     Ms. 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 This took place on 7/23/2020. She does currently have a prosthesis. Any problems with current prosthesis? yes - poorly fitting socket with some mobility in the socket impairing her gait - being attributed to weight loss. Current prosthetic knee is requiring a significant amount of work for her to perform sit-to-stand and ambulation. She uses  forearm crutches for mobility/ambulation. She has participated in physical therapy for training to use prosthesis. The patient is able to don and doff the prosthesis with no assistance. She does not require assistance at home. Areas of pain or weakness: She reports no residual surgical pain. She has intermittent phantom limb sensation with occasional reports of phantom limb pain described as sharp shooting pain. She feels this is well controlled on current dose of 300 mg gabapentin BID. Patient would like to be able to perform self care, cooking/cleaning, walking with varied cinda, household ambulation, childcare (for her 21 month old son), yard work and shopping. Past Medical History:   Diagnosis Date    35 weeks gestation of pregnancy 10/12/2020    Antepartum premature rupture of membrane     Diabetes mellitus ([facility] [address]     Gestational    High-risk pregnancy, unspecified trimester 10/12/2020    History of anemia     prior blood and iron transfusion, without reactions    History of blood transfusion 8/1/2020    History of E.  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 GDMA2 Abnormal early 1 hour GTT (183) Abnormal 3 hour GTT - fasting 86 - 1 hour 224 - 2 hour 187 - 3 hour 164 HbA1c 7/1 5.8 Component     Latest Ref Rng & Units 7/5/2020 7/5/2020 7/5/2020 7/5/2020         8:06 AM 11:27 AM  4:48 PM  9:28 PM  Glucose, metered     50 - 140 mg/dL 93 135 99 131   Component     Latest    Pseudomonas UTI 8/4/2020 8/4/20 treated with Cefepime 2g IV for 10 days     Wears glasses       Past Surgical History:   Procedure Laterality Date    IR PORT PLACEMENT EQUAL OR GREATER THAN 5 YEARS  10/27/2020    IR PORT PLACEMENT EQUAL OR GREATER THAN 5 YEARS 10/27/2020 Sirisha Singh MD [facility]    LEG AMPUTATION AT HIP Right 07/23/2020    [facility], osteosarcoma     Family History   Problem Relation Age of Onset    Diabetes Mother     Diabetes Maternal Grandmother     Heart Surgery Maternal Grandmother     Stroke Maternal Grandfather     Hypertension Maternal Grandfather      Social History     Socioeconomic History    Marital status:      Spouse name: Not on file    Number of children: 0    Years of education: Not on file    Highest education level: Not on file   Occupational History    Occupation: [employer]    Tobacco Use    Smoking status: Never Smoker    Smokeless tobacco: Never Used   Vaping Use    Vaping Use: Never used   Substance and Sexual Activity    Alcohol use: Not Currently    Drug use: Never    Sexual activity: Yes     Partners: Male   Other Topics Concern    Not on file   Social History Narrative    ** Merged History Encounter **          Social Determinants of Health     Financial Resource Strain: Low Risk     Difficulty of Paying Living Expenses: Not very hard   Food Insecurity: No Food Insecurity    Worried About Running Out of Food in the Last Year: Never true    [place] of Food in the Last Year: Never true Transportation Needs: No Transportation Needs    Lack of Transportation (Medical): No    Lack of Transportation (Non-Medical): No   Physical Activity:     Days of Exercise per Week: Not on file    Minutes of Exercise per Session: Not on file   Stress:     Feeling of Stress : Not on file   Social Connections:     Frequency of Communication with Friends and Family: Not on file    Frequency of Social Gatherings with Friends and Family: Not on file    Attends Christian Services: Not on file    Active Member of [address] [place] [address] or Organizations: Not on file    Attends Club or Organization Meetings: Not on file    Marital Status: Not on file   Intimate Partner Violence:     Fear of Current or Ex-Partner: Not on file    Emotionally Abused: Not on file    Physically Abused: Not on file    Sexually Abused: Not on file   Housing Stability:     Unable to Pay for Housing in the Last Year: Not on file    Number of [place] in the Last Year: Not on file    Unstable Housing in the Last Year: Not on file       Current Outpatient Medications   Medication Sig Dispense Refill    gabapentin (NEURONTIN) 300 MG capsule Take 1 capsule by mouth 2 times daily for 120 days. 60 capsule 3    ferrous sulfate (IRON 325) 325 (65 Fe) MG tablet Take 325 mg by mouth daily (with breakfast)      NORLYDA 0.35 MG tablet TAKE 1 TABLET BY MOUTH DAILY 84 tablet 3    Handicap Placard MISC Expiration: Lifetime 2 each 0    lidocaine-prilocaine (EMLA) 2.5-2.5 % cream Apply topically Daily as needed. 1 Tube 1    ibuprofen (ADVIL;MOTRIN) 800 MG tablet Take 1 tablet by mouth every 8 hours as needed for Pain 60 tablet 1    Prenatal MV-Min-Fe Fum-FA-DHA (PRENATAL 1 PO) Take by mouth       No current facility-administered medications for this visit. No Known Allergies    ROS:     Review of Systems  Constitutional: Negative for fever, chills and unexpected weight change. HEENT: Negative for trouble swallowing.  Negative for recent vision changes  Respiratory: Negative for cough and shortness of breath. Cardiovascular: Negative for chest pain or SOB. Endocrine: Negative for polyuria. Genitourinary: Negative for dysuria, urgency,frequency and difficulty urinating. Musculoskeletal: Negative for back pain and arthralgias. Neurological: Negative for headaches. Negative for paresthesias. Dermatologic: Negative for rash, ulcers, or lesions. Psychiatric: Negative for depressed mood or anxiety. Negative for sleep impairment. Objective:     Physical Exam  BP (!) 146/89   Temp 97.2 °F (36.2 °C)   Ht 5' 1\" (1.549 m)   Wt 192 lb (87.1 kg) Comment: 212. 8# with prosthesis  BMI 36.28 kg/m²   Constitutional: She appears well-developed and well-nourished. HEENT:  Normocephalic. EOMI. PERRL. Mucous membranes pink and moist.  Pulmonary/Chest: Respirations WNL and unlabored. Neurological: She is alert and oriented to person, place, and time. She. Sensation intact to light touch. DTRs 2+ L leg  Skin: Skin is warm dry and intact with good turgor. Amputation incision well healed. Psychiatric: She has a normal mood and affect. Her behavior is normal.Thought content normal.   MSK: Functional ROM and strength BUEs and LLE. Medical assistant note and vitals for today's encounter reviewed. Gait: Exhibiting some instability in the current socket, some vaulting of R hip and circumduction in swing phase and with initial hip flexion in lift off. Requires use of her arms to perform sit to stand transfer. Diagnostic Studies:  None new    Assessment:       Diagnosis Orders   1. History of disarticulation of right hip     2.  Phantom pain after amputation of lower extremity ([facility] [address]            Plan:      1. Ms. Dominique Cummins is a previously normal functioning individual.  She is highly motivated and would like to continue to perform self care, cooking/cleaning, walking with varied cinda, household ambulation, childcare (for her 21 month old son), yard work and minutes    Electronically signed by Justin Masterson MD on 3/31/2022 at 4:15 PM.     Please note that this chartwas generated using voice recognition Dragon dictation software. Although everyeffort was made to ensure the accuracy of this automated transcription, some errorsin transcription may have occurred.

## 2022-04-08 ENCOUNTER — TELEPHONE (OUTPATIENT)
Dept: ONCOLOGY | Age: 27
End: 2022-04-08

## 2022-04-08 NOTE — TELEPHONE ENCOUNTER
NUTRITION NOTE    Pt called writer to inquire re: survivorship nutrition guidance, lifestyle change. Pt s/p full leg amputation following osteosarcoma dx while pregnant. Once pt delivered baby, she underwent chemotherapy successfully. Pt states since finishing treatment, she has been working towards sustainable weight loss and has been utilizing the book \"Eating for Life. \" Pt asking Mauro Salvador for further ideas for continued weight loss and improved lifestyle. RD encouraged pt to continue prioritizing balanced meals, increased fruit/vegetable intake, physical activity including strength training, stress management. Pt states in January, her TG labs were elevated. Pt states she has stopped taking gabapentin and is taking oral contraceptive. With lifestyle change, it is possibly there are improvements in pt's lipid panel. RD provided pt with several recommendations, including the importance of consistency with lifestyle change and measuring. RD provided pt with recipe ideas per pt request. Despite minor weight changes, pt states she has been getting stronger and has noticed clothes are fitting looser. RD encouraged pt to celebrate these as measures of lifestyle change improvements, despite scale weight moving slowly. Pt receptive of information and appreciative of phone call. RD encourages pt and health care team to celebrate and support pt's efforts in creating sustainable lifestyle change, as she has made several lifestyle improvements in the past several months that are not only important for disease management, but pt's well-being and overall health.      ISAC Pérez, RD, LD  Registered Dietitian   [facility]  [phone]

## 2022-05-02 ENCOUNTER — HOSPITAL ENCOUNTER (OUTPATIENT)
Dept: INFUSION THERAPY | Age: 27
Discharge: HOME | End: 2022-05-02
Payer: COMMERCIAL

## 2022-05-02 ENCOUNTER — HOSPITAL ENCOUNTER (OUTPATIENT)
Dept: CT IMAGING | Age: 27
Discharge: HOME | End: 2022-05-04
Payer: COMMERCIAL

## 2022-05-02 DIAGNOSIS — C49.21 PRIMARY LOWER EXTREMITY SARCOMA, RIGHT (HCC): Primary | ICD-10-CM

## 2022-05-02 DIAGNOSIS — C49.21 PRIMARY LOWER EXTREMITY SARCOMA, RIGHT (HCC): ICD-10-CM

## 2022-05-02 LAB
ABSOLUTE EOS #: 0.1 K/UL (ref 0–0.4)
ABSOLUTE LYMPH #: 1.6 K/UL (ref 1–4.8)
ABSOLUTE MONO #: 0.5 K/UL (ref 0.1–1.2)
ALBUMIN SERPL-MCNC: 4.6 G/DL (ref 3.5–5.2)
ALBUMIN/GLOBULIN RATIO: 1.8 (ref 1–2.5)
ALP BLD-CCNC: 59 U/L (ref 35–104)
ALT SERPL-CCNC: 20 U/L (ref 5–33)
ANION GAP SERPL CALCULATED.3IONS-SCNC: 12 MMOL/L (ref 9–17)
AST SERPL-CCNC: 16 U/L
BASOPHILS # BLD: 0 % (ref 0–2)
BASOPHILS ABSOLUTE: 0 K/UL (ref 0–0.2)
BILIRUB SERPL-MCNC: 0.37 MG/DL (ref 0.3–1.2)
BUN BLDV-MCNC: 11 MG/DL (ref 6–20)
CALCIUM SERPL-MCNC: 9.4 MG/DL (ref 8.6–10.4)
CHLORIDE BLD-SCNC: 102 MMOL/L (ref 98–107)
CO2: 22 MMOL/L (ref 20–31)
CREAT SERPL-MCNC: <0.4 MG/DL (ref 0.5–0.9)
EOSINOPHILS RELATIVE PERCENT: 2 % (ref 1–4)
GFR AFRICAN AMERICAN: ABNORMAL ML/MIN
GFR NON-AFRICAN AMERICAN: ABNORMAL ML/MIN
GFR SERPL CREATININE-BSD FRML MDRD: ABNORMAL ML/MIN/{1.73_M2}
GLUCOSE BLD-MCNC: 132 MG/DL (ref 70–99)
HCT VFR BLD CALC: 40.2 % (ref 36–46)
HEMOGLOBIN: 13.8 G/DL (ref 12–16)
LYMPHOCYTES # BLD: 25 % (ref 24–44)
MCH RBC QN AUTO: 29.4 PG (ref 26–34)
MCHC RBC AUTO-ENTMCNC: 34.3 G/DL (ref 31–37)
MCV RBC AUTO: 85.6 FL (ref 80–100)
MONOCYTES # BLD: 8 % (ref 2–11)
PDW BLD-RTO: 12.6 % (ref 12.5–15.4)
PLATELET # BLD: 227 K/UL (ref 140–450)
PMV BLD AUTO: 9 FL (ref 6–12)
POTASSIUM SERPL-SCNC: 3.9 MMOL/L (ref 3.7–5.3)
RBC # BLD: 4.7 M/UL (ref 4–5.2)
SEG NEUTROPHILS: 65 % (ref 36–66)
SEGMENTED NEUTROPHILS ABSOLUTE COUNT: 4 K/UL (ref 1.8–7.7)
SODIUM BLD-SCNC: 136 MMOL/L (ref 135–144)
TOTAL PROTEIN: 7.2 G/DL (ref 6.4–8.3)
WBC # BLD: 6.2 K/UL (ref 3.5–11)

## 2022-05-02 PROCEDURE — 6360000002 HC RX W HCPCS: Performed by: INTERNAL MEDICINE

## 2022-05-02 PROCEDURE — 85025 COMPLETE CBC W/AUTO DIFF WBC: CPT

## 2022-05-02 PROCEDURE — 6360000004 HC RX CONTRAST MEDICATION: Performed by: INTERNAL MEDICINE

## 2022-05-02 PROCEDURE — 80053 COMPREHEN METABOLIC PANEL: CPT

## 2022-05-02 PROCEDURE — 2580000003 HC RX 258: Performed by: INTERNAL MEDICINE

## 2022-05-02 PROCEDURE — 74177 CT ABD & PELVIS W/CONTRAST: CPT

## 2022-05-02 PROCEDURE — 36591 DRAW BLOOD OFF VENOUS DEVICE: CPT

## 2022-05-02 RX ORDER — SODIUM CHLORIDE 0.9 % (FLUSH) 0.9 %
10 SYRINGE (ML) INJECTION PRN
Status: CANCELLED | OUTPATIENT
Start: 2022-05-02

## 2022-05-02 RX ORDER — HEPARIN SODIUM (PORCINE) LOCK FLUSH IV SOLN 100 UNIT/ML 100 UNIT/ML
500 SOLUTION INTRAVENOUS PRN
Status: DISCONTINUED | OUTPATIENT
Start: 2022-05-02 | End: 2022-05-03 | Stop reason: HOSPADM

## 2022-05-02 RX ORDER — SODIUM CHLORIDE 0.9 % (FLUSH) 0.9 %
20 SYRINGE (ML) INJECTION PRN
Status: CANCELLED | OUTPATIENT
Start: 2022-05-02

## 2022-05-02 RX ORDER — 0.9 % SODIUM CHLORIDE 0.9 %
80 INTRAVENOUS SOLUTION INTRAVENOUS ONCE
Status: COMPLETED | OUTPATIENT
Start: 2022-05-02 | End: 2022-05-02

## 2022-05-02 RX ORDER — SODIUM CHLORIDE 0.9 % (FLUSH) 0.9 %
10 SYRINGE (ML) INJECTION PRN
Status: DISCONTINUED | OUTPATIENT
Start: 2022-05-02 | End: 2022-05-03 | Stop reason: HOSPADM

## 2022-05-02 RX ORDER — HEPARIN SODIUM (PORCINE) LOCK FLUSH IV SOLN 100 UNIT/ML 100 UNIT/ML
500 SOLUTION INTRAVENOUS PRN
Status: CANCELLED | OUTPATIENT
Start: 2022-05-02

## 2022-05-02 RX ORDER — SODIUM CHLORIDE 0.9 % (FLUSH) 0.9 %
10 SYRINGE (ML) INJECTION PRN
Status: DISCONTINUED | OUTPATIENT
Start: 2022-05-02 | End: 2022-05-05 | Stop reason: HOSPADM

## 2022-05-02 RX ADMIN — IOHEXOL 50 ML: 240 INJECTION, SOLUTION INTRATHECAL; INTRAVASCULAR; INTRAVENOUS; ORAL at 10:36

## 2022-05-02 RX ADMIN — SODIUM CHLORIDE, PRESERVATIVE FREE 10 ML: 5 INJECTION INTRAVENOUS at 10:58

## 2022-05-02 RX ADMIN — SODIUM CHLORIDE 80 ML: 9 INJECTION, SOLUTION INTRAVENOUS at 10:36

## 2022-05-02 RX ADMIN — SODIUM CHLORIDE, PRESERVATIVE FREE 10 ML: 5 INJECTION INTRAVENOUS at 10:36

## 2022-05-02 RX ADMIN — SODIUM CHLORIDE, PRESERVATIVE FREE 10 ML: 5 INJECTION INTRAVENOUS at 09:23

## 2022-05-02 RX ADMIN — IOPAMIDOL 100 ML: 755 INJECTION, SOLUTION INTRAVENOUS at 10:36

## 2022-05-02 RX ADMIN — HEPARIN 500 UNITS: 100 SYRINGE at 10:58

## 2022-05-02 RX ADMIN — SODIUM CHLORIDE, PRESERVATIVE FREE 10 ML: 5 INJECTION INTRAVENOUS at 09:24

## 2022-05-02 NOTE — PROGRESS NOTES
Pt here for CT lombardo and labs. Denies any problems. Tolerates very well. Will return 5/3 for Dr visit.

## 2022-05-03 ENCOUNTER — OFFICE VISIT (OUTPATIENT)
Dept: ONCOLOGY | Age: 27
End: 2022-05-03
Payer: COMMERCIAL

## 2022-05-03 ENCOUNTER — TELEPHONE (OUTPATIENT)
Dept: ONCOLOGY | Age: 27
End: 2022-05-03

## 2022-05-03 VITALS
WEIGHT: 194.7 LBS | BODY MASS INDEX: 36.79 KG/M2 | TEMPERATURE: 98.2 F | SYSTOLIC BLOOD PRESSURE: 138 MMHG | RESPIRATION RATE: 16 BRPM | HEART RATE: 130 BPM | DIASTOLIC BLOOD PRESSURE: 95 MMHG

## 2022-05-03 DIAGNOSIS — O9A.113 MALIGNANT NEOPLASM AFFECTING PREGNANCY IN THIRD TRIMESTER: ICD-10-CM

## 2022-05-03 DIAGNOSIS — R91.8 LUNG MASS: Primary | ICD-10-CM

## 2022-05-03 PROCEDURE — 99214 OFFICE O/P EST MOD 30 MIN: CPT | Performed by: INTERNAL MEDICINE

## 2022-05-03 PROCEDURE — 1036F TOBACCO NON-USER: CPT | Performed by: INTERNAL MEDICINE

## 2022-05-03 PROCEDURE — G8417 CALC BMI ABV UP PARAM F/U: HCPCS | Performed by: INTERNAL MEDICINE

## 2022-05-03 PROCEDURE — G8427 DOCREV CUR MEDS BY ELIG CLIN: HCPCS | Performed by: INTERNAL MEDICINE

## 2022-05-03 PROCEDURE — 99211 OFF/OP EST MAY X REQ PHY/QHP: CPT | Performed by: INTERNAL MEDICINE

## 2022-05-03 NOTE — PROGRESS NOTES
DIAGNOSIS:   1. Undifferentiated pleomorphic  Sarcoma, right leg, T4 N0 M0  (stage IIIb) diagnosed 05/2020      CURRENT THERAPY:  S/P full right leg amputation (Right hip disarticulation)   Adjuvant chemotherapy delayed per patient choice until after delivery   Chemo with ifosfamide and adriamycin  (AIM) to started 11/3/2020     BRIEF CASE HISTORY:   Matt Morgan is a very pleasant 23 y.o. female who is referred to us for sarcoma. She presented with rightt knee sprain at work 04/2020 but the pain and swelling worsened with palpable mass. She underwent MRI which showed mass and was seen at [facility] where biopsy was taken and showed sarcoma. She was transferred to [facility] and decided to undergo full leg amputation due to early stage pregnancy, tumor was 16 cm x 20 cm x 3 cm and removed with negative margins. She was following with [facility] and decided she would prefer to receive treatment locally. The patient is currently 32 weeks pregnant and delivery plans are expected to be finalized 10/02/2020. She has elected to proceed with chemo after delivery. She is working towards prosthesis with test socket. 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 Kathy delivered in late October, after delivery, we decided to start chemo with AIM 11/3/2020. Patient finished 4 cycles of chemotherapy and she was declared in remission and was started surveillance. INTERIM HISTORY: The patient presents for follow up today for osteosarcoma surveillance and to review imaging. She has lost weight intentionally and will be fitted for new prosthesis, she is very active. PAST MEDICAL HISTORY: has a past medical history of 33 weeks gestation of pregnancy, Antepartum premature rupture of membrane, Diabetes mellitus ([address] High-risk pregnancy, unspecified trimester, History of anemia, History of blood transfusion, History of E.  Coli UTI 10-<100,000CFU (7/7/20), Osteosarcoma, Pregestational diabetes mellitus, modified White class B, Pseudomonas UTI, and Wears glasses. PAST SURGICAL HISTORY: has a past surgical history that includes Leg amputation at hip (Right, 07/23/2020) and IR PORT PLACEMENT > 5 YEARS (10/27/2020). CURRENT MEDICATIONS:  has a current medication list which includes the following prescription(s): ferrous sulfate, norlyda, lidocaine-prilocaine, prenatal mv-min-fe fum-fa-dha, handicap placard, and ibuprofen, and the following Facility-Administered Medications: sodium chloride flush. ALLERGIES:  has No Known Allergies. FAMILY HISTORY: Negative for any hematological or oncological conditions. SOCIAL HISTORY:  reports that she has never smoked. She has never used smokeless tobacco. She reports previous alcohol use. She reports that she does not use drugs. REVIEW OF SYSTEMS:   General: No fever or night sweats. Weight loss - intentional   ENT: No double or blurred vision, no tinnitus or hearing problem, no dysphagia or sore throat   Respiratory: No chest pain, no shortness of breath, no cough or hemoptysis. Cardiovascular: Denies chest pain, PND or orthopnea. No L E swelling or palpitations. Gastrointestinal: No nausea, vomiting, abdominal pain, diarrhea or constipation. Genitourinary: Denies dysuria, hematuria, frequency, urgency or incontinence. Gynecological: nearly resolved post partum bleeding  Neurological: Denies headaches, decreased LOC, no sensory or motor focal deficits. +post surgical phantom pain improving  Musculoskeletal: No arthralgia no back pain or joint swelling. Skin: There are no rashes or bleeding. Psychiatric: No anxiety, no depression. Endocrine: No diabetes or thyroid disease. Hematologic: No bleeding, no adenopathy. PHYSICAL EXAM: Shows a well appearing 23y.o.-year-old female who is not in pain or distress.  Vital Signs: Blood pressure (!) 138/95, pulse 130, temperature 98.2 °F (36.8 °C), temperature source Oral, resp. rate 16, weight 194 lb 11.2 oz (88.3 kg), not currently breastfeeding. HEENT: Normocephalic and atraumatic. Pupils are equal, round, reactive to light and accommodation. Extraocular muscles are intact. Neck: Showed no JVD, no carotid bruit . Lungs: Clear to auscultation bilaterally. Heart: Regular without any murmur. Abdomen: Soft, nontender. No hepatosplenomegaly. Extremities: Right leg amputation. Lower extremities show no edema, clubbing, or cyanosis. Breasts: Examination not done today. Neuro exam: intact cranial nerves bilaterally no motor or sensory deficit, gait is normal. Lymphatic: no adenopathy appreciated in the supraclavicular, axillary, cervical or inguinal area    REVIEW OF LABORATORY DATA:   Lab Results   Component Value Date    WBC 6.2 05/02/2022    HGB 13.8 05/02/2022    HCT 40.2 05/02/2022    MCV 85.6 05/02/2022     05/02/2022   '    Chemistry        Component Value Date/Time     05/02/2022 0925    K 3.9 05/02/2022 0925     05/02/2022 0925    CO2 22 05/02/2022 0925    BUN 11 05/02/2022 0925    CREATININE <0.40 (L) 05/02/2022 0925        Component Value Date/Time    CALCIUM 9.4 05/02/2022 0925    ALKPHOS 59 05/02/2022 0925    AST 16 05/02/2022 0925    ALT 20 05/02/2022 0925    BILITOT 0.37 05/02/2022 0925          REVIEW OF RADIOLOGICAL RESULTS:   CT CHEST ABDOMEN PELVIS W CONTRAST: 5/2/2022  1. Suspicious 1.7 cm nodule in the left lower lobe concerning for a metastatic deposit. 2.  No new findings identified in the abdomen or pelvis. IMPRESSION:   1. 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 Adjuvant chemo following delivery with ifosfamide and adriamycin (AIM) - started 11/03/2020, received 4 cycles  4. New lung lesion, seen on screening CT scan, plan biopsy    PLAN:   1. Her lab work was reviewed, counts and electrolytes are in range.    2. We reviewed her CT which shows 1.7 cm left lung nodule. 3. I discussed recommendation for biopsy and am putting in orders. 4. I encouraged her to continue with her active lifestyle. 5. Return to review results after the biopsy      Scribe Attestation   This note was created by Josse Carias acting as scribe for the physician signing this note  Electronically Signed  Josse Carias, [address] [facility], [employer]. Attending Attestation   Note was reviewed and edited.   I am in agreement with the note as entered      Cat Chapman MD  Hematologist/Medical [id number] [facility] hematology oncology physicians

## 2022-05-03 NOTE — TELEPHONE ENCOUNTER
IR to biopsy the lung mass, asap please  rv after the Bx    IR coordinator notified of referral, will contact patient to schedule    RV TBD    PT was given AVS and an appt schedule    Electronically signed by Julissa Gonzalez on 5/3/2022 at 2:41 PM

## 2022-05-06 ENCOUNTER — OFFICE VISIT (OUTPATIENT)
Dept: DERMATOLOGY | Age: 27
End: 2022-05-06
Payer: COMMERCIAL

## 2022-05-06 VITALS
WEIGHT: 195 LBS | SYSTOLIC BLOOD PRESSURE: 121 MMHG | TEMPERATURE: 97.8 F | HEIGHT: 61 IN | OXYGEN SATURATION: 98 % | BODY MASS INDEX: 36.82 KG/M2 | DIASTOLIC BLOOD PRESSURE: 77 MMHG | HEART RATE: 104 BPM

## 2022-05-06 DIAGNOSIS — L21.9 SEBORRHEIC DERMATITIS: Primary | ICD-10-CM

## 2022-05-06 PROCEDURE — G8427 DOCREV CUR MEDS BY ELIG CLIN: HCPCS | Performed by: PHYSICIAN ASSISTANT

## 2022-05-06 PROCEDURE — G8417 CALC BMI ABV UP PARAM F/U: HCPCS | Performed by: PHYSICIAN ASSISTANT

## 2022-05-06 PROCEDURE — 1036F TOBACCO NON-USER: CPT | Performed by: PHYSICIAN ASSISTANT

## 2022-05-06 PROCEDURE — 99204 OFFICE O/P NEW MOD 45 MIN: CPT | Performed by: PHYSICIAN ASSISTANT

## 2022-05-06 RX ORDER — FLUOCINONIDE TOPICAL SOLUTION USP, 0.05% 0.5 MG/ML
SOLUTION TOPICAL
Qty: 60 ML | Refills: 2 | Status: SHIPPED | OUTPATIENT
Start: 2022-05-06

## 2022-05-06 RX ORDER — KETOCONAZOLE 20 MG/ML
SHAMPOO TOPICAL
Qty: 120 ML | Refills: 2 | Status: SHIPPED | OUTPATIENT
Start: 2022-05-06

## 2022-05-06 NOTE — PROGRESS NOTES
Dermatology Patient Note  [address]  [address] [facility] [address]  Dept: [phone]  Dept Fax: [phone]      VISITDATE: 2022   REFERRING PROVIDER: FATEMEH Vasquez *      Rosales Oliveira is a 23 y.o. female  who presents today in the office for:    New Patient (Dandruff, dry scalp, states that its very itchy. States this started happening after chemo therapy. Has tried OTC 3 step process but states no help  )      HISTORY OF PRESENT ILLNESS:  As above. MEDICAL PROBLEMS:  Patient Active Problem List    Diagnosis Date Noted    Phantom pain after amputation of lower extremity ([facility] [address] 2021     10/12/20 M Apg  Wt 4#14  10/12/2020    Malignant neoplasm affecting pregnancy in third trimester 2020    History of disarticulation of right hip 2020    Transaminitis 2020    Iron deficiency anemia 2020     Last Assessment & Plan:   Hgb 8.2 g/dL  Asymptomatic   S/p iv iron on  and 1 u pRBC     Continue to monitor vitals  Last Assessment & Plan:   Hgb 8.2 g/dL  Asymptomatic   S/p iv iron on  and 1 u pRBC     Continue to monitor vitals      Thrombocytosis 2020     Last Assessment & Plan:   Plts elevated, 573 () --> 534 ()  Etiology: secondary to iron deficiency   Patient found to have iron deficiency  Peripheral smear   -  Neutrophilia and monocytosis. -  Thrombocytosis. -  Normocytic normochromic anemia. -  No evidence of schistocytes, dysplasia or blasts.   -Iron studies: iron 19, iron saturation 11%  -patient given one 1 unit leukocyte-reduced, irradiated RBC     Plan:  - f/u CBC w/ daily      Primary lower extremity sarcoma, right ([facility] [address] 2020     Last Assessment & Plan:   Hx of right thigh mass concerning for sarcoma  - MRI at OSH on 2020: Centered along the anteromedial aspect of the mid-distal thigh there is a large complex mass measuring at least 9.7 x 8.3 x 16.9 cm. Concerning for sarcoma  Patient follows with Dr. Adam Bal at [place] and wants to pursue her care at [facility]  S/p biopsy 6/29  Oxycodone for pain management     Plan:  -Continue heparin 5,000 u BID for DVT prophylaxis   - f/u biopsy results and tumor board treatment regimen   - pain management: continue oxycodone and tylenol as needed for pain management, avoid NSAIDs  -Please consider CT abdomen/pelvis/brain to stage patient appropriately   -Plan for transfer to [facility] due to [employer] and availability of pregnancy shield       Last Assessment & Plan:   Hx of right thigh mass concerning for sarcoma  - MRI at OSH on 6/12/2020: Centered along the anteromedial aspect of the mid-distal thigh there is a large complex mass measuring at least 9.7 x 8.3 x 16.9 cm. Concerning for sarcoma  Patient follows with Dr. Adam Bal at [place] and wants to pursue her care at [facility]  S/p biopsy 6/29  Oxycodone for pain management     Plan:  -Continue heparin 5,000 u BID for DVT prophylaxis   - f/u biopsy results and tumor board treatment regimen   - pain management: continue oxycodone and tylenol as needed for pain management, avoid NSAIDs  -Please consider CT abdomen/pelvis/brain to stage patient appropriately   -Plan for transfer to [facility] due to [employer] and availability of pregnancy shield           Family history of diabetes mellitus 06/08/2020       CURRENT MEDICATIONS:   Current Outpatient Medications   Medication Sig Dispense Refill    ketoconazole (NIZORAL) 2 % shampoo Apply 3-4 times weekly to scalp, leave on for five minutes prior to washing off 120 mL 2    fluocinonide (LIDEX) 0.05 % external solution Apply to scalp daily for rash 60 mL 2    ferrous sulfate (IRON 325) 325 (65 Fe) MG tablet Take 325 mg by mouth daily (with breakfast)      NORLYDA 0.35 MG tablet TAKE 1 TABLET BY MOUTH DAILY 84 tablet 3    Handicap Placard MISC Expiration: Lifetime 2 each 0    lidocaine-prilocaine (EMLA) 2.5-2.5 % cream Apply topically Daily as needed. 1 Tube 1    ibuprofen (ADVIL;MOTRIN) 800 MG tablet Take 1 tablet by mouth every 8 hours as needed for Pain (Patient not taking: Reported on 5/3/2022) 60 tablet 1    Prenatal MV-Min-Fe Fum-FA-DHA (PRENATAL 1 PO) Take by mouth       No current facility-administered medications for this visit. ALLERGIES:   No Known Allergies    SOCIAL HISTORY:  Social History     Tobacco Use    Smoking status: Never Smoker    Smokeless tobacco: Never Used   Substance Use Topics    Alcohol use: Not Currently       Pertinent ROS:  Review of Systems  Skin: Denies any new changing, growing or bleeding lesions or rashes except as described in the HPI   Constitutional: Denies fevers, chills, and malaise. PHYSICAL EXAM:   /77   Pulse 104   Temp 97.8 °F (36.6 °C)   Ht 5' 1\" (1.549 m)   Wt 195 lb (88.5 kg)   SpO2 98%   BMI 36.84 kg/m²     The patient is generally well appearing, well nourished, alert and conversational. Affect is normal.    Cutaneous Exam:  Physical Exam  Face, neck, and scalp were examined. Facial covering was not removed during examination. Diagnoses/exam findings/medical history pertinent to this visit are listed below:    Assessment:   Diagnosis Orders   1. Seborrheic dermatitis  ketoconazole (NIZORAL) 2 % shampoo    fluocinonide (LIDEX) 0.05 % external solution   - chronic illness with progression and/or exacerbation     Plan:  1. Seborrheic dermatitis  - ketoconazole (NIZORAL) 2 % shampoo; Apply 3-4 times weekly to scalp, leave on for five minutes prior to washing off  Dispense: 120 mL; Refill: 2  - fluocinonide (LIDEX) 0.05 % external solution;  Apply to scalp daily for rash  Dispense: 60 mL; Refill: 2      RTC 3M    Future Appointments   Date Time Provider Salma Hazel   5/9/2022 11:00 AM [facility] CT RM 2 FAST SCANNER [facility] CT SCAN [facility] Radiolog   5/12/2022 10:00 AM Scarlet Dates, APRN - CNP [facility]   8/4/2022  9:45 AM [facility] Tapia PA-C [facility]         Patient Instructions   Ketoconazole shampoo every other day     T/Sal shampoo OTC every other day when you are not using ketoconazole     Lidex sol twice a day as needed         Electronically signed by Dvaid Tapia PA-C on 5/6/22 at 9:50 AM EDT

## 2022-05-06 NOTE — PATIENT INSTRUCTIONS
Ketoconazole shampoo every other day     T/Sal shampoo OTC every other day when you are not using ketoconazole     Lidex sol twice a day as needed

## 2022-05-09 ENCOUNTER — HOSPITAL ENCOUNTER (OUTPATIENT)
Dept: GENERAL RADIOLOGY | Age: 27
Discharge: HOME | End: 2022-05-11
Payer: COMMERCIAL

## 2022-05-09 ENCOUNTER — HOSPITAL ENCOUNTER (OUTPATIENT)
Dept: CT IMAGING | Age: 27
Discharge: HOME | End: 2022-05-11
Payer: COMMERCIAL

## 2022-05-09 VITALS
HEART RATE: 95 BPM | DIASTOLIC BLOOD PRESSURE: 72 MMHG | SYSTOLIC BLOOD PRESSURE: 116 MMHG | TEMPERATURE: 97.3 F | OXYGEN SATURATION: 100 % | RESPIRATION RATE: 12 BRPM

## 2022-05-09 DIAGNOSIS — R91.8 LUNG MASS: ICD-10-CM

## 2022-05-09 LAB
INR BLD: 1
PLATELET # BLD: 230 K/UL (ref 150–450)
PROTHROMBIN TIME: 12.9 SEC (ref 11.8–14.6)

## 2022-05-09 PROCEDURE — 36415 COLL VENOUS BLD VENIPUNCTURE: CPT

## 2022-05-09 PROCEDURE — 7100000010 HC PHASE II RECOVERY - FIRST 15 MIN

## 2022-05-09 PROCEDURE — 2709999900 CT NEEDLE BIOPSY LUNG PERCUTANEOUS W IMAGING GUIDANCE

## 2022-05-09 PROCEDURE — 71045 X-RAY EXAM CHEST 1 VIEW: CPT

## 2022-05-09 PROCEDURE — 7100000030 HC ASPR PHASE II RECOVERY - FIRST 15 MIN

## 2022-05-09 PROCEDURE — 85049 AUTOMATED PLATELET COUNT: CPT

## 2022-05-09 PROCEDURE — 6360000002 HC RX W HCPCS: Performed by: RADIOLOGY

## 2022-05-09 PROCEDURE — 88333 PATH CONSLTJ SURG CYTO XM 1: CPT

## 2022-05-09 PROCEDURE — 7100000011 HC PHASE II RECOVERY - ADDTL 15 MIN

## 2022-05-09 PROCEDURE — 7100000031 HC ASPR PHASE II RECOVERY - ADDTL 15 MIN

## 2022-05-09 PROCEDURE — 88305 TISSUE EXAM BY PATHOLOGIST: CPT

## 2022-05-09 PROCEDURE — 85610 PROTHROMBIN TIME: CPT

## 2022-05-09 RX ORDER — SODIUM CHLORIDE 0.9 % (FLUSH) 0.9 %
5-40 SYRINGE (ML) INJECTION EVERY 12 HOURS SCHEDULED
Status: DISCONTINUED | OUTPATIENT
Start: 2022-05-09 | End: 2022-05-12 | Stop reason: HOSPADM

## 2022-05-09 RX ORDER — MIDAZOLAM HYDROCHLORIDE 1 MG/ML
INJECTION INTRAMUSCULAR; INTRAVENOUS
Status: COMPLETED | OUTPATIENT
Start: 2022-05-09 | End: 2022-05-09

## 2022-05-09 RX ORDER — ACETAMINOPHEN 325 MG/1
650 TABLET ORAL EVERY 4 HOURS PRN
Status: DISCONTINUED | OUTPATIENT
Start: 2022-05-09 | End: 2022-05-12 | Stop reason: HOSPADM

## 2022-05-09 RX ORDER — SODIUM CHLORIDE 9 MG/ML
INJECTION, SOLUTION INTRAVENOUS CONTINUOUS
Status: DISCONTINUED | OUTPATIENT
Start: 2022-05-09 | End: 2022-05-12 | Stop reason: HOSPADM

## 2022-05-09 RX ORDER — SODIUM CHLORIDE 9 MG/ML
INJECTION, SOLUTION INTRAVENOUS PRN
Status: DISCONTINUED | OUTPATIENT
Start: 2022-05-09 | End: 2022-05-12 | Stop reason: HOSPADM

## 2022-05-09 RX ORDER — SODIUM CHLORIDE 0.9 % (FLUSH) 0.9 %
5-40 SYRINGE (ML) INJECTION PRN
Status: DISCONTINUED | OUTPATIENT
Start: 2022-05-09 | End: 2022-05-12 | Stop reason: HOSPADM

## 2022-05-09 RX ORDER — FENTANYL CITRATE 50 UG/ML
INJECTION, SOLUTION INTRAMUSCULAR; INTRAVENOUS
Status: COMPLETED | OUTPATIENT
Start: 2022-05-09 | End: 2022-05-09

## 2022-05-09 RX ADMIN — MIDAZOLAM 0.5 MG: 1 INJECTION INTRAMUSCULAR; INTRAVENOUS at 12:56

## 2022-05-09 RX ADMIN — MIDAZOLAM 1 MG: 1 INJECTION INTRAMUSCULAR; INTRAVENOUS at 12:46

## 2022-05-09 RX ADMIN — FENTANYL CITRATE 50 MCG: 50 INJECTION, SOLUTION INTRAMUSCULAR; INTRAVENOUS at 12:46

## 2022-05-09 ASSESSMENT — ENCOUNTER SYMPTOMS
SORE THROAT: 0
APNEA: 0
TROUBLE SWALLOWING: 0
ABDOMINAL PAIN: 0
SINUS PAIN: 0
ABDOMINAL DISTENTION: 0
VOMITING: 0
WHEEZING: 0
RHINORRHEA: 0
DIARRHEA: 0
SHORTNESS OF BREATH: 0
NAUSEA: 0
BACK PAIN: 0
SINUS PRESSURE: 0
CONSTIPATION: 0
CHEST TIGHTNESS: 0
COUGH: 0

## 2022-05-09 NOTE — PRE SEDATION
Sedation Pre-Procedure Note    Patient Name: Sav Sepulveda   YOB: 1995  Room/Bed: Room/bed info not found  Medical Record Number: [id number]  Date: 5/9/2022   Time: 12:37 PM       Indication:  Pulmonary/pleural nodule    Consent: I have discussed with the patient and/or the patient representative the indication, alternatives, and the possible risks and/or complications of the planned procedure and the anesthesia methods. The patient and/or patient representative appear to understand and agree to proceed. Vital Signs:   Vitals:    05/09/22 0936   BP: (!) 144/77   Pulse: 98   Resp: 18   Temp: 98.4 °F (36.9 °C)   SpO2: 100%       Past Medical History:   has a past medical history of 33 weeks gestation of pregnancy, Antepartum premature rupture of membrane, Diabetes mellitus ([facility] [address] High-risk pregnancy, unspecified trimester, History of anemia, History of blood transfusion, History of E. Coli UTI 10-<100,000CFU (7/7/20), Osteosarcoma, Pregestational diabetes mellitus, modified White class B, Pseudomonas UTI, and Wears glasses. Past Surgical History:   has a past surgical history that includes Leg amputation at hip (Right, 07/23/2020) and IR PORT PLACEMENT > 5 YEARS (10/27/2020). Medications:   Scheduled Meds:    sodium chloride flush  5-40 mL IntraVENous 2 times per day     Continuous Infusions:    sodium chloride      sodium chloride       PRN Meds: sodium chloride flush, sodium chloride  Home Meds:   Prior to Admission medications    Medication Sig Start Date End Date Taking?  Authorizing Provider   ketoconazole (NIZORAL) 2 % shampoo Apply 3-4 times weekly to scalp, leave on for five minutes prior to washing off 5/6/22   Horacio Myrick PA-C   fluocinonide (LIDEX) 0.05 % external solution Apply to scalp daily for rash 5/6/22   Horacio Myrick PA-C   ferrous sulfate (IRON 325) 325 (65 Fe) MG tablet Take 325 mg by mouth daily (with breakfast)    Historical Provider, MD VERA 0.35 MG tablet TAKE 1 TABLET BY MOUTH DAILY 7/15/21   Adrián Helton,    Handicap Placard MISC Expiration: Lifetime 11/13/20   FATEMEH Mcgee - CNP   lidocaine-prilocaine (EMLA) 2.5-2.5 % cream Apply topically Daily as needed. 10/20/20   Dagoberto Schuster MD   Prenatal MV-Min-Fe Fum-FA-DHA (PRENATAL 1 PO) Take by mouth    Historical Provider, MD     Coumadin Use Last 7 Days:  no  Antiplatelet drug therapy use last 7 days: no  Other anticoagulant use last 7 days: no  Additional Medication Information:  See [facility] rec      Pre-Sedation Documentation and Exam:   I have reviewed the patient's history and review of systems.     Mallampati Airway Assessment:  Mallampati Class II - (soft palate, fauces & uvula are visible)    Prior History of Anesthesia Complications:   none    ASA Classification:  Class 2 - A normal healthy patient with mild systemic disease    Sedation/ Anesthesia Plan:   intravenous sedation    Medications Planned:   midazolam (Versed) intravenously and fentanyl intravenously    Patient is an appropriate candidate for plan of sedation: yes    Electronically signed by Mirza Duncan MD on 5/9/2022 at 12:37 PM

## 2022-05-09 NOTE — BRIEF OP NOTE
Brief Postoperative Note    Alexandra Kapoor  YOB: 1995  [id number]    Pre-operative Diagnosis: Pulmonary nodule    Post-operative Diagnosis: Same    Procedure: CT guided LLL pulmonary nodule biopsy    Anesthesia: Moderate Sedation    Surgeons/Assistants: Logan    Estimated Blood Loss: less than 50     Complications: None    Specimens: Was Obtained: 2 core samples    Electronically signed by Elena Bryant MD on 5/9/2022 at 1:47 PM

## 2022-05-09 NOTE — H&P
HISTORY and [id number]       NAME:  Sagar Tucker  MRN: [id number]   YOB: 1995   Date: 5/9/2022   Age: 23 y.o. Gender: female       COMPLAINT AND PRESENT HISTORY:   Sagar Tucker  is a 23 y.o. female presenting today for IR LUNG BX for hx of osteosarcoma surveillance. Pt had a CT for surveillance, showed a left lower lung nodule. She denies any SOB, chest pain, pleural pain, no unexplained weight loss, fever or chills. ONCOLOGY OFFICE NOTE 5/3/22  BRIEF CASE HISTORY:   Sagar Tucker is a very pleasant 23 y.o. female who is referred to us for sarcoma. She presented with rightt knee sprain at work 04/2020 but the pain and swelling worsened with palpable mass. She underwent MRI which showed mass and was seen at [facility] where biopsy was taken and showed sarcoma. She was transferred to [facility] and decided to undergo full leg amputation due to early stage pregnancy, tumor was 16 cm x 20 cm x 3 cm and removed with negative margins. She was following with [facility] and decided she would prefer to receive treatment locally. The patient is currently 32 weeks pregnant and delivery plans are expected to be finalized 10/02/2020. She has elected to proceed with chemo after delivery. She is working towards prosthesis with test socket. 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 Kathy delivered in late October, after delivery, we decided to start chemo with AIM 11/3/2020. Patient finished 4 cycles of chemotherapy and she was declared in remission and was started surveillance.     INTERIM HISTORY: The patient presents for follow up today for osteosarcoma surveillance and to review imaging. She has lost weight intentionally and will be fitted for new prosthesis, she is very active. NPO since midnight. Pt does not wear dentures.    Pt denies any hx of MRSA infection  Pt not currently taking any blood thinners or anticoagulants  Pt denies any personal or FHx of complications with anesthesia. Pt denies any acute symptoms of illness at this time including no SOB, CP, fever, URI or UTI symptoms. RECENT IMAGING    CT CHEST ABDOMEN PELVIS W CONTRAST    Result Date: 5/2/2022  1. Suspicious 1.7 cm nodule in the left lower lobe concerning for a metastatic deposit. 2.  No new findings identified in the abdomen or pelvis. PAST MEDICAL HISTORY     Past Medical History:   Diagnosis Date    35 weeks gestation of pregnancy 10/12/2020    Antepartum premature rupture of membrane     Diabetes mellitus ([facility] [address]     Gestational    High-risk pregnancy, unspecified trimester 10/12/2020    History of anemia     prior blood and iron transfusion, without reactions    History of blood transfusion 8/1/2020    History of E.  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 GDMA2 Abnormal early 1 hour GTT (183) Abnormal 3 hour GTT - fasting 86 - 1 hour 224 - 2 hour 187 - 3 hour 164 HbA1c 7/1 5.8 Component     Latest Ref Rng & Units 7/5/2020 7/5/2020 7/5/2020 7/5/2020         8:06 AM 11:27 AM  4:48 PM  9:28 PM  Glucose, metered     50 - 140 mg/dL 93 135 99 131   Component     Latest    Pseudomonas UTI 8/4/2020 8/4/20 treated with Cefepime 2g IV for 10 days     Wears glasses        SURGICAL HISTORY       Past Surgical History:   Procedure Laterality Date    IR PORT PLACEMENT EQUAL OR GREATER THAN 5 YEARS  10/27/2020    IR PORT PLACEMENT EQUAL OR GREATER THAN 5 YEARS 10/27/2020 Andie Rivera MD [facility]    LEG AMPUTATION AT HIP Right 07/23/2020    [facility], osteosarcoma       FAMILY HISTORY       Family History   Problem Relation Age of Onset    Diabetes Mother     Diabetes Maternal Grandmother     Heart Surgery Maternal Grandmother     Stroke Maternal Grandfather     Hypertension Maternal Grandfather        SOCIAL HISTORY       Social History     Socioeconomic History    Marital status:      Spouse name: None    Number of children: 0    Years of education: None    Highest education level: None   Occupational History    Occupation: [employer]    Tobacco Use    Smoking status: Never Smoker    Smokeless tobacco: Never Used   Vaping Use    Vaping Use: Never used   Substance and Sexual Activity    Alcohol use: Not Currently    Drug use: Never    Sexual activity: Yes     Partners: Male   Other Topics Concern    None   Social History Narrative    ** Merged History Encounter **          Social Determinants of Health     Financial Resource Strain: Low Risk     Difficulty of Paying Living Expenses: Not very hard   Food Insecurity: No Food Insecurity    Worried About Running Out of Food in the Last Year: Never true    Angeli of Food in the Last Year: Never true   Transportation Needs: No Transportation Needs    Lack of Transportation (Medical): No    Lack of Transportation (Non-Medical):  No   Physical Activity:     Days of Exercise per Week: Not on file    Minutes of Exercise per Session: Not on file   Stress:     Feeling of Stress : Not on file   Social Connections:     Frequency of Communication with Friends and Family: Not on file    Frequency of Social Gatherings with Friends and Family: Not on file    Attends Shinto Services: Not on file    Active Member of Clubs or Organizations: Not on file    Attends Club or Organization Meetings: Not on file    Marital Status: Not on file   Intimate Partner Violence:     Fear of Current or Ex-Partner: Not on file    Emotionally Abused: Not on file    Physically Abused: Not on file    Sexually Abused: Not on file   Housing Stability:     Unable to Pay for Housing in the Last Year: Not on file    Number of [place] in the Last Year: Not on file    Unstable Housing in the Last Year: Not on file           REVIEW OF SYSTEMS      No Known Allergies    Current Outpatient Medications on File Prior to Encounter   Medication Sig Dispense Refill    ketoconazole (NIZORAL) 2 % shampoo Apply 3-4 times weekly to scalp, leave on for five minutes prior to washing off 120 mL 2    fluocinonide (LIDEX) 0.05 % external solution Apply to scalp daily for rash 60 mL 2    ferrous sulfate (IRON 325) 325 (65 Fe) MG tablet Take 325 mg by mouth daily (with breakfast)      NORLYDA 0.35 MG tablet TAKE 1 TABLET BY MOUTH DAILY 84 tablet 3    Handicap Placard MISC Expiration: Lifetime 2 each 0    lidocaine-prilocaine (EMLA) 2.5-2.5 % cream Apply topically Daily as needed. 1 Tube 1    Prenatal MV-Min-Fe Fum-FA-DHA (PRENATAL 1 PO) Take by mouth       No current facility-administered medications on file prior to encounter. Review of Systems   Constitutional: Negative for chills, diaphoresis, fatigue and fever. HENT: Negative for congestion, dental problem, ear pain, postnasal drip, rhinorrhea, sinus pressure, sinus pain, sore throat and trouble swallowing. Eyes: Positive for visual disturbance. Respiratory: Negative for apnea, cough, chest tightness, shortness of breath and wheezing. Cardiovascular: Negative for chest pain, palpitations and leg swelling. Gastrointestinal: Negative for abdominal distention, abdominal pain, constipation, diarrhea, nausea and vomiting. Genitourinary: Negative for dysuria, flank pain, frequency and hematuria. Musculoskeletal: Positive for gait problem. Negative for back pain, joint swelling and myalgias. Right AKA. Awaiting new prosthesis, in [facility]   Skin: Negative for rash and wound. Neurological: Negative for dizziness, weakness, numbness and headaches. Hematological: Does not bruise/bleed easily. Psychiatric/Behavioral: Negative for agitation and confusion. The patient is nervous/anxious.                   See HPI    GENERAL PHYSICAL EXAM:     Vitals: BP (!) 144/77   Pulse 98   Temp 98.4 °F (36.9 °C) (Temporal)   Resp 18   LMP 04/14/2022   SpO2 100%  There is no height or weight on file to calculate BMI. Physical Exam  Vitals reviewed. Constitutional:       General: She is not in acute distress. Appearance: Normal appearance. She is well-developed and normal weight. She is not ill-appearing or toxic-appearing. HENT:      Head: Normocephalic and atraumatic. Mouth/Throat:      Mouth: Mucous membranes are dry. Pharynx: Oropharynx is clear. No oropharyngeal exudate or posterior oropharyngeal erythema. Eyes:      Extraocular Movements: Extraocular movements intact. Conjunctiva/sclera: Conjunctivae normal.      Pupils: Pupils are equal, round, and reactive to light. Comments: +glasses    Cardiovascular:      Rate and Rhythm: Normal rate and regular rhythm. Pulses: Normal pulses. Heart sounds: Normal heart sounds. No murmur heard. No friction rub. No gallop. Pulmonary:      Effort: Pulmonary effort is normal.      Breath sounds: Normal breath sounds. No wheezing. Abdominal:      General: Bowel sounds are normal. There is no distension. Palpations: Abdomen is soft. Tenderness: There is no abdominal tenderness. There is no guarding or rebound. Musculoskeletal:         General: Deformity present. No swelling. Normal range of motion. Cervical back: Normal range of motion and neck supple. No rigidity or tenderness. Right lower leg: No edema. Left lower leg: No edema. Comments: Missing RLE   Skin:     General: Skin is warm and dry. Findings: No erythema. Neurological:      General: No focal deficit present. Mental Status: She is alert and oriented to person, place, and time. Mental status is at baseline. Sensory: No sensory deficit. Gait: Gait abnormal.   Psychiatric:         Mood and Affect: Mood normal.         Behavior: Behavior normal.         Thought Content:  Thought content normal.         Judgment: Judgment normal.                                                                                         PROVISIONAL DIAGNOSES / SURGERY:    IR lung bx    Patient Active Problem List    Diagnosis Date Noted    Phantom pain after amputation of lower extremity ([facility] [address] 2021     10/12/20 M Apg  Wt 4#14  10/12/2020    Malignant neoplasm affecting pregnancy in third trimester 2020    History of disarticulation of right hip 2020    Transaminitis 2020    Iron deficiency anemia 2020    Thrombocytosis 2020    Primary lower extremity sarcoma, right ([facility] [address] 2020    Family history of diabetes mellitus 2020               FATEMEH Villanueva - CNP on 2022 at 9:55 AM

## 2022-05-12 ENCOUNTER — OFFICE VISIT (OUTPATIENT)
Dept: PRIMARY CARE CLINIC | Age: 27
End: 2022-05-12
Payer: COMMERCIAL

## 2022-05-12 VITALS
HEART RATE: 106 BPM | BODY MASS INDEX: 36.47 KG/M2 | DIASTOLIC BLOOD PRESSURE: 72 MMHG | SYSTOLIC BLOOD PRESSURE: 132 MMHG | OXYGEN SATURATION: 96 % | WEIGHT: 193 LBS

## 2022-05-12 DIAGNOSIS — R91.1 NODULE OF LEFT LUNG: ICD-10-CM

## 2022-05-12 DIAGNOSIS — Z98.890 HISTORY OF LUNG BIOPSY: ICD-10-CM

## 2022-05-12 DIAGNOSIS — E66.9 OBESITY (BMI 30-39.9): Primary | ICD-10-CM

## 2022-05-12 PROCEDURE — 99213 OFFICE O/P EST LOW 20 MIN: CPT | Performed by: NURSE PRACTITIONER

## 2022-05-12 PROCEDURE — 1036F TOBACCO NON-USER: CPT | Performed by: NURSE PRACTITIONER

## 2022-05-12 PROCEDURE — G8417 CALC BMI ABV UP PARAM F/U: HCPCS | Performed by: NURSE PRACTITIONER

## 2022-05-12 PROCEDURE — G8427 DOCREV CUR MEDS BY ELIG CLIN: HCPCS | Performed by: NURSE PRACTITIONER

## 2022-05-12 ASSESSMENT — ENCOUNTER SYMPTOMS
RHINORRHEA: 0
SORE THROAT: 0
DIARRHEA: 0
ABDOMINAL PAIN: 0
COLOR CHANGE: 0
SHORTNESS OF BREATH: 0
VOMITING: 0
NAUSEA: 0
CHEST TIGHTNESS: 0

## 2022-05-12 ASSESSMENT — PATIENT HEALTH QUESTIONNAIRE - PHQ9
1. LITTLE INTEREST OR PLEASURE IN DOING THINGS: 0
SUM OF ALL RESPONSES TO PHQ QUESTIONS 1-9: 0
SUM OF ALL RESPONSES TO PHQ9 QUESTIONS 1 & 2: 0
SUM OF ALL RESPONSES TO PHQ QUESTIONS 1-9: 0
SUM OF ALL RESPONSES TO PHQ QUESTIONS 1-9: 0
2. FEELING DOWN, DEPRESSED OR HOPELESS: 0
SUM OF ALL RESPONSES TO PHQ QUESTIONS 1-9: 0

## 2022-05-12 NOTE — PATIENT INSTRUCTIONS
Patient Education        Food as Fuel: Care Instructions  Your Care Instructions     A healthy, balanced diet gives your body nutrients. Nutrients are like fuel for your body. They give you energy. And they keep your heart beating, your brain active, and your muscles working. They also help to build and strengthen bones,muscles, and other body tissues. Your body needs three major nutrients for energy. These are carbohydrate,protein, and fat.  Carbohydrate provides energy for your brain, muscles, heart, and lungs. It is found in bread, cereal, rice, pasta, fruits, vegetables, milk, yogurt, and sugar.  Protein provides energy and helps build and repair your body's cells. It is found in meat, poultry, fish, cooked dry beans, cheese, tofu and other soy products, nuts and seeds, and milk and milk products.  Fat provides energy, helps build the covering around nerves in your body, and helps make hormones. Fat is found in butter, margarine, oil, mayonnaise, salad dressing, and nuts. It is also found in most foods that come from animals, such as meat and milk products. Many foods also have fat added to them. Your body needs all three of these nutrients to be healthy. If you choose a good mix of foods, you can help your body get the right amounts ofcarbohydrate, protein, and fat. It can also keep you at a healthy weight. Follow-up care is a key part of your treatment and safety. Be sure to make and go to all appointments, and call your doctor if you are having problems. It's also a good idea to know your test results and keep alist of the medicines you take. How can you care for yourself at home? Eat a balanced diet   Try to eat variety of healthy foods every day. These include:  ? 5 to 8 ounces of bread, cereal, crackers, rice, or pasta. An ounce is about 1 slice of bread, 1 cup of breakfast cereal, or ½ cup of cooked rice, cereal, or pasta.  Choose whole-grain products for at least half of your grain servings. ? 2 to 3 cups of vegetables. Be sure to include:  - Dark green vegetables such as broccoli and spinach.  - Orange vegetables such as carrots and sweet potatoes. ? 1½ to 2 cups of fresh, frozen, or canned fruit. A large banana, a large orange, or a small apple equals 1 cup. ? 3 cups of nonfat or low-fat milk, yogurt, or other milk products. ? 5 to 6½ ounces of protein foods. These include chicken, fish, lean meat, beans, nuts, and seeds. One egg equals 1 ounce of meat, poultry, or fish. A ½ cup of cooked beans equals 2 ounces of protein. Stay fueled all day  [facility] your day with breakfast. If you don't have time to sit down for a bowl of cereal in the morning, try something that you can eat \"on the go. \" Try a piece of whole wheat bread with peanut butter or a container of yogurt with frozen berries mixed in.  Eat regularly scheduled meals and snacks. If you miss a meal, you may overeat at the next meal. Or you may choose a less healthy snack.  Drink enough water. (If you have kidney, heart, or liver disease and have to limit fluids, talk with your doctor before you increase your fluid intake.)  Where can you learn more? Go to https://[employer]healthmSchool. org and sign in to your [employer] account. Enter T817 in the [facility] box to learn more about \"Food as Fuel: Care Instructions. \"     If you do not have an account, please click on the \"Sign Up Now\" link. Current as of: September 8, 2021               Content Version: 13.2  © 5030-5915 [employer], [employer]. Care instructions adapted under license by [facility] ([facility]). If you have questions about a medical condition or this instruction, always ask your healthcare professional. [address] any warranty or liability for your use of this information.

## 2022-05-12 NOTE — PROGRESS NOTES
[address] PRIMARY CARE  [address] [facility]  [address] [address]  Dept: [phone]  Dept Fax: [phone]    Rosales Oliveira is a 23 y.o. female who presentstoday for her medical conditions/complaints as noted below. Rosales Oliveira is c/o of  Chief Complaint   Patient presents with    Weight Loss     PA denied for wegovy         HPI:     Here for routine 3 month follow up for weight loss, has not started wegovy- awaiting PA approval, will check on this. Weight stable, was 192lb at OV 3 months ago. She finds herself emotional eating, generally worse around her menses. Typically eating very low carb, drinking at least 72 oz water daily, exercising several times per week. Had lung biopsy this past Monday for suspicious left lung nodule due to primary right lower extremity sarcoma- awaiting pathology. Hx of disarticulation of hip. Continues to follow with orthotist for fitting or prosthetic. Still ambulating without it, using forearm crutches. Is driving herself and doing well with this. Has concern for weight distribution, feels like she is weaker on right side due to absence of RLE and sometimes feels like she is holding more weight on that side.  On exam, abdomen appears symmetrical.       Hemoglobin A1C (%)   Date Value   2022 5.4   2020 5.0             ( goal A1C is < 7)   No results found for: LABMICR  LDL Cholesterol (mg/dL)   Date Value   2022 104       (goal LDL is <100)   AST (U/L)   Date Value   2022 16     ALT (U/L)   Date Value   2022 20     BUN (mg/dL)   Date Value   2022 11     BP Readings from Last 3 Encounters:   22 132/72   22 116/72   22 121/77          ([id number])    Past Medical History:   Diagnosis Date    33 weeks gestation of pregnancy 10/12/2020    Antepartum premature rupture of membrane     Diabetes mellitus ([facility] [address]     Gestational    High-risk pregnancy, unspecified trimester 10/12/2020    History of anemia     prior blood and iron transfusion, without reactions    History of blood transfusion 8/1/2020    History of E.  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 GDMA2 Abnormal early 1 hour GTT (183) Abnormal 3 hour GTT - fasting 86 - 1 hour 224 - 2 hour 187 - 3 hour 164 HbA1c 7/1 5.8 Component     Latest Ref Rng & Units 7/5/2020 7/5/2020 7/5/2020 7/5/2020         8:06 AM 11:27 AM  4:48 PM  9:28 PM  Glucose, metered     50 - 140 mg/dL 93 135 99 131   Component     Latest    Pseudomonas UTI 8/4/2020 8/4/20 treated with Cefepime 2g IV for 10 days     Wears glasses       Past Surgical History:   Procedure Laterality Date    CT NEEDLE BIOPSY LUNG PERCUTANEOUS  5/9/2022    CT NEEDLE BIOPSY LUNG PERCUTANEOUS 5/9/2022 [facility] CT SCAN    IR PORT PLACEMENT EQUAL OR GREATER THAN 5 YEARS  10/27/2020    IR PORT PLACEMENT EQUAL OR GREATER THAN 5 YEARS 10/27/2020 Linda Parra MD [facility] SPECIAL PROCEDURES    LEG AMPUTATION AT HIP Right 07/23/2020    [facility], osteosarcoma       Family History   Problem Relation Age of Onset    Diabetes Mother     Diabetes Maternal Grandmother     Heart Surgery Maternal Grandmother     Stroke Maternal Grandfather     Hypertension Maternal Grandfather        Social History     Tobacco Use    Smoking status: Never Smoker    Smokeless tobacco: Never Used   Substance Use Topics    Alcohol use: Not Currently      Current Outpatient Medications   Medication Sig Dispense Refill    ketoconazole (NIZORAL) 2 % shampoo Apply 3-4 times weekly to scalp, leave on for five minutes prior to washing off 120 mL 2    fluocinonide (LIDEX) 0.05 % external solution Apply to scalp daily for rash 60 mL 2    ferrous sulfate (IRON 325) 325 (65 Fe) MG tablet Take 325 mg by mouth daily (with breakfast)      NORLYDA 0.35 MG tablet TAKE 1 TABLET BY MOUTH DAILY 84 tablet 3    Handicap Placard MISC Expiration: Lifetime 2 each 0    lidocaine-prilocaine (EMLA) 2.5-2.5 % cream Apply topically Daily as needed. 1 Tube 1    Prenatal MV-Min-Fe Fum-FA-DHA (PRENATAL 1 PO) Take by mouth       No current facility-administered medications for this visit. No Known Allergies    Health Maintenance   Topic Date Due    Hepatitis A vaccine (2 of 2 - 2-dose series) 12/06/2014    Chlamydia screen  10/12/2021    COVID-19 Vaccine (3 - Booster for Pfizer series) 04/04/2022    Depression Screen  05/12/2023    Pap smear  01/07/2025    DTaP/Tdap/Td vaccine (8 - Td or Tdap) 08/12/2026    Hepatitis B vaccine  Completed    Hib vaccine  Completed    HPV vaccine  Completed    Varicella vaccine  Completed    Meningococcal (ACWY) vaccine  Completed    Flu vaccine  Completed    HIV screen  Completed    Pneumococcal 0-64 years Vaccine  Aged Out    Hepatitis C screen  Discontinued       Subjective:      Review of Systems   Constitutional: Negative for activity change, fatigue and fever. HENT: Negative for congestion, rhinorrhea and sore throat. Eyes: Negative for visual disturbance. Respiratory: Negative for chest tightness and shortness of breath. Cardiovascular: Negative for chest pain and palpitations. Gastrointestinal: Negative for abdominal pain, diarrhea, nausea and vomiting. Endocrine: Negative for polydipsia. Genitourinary: Negative for difficulty urinating. Musculoskeletal: Negative for arthralgias and myalgias. S/p right hip disarticulation   Skin: Negative for color change. Neurological: Negative for weakness and headaches. Psychiatric/Behavioral: Negative for behavioral problems. The patient is not nervous/anxious. All other systems reviewed and are negative.       Objective:   /72   Pulse 106   Wt 193 lb (87.5 kg) LMP 04/14/2022   SpO2 96%   BMI 36.47 kg/m²   Physical Exam  Vitals reviewed. Constitutional:       General: She is not in acute distress. Appearance: Normal appearance. She is obese. HENT:      Head: Normocephalic. Eyes:      Pupils: Pupils are equal, round, and reactive to light. Cardiovascular:      Rate and Rhythm: Normal rate and regular rhythm. Pulses: Normal pulses. Heart sounds: Normal heart sounds. Pulmonary:      Effort: Pulmonary effort is normal.      Breath sounds: Normal breath sounds. Abdominal:      General: Bowel sounds are normal. There is no distension. Palpations: Abdomen is soft. Musculoskeletal:         General: Normal range of motion. Right lower leg: No edema. Left lower leg: No edema. Skin:     General: Skin is warm and dry. Capillary Refill: Capillary refill takes less than 2 seconds. Comments: Tegaderm CDI left back, s/p lung biopsy. No periwound redness, swelling or pain. Neurological:      General: No focal deficit present. Mental Status: She is alert and oriented to person, place, and time. Psychiatric:         Mood and Affect: Mood normal.         Behavior: Behavior normal.           :       Diagnosis Orders   1. Obesity (BMI 30-39.9)     2. Nodule of left lung     3. History of lung biopsy               :          1. Obesity (BMI 30-39. 9)  Will check status of wegovy appeal. Continue low carb diet and exercise. May consider counting calories. 2. Nodule of left lung  3. History of lung biopsy  New, biopsy site WNL. Normal lung sounds. Following with hem/onc- await pathology. She is doing well mentally, preliminarily, hemonc does not think this lesion appears as CA. Return in about 3 months (around 8/12/2022). Patient given educational materials - see patient instructions. Discussed use, benefit, and side effects of prescribed medications. All patient questions answered. Pt voiced understanding. Reviewed health maintenance. Instructed to continue current medications, diet and exercise. Patient agreed with treatment plan. Follow up as directed.        Electronicallysigned by FATEMEH Tan CNP on 5/14/2022 at 3:00 PM

## 2022-05-14 PROBLEM — O9A.113: Status: RESOLVED | Noted: 2020-09-02 | Resolved: 2022-05-14

## 2022-05-16 LAB — SURGICAL PATHOLOGY REPORT: NORMAL

## 2022-05-17 ENCOUNTER — TELEMEDICINE (OUTPATIENT)
Dept: ONCOLOGY | Age: 27
End: 2022-05-17
Payer: COMMERCIAL

## 2022-05-17 DIAGNOSIS — R91.8 LUNG MASS: ICD-10-CM

## 2022-05-17 DIAGNOSIS — O9A.113 MALIGNANT NEOPLASM AFFECTING PREGNANCY IN THIRD TRIMESTER: Primary | ICD-10-CM

## 2022-05-17 PROCEDURE — G8427 DOCREV CUR MEDS BY ELIG CLIN: HCPCS | Performed by: INTERNAL MEDICINE

## 2022-05-17 PROCEDURE — 99213 OFFICE O/P EST LOW 20 MIN: CPT | Performed by: INTERNAL MEDICINE

## 2022-05-17 NOTE — PROGRESS NOTES
DIAGNOSIS:   1. Undifferentiated pleomorphic  Sarcoma, right leg, T4 N0 M0  (stage IIIb) diagnosed 05/2020      CURRENT THERAPY:  S/P full right leg amputation (Right hip disarticulation)   Adjuvant chemotherapy delayed per patient choice until after delivery   Chemo with ifosfamide and adriamycin  (AIM) to started 11/3/2020     BRIEF CASE HISTORY:   Amy Gee is a very pleasant 23 y.o. female who is referred to us for sarcoma. She presented with rightt knee sprain at work 04/2020 but the pain and swelling worsened with palpable mass. She underwent MRI which showed mass and was seen at [facility] where biopsy was taken and showed sarcoma. She was transferred to [facility] and decided to undergo full leg amputation due to early stage pregnancy, tumor was 16 cm x 20 cm x 3 cm and removed with negative margins. She was following with [facility] and decided she would prefer to receive treatment locally. The patient is currently 32 weeks pregnant and delivery plans are expected to be finalized 10/02/2020. She has elected to proceed with chemo after delivery. She is working towards prosthesis with test socket. 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 Kathy delivered in late October, after delivery, we decided to start chemo with AIM 11/3/2020. Patient finished 4 cycles of chemotherapy and she was declared in remission and was started surveillance. Follow-up CT scan showed a 1.7 cm lung lesion that was indeterminate. Biopsy of the lung was essentially negative for metastatic disease, we decided to continue surveillance  INTERIM HISTORY: This is a virtual visit  The patient presents to review the results of the biopsy. She overall feels well. Biopsy showed alveolar tissue with negative implants for malignancy.     PAST MEDICAL HISTORY: has a past medical history of 33 weeks gestation of pregnancy, Antepartum premature rupture of membrane, Diabetes mellitus [facility]), High-risk pregnancy, unspecified trimester, History of anemia, History of blood transfusion, History of E. Coli UTI 10-<100,000CFU (7/7/20), Malignant neoplasm affecting pregnancy in third trimester, Osteosarcoma, Pregestational diabetes mellitus, modified White class B, Pseudomonas UTI, and Wears glasses. PAST SURGICAL HISTORY: has a past surgical history that includes Leg amputation at hip (Right, 07/23/2020); IR PORT PLACEMENT > 5 YEARS (10/27/2020); and CT NEEDLE BIOPSY LUNG PERCUTANEOUS (5/9/2022). CURRENT MEDICATIONS:  has a current medication list which includes the following prescription(s): ketoconazole, fluocinonide, ferrous sulfate, norlyda, handicap placard, lidocaine-prilocaine, and prenatal mv-min-fe fum-fa-dha. ALLERGIES:  has No Known Allergies. FAMILY HISTORY: Negative for any hematological or oncological conditions. SOCIAL HISTORY:  reports that she has never smoked. She has never used smokeless tobacco. She reports previous alcohol use. She reports that she does not use drugs. REVIEW OF SYSTEMS:   General: No fever or night sweats. Weight loss - intentional   ENT: No double or blurred vision, no tinnitus or hearing problem, no dysphagia or sore throat   Respiratory: No chest pain, no shortness of breath, no cough or hemoptysis. Cardiovascular: Denies chest pain, PND or orthopnea. No L E swelling or palpitations. Gastrointestinal: No nausea, vomiting, abdominal pain, diarrhea or constipation. Genitourinary: Denies dysuria, hematuria, frequency, urgency or incontinence. Gynecological: nearly resolved post partum bleeding  Neurological: Denies headaches, decreased LOC, no sensory or motor focal deficits. +post surgical phantom pain improving  Musculoskeletal: No arthralgia no back pain or joint swelling. Skin: There are no rashes or bleeding. Psychiatric: No anxiety, no depression. Endocrine: No diabetes or thyroid disease.    Hematologic: No bleeding, no adenopathy. PHYSICAL EXAM: Shows a well appearing 23y.o.-year-old female who is not in pain or distress. PHYSICAL EXAMINATION:    Vital Signs: (As obtained by patient/caregiver or practitioner observation)    Blood pressure-  Heart rate-    Respiratory rate-    Temperature-  Pulse oximetry-     Constitutional: [x] Appears well-developed and well-nourished [x] No apparent distress      [] Abnormal-   Mental status  [x] Alert and awake  [x] Oriented to person/place/time [x]Able to follow commands      Eyes:  EOM    [x]  Normal  [] Abnormal-  Sclera  [x]  Normal  [] Abnormal -         Discharge [x]  None visible  [] Abnormal -    HENT:   [x] Normocephalic, atraumatic.   [] Abnormal   [x] Mouth/Throat: Mucous membranes are moist.     External Ears [x] Normal  [] Abnormal-     Neck: [x] No visualized mass     Pulmonary/Chest: [x] Respiratory effort normal.  [x] No visualized signs of difficulty breathing or respiratory distress        [] Abnormal-      Musculoskeletal:   [x] Normal gait with no signs of ataxia         [x] Normal range of motion of neck        [] Abnormal-       Neurological:        [x] No Facial Asymmetry (Cranial nerve 7 motor function) (limited exam to video visit)          [x] No gaze palsy        [] Abnormal-         Skin:        [x] No significant exanthematous lesions or discoloration noted on facial skin         [] Abnormal-            Psychiatric:       [x] Normal Affect [x] No Hallucinations        [] Abnormal-     Other pertinent observable physical exam findings-                            REVIEW OF LABORATORY DATA:   Lab Results   Component Value Date    WBC 6.2 05/02/2022    HGB 13.8 05/02/2022    HCT 40.2 05/02/2022    MCV 85.6 05/02/2022     05/09/2022   '    Chemistry        Component Value Date/Time     05/02/2022 0925    K 3.9 05/02/2022 0925     05/02/2022 0925    CO2 22 05/02/2022 0925    BUN 11 05/02/2022 0925    CREATININE <0.40 (L) 05/02/2022 7701 Component Value Date/Time    CALCIUM 9.4 05/02/2022 0925    ALKPHOS 59 05/02/2022 0925    AST 16 05/02/2022 0925    ALT 20 05/02/2022 0925    BILITOT 0.37 05/02/2022 0925          REVIEW OF RADIOLOGICAL RESULTS:   CT CHEST ABDOMEN PELVIS W CONTRAST: 5/2/2022  1. Suspicious 1.7 cm nodule in the left lower lobe concerning for a metastatic deposit. 2.  No new findings identified in the abdomen or pelvis. IMPRESSION:   1. 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 Adjuvant chemo following delivery with ifosfamide and adriamycin (AIM) - started 11/03/2020, received 4 cycles  4. New lung lesion, seen on screening CT scan, biopsy essentially negative    PLAN:   After review of the biopsy, I think the patient should be observed conservatively. The patient is happy with that result. We will check her CT scan in about 3 months. I reviewed the imaging. I was initially concerned that the biopsy missed the lesion but it seems that the biopsy was accurate according to the imaging  We will see her in 3 months after the CT scan       Amandeep Chapman MD  Hematologist/Medical [id number] [facility] hematology oncology physicians  Attestation   The patient  being evaluated by a Virtual Visit (video visit) encounter to address concerns as mentioned above. A caregiver was present when appropriate. Due to this being a TeleHealth encounter (During [facility] public health emergency), evaluation of the following organ systems was limited: Vitals/Constitutional/EENT/Resp/CV/GI//MS/Neuro/Skin/Heme-Lymph-Imm.   Pursuant to the emergency declaration under the [facility], [address] [place] [address] waiver authority and the [employer] Act, this Virtual Visit was conducted with patient's (and/or legal guardian's) consent, to reduce the patient's risk of exposure to COVID-19 and provide necessary medical care. The patient (and/or legal guardian) has also been advised to contact this office for worsening conditions or problems, and seek emergency medical treatment and/or call 911 if deemed necessary. Services were provided through a video synchronous discussion virtually to substitute for in-person clinic visit. Patient and provider were located at their individual homes. --Boom Ortiz MD on 4/6/2020 at 3:50 PM    An electronic signature was used to authenticate this note.

## 2022-06-13 RX ORDER — NORETHINDRONE
KIT
Qty: 84 TABLET | Refills: 3 | OUTPATIENT
Start: 2022-06-13

## 2022-06-16 RX ORDER — ACETAMINOPHEN AND CODEINE PHOSPHATE 120; 12 MG/5ML; MG/5ML
SOLUTION ORAL
Qty: 84 TABLET | Refills: 3 | Status: SHIPPED | OUTPATIENT
Start: 2022-06-16

## 2022-07-21 NOTE — PROGRESS NOTES
Physical Therapy  [address]  Acute Rehabilitation Physical Therapy Progress Note    Date: 20  Patient Name: Ashish Delgado       Room: 1383/3143-30  MRN: [id number]   Account: [de-identified]   : 1995  (22 y.o.) Gender: female     Referring Practitioner: Dr. Gina Keller  Diagnosis: R hip disarticulation on 20 due to osteosarcoma  Past Medical History:  has a past medical history of Diabetes mellitus ([address] and Osteosarcoma. Past Surgical History:   has a past surgical history that includes amputation (Right, 2020). Additional Pertinent Hx: Ashish Delgado  is a 22 y.o. right-handed     female admitted to the [address] [place] [address] unit on 2020. She was originally admitted to [address] [place] [address],[address] on 2020 for planned amputation R leg for Stage 3 sarcoma. She had R hip disarticulation performed 2020. She is pregnant - at 21 weeks gestation with comorbid gestational diabetes. She is NWB on RLE/R pelvis. Restrictions/Precautions  Restrictions/Precautions: Weight Bearing  Lower Extremity Weight Bearing Restrictions  Right Lower Extremity Weight Bearing: Non Weight Bearing(R pelvis (no rolling onto or weight bearing), OK to sit upright per [facility] PT notes)    Subjective: Pt relieved that spotting from yesterday was incisional, minimal; reports changed cushion feels \"firm, but I'm getting used to it. \"   Comments: Wider wheelchair provided in PM to alleviate R pelvis weightbearing (in narrower chair, Pt had been resting pelvis on armrest); Pt to assess comfort in AM.     Vital Signs  Patient Currently in Pain: Yes  Pain Assessment: Faces  Akins-Baker Pain Rating: Hurts a little bit  Pain Type: Phantom pain  Pain Location: Leg  Pain Orientation: Right  Pain Descriptors: Numbness;Pins and needles  Pain Onset: On-going  Non-Pharmaceutical Pain Intervention(s): Repositioned; Rest(Mirror therapy)  Response to Pain Intervention: None;Patient Satisfied Bed Mobility  Supine to Sit: Modified independent  Sit to Supine: Modified independent  Scooting: Modified independent  Comment: Mat, wedge, 3 pillows    Transfers:  Sit to Stand: Stand by assistance  Stand to sit: Stand by assistance  Bed to Chair: Stand by assistance  Stand pivot transfers: Stand by assistance (no device, bed to wheelchair)  Squat Pivot Transfers: Stand by assistance(no device, wheelchair to mat)    Ambulation 1  Surface: level tile  Device: Rolling Walker  Assistance: Stand by assistance  Quality of Gait: Steady, no shaking or loss of balance observed. Cue for heel strike with good return. Gait Deviations: Slow Laurence;Decreased step length;Decreased step height  Distance: 40' AM & 50' PM  Comments: Pt states she's becoming better able to  fatigue. Ambulation 2  Surface - 2: level tile  Device 2: Parallel Bars  Assistance 2: Stand by assistance  Quality of Gait 2: Emphasis on heel strike with good return to cues. Relies heavily on UEs for exaggeratedly slow swing/caution.   Gait Deviations: Decreased step height;Slow Laurence  Distance: 8'x2 each forward and backward    Stairs/Curb  Stairs?: Yes  Stairs  # Steps : 2  Stairs Height: 2\"  Rails: Bilateral(parallel bars)  Assistance: Contact guard assistance  Comment: Pt demonstrated no difficulty stepping up onto & off of block step    Wheelchair Activities  Wheelchair Type: Standard  Wheelchair Cushion: Pressure Relieving  Pressure Relief Type: Lateral lean;Push up  Level of Assistance for pressure relief activities: Modified independent  Wheelchair Parts Management: Yes  Left Leg Rest Level of Assistance: Independent(swinging away, into place)  Right Leg Rest Level of Assistance: Unable to assess(comment)(Not applicable)  Left Brakes Level of Assistance: Independent  Right Brakes Level of Assistance: Independent  Propulsion: Yes  Propulsion 1  Propulsion: Manual  Level: Level Tile(+ ramp)  Method: RUE;LUE  Level of Assistance: Stand by assistance  Description/ Details: Pt able to propel straight path, turn, backing and manuver in tight spaces. Distance: 300' AM (16\" wheelchair) & 250' PM (18\")    BALANCE Posture: Good  Sitting - Static: Good(edge of mat/bed, no UE or back support)  Sitting - Dynamic: Fair;+(Edge of mat/bed, UE support, no back support)  Standing - Static: Good;-(Single UE support, parallel bars)  Standing - Dynamic: Good;-(Single UE support, parallel bars)    EXERCISES    Other exercises?: Yes  Other exercises 1: Supine mirror therapy & L LE active ROM, repetitions to Pt. tolerance  Other exercises 4: sit<>stand x7(parallel bars, rolling walker)  Other exercises 5: standing in // bars: L heel raises and mini squats  Other exercises 6: standing in // bars: standing liliya & 1-2 UE support, reaching OOBOS & ball toss, 7 min. total with seated rest break between activities    Activity Tolerance: Patient Tolerated treatment well, Patient limited by endurance     PT Equipment Recommendations  Equipment Needed: Yes  Walker: Rolling  Wheelchair: Light Weight(With pressure relieving cushion & swing-away armrests)  Other Comments  Comments: DME order for above given to [email or web address] on 8/4/2020. Current Treatment Recommendations: Strengthening, Balance Training, Functional Mobility Training, Transfer Training, Endurance Training, Wheelchair Mobility Training, Gait Training, Safety Education & Training, Patient/Caregiver Education & Training, Equipment Evaluation, Education, & procurement, Positioning    Conditions Requiring Skilled Therapeutic Intervention  Body structures, Functions, Activity limitations: Decreased functional mobility ; Decreased ROM; Decreased strength;Decreased endurance;Decreased balance; Increased pain;Decreased posture  REQUIRES PT FOLLOW UP: Yes  Discharge Recommendations: Home with assist PRN    Goals  Short term goals  Time Frame for Short term goals: 1 week  Short term goal 1: Pt able to roll to left side for position change/pressure relief, independently. Short term goal 2: Pt able to perform supine <>sit at supervision level. Short term goal 3: Pt able to perform sit <>stand at SBA  Short term goal 4:  Pt able to transfer from bed<>W/C<>toilet at [facility]  Short term goal 5: Pt to demonstrate independence in pressure relieving technique while in bed,  chair or w/c. Short term goal 6: Pt able to manuever w/c on level surface, 150 ft, SBA  Short term goal 7:  Pt able to ambulate with RW dist of 10 to 15 ft, CGA  Long term goals  Time Frame for Long term goals : By LOS  Long term goal 1: Pt able to transfer independently  Long term goal 2: Pt able to ambulate with RW dist of 10 to 15 ft with RW at supervision level. Long term goal 3: Pt able to propel w/c on incline surfaces at SBA, dist of 50 to 80 ft   Long term goal 4: Pt able to propel w/c dist of 150 ft, on level surfaces, independently.      08/06/20 0747 08/06/20 1428   PT Individual Minutes   Time In [id number]   Time Out 1341 1183   Minutes 64 40     Electronically signed by Salty Barnes PTA on 8/6/20 at 3:43 PM EDT 3 = A little assistance

## 2022-08-09 ENCOUNTER — TELEPHONE (OUTPATIENT)
Dept: PHYSICAL MEDICINE AND REHAB | Age: 27
End: 2022-08-09

## 2022-08-09 DIAGNOSIS — G54.6 PHANTOM PAIN AFTER AMPUTATION OF LOWER EXTREMITY (HCC): ICD-10-CM

## 2022-08-09 DIAGNOSIS — Z89.621 HISTORY OF DISARTICULATION OF RIGHT HIP: Primary | ICD-10-CM

## 2022-08-09 NOTE — TELEPHONE ENCOUNTER
Patient called, finally has her prosthesis back after various adjustments had to be made and so she is ready to return to physical therapy. Requested the referral be sent to [facility], and she has previously worked with Christin Jacobson there.

## 2022-08-15 ENCOUNTER — HOSPITAL ENCOUNTER (OUTPATIENT)
Dept: PHYSICAL THERAPY | Facility: CLINIC | Age: 27
Setting detail: THERAPIES SERIES
Discharge: HOME | End: 2022-08-15
Payer: COMMERCIAL

## 2022-08-15 ENCOUNTER — OFFICE VISIT (OUTPATIENT)
Dept: PRIMARY CARE CLINIC | Age: 27
End: 2022-08-15
Payer: COMMERCIAL

## 2022-08-15 VITALS
SYSTOLIC BLOOD PRESSURE: 122 MMHG | WEIGHT: 192 LBS | BODY MASS INDEX: 36.28 KG/M2 | OXYGEN SATURATION: 99 % | DIASTOLIC BLOOD PRESSURE: 80 MMHG | HEART RATE: 102 BPM

## 2022-08-15 DIAGNOSIS — Z89.621 HISTORY OF DISARTICULATION OF RIGHT HIP: ICD-10-CM

## 2022-08-15 DIAGNOSIS — E66.9 OBESITY (BMI 30-39.9): Primary | ICD-10-CM

## 2022-08-15 PROCEDURE — 99213 OFFICE O/P EST LOW 20 MIN: CPT | Performed by: NURSE PRACTITIONER

## 2022-08-15 PROCEDURE — 1036F TOBACCO NON-USER: CPT | Performed by: NURSE PRACTITIONER

## 2022-08-15 PROCEDURE — G8417 CALC BMI ABV UP PARAM F/U: HCPCS | Performed by: NURSE PRACTITIONER

## 2022-08-15 PROCEDURE — 97161 PT EVAL LOW COMPLEX 20 MIN: CPT

## 2022-08-15 PROCEDURE — 97116 GAIT TRAINING THERAPY: CPT

## 2022-08-15 PROCEDURE — G8427 DOCREV CUR MEDS BY ELIG CLIN: HCPCS | Performed by: NURSE PRACTITIONER

## 2022-08-15 RX ORDER — PHENTERMINE HYDROCHLORIDE 37.5 MG/1
37.5 CAPSULE ORAL EVERY MORNING
Qty: 30 CAPSULE | Refills: 0 | Status: SHIPPED | OUTPATIENT
Start: 2022-08-15 | End: 2022-09-14

## 2022-08-15 ASSESSMENT — PATIENT HEALTH QUESTIONNAIRE - PHQ9
SUM OF ALL RESPONSES TO PHQ QUESTIONS 1-9: 0
2. FEELING DOWN, DEPRESSED OR HOPELESS: 0
SUM OF ALL RESPONSES TO PHQ9 QUESTIONS 1 & 2: 0
SUM OF ALL RESPONSES TO PHQ QUESTIONS 1-9: 0
1. LITTLE INTEREST OR PLEASURE IN DOING THINGS: 0

## 2022-08-15 ASSESSMENT — ENCOUNTER SYMPTOMS
VOMITING: 0
RHINORRHEA: 0
CHEST TIGHTNESS: 0
DIARRHEA: 0
SHORTNESS OF BREATH: 0
SORE THROAT: 0
NAUSEA: 0
COLOR CHANGE: 0
ABDOMINAL PAIN: 0

## 2022-08-15 NOTE — PROGRESS NOTES
(7/7/20) 8/2/2020    Malignant neoplasm affecting pregnancy in third trimester 9/2/2020    Osteosarcoma     Pregestational diabetes mellitus, modified White class B 6/30/2020    Last Assessment & Plan:  Formatting of this note might be different from the original. GDMA2 Abnormal early 1 hour GTT (183) Abnormal 3 hour GTT - fasting 86 - 1 hour 224 - 2 hour 187 - 3 hour 164 HbA1c 7/1 5.8 Component     Latest Ref Rng & Units 7/5/2020 7/5/2020 7/5/2020 7/5/2020         8:06 AM 11:27 AM  4:48 PM  9:28 PM  Glucose, metered     50 - 140 mg/dL 93 135 99 131   Component     Latest    Pseudomonas UTI 8/4/2020 8/4/20 treated with Cefepime 2g IV for 10 days     Wears glasses       Past Surgical History:   Procedure Laterality Date    CT NEEDLE BIOPSY LUNG PERCUTANEOUS  5/9/2022    CT NEEDLE BIOPSY LUNG PERCUTANEOUS 5/9/2022 [facility] CT SCAN    IR PORT PLACEMENT EQUAL OR GREATER THAN 5 YEARS  10/27/2020    IR PORT PLACEMENT EQUAL OR GREATER THAN 5 YEARS 10/27/2020 Dutch Lyman MD [facility] SPECIAL PROCEDURES    LEG AMPUTATION AT HIP Right 07/23/2020    [facility], osteosarcoma       Family History   Problem Relation Age of Onset    Diabetes Mother     Diabetes Maternal Grandmother     Heart Surgery Maternal Grandmother     Stroke Maternal Grandfather     Hypertension Maternal Grandfather        Social History     Tobacco Use    Smoking status: Never    Smokeless tobacco: Never   Substance Use Topics    Alcohol use: Not Currently      Current Outpatient Medications   Medication Sig Dispense Refill    phentermine 37.5 MG capsule Take 1 capsule by mouth every morning for 30 days.  30 capsule 0    norethindrone (NORLYDA) 0.35 MG tablet TAKE 1 TABLET BY MOUTH DAILY 84 tablet 3    ketoconazole (NIZORAL) 2 % shampoo Apply 3-4 times weekly to scalp, leave on for five minutes prior to washing off 120 mL 2    fluocinonide (LIDEX) 0.05 % external solution Apply to scalp daily for rash 60 mL 2    ferrous sulfate (IRON 325) 325 (65 Fe) MG tablet Take 325 mg by mouth daily (with breakfast)      Handicap Placard MISC Expiration: Lifetime 2 each 0    lidocaine-prilocaine (EMLA) 2.5-2.5 % cream Apply topically Daily as needed. 1 Tube 1    Prenatal MV-Min-Fe Fum-FA-DHA (PRENATAL 1 PO) Take by mouth       No current facility-administered medications for this visit. No Known Allergies    Health Maintenance   Topic Date Due    Hepatitis A vaccine (2 of 2 - 2-dose series) 12/06/2014    COVID-19 Vaccine (3 - Booster for Pfizer series) 04/04/2022    Flu vaccine (1) 09/01/2022    Depression Screen  05/12/2023    Pap smear  01/07/2025    DTaP/Tdap/Td vaccine (8 - Td or Tdap) 08/12/2026    Hepatitis B vaccine  Completed    Hib vaccine  Completed    HPV vaccine  Completed    Varicella vaccine  Completed    Meningococcal (ACWY) vaccine  Completed    HIV screen  Completed    Pneumococcal 0-64 years Vaccine  Aged Out    Hepatitis C screen  Discontinued       Subjective:      Review of Systems   Constitutional:  Negative for activity change, fatigue and fever. HENT:  Negative for congestion, rhinorrhea and sore throat. Eyes:  Negative for visual disturbance. Respiratory:  Negative for chest tightness and shortness of breath. Cardiovascular:  Negative for chest pain and palpitations. Gastrointestinal:  Negative for abdominal pain, diarrhea, nausea and vomiting. Endocrine: Negative for polydipsia. Genitourinary:  Negative for difficulty urinating. Musculoskeletal:  Negative for arthralgias and myalgias. Skin:  Negative for color change. Neurological:  Negative for weakness and headaches. Psychiatric/Behavioral:  Negative for behavioral problems. The patient is not nervous/anxious. All other systems reviewed and are negative. Objective:   /80   Pulse (!) 102   Wt 192 lb (87.1 kg)   SpO2 99%   BMI 36.28 kg/m²   Physical Exam  Vitals reviewed. Constitutional:       General: She is not in acute distress. Appearance: Normal appearance. HENT:      Head: Normocephalic. Eyes:      Pupils: Pupils are equal, round, and reactive to light. Cardiovascular:      Rate and Rhythm: Normal rate and regular rhythm. Pulses: Normal pulses. Heart sounds: Normal heart sounds. Pulmonary:      Effort: Pulmonary effort is normal.      Breath sounds: Normal breath sounds. Abdominal:      General: There is no distension. Musculoskeletal:         General: Normal range of motion. Right lower leg: No edema. Left lower leg: No edema. Comments: R AKA   Skin:     General: Skin is warm and dry. Capillary Refill: Capillary refill takes less than 2 seconds. Neurological:      General: No focal deficit present. Mental Status: She is alert and oriented to person, place, and time. Psychiatric:         Mood and Affect: Mood normal.         Behavior: Behavior normal.         :       Diagnosis Orders   1. Obesity (BMI 30-39.9)  phentermine 37.5 MG capsule      2. History of disarticulation of right hip                  :          1. Obesity (BMI 30-39. 9)  Improving with diet and exercise, ok to resume adipex. RTO 1 month. Continue healthy diet and exercise as able. Slight elevation of HR-had caffeine prior to coming in    - phentermine 37.5 MG capsule; Take 1 capsule by mouth every morning for 30 days. Dispense: 30 capsule; Refill: 0    2. History of disarticulation of right hip  Resuming PT with new orthotic leg today    Return in about 1 month (around 9/15/2022) for adipex- 1 month . Patient given educational materials - see patient instructions. Discussed use, benefit, and side effects of prescribed medications. All patient questions answered. Pt voiced understanding. Reviewed health maintenance. Instructed to continue current medications, diet and exercise. Patient agreed with treatment plan. Follow up as directed.        Electronicallysigned by FATEMEH Mosqueda CNP on 8/15/2022 at 10:37 AM

## 2022-08-19 ENCOUNTER — HOSPITAL ENCOUNTER (OUTPATIENT)
Dept: PHYSICAL THERAPY | Facility: CLINIC | Age: 27
Setting detail: THERAPIES SERIES
Discharge: HOME | End: 2022-08-19
Payer: COMMERCIAL

## 2022-08-19 PROCEDURE — 97116 GAIT TRAINING THERAPY: CPT

## 2022-08-22 NOTE — FLOWSHEET NOTE
[] [facility] [facility]  [address]  [phone]  F: [phone] [] [address]  [facility]   [address]  P: [phone]  F: [phone] [x] [facility]  [address]  P: [phone]  F: [phone] [] [address]  P: [phone]  F: [phone] [] [address]  [facility]   [address]   [place]: [phone]  F: [phone]      Physical Therapy Daily Treatment Note    Date:  2022  Patient Name:  Aubree Sims    :  1995  MRN: [id number]  Physician: Thelda Duane, MD                            Insurance: [employer] 40/40 Hard max  Medical Diagnosis:   T73.843 (ICD-10-CM) - History of disarticulation of right hip   G54.6 (ICD-10-CM) - Phantom pain after amputation of lower extremity ([address]   Rehab Codes: R26.2 Difficulty walking  Onset date: 22               Next Dr's appt. : tbd  Visit# / total visits: 2/    Cancels/No Shows: 0/0    Subjective:    Pain:  [] Yes  [x] No Location: N/A Pain Rating: (0-10 scale) -/10  Pain altered Tx:  [] No  [] Yes  Action:  Comments: Patient reports no issues since last session    Objective:  Modalities:   Precautions:  Exercises:  Exercise Reps/ Time Weight/ Level Comments   Gait training      turns                        Other:  Specific Instructions for subsequent treatments: continue gait training    Treatment Charges: Mins Units   []  Modalities     []  Ther Exercise     []  Manual Therapy     []  Ther Activities     []  Aquatics     []  Vasocompression     [x]  Other: gait 40 3   Total Treatment time 40 3       Assessment: [] Progressing toward goals. [] No change.      [x] Other:Prosthetist present for session and discussions were completed regarding compensations being made. Patient donned prosthetic better today and was seated inside and decreased rotation. VC's given for cinda and IC positioning. Patient to follow up with prosthetist for additional adjustments. [x] Patient would continue to benefit from skilled physical therapy services in order to: improve mobility    STG: (to be met in 6 treatments)  ? Pain:  ? ROM:  ? Strength:  ? Function: Patient to demonstrate the ability to ambulate with one forearm crutch  Independent with Home Exercise Programs  Demonstrate Knowledge of fall prevention  LTG: (to be met in 12 treatments)  Patient to demonstrate 36/47 on AMP  Patient to report 30/60 on AMP                    Patient goals: To increase confidence with prosthesis, walk without AD, improve strength       Pt. Education:  [x] Yes  [] No  [] Reviewed Prior HEP/Ed  Method of Education: [] Verbal  [] Demo  [] Written  Comprehension of Education:  [] Verbalizes understanding. [] Demonstrates understanding. [] Needs review. [] Demonstrates/verbalizes HEP/Ed previously given. Plan: [x] Continue current frequency toward long and short term goals.           Time In:910am           Time Out: 955am    Electronically signed by:  Vicente Howard, PT

## 2022-08-25 ENCOUNTER — HOSPITAL ENCOUNTER (OUTPATIENT)
Dept: CT IMAGING | Age: 27
Discharge: HOME | End: 2022-08-27
Payer: COMMERCIAL

## 2022-08-25 ENCOUNTER — HOSPITAL ENCOUNTER (OUTPATIENT)
Dept: INFUSION THERAPY | Age: 27
Discharge: HOME | End: 2022-08-25
Payer: COMMERCIAL

## 2022-08-25 DIAGNOSIS — O9A.113 MALIGNANT NEOPLASM AFFECTING PREGNANCY IN THIRD TRIMESTER: ICD-10-CM

## 2022-08-25 DIAGNOSIS — C49.21 PRIMARY LOWER EXTREMITY SARCOMA, RIGHT (HCC): Primary | ICD-10-CM

## 2022-08-25 DIAGNOSIS — R91.8 LUNG MASS: ICD-10-CM

## 2022-08-25 LAB
ABSOLUTE EOS #: 0.1 K/UL (ref 0–0.4)
ABSOLUTE LYMPH #: 1.4 K/UL (ref 1–4.8)
ABSOLUTE MONO #: 0.4 K/UL (ref 0.1–1.2)
ALBUMIN SERPL-MCNC: 4.1 G/DL (ref 3.5–5.2)
ALBUMIN/GLOBULIN RATIO: 2 (ref 1–2.5)
ALP BLD-CCNC: 52 U/L (ref 35–104)
ALT SERPL-CCNC: 14 U/L (ref 5–33)
ANION GAP SERPL CALCULATED.3IONS-SCNC: 12 MMOL/L (ref 9–17)
AST SERPL-CCNC: 15 U/L
BASOPHILS # BLD: 1 % (ref 0–2)
BASOPHILS ABSOLUTE: 0 K/UL (ref 0–0.2)
BILIRUB SERPL-MCNC: 0.37 MG/DL (ref 0.3–1.2)
BUN BLDV-MCNC: 11 MG/DL (ref 6–20)
CALCIUM SERPL-MCNC: 8.2 MG/DL (ref 8.6–10.4)
CHLORIDE BLD-SCNC: 104 MMOL/L (ref 98–107)
CO2: 22 MMOL/L (ref 20–31)
CREAT SERPL-MCNC: <0.4 MG/DL (ref 0.5–0.9)
EOSINOPHILS RELATIVE PERCENT: 2 % (ref 1–4)
GFR AFRICAN AMERICAN: ABNORMAL ML/MIN
GFR NON-AFRICAN AMERICAN: ABNORMAL ML/MIN
GFR SERPL CREATININE-BSD FRML MDRD: ABNORMAL ML/MIN/{1.73_M2}
GLUCOSE BLD-MCNC: 104 MG/DL (ref 70–99)
HCT VFR BLD CALC: 38.6 % (ref 36–46)
HEMOGLOBIN: 13.1 G/DL (ref 12–16)
LYMPHOCYTES # BLD: 27 % (ref 24–44)
MCH RBC QN AUTO: 29.7 PG (ref 26–34)
MCHC RBC AUTO-ENTMCNC: 34 G/DL (ref 31–37)
MCV RBC AUTO: 87.3 FL (ref 80–100)
MONOCYTES # BLD: 8 % (ref 2–11)
PDW BLD-RTO: 12.7 % (ref 12.5–15.4)
PLATELET # BLD: 229 K/UL (ref 140–450)
PMV BLD AUTO: 9.2 FL (ref 6–12)
POTASSIUM SERPL-SCNC: 3.5 MMOL/L (ref 3.7–5.3)
RBC # BLD: 4.42 M/UL (ref 4–5.2)
SEG NEUTROPHILS: 62 % (ref 36–66)
SEGMENTED NEUTROPHILS ABSOLUTE COUNT: 3.3 K/UL (ref 1.8–7.7)
SODIUM BLD-SCNC: 138 MMOL/L (ref 135–144)
TOTAL PROTEIN: 6.2 G/DL (ref 6.4–8.3)
WBC # BLD: 5.3 K/UL (ref 3.5–11)

## 2022-08-25 PROCEDURE — 71260 CT THORAX DX C+: CPT

## 2022-08-25 PROCEDURE — 2580000003 HC RX 258: Performed by: INTERNAL MEDICINE

## 2022-08-25 PROCEDURE — 6360000004 HC RX CONTRAST MEDICATION: Performed by: INTERNAL MEDICINE

## 2022-08-25 PROCEDURE — 80053 COMPREHEN METABOLIC PANEL: CPT

## 2022-08-25 PROCEDURE — 36591 DRAW BLOOD OFF VENOUS DEVICE: CPT

## 2022-08-25 PROCEDURE — 85025 COMPLETE CBC W/AUTO DIFF WBC: CPT

## 2022-08-25 PROCEDURE — 6360000002 HC RX W HCPCS: Performed by: INTERNAL MEDICINE

## 2022-08-25 RX ORDER — SODIUM CHLORIDE 0.9 % (FLUSH) 0.9 %
10 SYRINGE (ML) INJECTION PRN
Status: DISCONTINUED | OUTPATIENT
Start: 2022-08-25 | End: 2022-08-28 | Stop reason: HOSPADM

## 2022-08-25 RX ORDER — SODIUM CHLORIDE 0.9 % (FLUSH) 0.9 %
10 SYRINGE (ML) INJECTION PRN
Status: DISCONTINUED | OUTPATIENT
Start: 2022-08-25 | End: 2022-08-26 | Stop reason: HOSPADM

## 2022-08-25 RX ORDER — HEPARIN SODIUM (PORCINE) LOCK FLUSH IV SOLN 100 UNIT/ML 100 UNIT/ML
500 SOLUTION INTRAVENOUS PRN
Status: DISCONTINUED | OUTPATIENT
Start: 2022-08-25 | End: 2022-08-26 | Stop reason: HOSPADM

## 2022-08-25 RX ORDER — 0.9 % SODIUM CHLORIDE 0.9 %
80 INTRAVENOUS SOLUTION INTRAVENOUS ONCE
Status: COMPLETED | OUTPATIENT
Start: 2022-08-25 | End: 2022-08-25

## 2022-08-25 RX ORDER — SODIUM CHLORIDE 0.9 % (FLUSH) 0.9 %
20 SYRINGE (ML) INJECTION PRN
Status: CANCELLED | OUTPATIENT
Start: 2022-08-25

## 2022-08-25 RX ORDER — HEPARIN SODIUM (PORCINE) LOCK FLUSH IV SOLN 100 UNIT/ML 100 UNIT/ML
500 SOLUTION INTRAVENOUS PRN
Status: CANCELLED | OUTPATIENT
Start: 2022-08-25

## 2022-08-25 RX ORDER — SODIUM CHLORIDE 0.9 % (FLUSH) 0.9 %
10 SYRINGE (ML) INJECTION PRN
Status: CANCELLED | OUTPATIENT
Start: 2022-08-25

## 2022-08-25 RX ADMIN — SODIUM CHLORIDE 80 ML: 9 INJECTION, SOLUTION INTRAVENOUS at 10:15

## 2022-08-25 RX ADMIN — SODIUM CHLORIDE, PRESERVATIVE FREE 10 ML: 5 INJECTION INTRAVENOUS at 10:15

## 2022-08-25 RX ADMIN — HEPARIN 500 UNITS: 100 SYRINGE at 10:24

## 2022-08-25 RX ADMIN — SODIUM CHLORIDE, PRESERVATIVE FREE 20 ML: 5 INJECTION INTRAVENOUS at 09:50

## 2022-08-25 RX ADMIN — IOPAMIDOL 75 ML: 755 INJECTION, SOLUTION INTRAVENOUS at 10:15

## 2022-08-25 RX ADMIN — SODIUM CHLORIDE, PRESERVATIVE FREE 10 ML: 5 INJECTION INTRAVENOUS at 10:24

## 2022-08-25 NOTE — PROGRESS NOTES
Patient arrived for port access   Labs colleted for Dr garduno  Tolerated w/o incident   Return 9/1 dr Tessy Ballesteros RN

## 2022-08-26 ENCOUNTER — APPOINTMENT (OUTPATIENT)
Dept: PHYSICAL THERAPY | Facility: CLINIC | Age: 27
End: 2022-08-26
Payer: COMMERCIAL

## 2022-09-01 ENCOUNTER — TELEPHONE (OUTPATIENT)
Dept: ONCOLOGY | Age: 27
End: 2022-09-01

## 2022-09-01 ENCOUNTER — OFFICE VISIT (OUTPATIENT)
Dept: ONCOLOGY | Age: 27
End: 2022-09-01
Payer: COMMERCIAL

## 2022-09-01 VITALS
DIASTOLIC BLOOD PRESSURE: 85 MMHG | SYSTOLIC BLOOD PRESSURE: 130 MMHG | WEIGHT: 190 LBS | HEART RATE: 130 BPM | BODY MASS INDEX: 35.9 KG/M2 | TEMPERATURE: 96.9 F

## 2022-09-01 DIAGNOSIS — C49.21 PRIMARY LOWER EXTREMITY SARCOMA, RIGHT (HCC): Primary | ICD-10-CM

## 2022-09-01 DIAGNOSIS — R91.8 LUNG MASS: ICD-10-CM

## 2022-09-01 PROCEDURE — G8427 DOCREV CUR MEDS BY ELIG CLIN: HCPCS | Performed by: INTERNAL MEDICINE

## 2022-09-01 PROCEDURE — 1036F TOBACCO NON-USER: CPT | Performed by: INTERNAL MEDICINE

## 2022-09-01 PROCEDURE — 99214 OFFICE O/P EST MOD 30 MIN: CPT | Performed by: INTERNAL MEDICINE

## 2022-09-01 PROCEDURE — 99211 OFF/OP EST MAY X REQ PHY/QHP: CPT | Performed by: INTERNAL MEDICINE

## 2022-09-01 PROCEDURE — G8417 CALC BMI ABV UP PARAM F/U: HCPCS | Performed by: INTERNAL MEDICINE

## 2022-09-01 NOTE — TELEPHONE ENCOUNTER
AVS from 9/1/22      IR to biopsy lung mass.  Rv after bx   Refer to Dr Fatimah Luevano       Emailed ir to get biopsy scheduled     Writer will follow to get rv scheduled after biopsy     Pt was given AVS and appointment schedule    Electronically signed by Sanjuana Colbert on 9/1/2022 at 12:53 PM

## 2022-09-01 NOTE — PROGRESS NOTES
DIAGNOSIS:   Undifferentiated pleomorphic  Sarcoma, right leg, T4 N0 M0  (stage IIIb) diagnosed 05/2020      CURRENT THERAPY:  S/P full right leg amputation (Right hip disarticulation)   Adjuvant chemotherapy delayed per patient choice until after delivery   Chemo with ifosfamide and adriamycin  (AIM) to started 11/3/2020     BRIEF CASE HISTORY:   Theodore Luevano is a very pleasant 33 y.o. female who is referred to us for sarcoma. She presented with rightt knee sprain at work 04/2020 but the pain and swelling worsened with palpable mass. She underwent MRI which showed mass and was seen at [facility] where biopsy was taken and showed sarcoma. She was transferred to [facility] and decided to undergo full leg amputation due to early stage pregnancy, tumor was 16 cm x 20 cm x 3 cm and removed with negative margins. She was following with [facility] and decided she would prefer to receive treatment locally. The patient is currently 32 weeks pregnant and delivery plans are expected to be finalized 10/02/2020. She has elected to proceed with chemo after delivery. She is working towards prosthesis with test socket. 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 Kathy delivered in late October, after delivery, we decided to start chemo with AIM 11/3/2020. Patient finished 4 cycles of chemotherapy and she was declared in remission and was started surveillance. In May/2022, she had an isolated lung mass that was pleural-based and isolated in the left posterior lung. Biopsy was negative so we decided observation. INTERIM HISTORY: The patient presents for follow up today for osteosarcoma surveillance and to review imaging. She is doing well overall but notes some left back pain around previous biopsy site that has resolved. She has lost weight intentionally and is working to get prosthesis fitted.        PAST MEDICAL HISTORY: has a past medical history of 33 weeks gestation of pregnancy, Antepartum premature rupture of membrane, Diabetes mellitus ([facility] [address] High-risk pregnancy, unspecified trimester, History of anemia, History of blood transfusion, History of E. Coli UTI 10-<100,000CFU (7/7/20), Malignant neoplasm affecting pregnancy in third trimester, Osteosarcoma, Pregestational diabetes mellitus, modified White class B, Pseudomonas UTI, and Wears glasses. PAST SURGICAL HISTORY: has a past surgical history that includes Leg amputation at hip (Right, 07/23/2020); IR PORT PLACEMENT > 5 YEARS (10/27/2020); and CT NEEDLE BIOPSY LUNG PERCUTANEOUS (5/9/2022). CURRENT MEDICATIONS:  has a current medication list which includes the following prescription(s): phentermine, norethindrone, ketoconazole, fluocinonide, ferrous sulfate, handicap placard, lidocaine-prilocaine, and prenatal mv-min-fe fum-fa-dha. ALLERGIES:  has No Known Allergies. FAMILY HISTORY: Negative for any hematological or oncological conditions. SOCIAL HISTORY:  reports that she has never smoked. She has never used smokeless tobacco. She reports that she does not currently use alcohol. She reports that she does not use drugs. REVIEW OF SYSTEMS:   General: No fever or night sweats. Weight loss - intentional   ENT: No double or blurred vision, no tinnitus or hearing problem, no dysphagia or sore throat   Respiratory: No chest pain, no shortness of breath, no cough or hemoptysis. Cardiovascular: Denies chest pain, PND or orthopnea. No L E swelling or palpitations. Gastrointestinal: No nausea, vomiting, abdominal pain, diarrhea or constipation. Genitourinary: Denies dysuria, hematuria, frequency, urgency or incontinence. Gynecological: nearly resolved post partum bleeding  Neurological: Denies headaches, decreased LOC, no sensory or motor focal deficits. +post surgical phantom pain improving  Musculoskeletal: No arthralgia no back pain or joint swelling. Skin: There are no rashes or bleeding.   Psychiatric: No anxiety, no depression. Endocrine: No diabetes or thyroid disease. Hematologic: No bleeding, no adenopathy. PHYSICAL EXAM: Shows a well appearing 33y.o.-year-old female who is not in pain or distress. Vital Signs: Weight 190 lb (86.2 kg), not currently breastfeeding. HEENT: Normocephalic and atraumatic. Pupils are equal, round, reactive to light and accommodation. Extraocular muscles are intact. Neck: Showed no JVD, no carotid bruit . Lungs: Clear to auscultation bilaterally. Heart: Regular without any murmur. Abdomen: Soft, nontender. No hepatosplenomegaly. Extremities: Right leg amputation. Lower extremities show no edema, clubbing, or cyanosis. Breasts: Examination not done today. Neuro exam: intact cranial nerves bilaterally no motor or sensory deficit, gait is normal. Lymphatic: no adenopathy appreciated in the supraclavicular, axillary, cervical or inguinal area    REVIEW OF LABORATORY DATA:   Lab Results   Component Value Date    WBC 5.3 08/25/2022    HGB 13.1 08/25/2022    HCT 38.6 08/25/2022    MCV 87.3 08/25/2022     08/25/2022   '    Chemistry        Component Value Date/Time     08/25/2022 0949    K 3.5 (L) 08/25/2022 0949     08/25/2022 0949    CO2 22 08/25/2022 0949    BUN 11 08/25/2022 0949    CREATININE <0.40 (L) 08/25/2022 0949        Component Value Date/Time    CALCIUM 8.2 (L) 08/25/2022 0949    ALKPHOS 52 08/25/2022 0949    AST 15 08/25/2022 0949    ALT 14 08/25/2022 0949    BILITOT 0.37 08/25/2022 0949          REVIEW OF RADIOLOGICAL RESULTS:   CT CHEST W CONTRAST: 8/25/2022  Increasing size of biopsied pleural based mass in the posterior left lower lobe now measuring 4 cm (most recently 1.7 cm).   No new abnormality otherwise appreciated in the chest.          IMPRESSION:   Sarcoma right leg, T4 N0 M0, 05/2020 (undifferentiated pleomorphic sarcoma)   S/P right leg amputation 07/2021  Adjuvant chemo following delivery with ifosfamide and adriamycin (AIM) - started

## 2022-09-12 ENCOUNTER — PATIENT MESSAGE (OUTPATIENT)
Dept: PRIMARY CARE CLINIC | Age: 27
End: 2022-09-12

## 2022-09-12 NOTE — TELEPHONE ENCOUNTER
From: Garcia Carranza  To: Curt Garcia  Sent: 9/12/2022 10:30 AM EDT  Subject: Changing Appointment to Virtual    Good morning,     I have a lung biopsy this Thursday the 15th and I know I have a follow up appointment with Pasquale Crowley on the 16th. The appointment is in regards to weight loss and I started taking phentermine in August. I was seeing if I was able to move the appointment to a virtual one because I am not sure if I will be up for going in person due to the lung biopsy. Would that affect prescribing a refill of phentermine if the appointment is virtual? Or would Pasquale Crowley like me to try and reschedule the appointment for the following week? All I know is that the phentermine drug is a time sensitive issue with the insurance company. Hope you are all having a great Monday and get back to me at your earliest convenience.  Thanks :)

## 2022-09-15 ENCOUNTER — HOSPITAL ENCOUNTER (OUTPATIENT)
Dept: CT IMAGING | Age: 27
Discharge: HOME | End: 2022-09-17
Payer: COMMERCIAL

## 2022-09-15 ENCOUNTER — HOSPITAL ENCOUNTER (OUTPATIENT)
Dept: GENERAL RADIOLOGY | Age: 27
Discharge: HOME | End: 2022-09-17
Payer: COMMERCIAL

## 2022-09-15 VITALS
BODY MASS INDEX: 34.74 KG/M2 | HEIGHT: 61 IN | OXYGEN SATURATION: 98 % | DIASTOLIC BLOOD PRESSURE: 76 MMHG | TEMPERATURE: 98.1 F | SYSTOLIC BLOOD PRESSURE: 110 MMHG | WEIGHT: 184 LBS | HEART RATE: 112 BPM | RESPIRATION RATE: 16 BRPM

## 2022-09-15 DIAGNOSIS — R91.1 LUNG NODULE: ICD-10-CM

## 2022-09-15 LAB
INR BLD: 1
PARTIAL THROMBOPLASTIN TIME: 26.1 SEC (ref 20.5–30.5)
PLATELET # BLD: 223 K/UL (ref 138–453)
PROTHROMBIN TIME: 10.7 SEC (ref 9.1–12.3)

## 2022-09-15 PROCEDURE — 88333 PATH CONSLTJ SURG CYTO XM 1: CPT

## 2022-09-15 PROCEDURE — 85610 PROTHROMBIN TIME: CPT

## 2022-09-15 PROCEDURE — 71045 X-RAY EXAM CHEST 1 VIEW: CPT

## 2022-09-15 PROCEDURE — 2709999900 CT NEEDLE BIOPSY LUNG PERCUTANEOUS W IMAGING GUIDANCE

## 2022-09-15 PROCEDURE — 88305 TISSUE EXAM BY PATHOLOGIST: CPT

## 2022-09-15 PROCEDURE — 2580000003 HC RX 258: Performed by: PHYSICIAN ASSISTANT

## 2022-09-15 PROCEDURE — 85730 THROMBOPLASTIN TIME PARTIAL: CPT

## 2022-09-15 PROCEDURE — 2709999900 HC NON-CHARGEABLE SUPPLY

## 2022-09-15 PROCEDURE — 88334 PATH CONSLTJ SURG CYTO XM EA: CPT

## 2022-09-15 PROCEDURE — 85049 AUTOMATED PLATELET COUNT: CPT

## 2022-09-15 PROCEDURE — 7100000010 HC PHASE II RECOVERY - FIRST 15 MIN

## 2022-09-15 PROCEDURE — 6360000002 HC RX W HCPCS: Performed by: RADIOLOGY

## 2022-09-15 PROCEDURE — 7100000011 HC PHASE II RECOVERY - ADDTL 15 MIN

## 2022-09-15 RX ORDER — SODIUM CHLORIDE 9 MG/ML
INJECTION, SOLUTION INTRAVENOUS CONTINUOUS
Status: DISCONTINUED | OUTPATIENT
Start: 2022-09-15 | End: 2022-09-18 | Stop reason: HOSPADM

## 2022-09-15 RX ORDER — MIDAZOLAM HYDROCHLORIDE 2 MG/2ML
INJECTION, SOLUTION INTRAMUSCULAR; INTRAVENOUS
Status: COMPLETED | OUTPATIENT
Start: 2022-09-15 | End: 2022-09-15

## 2022-09-15 RX ORDER — PHENTERMINE HYDROCHLORIDE 37.5 MG/1
37.5 CAPSULE ORAL EVERY MORNING
COMMUNITY
End: 2022-09-16 | Stop reason: SDUPTHER

## 2022-09-15 RX ORDER — ACETAMINOPHEN 325 MG/1
650 TABLET ORAL EVERY 4 HOURS PRN
Status: DISCONTINUED | OUTPATIENT
Start: 2022-09-15 | End: 2022-09-18 | Stop reason: HOSPADM

## 2022-09-15 RX ORDER — FENTANYL CITRATE 50 UG/ML
INJECTION, SOLUTION INTRAMUSCULAR; INTRAVENOUS
Status: COMPLETED | OUTPATIENT
Start: 2022-09-15 | End: 2022-09-15

## 2022-09-15 RX ADMIN — FENTANYL CITRATE 50 MCG: 50 INJECTION, SOLUTION INTRAMUSCULAR; INTRAVENOUS at 11:05

## 2022-09-15 RX ADMIN — MIDAZOLAM HYDROCHLORIDE 1 MG: 1 INJECTION, SOLUTION INTRAMUSCULAR; INTRAVENOUS at 11:05

## 2022-09-15 RX ADMIN — SODIUM CHLORIDE: 9 INJECTION, SOLUTION INTRAVENOUS at 10:04

## 2022-09-15 ASSESSMENT — PAIN DESCRIPTION - ORIENTATION: ORIENTATION: LEFT;MID;UPPER

## 2022-09-15 ASSESSMENT — PAIN - FUNCTIONAL ASSESSMENT: PAIN_FUNCTIONAL_ASSESSMENT: NONE - DENIES PAIN

## 2022-09-15 ASSESSMENT — PAIN SCALES - GENERAL: PAINLEVEL_OUTOF10: 3

## 2022-09-15 ASSESSMENT — PAIN DESCRIPTION - DESCRIPTORS: DESCRIPTORS: PRESSURE

## 2022-09-15 ASSESSMENT — PAIN DESCRIPTION - LOCATION: LOCATION: BACK

## 2022-09-15 NOTE — PROGRESS NOTES
Assessment remains the same. Left back band aid is clean, dry and intact. Denies pain. Discharged to home per wheelchair.

## 2022-09-15 NOTE — PRE SEDATION
Sedation Pre-Procedure Note    Patient Name: Marge Traylor   YOB: 1995  Room/Bed: Room/bed info not found  Medical Record Number: [id number]  Date: 9/15/2022   Time: 10:50 AM       Indication:  Lung bx    Consent: I have discussed with the patient and/or the patient representative the indication, alternatives, and the possible risks and/or complications of the planned procedure and the anesthesia methods. The patient and/or patient representative appear to understand and agree to proceed. Vital Signs:   Vitals:    09/15/22 0951   BP: 135/85   Pulse: (!) 117   Resp: 22   SpO2: 98%       Past Medical History:   has a past medical history of 33 weeks gestation of pregnancy, Antepartum premature rupture of membrane, COVID-19, Diabetes mellitus ([facility] [address] High-risk pregnancy, unspecified trimester, History of anemia, History of blood transfusion, History of E. Coli UTI 10-<100,000CFU (7/7/20), Lung nodule, Malignant neoplasm affecting pregnancy in third trimester, Osteosarcoma, Pregestational diabetes mellitus, modified White class B, Pregnancy induced hypertension, Pseudomonas UTI, and Wears glasses. Past Surgical History:   has a past surgical history that includes Leg amputation at hip (Right, 07/23/2020); IR PORT PLACEMENT > 5 YEARS (10/27/2020); and CT NEEDLE BIOPSY LUNG PERCUTANEOUS (5/9/2022). Medications:   Scheduled Meds:   Continuous Infusions:    sodium chloride 20 mL/hr at 09/15/22 1004     PRN Meds:   Home Meds:   Prior to Admission medications    Medication Sig Start Date End Date Taking? Authorizing Provider   phentermine 37.5 MG capsule Take 37.5 mg by mouth every morning.    Yes Historical Provider, MD   norethindrone (NORLYDA) 0.35 MG tablet TAKE 1 TABLET BY MOUTH DAILY 6/16/22   Pipo Helton,    ketoconazole (NIZORAL) 2 % shampoo Apply 3-4 times weekly to scalp, leave on for five minutes prior to washing off  Patient not taking: Reported on 9/15/2022 5/6/22   Elysia Khoury PA-C fluocinonide (LIDEX) 0.05 % external solution Apply to scalp daily for rash 5/6/22   Keila Gandhi PA-C   ferrous sulfate (IRON 325) 325 (65 Fe) MG tablet Take 325 mg by mouth daily (with breakfast)    Historical Provider, MD   Handicap Placard MISC Expiration: Lifetime 11/13/20   FATEMEH Richey - CNP   lidocaine-prilocaine (EMLA) 2.5-2.5 % cream Apply topically Daily as needed. 10/20/20   Mona Beth MD   Prenatal MV-Min-Fe Fum-FA-DHA (PRENATAL 1 PO) Take by mouth    Historical Provider, MD     Coumadin Use Last 7 Days:  no  Antiplatelet drug therapy use last 7 days: no  Other anticoagulant use last 7 days: no  Additional Medication Information:  see med rec      Pre-Sedation Documentation and Exam:   I have reviewed the patient's history and review of systems.     Mallampati Airway Assessment:  Mallampati Class II - (soft palate, fauces & uvula are visible)    Prior History of Anesthesia Complications:   none    ASA Classification:  Class 2 - A normal healthy patient with mild systemic disease    Sedation/ Anesthesia Plan:   intravenous sedation    Medications Planned:   midazolam (Versed) intravenously and fentanyl intravenously    Patient is an appropriate candidate for plan of sedation: yes    Electronically signed by MARK Lr on 9/15/2022 at 10:50 AM

## 2022-09-15 NOTE — POST SEDATION
Sedation Post Procedure Note    Patient Name: Caitlin Salamanca   YOB: 1995  Room/Bed: Room/bed info not found  Medical Record Number: [id number]  Date: 9/15/2022   Time: 11:27 AM         Physicians/Assistants: Claudette Chad Redfox, PA    Procedure Performed:  Lung bx    Post-Sedation Vital Signs:  Vitals:    09/15/22 1125   BP: 119/77   Pulse: (!) 117   Resp: 14   SpO2: 97%      Vital signs were reviewed and were stable after the procedure (see flow sheet for vitals)            Post-Sedation Exam: Pt remains stable           Complications: none    Electronically signed by MARK Palacios on 9/15/2022 at 11:27 AM

## 2022-09-15 NOTE — PROGRESS NOTES
Bandaid to mid, upper left back intact with scant amount of light read drainage; surrounding area around bandaid is soft.

## 2022-09-15 NOTE — H&P
History and Physical    Pt Name: Ken Thomas  MRN: [id number]  YOB: 1995  Date of evaluation: 9/15/2022    SUBJECTIVE:   History of Chief Complaint:    Patient presents procedure for lung biopsy. She says that she has a history of sarcoma, LLE. She says that she was diagnosed in 2020 when she was pregnant with her son. She says that she underwent amputation, followed by chemotherapy. She has a lung nodule which was biopsied in May 2022, says that it was deemed infectious. She says that this nodule increased in size since then, so she has been scheduled for lung biopsy again. Past Medical History    has a past medical history of 33 weeks gestation of pregnancy, Antepartum premature rupture of membrane, COVID-19, Diabetes mellitus ([facility] [address] High-risk pregnancy, unspecified trimester, History of anemia, History of blood transfusion, History of E. Coli UTI 10-<100,000CFU (7/7/20), Lung nodule, Malignant neoplasm affecting pregnancy in third trimester, Osteosarcoma, Pregestational diabetes mellitus, modified White class B, Pregnancy induced hypertension, Pseudomonas UTI, and Wears glasses. Past Surgical History   has a past surgical history that includes Leg amputation at hip (Right, 07/23/2020); IR PORT PLACEMENT > 5 YEARS (10/27/2020); and CT NEEDLE BIOPSY LUNG PERCUTANEOUS (5/9/2022). Medications  Prior to Admission medications    Medication Sig Start Date End Date Taking? Authorizing Provider   phentermine 37.5 MG capsule Take 37.5 mg by mouth every morning.    Yes Historical Provider, MD   norethindrone (NORLYDA) 0.35 MG tablet TAKE 1 TABLET BY MOUTH DAILY 6/16/22   Pipo Helton,    ketoconazole (NIZORAL) 2 % shampoo Apply 3-4 times weekly to scalp, leave on for five minutes prior to washing off  Patient not taking: Reported on 9/15/2022 5/6/22   Phoebe SARAHI Roper   fluocinonide (LIDEX) 0.05 % external solution Apply to scalp daily for rash 5/6/22   Phoebe SARAHI Roper   ferrous sulfate (IRON 325) 325 (65 Fe) MG tablet Take 325 mg by mouth daily (with breakfast)    Historical Provider, MD   Handicap Placard MISC Expiration: Lifetime 11/13/20   Srinivas Head, APRN - CNP   lidocaine-prilocaine (EMLA) 2.5-2.5 % cream Apply topically Daily as needed. 10/20/20   Yvette Soriano MD   Prenatal MV-Min-Fe Fum-FA-DHA (PRENATAL 1 PO) Take by mouth    Historical Provider, MD     Allergies  has No Known Allergies. Family History  family history includes Diabetes in her maternal grandmother and mother; Heart Surgery in her maternal grandmother; Hypertension in her maternal grandfather; Stroke in her maternal grandfather. Social History   reports that she has never smoked. She has never used smokeless tobacco.   reports that she does not currently use alcohol. reports no history of drug use. Marital Status   Occupation disabled    Review of Systems:  CONSTITUTIONAL:   negative for fevers, chills, fatigue and malaise    EYES:   negative for double vision, blurred vision and photophobia    HEENT:   negative for tinnitus, epistaxis and sore throat     RESPIRATORY:   See HPI   CARDIOVASCULAR:   negative for chest pain, palpitations, syncope, edema     GASTROINTESTINAL:   negative for nausea, vomiting     GENITOURINARY:   negative for incontinence     MUSCULOSKELETAL:   See HPI   NEUROLOGICAL:   Negative for weakness and tingling  negative for headaches and dizziness     PSYCHIATRIC:   negative for anxiety         OBJECTIVE:   VITALS:  height is 5' 1.25\" (1.556 m) and weight is 184 lb (83.5 kg). Her blood pressure is 135/85 and her pulse is 117 (abnormal). Her respiration is 22 and oxygen saturation is 98%. CONSTITUTIONAL:alert & cooperative, no acute distress. Very pleasant and talkative. Right chest port placement. SKIN:  Warm and dry, no rashes on exposed areas of skin. HEAD:  Normocephalic, atraumatic. EYES: EOMs intact. Wearing glasses. EARS:  Hearing grossly WNL. NOSE:  Nares patent. Nasal/sinus congestion noted. MOUTH/THROAT:  benign  NECK:good ROM   LUNGS: Clear to auscultation bilaterally, no wheezes. CARDIOVASCULAR: tachycardia noted, regular rhythm, sinus tachy on monitor but pulse lowers during exam.  ABDOMEN: soft, non tender, non distended. Rotund/obese. EXTREMITIES: s/p RLE amputation    IMPRESSIONS:   Lung nodule   has a past medical history of 33 weeks gestation of pregnancy (10/12/2020), Antepartum premature rupture of membrane, COVID-19 (11/2021), Diabetes mellitus ([facility] [address] High-risk pregnancy, unspecified trimester (10/12/2020), History of anemia, History of blood transfusion (08/01/2020), History of E. Coli UTI 10-<100,000CFU (7/7/20) (08/02/2020), Lung nodule, Malignant neoplasm affecting pregnancy in third trimester (09/02/2020), Osteosarcoma, Pregestational diabetes mellitus, modified White class B (06/30/2020), Pregnancy induced hypertension, Pseudomonas UTI (08/04/2020), and Wears glasses.    PLANS:   Lung biopsy    HEMA Terrazas PA-C  Electronically signed 9/15/2022 at 10:05 AM

## 2022-09-16 ENCOUNTER — TELEMEDICINE (OUTPATIENT)
Dept: PRIMARY CARE CLINIC | Age: 27
End: 2022-09-16
Payer: COMMERCIAL

## 2022-09-16 DIAGNOSIS — E66.9 OBESITY (BMI 30-39.9): Primary | ICD-10-CM

## 2022-09-16 LAB — SURGICAL PATHOLOGY REPORT: NORMAL

## 2022-09-16 PROCEDURE — G8427 DOCREV CUR MEDS BY ELIG CLIN: HCPCS | Performed by: NURSE PRACTITIONER

## 2022-09-16 PROCEDURE — 99213 OFFICE O/P EST LOW 20 MIN: CPT | Performed by: NURSE PRACTITIONER

## 2022-09-16 RX ORDER — PHENTERMINE HYDROCHLORIDE 37.5 MG/1
37.5 CAPSULE ORAL EVERY MORNING
Qty: 30 CAPSULE | Refills: 0 | Status: SHIPPED | OUTPATIENT
Start: 2022-09-16 | End: 2022-10-16

## 2022-09-16 ASSESSMENT — ENCOUNTER SYMPTOMS
ABDOMINAL PAIN: 0
NAUSEA: 0
SHORTNESS OF BREATH: 0
CHEST TIGHTNESS: 0
SORE THROAT: 0
DIARRHEA: 0
RHINORRHEA: 0
VOMITING: 0
COLOR CHANGE: 0

## 2022-09-16 ASSESSMENT — PATIENT HEALTH QUESTIONNAIRE - PHQ9
SUM OF ALL RESPONSES TO PHQ QUESTIONS 1-9: 0
SUM OF ALL RESPONSES TO PHQ9 QUESTIONS 1 & 2: 0
2. FEELING DOWN, DEPRESSED OR HOPELESS: 0
SUM OF ALL RESPONSES TO PHQ QUESTIONS 1-9: 0
1. LITTLE INTEREST OR PLEASURE IN DOING THINGS: 0
SUM OF ALL RESPONSES TO PHQ QUESTIONS 1-9: 0
SUM OF ALL RESPONSES TO PHQ QUESTIONS 1-9: 0

## 2022-09-16 NOTE — PROGRESS NOTES
2022    TELEHEALTH EVALUATION -- Audio/Visual (During TKSHP-33 public health emergency)    HPI:    Kely Cheney (:  1995) has requested an audio/video evaluation for the following concern(s):    Here for 1 month f/u since starting adipex  Is down 9 lbs! Congratulated! Denies adverse side effects   Does help curb her appetite, eating smaller portions    Had lung biopsy yesterday in IR  Is doing well post-procedure  Has f/u with oncology 10/3, they've discussed removal of lung mass. Will review biopsy report at this visit     Has slight head congestion from cold from her young son, doing well with supportive care at home. Denies fever     Review of Systems   Constitutional:  Negative for activity change, fatigue and fever. HENT:  Negative for congestion, rhinorrhea and sore throat. Eyes:  Negative for visual disturbance. Respiratory:  Negative for chest tightness and shortness of breath. Cardiovascular:  Negative for chest pain and palpitations. Gastrointestinal:  Negative for abdominal pain, diarrhea, nausea and vomiting. Endocrine: Negative for polydipsia. Genitourinary:  Negative for difficulty urinating. Musculoskeletal:  Negative for arthralgias and myalgias. Skin:  Negative for color change. Neurological:  Negative for weakness and headaches. Psychiatric/Behavioral:  Negative for behavioral problems. The patient is not nervous/anxious. All other systems reviewed and are negative. Prior to Visit Medications    Medication Sig Taking? Authorizing Provider   phentermine 37.5 MG capsule Take 1 capsule by mouth every morning for 30 days.  Yes Rashmi Peace APRN - CNP   norethindrone (NORLYDA) 0.35 MG tablet TAKE 1 TABLET BY MOUTH DAILY Yes Pipo Helton DO   ketoconazole (NIZORAL) 2 % shampoo Apply 3-4 times weekly to scalp, leave on for five minutes prior to washing off Yes Carlito Devlin PA-C   fluocinonide (LIDEX) 0.05 % external solution Apply to scalp daily for rash Yes Javier Morillo PA-C   ferrous sulfate (IRON 325) 325 (65 Fe) MG tablet Take 325 mg by mouth daily (with breakfast) Yes Historical Provider, MD   Handicap Placard MISC Expiration: Lifetime Yes FATEMEH Issa - CNP   lidocaine-prilocaine (EMLA) 2.5-2.5 % cream Apply topically Daily as needed. Yes Krystyna Mcpherson MD   Prenatal MV-Min-Fe Fum-FA-DHA (PRENATAL 1 PO) Take by mouth Yes Historical Provider, MD       Social History     Tobacco Use    Smoking status: Never    Smokeless tobacco: Never   Vaping Use    Vaping Use: Never used   Substance Use Topics    Alcohol use: Not Currently    Drug use: Never            PHYSICAL EXAMINATION:  [ INSTRUCTIONS:  \"[x]\" Indicates a positive item  \"[]\" Indicates a negative item  -- DELETE ALL ITEMS NOT EXAMINED]  Vital Signs: (As obtained by patient/caregiver or practitioner observation)    Blood pressure-  Heart rate-    Respiratory rate-    Temperature-  Pulse oximetry-     Constitutional: [x] Appears well-developed and well-nourished [x] No apparent distress      [] Abnormal-   Mental status  [x] Alert and awake  [x] Oriented to person/place/time [x]Able to follow commands      Eyes:  EOM    []  Normal  [] Abnormal-  Sclera  []  Normal  [] Abnormal -         Discharge []  None visible  [] Abnormal -    HENT:   [x] Normocephalic, atraumatic.   [] Abnormal   [] Mouth/Throat: Mucous membranes are moist.     External Ears [x] Normal  [] Abnormal-     Neck: [x] No visualized mass     Pulmonary/Chest: [x] Respiratory effort normal.  [x] No visualized signs of difficulty breathing or respiratory distress        [] Abnormal-      Musculoskeletal:   [x] Normal gait with no signs of ataxia         [] Normal range of motion of neck        [] Abnormal-       Neurological:        [x] No Facial Asymmetry (Cranial nerve 7 motor function) (limited exam to video visit)          [] No gaze palsy        [] Abnormal-         Skin:        [x] No significant exanthematous lesions or discoloration noted on facial skin         [] Abnormal-            Psychiatric:       [x] Normal Affect [] No Hallucinations        [] Abnormal-     Other pertinent observable physical exam findings-     ASSESSMENT/PLAN:  1. Obesity (BMI 30-39. 9)  Improving, continue adipex for second month and f/u in 1 month. Continue low carb and high protein and fiber     - phentermine 37.5 MG capsule; Take 1 capsule by mouth every morning for 30 days. Dispense: 30 capsule; Refill: 0    Return in about 1 month (around 10/16/2022). Vinie Dakin, was evaluated through a synchronous (real-time) audio-video encounter. The patient (or guardian if applicable) is aware that this is a billable service, which includes applicable co-pays. This Virtual Visit was conducted with patient's (and/or legal guardian's) consent. The visit was conducted pursuant to the emergency declaration under the [facility], [address] [place] [address] authority and the [employer] Act. Patient identification was verified, and a caregiver was present when appropriate. The patient was located at Home: [place] [id number].. Provider was located at [facility] ([address] [place], [place] [address]t): [address] [place] [address]  [address] [address]  [facility] Total time spent on this encounter: Not billed by time    --FATEMEH Simmons CNP on 9/16/2022 at 10:13 AM    An electronic signature was used to authenticate this note.

## 2022-09-19 ENCOUNTER — TELEPHONE (OUTPATIENT)
Dept: CASE MANAGEMENT | Age: 27
End: 2022-09-19

## 2022-09-21 ENCOUNTER — TELEPHONE (OUTPATIENT)
Dept: CASE MANAGEMENT | Age: 27
End: 2022-09-21

## 2022-09-21 NOTE — TELEPHONE ENCOUNTER
Name: Wendy Live  : 1995  MRN: [id number]    Oncology Navigation- Initial Note:    Navigator spoke with pt. And confirming appt. With Dr. Wilian Hernandez for tomorrow. Writer introducing self and contact information given to pt. Writer will plan to discuss resources more in depth after pt. Completes visit with Dr. Wilian Hernandez. Plan to follow.    Electronically signed by Lois Crain RN on 2022 at 4:09 PM

## 2022-09-21 NOTE — TELEPHONE ENCOUNTER
Name: Caitlin Salamanca  : 1995  MRN: [id number]    Oncology Navigation Follow-Up Note  Navigator reviewing chart and pt. Not scheduled for consult, reaching out to CTS office a 2nd time and spoke with Selina and no documentation on referral of CTS staff calling pt. ? Jade Olivares reaching out to pt. Now. States, \"Office has openings tomorrow\" Plan to follow closely.    Electronically signed by Ganga Chamorro RN on 2022 at 3:32 PM

## 2022-09-22 ENCOUNTER — INITIAL CONSULT (OUTPATIENT)
Dept: CARDIOTHORACIC SURGERY | Age: 27
End: 2022-09-22
Payer: COMMERCIAL

## 2022-09-22 ENCOUNTER — HOSPITAL ENCOUNTER (OUTPATIENT)
Dept: PULMONOLOGY | Age: 27
Discharge: HOME | End: 2022-09-22
Payer: COMMERCIAL

## 2022-09-22 VITALS
OXYGEN SATURATION: 98 % | DIASTOLIC BLOOD PRESSURE: 76 MMHG | SYSTOLIC BLOOD PRESSURE: 113 MMHG | HEART RATE: 141 BPM | TEMPERATURE: 97.7 F | WEIGHT: 183 LBS | BODY MASS INDEX: 34.3 KG/M2

## 2022-09-22 DIAGNOSIS — C79.51 SARCOMA METASTATIC TO BONE (HCC): Primary | ICD-10-CM

## 2022-09-22 DIAGNOSIS — R91.8 LUNG MASS: Primary | ICD-10-CM

## 2022-09-22 PROCEDURE — 94726 PLETHYSMOGRAPHY LUNG VOLUMES: CPT

## 2022-09-22 PROCEDURE — G8427 DOCREV CUR MEDS BY ELIG CLIN: HCPCS | Performed by: PHYSICIAN ASSISTANT

## 2022-09-22 PROCEDURE — 94729 DIFFUSING CAPACITY: CPT

## 2022-09-22 PROCEDURE — 99244 OFF/OP CNSLTJ NEW/EST MOD 40: CPT | Performed by: PHYSICIAN ASSISTANT

## 2022-09-22 PROCEDURE — 94010 BREATHING CAPACITY TEST: CPT

## 2022-09-22 PROCEDURE — G8417 CALC BMI ABV UP PARAM F/U: HCPCS | Performed by: PHYSICIAN ASSISTANT

## 2022-09-22 NOTE — PROGRESS NOTES
LUNG PERCUTANEOUS  5/9/2022    CT NEEDLE BIOPSY LUNG PERCUTANEOUS 5/9/2022 [facility] CT SCAN    CT NEEDLE BIOPSY LUNG PERCUTANEOUS  9/15/2022    CT NEEDLE BIOPSY LUNG PERCUTANEOUS 9/15/2022 [facility] CT SCAN    IR PORT PLACEMENT EQUAL OR GREATER THAN 5 YEARS  10/27/2020    IR PORT PLACEMENT EQUAL OR GREATER THAN 5 YEARS 10/27/2020 Zane Gayle MD [facility] SPECIAL PROCEDURES    LEG AMPUTATION AT HIP Right 07/23/2020    [facility], osteosarcoma       Medications     Prior to Admission medications    Medication Sig Start Date End Date Taking? Authorizing Provider   phentermine 37.5 MG capsule Take 1 capsule by mouth every morning for 30 days. 9/16/22 10/16/22 Yes FATEMEH Wood CNP   norethindrone (NORLYDA) 0.35 MG tablet TAKE 1 TABLET BY MOUTH DAILY 6/16/22  Yes Pipo Helton,    ketoconazole (NIZORAL) 2 % shampoo Apply 3-4 times weekly to scalp, leave on for five minutes prior to washing off 5/6/22  Yes Leah Garrett PA-C   fluocinonide (LIDEX) 0.05 % external solution Apply to scalp daily for rash 5/6/22  Yes Leah Garrett PA-C   ferrous sulfate (IRON 325) 325 (65 Fe) MG tablet Take 325 mg by mouth daily (with breakfast)   Yes Historical Provider, MD   Handicap Placard MISC Expiration: Lifetime 11/13/20  Yes FATEMEH Issa CNP   lidocaine-prilocaine (EMLA) 2.5-2.5 % cream Apply topically Daily as needed. 10/20/20  Yes Inés Fraire MD   Prenatal MV-Min-Fe Fum-FA-DHA (PRENATAL 1 PO) Take by mouth   Yes Historical Provider, MD        Allergies     has No Known Allergies. Family History     family history includes Diabetes in her maternal grandmother and mother; Heart Surgery in her maternal grandmother; Hypertension in her maternal grandfather; Stroke in her maternal grandfather. Social History      reports that she has never smoked. She has never used smokeless tobacco.   reports that she does not currently use alcohol. reports no history of drug use.       OBJECTIVE:     VITALS:  weight is 183 lb (83 kg). Her temperature is 97.7 °F (36.5 °C). Her blood pressure is 113/76 and her pulse is 141 (abnormal). Her oxygen saturation is 98%. CONSTITUTIONAL: Alert and oriented times 3, no acute distress and cooperative to examination. HEENT: Head is normocephalic, atraumatic. EOMI, PERRLA  NECK: Soft, trachea midline and straight  LUNGS: Chest expands equally bilaterally upon respiration, no accessory muscle used. Ausculation reveals no wheezes, rales or rhonchi. CARDIOVASCULAR: Heart sounds are normal.  Regular rate and rhythm without murmur, gallop or rub. ABDOMEN: soft, nontender, nondistended, no masses or organomegaly, no hernias palpable, no guarding or peritoneal signs. Bowel sounds are present in all four quadrants Scars are consistent with previous surgical history. NEUROLOGIC: CN II-XII are grossly intact. There are no focalizing motor or sensory deficits  EXTREMITIES: right lower extremity amputation    LABS:     No results for input(s): WBC, HGB, HCT, PLT, NA, K, CL, CO2, BUN, CREATININE, MG, PHOS, CALCIUM, PTT, INR, AST, ALT, BILITOT, BILIDIR, NITRU, COLORU, BACTERIA in the last 72 hours. Invalid input(s): PT, WBCU, RBCU, LEUKOCYTESUA  No results for input(s): ALKPHOS, ALT, AST, BILITOT, BILIDIR, LABALBU, AMYLASE, LIPASE in the last 72 hours. RADIOLOGY:     All studies personally reviewed. Assessment:     Metastatic osteosarcoma left lower lung    Plan:     Plan for segmental resection possible lobectomy and rib removal in the next week. Will need PAT testing.     MARK Rich PA-C  Electronically signed 9/22/2022 at 10:19 AM

## 2022-09-23 ENCOUNTER — TELEPHONE (OUTPATIENT)
Dept: CASE MANAGEMENT | Age: 27
End: 2022-09-23

## 2022-09-23 ENCOUNTER — HOSPITAL ENCOUNTER (OUTPATIENT)
Dept: GENERAL RADIOLOGY | Age: 27
Discharge: HOME | End: 2022-09-25
Payer: COMMERCIAL

## 2022-09-23 ENCOUNTER — HOSPITAL ENCOUNTER (OUTPATIENT)
Dept: PREADMISSION TESTING | Age: 27
Discharge: HOME | End: 2022-09-27
Payer: COMMERCIAL

## 2022-09-23 VITALS
BODY MASS INDEX: 34.93 KG/M2 | WEIGHT: 185 LBS | DIASTOLIC BLOOD PRESSURE: 78 MMHG | SYSTOLIC BLOOD PRESSURE: 133 MMHG | RESPIRATION RATE: 18 BRPM | HEIGHT: 61 IN | HEART RATE: 120 BPM | OXYGEN SATURATION: 98 % | TEMPERATURE: 97.3 F

## 2022-09-23 LAB
ABSOLUTE EOS #: 0.07 K/UL (ref 0–0.44)
ABSOLUTE IMMATURE GRANULOCYTE: 0.1 K/UL (ref 0–0.3)
ABSOLUTE LYMPH #: 1.76 K/UL (ref 1.1–3.7)
ABSOLUTE MONO #: 0.56 K/UL (ref 0.1–1.2)
ALBUMIN SERPL-MCNC: 4.8 G/DL (ref 3.5–5.2)
ALBUMIN/GLOBULIN RATIO: 1.7 (ref 1–2.5)
ALLEN TEST: POSITIVE
ALP BLD-CCNC: 65 U/L (ref 35–104)
ALT SERPL-CCNC: 17 U/L (ref 5–33)
ANION GAP SERPL CALCULATED.3IONS-SCNC: 11 MMOL/L (ref 9–17)
AST SERPL-CCNC: 13 U/L
BASOPHILS # BLD: 1 % (ref 0–2)
BASOPHILS ABSOLUTE: 0.04 K/UL (ref 0–0.2)
BILIRUB SERPL-MCNC: 0.4 MG/DL (ref 0.3–1.2)
BILIRUBIN URINE: NEGATIVE
BUN BLDV-MCNC: 9 MG/DL (ref 6–20)
CALCIUM SERPL-MCNC: 9.5 MG/DL (ref 8.6–10.4)
CASTS UA: NORMAL /LPF (ref 0–8)
CHLORIDE BLD-SCNC: 100 MMOL/L (ref 98–107)
CO2: 25 MMOL/L (ref 20–31)
COLOR: YELLOW
CREAT SERPL-MCNC: 0.36 MG/DL (ref 0.5–0.9)
EOSINOPHILS RELATIVE PERCENT: 1 % (ref 1–4)
EPITHELIAL CELLS UA: NORMAL /HPF (ref 0–5)
GFR AFRICAN AMERICAN: >60 ML/MIN
GFR NON-AFRICAN AMERICAN: >60 ML/MIN
GFR SERPL CREATININE-BSD FRML MDRD: ABNORMAL ML/MIN/{1.73_M2}
GLUCOSE BLD-MCNC: 104 MG/DL (ref 70–99)
GLUCOSE URINE: NEGATIVE
HCG QUALITATIVE: NEGATIVE
HCT VFR BLD CALC: 41.5 % (ref 36.3–47.1)
HEMOGLOBIN: 14 G/DL (ref 11.9–15.1)
IMMATURE GRANULOCYTES: 1 %
INR BLD: 0.9
KETONES, URINE: NEGATIVE
LEUKOCYTE ESTERASE, URINE: ABNORMAL
LYMPHOCYTES # BLD: 22 % (ref 24–43)
MCH RBC QN AUTO: 30 PG (ref 25.2–33.5)
MCHC RBC AUTO-ENTMCNC: 33.7 G/DL (ref 28.4–34.8)
MCV RBC AUTO: 88.9 FL (ref 82.6–102.9)
MONOCYTES # BLD: 7 % (ref 3–12)
NEGATIVE BASE EXCESS, ART: 1 (ref 0–2)
NITRITE, URINE: NEGATIVE
NRBC AUTOMATED: 0 PER 100 WBC
O2 DEVICE/FLOW/%: NORMAL
PARTIAL THROMBOPLASTIN TIME: 25.4 SEC (ref 20.5–30.5)
PDW BLD-RTO: 12.1 % (ref 11.8–14.4)
PH UA: 7.5 (ref 5–8)
PLATELET # BLD: 309 K/UL (ref 138–453)
PMV BLD AUTO: 11.1 FL (ref 8.1–13.5)
POC HCO3: 23 MMOL/L (ref 21–28)
POC O2 SATURATION: 98 % (ref 94–98)
POC PCO2: 35.5 MM HG (ref 35–48)
POC PH: 7.42 (ref 7.35–7.45)
POC PO2: 103.7 MM HG (ref 83–108)
POTASSIUM SERPL-SCNC: 4.1 MMOL/L (ref 3.7–5.3)
PROTEIN UA: NEGATIVE
PROTHROMBIN TIME: 9.5 SEC (ref 9.1–12.3)
RBC # BLD: 4.67 M/UL (ref 3.95–5.11)
RBC UA: NORMAL /HPF (ref 0–4)
SAMPLE SITE: NORMAL
SEG NEUTROPHILS: 68 % (ref 36–65)
SEGMENTED NEUTROPHILS ABSOLUTE COUNT: 5.52 K/UL (ref 1.5–8.1)
SODIUM BLD-SCNC: 136 MMOL/L (ref 135–144)
SPECIFIC GRAVITY UA: 1.01 (ref 1–1.03)
TOTAL PROTEIN: 7.7 G/DL (ref 6.4–8.3)
TURBIDITY: CLEAR
URINE HGB: NEGATIVE
UROBILINOGEN, URINE: NORMAL
WBC # BLD: 8.1 K/UL (ref 3.5–11.3)
WBC UA: NORMAL /HPF (ref 0–5)

## 2022-09-23 PROCEDURE — 93005 ELECTROCARDIOGRAM TRACING: CPT | Performed by: THORACIC SURGERY (CARDIOTHORACIC VASCULAR SURGERY)

## 2022-09-23 PROCEDURE — 85730 THROMBOPLASTIN TIME PARTIAL: CPT

## 2022-09-23 PROCEDURE — 81001 URINALYSIS AUTO W/SCOPE: CPT

## 2022-09-23 PROCEDURE — 85025 COMPLETE CBC W/AUTO DIFF WBC: CPT

## 2022-09-23 PROCEDURE — 86850 RBC ANTIBODY SCREEN: CPT

## 2022-09-23 PROCEDURE — 87086 URINE CULTURE/COLONY COUNT: CPT

## 2022-09-23 PROCEDURE — 84703 CHORIONIC GONADOTROPIN ASSAY: CPT

## 2022-09-23 PROCEDURE — 85610 PROTHROMBIN TIME: CPT

## 2022-09-23 PROCEDURE — 36600 WITHDRAWAL OF ARTERIAL BLOOD: CPT

## 2022-09-23 PROCEDURE — 80053 COMPREHEN METABOLIC PANEL: CPT

## 2022-09-23 PROCEDURE — 86900 BLOOD TYPING SEROLOGIC ABO: CPT

## 2022-09-23 PROCEDURE — 71046 X-RAY EXAM CHEST 2 VIEWS: CPT

## 2022-09-23 PROCEDURE — 36415 COLL VENOUS BLD VENIPUNCTURE: CPT

## 2022-09-23 PROCEDURE — 87641 MR-STAPH DNA AMP PROBE: CPT

## 2022-09-23 PROCEDURE — 82803 BLOOD GASES ANY COMBINATION: CPT

## 2022-09-23 PROCEDURE — 86901 BLOOD TYPING SEROLOGIC RH(D): CPT

## 2022-09-23 PROCEDURE — 86920 COMPATIBILITY TEST SPIN: CPT

## 2022-09-23 RX ORDER — SODIUM CHLORIDE, SODIUM LACTATE, POTASSIUM CHLORIDE, CALCIUM CHLORIDE 600; 310; 30; 20 MG/100ML; MG/100ML; MG/100ML; MG/100ML
1000 INJECTION, SOLUTION INTRAVENOUS CONTINUOUS
Status: CANCELLED | OUTPATIENT
Start: 2022-09-23

## 2022-09-23 NOTE — PROGRESS NOTES
Anesthesia Focused Assessment    Hx of anesthesia complications:  no  Family hx of anesthesia complications:  n      Prior + Covid-19 test? 11/2021  Symptoms/Hospitalization?: fatigue, felt like a cold, run down  Patient vaccinated: 2, no booster      STOP-BANG Sleep Apnea Questionnaire    SNORE loudly (heard through closed doors)? No  TIRED, fatigued, sleepy during daytime? No  OBSERVED stopping breathing during sleep? No  High blood PRESSURE or being treated? No    BMI over 35? No  AGE over 48? No  NECK circumference over 16\"? No  GENDER (male)? No             Total zero  High risk 5-8  Intermediate risk 3-4  Low risk 0-2    ----------------------------------------------------------------------------------------------------------------------  BAUTISTA:                                             no  If yes, machine?:                              Type 1 DM:                                   no  T2DM:                                           no, hx gestational    Coronary Artery Disease:             no  Hypertension:                               no  Defib / AICD / Pacemaker:           no    Hx Tachycardia- states recently started phentermine for wt loss (has since held for surgery the past 2 days). EKG from today unchanged from EKG in 2020 that is in care everywhere. Patient without any cardiopulmonary complaints. No personal cardiac history. Renal Failure:                               no  If yes, on dialysis? :                           Active smoker:                              no  Drinks Alcohol:                              no  Illicit drugs:                                    no    Dentition:                                      benign    Past Medical History:   Diagnosis Date    33 weeks gestation of pregnancy 10/12/2020    child born oct 2020    Antepartum premature rupture of membrane     child born oct 2020    COVID-19 11/2021    fatigue    Gestational diabetes High-risk pregnancy, unspecified trimester 10/12/2020    History of anemia     prior blood and iron transfusion, without reactions    History of blood transfusion 08/01/2020    History of chemotherapy     last treatment jan 7, 2021    History of E. Coli UTI 10-<100,000CFU (7/7/20) 08/02/2020    Lung nodule     Malignant neoplasm affecting pregnancy in third trimester 09/02/2020    Osteosarcoma 2020    started at knee and moved up into thigh resulting in amputation and chemo    Pregestational diabetes mellitus, modified White class B 06/30/2020    resolved with childbirth    Pregnancy induced hypertension     resolved with childbirth    Pseudomonas UTI 08/04/2020 8/4/20 treated with Cefepime 2g IV for 10 days     Under care of service provider 09/23/2022    oncology-[facility]-last visit aug 2022    Under care of service provider 09/23/2022    lne-[id number]-last visit aug 2022    Under care of service provider 09/23/2022    [id number]-wh vincent-last visit sep 2022    Wears glasses      Patient was evaluated in PAT & anesthesia guidelines were applied. NPO guidelines and scheduled arrival time were reviewed with patient. Medication instructions per surgeon. I advised patient to please contact your surgeons office, ahead of time if possible, if any new signs or symptoms of illness, infection, rash, etc. Patient/ patient family verbalized understanding. Anesthesia contacted:   yes  I spoke with Dr. Mary Ellen Flores Patient history and pertinent findings reviewed (as documented above: hx abn EKG).   Medical or cardiac clearance ordered: FATEMEH Hernandez CNP  Electronically signed 9/23/2022 at 10:00 AM

## 2022-09-23 NOTE — TELEPHONE ENCOUNTER
Name: Sarah Downey  : 1995  MRN: [id number]    Oncology Navigation Follow-Up Note    Navigator sending text message to Dr. Sonia Walker yesterday to call Dr. Angeles Cahng. Writer today left VM for Dr. Karen Gray him pt. Scheduled for surgery .   3:34 Writer reaching out to pt. And pt. Concerned about PAT testing today and abnormal EKG. EKG results noted routed via lab to IRVING moralez and Dr. Angeles Chang.    Electronically signed by Padmaja Ramirez RN on 2022 at 3:05 PM

## 2022-09-23 NOTE — DISCHARGE INSTRUCTIONS
Preoperative Instructions:    Stop eating solid foods at MIDNIGHT the night prior to surgery. (This includes gum, mints, chewing tobacco). Stop drinking clear liquids at MIDNIGHT the night prior to surgery. Please stop smoking 48 hours prior to surgery. Arrive at the [address] [facility] center by 6:30 AM on 9/27/2022 (or as directed by your surgeon's office). Any patient arriving BEFORE 6:00am, goes straight to [address] which is the FIRST floor (to not be confused with the MAIN floor). When you get off the elevator on the first floor, there will be a large sign on the locked doors to push the button. You are to tell them your name and that you are there for surgery. Any patient arriving AFTER 6:00am, go to MAIN level registration in the [facility]. PLEASE FOLLOW ALL MEDICATION INSTRUCTIONS (WHAT TO HOLD, etc) AS DIRECTED BY YOUR SURGEON. .  If applicable:  -If you have been given a blood band, you must bring it with you the day of surgery, unclasped.  -Please use routine inhalers and bring inhalers the day of surgery.   -Bring C-Pap/Bi-pap with you morning of surgery if planning on staying in the hospital overnight.  -Please do not take diabetic medications on the day of surgery. If on lantus please do not take night dose. OTHER IMPORTANT REMINDERS  1) Transportation after your procedure.  -You will need an adult family member or friend to drive you home after your procedure.  Taxi cabs or any form of public transportation is not acceptable.  -Your  must be 25years of age or older and able to sign off on your discharge instructions.     -It is preferable that the friend or family member stay at the hospital throughout your procedure.  -Someone must remain with you for the first 24 hours after your surgery if you receive anesthesia or medication. If you do not have someone to stay with you, your procedure may be cancelled. 2) Please do not wear any jewelry or body piercings day of surgery. 9/23/22  9:49 AM      ___________________  _______________________  Signature (Provider)              Signature (Patient)     Day of Surgery/Procedure    As a patient at [facility] you can expect quality medical and nursing care that is centered on your individual needs. Our goal is to make your surgical experience as comfortable as possible  . Directions to the [address] [place] [address])  [facility] is located at [address] [facility], [address] [facility]. The [address] [place] [address] is across the street from the [facility]. You may park at the [address] [place] [address] [facility] [place] parking garage, or if handicapped there are closer spots in the surface lot directly in front of the [address] [place] [address]. The patients that arrive at 5:30 am need to report to the 1st floor nurse's station, otherwise please report to the main floor registration desk. Transportation after your procedure   You will need a friend or family member to drive you home after your procedure. Your  must be 25years of age or older and able to sign off on your discharge instructions. Taxi cabs or any form of public transportation is not acceptable. It is preferable that the friend or family member stay at the hospital throughout your procedure. Someone must remain with you for the first 24 hours after your surgery if you receive anesthesia or medication. If you do not have someone to stay with you, your procedure may be cancelled.     Patient Instructions    If you are having any type of anesthesia you are to have nothing to eat or drink after midnight the night before your surgery. This includes gum, mints, water or smoking or chewing tobacco.  The only exception to this is a small sip of water to take with any morning dose of heart, blood pressure, or seizure medications. Bring a list of all medications you take, along with the dose of the medications and how often you take it. If more convenient bring the pharmacy bottles in a zip lock bag. Please shower the night before and the morning of surgery with an antibacterial soap. Please use the wipes given to you the night before your surgery after your shower. Unless otherwise told by your physician, please do not shave legs or any part of your body below your neck the night before or day of your surgery. You may shave your face or neck. Brush your teeth but do not swallow water. Bring your inhaler if you are currently using one. Bring your eyeglasses and case with you. No contacts are to be worn the day of surgery. You also may bring your hearing aids. Bring your blood band if one has been given to you. Please do not close the clasp. If you are on C-PAP or Bi-PAP at home and plan on staying in the hospital overnight for your surgery please bring the machine with you. Do not wear any jewelry or body piercings day of surgery. Also, NO lotion, perfume or deodorant to be used the day of surgery. Do not bring any valuables, such as jewelry, cash or credit cards. If you are staying overnight with us, please bring a SMALL bag of personal items. We cannot accommodate large items, like suitcases. Please wear loose, comfortable clothing. If you are potentially going to have a cast or brace bring clothing that will fit over them.                                                                                                                           In case of illness - If you have cold or flu like symptoms (high fever, runny nose, sore throat, cough, etc.) rash, nausea, vomiting, loose stools, and/or recent contact with someone who has a contagious disease (chicken pox, measles, etc.) Please call your doctor before coming to the hospital.      If your child is having surgery please make arrangements for any other children to be cared for at home on the day of surgery. Other children are not permitted in recovery room and we want you to be able to spend time with the patient. If other arrangements are not available then we suggest that you have a second adult to stay in the waiting room.       If you have any other questions regarding your procedure or the day of surgery, please call [phone], or [phone]

## 2022-09-24 LAB
CULTURE: NORMAL
EKG ATRIAL RATE: 109 BPM
EKG P AXIS: 66 DEGREES
EKG P-R INTERVAL: 166 MS
EKG Q-T INTERVAL: 352 MS
EKG QRS DURATION: 82 MS
EKG QTC CALCULATION (BAZETT): 474 MS
EKG R AXIS: 63 DEGREES
EKG T AXIS: 47 DEGREES
EKG VENTRICULAR RATE: 109 BPM
MRSA, DNA, NASAL: NEGATIVE
SPECIMEN DESCRIPTION: NORMAL
SPECIMEN DESCRIPTION: NORMAL

## 2022-09-24 PROCEDURE — 93010 ELECTROCARDIOGRAM REPORT: CPT | Performed by: INTERNAL MEDICINE

## 2022-09-24 PROCEDURE — 94375 RESPIRATORY FLOW VOLUME LOOP: CPT | Performed by: NURSE PRACTITIONER

## 2022-09-24 NOTE — PROCEDURES
[address] [address]                               PULMONARY FUNCTION    PATIENT NAME: Ronald Evans                    :        1995  MED REC NO:   [id number]                             ROOM:  ACCOUNT NO:   [de-identified]                           ADMIT DATE: 2022  PROVIDER:     Logan Moralez    DATE OF PROCEDURE:  2022    Spirometry does not show any obstructive ventilatory defect and has an  FEV1 of 3.17 L and FVC of 3.62 L. Total lung capacity is normal at 83% predicted. Airway resistance is normal at 108% predicted. Specific conductance is normal at 85% predicted. Flow-volume loop is normal.    IMPRESSION:  Normal pulmonary function test with an FEV1 of 3.17 L and  FVC of 3.62 L.         Venkat Simpson    D: 2022 13:11:55       T: 2022 16:44:43     [id number]  Job#: [id number]     Doc#: [id number]    CC:

## 2022-09-26 ENCOUNTER — ANESTHESIA EVENT (OUTPATIENT)
Dept: OPERATING ROOM | Age: 27
DRG: 164 | End: 2022-09-26
Payer: COMMERCIAL

## 2022-09-26 PROCEDURE — 99024 POSTOP FOLLOW-UP VISIT: CPT | Performed by: PHYSICIAN ASSISTANT

## 2022-09-27 ENCOUNTER — HOSPITAL ENCOUNTER (INPATIENT)
Age: 27
LOS: 4 days | Discharge: HOME HEALTH | DRG: 164 | End: 2022-10-01
Attending: THORACIC SURGERY (CARDIOTHORACIC VASCULAR SURGERY) | Admitting: THORACIC SURGERY (CARDIOTHORACIC VASCULAR SURGERY)
Payer: COMMERCIAL

## 2022-09-27 ENCOUNTER — ANESTHESIA (OUTPATIENT)
Dept: OPERATING ROOM | Age: 27
DRG: 164 | End: 2022-09-27
Payer: COMMERCIAL

## 2022-09-27 ENCOUNTER — APPOINTMENT (OUTPATIENT)
Dept: GENERAL RADIOLOGY | Age: 27
DRG: 164 | End: 2022-09-27
Attending: THORACIC SURGERY (CARDIOTHORACIC VASCULAR SURGERY)
Payer: COMMERCIAL

## 2022-09-27 DIAGNOSIS — C34.11 MALIGNANT NEOPLASM OF UPPER LOBE OF RIGHT LUNG (HCC): Primary | ICD-10-CM

## 2022-09-27 DIAGNOSIS — R91.8 LUNG MASS: ICD-10-CM

## 2022-09-27 LAB
GLUCOSE BLD-MCNC: 132 MG/DL (ref 74–100)
NEGATIVE BASE EXCESS, ART: 2 (ref 0–2)
POC HCO3: 22.3 MMOL/L (ref 21–28)
POC HEMATOCRIT: 36 % (ref 36–46)
POC HEMOGLOBIN: 12.3 G/DL (ref 12–16)
POC IONIZED CALCIUM: 1.19 MMOL/L (ref 1.15–1.33)
POC O2 SATURATION: 100 % (ref 94–98)
POC PCO2: 34.8 MM HG (ref 35–48)
POC PH: 7.41 (ref 7.35–7.45)
POC PO2: 256.8 MM HG (ref 83–108)
POC POTASSIUM: 4 MMOL/L (ref 3.5–4.5)
POC SODIUM: 139 MMOL/L (ref 138–146)

## 2022-09-27 PROCEDURE — 85014 HEMATOCRIT: CPT

## 2022-09-27 PROCEDURE — 84295 ASSAY OF SERUM SODIUM: CPT

## 2022-09-27 PROCEDURE — 07B70ZX EXCISION OF THORAX LYMPHATIC, OPEN APPROACH, DIAGNOSTIC: ICD-10-PCS | Performed by: THORACIC SURGERY (CARDIOTHORACIC VASCULAR SURGERY)

## 2022-09-27 PROCEDURE — 3700000001 HC ADD 15 MINUTES (ANESTHESIA): Performed by: THORACIC SURGERY (CARDIOTHORACIC VASCULAR SURGERY)

## 2022-09-27 PROCEDURE — 6360000002 HC RX W HCPCS: Performed by: PHYSICIAN ASSISTANT

## 2022-09-27 PROCEDURE — 84132 ASSAY OF SERUM POTASSIUM: CPT

## 2022-09-27 PROCEDURE — 3600000016 HC SURGERY LEVEL 6 ADDTL 15MIN: Performed by: THORACIC SURGERY (CARDIOTHORACIC VASCULAR SURGERY)

## 2022-09-27 PROCEDURE — 82803 BLOOD GASES ANY COMBINATION: CPT

## 2022-09-27 PROCEDURE — 2720000010 HC SURG SUPPLY STERILE: Performed by: THORACIC SURGERY (CARDIOTHORACIC VASCULAR SURGERY)

## 2022-09-27 PROCEDURE — 2500000003 HC RX 250 WO HCPCS: Performed by: NURSE ANESTHETIST, CERTIFIED REGISTERED

## 2022-09-27 PROCEDURE — 0BTJ0ZZ RESECTION OF LEFT LOWER LUNG LOBE, OPEN APPROACH: ICD-10-PCS | Performed by: THORACIC SURGERY (CARDIOTHORACIC VASCULAR SURGERY)

## 2022-09-27 PROCEDURE — 2000000000 HC ICU R&B

## 2022-09-27 PROCEDURE — 21558 RESECT NECK TUMOR 5 CM/>: CPT | Performed by: THORACIC SURGERY (CARDIOTHORACIC VASCULAR SURGERY)

## 2022-09-27 PROCEDURE — 2580000003 HC RX 258: Performed by: PHYSICIAN ASSISTANT

## 2022-09-27 PROCEDURE — 2709999900 HC NON-CHARGEABLE SUPPLY: Performed by: THORACIC SURGERY (CARDIOTHORACIC VASCULAR SURGERY)

## 2022-09-27 PROCEDURE — 88309 TISSUE EXAM BY PATHOLOGIST: CPT

## 2022-09-27 PROCEDURE — 6370000000 HC RX 637 (ALT 250 FOR IP): Performed by: PHYSICIAN ASSISTANT

## 2022-09-27 PROCEDURE — 2580000003 HC RX 258: Performed by: NURSE ANESTHETIST, CERTIFIED REGISTERED

## 2022-09-27 PROCEDURE — C1768 GRAFT, VASCULAR: HCPCS | Performed by: THORACIC SURGERY (CARDIOTHORACIC VASCULAR SURGERY)

## 2022-09-27 PROCEDURE — 2700000000 HC OXYGEN THERAPY PER DAY

## 2022-09-27 PROCEDURE — 88342 IMHCHEM/IMCYTCHM 1ST ANTB: CPT

## 2022-09-27 PROCEDURE — 82947 ASSAY GLUCOSE BLOOD QUANT: CPT

## 2022-09-27 PROCEDURE — 88341 IMHCHEM/IMCYTCHM EA ADD ANTB: CPT

## 2022-09-27 PROCEDURE — 82330 ASSAY OF CALCIUM: CPT

## 2022-09-27 PROCEDURE — 32505 WEDGE RESECT OF LUNG INITIAL: CPT | Performed by: THORACIC SURGERY (CARDIOTHORACIC VASCULAR SURGERY)

## 2022-09-27 PROCEDURE — 3600000006 HC SURGERY LEVEL 6 BASE: Performed by: THORACIC SURGERY (CARDIOTHORACIC VASCULAR SURGERY)

## 2022-09-27 PROCEDURE — 88331 PATH CONSLTJ SURG 1 BLK 1SPC: CPT

## 2022-09-27 PROCEDURE — C1729 CATH, DRAINAGE: HCPCS | Performed by: THORACIC SURGERY (CARDIOTHORACIC VASCULAR SURGERY)

## 2022-09-27 PROCEDURE — 6360000002 HC RX W HCPCS: Performed by: NURSE ANESTHETIST, CERTIFIED REGISTERED

## 2022-09-27 PROCEDURE — 2500000003 HC RX 250 WO HCPCS: Performed by: THORACIC SURGERY (CARDIOTHORACIC VASCULAR SURGERY)

## 2022-09-27 PROCEDURE — 94761 N-INVAS EAR/PLS OXIMETRY MLT: CPT

## 2022-09-27 PROCEDURE — A4216 STERILE WATER/SALINE, 10 ML: HCPCS | Performed by: PHYSICIAN ASSISTANT

## 2022-09-27 PROCEDURE — 3700000000 HC ANESTHESIA ATTENDED CARE: Performed by: THORACIC SURGERY (CARDIOTHORACIC VASCULAR SURGERY)

## 2022-09-27 PROCEDURE — 2500000003 HC RX 250 WO HCPCS: Performed by: PHYSICIAN ASSISTANT

## 2022-09-27 PROCEDURE — 94640 AIRWAY INHALATION TREATMENT: CPT

## 2022-09-27 PROCEDURE — 71045 X-RAY EXAM CHEST 1 VIEW: CPT

## 2022-09-27 RX ORDER — LIDOCAINE HYDROCHLORIDE 10 MG/ML
INJECTION, SOLUTION EPIDURAL; INFILTRATION; INTRACAUDAL; PERINEURAL PRN
Status: DISCONTINUED | OUTPATIENT
Start: 2022-09-27 | End: 2022-09-27 | Stop reason: SDUPTHER

## 2022-09-27 RX ORDER — MORPHINE SULFATE 2 MG/ML
2 INJECTION, SOLUTION INTRAMUSCULAR; INTRAVENOUS
Status: DISCONTINUED | OUTPATIENT
Start: 2022-09-27 | End: 2022-10-01 | Stop reason: HOSPADM

## 2022-09-27 RX ORDER — BUPIVACAINE HYDROCHLORIDE AND EPINEPHRINE 5; 5 MG/ML; UG/ML
INJECTION, SOLUTION PERINEURAL
Status: DISPENSED
Start: 2022-09-27 | End: 2022-09-27

## 2022-09-27 RX ORDER — SODIUM CHLORIDE 9 MG/ML
INJECTION, SOLUTION INTRAVENOUS PRN
Status: DISCONTINUED | OUTPATIENT
Start: 2022-09-27 | End: 2022-10-01 | Stop reason: HOSPADM

## 2022-09-27 RX ORDER — FAMOTIDINE 20 MG/1
20 TABLET, FILM COATED ORAL 2 TIMES DAILY
Status: DISCONTINUED | OUTPATIENT
Start: 2022-09-27 | End: 2022-10-01 | Stop reason: HOSPADM

## 2022-09-27 RX ORDER — ONDANSETRON 2 MG/ML
INJECTION INTRAMUSCULAR; INTRAVENOUS PRN
Status: DISCONTINUED | OUTPATIENT
Start: 2022-09-27 | End: 2022-09-27 | Stop reason: SDUPTHER

## 2022-09-27 RX ORDER — LIDOCAINE 4 G/G
1 PATCH TOPICAL DAILY
Status: DISCONTINUED | OUTPATIENT
Start: 2022-09-27 | End: 2022-10-01 | Stop reason: HOSPADM

## 2022-09-27 RX ORDER — CEFAZOLIN SODIUM 1 G/3ML
INJECTION, POWDER, FOR SOLUTION INTRAMUSCULAR; INTRAVENOUS PRN
Status: DISCONTINUED | OUTPATIENT
Start: 2022-09-27 | End: 2022-09-27 | Stop reason: SDUPTHER

## 2022-09-27 RX ORDER — SODIUM CHLORIDE 0.9 % (FLUSH) 0.9 %
5-40 SYRINGE (ML) INJECTION EVERY 12 HOURS SCHEDULED
Status: DISCONTINUED | OUTPATIENT
Start: 2022-09-27 | End: 2022-09-30

## 2022-09-27 RX ORDER — KETOROLAC TROMETHAMINE 30 MG/ML
INJECTION, SOLUTION INTRAMUSCULAR; INTRAVENOUS PRN
Status: DISCONTINUED | OUTPATIENT
Start: 2022-09-27 | End: 2022-09-27 | Stop reason: SDUPTHER

## 2022-09-27 RX ORDER — CHLORHEXIDINE GLUCONATE 4 G/100ML
SOLUTION TOPICAL SEE ADMIN INSTRUCTIONS
Status: DISCONTINUED | OUTPATIENT
Start: 2022-09-27 | End: 2022-09-27 | Stop reason: HOSPADM

## 2022-09-27 RX ORDER — SODIUM CHLORIDE 0.9 % (FLUSH) 0.9 %
10 SYRINGE (ML) INJECTION PRN
Status: DISCONTINUED | OUTPATIENT
Start: 2022-09-27 | End: 2022-09-30

## 2022-09-27 RX ORDER — ROCURONIUM BROMIDE 10 MG/ML
INJECTION, SOLUTION INTRAVENOUS PRN
Status: DISCONTINUED | OUTPATIENT
Start: 2022-09-27 | End: 2022-09-27 | Stop reason: SDUPTHER

## 2022-09-27 RX ORDER — KETOROLAC TROMETHAMINE 15 MG/ML
15 INJECTION, SOLUTION INTRAMUSCULAR; INTRAVENOUS EVERY 6 HOURS
Status: COMPLETED | OUTPATIENT
Start: 2022-09-27 | End: 2022-09-29

## 2022-09-27 RX ORDER — SODIUM CHLORIDE, SODIUM LACTATE, POTASSIUM CHLORIDE, CALCIUM CHLORIDE 600; 310; 30; 20 MG/100ML; MG/100ML; MG/100ML; MG/100ML
INJECTION, SOLUTION INTRAVENOUS CONTINUOUS
Status: DISCONTINUED | OUTPATIENT
Start: 2022-09-27 | End: 2022-09-30

## 2022-09-27 RX ORDER — IPRATROPIUM BROMIDE AND ALBUTEROL SULFATE 2.5; .5 MG/3ML; MG/3ML
1 SOLUTION RESPIRATORY (INHALATION)
Status: DISCONTINUED | OUTPATIENT
Start: 2022-09-27 | End: 2022-10-01 | Stop reason: HOSPADM

## 2022-09-27 RX ORDER — SODIUM CHLORIDE 9 MG/ML
INJECTION, SOLUTION INTRAVENOUS PRN
Status: DISCONTINUED | OUTPATIENT
Start: 2022-09-27 | End: 2022-09-30

## 2022-09-27 RX ORDER — ASPIRIN 81 MG/1
81 TABLET ORAL
Status: DISCONTINUED | OUTPATIENT
Start: 2022-09-27 | End: 2022-09-30

## 2022-09-27 RX ORDER — DEXAMETHASONE SODIUM PHOSPHATE 10 MG/ML
INJECTION, SOLUTION INTRAMUSCULAR; INTRAVENOUS PRN
Status: DISCONTINUED | OUTPATIENT
Start: 2022-09-27 | End: 2022-09-27 | Stop reason: SDUPTHER

## 2022-09-27 RX ORDER — ENOXAPARIN SODIUM 100 MG/ML
40 INJECTION SUBCUTANEOUS DAILY
Status: DISCONTINUED | OUTPATIENT
Start: 2022-09-27 | End: 2022-10-01 | Stop reason: HOSPADM

## 2022-09-27 RX ORDER — SODIUM CHLORIDE 0.9 % (FLUSH) 0.9 %
5-40 SYRINGE (ML) INJECTION PRN
Status: DISCONTINUED | OUTPATIENT
Start: 2022-09-27 | End: 2022-10-01 | Stop reason: HOSPADM

## 2022-09-27 RX ORDER — SODIUM CHLORIDE, SODIUM LACTATE, POTASSIUM CHLORIDE, CALCIUM CHLORIDE 600; 310; 30; 20 MG/100ML; MG/100ML; MG/100ML; MG/100ML
INJECTION, SOLUTION INTRAVENOUS CONTINUOUS PRN
Status: DISCONTINUED | OUTPATIENT
Start: 2022-09-27 | End: 2022-09-27 | Stop reason: SDUPTHER

## 2022-09-27 RX ORDER — CHLORHEXIDINE GLUCONATE 0.12 MG/ML
15 RINSE ORAL ONCE
Status: COMPLETED | OUTPATIENT
Start: 2022-09-27 | End: 2022-09-27

## 2022-09-27 RX ORDER — PROPOFOL 10 MG/ML
INJECTION, EMULSION INTRAVENOUS PRN
Status: DISCONTINUED | OUTPATIENT
Start: 2022-09-27 | End: 2022-09-27 | Stop reason: SDUPTHER

## 2022-09-27 RX ORDER — MORPHINE SULFATE 4 MG/ML
4 INJECTION, SOLUTION INTRAMUSCULAR; INTRAVENOUS ONCE
Status: COMPLETED | OUTPATIENT
Start: 2022-09-27 | End: 2022-09-27

## 2022-09-27 RX ORDER — MIDAZOLAM HYDROCHLORIDE 1 MG/ML
INJECTION INTRAMUSCULAR; INTRAVENOUS PRN
Status: DISCONTINUED | OUTPATIENT
Start: 2022-09-27 | End: 2022-09-27 | Stop reason: SDUPTHER

## 2022-09-27 RX ORDER — FENTANYL CITRATE 50 UG/ML
INJECTION, SOLUTION INTRAMUSCULAR; INTRAVENOUS PRN
Status: DISCONTINUED | OUTPATIENT
Start: 2022-09-27 | End: 2022-09-27 | Stop reason: SDUPTHER

## 2022-09-27 RX ORDER — BUPIVACAINE HYDROCHLORIDE AND EPINEPHRINE 5; 5 MG/ML; UG/ML
INJECTION, SOLUTION PERINEURAL PRN
Status: DISCONTINUED | OUTPATIENT
Start: 2022-09-27 | End: 2022-09-27 | Stop reason: ALTCHOICE

## 2022-09-27 RX ORDER — SODIUM CHLORIDE 0.9 % (FLUSH) 0.9 %
5-40 SYRINGE (ML) INJECTION EVERY 12 HOURS SCHEDULED
Status: DISCONTINUED | OUTPATIENT
Start: 2022-09-27 | End: 2022-10-01 | Stop reason: HOSPADM

## 2022-09-27 RX ADMIN — MORPHINE SULFATE 2 MG: 2 INJECTION, SOLUTION INTRAMUSCULAR; INTRAVENOUS at 22:02

## 2022-09-27 RX ADMIN — PROPOFOL 150 MG: 10 INJECTION, EMULSION INTRAVENOUS at 08:39

## 2022-09-27 RX ADMIN — FENTANYL CITRATE 25 MCG: 50 INJECTION, SOLUTION INTRAMUSCULAR; INTRAVENOUS at 12:46

## 2022-09-27 RX ADMIN — MORPHINE SULFATE 4 MG: 4 INJECTION INTRAVENOUS at 16:18

## 2022-09-27 RX ADMIN — IPRATROPIUM BROMIDE AND ALBUTEROL SULFATE 1 AMPULE: .5; 2.5 SOLUTION RESPIRATORY (INHALATION) at 16:30

## 2022-09-27 RX ADMIN — DEXAMETHASONE SODIUM PHOSPHATE 10 MG: 10 INJECTION INTRAMUSCULAR; INTRAVENOUS at 09:38

## 2022-09-27 RX ADMIN — PHENYLEPHRINE HYDROCHLORIDE 100 MCG: 10 INJECTION INTRAVENOUS at 09:15

## 2022-09-27 RX ADMIN — ASPIRIN 81 MG: 81 TABLET, COATED ORAL at 07:45

## 2022-09-27 RX ADMIN — CHLORHEXIDINE GLUCONATE 15 ML: 1.2 SOLUTION ORAL at 07:46

## 2022-09-27 RX ADMIN — MORPHINE SULFATE 2 MG: 2 INJECTION, SOLUTION INTRAMUSCULAR; INTRAVENOUS at 13:53

## 2022-09-27 RX ADMIN — SUGAMMADEX 168 MG: 100 INJECTION, SOLUTION INTRAVENOUS at 12:35

## 2022-09-27 RX ADMIN — LIDOCAINE HYDROCHLORIDE 5 ML: 10 INJECTION, SOLUTION EPIDURAL; INFILTRATION; INTRACAUDAL; PERINEURAL at 08:39

## 2022-09-27 RX ADMIN — FENTANYL CITRATE 50 MCG: 50 INJECTION, SOLUTION INTRAMUSCULAR; INTRAVENOUS at 10:09

## 2022-09-27 RX ADMIN — FENTANYL CITRATE 50 MCG: 50 INJECTION, SOLUTION INTRAMUSCULAR; INTRAVENOUS at 12:34

## 2022-09-27 RX ADMIN — ROCURONIUM BROMIDE 20 MG: 10 INJECTION, SOLUTION INTRAVENOUS at 11:02

## 2022-09-27 RX ADMIN — PHENYLEPHRINE HYDROCHLORIDE 100 MCG: 10 INJECTION INTRAVENOUS at 09:37

## 2022-09-27 RX ADMIN — PHENYLEPHRINE HYDROCHLORIDE 100 MCG: 10 INJECTION INTRAVENOUS at 09:31

## 2022-09-27 RX ADMIN — MIDAZOLAM 2 MG: 1 INJECTION INTRAMUSCULAR; INTRAVENOUS at 08:37

## 2022-09-27 RX ADMIN — KETOROLAC TROMETHAMINE 15 MG: 15 INJECTION, SOLUTION INTRAMUSCULAR; INTRAVENOUS at 20:14

## 2022-09-27 RX ADMIN — MORPHINE SULFATE 2 MG: 2 INJECTION, SOLUTION INTRAMUSCULAR; INTRAVENOUS at 18:52

## 2022-09-27 RX ADMIN — ROCURONIUM BROMIDE 50 MG: 10 INJECTION, SOLUTION INTRAVENOUS at 08:42

## 2022-09-27 RX ADMIN — CEFAZOLIN 2000 MG: 1 INJECTION, POWDER, FOR SOLUTION INTRAMUSCULAR; INTRAVENOUS at 09:43

## 2022-09-27 RX ADMIN — FENTANYL CITRATE 50 MCG: 50 INJECTION, SOLUTION INTRAMUSCULAR; INTRAVENOUS at 09:51

## 2022-09-27 RX ADMIN — FAMOTIDINE 20 MG: 10 INJECTION INTRAVENOUS at 15:55

## 2022-09-27 RX ADMIN — SODIUM CHLORIDE, PRESERVATIVE FREE 5 ML: 5 INJECTION INTRAVENOUS at 20:20

## 2022-09-27 RX ADMIN — SODIUM CHLORIDE, PRESERVATIVE FREE 10 ML: 5 INJECTION INTRAVENOUS at 08:20

## 2022-09-27 RX ADMIN — SODIUM CHLORIDE, POTASSIUM CHLORIDE, SODIUM LACTATE AND CALCIUM CHLORIDE: 600; 310; 30; 20 INJECTION, SOLUTION INTRAVENOUS at 08:33

## 2022-09-27 RX ADMIN — KETOROLAC TROMETHAMINE 15 MG: 15 INJECTION, SOLUTION INTRAMUSCULAR; INTRAVENOUS at 14:21

## 2022-09-27 RX ADMIN — ROCURONIUM BROMIDE 30 MG: 10 INJECTION, SOLUTION INTRAVENOUS at 09:37

## 2022-09-27 RX ADMIN — PHENYLEPHRINE HYDROCHLORIDE 100 MCG: 10 INJECTION INTRAVENOUS at 08:58

## 2022-09-27 RX ADMIN — ENOXAPARIN SODIUM 40 MG: 100 INJECTION SUBCUTANEOUS at 20:12

## 2022-09-27 RX ADMIN — ROCURONIUM BROMIDE 20 MG: 10 INJECTION, SOLUTION INTRAVENOUS at 10:09

## 2022-09-27 RX ADMIN — METOPROLOL TARTRATE 12.5 MG: 25 TABLET, FILM COATED ORAL at 07:45

## 2022-09-27 RX ADMIN — ROCURONIUM BROMIDE 20 MG: 10 INJECTION, SOLUTION INTRAVENOUS at 11:45

## 2022-09-27 RX ADMIN — IPRATROPIUM BROMIDE AND ALBUTEROL SULFATE 1 AMPULE: .5; 2.5 SOLUTION RESPIRATORY (INHALATION) at 21:11

## 2022-09-27 RX ADMIN — FENTANYL CITRATE 25 MCG: 50 INJECTION, SOLUTION INTRAMUSCULAR; INTRAVENOUS at 12:40

## 2022-09-27 RX ADMIN — ONDANSETRON 4 MG: 2 INJECTION INTRAMUSCULAR; INTRAVENOUS at 12:09

## 2022-09-27 RX ADMIN — SODIUM CHLORIDE, POTASSIUM CHLORIDE, SODIUM LACTATE AND CALCIUM CHLORIDE: 600; 310; 30; 20 INJECTION, SOLUTION INTRAVENOUS at 07:44

## 2022-09-27 RX ADMIN — FENTANYL CITRATE 100 MCG: 50 INJECTION, SOLUTION INTRAMUSCULAR; INTRAVENOUS at 08:40

## 2022-09-27 RX ADMIN — KETOROLAC TROMETHAMINE 30 MG: 30 INJECTION, SOLUTION INTRAMUSCULAR at 12:24

## 2022-09-27 ASSESSMENT — PAIN SCALES - GENERAL
PAINLEVEL_OUTOF10: 4
PAINLEVEL_OUTOF10: 4
PAINLEVEL_OUTOF10: 10
PAINLEVEL_OUTOF10: 10
PAINLEVEL_OUTOF10: 7
PAINLEVEL_OUTOF10: 8
PAINLEVEL_OUTOF10: 10

## 2022-09-27 ASSESSMENT — PAIN DESCRIPTION - LOCATION
LOCATION: SHOULDER

## 2022-09-27 ASSESSMENT — PAIN DESCRIPTION - ORIENTATION
ORIENTATION: LEFT

## 2022-09-27 ASSESSMENT — PAIN DESCRIPTION - DESCRIPTORS
DESCRIPTORS: ACHING;SORE
DESCRIPTORS: ACHING

## 2022-09-27 NOTE — ANESTHESIA PRE PROCEDURE
Department of Anesthesiology  Preprocedure Note       Name:  Lupe Ellington   Age:  33 y.o.  :  1995                                          MRN:  [id number]         Date:  2022      Surgeon: Gilma Bethea):  Cristobal Robert MD    Procedure: Procedure(s):  THORACOTOMY, LOWER LOBECTOMY LYMPH NODE BIOPSY    Medications prior to admission:   Prior to Admission medications    Medication Sig Start Date End Date Taking? Authorizing Provider   phentermine 37.5 MG capsule Take 1 capsule by mouth every morning for 30 days. 9/16/22 10/16/22  FATEMEH Wright CNP   norethindrone (NORLYDA) 0.35 MG tablet TAKE 1 TABLET BY MOUTH DAILY 22   Pipo Helton DO   ketoconazole (NIZORAL) 2 % shampoo Apply 3-4 times weekly to scalp, leave on for five minutes prior to washing off 22   David Marrero PA-C   fluocinonide (LIDEX) 0.05 % external solution Apply to scalp daily for rash 22   David Marrero PA-C   ferrous sulfate (IRON 325) 325 (65 Fe) MG tablet Take 325 mg by mouth daily (with breakfast)    Historical Provider, MD   Handicap Placard MISC Expiration: Lifetime 20   FATEMEH Wright CNP   lidocaine-prilocaine (EMLA) 2.5-2.5 % cream Apply topically Daily as needed. 10/20/20   Jett Mccall MD   Prenatal MV-Min-Fe Fum-FA-DHA (PRENATAL 1 PO) Take by mouth    Historical Provider, MD       Current medications:    No current facility-administered medications for this encounter.        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  Coli UTI 10-<100,000CFU (7/7/20) 08/02/2020    Lung nodule     Malignant neoplasm affecting pregnancy in third trimester 09/02/2020    Osteosarcoma 2020    started at knee and moved up into thigh resulting in amputation and chemo    Pregestational diabetes mellitus, modified White class B 06/30/2020    resolved with childbirth    Pregnancy induced hypertension     resolved with childbirth    Pseudomonas UTI 08/04/2020 8/4/20 treated with Cefepime 2g IV for 10 days     Under care of service provider 09/23/2022    [facility]-last visit aug 2022    Under care of service provider 09/23/2022    sto-qrvogdoj-jfylmvwrxg-last visit aug 2022    Under care of service provider 09/23/2022    pztibcnjgnzxnr-dhwuzuj-hp vincent-last visit sep 2022   [employer] Wears glasses        Past Surgical History:        Procedure Laterality Date    CT NEEDLE BIOPSY LUNG PERCUTANEOUS  5/9/2022    CT NEEDLE BIOPSY LUNG PERCUTANEOUS 5/9/2022 [facility] CT SCAN    CT NEEDLE BIOPSY LUNG PERCUTANEOUS  9/15/2022    CT NEEDLE BIOPSY LUNG PERCUTANEOUS 9/15/2022 [facility] CT SCAN    IR PORT PLACEMENT EQUAL OR GREATER THAN 5 YEARS  10/27/2020    IR PORT PLACEMENT EQUAL OR GREATER THAN 5 YEARS 10/27/2020 Eileen Coello MD [facility] PROCED[facility]    LEG AMPUTATION AT HIP Right 07/23/2020    [facility], osteosarcoma       Social History:    Social History     Tobacco Use    Smoking status: Never    Smokeless tobacco: Never   Substance Use Topics    Alcohol use: Not Currently                                Counseling given: Not Answered      Vital Signs (Current):   Vitals:    09/27/22 0645   BP: 130/86   Pulse: 98   Resp: 17   SpO2: 97%                                              BP Readings from Last 3 Encounters:   09/27/22 130/86   09/23/22 133/78   09/22/22 113/76       NPO Status:                                                                                 BMI:   Wt Readings from Last 3 Encounters:   09/23/22 185 lb (83.9 kg)   09/22/22 183 lb (83 kg)   09/15/22 184 lb (83.5 kg)     There is no height or weight on file to calculate BMI.    CBC:   Lab Results   Component Value Date/Time    WBC 8.1 09/23/2022 10:45 AM    RBC 4.67 09/23/2022 10:45 AM    HGB 14.0 09/23/2022 10:45 AM    HCT 41.5 09/23/2022 10:45 AM    MCV 88.9 09/23/2022 10:45 AM    RDW 12.1 09/23/2022 10:45 AM     09/23/2022 10:45 AM       CMP:   Lab Results   Component Value Date/Time     09/23/2022 10:45 AM    K 4.1 09/23/2022 10:45 AM     09/23/2022 10:45 AM    CO2 25 09/23/2022 10:45 AM    BUN 9 09/23/2022 10:45 AM    CREATININE 0.36 09/23/2022 10:45 AM    GFRAA >60 09/23/2022 10:45 AM    LABGLOM >60 09/23/2022 10:45 AM    GLUCOSE 104 09/23/2022 10:45 AM    PROT 7.7 09/23/2022 10:45 AM    CALCIUM 9.5 09/23/2022 10:45 AM    BILITOT 0.4 09/23/2022 10:45 AM    ALKPHOS 65 09/23/2022 10:45 AM    AST 13 09/23/2022 10:45 AM    ALT 17 09/23/2022 10:45 AM       POC Tests: No results for input(s): POCGLU, POCNA, POCK, POCCL, POCBUN, POCHEMO, POCHCT in the last 72 hours.     Coags:   Lab Results   Component Value Date/Time    PROTIME 9.5 09/23/2022 10:45 AM    INR 0.9 09/23/2022 10:45 AM    APTT 25.4 09/23/2022 10:45 AM       HCG (If Applicable):   Lab Results   Component Value Date    HCGQUANT 24,003 (H) 06/01/2020        ABGs: No results found for: PHART, PO2ART, ZOQ1LPV, NEU8XSS, BEART, A7LXVGZS     Type & Screen (If Applicable):  No results found for: LABABO, LABRH    Drug/Infectious Status (If Applicable):  No results found for: HIV, HEPCAB    COVID-19 Screening (If Applicable):   Lab Results   Component Value Date/Time    COVID19 Not Detected 10/12/2020 05:00 AM           Anesthesia Evaluation  Patient summary reviewed and Nursing notes reviewed  Airway: Mallampati: I          Dental: normal exam         Pulmonary:normal exam                               Cardiovascular:  Exercise tolerance: good (>4 METS),   (+) hypertension:,                   Neuro/Psych:               GI/Hepatic/Renal:             Endo/Other:    (+) malignancy/cancer. (-) diabetes mellitus               Abdominal:             Vascular: Other Findings:           Anesthesia Plan      general     ASA 3     (35 [address] Left BEVERLY)  Induction: intravenous. arterial line  MIPS: Postoperative opioids intended and Postoperative trial extubation. Anesthetic plan and risks discussed with patient. Use of blood products discussed with patient whom consented to blood products. Plan discussed with CRNA.                     Marisol Soto MD   9/27/2022

## 2022-09-27 NOTE — H&P
Pt Name: Anahi Palma  MRN: [id number]  [place]: 1995  Date of evaluation: 9/22/2022  Primary Care Physician: FATEMEH Tamayo CNP  Patient evaluated at the request of  Dr. Irma Loredo  Reason for evaluation: Metastatic osteosarcoma     History of Present Illness:        Anahi Palma is a 33y.o. year old, ,  female with a known history of osteosarcoma resulting in right lower extremity amputation followed by 4 cycles of chemotherapy. Atient has hasd no complaints but a lung mass was detected on surveillance CT scan in May of this year. Repeat CT scan this month showed significant increase in size from 1.7 to 4 cm. Needle biopsy on 9/15/2022 confirmed metastatic osteosarcoma. We've been asked to evaluate for possible segemntal resection versus lobectomy. Review of Systems:      General Denies any fever or chills  HEENT Denies any diplopia, tinnitus or vertigo  Resp Denies any shortness of breath, cough or wheezing  Cardiac Denies any chest pain, palpitations, claudication or edema  GI Denies any melena, hematochezia, hematemesis or pyrosis   Denies any frequency, urgency, hesitancy or incontinence  Heme Denies bruising or bleeding easily  Endocrine Denies any history of diabetes or thyroid disease  Neuro Denies any focal motor or sensory deficits     Past Medical History          Diagnosis Date    33 weeks gestation of pregnancy 10/12/2020    child born oct 2020    Antepartum premature rupture of membrane     child born oct 2020    COVID-19 11/2021    fatigue    Gestational diabetes     High-risk pregnancy, unspecified trimester 10/12/2020    History of anemia     prior blood and iron transfusion, without reactions    History of blood transfusion 08/01/2020    History of chemotherapy     last treatment jan 7, 2021    History of E.  Coli UTI 10-<100,000CFU (7/7/20) 08/02/2020    Lung nodule     Malignant neoplasm affecting pregnancy in third trimester 09/02/2020    Osteosarcoma 2020 started at knee and moved up into thigh resulting in amputation and chemo    Pregestational diabetes mellitus, modified White class B 06/30/2020    resolved with childbirth    Pregnancy induced hypertension     resolved with childbirth    Pseudomonas UTI 08/04/2020 8/4/20 treated with Cefepime 2g IV for 10 days     Under care of service provider 09/23/2022    [facility]cology-[facility]-last visit aug 2022    Under care of service provider 09/23/2022    fmo-bbkeltzp-lorufpmxfl-last visit aug 2022    Under care of service provider 09/23/2022    gbomxfbbdifuuc-eetuhnq-ga vincent-last visit sep 2022    Wears glasses             Past Surgical History      Past Surgical History             Procedure Laterality Date    CT NEEDLE BIOPSY LUNG PERCUTANEOUS   5/9/2022     CT NEEDLE BIOPSY LUNG PERCUTANEOUS 5/9/2022 [facility] CT SCAN    CT NEEDLE BIOPSY LUNG PERCUTANEOUS   9/15/2022     CT NEEDLE BIOPSY LUNG PERCUTANEOUS 9/15/2022 [facility] CT SCAN    IR PORT PLACEMENT EQUAL OR GREATER THAN 5 YEARS   10/27/2020     IR PORT PLACEMENT EQUAL OR GREATER THAN 5 YEARS 10/27/2020 Mckenna Murray MD [facility] PROCED[facility]    LEG AMPUTATION AT HIP Right 07/23/2020     [facility], osteosarcoma            Medications      Home Medications           Prior to Admission medications    Medication Sig Start Date End Date Taking? Authorizing Provider   phentermine 37.5 MG capsule Take 1 capsule by mouth every morning for 30 days.  9/16/22 10/16/22 Yes Tim Montana, APRN - CNP   norethindrone (NORLYDA) 0.35 MG tablet TAKE 1 TABLET BY MOUTH DAILY 6/16/22   Yes Pipo Helton,    ketoconazole (NIZORAL) 2 % shampoo Apply 3-4 times weekly to scalp, leave on for five minutes prior to washing off 5/6/22   Yes Zahra Trammell PA-C   fluocinonide (LIDEX) 0.05 % external solution Apply to scalp daily for rash 5/6/22   Yes Zahra Trammell PA-C   ferrous sulfate (IRON 325) 325 (65 Fe) MG tablet Take 325 mg by mouth daily (with breakfast)     Yes Historical Provider, MD   Handicap Placard MISC Expiration: Lifetime 11/13/20   Yes Cori Reyes, APRN - CNP   lidocaine-prilocaine (EMLA) 2.5-2.5 % cream Apply topically Daily as needed. 10/20/20   Yes Trinh Ch MD   Prenatal MV-Min-Fe Fum-FA-DHA (PRENATAL 1 PO) Take by mouth     Yes Historical Provider, MD            Allergies      has No Known Allergies. Family History      family history includes Diabetes in her maternal grandmother and mother; Heart Surgery in her maternal grandmother; Hypertension in her maternal grandfather; Stroke in her maternal grandfather. Social History       reports that she has never smoked. She has never used smokeless tobacco.   reports that she does not currently use alcohol. reports no history of drug use. OBJECTIVE:      VITALS:  weight is 183 lb (83 kg). Her temperature is 97.7 °F (36.5 °C). Her blood pressure is 113/76 and her pulse is 141 (abnormal). Her oxygen saturation is 98%. CONSTITUTIONAL: Alert and oriented times 3, no acute distress and cooperative to examination. HEENT: Head is normocephalic, atraumatic. EOMI, PERRLA  NECK: Soft, trachea midline and straight  LUNGS: Chest expands equally bilaterally upon respiration, no accessory muscle used. Ausculation reveals no wheezes, rales or rhonchi. CARDIOVASCULAR: Heart sounds are normal.  Regular rate and rhythm without murmur, gallop or rub. ABDOMEN: soft, nontender, nondistended, no masses or organomegaly, no hernias palpable, no guarding or peritoneal signs. Bowel sounds are present in all four quadrants Scars are consistent with previous surgical history. NEUROLOGIC: CN II-XII are grossly intact. There are no focalizing motor or sensory deficits  EXTREMITIES: right lower extremity amputation     LABS:      No results for input(s): WBC, HGB, HCT, PLT, NA, K, CL, CO2, BUN, CREATININE, MG, PHOS, CALCIUM, PTT, INR, AST, ALT, BILITOT, BILIDIR, NITRU, COLORU, BACTERIA in the last 72 hours.      Invalid input(s): PT, WBCU, RBCU, LEUKOCYTESUA  No results for input(s): ALKPHOS, ALT, AST, BILITOT, BILIDIR, LABALBU, AMYLASE, LIPASE in the last 72 hours. RADIOLOGY:      All studies personally reviewed. Assessment:      Metastatic osteosarcoma left lower lung     Plan:      OR today for Left lower lobectomy possible rib resection.

## 2022-09-27 NOTE — BRIEF OP NOTE
Brief Postoperative Note      Patient: Nadeen Dudley  YOB: 1995  MRN: [id number]    Date of Procedure: 9/27/2022    Pre-Op Diagnosis: LUNG MASS    Post-Op Diagnosis: Same       Procedure(s):  THORACOTOMY, LOWER LOBECTOMY LYMPH NODE BIOPSY    Surgeon(s):  Negrita Kam MD    Assistant:  Surgical Assistant: Bobby Martinez Adwoa    Anesthesia: General    Estimated Blood Loss (mL): 384     Complications: None    Specimens:   ID Type Source Tests Collected by Time Destination   A : CHEST WALL/ LEFT LOWER LOBE WEDGE RESECTION W/PLEURAL MARGINS Tissue Tissue SURGICAL PATHOLOGY Negrita Kam MD 9/27/2022 1159        Implants:  * No implants in log *      Drains:   Chest Tube Left 1 (Active)       Chest Tube Left 2 (Active)       Urinary Catheter 09/27/22 [place] (Active)       Findings: en bloc resection of lower lobe lung mass and chest wall    Electronically signed by Rhys Cole MD on 9/27/2022 at 12:37 PM

## 2022-09-27 NOTE — PROGRESS NOTES
[id number] [facility] Cardiothoracic Surgery  Pre-op Note    9/27/2022    Raul Locus is scheduled for surgery today. All of the pertinent studies have been reviewed and patient is NPO. I have discussed the procedure with the patient and family, and they have been given opportunity to ask questions. The attendant risks, benefits, and possible outcomes have been discussed with them. They understand and have signed informed consent.       Anya Armando MD     Ready at 8:23 AM

## 2022-09-27 NOTE — OP NOTE
[address] [facility] 30                                OPERATIVE REPORT    PATIENT NAME: Teo Ríos                    :        1995  MED REC NO:   [id number]                             ROOM:  ACCOUNT NO:   [de-identified]                           ADMIT DATE: 2022  PROVIDER:     Kofi Woodruff MD    DATE OF PROCEDURE:  2022    PREOPERATIVE DIAGNOSIS:  Metastatic sarcoma to the left chest.    POSTOPERATIVE DIAGNOSIS:  Metastatic sarcoma to the left chest.    PROCEDURES:  1. Left muscle-sparing thoracotomy. 2.  Left lower lobe wedge resection. 3.  Blood chest wall resection. 4.  Platelet gel. 5.  Pain ball. SURGEON:  Kofi Woodruff MD    ASSISTANT:  Sully Jacques MD.    ESTIMATED BLOOD LOSS:  400. SPECIMEN:  Lower lobe wedge. DRAINS:  Two 28-St [place]ian chest tubes. PROCEDURE:  The patient has risks, benefits, and intentions fully  discussed. Signed consent, taken back to the operating, placed supine  position. General anesthesia was induced. Double-lumen endotracheal  tube, A-line, Robbins were placed. Patient placed in a right lateral  decubitus position. All the appropriate padding and precautions were  taken. At this time, the patient has the muscle-sparing thoracotomy incision  made in the seventh intercostal space continued in depth with  electrocautery. Latissimus and serratus muscles were held anterior  posterior. The thorax entered. The chest retractors were placed. The  lower lobe sarcoma identified. The inferior pulmonary ligaments taken. There is no abnormal lymph node noted. At this time, the patient has the lower half of the posterior inferior  segment of the lung stapled using a [place] 60 stapler with Christine-Strips. Then the chest wall was dissected free circumferentially from the ninth  rib where it was attached. The muscle and pleura were peeled off the  chest wall. Any bleeding points were clipped and cauterized. At this  time, the specimen sent off the field, negative margins were identified. Platelet gel was applied to the cut surface. A dual-lumen pain ball was  placed. The chest was closed using #1 Vicryl figure-of-eight sutures, 2-0, 3-0  and 4-0 Vicryl for the superficial layers. Two 20-Telugu chest tubes  were left in place, sewn in place using zero silk sutures. The patient  had chest tubes hooked to Pleur-evac suction.     This procedure could not have been performed without the assistance of  Dr. Max Israel to close in this cyst in opening the chest.      Stephani New MD    D: 09/27/2022 12:53:33       T: 09/27/2022 12:56:48     [email or web address]  Job#: [id number]     Doc#: [id number]    CC:

## 2022-09-28 ENCOUNTER — APPOINTMENT (OUTPATIENT)
Dept: GENERAL RADIOLOGY | Age: 27
DRG: 164 | End: 2022-09-28
Attending: THORACIC SURGERY (CARDIOTHORACIC VASCULAR SURGERY)
Payer: COMMERCIAL

## 2022-09-28 LAB
ABO/RH: NORMAL
ABSOLUTE EOS #: <0.03 K/UL (ref 0–0.44)
ABSOLUTE IMMATURE GRANULOCYTE: 0.07 K/UL (ref 0–0.3)
ABSOLUTE LYMPH #: 1.07 K/UL (ref 1.1–3.7)
ABSOLUTE MONO #: 1.4 K/UL (ref 0.1–1.2)
ALBUMIN SERPL-MCNC: 3.8 G/DL (ref 3.5–5.2)
ALBUMIN/GLOBULIN RATIO: 1.6 (ref 1–2.5)
ALP BLD-CCNC: 51 U/L (ref 35–104)
ALT SERPL-CCNC: 15 U/L (ref 5–33)
ANION GAP SERPL CALCULATED.3IONS-SCNC: 10 MMOL/L (ref 9–17)
ANTIBODY SCREEN: NEGATIVE
ARM BAND NUMBER: NORMAL
AST SERPL-CCNC: 33 U/L
BASOPHILS # BLD: 0 % (ref 0–2)
BASOPHILS ABSOLUTE: <0.03 K/UL (ref 0–0.2)
BILIRUB SERPL-MCNC: 0.5 MG/DL (ref 0.3–1.2)
BLD PROD TYP BPU: NORMAL
BLD PROD TYP BPU: NORMAL
BPU ID: NORMAL
BPU ID: NORMAL
BUN BLDV-MCNC: 9 MG/DL (ref 6–20)
CALCIUM SERPL-MCNC: 9 MG/DL (ref 8.6–10.4)
CHLORIDE BLD-SCNC: 101 MMOL/L (ref 98–107)
CO2: 25 MMOL/L (ref 20–31)
CREAT SERPL-MCNC: 0.27 MG/DL (ref 0.5–0.9)
CROSSMATCH RESULT: NORMAL
CROSSMATCH RESULT: NORMAL
DISPENSE STATUS BLOOD BANK: NORMAL
DISPENSE STATUS BLOOD BANK: NORMAL
EOSINOPHILS RELATIVE PERCENT: 0 % (ref 1–4)
EXPIRATION DATE: NORMAL
GFR AFRICAN AMERICAN: >60 ML/MIN
GFR NON-AFRICAN AMERICAN: >60 ML/MIN
GFR SERPL CREATININE-BSD FRML MDRD: ABNORMAL ML/MIN/{1.73_M2}
GLUCOSE BLD-MCNC: 128 MG/DL (ref 70–99)
HCT VFR BLD CALC: 33.5 % (ref 36.3–47.1)
HEMOGLOBIN: 11.9 G/DL (ref 11.9–15.1)
IMMATURE GRANULOCYTES: 1 %
LYMPHOCYTES # BLD: 8 % (ref 24–43)
MCH RBC QN AUTO: 30.7 PG (ref 25.2–33.5)
MCHC RBC AUTO-ENTMCNC: 35.5 G/DL (ref 28.4–34.8)
MCV RBC AUTO: 86.3 FL (ref 82.6–102.9)
MONOCYTES # BLD: 10 % (ref 3–12)
NRBC AUTOMATED: 0 PER 100 WBC
PDW BLD-RTO: 12.4 % (ref 11.8–14.4)
PLATELET # BLD: 277 K/UL (ref 138–453)
PMV BLD AUTO: 11.5 FL (ref 8.1–13.5)
POTASSIUM SERPL-SCNC: 4.1 MMOL/L (ref 3.7–5.3)
RBC # BLD: 3.88 M/UL (ref 3.95–5.11)
SEG NEUTROPHILS: 81 % (ref 36–65)
SEGMENTED NEUTROPHILS ABSOLUTE COUNT: 11.06 K/UL (ref 1.5–8.1)
SODIUM BLD-SCNC: 136 MMOL/L (ref 135–144)
TOTAL PROTEIN: 6.2 G/DL (ref 6.4–8.3)
TRANSFUSION STATUS: NORMAL
TRANSFUSION STATUS: NORMAL
UNIT DIVISION: 0
UNIT DIVISION: 0
WBC # BLD: 13.6 K/UL (ref 3.5–11.3)

## 2022-09-28 PROCEDURE — 6360000002 HC RX W HCPCS: Performed by: PHYSICIAN ASSISTANT

## 2022-09-28 PROCEDURE — 6370000000 HC RX 637 (ALT 250 FOR IP): Performed by: PHYSICIAN ASSISTANT

## 2022-09-28 PROCEDURE — 85025 COMPLETE CBC W/AUTO DIFF WBC: CPT

## 2022-09-28 PROCEDURE — 94640 AIRWAY INHALATION TREATMENT: CPT

## 2022-09-28 PROCEDURE — 97166 OT EVAL MOD COMPLEX 45 MIN: CPT

## 2022-09-28 PROCEDURE — 2580000003 HC RX 258: Performed by: PHYSICIAN ASSISTANT

## 2022-09-28 PROCEDURE — A4216 STERILE WATER/SALINE, 10 ML: HCPCS | Performed by: PHYSICIAN ASSISTANT

## 2022-09-28 PROCEDURE — 97530 THERAPEUTIC ACTIVITIES: CPT

## 2022-09-28 PROCEDURE — 2700000000 HC OXYGEN THERAPY PER DAY

## 2022-09-28 PROCEDURE — 2500000003 HC RX 250 WO HCPCS: Performed by: PHYSICIAN ASSISTANT

## 2022-09-28 PROCEDURE — 2000000000 HC ICU R&B

## 2022-09-28 PROCEDURE — 71045 X-RAY EXAM CHEST 1 VIEW: CPT

## 2022-09-28 PROCEDURE — 97535 SELF CARE MNGMENT TRAINING: CPT

## 2022-09-28 PROCEDURE — 97162 PT EVAL MOD COMPLEX 30 MIN: CPT

## 2022-09-28 PROCEDURE — 94761 N-INVAS EAR/PLS OXIMETRY MLT: CPT

## 2022-09-28 PROCEDURE — 80053 COMPREHEN METABOLIC PANEL: CPT

## 2022-09-28 RX ORDER — ONDANSETRON 2 MG/ML
4 INJECTION INTRAMUSCULAR; INTRAVENOUS EVERY 6 HOURS PRN
Status: DISCONTINUED | OUTPATIENT
Start: 2022-09-28 | End: 2022-10-01 | Stop reason: HOSPADM

## 2022-09-28 RX ADMIN — MORPHINE SULFATE 2 MG: 2 INJECTION, SOLUTION INTRAMUSCULAR; INTRAVENOUS at 19:35

## 2022-09-28 RX ADMIN — KETOROLAC TROMETHAMINE 15 MG: 15 INJECTION, SOLUTION INTRAMUSCULAR; INTRAVENOUS at 02:00

## 2022-09-28 RX ADMIN — ONDANSETRON 4 MG: 2 INJECTION INTRAMUSCULAR; INTRAVENOUS at 14:06

## 2022-09-28 RX ADMIN — MORPHINE SULFATE 2 MG: 2 INJECTION, SOLUTION INTRAMUSCULAR; INTRAVENOUS at 13:19

## 2022-09-28 RX ADMIN — IPRATROPIUM BROMIDE AND ALBUTEROL SULFATE 1 AMPULE: .5; 2.5 SOLUTION RESPIRATORY (INHALATION) at 15:30

## 2022-09-28 RX ADMIN — KETOROLAC TROMETHAMINE 15 MG: 15 INJECTION, SOLUTION INTRAMUSCULAR; INTRAVENOUS at 09:18

## 2022-09-28 RX ADMIN — FAMOTIDINE 20 MG: 10 INJECTION INTRAVENOUS at 19:35

## 2022-09-28 RX ADMIN — SODIUM CHLORIDE, PRESERVATIVE FREE 10 ML: 5 INJECTION INTRAVENOUS at 09:19

## 2022-09-28 RX ADMIN — IPRATROPIUM BROMIDE AND ALBUTEROL SULFATE 1 AMPULE: .5; 2.5 SOLUTION RESPIRATORY (INHALATION) at 19:46

## 2022-09-28 RX ADMIN — IPRATROPIUM BROMIDE AND ALBUTEROL SULFATE 1 AMPULE: .5; 2.5 SOLUTION RESPIRATORY (INHALATION) at 12:40

## 2022-09-28 RX ADMIN — MORPHINE SULFATE 2 MG: 2 INJECTION, SOLUTION INTRAMUSCULAR; INTRAVENOUS at 04:26

## 2022-09-28 RX ADMIN — KETOROLAC TROMETHAMINE 15 MG: 15 INJECTION, SOLUTION INTRAMUSCULAR; INTRAVENOUS at 15:43

## 2022-09-28 RX ADMIN — SODIUM CHLORIDE, PRESERVATIVE FREE 10 ML: 5 INJECTION INTRAVENOUS at 19:35

## 2022-09-28 RX ADMIN — MORPHINE SULFATE 2 MG: 2 INJECTION, SOLUTION INTRAMUSCULAR; INTRAVENOUS at 09:19

## 2022-09-28 RX ADMIN — ENOXAPARIN SODIUM 40 MG: 100 INJECTION SUBCUTANEOUS at 09:17

## 2022-09-28 RX ADMIN — IPRATROPIUM BROMIDE AND ALBUTEROL SULFATE 1 AMPULE: .5; 2.5 SOLUTION RESPIRATORY (INHALATION) at 08:13

## 2022-09-28 RX ADMIN — FAMOTIDINE 20 MG: 20 TABLET, FILM COATED ORAL at 09:18

## 2022-09-28 RX ADMIN — KETOROLAC TROMETHAMINE 15 MG: 15 INJECTION, SOLUTION INTRAMUSCULAR; INTRAVENOUS at 19:35

## 2022-09-28 ASSESSMENT — PAIN SCALES - GENERAL
PAINLEVEL_OUTOF10: 4
PAINLEVEL_OUTOF10: 7
PAINLEVEL_OUTOF10: 5
PAINLEVEL_OUTOF10: 5
PAINLEVEL_OUTOF10: 7
PAINLEVEL_OUTOF10: 4
PAINLEVEL_OUTOF10: 3
PAINLEVEL_OUTOF10: 2
PAINLEVEL_OUTOF10: 4

## 2022-09-28 ASSESSMENT — PAIN DESCRIPTION - PAIN TYPE: TYPE: ACUTE PAIN

## 2022-09-28 ASSESSMENT — PAIN DESCRIPTION - LOCATION
LOCATION: RIB CAGE;SHOULDER
LOCATION: SHOULDER
LOCATION: SHOULDER
LOCATION: BACK;SHOULDER
LOCATION: SHOULDER
LOCATION: BACK;SHOULDER

## 2022-09-28 ASSESSMENT — PAIN DESCRIPTION - DESCRIPTORS
DESCRIPTORS: ACHING

## 2022-09-28 ASSESSMENT — PAIN DESCRIPTION - ORIENTATION
ORIENTATION: LEFT

## 2022-09-28 NOTE — CARE COORDINATION
09/28/22 1554   Service Assessment   Patient Orientation Alert and Oriented   Cognition Alert   History Provided By Patient   Primary Malvin Dos Santos is: Legal Next of Kin   PCP Verified by CM Yes   Last Visit to PCP Within last 3 months   Prior Functional Level Independent in ADLs/IADLs   Can patient return to prior living arrangement Yes   Ability to make needs known: Good   Family able to assist with home care needs: Yes   Social/Functional History   Lives With Spouse; Son   Type of [facility] One level   [facility] to enter with rails; Ramped entrance   Entrance Stairs - Number of Steps 5   Bathroom Shower/Tub Walk-in shower   Bathroom Toilet Standard   Bathroom Equipment Shower chair;Grab bars around toilet   [address]   (Prosthesis (does not have currently, it is getting adjusted), wheelchair, 4-arm crutches, front-wheeled walker.)   Receives Help From Family   Active  Yes   Discharge Planning   Type of Residence Trailer/Mobile Home   Living Arrangements Spouse/Significant Other;Children   Current Services Prior To Admission None   Potential [address] Medications No   Patient expects to be discharged to: Trailer/mobile home   One/Two Story Residence One story   Services At/After Discharge   Transition of Care Consult (CM Consult) Discharge Planning   Services [facility] Discharge Home [facility]   Mode of Transport at Discharge Other (see comment)  (Family.)   Condition of Participation: Discharge Planning   The Plan for Transition of Care is related to the following treatment goals: Pt's goal is to go home. Pt's goal is to home, pt has transportation. Pt has used [facility] in the past and would like a referral there, if [facility] is needed. Referral sent.     [phone] - Placed call to Celsias, left message, call back requested.

## 2022-09-28 NOTE — ANESTHESIA POSTPROCEDURE EVALUATION
Department of Anesthesiology  Postprocedure Note    Patient: Nikki Cornejo  MRN: [id number]  YOB: 1995  Date of evaluation: 9/28/2022      Procedure Summary     Date: 09/27/22 Room / Location: [address]    Anesthesia Start: 7091 Anesthesia Stop: 0971    Procedure: LT MUSCLE SPARING THORACOTOMY, LOWER LOBE SEGMENTAL RESECTION AND EN BLOC CHEST WALL RESECTION (Left) Diagnosis:       Lung mass      (LUNG MASS)    Surgeons: Eze Foy MD Responsible Provider: Isabel Giraldo MD    Anesthesia Type: general ASA Status: 3          Anesthesia Type: No value filed.     Mark Phase I:      Mark Phase II:        Anesthesia Post Evaluation    Patient location during evaluation: ICU  Patient participation: complete - patient participated  Level of consciousness: awake and alert  Airway patency: patent  Nausea & Vomiting: no nausea and no vomiting  Complications: no  Cardiovascular status: blood pressure returned to baseline  Respiratory status: acceptable  Hydration status: euvolemic

## 2022-09-28 NOTE — PROGRESS NOTES
[facility] Cardiothoracic Surgery  Daily Progress Note    Surgeon:  Dr. Yumiko Cid      Subjective:  Ms. Olya Steen is a 33y.o. year-old female status post Left Mini Thoracotomy, LLL Wedge Resection on 9/27/22. Patient was seen and examined at bedside this morning without any complaints. Vital Signs: /85   Pulse (!) 113   Temp 98.6 °F (37 °C) (Oral)   Resp 25   LMP 09/13/2022   SpO2 91%  O2 Flow Rate (L/min): (S) 2 L/min   Admit Weight:     WEIGHT      I/O's:  I/O last 3 completed shifts: In: 1889.4 [I.V.:1889.4]  Out: 1700 [Urine:1415; Chest Tube:285]    Data:    CBC:   Recent Labs     09/28/22  0438   WBC 13.6*   HGB 11.9   HCT 33.5*   MCV 86.3        BMP:   Recent Labs     09/28/22  0438      K 4.1      CO2 25   BUN 9   CREATININE 0.27*     PT/INR: No results for input(s): PROTIME, INR in the last 72 hours. APTT: No results for input(s): APTT in the last 72 hours. Chest X-Ray: 9/28/22  FINDINGS:   There is a right chest Port-A-Cath present. Left-sided chest tubes are   noted. The mediastinal and cardiac contours are stable. There is no pleural   effusion or pneumothorax identified. There is no focal airspace   consolidation. There is improved aeration in the lower lobes. Impression   1. Stable appearance of left-sided chest tubes. 2. No pneumothorax or acute cardiopulmonary process identified.            Scheduled Meds:    [MAR Hold] sodium chloride flush  5-40 mL IntraVENous 2 times per day    [MAR Hold] aspirin  81 mg Oral 60 Min Pre-Op    sodium chloride flush  5-40 mL IntraVENous 2 times per day    famotidine  20 mg Oral BID    Or    famotidine (PEPCID) injection  20 mg IntraVENous BID    enoxaparin  40 mg SubCUTAneous Daily    ipratropium-albuterol  1 ampule Inhalation Q4H WA    ketorolac  15 mg IntraVENous Q6H    lidocaine  1 patch TransDERmal Daily     Continuous Infusions:    [MAR Hold] lactated ringers Stopped (09/27/22 0827)    [facility] Hold] sodium chloride      [MAR Hold] bupivacaine 0.5%      sodium chloride             Physical Exam:    General: A&O x 3, NAD noted  Heart: Normal S1, S2, RRR, No murmurs noted    Lungs: Diminished BS left base. CT's in place to [facility]. No air leak noted   Abdomen: Soft, non tender, non distended, Normal BS x 4   : Stanford in place   Extremities: No edema left LE. Right leg amputation       Assessment & Plan:    Ms. Tyson is a 33y.o. year-old female status post Left Mini Thoracotomy, LLL Wedge Resection on 9/27/22 POD# 1. No issues or events noted with the patient overnight.    - Continue current care and meds  - Follow-up labs, CXR   - DVT prophylaxis with Lovenox and SCD to the left LE   - GI Prophylaxis  - Cardiac diet  - OOB to chair - Ambulation with PT 3x daily  - Incentive Spirometry / Pulmonary hygiene  - d/c stanford  - d/c A-line  - Continue CT's to LWS  - Pain management  - Further recommendations as per Dr. Jayson Blackman      The above recommendations including medications and orders were discussed and agreed upon with Dr. Carlos Edgar.        Charlynne Severin, MBA, PA-C

## 2022-09-28 NOTE — PROGRESS NOTES
Occupational Therapy  Facility/Department: [facility] CAR 1- SICU  Occupational Therapy Initial Assessment    Name: Raf Cherry  : 1995  MRN: [id number]  Date of Service: 2022    Discharge Recommendations:  Patient would benefit from continued therapy after discharge  OT Equipment Recommendations  Equipment Needed: Yes  Mobility Devices: ADL Assistive Devices  ADL Assistive Devices: Toileting - 3-in-1 Commode;Reacher;Long-handled Shoe Horn;Long-handled Sponge;Sock-Aid Hard       Patient Diagnosis(es): The encounter diagnosis was Lung mass. Past Medical History:  has a past medical history of 33 weeks gestation of pregnancy, Antepartum premature rupture of membrane, COVID-19, Gestational diabetes, High-risk pregnancy, unspecified trimester, History of anemia, History of blood transfusion, History of chemotherapy, History of E. Coli UTI 10-<100,000CFU (20), Lung nodule, Malignant neoplasm affecting pregnancy in third trimester, Osteosarcoma, Pregestational diabetes mellitus, modified White class B, Pregnancy induced hypertension, Pseudomonas UTI, Under care of service provider, Under care of service provider, Under care of service provider, and Wears glasses. Past Surgical History:  has a past surgical history that includes Leg amputation at hip (Right, 2020); IR PORT PLACEMENT > 5 YEARS (10/27/2020); CT NEEDLE BIOPSY LUNG PERCUTANEOUS (2022); CT NEEDLE BIOPSY LUNG PERCUTANEOUS (9/15/2022); and Lung lobectomy (Left, 2022). Assessment   Performance deficits / Impairments: Decreased functional mobility ; Decreased ADL status; Decreased endurance;Decreased balance;Decreased high-level IADLs;Decreased ROM; Decreased strength  Assessment: Pt engaged in bed mobility sit<>supine with Mod A x 2. Required significant assist for trunk progression d/t LUE pain. OT facilitated LB dressing to don sock on L foot. Required max A d/t increased pain in L shoulder.  Pt engaged in functional sit<>stand transfers and functional mobility with CGA and use of RW. Unable to hop this date d/t pain in shoulder, but able to shuffle with L foot with chair placed in stand pivot position. Pt grossly limited by decreased balance, endurance, and L shoulder pain. Pt is expected to require skilled OT services during their acute hospitalization stay to address the above noted deficits through skilled occupational therapy intervention for promotion of increased independence throughout ADLs, IADLs and functional mobility tasks. Prognosis: Good  Decision Making: Medium Complexity  REQUIRES OT FOLLOW-UP: Yes  Activity Tolerance  Activity Tolerance: Patient Tolerated treatment well        Plan   Plan  Times per Week: 3-5x/wk  Current Treatment Recommendations: Balance training, Functional mobility training, Endurance training, Safety education & training, Patient/Caregiver education & training, Self-Care / ADL, Equipment evaluation, education, & procurement, Home management training     Restrictions  Restrictions/Precautions  Restrictions/Precautions: Fall Risk  Required Braces or Orthoses?: No  Position Activity Restriction  Other position/activity restrictions: s/p LLL wedge resection 9/27. Hx osteosaroma & R hip disartic. amp., prosthetic being worked on. Subjective   General  Patient assessed for rehabilitation services?: Yes  Family / Caregiver Present: No  General Comment  Comments: RN ok'd for OT/PT eval this AM. Pt agreeable to session, pleasent/cooperative throughout. Pt reports 5-6/10 pain in L shoulder, progressing with increased activity.      Social/Functional History  Social/Functional History  Lives With: Spouse, Son  Type of Home: Mobile home  Home Layout: One level  Home Access: Stairs to enter with rails, Ramped entrance  Entrance Stairs - Number of Steps: 5  Entrance Stairs - Rails: Both  Bathroom Shower/Tub: Walk-in shower  Bathroom Toilet: Standard  Bathroom Equipment: Shower chair, Grab bars around toilet  Bathroom Accessibility: Accessible  Home Equipment:  (Prosthesis (does not have currently, it is getting adjusted), wheelchair, 4-arm crutches, front-wheeled walker.)  Has the patient had two or more falls in the past year or any fall with injury in the past year?: No  Receives Help From: Family  ADL Assistance: Independent  Homemaking Assistance: Independent  Homemaking Responsibilities: Yes  Ambulation Assistance:  (w/c only at home, certain activities such as carrying son. 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 Leisure & Hobbies: read, youtube,  Additional Comments: electric scooter at [employer]       Objective      Safety Devices  Type of Devices: Call light within reach;Nurse notified;Gait belt;Patient at risk for falls; Left in chair  Restraints  Restraints Initially in Place: No    Balance  Sitting:  (Seated EOB unsupported with SUP for static sitting and CGA for dynamic sitting (ROM/MMT, LB dressing, UB dressing) ~15 minutes.)  Standing:  (Pt stood statically for ~45 seconds with CGA and RW. Limited by significant L shoulder pain.)    Gait  Overall Level of Assistance: Contact-guard assistance (Pt completed functional mobility EOB->bedside recliner. Unable to hop, but able to shuffle LLE. Limited d/t L shoulder pain.)     AROM: Generally decreased, functional (See ROM section)  Strength: Generally decreased, functional  Coordination: Within functional limits  Tone: Normal  Sensation: Intact (Denies any numbness/tingling)    ADL  Feeding: Independent;Setup  Grooming: Independent;Setup  Grooming Skilled Clinical Factors: Washed face seated at bedside chair. UE Bathing: Contact guard assistance;Setup; Increased time to complete  LE Bathing: Moderate assistance; Increased time to complete;Setup;Verbal cueing  UE Dressing: Contact guard assistance;Setup; Increased time to complete  LE Dressing: Maximum assistance; Increased time to complete;Setup;Verbal cueing  LE Dressing Skilled Clinical Factors: Max A to don L sock d/t decreased functional reach and pain at chest tube site. Pt able to reach forward to pull sock up once threaded over toes and anlke with significant effort. Toileting: Maximum assistance;Setup; Increased time to complete     Activity Tolerance  Activity Tolerance: Patient tolerated treatment well;Patient limited by pain  Activity Tolerance Comments: L shld pain limiting mobility    Bed mobility  Supine to Sit: 2 Person assistance; Moderate assistance (Assist for LE and trunk progression)  Sit to Supine:  (Retired to bedside chair)  Scooting: Contact guard assistance (Increased time/effort)    Transfers  Sit to stand: Contact guard assistance  Stand to sit: Contact guard assistance  Transfer Comments: Use of RW; increased effort; Limited by increased L shoulder pain     Cognition  Overall Cognitive Status: WFL    Orientation  Overall Orientation Status: Within Normal Limits  Orientation Level: Oriented to person;Oriented to place;Oriented to situation;Oriented to time        Education Provided Comments: Pt edcuated by OT role, OT POC, activity promotion, transfer training, safety awareness, balance maint. , importance of AROM/PROM-good return  LUE PROM (degrees)  LUE PROM: WFL  LUE AROM (degrees)  LUE AROM : Exceptions  L Shoulder Flexion 0-180: 0-70, distal to shoulder WFL  Left Hand AROM (degrees)  Left Hand AROM: WFL  RUE AROM (degrees)  RUE AROM : WFL  Right Hand AROM (degrees)  Right Hand AROM: [facility]                 AM-PAC Score        AM-[facility] Inpatient Daily Activity Raw Score: 17 (09/28/22 1620)  AM-PAC Inpatient ADL T-Scale Score : 37.26 (09/28/22 1620)  ADL Inpatient CMS 0-100% Score: 50.11 (09/28/22 1620)  ADL Inpatient CMS G-Code Modifier : CK (09/28/22 1620)         Goals  Short Term Goals  Time Frame for Short term goals: By discharge, pt will:  Short Term Goal 1: Demo bed mobility sit<>supine with Min A to promote increased engagement in EOB/OOB functional activities  Short Term Goal 2: Demo functional sit<>stand transfers and functional mobility with SBA and use of LRD PRN  Short Term Goal 3: Demo 15 minutes of dynamic sitting balance with SUP, reaching outside ADALID, to promote increased independence throughout ADLs/IADLs  Short Term Goal 4: Demo UB ADLs with Mod IND  Short Term Goal 5: Demo LB ADLs/toileting with Min A and use of DME PRN  Short Term Goal 6: Participate in AROM/self PROM for +5 minutes to L shoulder each visit to promote increased functional use throughout ADLs/IADLs       Therapy Time   Individual Concurrent Group Co-treatment   Time In 0826         Time Out 0913         Minutes 47         Timed Code Treatment Minutes: 23 Minutes       JILLIAN Rivera/WILLIAM

## 2022-09-28 NOTE — PROGRESS NOTES
Physical Therapy  Facility/Department: [facility] CAR 1- SICU  Physical Therapy Initial Assessment    Name: Max Mansfield  : 1995  MRN: [id number]  Date of Service: 2022    No chief complaint on file. -s/p LLL wedge resection . Hx osteosaroma & R hip disartic. amp. Discharge Recommendations:    Further therapy recommended at discharge. PT Equipment Recommendations  Equipment Needed: No  Other: Pt has forearm crutches, RW, and manual w/c      Patient Diagnosis(es): The encounter diagnosis was Lung mass. Past Medical History:  has a past medical history of 33 weeks gestation of pregnancy, Antepartum premature rupture of membrane, COVID-19, Gestational diabetes, High-risk pregnancy, unspecified trimester, History of anemia, History of blood transfusion, History of chemotherapy, History of E. Coli UTI 10-<100,000CFU (20), Lung nodule, Malignant neoplasm affecting pregnancy in third trimester, Osteosarcoma, Pregestational diabetes mellitus, modified White class B, Pregnancy induced hypertension, Pseudomonas UTI, Under care of service provider, Under care of service provider, Under care of service provider, and Wears glasses. Past Surgical History:  has a past surgical history that includes Leg amputation at hip (Right, 2020); IR PORT PLACEMENT > 5 YEARS (10/27/2020); CT NEEDLE BIOPSY LUNG PERCUTANEOUS (2022); and CT NEEDLE BIOPSY LUNG PERCUTANEOUS (9/15/2022). Assessment   Body Structures, Functions, Activity Limitations Requiring Skilled Therapeutic Intervention: Decreased functional mobility ; Decreased strength;Decreased endurance;Decreased balance  Assessment: Pt modA+Sujata supine to sit, CGA transfers and step pivot to chair. L shld pain limiting mobility. Pt would benefit from continued acute PT to address deficits.   Therapy Prognosis: Good  Decision Making: Medium Complexity  Requires PT Follow-Up: Yes  Activity Tolerance  Activity Tolerance: Patient tolerated treatment well;Patient limited by pain  Activity Tolerance Comments: L shld pain limiting mobility     Plan   Plan  Plan:  (5-6x/wk)  Current Treatment Recommendations: Strengthening, Balance training, Gait training, Functional mobility training, Stair training, Transfer training, Endurance training, Home exercise program, Safety education & training, Patient/Caregiver education & training, Equipment evaluation, education, & procurement, Therapeutic activities  Safety Devices  Type of Devices: Call light within reach, Nurse notified, Gait belt, Patient at risk for falls, Left in chair, All fall risk precautions in place  Restraints  Restraints Initially in Place: No     Restrictions  Restrictions/Precautions  Restrictions/Precautions: General Precautions, Fall Risk  Required Braces or Orthoses?: No  Position Activity Restriction  Other position/activity restrictions: s/p LLL wedge resection 9/27. Hx osteosaroma & R hip disartic. amp., prosthetic being worked on. Subjective   General  Chart Reviewed: Yes  Patient assessed for rehabilitation services?: Yes  Response To Previous Treatment: Not applicable  Family / Caregiver Present: No  Follows Commands: Within Functional Limits  General Comment  Comments: RN and pt agreeable to PT. Pt alert in bed upon arrival. OT co-eval  Subjective  Subjective: Pt reports 5-6/10 pain L shld and side. Pt denies any numbness or tingling.          Social/Functional History  Social/Functional History  Lives With: Spouse, Son (3y/o.  works, / EMT.)  Type of Home: Mobile home  Home Layout: One level  Home Access: Ramped entrance, Stairs to enter with rails  Entrance Stairs - Number of Steps: 5  Entrance Stairs - Rails: Both  Bathroom Shower/Tub: Walk-in shower  Bathroom Toilet: Standard  Bathroom Equipment: Shower chair, Grab bars around toilet (grab bar just outside of shower)  Bathroom Accessibility: [address] [place] [address] Rd: Cayla [place], rolling, [employer] (forearm crutches. mainly uses crutches, including comunity distances)  Has the patient had two or more falls in the past year or any fall with injury in the past year?: No  Receives Help From: Family  ADL Assistance: [address]: Independent  Homemaking Responsibilities: Yes  Ambulation Assistance:  (w/c only at home, certain activities such as carrying son. mainly crutches.)  Active : Yes  Mode of Transportation: 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 Leisure & Hobbies: read, youtube,  Additional Comments: electric scooter at [employer]  [address]: Impaired  Vision Exceptions: Wears glasses for distance  Hearing  Hearing: Within functional limits    Cognition   Orientation  Overall Orientation Status: Within Normal Limits  Orientation Level: Oriented to person;Oriented to place;Oriented to situation;Oriented to time  Cognition  Overall Cognitive Status: WFL     Objective     AROM RLE (degrees)  RLE General AROM: Hx hip disartic. amp  AROM LLE (degrees)  LLE AROM : WFL  AROM RUE (degrees)  RUE AROM : WFL  AROM LUE (degrees)  LUE AROM : WFL  Strength RLE  Comment: Hx amp  Strength LLE  Strength LLE: WNL  Strength RUE  Comment: antigravity minimum, see OT  Strength LUE  Comment: Shld pain, antigravity with difficulty, see OT           Bed mobility  Supine to Sit: Minimal assistance; Moderate assistance (modA trunk progress, Sujata LLE.)  Sit to Supine:  (left in chair)  Scooting: Contact guard assistance (increased time and effort)  Transfers  Sit to Stand: Contact guard assistance  Stand to sit: Contact guard assistance  Comment: stood to RW, cues to sequence  Ambulation  Surface: level tile  Device: Rolling Walker  Assistance: Contact guard assistance  Quality of Gait: L shld pain limiting, able to scoot on LLE CGA  Distance: 1'  More Ambulation?: No  Stairs/Curb  Stairs?: No     Balance  Posture: Fair  Sitting - Dynamic: Good;-  Standing - Static: Fair;+  Standing - Dynamic: Fair  Comments: RW used while assessing standing balance  Exercise Treatment: increased time for mobility, c/o pain L side and L shld limiting. Pt denies dizziness throughout. 02 removed for mobility, Sp02 maintained 92-93% with good wave, RN notified. AM-PAC Score  AM-PAC Inpatient Mobility Raw Score : 13 (09/28/22 1109)  AM-PAC Inpatient T-Scale Score : 36.74 (09/28/22 1109)  Mobility Inpatient CMS 0-100% Score: 64.91 (09/28/22 1109)  Mobility Inpatient CMS G-Code Modifier : CL (09/28/22 1109)        Goals  Short Term Goals  Time Frame for Short term goals: 14 visits  Short term goal 1: Pt will be Giselle bed mobility  Short term goal 2: Pt will be Giselle transfers  Short term goal 3: Pt will be Giselle amb 200' RW or least restrictive AD  Short term goal 4: Pt will navigate 6 steps Giselle either or B rail use.        Education  Patient Education  Education Given To: Patient  Education Provided: Role of Therapy;Plan of Care  Education Method: Demonstration;Verbal  Barriers to Learning: None  Education Outcome: Verbalized understanding;Demonstrated understanding      Therapy Time   Individual Concurrent Group Co-treatment   Time In 0825         Time Out 0912         Minutes 47         Timed Code Treatment Minutes: 21 Minutes       Brisa Josue, PT

## 2022-09-28 NOTE — PROGRESS NOTES
[address] [facility] [address]  PROGRESS NOTE    Shift date: 9.27.2022  Shift day: Tuesday   Shift # 2    Room # 1006/1006-01   Name: Juan Anaya                Shinto: unknown   Place of Roman Catholic: unknown    Referral: Routine Visit    Admit Date & Time: 9/27/2022  6:27 AM    Assessment:  Juan Anaya is a 33 y.o. female in the hospital after surgery. Upon entering the room writer observes family bedside coping. Patient recovering after surgery and still medicated for pain. Patient appears to be coping well and in good spirits. Intervention:  Writer introduced self and title as  Writer offered space for the family and patinet  to express feelings, needs, and concerns and provided a ministry presence. Determined support to be available. Outcome:  Calm and coping    Plan:  Chaplains will remain available to offer spiritual and emotional support as needed. Electronically signed by Hayde Fisher on 9/27/2022 at 9:04 PM.  [facility]  [phone]       09/27/22 1945   Encounter Summary   Service Provided For: Patient and family together   Referral/Consult From: [facility] Family members;Significant other   Last Encounter  09/27/22   Complexity of Encounter Moderate   Begin Time 1945   End Time  2000   Total Time Calculated 15 min   Encounter    Type Initial Screen/Assessment   Assessment/Intervention/Outcome   Assessment Calm;Coping   Intervention Active listening;Discussed illness injury and its impact; Explored/Affirmed feelings, thoughts, concerns   Outcome Expressed feelings, needs, and concerns;Coping     Electronically signed by Chuy Malone on 9/27/2022 at 9:04 PM

## 2022-09-28 NOTE — PLAN OF CARE
Problem: Discharge Planning  Goal: Discharge to home or other facility with appropriate resources  Outcome: Progressing  Flowsheets (Taken 9/28/2022 0800)  Discharge to home or other facility with appropriate resources:   Identify barriers to discharge with patient and caregiver   Arrange for needed discharge resources and transportation as appropriate   Identify discharge learning needs (meds, wound care, etc)     Problem: Safety - Adult  Goal: Free from fall injury  Outcome: Progressing     Problem: Chronic Conditions and Co-morbidities  Goal: Patient's chronic conditions and co-morbidity symptoms are monitored and maintained or improved  Outcome: Progressing  Flowsheets (Taken 9/28/2022 0800)  Care Plan - Patient's Chronic Conditions and Co-Morbidity Symptoms are Monitored and Maintained or Improved:   Monitor and assess patient's chronic conditions and comorbid symptoms for stability, deterioration, or improvement   Update acute care plan with appropriate goals if chronic or comorbid symptoms are exacerbated and prevent overall improvement and discharge   Collaborate with multidisciplinary team to address chronic and comorbid conditions and prevent exacerbation or deterioration     Problem: Pain  Goal: Verbalizes/displays adequate comfort level or baseline comfort level  Outcome: Progressing  Flowsheets  Taken 9/28/2022 1000  Verbalizes/displays adequate comfort level or baseline comfort level:   Encourage patient to monitor pain and request assistance   Assess pain using appropriate pain scale   Administer analgesics based on type and severity of pain and evaluate response   Implement non-pharmacological measures as appropriate and evaluate response  Taken 9/28/2022 0800  Verbalizes/displays adequate comfort level or baseline comfort level:   Encourage patient to monitor pain and request assistance   Assess pain using appropriate pain scale   Administer analgesics based on type and severity of pain and evaluate response   Implement non-pharmacological measures as appropriate and evaluate response     Problem: Respiratory - Adult  Goal: Achieves optimal ventilation and oxygenation  Outcome: Progressing  Flowsheets (Taken 9/28/2022 0800)  Achieves optimal ventilation and oxygenation:   Assess for changes in respiratory status   Assess for changes in mentation and behavior   Position to facilitate oxygenation and minimize respiratory effort   Oxygen supplementation based on oxygen saturation or arterial blood gases

## 2022-09-29 ENCOUNTER — APPOINTMENT (OUTPATIENT)
Dept: GENERAL RADIOLOGY | Age: 27
DRG: 164 | End: 2022-09-29
Attending: THORACIC SURGERY (CARDIOTHORACIC VASCULAR SURGERY)
Payer: COMMERCIAL

## 2022-09-29 LAB
ANION GAP SERPL CALCULATED.3IONS-SCNC: 6 MMOL/L (ref 9–17)
BUN BLDV-MCNC: 7 MG/DL (ref 6–20)
CALCIUM SERPL-MCNC: 8.7 MG/DL (ref 8.6–10.4)
CHLORIDE BLD-SCNC: 103 MMOL/L (ref 98–107)
CO2: 27 MMOL/L (ref 20–31)
CREAT SERPL-MCNC: 0.35 MG/DL (ref 0.5–0.9)
GFR AFRICAN AMERICAN: >60 ML/MIN
GFR NON-AFRICAN AMERICAN: >60 ML/MIN
GFR SERPL CREATININE-BSD FRML MDRD: ABNORMAL ML/MIN/{1.73_M2}
GLUCOSE BLD-MCNC: 114 MG/DL (ref 70–99)
HCT VFR BLD CALC: 34.6 % (ref 36.3–47.1)
HEMOGLOBIN: 11.9 G/DL (ref 11.9–15.1)
MCH RBC QN AUTO: 30 PG (ref 25.2–33.5)
MCHC RBC AUTO-ENTMCNC: 34.4 G/DL (ref 28.4–34.8)
MCV RBC AUTO: 87.2 FL (ref 82.6–102.9)
NRBC AUTOMATED: 0 PER 100 WBC
PDW BLD-RTO: 12.5 % (ref 11.8–14.4)
PLATELET # BLD: 240 K/UL (ref 138–453)
PMV BLD AUTO: 10.8 FL (ref 8.1–13.5)
POTASSIUM SERPL-SCNC: 4 MMOL/L (ref 3.7–5.3)
RBC # BLD: 3.97 M/UL (ref 3.95–5.11)
SODIUM BLD-SCNC: 136 MMOL/L (ref 135–144)
WBC # BLD: 11.8 K/UL (ref 3.5–11.3)

## 2022-09-29 PROCEDURE — 6370000000 HC RX 637 (ALT 250 FOR IP): Performed by: STUDENT IN AN ORGANIZED HEALTH CARE EDUCATION/TRAINING PROGRAM

## 2022-09-29 PROCEDURE — 6370000000 HC RX 637 (ALT 250 FOR IP): Performed by: PHYSICIAN ASSISTANT

## 2022-09-29 PROCEDURE — 94640 AIRWAY INHALATION TREATMENT: CPT

## 2022-09-29 PROCEDURE — 6360000002 HC RX W HCPCS: Performed by: PHYSICIAN ASSISTANT

## 2022-09-29 PROCEDURE — 97530 THERAPEUTIC ACTIVITIES: CPT

## 2022-09-29 PROCEDURE — 2000000000 HC ICU R&B

## 2022-09-29 PROCEDURE — 2580000003 HC RX 258: Performed by: PHYSICIAN ASSISTANT

## 2022-09-29 PROCEDURE — 71045 X-RAY EXAM CHEST 1 VIEW: CPT

## 2022-09-29 PROCEDURE — 80048 BASIC METABOLIC PNL TOTAL CA: CPT

## 2022-09-29 PROCEDURE — 85027 COMPLETE CBC AUTOMATED: CPT

## 2022-09-29 PROCEDURE — 36415 COLL VENOUS BLD VENIPUNCTURE: CPT

## 2022-09-29 PROCEDURE — 94761 N-INVAS EAR/PLS OXIMETRY MLT: CPT

## 2022-09-29 PROCEDURE — 97110 THERAPEUTIC EXERCISES: CPT

## 2022-09-29 RX ORDER — METHOCARBAMOL 750 MG/1
750 TABLET, FILM COATED ORAL EVERY 6 HOURS PRN
Status: DISCONTINUED | OUTPATIENT
Start: 2022-09-29 | End: 2022-10-01 | Stop reason: HOSPADM

## 2022-09-29 RX ADMIN — SODIUM CHLORIDE, PRESERVATIVE FREE 10 ML: 5 INJECTION INTRAVENOUS at 20:39

## 2022-09-29 RX ADMIN — MORPHINE SULFATE 2 MG: 2 INJECTION, SOLUTION INTRAMUSCULAR; INTRAVENOUS at 10:59

## 2022-09-29 RX ADMIN — SODIUM CHLORIDE, PRESERVATIVE FREE 10 ML: 5 INJECTION INTRAVENOUS at 08:17

## 2022-09-29 RX ADMIN — IPRATROPIUM BROMIDE AND ALBUTEROL SULFATE 1 AMPULE: .5; 2.5 SOLUTION RESPIRATORY (INHALATION) at 08:08

## 2022-09-29 RX ADMIN — ENOXAPARIN SODIUM 40 MG: 100 INJECTION SUBCUTANEOUS at 08:17

## 2022-09-29 RX ADMIN — MORPHINE SULFATE 2 MG: 2 INJECTION, SOLUTION INTRAMUSCULAR; INTRAVENOUS at 13:57

## 2022-09-29 RX ADMIN — IPRATROPIUM BROMIDE AND ALBUTEROL SULFATE 1 AMPULE: .5; 2.5 SOLUTION RESPIRATORY (INHALATION) at 20:01

## 2022-09-29 RX ADMIN — IPRATROPIUM BROMIDE AND ALBUTEROL SULFATE 1 AMPULE: .5; 2.5 SOLUTION RESPIRATORY (INHALATION) at 11:59

## 2022-09-29 RX ADMIN — FAMOTIDINE 20 MG: 20 TABLET, FILM COATED ORAL at 08:17

## 2022-09-29 RX ADMIN — KETOROLAC TROMETHAMINE 15 MG: 15 INJECTION, SOLUTION INTRAMUSCULAR; INTRAVENOUS at 08:17

## 2022-09-29 RX ADMIN — METHOCARBAMOL 750 MG: 750 TABLET ORAL at 20:38

## 2022-09-29 RX ADMIN — FAMOTIDINE 20 MG: 20 TABLET, FILM COATED ORAL at 20:38

## 2022-09-29 RX ADMIN — METHOCARBAMOL 750 MG: 750 TABLET ORAL at 13:55

## 2022-09-29 RX ADMIN — KETOROLAC TROMETHAMINE 15 MG: 15 INJECTION, SOLUTION INTRAMUSCULAR; INTRAVENOUS at 02:00

## 2022-09-29 ASSESSMENT — PAIN SCALES - GENERAL
PAINLEVEL_OUTOF10: 5
PAINLEVEL_OUTOF10: 8
PAINLEVEL_OUTOF10: 5
PAINLEVEL_OUTOF10: 3
PAINLEVEL_OUTOF10: 8
PAINLEVEL_OUTOF10: 7
PAINLEVEL_OUTOF10: 4

## 2022-09-29 ASSESSMENT — PAIN DESCRIPTION - ORIENTATION
ORIENTATION: LEFT
ORIENTATION: LEFT

## 2022-09-29 ASSESSMENT — PAIN DESCRIPTION - LOCATION
LOCATION: RIB CAGE
LOCATION: RIB CAGE

## 2022-09-29 NOTE — PROGRESS NOTES
Tolerance  Activity Tolerance: Patient tolerated treatment well;Patient limited by pain  Activity Tolerance Comments: L shld pain limiting mobility     Plan   Plan  Plan:  (5-6x/wk)  Current Treatment Recommendations: Strengthening, Balance training, Gait training, Functional mobility training, Stair training, Transfer training, Endurance training, Home exercise program, Safety education & training, Patient/Caregiver education & training, Equipment evaluation, education, & procurement, Therapeutic activities  Safety Devices  Type of Devices: Call light within reach, Nurse notified, Gait belt, Patient at risk for falls, Left in bed  Restraints  Restraints Initially in Place: No     Restrictions  Restrictions/Precautions  Restrictions/Precautions: Fall Risk  Required Braces or Orthoses?: No  Position Activity Restriction  Other position/activity restrictions: s/p LLL wedge resection 9/27. Hx osteosaroma & R hip disartic. amp., prosthetic being worked on. Subjective   General  Chart Reviewed: Yes  Response To Previous Treatment: Patient with no complaints from previous session. Family / Caregiver Present: No  Follows Commands: Within Functional Limits  General Comment  Comments: RN and pt agreeable to PT. Pt alert in bed upon arrival. =  Subjective  Subjective: Pt reports 5-6/10 pain L shld and side. Pt denies any numbness or tingling.              Cognition   Orientation  Overall Orientation Status: Within Normal Limits  Orientation Level: Oriented to person;Oriented to place;Oriented to situation;Oriented to time  Cognition  Overall Cognitive Status: WFL     Objective   Bed mobility  Supine to Sit: Maximum assistance  Sit to Supine: Maximum assistance  Scooting: Contact guard assistance  Transfers  Sit to Stand: Minimal Assistance  Stand to sit: Minimal Assistance        Balance  Posture: Fair  Sitting - Dynamic: Good;-  Standing - Static: Fair;+  Standing - Dynamic: Fair  Comments: RW used while assessing standing balance  A/AROM Exercises: Anklepumps x20,SLRx10 heelslides x10 LLE          AM-PAC Score  AM-PAC Inpatient Mobility Raw Score : 13 (09/29/22 1619)  AM-PAC Inpatient T-Scale Score : 36.74 (09/29/22 1619)  Mobility Inpatient CMS 0-100% Score: 64.91 (09/29/22 1619)  Mobility Inpatient CMS G-Code Modifier : CL (09/29/22 1619)          Goals  Short Term Goals  Time Frame for Short term goals: 14 visits  Short term goal 1: Pt will be Giselle bed mobility  Short term goal 2: Pt will be Giselle transfers  Short term goal 3: Pt will be Giselle amb 200' RW or least restrictive AD  Short term goal 4: Pt will navigate 6 steps Giselle either or B rail use.               Therapy Time   Individual Concurrent Group Co-treatment   Time In 1520         Time Out 1600         Minutes 40         Timed Code Treatment Minutes: [address], PTA

## 2022-09-29 NOTE — PLAN OF CARE
Problem: Respiratory - Adult  Goal: Achieves optimal ventilation and oxygenation  9/28/2022 2131 by Clinton Mtz RCP  Outcome: Progressing   BRONCHOSPASM/BRONCHOCONSTRICTION     [x]         IMPROVE AERATION/BREATH SOUNDS  [x]   ADMINISTER BRONCHODILATOR THERAPY AS APPROPRIATE  [x]   ASSESS BREATH SOUNDS  []   IMPLEMENT AEROSOL/MDI PROTOCOL  [x]   PATIENT EDUCATION AS NEEDED

## 2022-09-29 NOTE — PLAN OF CARE
Problem: Discharge Planning  Goal: Discharge to home or other facility with appropriate resources  9/29/2022 0529 by Chantal Woody RN  Outcome: Progressing  9/28/2022 1856 by Olimpia Brown RN  Outcome: Progressing  Flowsheets (Taken 9/28/2022 0800)  Discharge to home or other facility with appropriate resources:   Identify barriers to discharge with patient and caregiver   Arrange for needed discharge resources and transportation as appropriate   Identify discharge learning needs (meds, wound care, etc)     Problem: Safety - Adult  Goal: Free from fall injury  9/29/2022 0529 by Chantal Woody RN  Outcome: Progressing  9/28/2022 1856 by Olimpia Brown RN  Outcome: Progressing     Problem: Chronic Conditions and Co-morbidities  Goal: Patient's chronic conditions and co-morbidity symptoms are monitored and maintained or improved  9/29/2022 0529 by Chantal Woody RN  Outcome: Progressing  9/28/2022 1856 by Olimpia Brown RN  Outcome: Progressing  Flowsheets (Taken 9/28/2022 0800)  Care Plan - Patient's Chronic Conditions and Co-Morbidity Symptoms are Monitored and Maintained or Improved:   Monitor and assess patient's chronic conditions and comorbid symptoms for stability, deterioration, or improvement   Update acute care plan with appropriate goals if chronic or comorbid symptoms are exacerbated and prevent overall improvement and discharge   Collaborate with multidisciplinary team to address chronic and comorbid conditions and prevent exacerbation or deterioration     Problem: Pain  Goal: Verbalizes/displays adequate comfort level or baseline comfort level  9/29/2022 0529 by Chantal Woody RN  Outcome: Progressing  9/28/2022 1856 by Olimpia Brown RN  Outcome: Progressing  Flowsheets  Taken 9/28/2022 1000  Verbalizes/displays adequate comfort level or baseline comfort level:   Encourage patient to monitor pain and request assistance   Assess pain using appropriate pain scale   Administer analgesics based on type and severity of pain and evaluate response   Implement non-pharmacological measures as appropriate and evaluate response  Taken 9/28/2022 0800  Verbalizes/displays adequate comfort level or baseline comfort level:   Encourage patient to monitor pain and request assistance   Assess pain using appropriate pain scale   Administer analgesics based on type and severity of pain and evaluate response   Implement non-pharmacological measures as appropriate and evaluate response     Problem: Respiratory - Adult  Goal: Achieves optimal ventilation and oxygenation  9/29/2022 0529 by Luz Bansal RN  Outcome: Progressing  9/28/2022 2131 by Wilmer Avila RCP  Outcome: Progressing  9/28/2022 1856 by Dom Naik RN  Outcome: Progressing  Flowsheets (Taken 9/28/2022 0800)  Achieves optimal ventilation and oxygenation:   Assess for changes in respiratory status   Assess for changes in mentation and behavior   Position to facilitate oxygenation and minimize respiratory effort   Oxygen supplementation based on oxygen saturation or arterial blood gases

## 2022-09-29 NOTE — PROGRESS NOTES
[facility] Cardiothoracic Surgery  Daily Progress Note    Surgeon:  Dr. Tim Zelaya      Subjective:  Ms. Lisa Asenico is a 33y.o. year-old female status post Left Mini Thoracotomy, LLL Wedge Resection on 9/27/22. Patient was seen and examined at bedside this morning without any complaints. Vital Signs: /78   Pulse (!) 115   Temp 98.8 °F (37.1 °C) (Oral)   Resp 20   LMP 09/13/2022   SpO2 95%  O2 Flow Rate (L/min): (S) 2 L/min   Admit Weight:     WEIGHT      I/O's:  I/O last 3 completed shifts: In: 150 [P.O.:150]  Out: 2917 [Urine:2720; Chest Tube:197]    Data:    CBC:   Recent Labs     09/28/22  0438 09/29/22  0244   WBC 13.6* 11.8*   HGB 11.9 11.9   HCT 33.5* 34.6*   MCV 86.3 87.2    240       BMP:   Recent Labs     09/28/22  0438 09/29/22  0244    136   K 4.1 4.0    103   CO2 25 27   BUN 9 7   CREATININE 0.27* 0.35*       PT/INR: No results for input(s): PROTIME, INR in the last 72 hours. APTT: No results for input(s): APTT in the last 72 hours. Chest X-Ray: 9/29/22  FINDINGS:   Right jugular chest port catheter tip remains in the right atrium. There are   2 chest tubes on the left without a significant pneumothorax identified. Lung volumes remain low. Stable cardiomediastinal silhouette. Minimal   streaky left perihilar and lower lobe opacity accentuated by low lung   volumes.   Monitor leads overlie the chest.           Impression   Relatively stable exam.             Scheduled Meds:    [MAR Hold] sodium chloride flush  5-40 mL IntraVENous 2 times per day    [MAR Hold] aspirin  81 mg Oral 60 Min Pre-Op    sodium chloride flush  5-40 mL IntraVENous 2 times per day    famotidine  20 mg Oral BID    Or    famotidine (PEPCID) injection  20 mg IntraVENous BID    enoxaparin  40 mg SubCUTAneous Daily    ipratropium-albuterol  1 ampule Inhalation Q4H WA    lidocaine  1 patch TransDERmal Daily     Continuous Infusions:    [MAR Hold] lactated ringers Stopped (09/27/22 Shelia    [MAR Hold] sodium chloride      [MAR Hold] bupivacaine 0.5%      sodium chloride           Physical Exam:    General: A&O x 3, NAD noted  Heart: Normal S1, S2, RRR, No murmurs noted    Lungs: Diminished BS left base. CT's in place to [facility]. No air leak noted   Abdomen: Soft, non tender, non distended, Normal BS x 4   Extremities: No edema left LE. Right leg amputation       Assessment & Plan:    Ms. Kelvin Pérez is a 33y.o. year-old female status post Left Mini Thoracotomy, LLL Wedge Resection on 9/27/22 POD# 2. No issues or events noted with the patient overnight.    - Continue current care and meds  - Follow-up labs, CXR   - d/c CT's. Will obtain a CXR post CT removal to r/o a PTX   - Add Robaxin PRN for pain management   - DVT prophylaxis with Lovenox and SCD to the left LE   - GI Prophylaxis  - Cardiac diet  - OOB to chair - Ambulation with PT 3x daily  - Incentive Spirometry / Pulmonary hygiene  - Continue CT's to LWS  - Pain management  - Transfer to CAR 2 today   - Further recommendations as per Dr. Shaila Fraire      The above recommendations including medications and orders were discussed and agreed upon with Dr. Vinny Atkinson.        Murray Lutz MBA, PA-C

## 2022-09-29 NOTE — PLAN OF CARE
Problem: Discharge Planning  Goal: Discharge to home or other facility with appropriate resources  9/29/2022 1127 by Lidia Ruvalcaba RN  Outcome: Progressing  9/29/2022 0529 by Joe Stark RN  Outcome: Progressing     Problem: Safety - Adult  Goal: Free from fall injury  9/29/2022 1127 by Lidia Ruvalcaba RN  Outcome: Progressing  9/29/2022 0529 by Joe Stark RN  Outcome: Progressing     Problem: Chronic Conditions and Co-morbidities  Goal: Patient's chronic conditions and co-morbidity symptoms are monitored and maintained or improved  9/29/2022 1127 by Lidia Ruvalcaba RN  Outcome: Progressing  9/29/2022 0529 by Joe Stark RN  Outcome: Progressing     Problem: Pain  Goal: Verbalizes/displays adequate comfort level or baseline comfort level  9/29/2022 1127 by Lidia Ruvalcaba RN  Outcome: Progressing  9/29/2022 0529 by Joe Stark RN  Outcome: Progressing     Problem: Respiratory - Adult  Goal: Achieves optimal ventilation and oxygenation  9/29/2022 1127 by Lidia Ruvalcaba RN  Outcome: Progressing  9/29/2022 0529 by Joe Stark RN  Outcome: Progressing  9/28/2022 2131 by Devan Valentine RCP  Outcome: Progressing

## 2022-09-30 ENCOUNTER — APPOINTMENT (OUTPATIENT)
Dept: GENERAL RADIOLOGY | Age: 27
DRG: 164 | End: 2022-09-30
Attending: THORACIC SURGERY (CARDIOTHORACIC VASCULAR SURGERY)
Payer: COMMERCIAL

## 2022-09-30 LAB
ANION GAP SERPL CALCULATED.3IONS-SCNC: 12 MMOL/L (ref 9–17)
BUN BLDV-MCNC: 6 MG/DL (ref 6–20)
CALCIUM SERPL-MCNC: 8.6 MG/DL (ref 8.6–10.4)
CHLORIDE BLD-SCNC: 103 MMOL/L (ref 98–107)
CO2: 24 MMOL/L (ref 20–31)
CREAT SERPL-MCNC: 0.32 MG/DL (ref 0.5–0.9)
GFR AFRICAN AMERICAN: >60 ML/MIN
GFR NON-AFRICAN AMERICAN: >60 ML/MIN
GFR SERPL CREATININE-BSD FRML MDRD: ABNORMAL ML/MIN/{1.73_M2}
GLUCOSE BLD-MCNC: 108 MG/DL (ref 70–99)
HCT VFR BLD CALC: 33 % (ref 36.3–47.1)
HEMOGLOBIN: 11.4 G/DL (ref 11.9–15.1)
MCH RBC QN AUTO: 29.8 PG (ref 25.2–33.5)
MCHC RBC AUTO-ENTMCNC: 34.5 G/DL (ref 28.4–34.8)
MCV RBC AUTO: 86.4 FL (ref 82.6–102.9)
NRBC AUTOMATED: 0 PER 100 WBC
PDW BLD-RTO: 12.3 % (ref 11.8–14.4)
PLATELET # BLD: 230 K/UL (ref 138–453)
PMV BLD AUTO: 10.7 FL (ref 8.1–13.5)
POTASSIUM SERPL-SCNC: 3.9 MMOL/L (ref 3.7–5.3)
RBC # BLD: 3.82 M/UL (ref 3.95–5.11)
SODIUM BLD-SCNC: 139 MMOL/L (ref 135–144)
SURGICAL PATHOLOGY REPORT: NORMAL
WBC # BLD: 10.2 K/UL (ref 3.5–11.3)

## 2022-09-30 PROCEDURE — 2580000003 HC RX 258: Performed by: PHYSICIAN ASSISTANT

## 2022-09-30 PROCEDURE — 6370000000 HC RX 637 (ALT 250 FOR IP): Performed by: PHYSICIAN ASSISTANT

## 2022-09-30 PROCEDURE — 36415 COLL VENOUS BLD VENIPUNCTURE: CPT

## 2022-09-30 PROCEDURE — 85027 COMPLETE CBC AUTOMATED: CPT

## 2022-09-30 PROCEDURE — 97530 THERAPEUTIC ACTIVITIES: CPT

## 2022-09-30 PROCEDURE — 6360000002 HC RX W HCPCS: Performed by: PHYSICIAN ASSISTANT

## 2022-09-30 PROCEDURE — 71045 X-RAY EXAM CHEST 1 VIEW: CPT

## 2022-09-30 PROCEDURE — 80048 BASIC METABOLIC PNL TOTAL CA: CPT

## 2022-09-30 PROCEDURE — 94640 AIRWAY INHALATION TREATMENT: CPT

## 2022-09-30 PROCEDURE — 97116 GAIT TRAINING THERAPY: CPT

## 2022-09-30 PROCEDURE — 97110 THERAPEUTIC EXERCISES: CPT

## 2022-09-30 PROCEDURE — 2060000000 HC ICU INTERMEDIATE R&B

## 2022-09-30 PROCEDURE — 6370000000 HC RX 637 (ALT 250 FOR IP): Performed by: STUDENT IN AN ORGANIZED HEALTH CARE EDUCATION/TRAINING PROGRAM

## 2022-09-30 RX ORDER — OXYCODONE HYDROCHLORIDE AND ACETAMINOPHEN 5; 325 MG/1; MG/1
1 TABLET ORAL EVERY 4 HOURS PRN
Status: DISCONTINUED | OUTPATIENT
Start: 2022-09-30 | End: 2022-10-01 | Stop reason: HOSPADM

## 2022-09-30 RX ORDER — OXYCODONE HYDROCHLORIDE AND ACETAMINOPHEN 5; 325 MG/1; MG/1
2 TABLET ORAL EVERY 4 HOURS PRN
Status: DISCONTINUED | OUTPATIENT
Start: 2022-09-30 | End: 2022-10-01 | Stop reason: HOSPADM

## 2022-09-30 RX ORDER — BISACODYL 10 MG
10 SUPPOSITORY, RECTAL RECTAL DAILY PRN
Status: DISCONTINUED | OUTPATIENT
Start: 2022-09-30 | End: 2022-10-01 | Stop reason: HOSPADM

## 2022-09-30 RX ORDER — CHLORHEXIDINE GLUCONATE 0.12 MG/ML
15 RINSE ORAL 2 TIMES DAILY
Status: DISCONTINUED | OUTPATIENT
Start: 2022-09-30 | End: 2022-10-01 | Stop reason: HOSPADM

## 2022-09-30 RX ADMIN — METOPROLOL TARTRATE 12.5 MG: 25 TABLET ORAL at 20:44

## 2022-09-30 RX ADMIN — BISACODYL 5 MG: 5 TABLET, COATED ORAL at 11:11

## 2022-09-30 RX ADMIN — SODIUM CHLORIDE, PRESERVATIVE FREE 10 ML: 5 INJECTION INTRAVENOUS at 20:50

## 2022-09-30 RX ADMIN — ENOXAPARIN SODIUM 40 MG: 100 INJECTION SUBCUTANEOUS at 08:30

## 2022-09-30 RX ADMIN — METHOCARBAMOL 750 MG: 750 TABLET ORAL at 08:25

## 2022-09-30 RX ADMIN — CHLORHEXIDINE GLUCONATE 15 ML: 1.2 SOLUTION ORAL at 20:49

## 2022-09-30 RX ADMIN — OXYCODONE HYDROCHLORIDE AND ACETAMINOPHEN 1 TABLET: 5; 325 TABLET ORAL at 10:58

## 2022-09-30 RX ADMIN — METOPROLOL TARTRATE 12.5 MG: 25 TABLET ORAL at 11:11

## 2022-09-30 RX ADMIN — OXYCODONE HYDROCHLORIDE AND ACETAMINOPHEN 2 TABLET: 5; 325 TABLET ORAL at 18:31

## 2022-09-30 RX ADMIN — OXYCODONE HYDROCHLORIDE AND ACETAMINOPHEN 1 TABLET: 5; 325 TABLET ORAL at 14:07

## 2022-09-30 RX ADMIN — MORPHINE SULFATE 2 MG: 2 INJECTION, SOLUTION INTRAMUSCULAR; INTRAVENOUS at 00:11

## 2022-09-30 RX ADMIN — IPRATROPIUM BROMIDE AND ALBUTEROL SULFATE 1 AMPULE: .5; 2.5 SOLUTION RESPIRATORY (INHALATION) at 08:04

## 2022-09-30 RX ADMIN — CHLORHEXIDINE GLUCONATE 15 ML: 1.2 SOLUTION ORAL at 08:25

## 2022-09-30 RX ADMIN — FAMOTIDINE 20 MG: 20 TABLET, FILM COATED ORAL at 20:44

## 2022-09-30 RX ADMIN — BISACODYL 10 MG: 10 SUPPOSITORY RECTAL at 16:53

## 2022-09-30 RX ADMIN — IPRATROPIUM BROMIDE AND ALBUTEROL SULFATE 1 AMPULE: .5; 2.5 SOLUTION RESPIRATORY (INHALATION) at 16:16

## 2022-09-30 RX ADMIN — SODIUM CHLORIDE, PRESERVATIVE FREE 10 ML: 5 INJECTION INTRAVENOUS at 08:31

## 2022-09-30 RX ADMIN — FAMOTIDINE 20 MG: 20 TABLET, FILM COATED ORAL at 08:31

## 2022-09-30 RX ADMIN — MORPHINE SULFATE 2 MG: 2 INJECTION, SOLUTION INTRAMUSCULAR; INTRAVENOUS at 08:25

## 2022-09-30 RX ADMIN — MAGNESIUM HYDROXIDE 30 ML: 400 SUSPENSION ORAL at 16:54

## 2022-09-30 RX ADMIN — IPRATROPIUM BROMIDE AND ALBUTEROL SULFATE 1 AMPULE: .5; 2.5 SOLUTION RESPIRATORY (INHALATION) at 12:32

## 2022-09-30 ASSESSMENT — PAIN DESCRIPTION - ORIENTATION: ORIENTATION: LEFT

## 2022-09-30 ASSESSMENT — PAIN SCALES - GENERAL
PAINLEVEL_OUTOF10: 6
PAINLEVEL_OUTOF10: 2
PAINLEVEL_OUTOF10: 6
PAINLEVEL_OUTOF10: 8
PAINLEVEL_OUTOF10: 7
PAINLEVEL_OUTOF10: 5

## 2022-09-30 ASSESSMENT — PAIN DESCRIPTION - LOCATION: LOCATION: RIB CAGE

## 2022-09-30 ASSESSMENT — PAIN DESCRIPTION - DESCRIPTORS: DESCRIPTORS: STABBING

## 2022-09-30 NOTE — PROGRESS NOTES
Physical Therapy  Facility/Department: [facility] CAR 1- SICU  Daily Treatment Note     Name: Teddy Bhat  : 1995  MRN: [id number]  Date of Service: 2022    Discharge Recommendations:  Patient would benefit from continued therapy after discharge, IP Rehab   PT Equipment Recommendations  Equipment Needed: No  Other: Pt has forearm crutches, RW, and manual w/c      Patient Diagnosis(es): The encounter diagnosis was Lung mass. Past Medical History:  has a past medical history of 33 weeks gestation of pregnancy, Antepartum premature rupture of membrane, COVID-19, Gestational diabetes, High-risk pregnancy, unspecified trimester, History of anemia, History of blood transfusion, History of chemotherapy, History of E. Coli UTI 10-<100,000CFU (20), Lung nodule, Malignant neoplasm affecting pregnancy in third trimester, Osteosarcoma, Pregestational diabetes mellitus, modified White class B, Pregnancy induced hypertension, Pseudomonas UTI, Under care of service provider, Under care of service provider, Under care of service provider, and Wears glasses. Past Surgical History:  has a past surgical history that includes Leg amputation at hip (Right, 2020); IR PORT PLACEMENT > 5 YEARS (10/27/2020); CT NEEDLE BIOPSY LUNG PERCUTANEOUS (2022); CT NEEDLE BIOPSY LUNG PERCUTANEOUS (9/15/2022); and Lung lobectomy (Left, 2022). Assessment   Body Structures, Functions, Activity Limitations Requiring Skilled Therapeutic Intervention: Decreased functional mobility ; Decreased strength;Decreased endurance;Decreased balance  Assessment: Pt performed STS transfer with Sujata using a RW and demos a standing tolerance of 5 min. Pt ambulated 5 ft. from bedside recliner to EOB with CGA using a RW along with repeated VC's for sequencing with good return. Pt sat EOB for 25 min's with CGA with no LOB noted. Pt would benefit from continued acute PT following discharge to address functional deficits.   Therapy Prognosis: Good  Decision Making: Medium Complexity  Requires PT Follow-Up: Yes  Activity Tolerance  Activity Tolerance: Patient tolerated treatment well;Patient limited by pain  Activity Tolerance Comments: L shld pain limiting mobility     Plan   Physcial Therapy Plan  General Plan:  (5-6x/wk)  Current Treatment Recommendations: Strengthening, Balance training, Gait training, Functional mobility training, Stair training, Transfer training, Endurance training, Home exercise program, Safety education & training, Patient/Caregiver education & training, Equipment evaluation, education, & procurement, Therapeutic activities  Safety Devices  Type of Devices: Call light within reach, Nurse notified, Gait belt, Patient at risk for falls, Left in bed  Restraints  Restraints Initially in Place: No     Restrictions  Restrictions/Precautions  Restrictions/Precautions: Fall Risk  Required Braces or Orthoses?: No  Position Activity Restriction  Other position/activity restrictions: s/p LLL wedge resection 9/27. Hx osteosaroma & R hip disartic. amp., prosthetic being worked on. Subjective   General  Chart Reviewed: Yes  Response To Previous Treatment: Patient with no complaints from previous session. Family / Caregiver Present: No  Follows Commands: Within Functional Limits  General Comment  Comments: RN and pt agreeable to PT. Pt alert in bed upon arrival.  Subjective  Subjective: Pt reports 5-6/10 pain L shld and side. Pt denies any numbness or tingling.           Cognition   Orientation  Overall Orientation Status: Within Normal Limits  Orientation Level: Oriented to person;Oriented to place;Oriented to situation;Oriented to time  Cognition  Overall Cognitive Status: WFL     Objective   Bed mobility  Supine to Sit: Unable to assess  Sit to Supine: Unable to assess  Scooting: Contact guard assistance  Transfers  Sit to Stand: Minimal Assistance  Stand to Sit: Minimal Assistance  Ambulation  Surface: Level tile  Device: Rolling Walker  Assistance: Contact guard assistance  Quality of Gait: L shld pain limiting, able to scoot on LLE CGA  Distance: 5 ft. More Ambulation?: No  Stairs/Curb  Stairs?: No     Balance  Posture: Fair  Sitting - Static: Good;-  Sitting - Dynamic: Good;-  Standing - Static: Fair;+  Standing - Dynamic: Fair  Comments: RW used while assessing standing balance  A/AROM Exercises: Ankle pumps x20,SLRx20 heelslides x20, LAQs x20 BLE, KTC x20 BLE. AM-PAC Score  AM-PAC Inpatient Mobility Raw Score : 14 (09/30/22 1250)  AM-PAC Inpatient T-Scale Score : 38.1 (09/30/22 1250)  Mobility Inpatient CMS 0-100% Score: 61.29 (09/30/22 1250)  Mobility Inpatient CMS G-Code Modifier : CL (09/30/22 1250)          Goals  Short Term Goals  Time Frame for Short Term Goals: 14 visits  Short Term Goal 1: Pt will be Giselle bed mobility  Short Term Goal 2: Pt will be Giselle transfers  Short Term Goal 3: Pt will be Giselle amb 200' RW or least restrictive AD  Short Term Goal 4: Pt will navigate 6 steps Giselle either or B rail use.        Therapy Time   Individual Concurrent Group Co-treatment   Time In 1100         Time Out 1158         Minutes 58         Timed Code Treatment Minutes: [place], [place]

## 2022-09-30 NOTE — PROGRESS NOTES
[facility]  Daily Progress Note    Surgeon:  Dr. Tim Zelaya      Subjective:  Ms. Lisa Asencio is a 33y.o. year-old female status post Left Mini Thoracotomy, LLL Wedge Resection on 9/27/22. Patient was seen and examined at bedside this morning without any complaints. Vital Signs: /88   Pulse (!) 115   Temp 98.4 °F (36.9 °C) (Oral)   Resp 22   Wt 183 lb 8 oz (83.2 kg)   LMP 09/13/2022   SpO2 95%   BMI 34.39 kg/m²  O2 Flow Rate (L/min): 4 L/min   Admit Weight: Weight: 183 lb 8 oz (83.2 kg)   WEIGHTWeight: 183 lb 8 oz (83.2 kg)     I/O's:  I/O last 3 completed shifts: In: 1 [P.O.:950; I.V.:20]  Out: 0022 [Urine:2725; Chest Tube:50]    Data:    CBC:   Recent Labs     09/28/22  0438 09/29/22  0244 09/30/22  0416   WBC 13.6* 11.8* 10.2   HGB 11.9 11.9 11.4*   HCT 33.5* 34.6* 33.0*   MCV 86.3 87.2 86.4    240 230       BMP:   Recent Labs     09/28/22  0438 09/29/22  0244 09/30/22  0416    136 139   K 4.1 4.0 3.9    103 103   CO2 25 27 24   BUN 9 7 6   CREATININE 0.27* 0.35* 0.32*       PT/INR: No results for input(s): PROTIME, INR in the last 72 hours. APTT: No results for input(s): APTT in the last 72 hours. Chest X-Ray: 9/30/22  FINDINGS:   Stable position of the right internal jugular port. Stable cardiomediastinal   silhouette. No significant pulmonary edema. Low lung volumes remain with   persistent left retrocardiac opacity. No sizable pleural effusion. No   pneumothorax. Impression   Low lung volumes with unchanged left retrocardiac opacity.          Scheduled Meds:    chlorhexidine  15 mL Mouth/Throat BID    metoprolol tartrate  12.5 mg Oral BID    sodium chloride flush  5-40 mL IntraVENous 2 times per day    famotidine  20 mg Oral BID    Or    famotidine (PEPCID) injection  20 mg IntraVENous BID    enoxaparin  40 mg SubCUTAneous Daily    ipratropium-albuterol  1 ampule Inhalation Q4H WA    lidocaine  1 patch TransDERmal Daily Continuous Infusions:    sodium chloride           Physical Exam:    General: A&O x 3, NAD noted  Heart: Normal S1, S2, RRR, No murmurs noted    Lungs: Slightly diminished BS left base. Incision: CDI   Abdomen: Soft, non tender, non distended, Normal BS x 4   Extremities: No edema left LE. Right leg amputation       Assessment & Plan:    Ms. Rosie Cain is a 33y.o. year-old female status post Left Mini Thoracotomy, LLL Wedge Resection on 9/27/22 POD# 3. No issues or events noted with the patient overnight.    - Continue current care and meds  - Follow-up labs, CXR   - Add Lopressor 12.5 mg PO BID for HR control  - MOM 30 ml PO and Dulcolax suppository PRN for a BM  - DVT prophylaxis with Lovenox and SCD to the left LE   - GI Prophylaxis  - Cardiac diet  - OOB to chair - Ambulation with PT 3x daily  - Incentive Spirometry / Pulmonary hygiene  - Pain management  - Transfer to CAR 2 today / Will possibly d/c home today if HR is well controlled   - Further recommendations as per Dr. Fatimah Rodriguez      The above recommendations including medications and orders were discussed and agreed upon with Dr. Nancy Barreto.        Dante Corbett MBA, PA-C

## 2022-09-30 NOTE — CARE COORDINATION
Writer placed call to [facility], and per their office, they are able to accept; however, they need pt's Medicare number as Medicare is primary.  spoke with pt, she states that her health insurance is through her , and Medicare does not start until 12/1/22.  placed call to phuc at [address] and notified her that pt's [address] [facility] is primary and will continue to be so as her  is working, also notified her that pt's Medicare does not start until 12/1/22.

## 2022-09-30 NOTE — PLAN OF CARE
Problem: Discharge Planning  Goal: Discharge to home or other facility with appropriate resources  9/29/2022 2347 by Dayanara Reed RN  Outcome: Progressing  Flowsheets (Taken 9/29/2022 2000)  Discharge to home or other facility with appropriate resources: Identify barriers to discharge with patient and caregiver  9/29/2022 1127 by Adan Gamino RN  Outcome: Progressing     Problem: Safety - Adult  Goal: Free from fall injury  9/29/2022 2347 by Dayanara Reed RN  Outcome: Progressing  [address] (Taken 9/29/2022 2000)  Free From Fall Injury:   Instruct family/caregiver on patient safety   Based on caregiver fall risk screen, instruct family/caregiver to ask for assistance with transferring infant if caregiver noted to have fall risk factors  9/29/2022 1127 by Adan Gamino RN  Outcome: Progressing     Problem: Chronic Conditions and Co-morbidities  Goal: Patient's chronic conditions and co-morbidity symptoms are monitored and maintained or improved  9/29/2022 2347 by Dayanara Reed RN  Outcome: Progressing  [address] (Taken 9/29/2022 2000)  Care Plan - Patient's Chronic Conditions and Co-Morbidity Symptoms are Monitored and Maintained or Improved:   Monitor and assess patient's chronic conditions and comorbid symptoms for stability, deterioration, or improvement   Collaborate with multidisciplinary team to address chronic and comorbid conditions and prevent exacerbation or deterioration  9/29/2022 1127 by Adan Gamino RN  Outcome: Progressing     Problem: Pain  Goal: Verbalizes/displays adequate comfort level or baseline comfort level  9/29/2022 2347 by Dayanara Reed RN  Outcome: Progressing  9/29/2022 1127 by Adan Gamino RN  Outcome: Progressing     Problem: Respiratory - Adult  Goal: Achieves optimal ventilation and oxygenation  9/29/2022 2347 by Dayanara Reed RN  Outcome: Progressing  Flowsheets (Taken 9/29/2022 2000)  Achieves optimal ventilation and oxygenation:   Assess for changes in respiratory status Assess for changes in mentation and behavior   Position to facilitate oxygenation and minimize respiratory effort   Encourage broncho-pulmonary hygiene including cough, deep breathe, incentive spirometry   Assess and instruct to report shortness of breath or any respiratory difficulty  9/29/2022 1127 by Rosendo Anderson RN  Outcome: Progressing

## 2022-09-30 NOTE — PROGRESS NOTES
Occupational [address]  Occupational Therapy Not Seen Note    DATE: 2022    NAME: Julio Jeff  MRN: [id number]   : 1995      Patient not seen this date for Occupational Therapy due to: PT w/pt    Next Scheduled Treatment: 10/1/22    Electronically signed by LILIAN Farrell on 2022 at 11:58 AM

## 2022-10-01 ENCOUNTER — APPOINTMENT (OUTPATIENT)
Dept: GENERAL RADIOLOGY | Age: 27
DRG: 164 | End: 2022-10-01
Attending: THORACIC SURGERY (CARDIOTHORACIC VASCULAR SURGERY)
Payer: COMMERCIAL

## 2022-10-01 VITALS
TEMPERATURE: 98.2 F | BODY MASS INDEX: 34.64 KG/M2 | RESPIRATION RATE: 20 BRPM | DIASTOLIC BLOOD PRESSURE: 93 MMHG | SYSTOLIC BLOOD PRESSURE: 135 MMHG | HEIGHT: 61 IN | WEIGHT: 183.5 LBS | OXYGEN SATURATION: 98 % | HEART RATE: 93 BPM

## 2022-10-01 LAB
ANION GAP SERPL CALCULATED.3IONS-SCNC: 12 MMOL/L (ref 9–17)
BUN BLDV-MCNC: 9 MG/DL (ref 6–20)
CALCIUM SERPL-MCNC: 9.2 MG/DL (ref 8.6–10.4)
CHLORIDE BLD-SCNC: 99 MMOL/L (ref 98–107)
CO2: 25 MMOL/L (ref 20–31)
CREAT SERPL-MCNC: 0.3 MG/DL (ref 0.5–0.9)
GFR AFRICAN AMERICAN: >60 ML/MIN
GFR NON-AFRICAN AMERICAN: >60 ML/MIN
GFR SERPL CREATININE-BSD FRML MDRD: ABNORMAL ML/MIN/{1.73_M2}
GLUCOSE BLD-MCNC: 113 MG/DL (ref 70–99)
HCT VFR BLD CALC: 34.9 % (ref 36.3–47.1)
HEMOGLOBIN: 12.2 G/DL (ref 11.9–15.1)
MCH RBC QN AUTO: 30 PG (ref 25.2–33.5)
MCHC RBC AUTO-ENTMCNC: 35 G/DL (ref 28.4–34.8)
MCV RBC AUTO: 86 FL (ref 82.6–102.9)
NRBC AUTOMATED: 0 PER 100 WBC
PDW BLD-RTO: 12.2 % (ref 11.8–14.4)
PLATELET # BLD: 295 K/UL (ref 138–453)
PMV BLD AUTO: 11.2 FL (ref 8.1–13.5)
POTASSIUM SERPL-SCNC: 4.3 MMOL/L (ref 3.7–5.3)
RBC # BLD: 4.06 M/UL (ref 3.95–5.11)
SODIUM BLD-SCNC: 136 MMOL/L (ref 135–144)
WBC # BLD: 12.7 K/UL (ref 3.5–11.3)

## 2022-10-01 PROCEDURE — 6370000000 HC RX 637 (ALT 250 FOR IP): Performed by: PHYSICIAN ASSISTANT

## 2022-10-01 PROCEDURE — 85027 COMPLETE CBC AUTOMATED: CPT

## 2022-10-01 PROCEDURE — 71045 X-RAY EXAM CHEST 1 VIEW: CPT

## 2022-10-01 PROCEDURE — 94640 AIRWAY INHALATION TREATMENT: CPT

## 2022-10-01 PROCEDURE — 80048 BASIC METABOLIC PNL TOTAL CA: CPT

## 2022-10-01 PROCEDURE — 6360000002 HC RX W HCPCS: Performed by: PHYSICIAN ASSISTANT

## 2022-10-01 PROCEDURE — 2580000003 HC RX 258: Performed by: PHYSICIAN ASSISTANT

## 2022-10-01 PROCEDURE — 36415 COLL VENOUS BLD VENIPUNCTURE: CPT

## 2022-10-01 RX ORDER — OXYCODONE HYDROCHLORIDE AND ACETAMINOPHEN 5; 325 MG/1; MG/1
1 TABLET ORAL EVERY 6 HOURS PRN
Qty: 20 TABLET | Refills: 0 | Status: SHIPPED | OUTPATIENT
Start: 2022-10-01 | End: 2022-10-06 | Stop reason: SDUPTHER

## 2022-10-01 RX ORDER — METHOCARBAMOL 750 MG/1
750 TABLET, FILM COATED ORAL EVERY 6 HOURS PRN
Qty: 40 TABLET | Refills: 0 | Status: SHIPPED | OUTPATIENT
Start: 2022-10-01 | End: 2022-10-11

## 2022-10-01 RX ORDER — LIDOCAINE 4 G/G
1 PATCH TOPICAL DAILY
Qty: 7 EACH | Refills: 0 | Status: SHIPPED | OUTPATIENT
Start: 2022-10-02 | End: 2022-10-09

## 2022-10-01 RX ADMIN — CHLORHEXIDINE GLUCONATE 15 ML: 1.2 SOLUTION ORAL at 09:54

## 2022-10-01 RX ADMIN — IPRATROPIUM BROMIDE AND ALBUTEROL SULFATE 1 AMPULE: .5; 2.5 SOLUTION RESPIRATORY (INHALATION) at 12:32

## 2022-10-01 RX ADMIN — BISACODYL 5 MG: 5 TABLET, COATED ORAL at 02:44

## 2022-10-01 RX ADMIN — OXYCODONE HYDROCHLORIDE AND ACETAMINOPHEN 2 TABLET: 5; 325 TABLET ORAL at 09:59

## 2022-10-01 RX ADMIN — METOPROLOL TARTRATE 12.5 MG: 25 TABLET ORAL at 09:51

## 2022-10-01 RX ADMIN — FAMOTIDINE 20 MG: 20 TABLET, FILM COATED ORAL at 09:51

## 2022-10-01 RX ADMIN — SODIUM CHLORIDE, PRESERVATIVE FREE 10 ML: 5 INJECTION INTRAVENOUS at 09:52

## 2022-10-01 RX ADMIN — ENOXAPARIN SODIUM 40 MG: 100 INJECTION SUBCUTANEOUS at 09:50

## 2022-10-01 RX ADMIN — OXYCODONE HYDROCHLORIDE AND ACETAMINOPHEN 1 TABLET: 5; 325 TABLET ORAL at 02:44

## 2022-10-01 ASSESSMENT — PAIN SCALES - GENERAL: PAINLEVEL_OUTOF10: 7

## 2022-10-01 NOTE — FLOWSHEET NOTE
Patient discharged to home, accompanied by  with all belongings. Discharge instructions given, patient verbalized understanding. Home care confirmed with social work before discharge.

## 2022-10-01 NOTE — PROGRESS NOTES
CLINICAL PHARMACY NOTE: MEDS TO BEDS    Total # of Prescriptions Filled: 3   The following medications were delivered to the patient:  Percocet 5/325mg tablets  Metoprolol tartrate 25mg tablet  Methocarbamol 750mg    Additional Documentation: meds delivered to the pt in room 2014 on 10/1/22 at 12:48, pt declined lidocaine patches because their was a co pay and she didn't have the money for them.

## 2022-10-01 NOTE — DISCHARGE INSTR - COC
Continuity of Care Form    Patient Name: Carlton Lanes   :  1995  MRN:  [id number]    Admit date:  2022  Discharge date:  10/01/22    Code Status Order: Full Code   Advance Directives:     Admitting Physician:  Rogelio Luong MD  PCP: Anabel Mc, APRN - CNP    Discharging Nurse: Del[facility] Unit/Room#:   Discharging Unit Phone Number: [phone]    Emergency Contact:   Extended Emergency Contact Information  Primary Emergency Contact: Lashay Robledo *[facility]  Address: [address]           [facility], [facility] [address] Phone: [phone]  Relation: Spouse  Secondary Emergency Contact: Alyssa Ayala  Address: [place] [address] Phone: [phone]  Mobile Phone: [phone]  Relation: Parent    Past Surgical History:  Past Surgical History:   Procedure Laterality Date    CT NEEDLE BIOPSY LUNG PERCUTANEOUS  2022    CT NEEDLE BIOPSY LUNG PERCUTANEOUS 2022 [facility] CT SCAN    CT NEEDLE BIOPSY LUNG PERCUTANEOUS  9/15/2022    CT NEEDLE BIOPSY LUNG PERCUTANEOUS 9/15/2022 [facility] CT SCAN    IR PORT PLACEMENT EQUAL OR GREATER THAN 5 YEARS  10/27/2020    IR PORT PLACEMENT EQUAL OR GREATER THAN 5 YEARS 10/27/2020 Marv Engel MD [facility] SPECIAL PROCEDURES    LEG AMPUTATION AT HIP Right 2020    [facility], osteosarcoma    LOBECTOMY Left 2022    LT MUSCLE SPARING THORACOTOMY, LOWER LOBE SEGMENTAL RESECTION AND EN BLOC CHEST WALL RESECTION performed by Rogelio Luong MD at [address] [facility]       Immunization History:   Immunization History   Administered Date(s) Administered    COVID-19, PFIZER PURPLE top, DILUTE for use, (age 15 y+), 30mcg/0.3mL 10/08/2021, 2021    DTaP 1995, 1996, 1997, 1998, 2001    DTaP (Infanrix) 1996, 1996, 1997, 1998, 2001    HPV Quadrivalent (Gardasil) 2014, 10/15/2014, 2015    Hepatitis A Ped/Adol (Havrix, Vaqta) 2014 Hepatitis B 1996, 1996, 1997    Hepatitis B Ped/Adol (Engerix-B, Recombivax HB) 1996, 1996, 1997    Hib vaccine 1996, 1996, 1997, 11/10/1997    Influenza Vaccine, unspecified formulation 10/15/2014, 2016    Influenza Virus Vaccine 10/15/2014, 2016    Influenza, FLUCELVAX, (age 10 mo+), MDCK, PF, 0.5mL 2021    MMR 11/10/1997, 2001    Meningococcal B, OMV (Bexsero) 2016, 2016    Meningococcal MCV4O (Menveo) 2008, 2014    PPD Test 2016, 2016    Polio IPV (IPOL) 1996, 1996, 1998, 2001    Tdap (Boostrix, Adacel) 2008, 2016    Varicella (Varivax) 1997, 1998, 2008, 2016       Active Problems:  Patient Active Problem List   Diagnosis Code    Family history of diabetes mellitus Z83.3    Iron deficiency anemia D50.9    Primary lower extremity sarcoma, right ([facility] [address] C49.21    Thrombocytosis D75.839    Transaminitis R74.01    History of disarticulation of right hip Z89.621     10/12/20 M Apg 8/9 Wt 4#14  Z39.2    Phantom pain after amputation of lower extremity (HCC) G54.6    Malignant neoplasm of upper lobe of right lung (HCC) C34.11    Lung mass R91.8       Isolation/Infection:   Isolation            No Isolation          Patient Infection Status       Infection Onset Added Last Indicated Last Indicated By Review Planned Expiration Resolved Resolved By    None active    Resolved    COVID-19 (Rule Out) 10/12/20 10/12/20 10/12/20 COVID-19 (Ordered)   10/12/20 Rule-Out Test Resulted            Nurse Assessment:  Last Vital Signs: BP (!) 135/93   Pulse (!) 101   Temp 98.2 °F (36.8 °C) (Oral)   Resp 19   Ht 5' 1\" (1.549 m)   Wt 183 lb 8 oz (83.2 kg)   LMP 2022   SpO2 94%   BMI 34.67 kg/m²     Last documented pain score (0-10 scale): Pain Level: 7  Last Weight:   Wt Readings from Last 1 Encounters:   22 183 lb 8 oz (83.2 kg)     Mental Status:  oriented, alert, coherent, logical, thought processes intact, and able to concentrate and follow conversation    IV Access:  - chemo port    Nursing Mobility/ADLs:  Walking   Assisted  Transfer  Independent  [address] [facility]   whole    Wound Care Documentation and Therapy:  Incision 09/27/22 Abdomen Lateral;Left;Upper (Active)   Dressing Status Clean;Dry; Intact 10/01/22 0332   Dressing/Treatment ABD pad 10/01/22 0332   Incision Assessment Other (Comment) 10/01/22 0332   Drainage Amount None 10/01/22 0332   Number of days: 4       Incision 07/31/20 Femoral Right (Active)   Number of days: 791        Elimination:  Continence: Bowel: Yes  Bladder: Yes  Urinary Catheter: None   Colostomy/Ileostomy/Ileal Conduit: No       Date of Last BM: 09/30/22    Intake/Output Summary (Last 24 hours) at 10/1/2022 1129  Last data filed at 9/30/2022 2232  Gross per 24 hour   Intake 150 ml   Output 300 ml   Net -150 ml     I/O last 3 completed shifts: In: 1 [P.O.:950; I.V.:20]  Out: 1550 [Urine:1550]    Safety Concerns: At Risk for Falls    Impairments/Disabilities:      Amputation - right hip disarticulation    Nutrition Therapy:  Current Nutrition Therapy:   - Oral Diet:  General    Routes of Feeding: Oral  Liquids: No Restrictions  Daily Fluid Restriction: no  Last Modified Barium Swallow with Video (Video Swallowing Test): not done    Treatments at the Time of Hospital Discharge:   Respiratory Treatments: ***  Oxygen Therapy:  is not on home oxygen therapy.   Ventilator:    - No ventilator support    Rehab Therapies: Physical Therapy and Occupational Therapy  Weight Bearing Status/Restrictions: No weight bearing restrictions  Other Medical Equipment (for information only, NOT a DME order):  walker  Other Treatments: Skilled Nursing Assessment    Patient's personal belongings (please select all that are sent with

## 2022-10-01 NOTE — CARE COORDINATION
Discharge [facility]  Written by: Enrike Hand RN    Patient Name: Juan Anaya  Attending Provider: Vida Evangelista MD  Admit Date: 2022  6:27 AM  MRN: [id number]  Account: [de-identified]                     : 1995  Discharge Date: 10/01/2022      Disposition: home    Enrike Hand RN      10/01/22 [facility] Discharge   Services At/After Discharge OT;PT;Nursing services; Home Health  ([facility])   Mode of Transport at Discharge [facility] Time of Discharge 1600   Condition of Participation: Discharge Planning   The Plan for Transition of Care is related to the following treatment goals: Comfort, safety, return home   The Patient and/or Patient Representative was provided with a Choice of Provider? Patient   The Patient and/Or Patient Representative agree with the Discharge Plan? Yes   Freedom of Choice list was provided with basic dialogue that supports the patient's individualized plan of care/goals, treatment preferences, and shares the quality data associated with the providers?   Yes

## 2022-10-01 NOTE — CARE COORDINATION
[facility] Quality Flow/Interdisciplinary Rounds Progress Note    Quality Flow Rounds held on October 1, 2022 at [address] Attending:  Bedside Nurse, , , and Nursing Unit Leadership    Barriers to Discharge:     Anticipated Discharge Date:   10/01/2022    Anticipated Discharge Disposition:Home with [facility]    Readmission Risk              Risk of Unplanned Readmission:  10           Discussed patient goal for the day, patient clinical progression, and barriers to discharge. The following Goal(s) of the Day/Commitment(s) have been identified:  Discharge - Obtain Order    Pt to be d/c home with [facility] per [facility]. JESE completed. Call placed to the on call staff   ([phone]) to notify them patient is being d/c home today. Spoke with Viraj Marks who informs CM they will contact patient to establish start of care on Monday.   RN updated      Abiodun Mata RN  October 1, 2022

## 2022-10-01 NOTE — DISCHARGE SUMMARY
SURGERY DISCHARGE NOTE    Disposition: DISCHARGE TO Home     PATIENT NAME: Ollis Gottron: 1995  MEDICAL RECORD NO. [id number]  DATE: 10/1/2022  ADMITTING PHYSICIAN: Dr. Susan Curry: Rani Inman, APRN - CNP  DISCHARGE DATE:  No discharge date for patient encounter. DISPOSITION: to Home  ADMITTING DIAGNOSIS:   1. 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 She was admitted post operatively. She recovered well, and her hospital course was uneventful. Both of her chest tubes were successfully removed. Her pain was controlled with oral pain medication. She was tolerating a regular diet without any nausea or vomiting, and voiding without difficulty. Labs and imaging were followed daily. On day of discharge, she was medically stable, and agreeable to follow up treatment plan.      DISCHARGE INSTRUCTIONS     Discharge Medications:        Medication List        CONTINUE taking these medications      Handicap Placard Misc  Expiration: Lifetime            ASK your doctor about these medications      ferrous sulfate 325 (65 Fe) MG tablet  Commonly known as: IRON 325     fluocinonide 0.05 % external solution  Commonly known as: LIDEX  Apply to scalp daily for rash     ketoconazole 2 % shampoo  Commonly known as: NIZORAL  Apply 3-4 times weekly to scalp, leave on for five minutes prior to washing off     lidocaine-prilocaine 2.5-2.5 % cream  Commonly known as: EMLA  Apply topically Daily as needed. norethindrone 0.35 MG tablet  Commonly known as: Norlyda  TAKE 1 TABLET BY MOUTH DAILY     phentermine 37.5 MG capsule  Take 1 capsule by mouth every morning for 30 days. PRENATAL 1 PO            Diet: ADULT DIET; Regular diet as tolerated  Activity: no lifting more than 10-20 lbs until cleared at follow up appointment   Wound Care: Daily and as needed  Follow-up:   1. Follow up in thoracic surgery clinic with Dr. Serafin Hill in 2 weeks   2.  Follow up in  the next few weeks with PCP: FATEMEH Tamayo CNP,      Nas Jolly DO  10/1/2022, 11:32 AM

## 2022-10-01 NOTE — DISCHARGE INSTRUCTIONS
Patient Discharge Instructions  Discharge Date:  10/1/2022     Discharged To: Home       RESUME ACTIVITY:      BATHING: May shower over incisions. Please do not soak or submerge in a tub or pool. Keep dry occlusive dressing intact to chest tube sites. DRIVING: No driving on narcotics     WALKING:  Yes     STAIRS:  Yes     LIFTING: Less than 15 pounds until instructed otherwise      DIET: common adult     SPECIAL INSTRUCTIONS:      Take pain medications as prescribed. Continue metoprolol at home. Will advise outpatient when to discontinue.   Please follow-up with your oncologist as scheduled  Follow-up with thoracic surgery in 2 weeks, please call to make an appointment

## 2022-10-04 ENCOUNTER — TELEPHONE (OUTPATIENT)
Dept: CASE MANAGEMENT | Age: 27
End: 2022-10-04

## 2022-10-04 NOTE — TELEPHONE ENCOUNTER
Name: Salima Lopez  : 1995  MRN: [id number]    Oncology Navigation Follow-Up Note    Navigator reviewing chart and pt. Discharged with [facility]. Pt. To see MO 10/6 Plan to follow.    Electronically signed by Dioni Butt RN on 10/4/2022 at 9:55 AM

## 2022-10-06 ENCOUNTER — OFFICE VISIT (OUTPATIENT)
Dept: ONCOLOGY | Age: 27
End: 2022-10-06
Payer: COMMERCIAL

## 2022-10-06 ENCOUNTER — HOSPITAL ENCOUNTER (OUTPATIENT)
Dept: INFUSION THERAPY | Age: 27
Discharge: HOME | End: 2022-10-06
Payer: COMMERCIAL

## 2022-10-06 ENCOUNTER — TELEPHONE (OUTPATIENT)
Dept: CASE MANAGEMENT | Age: 27
End: 2022-10-06

## 2022-10-06 ENCOUNTER — TELEPHONE (OUTPATIENT)
Dept: ONCOLOGY | Age: 27
End: 2022-10-06

## 2022-10-06 VITALS
WEIGHT: 184.5 LBS | DIASTOLIC BLOOD PRESSURE: 81 MMHG | HEART RATE: 98 BPM | TEMPERATURE: 97.9 F | SYSTOLIC BLOOD PRESSURE: 116 MMHG | BODY MASS INDEX: 34.86 KG/M2

## 2022-10-06 DIAGNOSIS — C34.11 MALIGNANT NEOPLASM OF UPPER LOBE OF RIGHT LUNG (HCC): Primary | ICD-10-CM

## 2022-10-06 DIAGNOSIS — C49.21 PRIMARY LOWER EXTREMITY SARCOMA, RIGHT (HCC): ICD-10-CM

## 2022-10-06 DIAGNOSIS — C34.11 MALIGNANT NEOPLASM OF UPPER LOBE OF RIGHT LUNG (HCC): ICD-10-CM

## 2022-10-06 DIAGNOSIS — C49.21 PRIMARY LOWER EXTREMITY SARCOMA, RIGHT (HCC): Primary | ICD-10-CM

## 2022-10-06 PROCEDURE — 96523 IRRIG DRUG DELIVERY DEVICE: CPT

## 2022-10-06 PROCEDURE — 99211 OFF/OP EST MAY X REQ PHY/QHP: CPT | Performed by: INTERNAL MEDICINE

## 2022-10-06 PROCEDURE — 6360000002 HC RX W HCPCS: Performed by: INTERNAL MEDICINE

## 2022-10-06 PROCEDURE — 99215 OFFICE O/P EST HI 40 MIN: CPT | Performed by: INTERNAL MEDICINE

## 2022-10-06 PROCEDURE — 1036F TOBACCO NON-USER: CPT | Performed by: INTERNAL MEDICINE

## 2022-10-06 PROCEDURE — 2580000003 HC RX 258: Performed by: INTERNAL MEDICINE

## 2022-10-06 PROCEDURE — G8484 FLU IMMUNIZE NO ADMIN: HCPCS | Performed by: INTERNAL MEDICINE

## 2022-10-06 PROCEDURE — 1111F DSCHRG MED/CURRENT MED MERGE: CPT | Performed by: INTERNAL MEDICINE

## 2022-10-06 PROCEDURE — G8427 DOCREV CUR MEDS BY ELIG CLIN: HCPCS | Performed by: INTERNAL MEDICINE

## 2022-10-06 PROCEDURE — G8417 CALC BMI ABV UP PARAM F/U: HCPCS | Performed by: INTERNAL MEDICINE

## 2022-10-06 RX ORDER — HEPARIN SODIUM (PORCINE) LOCK FLUSH IV SOLN 100 UNIT/ML 100 UNIT/ML
500 SOLUTION INTRAVENOUS PRN
OUTPATIENT
Start: 2022-10-06

## 2022-10-06 RX ORDER — SODIUM CHLORIDE 0.9 % (FLUSH) 0.9 %
20 SYRINGE (ML) INJECTION PRN
OUTPATIENT
Start: 2022-10-06

## 2022-10-06 RX ORDER — SODIUM CHLORIDE 0.9 % (FLUSH) 0.9 %
10 SYRINGE (ML) INJECTION PRN
Status: DISCONTINUED | OUTPATIENT
Start: 2022-10-06 | End: 2022-10-07 | Stop reason: HOSPADM

## 2022-10-06 RX ORDER — HEPARIN SODIUM (PORCINE) LOCK FLUSH IV SOLN 100 UNIT/ML 100 UNIT/ML
500 SOLUTION INTRAVENOUS PRN
Status: DISCONTINUED | OUTPATIENT
Start: 2022-10-06 | End: 2022-10-07 | Stop reason: HOSPADM

## 2022-10-06 RX ORDER — SODIUM CHLORIDE 0.9 % (FLUSH) 0.9 %
10 SYRINGE (ML) INJECTION PRN
OUTPATIENT
Start: 2022-10-06

## 2022-10-06 RX ORDER — OXYCODONE HYDROCHLORIDE AND ACETAMINOPHEN 5; 325 MG/1; MG/1
1 TABLET ORAL EVERY 6 HOURS PRN
Qty: 120 TABLET | Refills: 0 | Status: SHIPPED | OUTPATIENT
Start: 2022-10-06 | End: 2022-11-05

## 2022-10-06 RX ADMIN — SODIUM CHLORIDE, PRESERVATIVE FREE 10 ML: 5 INJECTION INTRAVENOUS at 11:49

## 2022-10-06 RX ADMIN — HEPARIN 500 UNITS: 100 SYRINGE at 11:49

## 2022-10-06 NOTE — PROGRESS NOTES
HISTORY: The patient presents for follow up today following lobectomy to discuss further recommendations. She underwent left lobectomy lat week, her pain is well controlled. PAST MEDICAL HISTORY: has a past medical history of 33 weeks gestation of pregnancy, Antepartum premature rupture of membrane, COVID-19, Gestational diabetes, High-risk pregnancy, unspecified trimester, History of anemia, History of blood transfusion, History of chemotherapy, History of E. Coli UTI 10-<100,000CFU (7/7/20), Lung nodule, Malignant neoplasm affecting pregnancy in third trimester, Osteosarcoma, Pregestational diabetes mellitus, modified White class B, Pregnancy induced hypertension, Pseudomonas UTI, Under care of service provider, Under care of service provider, Under care of service provider, and Wears glasses. PAST SURGICAL HISTORY: has a past surgical history that includes Leg amputation at hip (Right, 07/23/2020); IR PORT PLACEMENT > 5 YEARS (10/27/2020); CT NEEDLE BIOPSY LUNG PERCUTANEOUS (5/9/2022); CT NEEDLE BIOPSY LUNG PERCUTANEOUS (9/15/2022); and Lung lobectomy (Left, 9/27/2022). CURRENT MEDICATIONS:  has a current medication list which includes the following prescription(s): oxycodone-acetaminophen, lidocaine, methocarbamol, metoprolol tartrate, phentermine, norethindrone, ketoconazole, fluocinonide, ferrous sulfate, handicap placard, lidocaine-prilocaine, and prenatal mv-min-fe fum-fa-dha. ALLERGIES:  has No Known Allergies. FAMILY HISTORY: Negative for any hematological or oncological conditions. SOCIAL HISTORY:  reports that she has never smoked. She has never used smokeless tobacco. She reports that she does not currently use alcohol. She reports that she does not use drugs. REVIEW OF SYSTEMS:   General: No fever or night sweats.  Weight stable   ENT: No double or blurred vision, no tinnitus or hearing problem, no dysphagia or sore throat   Respiratory: No chest pain, no shortness of breath, no cough or hemoptysis. Cardiovascular: Denies chest pain, PND or orthopnea. No L E swelling or palpitations. Gastrointestinal: No nausea, vomiting, abdominal pain, diarrhea or constipation. Genitourinary: Denies dysuria, hematuria, frequency, urgency or incontinence. Gynecological: nearly resolved post partum bleeding  Neurological: Denies headaches, decreased LOC, no sensory or motor focal deficits. +post surgical phantom pain improving  Musculoskeletal: No arthralgia no back pain or joint swelling. +post surgical muscle pain in arms   Skin: There are no rashes or bleeding. Psychiatric: No anxiety, no depression. Endocrine: No diabetes or thyroid disease. Hematologic: No bleeding, no adenopathy. PHYSICAL EXAM: Shows a well appearing 33y.o.-year-old female who is not in pain or distress. Vital Signs: Blood pressure 116/81, pulse 98, temperature 97.9 °F (36.6 °C), temperature source Temporal, weight 184 lb 8 oz (83.7 kg), last menstrual period 09/13/2022, not currently breastfeeding. HEENT: Normocephalic and atraumatic. Pupils are equal, round, reactive to light and accommodation. Extraocular muscles are intact. Neck: Showed no JVD, no carotid bruit . Lungs: Clear to auscultation bilaterally. Heart: Regular without any murmur. Abdomen: Soft, nontender. No hepatosplenomegaly. Extremities: Right leg amputation. Lower extremities show no edema, clubbing, or cyanosis. Breasts: Examination not done today.  Neuro exam: intact cranial nerves bilaterally no motor or sensory deficit, gait is normal. Lymphatic: no adenopathy appreciated in the supraclavicular, axillary, cervical or inguinal area    REVIEW OF LABORATORY DATA:   Lab Results   Component Value Date    WBC 12.7 (H) 10/01/2022    HGB 12.2 10/01/2022    HCT 34.9 (L) 10/01/2022    MCV 86.0 10/01/2022     10/01/2022   '    Chemistry        Component Value Date/Time     10/01/2022 0554    K 4.3 10/01/2022 0554    CL 99 10/01/2022 0554    CO2 25 10/01/2022 0554    BUN 9 10/01/2022 0554    CREATININE 0.30 (L) 10/01/2022 0554        Component Value Date/Time    CALCIUM 9.2 10/01/2022 0554    ALKPHOS 51 09/28/2022 0438    AST 33 (H) 09/28/2022 0438    ALT 15 09/28/2022 0438    BILITOT 0.5 09/28/2022 0438          REVIEW OF RADIOLOGICAL RESULTS:     PATHOLOGY:   09/27/2022:  LEFT LOWER LUNG LOBE AND CHEST WALL, WEDGE RESECTION:   -METASTATIC HIGH GRADE SARCOMA, CONSISTENT WITH HIGH-GRADE UNDIFFERENTIATED PLEOMORPHIC SARCOMA, SIZE 6.8 CM, EXTENDING INTO LUNG PARENCHYMA AND PORTION OF CHEST WALL, MARGINS NEGATIVE. 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 Second biopsy showed metastatic pleomorphic sarcoma. Status post lower left wedge resection with negative margin. PLAN:   We reviewed her pathology, 6.8 cm high sarcoma with chest wall invasion removed with negative margins. I confirmed that the tumor was removed with negative margin. We will complete imaging to make sure she has no other residual disease. Looking at the CT of the chest and the upper abdomen and the operative note, it seems to be an isolated mass that was resected. I discussed consultation with [facility] oncology, I am referring back for discussion regarding further therapy with goal of prevention. Her pain is controlled with Percocet and I am refilling. Return based on treatment plan. Scribe Attestation   This note was created by Aliya Cummins acting as scribe for the physician signing this note  Electronically Signed  Aliya Cummins, 10/6/2022  [employer], [employer]. Attending Attestation   Note was reviewed and edited.   I am in agreement with the note as entered      Cody Chapman MD  Hematologist/Medical [id number] [facility] hematology oncology physicians

## 2022-10-06 NOTE — TELEPHONE ENCOUNTER
Name: Jackie Sands  : 1995  MRN: [id number]    Oncology Navigation Follow-Up Note  Navigator met with pt. And spouse face to face introducing self and answering all questions. Pt. Will plan to F/U with [facility] oper[facility]. Writer will assist with appt. If needed. Plan to follow.    Electronically signed by Luke Tavarez RN on 10/6/2022 at 1:33 PM

## 2022-10-06 NOTE — PROGRESS NOTES
Patient here for port flush. Port flushes and draws well and she was discharged home with spouse in stable condition. She is due to return 11/17 for port flush.

## 2022-10-06 NOTE — TELEPHONE ENCOUNTER
AVS from 10/6/22    Refer  back to Dr Justin Carpenter [facility].    Rv after consultation      called [facility] to confirm referral, patient already established  Rv will be scheduled after seeing Dr. Justin Carpenter    PT was given AVS and appt schedule

## 2022-10-07 DIAGNOSIS — Z89.621 HISTORY OF DISARTICULATION OF RIGHT HIP: Primary | ICD-10-CM

## 2022-10-13 ENCOUNTER — HOSPITAL ENCOUNTER (OUTPATIENT)
Dept: GENERAL RADIOLOGY | Age: 27
Discharge: HOME | End: 2022-10-15
Payer: COMMERCIAL

## 2022-10-13 ENCOUNTER — OFFICE VISIT (OUTPATIENT)
Dept: CARDIOTHORACIC SURGERY | Age: 27
End: 2022-10-13

## 2022-10-13 ENCOUNTER — HOSPITAL ENCOUNTER (OUTPATIENT)
Age: 27
Discharge: HOME | End: 2022-10-15
Payer: COMMERCIAL

## 2022-10-13 VITALS
RESPIRATION RATE: 17 BRPM | WEIGHT: 184 LBS | TEMPERATURE: 98.4 F | BODY MASS INDEX: 34.74 KG/M2 | HEART RATE: 99 BPM | HEIGHT: 61 IN | DIASTOLIC BLOOD PRESSURE: 87 MMHG | OXYGEN SATURATION: 100 % | SYSTOLIC BLOOD PRESSURE: 126 MMHG

## 2022-10-13 DIAGNOSIS — C34.11 MALIGNANT NEOPLASM OF UPPER LOBE OF RIGHT LUNG (HCC): ICD-10-CM

## 2022-10-13 DIAGNOSIS — R00.0 TACHYCARDIA: Primary | ICD-10-CM

## 2022-10-13 PROCEDURE — 71046 X-RAY EXAM CHEST 2 VIEWS: CPT

## 2022-10-13 PROCEDURE — 99024 POSTOP FOLLOW-UP VISIT: CPT | Performed by: NURSE PRACTITIONER

## 2022-10-13 NOTE — PROGRESS NOTES
[facility]  Office Visit      Subjective:  Ms. Olya Steen is a 33 y.o. female s/p wedge resection left side with Dr. Yumiko Cid due to cancer. Patient is recovering at home well and is following up with oncology regarding oral chemo agents. She has no shortness of breath or chest pain. He has no shortness of breath or chest pain. Alert and oriented x4    Physical Exam  Vitals:  Vitals:    10/13/22 1144   BP: 126/87   Pulse: 99   Resp: 17   Temp: 98.4 °F (36.9 °C)   SpO2: 100%       General: Alert and Oriented x3. Ambulatory. No apparent distress. Chest:  No abnormality. Equal and symmetric expansion with respiration. Lungs:  Clear to auscultation. Cardiac:  Regular rate and rhythm without murmurs, rubs or gallops. Abdomen:  Soft, non-tender, normoactive bowel sounds. Extremities:  No edema. Intact pulses in all four extremities. Psychiatric: Mood and affect are appropriate. X-ray stable with no pneumothorax or pleural effusion. Current Medications:    Current Outpatient Medications:     oxyCODONE-acetaminophen (PERCOCET) 5-325 MG per tablet, Take 1 tablet by mouth every 6 hours as needed for Pain for up to 30 days. , Disp: 120 tablet, Rfl: 0    metoprolol tartrate (LOPRESSOR) 25 MG tablet, Take 0.5 tablets by mouth 2 times daily, Disp: 60 tablet, Rfl: 0    phentermine 37.5 MG capsule, Take 1 capsule by mouth every morning for 30 days. , Disp: 30 capsule, Rfl: 0    norethindrone (NORLYDA) 0.35 MG tablet, TAKE 1 TABLET BY MOUTH DAILY, Disp: 84 tablet, Rfl: 3    ketoconazole (NIZORAL) 2 % shampoo, Apply 3-4 times weekly to scalp, leave on for five minutes prior to washing off, Disp: 120 mL, Rfl: 2    fluocinonide (LIDEX) 0.05 % external solution, Apply to scalp daily for rash, Disp: 60 mL, Rfl: 2    ferrous sulfate (IRON 325) 325 (65 Fe) MG tablet, Take 325 mg by mouth daily (with breakfast), Disp: , Rfl:     Handicap Placard MISC, Expiration: Lifetime, Disp: 2 each, Rfl: 0 lidocaine-prilocaine (EMLA) 2.5-2.5 % cream, Apply topically Daily as needed. , Disp: 1 Tube, Rfl: 1    Prenatal MV-Min-Fe Fum-FA-DHA (PRENATAL 1 PO), Take by mouth, Disp: , Rfl:     Past Surgical History:   Procedure Laterality Date    CT NEEDLE BIOPSY LUNG PERCUTANEOUS  5/9/2022    CT NEEDLE BIOPSY LUNG PERCUTANEOUS 5/9/2022 [facility] CT SCAN    CT NEEDLE BIOPSY LUNG PERCUTANEOUS  9/15/2022    CT NEEDLE BIOPSY LUNG PERCUTANEOUS 9/15/2022 [facility] CT SCAN    IR PORT PLACEMENT EQUAL OR GREATER THAN 5 YEARS  10/27/2020    IR PORT PLACEMENT EQUAL OR GREATER THAN 5 YEARS 10/27/2020 Celine Scott MD [facility] SPECIAL PROCEDURES    LEG AMPUTATION AT HIP Right 07/23/2020    [facility], osteosarcoma    LOBECTOMY Left 9/27/2022    LT MUSCLE SPARING THORACOTOMY, LOWER LOBE SEGMENTAL RESECTION AND EN BLOC CHEST WALL RESECTION performed by Paul Easton MD at [address] [place] [address] Hx: reports that she has never smoked.  She has never used smokeless tobacco.    Assessment & Plan:   Patient had sinus tachycardia during her postop recovery while at rest.  No known cardiac history in the past.  After discussion patient request to be established with a cardiologist therefore we will assist with establishing with [facility] for further evaluation    [address], APRN - NP,APRMICHAEL CNP

## 2022-11-18 ENCOUNTER — HOSPITAL ENCOUNTER (OUTPATIENT)
Dept: INFUSION THERAPY | Age: 27
Discharge: HOME | End: 2022-11-18
Payer: COMMERCIAL

## 2022-11-18 DIAGNOSIS — C49.21 PRIMARY LOWER EXTREMITY SARCOMA, RIGHT (HCC): Primary | ICD-10-CM

## 2022-11-18 PROCEDURE — 2580000003 HC RX 258: Performed by: INTERNAL MEDICINE

## 2022-11-18 PROCEDURE — 96523 IRRIG DRUG DELIVERY DEVICE: CPT

## 2022-11-18 PROCEDURE — 6360000002 HC RX W HCPCS: Performed by: INTERNAL MEDICINE

## 2022-11-18 RX ORDER — SODIUM CHLORIDE 0.9 % (FLUSH) 0.9 %
10 SYRINGE (ML) INJECTION PRN
OUTPATIENT
Start: 2022-11-18

## 2022-11-18 RX ORDER — SODIUM CHLORIDE 0.9 % (FLUSH) 0.9 %
10 SYRINGE (ML) INJECTION PRN
Status: DISCONTINUED | OUTPATIENT
Start: 2022-11-18 | End: 2022-11-19 | Stop reason: HOSPADM

## 2022-11-18 RX ORDER — HEPARIN SODIUM (PORCINE) LOCK FLUSH IV SOLN 100 UNIT/ML 100 UNIT/ML
500 SOLUTION INTRAVENOUS PRN
Status: DISCONTINUED | OUTPATIENT
Start: 2022-11-18 | End: 2022-11-19 | Stop reason: HOSPADM

## 2022-11-18 RX ORDER — HEPARIN SODIUM (PORCINE) LOCK FLUSH IV SOLN 100 UNIT/ML 100 UNIT/ML
500 SOLUTION INTRAVENOUS PRN
OUTPATIENT
Start: 2022-11-18

## 2022-11-18 RX ORDER — SODIUM CHLORIDE 0.9 % (FLUSH) 0.9 %
20 SYRINGE (ML) INJECTION PRN
OUTPATIENT
Start: 2022-11-18

## 2022-11-18 RX ORDER — WATER 1000 ML/1000ML
2.2 INJECTION, SOLUTION INTRAVENOUS ONCE
OUTPATIENT
Start: 2022-11-18 | End: 2022-11-18

## 2022-11-18 RX ADMIN — SODIUM CHLORIDE, PRESERVATIVE FREE 20 ML: 5 INJECTION INTRAVENOUS at 11:32

## 2022-11-18 RX ADMIN — HEPARIN 500 UNITS: 100 SYRINGE at 11:32

## 2022-11-29 ENCOUNTER — TELEPHONE (OUTPATIENT)
Dept: CASE MANAGEMENT | Age: 27
End: 2022-11-29

## 2022-11-29 NOTE — TELEPHONE ENCOUNTER
Name: Rebekah Diaz  : 1995  MRN: [id number]    Oncology Navigation Follow-Up Note  Navigator left VM requesting F/U be scheduled for pt once Dr. Ruben Robbins notes available . Plan to follow .   Electronically signed by Susu Orellana RN on 2022 at 10:16 AM

## 2022-12-15 ENCOUNTER — OFFICE VISIT (OUTPATIENT)
Dept: ONCOLOGY | Age: 27
End: 2022-12-15
Payer: COMMERCIAL

## 2022-12-15 VITALS
DIASTOLIC BLOOD PRESSURE: 65 MMHG | SYSTOLIC BLOOD PRESSURE: 96 MMHG | HEART RATE: 125 BPM | WEIGHT: 192.6 LBS | BODY MASS INDEX: 36.39 KG/M2 | TEMPERATURE: 98.3 F

## 2022-12-15 DIAGNOSIS — C49.21 PRIMARY LOWER EXTREMITY SARCOMA, RIGHT (HCC): ICD-10-CM

## 2022-12-15 DIAGNOSIS — C34.11 MALIGNANT NEOPLASM OF UPPER LOBE OF RIGHT LUNG (HCC): Primary | ICD-10-CM

## 2022-12-15 PROCEDURE — 99214 OFFICE O/P EST MOD 30 MIN: CPT | Performed by: INTERNAL MEDICINE

## 2022-12-15 PROCEDURE — G8417 CALC BMI ABV UP PARAM F/U: HCPCS | Performed by: INTERNAL MEDICINE

## 2022-12-15 PROCEDURE — G8427 DOCREV CUR MEDS BY ELIG CLIN: HCPCS | Performed by: INTERNAL MEDICINE

## 2022-12-15 PROCEDURE — G8484 FLU IMMUNIZE NO ADMIN: HCPCS | Performed by: INTERNAL MEDICINE

## 2022-12-15 PROCEDURE — 99211 OFF/OP EST MAY X REQ PHY/QHP: CPT | Performed by: INTERNAL MEDICINE

## 2022-12-15 PROCEDURE — 1036F TOBACCO NON-USER: CPT | Performed by: INTERNAL MEDICINE

## 2022-12-15 NOTE — PROGRESS NOTES
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 female who is referred to us for sarcoma. She presented with rightt knee sprain at work 04/2020 but the pain and swelling worsened with palpable mass. She underwent MRI which showed mass and was seen at [facility] where biopsy was taken and showed sarcoma. She was transferred to [facility] and decided to undergo full leg amputation due to early stage pregnancy, tumor was 16 cm x 20 cm x 3 cm and removed with negative margins. She was following with [facility] and decided she would prefer to receive treatment locally. The patient is currently 32 weeks pregnant and delivery plans are expected to be finalized 10/02/2020. She has elected to proceed with chemo after delivery. She is working towards prosthesis with test socket. 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 Kathy delivered in late October, after delivery, we decided to start chemo with AIM 11/3/2020. Patient finished 4 cycles of chemotherapy and she was declared in remission and was started surveillance. In May/2022, she had an isolated lung mass that was pleural-based and isolated in the left posterior lung. Biopsy was negative so we decided observation. 09/2022 imaging showed progression of disease, with isolated mass in the left lung, she underwent lobectomy 09/27/2022 which showed 6.8 cm high grade sarcoma with invasion to the chest wall, removed with negative margins. INTERIM HISTORY: The patient presents for follow up today for follow up with sarcoma surveillance. She had recent follow up at [facility] and was advised against treatment with no current active disease. PAST MEDICAL HISTORY: has a past medical history of 33 weeks gestation of pregnancy, Antepartum premature rupture of membrane, COVID-19, Gestational diabetes, High-risk pregnancy, unspecified trimester, History of anemia, History of blood transfusion, History of chemotherapy, History of E. Coli UTI 10-<100,000CFU (7/7/20), Lung nodule, Malignant neoplasm affecting pregnancy in third trimester, Osteosarcoma, Pregestational diabetes mellitus, modified White class B, Pregnancy induced hypertension, Pseudomonas UTI, Under care of service provider, Under care of service provider, Under care of service provider, and Wears glasses. PAST SURGICAL HISTORY: has a past surgical history that includes Leg amputation at hip (Right, 07/23/2020); IR PORT PLACEMENT > 5 YEARS (10/27/2020); CT NEEDLE BIOPSY LUNG PERCUTANEOUS (5/9/2022); CT NEEDLE BIOPSY LUNG PERCUTANEOUS (9/15/2022); and Lung lobectomy (Left, 9/27/2022). CURRENT MEDICATIONS:  has a current medication list which includes the following prescription(s): norethindrone, ketoconazole, fluocinonide, ferrous sulfate, handicap placard, lidocaine-prilocaine, prenatal mv-min-fe fum-fa-dha, and metoprolol tartrate. ALLERGIES:  has No Known Allergies. FAMILY HISTORY: Negative for any hematological or oncological conditions. SOCIAL HISTORY:  reports that she has never smoked. She has never used smokeless tobacco. She reports that she does not currently use alcohol. She reports that she does not use drugs. REVIEW OF SYSTEMS:   General: No fever or night sweats.  Weight stable   ENT: No double or blurred vision, no tinnitus or hearing problem, no dysphagia or sore throat   Respiratory: No chest pain, no shortness of breath, no cough or hemoptysis. Cardiovascular: Denies chest pain, PND or orthopnea. No L E swelling or palpitations. Gastrointestinal: No nausea, vomiting, abdominal pain, diarrhea or constipation. Genitourinary: Denies dysuria, hematuria, frequency, urgency or incontinence. Gynecological: nearly resolved post partum bleeding  Neurological: Denies headaches, decreased LOC, no sensory or motor focal deficits. +post surgical phantom pain improving  Musculoskeletal: No arthralgia no back pain or joint swelling. +post surgical muscle pain in arms   Skin: There are no rashes or bleeding. Psychiatric: No anxiety, no depression. Endocrine: No diabetes or thyroid disease. Hematologic: No bleeding, no adenopathy. PHYSICAL EXAM: Shows a well appearing 33y.o.-year-old female who is not in pain or distress. Vital Signs: Blood pressure 96/65, pulse (!) 125, temperature 98.3 °F (36.8 °C), temperature source Temporal, weight 192 lb 9.6 oz (87.4 kg), not currently breastfeeding. HEENT: Normocephalic and atraumatic. Pupils are equal, round, reactive to light and accommodation. Extraocular muscles are intact. Neck: Showed no JVD, no carotid bruit . Lungs: Clear to auscultation bilaterally. Heart: Regular without any murmur, tachycardic. Abdomen: Soft, nontender. No hepatosplenomegaly. Extremities: Right leg amputation. Lower extremities show no edema, clubbing, or cyanosis. Breasts: Examination not done today.  Neuro exam: intact cranial nerves bilaterally no motor or sensory deficit, gait is normal. Lymphatic: no adenopathy appreciated in the supraclavicular, axillary, cervical or inguinal area    REVIEW OF LABORATORY DATA:   Lab Results   Component Value Date    WBC 12.7 (H) 10/01/2022    HGB 12.2 10/01/2022    HCT 34.9 (L) 10/01/2022    MCV 86.0 10/01/2022     10/01/2022   '    Chemistry        Component Value Date/Time     10/01/2022 0554    K 4.3 10/01/2022 0554    CL 99 10/01/2022 0554    CO2 25 10/01/2022 0554    BUN 9 10/01/2022 0554    CREATININE 0.30 (L) 10/01/2022 0554        Component Value Date/Time    CALCIUM 9.2 10/01/2022 0554    ALKPHOS 51 09/28/2022 0438    AST 33 (H) 09/28/2022 0438    ALT 15 09/28/2022 0438    BILITOT 0.5 09/28/2022 0438          REVIEW OF RADIOLOGICAL RESULTS:     PATHOLOGY:           IMPRESSION:   Sarcoma right leg, T4 N0 M0, 05/2020 (undifferentiated pleomorphic sarcoma)   S/P right leg amputation 07/2021  Adjuvant chemo following delivery with ifosfamide and adriamycin (AIM) - started 11/03/2020, received 4 cycles  New lung lesion, seen on screening CT scan, first biopsy was negative, follow-up imaging showed rapid increase in the mass. Second biopsy showed metastatic pleomorphic sarcoma. Status post lower left wedge resection with negative margin. PLAN:   We discussed recommendation form [facility] against possible treatment option based on side effects, prognostic data was discussed and regular imaging was recommended. We reviewed prognostic data and odds of recurrence by year. We will plan on CT of the chest every 3 months with extension to abdomen and pelvis every 6 months. She is asymptomatic and clinically well. We will commence with surveillance and refer back to [facility] with progression for recommendations. I am putting in orders for CT of chest, abdomen, pelvis to be done in the near future. Return in 4 months. Scribe Attestation   This note was created by Navneet Tapia acting as scribe for the physician signing this note  Electronically Signed  Navneet Tapia, 12/15/2022  [employer], [employer]. Attending Attestation   Note was reviewed and edited.   I am in agreement with the note as entered      Alexandra Chapman MD  Hematologist/Medical [id number] [facility] hematology oncology physicians

## 2022-12-16 ENCOUNTER — TELEPHONE (OUTPATIENT)
Dept: ONCOLOGY | Age: 27
End: 2022-12-16

## 2022-12-16 NOTE — TELEPHONE ENCOUNTER
AVS from 12/15/22      Need Ct chest abd and pelvis soon  Rv in April need cbc, cmp, CT chest prior to RV       Ct scheduled for 12/22 @ 3:00 pm     Ct scheduled for 4/5 @ 10:00 am     Rv scheduled for 4/11 @ 3:45 pm     Pt was given AVS and appointment schedule    Electronically signed by Dawson Dunlap on 12/16/2022 at 8:28 AM

## 2022-12-21 DIAGNOSIS — C49.21 PRIMARY LOWER EXTREMITY SARCOMA, RIGHT (HCC): Primary | ICD-10-CM

## 2022-12-22 ENCOUNTER — HOSPITAL ENCOUNTER (OUTPATIENT)
Dept: INFUSION THERAPY | Age: 27
Discharge: HOME | End: 2022-12-22
Payer: COMMERCIAL

## 2022-12-22 ENCOUNTER — HOSPITAL ENCOUNTER (OUTPATIENT)
Dept: CT IMAGING | Age: 27
Discharge: HOME | End: 2022-12-24
Payer: COMMERCIAL

## 2022-12-22 DIAGNOSIS — C49.21 PRIMARY LOWER EXTREMITY SARCOMA, RIGHT (HCC): Primary | ICD-10-CM

## 2022-12-22 DIAGNOSIS — C34.11 MALIGNANT NEOPLASM OF UPPER LOBE OF RIGHT LUNG (HCC): ICD-10-CM

## 2022-12-22 DIAGNOSIS — C49.21 PRIMARY LOWER EXTREMITY SARCOMA, RIGHT (HCC): ICD-10-CM

## 2022-12-22 LAB
BUN BLDV-MCNC: 7 MG/DL (ref 6–20)
CREAT SERPL-MCNC: <0.4 MG/DL (ref 0.5–0.9)
GFR SERPL CREATININE-BSD FRML MDRD: ABNORMAL ML/MIN/1.73M2

## 2022-12-22 PROCEDURE — 84520 ASSAY OF UREA NITROGEN: CPT

## 2022-12-22 PROCEDURE — 74177 CT ABD & PELVIS W/CONTRAST: CPT

## 2022-12-22 PROCEDURE — 6360000004 HC RX CONTRAST MEDICATION: Performed by: INTERNAL MEDICINE

## 2022-12-22 PROCEDURE — 2580000003 HC RX 258: Performed by: INTERNAL MEDICINE

## 2022-12-22 PROCEDURE — 82565 ASSAY OF CREATININE: CPT

## 2022-12-22 PROCEDURE — 36591 DRAW BLOOD OFF VENOUS DEVICE: CPT

## 2022-12-22 PROCEDURE — A4641 RADIOPHARM DX AGENT NOC: HCPCS | Performed by: INTERNAL MEDICINE

## 2022-12-22 PROCEDURE — 36415 COLL VENOUS BLD VENIPUNCTURE: CPT

## 2022-12-22 PROCEDURE — 6360000002 HC RX W HCPCS: Performed by: INTERNAL MEDICINE

## 2022-12-22 RX ORDER — HEPARIN SODIUM (PORCINE) LOCK FLUSH IV SOLN 100 UNIT/ML 100 UNIT/ML
500 SOLUTION INTRAVENOUS PRN
Status: DISCONTINUED | OUTPATIENT
Start: 2022-12-22 | End: 2022-12-23 | Stop reason: HOSPADM

## 2022-12-22 RX ORDER — HEPARIN SODIUM (PORCINE) LOCK FLUSH IV SOLN 100 UNIT/ML 100 UNIT/ML
500 SOLUTION INTRAVENOUS PRN
OUTPATIENT
Start: 2022-12-22

## 2022-12-22 RX ORDER — SODIUM CHLORIDE 0.9 % (FLUSH) 0.9 %
20 SYRINGE (ML) INJECTION PRN
OUTPATIENT
Start: 2022-12-22

## 2022-12-22 RX ORDER — 0.9 % SODIUM CHLORIDE 0.9 %
80 INTRAVENOUS SOLUTION INTRAVENOUS ONCE
Status: COMPLETED | OUTPATIENT
Start: 2022-12-22 | End: 2022-12-22

## 2022-12-22 RX ORDER — SODIUM CHLORIDE 0.9 % (FLUSH) 0.9 %
10 SYRINGE (ML) INJECTION PRN
Status: DISCONTINUED | OUTPATIENT
Start: 2022-12-22 | End: 2022-12-25 | Stop reason: HOSPADM

## 2022-12-22 RX ORDER — SODIUM CHLORIDE 0.9 % (FLUSH) 0.9 %
10 SYRINGE (ML) INJECTION PRN
Status: DISCONTINUED | OUTPATIENT
Start: 2022-12-22 | End: 2022-12-23 | Stop reason: HOSPADM

## 2022-12-22 RX ORDER — WATER 1000 ML/1000ML
2.2 INJECTION, SOLUTION INTRAVENOUS ONCE
OUTPATIENT
Start: 2022-12-22 | End: 2022-12-22

## 2022-12-22 RX ORDER — SODIUM CHLORIDE 0.9 % (FLUSH) 0.9 %
10 SYRINGE (ML) INJECTION PRN
OUTPATIENT
Start: 2022-12-22

## 2022-12-22 RX ADMIN — SODIUM CHLORIDE, PRESERVATIVE FREE 10 ML: 5 INJECTION INTRAVENOUS at 15:58

## 2022-12-22 RX ADMIN — Medication 10 ML: at 16:13

## 2022-12-22 RX ADMIN — IOPAMIDOL 100 ML: 755 INJECTION, SOLUTION INTRAVENOUS at 15:57

## 2022-12-22 RX ADMIN — SODIUM CHLORIDE 80 ML: 9 INJECTION, SOLUTION INTRAVENOUS at 15:58

## 2022-12-22 RX ADMIN — HEPARIN 500 UNITS: 100 SYRINGE at 16:13

## 2022-12-22 RX ADMIN — Medication 10 ML: at 14:26

## 2022-12-22 RX ADMIN — BARIUM SULFATE 450 ML: 20 SUSPENSION ORAL at 15:58

## 2022-12-22 NOTE — PROGRESS NOTES
Pt here for port draw/access for CT. Port de-accessed after scan complete. Returns 2/10/23 for port flush.

## 2022-12-27 DIAGNOSIS — N91.2 AMENORRHEA: Primary | ICD-10-CM

## 2023-01-05 ENCOUNTER — HOSPITAL ENCOUNTER (OUTPATIENT)
Age: 28
Discharge: HOME | End: 2023-01-05
Payer: COMMERCIAL

## 2023-01-05 ENCOUNTER — HOSPITAL ENCOUNTER (OUTPATIENT)
Dept: NON INVASIVE DIAGNOSTICS | Age: 28
Discharge: HOME | End: 2023-01-07
Payer: COMMERCIAL

## 2023-01-05 DIAGNOSIS — N91.2 AMENORRHEA: ICD-10-CM

## 2023-01-05 DIAGNOSIS — R00.0 TACHYCARDIA, UNSPECIFIED: ICD-10-CM

## 2023-01-05 LAB
HCG QUANTITATIVE: ABNORMAL MIU/ML
T3 FREE: 3.3 PG/ML (ref 2.02–4.43)
TSH SERPL DL<=0.05 MIU/L-ACNC: 1.7 UIU/ML (ref 0.3–5)

## 2023-01-05 PROCEDURE — 84702 CHORIONIC GONADOTROPIN TEST: CPT

## 2023-01-05 PROCEDURE — 93306 TTE W/DOPPLER COMPLETE: CPT

## 2023-01-05 PROCEDURE — 84481 FREE ASSAY (FT-3): CPT

## 2023-01-05 PROCEDURE — 36415 COLL VENOUS BLD VENIPUNCTURE: CPT

## 2023-01-05 PROCEDURE — 84443 ASSAY THYROID STIM HORMONE: CPT

## 2023-01-06 DIAGNOSIS — C34.11 MALIGNANT NEOPLASM OF UPPER LOBE OF RIGHT LUNG (HCC): Primary | ICD-10-CM

## 2023-01-06 DIAGNOSIS — Z34.90 EARLY STAGE OF PREGNANCY: ICD-10-CM

## 2023-01-06 DIAGNOSIS — C49.21 PRIMARY LOWER EXTREMITY SARCOMA, RIGHT (HCC): ICD-10-CM

## 2023-01-19 ENCOUNTER — TELEPHONE (OUTPATIENT)
Dept: CASE MANAGEMENT | Age: 28
End: 2023-01-19

## 2023-01-19 NOTE — TELEPHONE ENCOUNTER
Name: Corey Nieves  : 1995  MRN: [id number]    Oncology Navigation Follow-Up Note  Navigator left VM for pt. And navigation completed. Pt. Following with Dr. Arlene Harrison again in April with scans .    Electronically signed by Destini Healy RN on 2023 at 3:39 PM

## 2023-01-20 ENCOUNTER — INITIAL PRENATAL (OUTPATIENT)
Dept: OBGYN CLINIC | Age: 28
End: 2023-01-20

## 2023-01-20 VITALS
BODY MASS INDEX: 35.95 KG/M2 | DIASTOLIC BLOOD PRESSURE: 62 MMHG | WEIGHT: 190.25 LBS | SYSTOLIC BLOOD PRESSURE: 114 MMHG

## 2023-01-20 DIAGNOSIS — Z85.89 HISTORY OF MALIGNANT NEOPLASM METASTATIC TO LUNG: ICD-10-CM

## 2023-01-20 DIAGNOSIS — O21.9 NAUSEA AND VOMITING IN PREGNANCY: ICD-10-CM

## 2023-01-20 DIAGNOSIS — Z87.898 HISTORY OF TACHYCARDIA: ICD-10-CM

## 2023-01-20 DIAGNOSIS — Z87.59 HISTORY OF PRETERM PREMATURE RUPTURE OF MEMBRANES (PPROM): ICD-10-CM

## 2023-01-20 DIAGNOSIS — Z3A.08 8 WEEKS GESTATION OF PREGNANCY: ICD-10-CM

## 2023-01-20 DIAGNOSIS — O99.210 OBESITY IN PREGNANCY: ICD-10-CM

## 2023-01-20 DIAGNOSIS — Z85.831 HISTORY OF SARCOMA: ICD-10-CM

## 2023-01-20 DIAGNOSIS — O09.90 HIGH RISK PREGNANCY, ANTEPARTUM: Primary | ICD-10-CM

## 2023-01-20 RX ORDER — METOPROLOL SUCCINATE 25 MG/1
TABLET, EXTENDED RELEASE ORAL
COMMUNITY
Start: 2022-12-21

## 2023-01-20 RX ORDER — DOXYLAMINE SUCCINATE AND PYRIDOXINE HYDROCHLORIDE, DELAYED RELEASE TABLETS 10 MG/10 MG 10; 10 MG/1; MG/1
1 TABLET, DELAYED RELEASE ORAL 2 TIMES DAILY PRN
Qty: 60 TABLET | Refills: 0 | Status: SHIPPED | OUTPATIENT
Start: 2023-01-20

## 2023-01-20 ASSESSMENT — PATIENT HEALTH QUESTIONNAIRE - PHQ9
2. FEELING DOWN, DEPRESSED OR HOPELESS: 0
SUM OF ALL RESPONSES TO PHQ QUESTIONS 1-9: 0
SUM OF ALL RESPONSES TO PHQ QUESTIONS 1-9: 0
1. LITTLE INTEREST OR PLEASURE IN DOING THINGS: 0
SUM OF ALL RESPONSES TO PHQ9 QUESTIONS 1 & 2: 0
SUM OF ALL RESPONSES TO PHQ QUESTIONS 1-9: 0
SUM OF ALL RESPONSES TO PHQ QUESTIONS 1-9: 0

## 2023-01-20 NOTE — PROGRESS NOTES
[facility],[address]/ [address]  [address]  Dept: [phone]    New Obstetric Intake     Subjective:    Patient Aurora Weinstein  Patient Age: 33 y.o. Date of Visit: 2023  Patient's estimated gestational age at visit: 8w0d      Any Concerns Today: yes - nausea  Patient reports no complaints. She denies spotting, cramping or pain.     OB History          2    Para   1    Term   0       1    AB   0    Living   1         SAB   0    IAB   0    Ectopic   0    Molar   0    Multiple   0    Live Births   1                Information about this pregnancy:   Planned Pregnancy: No, Accepting: Yes  Father of Baby:  is involved with the pregnancy  Patient's last menstrual period was 2022., Regular menses: Yes  Date of Last Pap Smear: 2022 normal  On Birth Control at Time of Conception: no  Early Dating Ultrasound: completed  Desires first trimester screening: No      Risk Screening  Patient Occupation: stay at home, Environmental/Work Hazards: No  Smoker: No  History of Substance Abuse: no  History of Sexual Abuse: no  Current of past history of intimate partner violence: no  Teratogen Exposure since finding out pregnant: no  Pets at home: yes - 1 dog    Infection History:  Personal History of Chicken Pox or varicella vaccination: Yes  Exposed to TB or live with someone with TB: no  Patient or partner history of genital herpes: no  Rash or viral illness since last menstrual period: no  Prior GBS-infected child: no  History of sexually transmitted infection(s) (STIs): no   Any recent travel within the last 3 months out of the country: no, Partner: no    Previous Birth History:   Vaginal Birth: yes   Birth: N/A  Any previous birth complications: yes- hx PPROM  History of hemorrhage during/after birth: no    High Risk Factor Screening for this Pregnancy:  Age greater than 28 at delivery: No  History of  delivery: yes   History of diabetes (gestational or outside of pregnancy): Yes, On medications: No  Screening for pregestational diabetes:   BMI greater than 30: Yes. If Yes, need early glucola  History of Hypertension: No, On medications: No  History of bleeding disorders: No    See Epic Navigator for further genetic and risk screening. Objective:  /62   Wt 190 lb 4 oz (86.3 kg)   LMP 2022   BMI 35.95 kg/m²   Body mass index is 35.95 kg/m². Physical Exam    Chaperone for Intimate Exam  Chaperone was offered as part of the rooming process. Patient declined and agrees to continue with exam without a chaperone. Chaperone: n/a    Mother's Ethnicity:   Father's Ethnicity:       Assessment/Plan:  Pregnancy at 8w0d   Role of ultrasound in pregnancy discussed; fetal survey: ordered  Due date has been established and is based off of 8w0d  She was counseled on office policies. She was educated about the anticipated course of prenatal care. Warning signs were reviewed. The patient was counseled on drug screening, HIV, Cystic Fibrosis, SMA and Sickle Cell disease, as appropriate. She verbally consented to HIV testing and drug screening. She was counseled on Toxoplasmosis/Listeriosis (cats/raw meat). She denies tobacco use. The patient was counseled on the risks of tobacco abuse, both maternal and fetal. She was instructed to stop smoking if currently using tobacco. Morbidity, mortality, and cessation programs were reviewed. The risks include but are not limited to increased risks of  labor,  delivery, premature rupture of membranes, intrauterine growth restriction, intrauterine fetal demise and abruptio placenta. Secondary smoke risks were also reviewed. Increases in cancer, respiratory problems, and sudden infant death syndrome were reviewed as well. The patient was informed of a 2-4% risk of congenital anomalies in the general population.  She was questioned in detail regarding any genetic misnomer history, chromosomal abnormalities, or learning disabilities in herself, the father of the baby or their families. She denies any history as stated above.  Discussed in detail referral for genetic screening through [facility]. Discussed in detail referral/orders for  for 1st trimester screen vs NIPSinfo provided for screening  Discussed with patient that if they proceed with the NIPs testing then they will be opting out of 1st trimester screening which can provide information in regards to placental health, congenital heart defects, metabolic abnormalities, and anatomic abnormalities. She verbalizes understanding and would like to proceed with: Declines   Route of delivery and counseling on vaginal, operative vaginal, and  sections were discussed.  Further counseling will be handled by the provider at subsequent visits.   Encouraged well-balanced diet, plenty of rest when needed, prenatal vitamins daily and walking for exercise. Discussed self-help for nausea, avoiding OTC medications until consulting provider or pharmacist, other than Tylenol PRN, minimal caffeine (1-2 cups daily) and avoiding alcohol. Discussed exercise safety in pregnancy.      1. High risk pregnancy, antepartum    - HIV Screen; Future  - Prenatal Profile I; Future  - Urinalysis; Future  - Culture, Urine; Future  - Urine Drug Screen; Future  - Glucose tolerance, 1 hour  - Prenatal type and screen; Future  - C.trachomatis N.gonorrhoeae DNA, Urine; Future  - Varicella Zoster Antibody, IgG; Future  - Hepatitis C Antibody; Future  - doxylamine-pyridoxine 10-10 MG TBEC; Take 1 tablet by mouth 2 times daily as needed (nausea/vomiting)  Dispense: 60 tablet; Refill: 0    2. 8 weeks gestation of pregnancy  - Reviewed reviewed warning signs      3. Obesity in pregnancy  - Glucose tolerance, 1 hour    4. History of  premature rupture of membranes (PPROM)  - [facility] referral    5. History of  tachycardia  - On metoprolol 25mg XR daily follows with cardiology    6. Nausea and vomiting in pregnancy  - Reviewed to update office with any worsening symptoms  - doxylamine-pyridoxine 10-10 MG TBEC; Take 1 tablet by mouth 2 times daily as needed (nausea/vomiting)  Dispense: 60 tablet; Refill: 0    7. History of sarcoma  Oncologic history per [facility] visit 11/23/22 with :  April 2020- pain in leg when working at [employer] ED evaluation thought likely sprain. June 2020- progressive swelling/pain such could not work. 6/16/2020- MRI R knee and femur [facility]))- showed a large complex mass measuring at least 9.7 x 8.3 x 16.9 cm centered along the anteromedial aspect of the mid-distal thigh  6/29/20- Presented to orthopedics clinic and admitted for further work up of intractable pain, tachycardia, elevated WBC in the setting of 19 week pregnancy  6/29/20- CT PE -Assessment of segmental and smaller bilateral lower lobe and right middle lobe pulmonary arteries is limited by the contrast bolus. No  central pulmonary embolus is evident. 6/30/20- Biopsy right thigh mass Hiram Matthews, Dr. Madeleine Shipley)- undifferentiated sarcoma. Neoadjuvant radiation recommended  7/6/20-Transferred to  for possible neoadjuvant radiation, further mgmt. Sarcoma tumor board discussion joint with ortho onc, rad onc, MFM, med onc was neoadjuvant radiation was recommended for a limb salvage approach and was not safe for existing pregnancy. Neoadjuvant chemotherapy could be given with a risk of progression and some potential consequences to fetus. Hip dysarticulation could be performed but would result in loss of limb and patient would still be at risk for metastases.   7/23/20 Right lower extremity, right hip disarticulation amputation by Dr. Gonzáles Stable: Undifferentiated pleomorphic sarcoma (20 cm), negative margins  11/20/20: Initiated chemotherapy after delivery of her son  1/2020: Completed 4 cycles of doxorubicin and ifosfamide chemotherapy  05/2022: LLL shows 1.7cm mass, biopsy : small fragment of alveolar lung parenchyma without significant diagnostic abnormality. Negative for neoplasm, granulomatous inflammation and evidence of acute infection. No malignant cells are identified on the touch preparations. 09/2022: imaging showed progression of isolated mass in the left lung  09/27/2022: 6.8 cm high grade sarcoma with invasion to the chest wall, removed with negative margins. Has MFM consult       8. History of malignant neoplasm metastatic to lung  [facility] plan per  on 11/23/22  \"Plan:   1. Undifferentiated pleomorphic sarcoma. -I reviewed with the patient and her  her course of disease and treatment  -We discussed that her disease spread hematogenously to one site and fortunately it was able to be completely removed. I explained that she has very high risk of developing future metastatic disease. I would recommend surveillance imaging every 3 months with chest ct (non contrast, low dose radiation) and continue surveillance of her R thigh with MRI per ortho onc guidelines.   -There is not good data for use of chemotherapy in a maintentnace fashion. It is difficult to measure benefit in this way and drug toxicities can impact quality of life. She would have to use contraception and currently she is considering a future pregnancy.  -We had an extensive discussion today about her high risk of developing metastatic disease and the implications that has for future planning. I strongly suggest close surveillance and if she was to have new findings concerning for recurrent disease then we can consider treatment options at that time which would include clinical trials vs standard systemic therapy vs possibly local therapies depending on the situation. \"    Has MFM consult    Upon completion of the visit all questions were answered. ROS was done and is negative except as documented in HPI.   History was reviewed as documented on [employer] The patient, Verenice Davis, was seen with a total time spent of 25 minutes for the visit on this date of service by the [facility]  The time component involved both face-to-face (counseling and education) and non face-to-face time (care coordination), spent in determining the total time component.       Return in about 4 weeks (around 2/17/2023) for Return OB with .    Electronically Signed by: FATEMEH Baca CNM

## 2023-02-09 ENCOUNTER — HOSPITAL ENCOUNTER (OUTPATIENT)
Age: 28
Setting detail: SPECIMEN
Discharge: HOME | End: 2023-02-09

## 2023-02-09 ENCOUNTER — PATIENT MESSAGE (OUTPATIENT)
Dept: PHYSICAL MEDICINE AND REHAB | Age: 28
End: 2023-02-09

## 2023-02-09 DIAGNOSIS — G54.6 PHANTOM PAIN AFTER AMPUTATION OF LOWER EXTREMITY (HCC): ICD-10-CM

## 2023-02-09 DIAGNOSIS — C34.11 MALIGNANT NEOPLASM OF UPPER LOBE OF RIGHT LUNG (HCC): ICD-10-CM

## 2023-02-09 DIAGNOSIS — Z89.621 HISTORY OF DISARTICULATION OF RIGHT HIP: Primary | ICD-10-CM

## 2023-02-09 DIAGNOSIS — O09.90 HIGH RISK PREGNANCY, ANTEPARTUM: ICD-10-CM

## 2023-02-09 DIAGNOSIS — C49.21 PRIMARY LOWER EXTREMITY SARCOMA, RIGHT (HCC): ICD-10-CM

## 2023-02-09 LAB
ABO/RH: NORMAL
ABSOLUTE EOS #: 0.05 K/UL (ref 0–0.44)
ABSOLUTE IMMATURE GRANULOCYTE: 0.04 K/UL (ref 0–0.3)
ABSOLUTE LYMPH #: 1.33 K/UL (ref 1.1–3.7)
ABSOLUTE MONO #: 0.38 K/UL (ref 0.1–1.2)
AMPHETAMINE SCREEN URINE: NEGATIVE
ANTIBODY SCREEN: NEGATIVE
BARBITURATE SCREEN URINE: NEGATIVE
BASOPHILS # BLD: 0 % (ref 0–2)
BASOPHILS ABSOLUTE: 0.03 K/UL (ref 0–0.2)
BENZODIAZEPINE SCREEN, URINE: NEGATIVE
BILIRUBIN URINE: NEGATIVE
CANNABINOID SCREEN URINE: NEGATIVE
COCAINE METABOLITE, URINE: NEGATIVE
COLOR: YELLOW
EOSINOPHILS RELATIVE PERCENT: 1 % (ref 1–4)
EPITHELIAL CELLS UA: NORMAL /HPF (ref 0–5)
FENTANYL URINE: NEGATIVE
GLUCOSE UR STRIP.AUTO-MCNC: NEGATIVE MG/DL
HBV SURFACE AG SER QL: NONREACTIVE
HCT VFR BLD AUTO: 39.5 % (ref 36.3–47.1)
HCV AB SER QL: NONREACTIVE
HGB BLD-MCNC: 13.3 G/DL (ref 11.9–15.1)
HIV 1+2 AB+HIV1 P24 AG SERPL QL IA: NONREACTIVE
IMMATURE GRANULOCYTES: 1 %
KETONES UR STRIP.AUTO-MCNC: NEGATIVE MG/DL
LEUKOCYTE ESTERASE UR QL STRIP.AUTO: ABNORMAL
LYMPHOCYTES # BLD: 18 % (ref 24–43)
MCH RBC QN AUTO: 29.4 PG (ref 25.2–33.5)
MCHC RBC AUTO-ENTMCNC: 33.7 G/DL (ref 28.4–34.8)
MCV RBC AUTO: 87.2 FL (ref 82.6–102.9)
METHADONE SCREEN, URINE: NEGATIVE
MONOCYTES # BLD: 5 % (ref 3–12)
NITRITE UR QL STRIP.AUTO: NEGATIVE
NRBC AUTOMATED: 0 PER 100 WBC
OPIATES, URINE: NEGATIVE
OXYCODONE SCREEN URINE: NEGATIVE
PDW BLD-RTO: 12.6 % (ref 11.8–14.4)
PHENCYCLIDINE, URINE: NEGATIVE
PLATELET # BLD AUTO: 208 K/UL (ref 138–453)
PMV BLD AUTO: 12.4 FL (ref 8.1–13.5)
PROT UR STRIP.AUTO-MCNC: 7.5 MG/DL (ref 5–8)
PROT UR STRIP.AUTO-MCNC: NEGATIVE MG/DL
RBC # BLD: 4.53 M/UL (ref 3.95–5.11)
RBC CLUMPS #/AREA URNS AUTO: NORMAL /HPF (ref 0–4)
RUBV IGG SER QL: 35.8 IU/ML
SEG NEUTROPHILS: 75 % (ref 36–65)
SEGMENTED NEUTROPHILS ABSOLUTE COUNT: 5.46 K/UL (ref 1.5–8.1)
SPECIFIC GRAVITY UA: 1.01 (ref 1–1.03)
T PALLIDUM AB SER QL IA: NONREACTIVE
TEST INFORMATION: NORMAL
TURBIDITY: CLEAR
URINE HGB: NEGATIVE
UROBILINOGEN, URINE: NORMAL
WBC # BLD AUTO: 7.3 K/UL (ref 3.5–11.3)
WBC UA: NORMAL /HPF (ref 0–5)

## 2023-02-09 NOTE — TELEPHONE ENCOUNTER
Pt was last seen 03.2022. I left her a message on voicemail to contact our office for follow-up.       I did not start a PT order for because I was not sure if she was needing to be seen first.

## 2023-02-09 NOTE — TELEPHONE ENCOUNTER
From: Georgie Lawrence  To: Dr. Horacio Ayala: 2/9/2023 4:59 PM EST  Subject: Referral For Physical Therapy    Good evening, I know it has been a while but i hope all is well. I am reaching out today because I am again at the point with my prosthetic where I can begin Physical Therapy again. I spoke with Christin Jacobson at [facility] where I have been going for therapy and she mentioned that my referral for her is from last August. I know that since then I've added [employer] as my Primary insurance and that it is also from last year. I was wondering if I was able to get another referral or if I needed to come in for a visit first. Thanks, and have a great night.  Johnathan Loaiza

## 2023-02-10 ENCOUNTER — HOSPITAL ENCOUNTER (OUTPATIENT)
Dept: INFUSION THERAPY | Age: 28
Discharge: HOME | End: 2023-02-10
Payer: MEDICARE

## 2023-02-10 DIAGNOSIS — C49.21 PRIMARY LOWER EXTREMITY SARCOMA, RIGHT (HCC): Primary | ICD-10-CM

## 2023-02-10 LAB
CHLAMYDIA DNA UR QL NAA+PROBE: NEGATIVE
MICROORGANISM SPEC CULT: NORMAL
N GONORRHOEA DNA UR QL NAA+PROBE: NEGATIVE
SPECIMEN DESCRIPTION: NORMAL
SPECIMEN DESCRIPTION: NORMAL
VZV IGG SER QL IA: 1.32

## 2023-02-10 PROCEDURE — 6360000002 HC RX W HCPCS: Performed by: INTERNAL MEDICINE

## 2023-02-10 PROCEDURE — 96523 IRRIG DRUG DELIVERY DEVICE: CPT

## 2023-02-10 PROCEDURE — 2580000003 HC RX 258: Performed by: INTERNAL MEDICINE

## 2023-02-10 RX ORDER — HEPARIN SODIUM (PORCINE) LOCK FLUSH IV SOLN 100 UNIT/ML 100 UNIT/ML
500 SOLUTION INTRAVENOUS PRN
Status: DISCONTINUED | OUTPATIENT
Start: 2023-02-10 | End: 2023-02-11 | Stop reason: HOSPADM

## 2023-02-10 RX ORDER — HEPARIN SODIUM (PORCINE) LOCK FLUSH IV SOLN 100 UNIT/ML 100 UNIT/ML
500 SOLUTION INTRAVENOUS PRN
OUTPATIENT
Start: 2023-02-10

## 2023-02-10 RX ORDER — SODIUM CHLORIDE 0.9 % (FLUSH) 0.9 %
20 SYRINGE (ML) INJECTION PRN
OUTPATIENT
Start: 2023-02-10

## 2023-02-10 RX ORDER — SODIUM CHLORIDE 0.9 % (FLUSH) 0.9 %
10 SYRINGE (ML) INJECTION PRN
OUTPATIENT
Start: 2023-02-10

## 2023-02-10 RX ORDER — SODIUM CHLORIDE 0.9 % (FLUSH) 0.9 %
10 SYRINGE (ML) INJECTION PRN
Status: DISCONTINUED | OUTPATIENT
Start: 2023-02-10 | End: 2023-02-11 | Stop reason: HOSPADM

## 2023-02-10 RX ORDER — WATER 1000 ML/1000ML
2.2 INJECTION, SOLUTION INTRAVENOUS ONCE
OUTPATIENT
Start: 2023-02-10 | End: 2023-02-10

## 2023-02-10 RX ADMIN — SODIUM CHLORIDE, PRESERVATIVE FREE 20 ML: 5 INJECTION INTRAVENOUS at 11:45

## 2023-02-10 RX ADMIN — Medication 500 UNITS: at 11:45

## 2023-02-15 ENCOUNTER — ROUTINE PRENATAL (OUTPATIENT)
Dept: OBGYN CLINIC | Age: 28
End: 2023-02-15

## 2023-02-15 ENCOUNTER — HOSPITAL ENCOUNTER (OUTPATIENT)
Age: 28
Setting detail: SPECIMEN
Discharge: HOME | End: 2023-02-15

## 2023-02-15 VITALS — DIASTOLIC BLOOD PRESSURE: 64 MMHG | WEIGHT: 193 LBS | BODY MASS INDEX: 36.47 KG/M2 | SYSTOLIC BLOOD PRESSURE: 102 MMHG

## 2023-02-15 DIAGNOSIS — R73.09 ABNORMAL GLUCOSE: Primary | ICD-10-CM

## 2023-02-15 DIAGNOSIS — Z87.898 HISTORY OF TACHYCARDIA: ICD-10-CM

## 2023-02-15 DIAGNOSIS — Z3A.11 11 WEEKS GESTATION OF PREGNANCY: ICD-10-CM

## 2023-02-15 DIAGNOSIS — Z13.1 ENCOUNTER FOR SCREENING FOR DIABETES MELLITUS: ICD-10-CM

## 2023-02-15 DIAGNOSIS — Z85.831 HISTORY OF SARCOMA: ICD-10-CM

## 2023-02-15 DIAGNOSIS — O99.210 OBESITY IN PREGNANCY: ICD-10-CM

## 2023-02-15 DIAGNOSIS — O09.90 HIGH RISK PREGNANCY, ANTEPARTUM: Primary | ICD-10-CM

## 2023-02-15 DIAGNOSIS — Z85.89 HISTORY OF MALIGNANT NEOPLASM METASTATIC TO LUNG: ICD-10-CM

## 2023-02-15 DIAGNOSIS — Z87.59 HISTORY OF PRETERM PREMATURE RUPTURE OF MEMBRANES (PPROM): ICD-10-CM

## 2023-02-15 DIAGNOSIS — O21.9 NAUSEA AND VOMITING IN PREGNANCY: ICD-10-CM

## 2023-02-15 LAB
GLUCOSE ADMINISTRATION: ABNORMAL
GLUCOSE TOLERANCE SCREEN 50G: 184 MG/DL (ref 70–135)

## 2023-02-15 PROCEDURE — 0502F SUBSEQUENT PRENATAL CARE: CPT | Performed by: ADVANCED PRACTICE MIDWIFE

## 2023-02-15 NOTE — PROGRESS NOTES
SUBJECTIVE:    Pedrito Guevara is here for her return OB visit. denies concerns today. Reports nausea is getting better was unable to obtain bonjesta. She denies  feeling fetal movement. She denies vaginal bleeding. She denies vaginal discharge. She denies leaking of fluid. She denies uterine cramping. She denies  nausea and/or vomiting. OBJECTIVE:  Blood pressure 102/64, weight 193 lb (87.5 kg), last menstrual period 2022, not currently breastfeeding. Total weight gain: 3 lb (1.361 kg)      ASSESSMENT/PLAN:  IUP @ 11+5 weeks  S =D    High Risk Pregnancy  Due date is based on LMP and confirmed with 8w3d early dating ultrasound  Patient's prenatal labs are completed. Patient's blood type A POSITIVE  and rhogam is not indicated in the pregnancy  Early glucola indicated: yes - doing after visit today  Patient declined genetic screening. 2. 11 weeks gestation of pregnancy      3. Obesity in pregnancy  - Doing early glucola today    4. Nausea and vomiting in pregnancy  - Discussed at visit, improving no interventions needed at this time    5. History of  premature rupture of membranes (PPROM)  - Has up coming mfm consult    6. History of tachycardia  - continue with metoprolol 25mg XR daily follows with cardiology    7. History of sarcoma  - Continues to follow with oncologist.   - MFM referral sent    8. History of malignant neoplasm metastatic to lung  - Continues to follow with oncologist.  - MFM Referral sent        She was counseled regarding all of the above:    Return in about 4 weeks (around 3/15/2023) for Return OB.     The patient, Lorraine Vigil,  was seen with a total time spent of 25 minutes for the visit on this date of service by the [facility]  On this date February 15, 2023,  I have spent greater than 50% of this visit:  [x] reviewing previous notes  [x] reviewing test results  [x] pre-charting  Discussed with patient:  [x] Importance of compliance with treatment plan  [x] Counseling  [] Coordinating care  [x] Documenting     Electronically Signed By: FATEMEH Weber CNM

## 2023-02-21 ENCOUNTER — ROUTINE PRENATAL (OUTPATIENT)
Dept: PERINATAL CARE | Age: 28
End: 2023-02-21
Payer: COMMERCIAL

## 2023-02-21 ENCOUNTER — HOSPITAL ENCOUNTER (OUTPATIENT)
Age: 28
Setting detail: SPECIMEN
Discharge: HOME | End: 2023-02-21
Payer: COMMERCIAL

## 2023-02-21 VITALS
DIASTOLIC BLOOD PRESSURE: 64 MMHG | HEART RATE: 84 BPM | HEIGHT: 61 IN | TEMPERATURE: 99 F | SYSTOLIC BLOOD PRESSURE: 104 MMHG | RESPIRATION RATE: 16 BRPM | WEIGHT: 190 LBS | BODY MASS INDEX: 35.87 KG/M2

## 2023-02-21 DIAGNOSIS — O16.1 HYPERTENSION AFFECTING PREGNANCY IN FIRST TRIMESTER: ICD-10-CM

## 2023-02-21 DIAGNOSIS — O24.119 PRE-EXISTING TYPE 2 DIABETES MELLITUS DURING PREGNANCY, ANTEPARTUM: ICD-10-CM

## 2023-02-21 DIAGNOSIS — Z3A.12 12 WEEKS GESTATION OF PREGNANCY: ICD-10-CM

## 2023-02-21 DIAGNOSIS — O99.891 CURRENT MATERNAL CONDITION AFFECTING PREGNANCY: ICD-10-CM

## 2023-02-21 DIAGNOSIS — O99.211 OBESITY AFFECTING PREGNANCY IN FIRST TRIMESTER: ICD-10-CM

## 2023-02-21 DIAGNOSIS — Z36.9 FIRST TRIMESTER SCREENING: ICD-10-CM

## 2023-02-21 DIAGNOSIS — O9A.111 MALIGNANT NEOPLASM AFFECTING PREGNANCY IN FIRST TRIMESTER: Primary | ICD-10-CM

## 2023-02-21 DIAGNOSIS — O09.211 CURRENT PREGNANCY WITH HISTORY OF PRE-TERM LABOR IN FIRST TRIMESTER: ICD-10-CM

## 2023-02-21 PROBLEM — R00.0 TACHYCARDIA: Status: ACTIVE | Noted: 2023-02-21

## 2023-02-21 PROBLEM — Z89.9 HISTORY OF AMPUTATION: Status: ACTIVE | Noted: 2023-02-21

## 2023-02-21 PROBLEM — O24.312 PRE-EXISTING DIABETES MELLITUS DURING PREGNANCY IN SECOND TRIMESTER: Status: ACTIVE | Noted: 2023-02-21

## 2023-02-21 LAB
CRL: NORMAL
SAC DIAMETER: NORMAL

## 2023-02-21 PROCEDURE — G8427 DOCREV CUR MEDS BY ELIG CLIN: HCPCS | Performed by: OBSTETRICS & GYNECOLOGY

## 2023-02-21 PROCEDURE — 76813 OB US NUCHAL MEAS 1 GEST: CPT | Performed by: OBSTETRICS & GYNECOLOGY

## 2023-02-21 PROCEDURE — G8417 CALC BMI ABV UP PARAM F/U: HCPCS | Performed by: OBSTETRICS & GYNECOLOGY

## 2023-02-21 PROCEDURE — 99245 OFF/OP CONSLTJ NEW/EST HI 55: CPT | Performed by: OBSTETRICS & GYNECOLOGY

## 2023-02-21 PROCEDURE — 36415 COLL VENOUS BLD VENIPUNCTURE: CPT

## 2023-02-21 PROCEDURE — G8484 FLU IMMUNIZE NO ADMIN: HCPCS | Performed by: OBSTETRICS & GYNECOLOGY

## 2023-02-21 PROCEDURE — 76801 OB US < 14 WKS SINGLE FETUS: CPT | Performed by: OBSTETRICS & GYNECOLOGY

## 2023-02-21 PROCEDURE — 81508 FTL CGEN ABNOR TWO PROTEINS: CPT

## 2023-02-21 PROCEDURE — 2022F DILAT RTA XM EVC RTNOPTHY: CPT | Performed by: OBSTETRICS & GYNECOLOGY

## 2023-02-22 LAB — SEND OUT REPORT: NORMAL

## 2023-02-24 LAB
AFP INTERPRETATION: NORMAL
AFP SPECIMEN: NORMAL
CRL: 63.9 MM
CROWN RUMP LENGTH TWIN B: NORMAL MM
CROWN RUMP LENGTH: 63.9
DATE OF BIRTH: NORMAL
DONOR EGG?: NORMAL
GESTATIONAL AGE: NORMAL
HISTORY OF ANEUPLOIDY?: NORMAL
IN VITRO FERTILIZATION: NORMAL
MATERNAL AGE AT EDD: 27 YR
MATERNAL SCREEN, EER: NORMAL
MATERNAL WEIGHT: 190
MOM FOR NT, TWIN B: NORMAL
MOM FOR NT: 0.89
MOM FOR PAPP-A: 0.33
MONOCHORIONIC TWINS: NORMAL
MSHCG-MOM: 0.57
MSHCG: NORMAL IU/L
NUCHAL TRANSLUC (NT): 1.4
NUCHAL TRANSLUC (NT): 1.4 MM
NUCHAL TRANSLUCENCY TWIN B: NORMAL MM
NUMBER OF FETUSES: 1
NUMBER OF FETUSES: NORMAL
PAPP-A MOM: 233 NG/ML
PATIENT WEIGHT UNITS: NORMAL
PATIENT WEIGHT: NORMAL
PREV TRISOMY PREG: NORMAL
RACE (MATERNAL): NORMAL
RACE: NORMAL
READING MD CERT NUM: NORMAL
READING MD NAME: NORMAL
REPEAT SPECIMEN?: NORMAL
SMOKING: NORMAL
SMOKING: NORMAL
SONOGRAPHER CERT NO: NORMAL
SONOGRAPHER CERT NO: NORMAL
SONOGRAPHER NAME: NORMAL
SONOGRAPHER NAME: NORMAL
ULTRASOUND DATE: NORMAL
ULTRASOUND DATE: NORMAL

## 2023-03-07 ENCOUNTER — TELEMEDICINE (OUTPATIENT)
Dept: ONCOLOGY | Age: 28
End: 2023-03-07
Payer: COMMERCIAL

## 2023-03-07 ENCOUNTER — TELEPHONE (OUTPATIENT)
Dept: PERINATAL CARE | Age: 28
End: 2023-03-07

## 2023-03-07 DIAGNOSIS — C49.21 PRIMARY LOWER EXTREMITY SARCOMA, RIGHT (HCC): ICD-10-CM

## 2023-03-07 DIAGNOSIS — C34.11 MALIGNANT NEOPLASM OF UPPER LOBE OF RIGHT LUNG (HCC): Primary | ICD-10-CM

## 2023-03-07 PROCEDURE — 99214 OFFICE O/P EST MOD 30 MIN: CPT | Performed by: INTERNAL MEDICINE

## 2023-03-07 PROCEDURE — G8427 DOCREV CUR MEDS BY ELIG CLIN: HCPCS | Performed by: INTERNAL MEDICINE

## 2023-03-07 NOTE — PROGRESS NOTES
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 female who is referred to us for sarcoma. She presented with rightt knee sprain at work 04/2020 but the pain and swelling worsened with palpable mass. She underwent MRI which showed mass and was seen at [facility] where biopsy was taken and showed sarcoma. She was transferred to [facility] and decided to undergo full leg amputation due to early stage pregnancy, tumor was 16 cm x 20 cm x 3 cm and removed with negative margins. She was following with [facility] and decided she would prefer to receive treatment locally. The patient is currently 32 weeks pregnant and delivery plans are expected to be finalized 10/02/2020. She has elected to proceed with chemo after delivery. She is working towards prosthesis with test socket. 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 Kathy delivered in late October, after delivery, we decided to start chemo with AIM 11/3/2020. Patient finished 4 cycles of chemotherapy and she was declared in remission and was started surveillance. In May/2022, she had an isolated lung mass that was pleural-based and isolated in the left posterior lung. Biopsy was negative so we decided observation. 09/2022 imaging showed progression of disease, with isolated mass in the left lung, she underwent lobectomy 09/27/2022 which showed 6.8 cm high grade sarcoma with invasion to the chest wall, removed with negative margins. INTERIM HISTORY: The patient presents for follow up today for follow up with sarcoma surveillance. This is a virtual visit  The patient is pregnant about 14 weeks. She is very excited and feeling well. But obviously she is following with maternal-fetal medicine and who are monitoring her closely. PAST MEDICAL HISTORY: has a past medical history of 33 weeks gestation of pregnancy, Anemia, Antepartum premature rupture of membrane, COVID-19, Gestational diabetes, Gestational diabetes mellitus, High-risk pregnancy, unspecified trimester, History of anemia, History of blood transfusion, History of chemotherapy, History of E. Coli UTI 10-<100,000CFU (20), Lung nodule, Malignant neoplasm affecting pregnancy in third trimester, Osteosarcoma, Pregestational diabetes mellitus, modified White class B, Pregnancy induced hypertension,  delivery, Pseudomonas UTI, Under care of service provider, Under care of service provider, Under care of service provider, and Wears glasses. PAST SURGICAL HISTORY: has a past surgical history that includes Leg amputation at hip (Right, 2020); IR PORT PLACEMENT > 5 YEARS (10/27/2020); CT NEEDLE BIOPSY LUNG PERCUTANEOUS (2022); CT NEEDLE BIOPSY LUNG PERCUTANEOUS (9/15/2022); and Lung lobectomy (Left, 2022). CURRENT MEDICATIONS:  has a current medication list which includes the following prescription(s): metoprolol succinate, ketoconazole, fluocinonide, ferrous sulfate, handicap placard, lidocaine-prilocaine, and prenatal mv-min-fe fum-fa-dha. ALLERGIES:  has No Known Allergies. FAMILY HISTORY: Negative for any hematological or oncological conditions. SOCIAL HISTORY:  reports that she has never smoked. She has never used smokeless tobacco. She reports that she does not currently use alcohol. She reports that she does not use drugs. REVIEW OF SYSTEMS:   General: No fever or night sweats.  Weight stable   ENT: No double or blurred vision, no tinnitus or hearing problem, no dysphagia or sore throat   Respiratory: No chest pain, no shortness of breath, no cough or hemoptysis. Cardiovascular: Denies chest pain, PND or orthopnea. No L E swelling or palpitations. Gastrointestinal: No nausea, vomiting, abdominal pain, diarrhea or constipation. Genitourinary: Denies dysuria, hematuria, frequency, urgency or incontinence. Gynecological: nearly resolved post partum bleeding  Neurological: Denies headaches, decreased LOC, no sensory or motor focal deficits. +post surgical phantom pain improving  Musculoskeletal: No arthralgia no back pain or joint swelling. +post surgical muscle pain in arms   Skin: There are no rashes or bleeding. Psychiatric: No anxiety, no depression. Endocrine: No diabetes or thyroid disease. Hematologic: No bleeding, no adenopathy. PHYSICAL EXAM: Shows a well appearing 33y.o.-year-old female who is not in pain or distress. PHYSICAL EXAMINATION:    Vital Signs: (As obtained by patient/caregiver or practitioner observation)    Blood pressure-  Heart rate-    Respiratory rate-    Temperature-  Pulse oximetry-     Constitutional: [x] Appears well-developed and well-nourished [x] No apparent distress      [] Abnormal-   Mental status  [x] Alert and awake  [x] Oriented to person/place/time [x]Able to follow commands      Eyes:  EOM    [x]  Normal  [] Abnormal-  Sclera  [x]  Normal  [] Abnormal -         Discharge [x]  None visible  [] Abnormal -    HENT:   [x] Normocephalic, atraumatic.   [] Abnormal   [x] Mouth/Throat: Mucous membranes are moist.     External Ears [x] Normal  [] Abnormal-     Neck: [x] No visualized mass     Pulmonary/Chest: [x] Respiratory effort normal.  [x] No visualized signs of difficulty breathing or respiratory distress        [] Abnormal-      Musculoskeletal:   [x] Normal gait with no signs of ataxia         [x] Normal range of motion of neck        [] Abnormal-       Neurological:        [x] No Facial Asymmetry (Cranial nerve 7 motor function) (limited exam to video visit)          [x] No gaze palsy        [] Abnormal-         Skin:        [x] No significant exanthematous lesions or discoloration noted on facial skin         [] Abnormal-            Psychiatric:       [x] Normal Affect [x] No Hallucinations        [] Abnormal-     Other pertinent observable physical exam findings-                            REVIEW OF LABORATORY DATA:   Lab Results   Component Value Date    WBC 7.3 02/09/2023    HGB 13.3 02/09/2023    HCT 39.5 02/09/2023    MCV 87.2 02/09/2023     02/09/2023   '    Chemistry        Component Value Date/Time     10/01/2022 0554    K 4.3 10/01/2022 0554    CL 99 10/01/2022 0554    CO2 25 10/01/2022 0554    BUN 7 12/22/2022 1434    CREATININE <0.40 (L) 12/22/2022 1434        Component Value Date/Time    CALCIUM 9.2 10/01/2022 0554    ALKPHOS 51 09/28/2022 0438    AST 33 (H) 09/28/2022 0438    ALT 15 09/28/2022 0438    BILITOT 0.5 09/28/2022 0438          REVIEW OF RADIOLOGICAL RESULTS:     PATHOLOGY:           IMPRESSION:   Sarcoma right leg, T4 N0 M0, 05/2020 (undifferentiated pleomorphic sarcoma)   S/P right leg amputation 07/2021  Adjuvant chemo following delivery with ifosfamide and adriamycin (AIM) - started 11/03/2020, received 4 cycles  New lung lesion, seen on screening CT scan, first biopsy was negative, follow-up imaging showed rapid increase in the mass. Second biopsy showed metastatic pleomorphic sarcoma. Status post lower left wedge resection with negative margin. Current pregnancy    PLAN:   I had a long discussion with the patient. Her maternal-fetal medicine doctor is following her very closely  Obviously we are concerned about monitoring her cancer during pregnancy. I think intermediate through pregnancy, will obtain a CT scan of the lung without contrast.  I think that might be the best option to monitor her cancer. The patient is verbally agreeable to this.   We will see her after that through a virtual visit. Hoping that she stays in remission but we will watch her closely. Verenice Chapman MD  Hematologist/Medical [id number] [facility] hematology oncology physicians  Attestation   The patient  being evaluated by a Virtual Visit (video visit) encounter to address concerns as mentioned above. A caregiver was present when appropriate. Due to this being a TeleHealth encounter (During [facility]- public health emergency), evaluation of the following organ systems was limited: Vitals/Constitutional/EENT/Resp/CV/GI//MS/Neuro/Skin/Heme-Lymph-Imm. Pursuant to the emergency declaration under the [facility] [address] authority and the [facility] and [employer] Act, this Virtual Visit was conducted with patient's (and/or legal guardian's) consent, to reduce the patient's risk of exposure to COVID-19 and provide necessary medical care. The patient (and/or legal guardian) has also been advised to contact this office for worsening conditions or problems, and seek emergency medical treatment and/or call 911 if deemed necessary. Services were provided through a video synchronous discussion virtually to substitute for in-person clinic visit. Patient and provider were located at their individual homes. --Veto Tobar MD on 4/6/2020 at 3:50 PM    An electronic signature was used to authenticate this note.

## 2023-03-07 NOTE — TELEPHONE ENCOUNTER
Pt sent blood sugar log and Dr. Flor Coffey reviewed. Dr. Flor Coffey ordered pt to begin 12u of NPH insulin at bedtime  Pt notified, agreed and states gave herself insulin with last pregnancy and has no questions, concerns or need for education. Pt asked to have called into [facility] in [facility], [address] [place] [address] called into [facility] pharmacy, [phone], unable to talk to pharmacist d/t lunch break, message left on voice mail, NPH insulin, 12u, subq, at bedtime, 1mos supply with needles, syringes and alcohol pads, no refills.

## 2023-03-08 NOTE — PATIENT INSTRUCTIONS
Need CT scan in late April.   Return after CT scan, virtual visit is okay, needs CBC and CMP on the day of CT scan

## 2023-03-14 SDOH — ECONOMIC STABILITY: FOOD INSECURITY: WITHIN THE PAST 12 MONTHS, YOU WORRIED THAT YOUR FOOD WOULD RUN OUT BEFORE YOU GOT MONEY TO BUY MORE.: NEVER TRUE

## 2023-03-14 SDOH — ECONOMIC STABILITY: HOUSING INSECURITY
IN THE LAST 12 MONTHS, WAS THERE A TIME WHEN YOU DID NOT HAVE A STEADY PLACE TO SLEEP OR SLEPT IN A SHELTER (INCLUDING NOW)?: NO

## 2023-03-14 SDOH — ECONOMIC STABILITY: INCOME INSECURITY: HOW HARD IS IT FOR YOU TO PAY FOR THE VERY BASICS LIKE FOOD, HOUSING, MEDICAL CARE, AND HEATING?: NOT HARD AT ALL

## 2023-03-14 SDOH — ECONOMIC STABILITY: FOOD INSECURITY: WITHIN THE PAST 12 MONTHS, THE FOOD YOU BOUGHT JUST DIDN'T LAST AND YOU DIDN'T HAVE MONEY TO GET MORE.: NEVER TRUE

## 2023-03-17 ENCOUNTER — ROUTINE PRENATAL (OUTPATIENT)
Dept: OBGYN CLINIC | Age: 28
End: 2023-03-17

## 2023-03-17 VITALS — SYSTOLIC BLOOD PRESSURE: 122 MMHG | WEIGHT: 194 LBS | DIASTOLIC BLOOD PRESSURE: 84 MMHG | BODY MASS INDEX: 36.66 KG/M2

## 2023-03-17 DIAGNOSIS — Z3A.16 16 WEEKS GESTATION OF PREGNANCY: ICD-10-CM

## 2023-03-17 DIAGNOSIS — O99.210 OBESITY IN PREGNANCY: ICD-10-CM

## 2023-03-17 DIAGNOSIS — Z14.8 CARRIER OF FRAGILE X CHROMOSOME: ICD-10-CM

## 2023-03-17 DIAGNOSIS — Z85.89 HISTORY OF MALIGNANT NEOPLASM METASTATIC TO LUNG: ICD-10-CM

## 2023-03-17 DIAGNOSIS — Z87.59 HISTORY OF PRETERM PREMATURE RUPTURE OF MEMBRANES (PPROM): ICD-10-CM

## 2023-03-17 DIAGNOSIS — Z85.831 HISTORY OF SARCOMA: ICD-10-CM

## 2023-03-17 DIAGNOSIS — O09.90 HIGH RISK PREGNANCY, ANTEPARTUM: Primary | ICD-10-CM

## 2023-03-17 DIAGNOSIS — O10.919 CHRONIC HYPERTENSION AFFECTING PREGNANCY: ICD-10-CM

## 2023-03-17 DIAGNOSIS — O24.312 PRE-EXISTING DIABETES MELLITUS DURING PREGNANCY IN SECOND TRIMESTER: ICD-10-CM

## 2023-03-17 DIAGNOSIS — Z87.898 HISTORY OF TACHYCARDIA: ICD-10-CM

## 2023-03-17 NOTE — PROGRESS NOTES
SUBJECTIVE:    Lawyer Sparks is here for her return OB visit. denies concerns today. She denies  feeling fetal movement. She denies vaginal bleeding. She denies vaginal discharge. She denies leaking of fluid. She denies uterine cramping. She denies  nausea and/or vomiting. OBJECTIVE:  Blood pressure 122/84, weight 194 lb (88 kg), last menstrual period 2022, not currently breastfeeding. Total weight gain: 4 lb (1.814 kg)      ASSESSMENT/PLAN:  IUP @ 16+0 weeks  S =D    High Risk Pregnancy  Due date is based on LMP and confirmed with 8w3d early dating ultrasound  Patient's prenatal labs are completed. Patient's blood type A POSITIVE  and rhogam is not indicated in the pregnancy  Early glucola indicated: yes - 184 declined 3 hour proceed as pregestational diabetic   Patient accepted genetic screening. First trimester screen. Results low risk. Total weight gain in pregnancy reviewed: Yes  Fetal movement reviewed      2. 16 weeks gestation of pregnancy  - Reviewed warning signs     3. 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 Chronic hypertension affecting pregnancy  MFM consulted 23  Recommends fetal growth from 24 weeks and fetal surveillance at 32 weeks. Patient has been seeing cardiology and has a echo in January     - Baseline labs ordered at visit  - Reviewed baby ASA daily  - Reviewed fetal surveillance and delivery recommendations    - Comprehensive Metabolic Panel; Future  - CBC; Future  - Protein / creatinine ratio, urine; Future    5. Pregestational DM  -M consulted 23  - Advises fetal echo around 24 weeks gestation and be repeated at 32 weeks to evaluate for cardiac hypertrophy    -3/7/23 MFM recommended 0/0/0/12 insulin regiment     -Reviewed fetal surveillance and delivery recommendations     6. Obesity in pregnancy  - failed early 1 hour, proceed as preGDM    7.  History of  premature rupture of membranes (PPROM)  MFM consult 23  Hx of 33 week spontaneous  delivery. Recommends 1 to 2 week ultrasounds for cervical length assessment. Advise initiation of 17-hydroxyprogesterone caproate if declined by insurance advise daily vaginal progesterone suppositories either 100 or 200mg until 34 week   -patient reports insurance declined IM progesterone reviewed vaginal suppositories     8. History of tachycardia  - continues with metoprolol 25mg XR daily follows with cardiology  - MFM consult previously sent    9. History of sarcoma  - MFM consult 23  Recommends that imaging studies should be performed in pregnant patients without regard to gestational age when the information is expected to provide information that is needed for care. Advises final decisions regarding CT imagine, etc,. Will require the input of medical oncology, who were not previously aware of the patient's pregnancy. From  on 3/7/23:    \"I had a long discussion with the patient. Her maternal-fetal medicine doctor is following her very closely  Obviously we are concerned about monitoring her cancer during pregnancy. I think residential through pregnancy, will obtain a CT scan of the lung without contrast.  I think that might be the best option to monitor her cancer. The patient is verbally agreeable to this. We will see her after that through a virtual visit. Hoping that she stays in remission but we will watch her closely. \"    10. History of malignant neoplasm metastatic to lung    Continues to follow with         She was counseled regarding all of the above:    Return in about 4 weeks (around 2023) for Return OB.     The patient, Jeff Briscoe,  was seen with a total time spent of 25 minutes for the visit on this date of service by the [facility]  On this date 2023,  I have spent greater than 50% of this visit:  [x] reviewing previous notes  [x] reviewing test results  [x] pre-charting  Discussed with patient:  [x] Importance of compliance with treatment plan  [x] Counseling  [] Coordinating care  [x] Documenting     Electronically Signed By: FATEMEH Mejia CNM

## 2023-03-24 ENCOUNTER — ROUTINE PRENATAL (OUTPATIENT)
Dept: PERINATAL CARE | Age: 28
End: 2023-03-24

## 2023-03-24 ENCOUNTER — TELEPHONE (OUTPATIENT)
Dept: PERINATAL CARE | Age: 28
End: 2023-03-24

## 2023-03-24 VITALS
TEMPERATURE: 97.9 F | BODY MASS INDEX: 36.82 KG/M2 | RESPIRATION RATE: 16 BRPM | HEART RATE: 116 BPM | WEIGHT: 195 LBS | HEIGHT: 61 IN

## 2023-03-24 DIAGNOSIS — O09.212 CURRENT PREGNANCY WITH HISTORY OF PRE-TERM LABOR IN SECOND TRIMESTER: Primary | ICD-10-CM

## 2023-03-24 DIAGNOSIS — O99.212 OBESITY AFFECTING PREGNANCY IN SECOND TRIMESTER: ICD-10-CM

## 2023-03-24 DIAGNOSIS — O24.119 PRE-EXISTING TYPE 2 DIABETES MELLITUS DURING PREGNANCY, ANTEPARTUM: ICD-10-CM

## 2023-03-24 DIAGNOSIS — O16.2 HYPERTENSION AFFECTING PREGNANCY IN SECOND TRIMESTER: ICD-10-CM

## 2023-03-24 DIAGNOSIS — O99.891 CURRENT MATERNAL CONDITION AFFECTING PREGNANCY: ICD-10-CM

## 2023-03-24 DIAGNOSIS — Z13.89 ENCOUNTER FOR ROUTINE SCREENING FOR MALFORMATION USING ULTRASONICS: ICD-10-CM

## 2023-03-24 DIAGNOSIS — Z3A.17 17 WEEKS GESTATION OF PREGNANCY: ICD-10-CM

## 2023-03-24 RX ORDER — INSULIN HUMAN 100 [IU]/ML
INJECTION, SUSPENSION SUBCUTANEOUS
COMMUNITY
Start: 2023-03-07

## 2023-03-24 RX ORDER — ASPIRIN 81 MG/1
81 TABLET ORAL DAILY
COMMUNITY

## 2023-03-24 RX ORDER — FLURBIPROFEN SODIUM 0.3 MG/ML
SOLUTION/ DROPS OPHTHALMIC
COMMUNITY
Start: 2023-03-07

## 2023-03-24 NOTE — TELEPHONE ENCOUNTER
Rx called into the pharmacy / voicemail for progesterone capsules 200mg a 17 week supply pt should insert one into the vagina nightly with no refills. Rx called by LUC[facility]A FERNANDEZ per Dr. Enrique Alba MD.

## 2023-03-24 NOTE — PROGRESS NOTES
Obstetric/Gynecology Maternal Fetal Medicine Resident Note    Patient has declined AFP.      Tabatha Lombardo DO  OBCELESTINE Resident, PGY1  [facility]  3/24/2023, 11:38 AM

## 2023-03-30 ENCOUNTER — TELEPHONE (OUTPATIENT)
Dept: PHYSICAL MEDICINE AND REHAB | Age: 28
End: 2023-03-30

## 2023-03-30 ENCOUNTER — TELEPHONE (OUTPATIENT)
Dept: PERINATAL CARE | Age: 28
End: 2023-03-30

## 2023-03-30 NOTE — TELEPHONE ENCOUNTER
Patient called to cancel her annual follow up visit on 04/20/2023. She stated she is currently pregnant and has quite a few other doctor's appointments because of this and it's getting to be a lot for her to manage. She also stated that following up about her prosthetic may not be productive come April due to her being pregnant, so she said she'd like to reschedule with you sometime after her baby is born. She is due early September. I told her I would put a reminder in the system and we would call her later in the summer to get an appointment on the books for her in the fall. Patient understood.

## 2023-03-30 NOTE — TELEPHONE ENCOUNTER
Phone call from pt stating needed a refill on NPH insulin. Asked to call into [address]. Writer called into above pharmacy, [phone], unable to speak to pharmacist, went directly to voice mail, Eva RODRIGUEZ, 30u, subq, at bedtime, 1 mos supply with needles, syringes and alcohol pads, no refills.

## 2023-04-04 RX ORDER — SODIUM CHLORIDE 0.9 % (FLUSH) 0.9 %
20 SYRINGE (ML) INJECTION PRN
Status: CANCELLED | OUTPATIENT
Start: 2023-04-04

## 2023-04-04 RX ORDER — WATER 1000 ML/1000ML
2.2 INJECTION, SOLUTION INTRAVENOUS ONCE
Status: CANCELLED | OUTPATIENT
Start: 2023-04-04 | End: 2023-04-04

## 2023-04-05 ENCOUNTER — HOSPITAL ENCOUNTER (OUTPATIENT)
Dept: INFUSION THERAPY | Age: 28
Discharge: HOME | End: 2023-04-05
Payer: COMMERCIAL

## 2023-04-05 DIAGNOSIS — C49.21 PRIMARY LOWER EXTREMITY SARCOMA, RIGHT (HCC): ICD-10-CM

## 2023-04-05 DIAGNOSIS — C34.11 MALIGNANT NEOPLASM OF UPPER LOBE OF RIGHT LUNG (HCC): ICD-10-CM

## 2023-04-05 DIAGNOSIS — C49.21 PRIMARY LOWER EXTREMITY SARCOMA, RIGHT (HCC): Primary | ICD-10-CM

## 2023-04-05 LAB
ABSOLUTE EOS #: 0.07 K/UL (ref 0–0.4)
ABSOLUTE LYMPH #: 1.09 K/UL (ref 1–4.8)
ABSOLUTE MONO #: 0.27 K/UL (ref 0.1–0.8)
ALBUMIN SERPL-MCNC: 3.9 G/DL (ref 3.5–5.2)
ALBUMIN/GLOBULIN RATIO: 1.5 (ref 1–2.5)
ALP SERPL-CCNC: 43 U/L (ref 35–104)
ALT SERPL-CCNC: 12 U/L (ref 5–33)
ANION GAP SERPL CALCULATED.3IONS-SCNC: 12 MMOL/L (ref 9–17)
AST SERPL-CCNC: 12 U/L
BASOPHILS # BLD: 0 % (ref 0–2)
BASOPHILS ABSOLUTE: 0 K/UL (ref 0–0.2)
BILIRUB SERPL-MCNC: 0.2 MG/DL (ref 0.3–1.2)
BUN SERPL-MCNC: 6 MG/DL (ref 6–20)
CALCIUM SERPL-MCNC: 8.9 MG/DL (ref 8.6–10.4)
CHLORIDE SERPL-SCNC: 105 MMOL/L (ref 98–107)
CO2 SERPL-SCNC: 21 MMOL/L (ref 20–31)
CREAT SERPL-MCNC: <0.4 MG/DL (ref 0.5–0.9)
EOSINOPHILS RELATIVE PERCENT: 1 % (ref 1–4)
GFR SERPL CREATININE-BSD FRML MDRD: ABNORMAL ML/MIN/1.73M2
GLUCOSE SERPL-MCNC: 132 MG/DL (ref 70–99)
HCT VFR BLD AUTO: 35.8 % (ref 36–46)
HGB BLD-MCNC: 12.5 G/DL (ref 12–16)
LYMPHOCYTES # BLD: 16 % (ref 24–44)
MCH RBC QN AUTO: 29.5 PG (ref 26–34)
MCHC RBC AUTO-ENTMCNC: 34.9 G/DL (ref 31–37)
MCV RBC AUTO: 84.4 FL (ref 80–100)
MONOCYTES # BLD: 4 % (ref 1–7)
MORPHOLOGY: NORMAL
PDW BLD-RTO: 13 % (ref 12.5–15.4)
PLATELET # BLD AUTO: 167 K/UL (ref 140–450)
PMV BLD AUTO: 11.3 FL (ref 8–14)
POTASSIUM SERPL-SCNC: 3.6 MMOL/L (ref 3.7–5.3)
PROT SERPL-MCNC: 6.5 G/DL (ref 6.4–8.3)
RBC # BLD: 4.24 M/UL (ref 4–5.2)
SEG NEUTROPHILS: 79 % (ref 36–66)
SEGMENTED NEUTROPHILS ABSOLUTE COUNT: 5.37 K/UL (ref 1.8–7.7)
SODIUM SERPL-SCNC: 138 MMOL/L (ref 135–144)
WBC # BLD AUTO: 6.8 K/UL (ref 3.5–11)

## 2023-04-05 PROCEDURE — 85025 COMPLETE CBC W/AUTO DIFF WBC: CPT

## 2023-04-05 PROCEDURE — 6360000002 HC RX W HCPCS: Performed by: INTERNAL MEDICINE

## 2023-04-05 PROCEDURE — 80053 COMPREHEN METABOLIC PANEL: CPT

## 2023-04-05 PROCEDURE — 2580000003 HC RX 258: Performed by: INTERNAL MEDICINE

## 2023-04-05 PROCEDURE — 36591 DRAW BLOOD OFF VENOUS DEVICE: CPT

## 2023-04-05 PROCEDURE — 36415 COLL VENOUS BLD VENIPUNCTURE: CPT

## 2023-04-05 RX ORDER — SODIUM CHLORIDE 0.9 % (FLUSH) 0.9 %
20 SYRINGE (ML) INJECTION PRN
OUTPATIENT
Start: 2023-04-05

## 2023-04-05 RX ORDER — WATER 1000 ML/1000ML
2.2 INJECTION, SOLUTION INTRAVENOUS ONCE
OUTPATIENT
Start: 2023-04-05 | End: 2023-04-05

## 2023-04-05 RX ORDER — HEPARIN SODIUM (PORCINE) LOCK FLUSH IV SOLN 100 UNIT/ML 100 UNIT/ML
500 SOLUTION INTRAVENOUS PRN
OUTPATIENT
Start: 2023-04-05

## 2023-04-05 RX ORDER — SODIUM CHLORIDE 0.9 % (FLUSH) 0.9 %
10 SYRINGE (ML) INJECTION PRN
Status: DISCONTINUED | OUTPATIENT
Start: 2023-04-05 | End: 2023-04-06 | Stop reason: HOSPADM

## 2023-04-05 RX ORDER — HEPARIN SODIUM (PORCINE) LOCK FLUSH IV SOLN 100 UNIT/ML 100 UNIT/ML
500 SOLUTION INTRAVENOUS PRN
Status: DISCONTINUED | OUTPATIENT
Start: 2023-04-05 | End: 2023-04-06 | Stop reason: HOSPADM

## 2023-04-05 RX ORDER — SODIUM CHLORIDE 0.9 % (FLUSH) 0.9 %
10 SYRINGE (ML) INJECTION PRN
OUTPATIENT
Start: 2023-04-05

## 2023-04-05 RX ADMIN — SODIUM CHLORIDE, PRESERVATIVE FREE 10 ML: 5 INJECTION INTRAVENOUS at 09:53

## 2023-04-05 RX ADMIN — HEPARIN 500 UNITS: 100 SYRINGE at 09:53

## 2023-04-05 RX ADMIN — SODIUM CHLORIDE, PRESERVATIVE FREE 10 ML: 5 INJECTION INTRAVENOUS at 09:44

## 2023-04-05 NOTE — PROGRESS NOTES
Patient arrived for port flush and lab draw. No new issues and left instable condition.  Returns 4/11 for

## 2023-04-07 ENCOUNTER — ROUTINE PRENATAL (OUTPATIENT)
Dept: PERINATAL CARE | Age: 28
End: 2023-04-07

## 2023-04-07 VITALS
HEART RATE: 112 BPM | RESPIRATION RATE: 16 BRPM | DIASTOLIC BLOOD PRESSURE: 70 MMHG | TEMPERATURE: 98.1 F | HEIGHT: 61 IN | WEIGHT: 197 LBS | SYSTOLIC BLOOD PRESSURE: 105 MMHG | BODY MASS INDEX: 37.19 KG/M2

## 2023-04-07 DIAGNOSIS — O09.212 CURRENT PREGNANCY WITH HISTORY OF PRE-TERM LABOR IN SECOND TRIMESTER: Primary | ICD-10-CM

## 2023-04-07 DIAGNOSIS — O99.212 OBESITY AFFECTING PREGNANCY IN SECOND TRIMESTER: ICD-10-CM

## 2023-04-07 DIAGNOSIS — Z3A.19 19 WEEKS GESTATION OF PREGNANCY: ICD-10-CM

## 2023-04-07 DIAGNOSIS — O24.119 PRE-EXISTING TYPE 2 DIABETES MELLITUS DURING PREGNANCY, ANTEPARTUM: ICD-10-CM

## 2023-04-07 DIAGNOSIS — O16.2 HYPERTENSION AFFECTING PREGNANCY IN SECOND TRIMESTER: ICD-10-CM

## 2023-04-07 PROBLEM — Z85.89 HISTORY OF MALIGNANT NEOPLASM METASTATIC TO LUNG: Status: ACTIVE | Noted: 2022-09-27

## 2023-04-07 PROBLEM — Z85.118 HISTORY OF PRIMARY MALIGNANT NEOPLASM OF RIGHT LUNG: Status: ACTIVE | Noted: 2022-09-27

## 2023-04-07 PROBLEM — Z85.830 HISTORY OF SARCOMA OF BONE: Status: ACTIVE | Noted: 2020-06-29

## 2023-04-07 RX ORDER — PROGESTERONE 200 MG/1
CAPSULE ORAL
COMMUNITY
Start: 2023-03-24

## 2023-04-20 ENCOUNTER — TELEMEDICINE (OUTPATIENT)
Dept: ONCOLOGY | Age: 28
End: 2023-04-20
Payer: MEDICARE

## 2023-04-20 DIAGNOSIS — Z85.830 HISTORY OF SARCOMA OF BONE: ICD-10-CM

## 2023-04-20 DIAGNOSIS — C78.02 SECONDARY MALIGNANT NEOPLASM OF LEFT LUNG (HCC): ICD-10-CM

## 2023-04-20 DIAGNOSIS — C34.11 MALIGNANT NEOPLASM OF UPPER LOBE OF RIGHT LUNG (HCC): Primary | ICD-10-CM

## 2023-04-20 PROCEDURE — 99213 OFFICE O/P EST LOW 20 MIN: CPT | Performed by: INTERNAL MEDICINE

## 2023-04-20 PROCEDURE — G8428 CUR MEDS NOT DOCUMENT: HCPCS | Performed by: INTERNAL MEDICINE

## 2023-04-20 NOTE — PROGRESS NOTES
INTERIM HISTORY: The patient presents for follow up today for follow up with sarcoma surveillance. This is a virtual visit  The patient is pregnant about 20 weeks. She is very excited and feeling well. But obviously she is following with maternal-fetal medicine and who are monitoring her closely. No cough or hemoptysis, no chest pain or shortness of breath. No abdominal pain. We did a CT scan and she is anxious about the results. PAST MEDICAL HISTORY: has a past medical history of 33 weeks gestation of pregnancy, Anemia, Antepartum premature rupture of membrane, COVID-19, Gestational diabetes, Gestational diabetes mellitus, High-risk pregnancy, unspecified trimester, History of anemia, History of blood transfusion, History of chemotherapy, History of E. Coli UTI 10-<100,000CFU (20), Lung nodule, Malignant neoplasm affecting pregnancy in third trimester, Osteosarcoma, Pregestational diabetes mellitus, modified White class B, Pregnancy induced hypertension,  delivery, Pseudomonas UTI, Under care of service provider, Under care of service provider, Under care of service provider, and Wears glasses. PAST SURGICAL HISTORY: has a past surgical history that includes Leg amputation at hip (Right, 2020); IR PORT PLACEMENT > 5 YEARS (10/27/2020); CT NEEDLE BIOPSY LUNG PERCUTANEOUS (2022); CT NEEDLE BIOPSY LUNG PERCUTANEOUS (9/15/2022); and Lung lobectomy (Left, 2022). CURRENT MEDICATIONS:  has a current medication list which includes the following prescription(s): progesterone, docusate sodium, b-d uf iii mini pen needles, humulin n kwikpen, senna, aspirin, metoprolol succinate, ketoconazole, fluocinonide, ferrous sulfate, handicap placard, lidocaine-prilocaine, and prenatal mv-min-fe fum-fa-dha. ALLERGIES:  has No Known Allergies. FAMILY HISTORY: Negative for any hematological or oncological conditions. SOCIAL HISTORY:  reports that she has never smoked.  She has

## 2023-04-21 ENCOUNTER — TELEPHONE (OUTPATIENT)
Dept: ONCOLOGY | Age: 28
End: 2023-04-21

## 2023-04-21 ENCOUNTER — ROUTINE PRENATAL (OUTPATIENT)
Dept: PERINATAL CARE | Age: 28
End: 2023-04-21

## 2023-04-21 VITALS
TEMPERATURE: 97.5 F | HEART RATE: 112 BPM | RESPIRATION RATE: 16 BRPM | WEIGHT: 194.22 LBS | DIASTOLIC BLOOD PRESSURE: 81 MMHG | SYSTOLIC BLOOD PRESSURE: 129 MMHG | HEIGHT: 61 IN | BODY MASS INDEX: 36.67 KG/M2

## 2023-04-21 DIAGNOSIS — O24.119 PRE-EXISTING TYPE 2 DIABETES MELLITUS DURING PREGNANCY, ANTEPARTUM: ICD-10-CM

## 2023-04-21 DIAGNOSIS — O99.891 CURRENT MATERNAL CONDITION AFFECTING PREGNANCY: ICD-10-CM

## 2023-04-21 DIAGNOSIS — O16.2 HYPERTENSION AFFECTING PREGNANCY IN SECOND TRIMESTER: Primary | ICD-10-CM

## 2023-04-21 DIAGNOSIS — O09.212 CURRENT PREGNANCY WITH HISTORY OF PRE-TERM LABOR IN SECOND TRIMESTER: ICD-10-CM

## 2023-04-21 DIAGNOSIS — O99.212 OBESITY AFFECTING PREGNANCY IN SECOND TRIMESTER: ICD-10-CM

## 2023-04-21 DIAGNOSIS — Z3A.21 21 WEEKS GESTATION OF PREGNANCY: ICD-10-CM

## 2023-04-21 NOTE — TELEPHONE ENCOUNTER
AVS from 4/20/23    Schedule virtual visit in early August    Rv scheduled for 8/3 @ 4:00 as a vv    Pt elected to access avs & schedule on [employer]     Electronically signed by Arabella Tucker on 4/21/2023 at 8:01 AM

## 2023-05-04 ENCOUNTER — ROUTINE PRENATAL (OUTPATIENT)
Dept: PERINATAL CARE | Age: 28
End: 2023-05-04
Payer: MEDICARE

## 2023-05-04 VITALS
SYSTOLIC BLOOD PRESSURE: 128 MMHG | BODY MASS INDEX: 37 KG/M2 | HEART RATE: 92 BPM | TEMPERATURE: 99 F | DIASTOLIC BLOOD PRESSURE: 82 MMHG | WEIGHT: 196 LBS | RESPIRATION RATE: 16 BRPM | HEIGHT: 61 IN

## 2023-05-04 DIAGNOSIS — O28.5 ABNORMAL GENETIC TEST DURING PREGNANCY: Primary | ICD-10-CM

## 2023-05-04 DIAGNOSIS — O24.119 PRE-EXISTING TYPE 2 DIABETES MELLITUS DURING PREGNANCY, ANTEPARTUM: ICD-10-CM

## 2023-05-04 DIAGNOSIS — O09.212 CURRENT PREGNANCY WITH HISTORY OF PRE-TERM LABOR IN SECOND TRIMESTER: ICD-10-CM

## 2023-05-04 DIAGNOSIS — O16.2 HYPERTENSION AFFECTING PREGNANCY IN SECOND TRIMESTER: ICD-10-CM

## 2023-05-04 DIAGNOSIS — Z3A.22 22 WEEKS GESTATION OF PREGNANCY: ICD-10-CM

## 2023-05-04 DIAGNOSIS — O43.102 PLACENTAL ABNORMALITY IN SECOND TRIMESTER: ICD-10-CM

## 2023-05-04 LAB
ABDOMINAL CIRCUMFERENCE: NORMAL
BIPARIETAL DIAMETER: NORMAL
ESTIMATED FETAL WEIGHT: NORMAL
FEMORAL DIAMETER: NORMAL
HC/AC: NORMAL
HEAD CIRCUMFERENCE: NORMAL

## 2023-05-04 PROCEDURE — G8417 CALC BMI ABV UP PARAM F/U: HCPCS | Performed by: OBSTETRICS & GYNECOLOGY

## 2023-05-04 PROCEDURE — 76817 TRANSVAGINAL US OBSTETRIC: CPT | Performed by: OBSTETRICS & GYNECOLOGY

## 2023-05-04 PROCEDURE — G8427 DOCREV CUR MEDS BY ELIG CLIN: HCPCS | Performed by: OBSTETRICS & GYNECOLOGY

## 2023-05-04 PROCEDURE — 2022F DILAT RTA XM EVC RTNOPTHY: CPT | Performed by: OBSTETRICS & GYNECOLOGY

## 2023-05-04 PROCEDURE — 76815 OB US LIMITED FETUS(S): CPT | Performed by: OBSTETRICS & GYNECOLOGY

## 2023-05-04 PROCEDURE — 3046F HEMOGLOBIN A1C LEVEL >9.0%: CPT | Performed by: OBSTETRICS & GYNECOLOGY

## 2023-05-04 PROCEDURE — 99214 OFFICE O/P EST MOD 30 MIN: CPT | Performed by: OBSTETRICS & GYNECOLOGY

## 2023-05-04 PROCEDURE — 1036F TOBACCO NON-USER: CPT | Performed by: OBSTETRICS & GYNECOLOGY

## 2023-05-08 ENCOUNTER — TELEPHONE (OUTPATIENT)
Dept: PERINATAL CARE | Age: 28
End: 2023-05-08

## 2023-05-08 RX ORDER — INSULIN HUMAN 100 [IU]/ML
INJECTION, SUSPENSION SUBCUTANEOUS
Qty: 9 ML | OUTPATIENT
Start: 2023-05-08

## 2023-05-08 NOTE — TELEPHONE ENCOUNTER
Phone call from pt stating needs a refill on 30u NPH insulin and asked to have called into [address]. Writer called into above pharmacy, [phone], unable to talk to pharmacist, went immediately to voice mail. Refill on NPH insulin, 30u subq, at bedtime, daily, with needles, 1mos supply and no refills.

## 2023-05-10 ENCOUNTER — ROUTINE PRENATAL (OUTPATIENT)
Dept: OBGYN CLINIC | Age: 28
End: 2023-05-10
Payer: MEDICARE

## 2023-05-10 ENCOUNTER — TELEPHONE (OUTPATIENT)
Dept: CASE MANAGEMENT | Age: 28
End: 2023-05-10

## 2023-05-10 VITALS — BODY MASS INDEX: 37.6 KG/M2 | SYSTOLIC BLOOD PRESSURE: 120 MMHG | DIASTOLIC BLOOD PRESSURE: 78 MMHG | WEIGHT: 199 LBS

## 2023-05-10 DIAGNOSIS — Z3A.23 23 WEEKS GESTATION OF PREGNANCY: ICD-10-CM

## 2023-05-10 DIAGNOSIS — Z87.59 HISTORY OF PRETERM PREMATURE RUPTURE OF MEMBRANES (PPROM): ICD-10-CM

## 2023-05-10 DIAGNOSIS — M62.89 PELVIC FLOOR DYSFUNCTION: ICD-10-CM

## 2023-05-10 DIAGNOSIS — M25.60 DECREASED RANGE OF MOTION: ICD-10-CM

## 2023-05-10 DIAGNOSIS — Z87.898 HISTORY OF TACHYCARDIA: ICD-10-CM

## 2023-05-10 DIAGNOSIS — O09.90 HIGH RISK PREGNANCY, ANTEPARTUM: Primary | ICD-10-CM

## 2023-05-10 DIAGNOSIS — Z85.89 HISTORY OF MALIGNANT NEOPLASM METASTATIC TO LUNG: ICD-10-CM

## 2023-05-10 DIAGNOSIS — O24.312 PRE-EXISTING DIABETES MELLITUS DURING PREGNANCY IN SECOND TRIMESTER: ICD-10-CM

## 2023-05-10 DIAGNOSIS — O99.891 BACK PAIN IN PREGNANCY: ICD-10-CM

## 2023-05-10 DIAGNOSIS — Z14.8 CARRIER OF FRAGILE X CHROMOSOME: ICD-10-CM

## 2023-05-10 DIAGNOSIS — M54.9 BACK PAIN IN PREGNANCY: ICD-10-CM

## 2023-05-10 DIAGNOSIS — Z85.831 HISTORY OF SARCOMA: ICD-10-CM

## 2023-05-10 PROCEDURE — G8417 CALC BMI ABV UP PARAM F/U: HCPCS | Performed by: OBSTETRICS & GYNECOLOGY

## 2023-05-10 PROCEDURE — 1036F TOBACCO NON-USER: CPT | Performed by: OBSTETRICS & GYNECOLOGY

## 2023-05-10 PROCEDURE — 2022F DILAT RTA XM EVC RTNOPTHY: CPT | Performed by: OBSTETRICS & GYNECOLOGY

## 2023-05-10 PROCEDURE — G8427 DOCREV CUR MEDS BY ELIG CLIN: HCPCS | Performed by: OBSTETRICS & GYNECOLOGY

## 2023-05-10 PROCEDURE — 3046F HEMOGLOBIN A1C LEVEL >9.0%: CPT | Performed by: OBSTETRICS & GYNECOLOGY

## 2023-05-10 PROCEDURE — 99213 OFFICE O/P EST LOW 20 MIN: CPT | Performed by: OBSTETRICS & GYNECOLOGY

## 2023-05-10 NOTE — TELEPHONE ENCOUNTER
Name: Venus Vaca  : 1995  MRN: [id number]    Oncology Navigation Follow-Up Note  Navigator spoke with pt. Offering survivorship if desires, however pt. Sharing she received something similar post CTX and doesn't think she needs it at this time. Writer reminding pt. How important Surveillance is and recognizing recurrence. Navigation completed, but pt. May call as needed.    Electronically signed by Xavier George RN on 5/10/2023 at 4:22 PM

## 2023-05-19 ENCOUNTER — ROUTINE PRENATAL (OUTPATIENT)
Dept: PERINATAL CARE | Age: 28
End: 2023-05-19
Payer: MEDICARE

## 2023-05-19 ENCOUNTER — HOSPITAL ENCOUNTER (OUTPATIENT)
Dept: INFUSION THERAPY | Age: 28
Discharge: HOME | End: 2023-05-19
Payer: MEDICARE

## 2023-05-19 VITALS
BODY MASS INDEX: 37.76 KG/M2 | RESPIRATION RATE: 16 BRPM | HEART RATE: 124 BPM | TEMPERATURE: 98 F | HEIGHT: 61 IN | WEIGHT: 200 LBS | SYSTOLIC BLOOD PRESSURE: 117 MMHG | DIASTOLIC BLOOD PRESSURE: 68 MMHG

## 2023-05-19 DIAGNOSIS — O99.212 OBESITY AFFECTING PREGNANCY IN SECOND TRIMESTER: ICD-10-CM

## 2023-05-19 DIAGNOSIS — Z36.4 ULTRASOUND FOR ANTENATAL SCREENING FOR FETAL GROWTH RESTRICTION: ICD-10-CM

## 2023-05-19 DIAGNOSIS — O09.212 CURRENT PREGNANCY WITH HISTORY OF PRE-TERM LABOR IN SECOND TRIMESTER: ICD-10-CM

## 2023-05-19 DIAGNOSIS — O24.119 PRE-EXISTING TYPE 2 DIABETES MELLITUS DURING PREGNANCY, ANTEPARTUM: Primary | ICD-10-CM

## 2023-05-19 DIAGNOSIS — Z85.830 HISTORY OF SARCOMA OF BONE: Primary | ICD-10-CM

## 2023-05-19 DIAGNOSIS — Z3A.25 25 WEEKS GESTATION OF PREGNANCY: ICD-10-CM

## 2023-05-19 DIAGNOSIS — O16.2 HYPERTENSION AFFECTING PREGNANCY IN SECOND TRIMESTER: ICD-10-CM

## 2023-05-19 DIAGNOSIS — O99.891 CURRENT MATERNAL CONDITION AFFECTING PREGNANCY: ICD-10-CM

## 2023-05-19 LAB
ABDOMINAL CIRCUMFERENCE: NORMAL
ABDOMINAL CIRCUMFERENCE: NORMAL
BIPARIETAL DIAMETER: NORMAL
BIPARIETAL DIAMETER: NORMAL
ESTIMATED FETAL WEIGHT: NORMAL
ESTIMATED FETAL WEIGHT: NORMAL
FEMORAL DIAMETER: NORMAL
FEMORAL DIAMETER: NORMAL
HC/AC: NORMAL
HC/AC: NORMAL
HEAD CIRCUMFERENCE: NORMAL
HEAD CIRCUMFERENCE: NORMAL

## 2023-05-19 PROCEDURE — 93325 DOPPLER ECHO COLOR FLOW MAPG: CPT | Performed by: OBSTETRICS & GYNECOLOGY

## 2023-05-19 PROCEDURE — 76816 OB US FOLLOW-UP PER FETUS: CPT | Performed by: OBSTETRICS & GYNECOLOGY

## 2023-05-19 PROCEDURE — 76820 UMBILICAL ARTERY ECHO: CPT | Performed by: OBSTETRICS & GYNECOLOGY

## 2023-05-19 PROCEDURE — 96523 IRRIG DRUG DELIVERY DEVICE: CPT

## 2023-05-19 PROCEDURE — 6360000002 HC RX W HCPCS: Performed by: INTERNAL MEDICINE

## 2023-05-19 PROCEDURE — 2580000003 HC RX 258: Performed by: INTERNAL MEDICINE

## 2023-05-19 PROCEDURE — 76817 TRANSVAGINAL US OBSTETRIC: CPT | Performed by: OBSTETRICS & GYNECOLOGY

## 2023-05-19 PROCEDURE — 76827 ECHO EXAM OF FETAL HEART: CPT | Performed by: OBSTETRICS & GYNECOLOGY

## 2023-05-19 PROCEDURE — 76825 ECHO EXAM OF FETAL HEART: CPT | Performed by: OBSTETRICS & GYNECOLOGY

## 2023-05-19 RX ORDER — SODIUM CHLORIDE 0.9 % (FLUSH) 0.9 %
20 SYRINGE (ML) INJECTION PRN
OUTPATIENT
Start: 2023-05-19

## 2023-05-19 RX ORDER — SODIUM CHLORIDE 0.9 % (FLUSH) 0.9 %
10 SYRINGE (ML) INJECTION PRN
Status: DISCONTINUED | OUTPATIENT
Start: 2023-05-19 | End: 2023-05-20 | Stop reason: HOSPADM

## 2023-05-19 RX ORDER — WATER 1000 ML/1000ML
2.2 INJECTION, SOLUTION INTRAVENOUS ONCE
OUTPATIENT
Start: 2023-05-19 | End: 2023-05-19

## 2023-05-19 RX ORDER — HEPARIN SODIUM (PORCINE) LOCK FLUSH IV SOLN 100 UNIT/ML 100 UNIT/ML
500 SOLUTION INTRAVENOUS PRN
Status: DISCONTINUED | OUTPATIENT
Start: 2023-05-19 | End: 2023-05-20 | Stop reason: HOSPADM

## 2023-05-19 RX ORDER — SODIUM CHLORIDE 0.9 % (FLUSH) 0.9 %
10 SYRINGE (ML) INJECTION PRN
OUTPATIENT
Start: 2023-05-19

## 2023-05-19 RX ORDER — HEPARIN SODIUM (PORCINE) LOCK FLUSH IV SOLN 100 UNIT/ML 100 UNIT/ML
500 SOLUTION INTRAVENOUS PRN
OUTPATIENT
Start: 2023-05-19

## 2023-05-19 RX ORDER — ISOPROPYL ALCOHOL 0.75 G/1
SWAB TOPICAL
COMMUNITY
Start: 2023-05-08

## 2023-05-19 RX ADMIN — SODIUM CHLORIDE, PRESERVATIVE FREE 10 ML: 5 INJECTION INTRAVENOUS at 08:41

## 2023-05-19 RX ADMIN — HEPARIN 500 UNITS: 100 SYRINGE at 08:41

## 2023-06-01 ENCOUNTER — ROUTINE PRENATAL (OUTPATIENT)
Dept: OBGYN CLINIC | Age: 28
End: 2023-06-01
Payer: MEDICARE

## 2023-06-01 ENCOUNTER — HOSPITAL ENCOUNTER (OUTPATIENT)
Age: 28
Setting detail: SPECIMEN
Discharge: HOME | End: 2023-06-01

## 2023-06-01 VITALS — DIASTOLIC BLOOD PRESSURE: 76 MMHG | SYSTOLIC BLOOD PRESSURE: 120 MMHG | BODY MASS INDEX: 37.6 KG/M2 | WEIGHT: 199 LBS

## 2023-06-01 DIAGNOSIS — Z14.8 CARRIER OF FRAGILE X CHROMOSOME: ICD-10-CM

## 2023-06-01 DIAGNOSIS — O09.90 HIGH RISK PREGNANCY, ANTEPARTUM: Primary | ICD-10-CM

## 2023-06-01 DIAGNOSIS — Z3A.26 26 WEEKS GESTATION OF PREGNANCY: ICD-10-CM

## 2023-06-01 DIAGNOSIS — Z87.59 HISTORY OF PRETERM PREMATURE RUPTURE OF MEMBRANES (PPROM): ICD-10-CM

## 2023-06-01 DIAGNOSIS — Z85.831 HISTORY OF SARCOMA: ICD-10-CM

## 2023-06-01 DIAGNOSIS — O24.312 PRE-EXISTING DIABETES MELLITUS DURING PREGNANCY IN SECOND TRIMESTER: ICD-10-CM

## 2023-06-01 DIAGNOSIS — Z85.89 HISTORY OF MALIGNANT NEOPLASM METASTATIC TO LUNG: ICD-10-CM

## 2023-06-01 LAB
BACTERIA URNS QL MICRO: ABNORMAL
BILIRUB UR QL STRIP: NEGATIVE
CASTS #/AREA URNS LPF: ABNORMAL /LPF (ref 0–8)
CLARITY UR: CLEAR
COLOR UR: YELLOW
EPI CELLS #/AREA URNS HPF: ABNORMAL /HPF (ref 0–5)
GLUCOSE UR STRIP-MCNC: NEGATIVE MG/DL
HGB UR QL STRIP.AUTO: NEGATIVE
KETONES UR STRIP-MCNC: NEGATIVE MG/DL
LEUKOCYTE ESTERASE UR QL STRIP: ABNORMAL
NITRITE UR QL STRIP: NEGATIVE
PH UR STRIP: 7.5 [PH] (ref 5–8)
PROT UR STRIP-MCNC: NEGATIVE MG/DL
RBC #/AREA URNS HPF: ABNORMAL /HPF (ref 0–4)
SP GR UR STRIP: 1.01 (ref 1–1.03)
UROBILINOGEN UR STRIP-ACNC: NORMAL
WBC #/AREA URNS HPF: ABNORMAL /HPF (ref 0–5)

## 2023-06-01 PROCEDURE — 1036F TOBACCO NON-USER: CPT | Performed by: NURSE PRACTITIONER

## 2023-06-01 PROCEDURE — 99214 OFFICE O/P EST MOD 30 MIN: CPT | Performed by: NURSE PRACTITIONER

## 2023-06-01 PROCEDURE — G8417 CALC BMI ABV UP PARAM F/U: HCPCS | Performed by: NURSE PRACTITIONER

## 2023-06-01 PROCEDURE — 2022F DILAT RTA XM EVC RTNOPTHY: CPT | Performed by: NURSE PRACTITIONER

## 2023-06-01 PROCEDURE — 3046F HEMOGLOBIN A1C LEVEL >9.0%: CPT | Performed by: NURSE PRACTITIONER

## 2023-06-01 PROCEDURE — G8427 DOCREV CUR MEDS BY ELIG CLIN: HCPCS | Performed by: NURSE PRACTITIONER

## 2023-06-01 NOTE — PROGRESS NOTES
Presents for OB visit  Gestation 26w6d  Estimated Date of Delivery: 23    Resolved CT-Maternity episode. Sarcoma of right leg- amputated, chemo after delivery, Margins were clear      ACC: Bradley Ricardo RN CM Risk Score: 1  Charlson 10 Year Mortality Risk Score: 100%       Care Coordination Interventions    Program Enrollment: Rising Risk  Referral from Primary Care Provider: No  Suggested Interventions and Community Resources             Insurance MMO    IPV bag/mug given:         Plans to deliver: ST V's    FOB Jodeen Tuan    1st trimester screen/NIPs: FTS low risk    AFP declined    Fetal Echo: yes at 24 and 32 weeks    High Risk:  cHTN (no meds)  Baseline labs ordered 3/17/2023   MFM consult completed  Baby ASA  Fetal surveillance & delivery implications reviewed   PreGestational Diabetes  Elevated 1 hour (182) no 3 hour proceed preGDM  Managed by MFM  Insulin 0/0/0/12  Fetal surveillance and delivery implications reviewed   Hx of  delivery (33w)  MFM consult completed  Serial CL USN q2 weeks  Recommend IM progesterone 3/17/2023 insurance denied. Is going to try vaginal suppositories  Hx of Sarcoma- Right  - Saw  plan for CT senior care thorugh pregnancy and a CT scan of the lung w/o contrast  Hx of lung cancer  - see above  S/P Lt Lobectomy/thractomy  Hx of Anemia/Thrombocytosis  - pt of   Hx of Gest. Diabetes- Last pregnancy  Hx of tachycardia   - follows with cardiology.  Echo in   - Metoprolol 25mg daily    Early 1 hr GTT: 184 2/15/2023 FAIL   3 hour ordered 2/15/2023   DECLINED proceed at preGDM    Covid Vaccine: Yes no booster     Flu Vaccine: No this season    Tdap:    Rhogam: NA A+    1hr GTT:    3hr GTT:    GBS:    2nd trimester appointment with Dr. Sumanth Palumbo : 5/10/23    3rd trimester appointment with Dr. Sumanth Palumbo :    ** Right pelvic/pelvic floor pain - support brace ordered, instructed to see her PT as well **                                                      OB

## 2023-06-01 NOTE — PATIENT INSTRUCTIONS
After Hours Number: You can call the office [phone]  or [phone]  Call if you have:  1. Bleeding like a period  2. Cramps or contractions greater than 2 hours  3. If you are leaking fluid  4. If you've a fever greater than 100°  5. If you feel as if baby is not moving  6. If you have continuous vomiting over 3-4 hours   Counting Your Baby's Kicks: Care Instructions  Your Care Instructions    Counting your baby's kicks is one way your doctor can tell that your baby is healthy. Most women--especially in a first pregnancy--feel their baby move for the first time between 16 and 22 weeks. The movement may feel like flutters rather than kicks. Your baby may move more at certain times of the day. When you are active, you may notice less kicking than when you are resting. At your prenatal visits, your doctor will ask whether the baby is active. In your last trimester, your doctor may ask you to count the number of times you feel your baby move. Follow-up care is a key part of your treatment and safety. Be sure to make and go to all appointments, and call your doctor if you are having problems. It's also a good idea to know your test results and keep a list of the medicines you take. How do you count fetal kicks? A common method of checking your baby's movement is to count the number of kicks or moves you feel in 1 hour. Ten movements (such as kicks, flutters, or rolls) in 1 hour are normal. Some doctors suggest that you count in the morning until you get to 10 movements. Then you can quit for that day and start again the next day. Pick your baby's most active time of day to count. This may be any time from morning to evening. If you do not feel 10 movements in an hour, your baby may be sleeping. Wait for the next hour and count again. When should you call for help?   Call your doctor now or seek immediate medical care if:    You noticed that your baby has stopped moving or is moving much less than

## 2023-06-02 ENCOUNTER — ROUTINE PRENATAL (OUTPATIENT)
Dept: PERINATAL CARE | Age: 28
End: 2023-06-02
Payer: MEDICARE

## 2023-06-02 VITALS
SYSTOLIC BLOOD PRESSURE: 133 MMHG | HEIGHT: 61 IN | HEART RATE: 98 BPM | WEIGHT: 199 LBS | BODY MASS INDEX: 37.57 KG/M2 | TEMPERATURE: 97.3 F | DIASTOLIC BLOOD PRESSURE: 62 MMHG | RESPIRATION RATE: 16 BRPM

## 2023-06-02 DIAGNOSIS — O99.891 CURRENT MATERNAL CONDITION AFFECTING PREGNANCY: ICD-10-CM

## 2023-06-02 DIAGNOSIS — O99.213 OBESITY AFFECTING PREGNANCY IN THIRD TRIMESTER: ICD-10-CM

## 2023-06-02 DIAGNOSIS — O09.213 CURRENT PREGNANCY WITH HISTORY OF PRE-TERM LABOR IN THIRD TRIMESTER: ICD-10-CM

## 2023-06-02 DIAGNOSIS — O24.119 PRE-EXISTING TYPE 2 DIABETES MELLITUS DURING PREGNANCY, ANTEPARTUM: ICD-10-CM

## 2023-06-02 DIAGNOSIS — O43.103 PLACENTAL ABNORMALITY IN THIRD TRIMESTER: ICD-10-CM

## 2023-06-02 DIAGNOSIS — O16.3 HYPERTENSION AFFECTING PREGNANCY IN THIRD TRIMESTER: Primary | ICD-10-CM

## 2023-06-02 DIAGNOSIS — Z3A.27 27 WEEKS GESTATION OF PREGNANCY: ICD-10-CM

## 2023-06-02 LAB
ABDOMINAL CIRCUMFERENCE: NORMAL
BIPARIETAL DIAMETER: NORMAL
ESTIMATED FETAL WEIGHT: NORMAL
FEMORAL DIAMETER: NORMAL
HC/AC: NORMAL
HEAD CIRCUMFERENCE: NORMAL
MICROORGANISM SPEC CULT: ABNORMAL
SPECIMEN DESCRIPTION: ABNORMAL

## 2023-06-02 PROCEDURE — 76819 FETAL BIOPHYS PROFIL W/O NST: CPT | Performed by: OBSTETRICS & GYNECOLOGY

## 2023-06-02 PROCEDURE — 76817 TRANSVAGINAL US OBSTETRIC: CPT | Performed by: OBSTETRICS & GYNECOLOGY

## 2023-06-02 PROCEDURE — 76820 UMBILICAL ARTERY ECHO: CPT | Performed by: OBSTETRICS & GYNECOLOGY

## 2023-06-02 PROCEDURE — 76815 OB US LIMITED FETUS(S): CPT | Performed by: OBSTETRICS & GYNECOLOGY

## 2023-06-05 RX ORDER — AMOXICILLIN 500 MG/1
500 CAPSULE ORAL 3 TIMES DAILY
Qty: 21 CAPSULE | Refills: 0 | Status: SHIPPED | OUTPATIENT
Start: 2023-06-05 | End: 2023-06-12

## 2023-06-16 PROBLEM — N88.3 SHORT CERVIX: Status: ACTIVE | Noted: 2023-06-16

## 2023-06-19 RX ORDER — INSULIN HUMAN 100 [IU]/ML
INJECTION, SUSPENSION SUBCUTANEOUS
Qty: 9 ML | OUTPATIENT
Start: 2023-06-19

## 2023-06-21 RX ORDER — PROGESTERONE 200 MG/1
CAPSULE ORAL
Qty: 119 CAPSULE | OUTPATIENT
Start: 2023-06-21

## 2023-06-23 ENCOUNTER — ROUTINE PRENATAL (OUTPATIENT)
Dept: OBGYN CLINIC | Age: 28
End: 2023-06-23

## 2023-06-23 ENCOUNTER — HOSPITAL ENCOUNTER (OUTPATIENT)
Age: 28
Setting detail: SPECIMEN
Discharge: HOME | End: 2023-06-23

## 2023-06-23 VITALS — SYSTOLIC BLOOD PRESSURE: 117 MMHG | BODY MASS INDEX: 37.98 KG/M2 | WEIGHT: 201 LBS | DIASTOLIC BLOOD PRESSURE: 68 MMHG

## 2023-06-23 DIAGNOSIS — O10.919 CHRONIC HYPERTENSION AFFECTING PREGNANCY: ICD-10-CM

## 2023-06-23 DIAGNOSIS — O26.873 SHORT CERVICAL LENGTH DURING PREGNANCY IN THIRD TRIMESTER: ICD-10-CM

## 2023-06-23 DIAGNOSIS — Z85.831 HISTORY OF SARCOMA: ICD-10-CM

## 2023-06-23 DIAGNOSIS — Z3A.26 26 WEEKS GESTATION OF PREGNANCY: ICD-10-CM

## 2023-06-23 DIAGNOSIS — O99.210 OBESITY IN PREGNANCY: ICD-10-CM

## 2023-06-23 DIAGNOSIS — Z14.8 CARRIER OF FRAGILE X CHROMOSOME: ICD-10-CM

## 2023-06-23 DIAGNOSIS — O24.312 PRE-EXISTING DIABETES MELLITUS DURING PREGNANCY IN SECOND TRIMESTER: ICD-10-CM

## 2023-06-23 DIAGNOSIS — O09.90 HIGH RISK PREGNANCY, ANTEPARTUM: Primary | ICD-10-CM

## 2023-06-23 DIAGNOSIS — Z87.59 HISTORY OF PRETERM PREMATURE RUPTURE OF MEMBRANES (PPROM): ICD-10-CM

## 2023-06-23 DIAGNOSIS — Z3A.30 30 WEEKS GESTATION OF PREGNANCY: ICD-10-CM

## 2023-06-23 DIAGNOSIS — O09.90 HIGH RISK PREGNANCY, ANTEPARTUM: ICD-10-CM

## 2023-06-23 DIAGNOSIS — R00.0 TACHYCARDIA: ICD-10-CM

## 2023-06-23 DIAGNOSIS — Z85.89 HISTORY OF MALIGNANT NEOPLASM METASTATIC TO LUNG: ICD-10-CM

## 2023-06-23 DIAGNOSIS — B95.1 POSITIVE GBS TEST: ICD-10-CM

## 2023-06-23 LAB
BASOPHILS # BLD: <0.03 K/UL (ref 0–0.2)
BASOPHILS NFR BLD: 0 % (ref 0–2)
EOSINOPHIL # BLD: 0.07 K/UL (ref 0–0.44)
EOSINOPHILS RELATIVE PERCENT: 1 % (ref 1–4)
ERYTHROCYTE [DISTWIDTH] IN BLOOD BY AUTOMATED COUNT: 13 % (ref 11.8–14.4)
HCT VFR BLD AUTO: 36.4 % (ref 36.3–47.1)
HGB BLD-MCNC: 11.8 G/DL (ref 11.9–15.1)
IMM GRANULOCYTES # BLD AUTO: 0.03 K/UL (ref 0–0.3)
IMM GRANULOCYTES NFR BLD: 1 %
LYMPHOCYTES # BLD: 17 % (ref 24–43)
LYMPHOCYTES NFR BLD: 1.03 K/UL (ref 1.1–3.7)
MCH RBC QN AUTO: 28.8 PG (ref 25.2–33.5)
MCHC RBC AUTO-ENTMCNC: 32.4 G/DL (ref 28.4–34.8)
MCV RBC AUTO: 88.8 FL (ref 82.6–102.9)
MONOCYTES NFR BLD: 0.46 K/UL (ref 0.1–1.2)
MONOCYTES NFR BLD: 8 % (ref 3–12)
NEUTROPHILS NFR BLD: 73 % (ref 36–65)
NEUTS SEG NFR BLD: 4.34 K/UL (ref 1.5–8.1)
NRBC AUTOMATED: 0 PER 100 WBC
PLATELET # BLD AUTO: 152 K/UL (ref 138–453)
PMV BLD AUTO: 11.7 FL (ref 8.1–13.5)
RBC # BLD AUTO: 4.1 M/UL (ref 3.95–5.11)
WBC OTHER # BLD: 5.9 K/UL (ref 3.5–11.3)

## 2023-06-23 ASSESSMENT — ANXIETY QUESTIONNAIRES
5. BEING SO RESTLESS THAT IT IS HARD TO SIT STILL: 0
4. TROUBLE RELAXING: 0
6. BECOMING EASILY ANNOYED OR IRRITABLE: 1
IF YOU CHECKED OFF ANY PROBLEMS ON THIS QUESTIONNAIRE, HOW DIFFICULT HAVE THESE PROBLEMS MADE IT FOR YOU TO DO YOUR WORK, TAKE CARE OF THINGS AT HOME, OR GET ALONG WITH OTHER PEOPLE: NOT DIFFICULT AT ALL
3. WORRYING TOO MUCH ABOUT DIFFERENT THINGS: 0
7. FEELING AFRAID AS IF SOMETHING AWFUL MIGHT HAPPEN: 0
2. NOT BEING ABLE TO STOP OR CONTROL WORRYING: 0
GAD7 TOTAL SCORE: 1
1. FEELING NERVOUS, ANXIOUS, OR ON EDGE: 0

## 2023-06-23 ASSESSMENT — PATIENT HEALTH QUESTIONNAIRE - PHQ9
2. FEELING DOWN, DEPRESSED OR HOPELESS: 0
SUM OF ALL RESPONSES TO PHQ9 QUESTIONS 1 & 2: 0
SUM OF ALL RESPONSES TO PHQ QUESTIONS 1-9: 0
1. LITTLE INTEREST OR PLEASURE IN DOING THINGS: 0
SUM OF ALL RESPONSES TO PHQ QUESTIONS 1-9: 0

## 2023-06-23 NOTE — PROGRESS NOTES
SUBJECTIVE:    Carlos Sanderson is here for her return OB visit. denies concerns today. She reports  feeling fetal movement. She denies vaginal bleeding. She denies vaginal discharge. She denies leaking of fluid. She denies uterine cramping. She denies  nausea and/or vomiting. OBJECTIVE:  Blood pressure 117/68, weight 201 lb (91.2 kg), last menstrual period 11/25/2022, not currently breastfeeding. Total weight gain: 11 lb (4.99 kg)      Vaccinations:   Patient is agreeable to tdap vaccination, offer at future visit      ASSESSMENT/PLAN:  IUP @ 30+0 weeks  S=D    High Risk Pregnancy  Due date is based on LMP and confirmed with 8w3d early dating ultrasound  Patient's prenatal labs are completed. Patient's blood type A POSITIVE  and rhogam is not indicated in the pregnancy  Early glucola indicated: yes - 184 declined 3 hour proceed as pregestational diabetic   Insulin 0/0/0/30  Patient accepted genetic screening. First trimester screen. Results low risk. Anatomy scan Anatomy scan completed 4/21/23: placenta anterior, normal cord insertion. cervical length 3.14 cm. Mulitple placental lakes are again noted. No evidence of vasa previa and no evidence of placenta previa/low lying placentation seen today. Follow up 2 weeks for TVUSN CL  USN 5/4/23 CL 30-35mm  USN 5/19/23: presentation cephalic, GEORGE WNL, EFW 75%, CL 3.1cm. F/up in 2 weeks for BPP/UAD and TVUSN  FETAL ECHO 5/19/23 WNL  USN 6/2/23 presentation cephalic, GEORGE 57.70, BPP 8/8. UAD WNL, mulitple placental lakes again noted f/up 2 weeks  USN 6/16/23 presentation cephalic, GEORGE 16.63, BPP 8/8, EFW 58%. CL 21-23 (SHORT CERVIX) f/up in 2 weeks  Glucola between 24-28 weeks. non-applicable. Pre gestational diabetic     Total weight gain in pregnancy reviewed: Yes  Fetal Kick Count was discussed and explained. She was instructed to lay on her left side 20 minutes after eating and count movements for up to 2 hours with a target value of 10 movements.  Instructed to

## 2023-06-30 ENCOUNTER — ROUTINE PRENATAL (OUTPATIENT)
Dept: PERINATAL CARE | Age: 28
End: 2023-06-30
Payer: COMMERCIAL

## 2023-06-30 ENCOUNTER — HOSPITAL ENCOUNTER (OUTPATIENT)
Dept: INFUSION THERAPY | Age: 28
Discharge: HOME | End: 2023-06-30
Payer: COMMERCIAL

## 2023-06-30 VITALS
SYSTOLIC BLOOD PRESSURE: 126 MMHG | DIASTOLIC BLOOD PRESSURE: 82 MMHG | RESPIRATION RATE: 16 BRPM | TEMPERATURE: 97.1 F | HEART RATE: 107 BPM | HEIGHT: 61 IN | BODY MASS INDEX: 38.21 KG/M2 | WEIGHT: 202.38 LBS

## 2023-06-30 DIAGNOSIS — O99.891 CURRENT MATERNAL CONDITION AFFECTING PREGNANCY: ICD-10-CM

## 2023-06-30 DIAGNOSIS — O43.103 PLACENTAL ABNORMALITY IN THIRD TRIMESTER: ICD-10-CM

## 2023-06-30 DIAGNOSIS — Z85.830 HISTORY OF SARCOMA OF BONE: Primary | ICD-10-CM

## 2023-06-30 DIAGNOSIS — O16.3 HYPERTENSION AFFECTING PREGNANCY IN THIRD TRIMESTER: Primary | ICD-10-CM

## 2023-06-30 DIAGNOSIS — Z3A.31 31 WEEKS GESTATION OF PREGNANCY: ICD-10-CM

## 2023-06-30 DIAGNOSIS — O24.119 PRE-EXISTING TYPE 2 DIABETES MELLITUS DURING PREGNANCY, ANTEPARTUM: ICD-10-CM

## 2023-06-30 DIAGNOSIS — O99.213 OBESITY AFFECTING PREGNANCY IN THIRD TRIMESTER: ICD-10-CM

## 2023-06-30 DIAGNOSIS — O09.213 CURRENT PREGNANCY WITH HISTORY OF PRE-TERM LABOR IN THIRD TRIMESTER: ICD-10-CM

## 2023-06-30 PROCEDURE — 6360000002 HC RX W HCPCS: Performed by: INTERNAL MEDICINE

## 2023-06-30 PROCEDURE — 76819 FETAL BIOPHYS PROFIL W/O NST: CPT | Performed by: OBSTETRICS & GYNECOLOGY

## 2023-06-30 PROCEDURE — 76820 UMBILICAL ARTERY ECHO: CPT | Performed by: OBSTETRICS & GYNECOLOGY

## 2023-06-30 PROCEDURE — 76817 TRANSVAGINAL US OBSTETRIC: CPT | Performed by: OBSTETRICS & GYNECOLOGY

## 2023-06-30 PROCEDURE — 96523 IRRIG DRUG DELIVERY DEVICE: CPT

## 2023-06-30 PROCEDURE — 2580000003 HC RX 258: Performed by: INTERNAL MEDICINE

## 2023-06-30 PROCEDURE — 76815 OB US LIMITED FETUS(S): CPT | Performed by: OBSTETRICS & GYNECOLOGY

## 2023-06-30 RX ORDER — SODIUM CHLORIDE 0.9 % (FLUSH) 0.9 %
10 SYRINGE (ML) INJECTION PRN
Status: DISCONTINUED | OUTPATIENT
Start: 2023-06-30 | End: 2023-07-01 | Stop reason: HOSPADM

## 2023-06-30 RX ORDER — SODIUM CHLORIDE 0.9 % (FLUSH) 0.9 %
10 SYRINGE (ML) INJECTION PRN
OUTPATIENT
Start: 2023-06-30

## 2023-06-30 RX ORDER — HEPARIN SODIUM 100 [USP'U]/ML
500 INJECTION, SOLUTION INTRAVENOUS PRN
OUTPATIENT
Start: 2023-06-30

## 2023-06-30 RX ORDER — HEPARIN SODIUM 100 [USP'U]/ML
500 INJECTION, SOLUTION INTRAVENOUS PRN
Status: DISCONTINUED | OUTPATIENT
Start: 2023-06-30 | End: 2023-07-01 | Stop reason: HOSPADM

## 2023-06-30 RX ORDER — WATER 1000 ML/1000ML
2.2 INJECTION, SOLUTION INTRAVENOUS ONCE
OUTPATIENT
Start: 2023-06-30 | End: 2023-06-30

## 2023-06-30 RX ORDER — SODIUM CHLORIDE 0.9 % (FLUSH) 0.9 %
20 SYRINGE (ML) INJECTION PRN
OUTPATIENT
Start: 2023-06-30

## 2023-06-30 RX ADMIN — SODIUM CHLORIDE, PRESERVATIVE FREE 10 ML: 5 INJECTION INTRAVENOUS at 08:33

## 2023-06-30 RX ADMIN — HEPARIN 500 UNITS: 100 SYRINGE at 08:33

## 2023-07-07 ENCOUNTER — ROUTINE PRENATAL (OUTPATIENT)
Dept: PERINATAL CARE | Age: 28
End: 2023-07-07
Payer: COMMERCIAL

## 2023-07-07 VITALS
BODY MASS INDEX: 37.57 KG/M2 | HEART RATE: 86 BPM | DIASTOLIC BLOOD PRESSURE: 79 MMHG | TEMPERATURE: 97.9 F | SYSTOLIC BLOOD PRESSURE: 119 MMHG | HEIGHT: 61 IN | WEIGHT: 199 LBS | RESPIRATION RATE: 16 BRPM

## 2023-07-07 DIAGNOSIS — O99.213 OBESITY AFFECTING PREGNANCY IN THIRD TRIMESTER: ICD-10-CM

## 2023-07-07 DIAGNOSIS — Z36.4 ULTRASOUND FOR ANTENATAL SCREENING FOR FETAL GROWTH RESTRICTION: ICD-10-CM

## 2023-07-07 DIAGNOSIS — O16.3 HYPERTENSION AFFECTING PREGNANCY IN THIRD TRIMESTER: ICD-10-CM

## 2023-07-07 DIAGNOSIS — O24.119 PRE-EXISTING TYPE 2 DIABETES MELLITUS DURING PREGNANCY, ANTEPARTUM: Primary | ICD-10-CM

## 2023-07-07 DIAGNOSIS — O09.213 CURRENT PREGNANCY WITH HISTORY OF PRE-TERM LABOR IN THIRD TRIMESTER: ICD-10-CM

## 2023-07-07 DIAGNOSIS — Z3A.32 32 WEEKS GESTATION OF PREGNANCY: ICD-10-CM

## 2023-07-07 DIAGNOSIS — O35.09X0 FETAL MACROCEPHALY AFFECTING ANTEPARTUM CARE OF MOTHER, SINGLE OR UNSPECIFIED FETUS: ICD-10-CM

## 2023-07-07 DIAGNOSIS — Z13.89 ENCOUNTER FOR ROUTINE SCREENING FOR MALFORMATION USING ULTRASONICS: ICD-10-CM

## 2023-07-07 PROCEDURE — 93325 DOPPLER ECHO COLOR FLOW MAPG: CPT | Performed by: OBSTETRICS & GYNECOLOGY

## 2023-07-07 NOTE — PROGRESS NOTES
Obstetric/Gynecology Maternal Fetal Medicine Resident Note    Patient declines formal consult with MFM attending physician for new diagnosis of macrocephaly.      Madeleine Page DO  OBGYMICHAEL Resident, PGY2  [facility]  7/7/2023, 12:19 PM

## 2023-07-13 ENCOUNTER — ROUTINE PRENATAL (OUTPATIENT)
Dept: OBGYN CLINIC | Age: 28
End: 2023-07-13

## 2023-07-13 VITALS — WEIGHT: 202.8 LBS | SYSTOLIC BLOOD PRESSURE: 112 MMHG | DIASTOLIC BLOOD PRESSURE: 72 MMHG | BODY MASS INDEX: 38.32 KG/M2

## 2023-07-13 DIAGNOSIS — Z85.89 HISTORY OF MALIGNANT NEOPLASM METASTATIC TO LUNG: ICD-10-CM

## 2023-07-13 DIAGNOSIS — Z87.59 HISTORY OF PRETERM PREMATURE RUPTURE OF MEMBRANES (PPROM): ICD-10-CM

## 2023-07-13 DIAGNOSIS — O10.919 CHRONIC HYPERTENSION AFFECTING PREGNANCY: ICD-10-CM

## 2023-07-13 DIAGNOSIS — O26.873 SHORT CERVICAL LENGTH DURING PREGNANCY IN THIRD TRIMESTER: ICD-10-CM

## 2023-07-13 DIAGNOSIS — Z23 NEED FOR DIPHTHERIA-TETANUS-PERTUSSIS (TDAP) VACCINE: ICD-10-CM

## 2023-07-13 DIAGNOSIS — O24.312 PRE-EXISTING DIABETES MELLITUS DURING PREGNANCY IN SECOND TRIMESTER: ICD-10-CM

## 2023-07-13 DIAGNOSIS — O99.210 OBESITY IN PREGNANCY: ICD-10-CM

## 2023-07-13 DIAGNOSIS — Z3A.32 32 WEEKS GESTATION OF PREGNANCY: ICD-10-CM

## 2023-07-13 DIAGNOSIS — B95.1 POSITIVE GBS TEST: ICD-10-CM

## 2023-07-13 DIAGNOSIS — O09.90 HIGH RISK PREGNANCY, ANTEPARTUM: Primary | ICD-10-CM

## 2023-07-13 NOTE — PROGRESS NOTES
SUBJECTIVE:    Sybil Baltazar is here for her return OB visit. denies concerns today. She reports  feeling fetal movement. She denies vaginal bleeding. She denies vaginal discharge. She denies leaking of fluid. She denies uterine cramping. She denies  nausea and/or vomiting. OBJECTIVE:  Blood pressure 112/72, weight 202 lb 12.8 oz (92 kg), last menstrual period 11/25/2022, not currently breastfeeding. Total weight gain: 12 lb 12.8 oz (5.806 kg)    NST:   Baseline:  135  Variability:  Moderate  Accelerations:  Present  Decelerations: Absent   Fetal Movement:  Perceived by patient during NST  Contractions: patient denies contractions, contractions Absent  on toco.  Interpretation: Reactive (Mickie Holstein)     BPP completed during appointment: No      Vaccinations:   Patient has received tdap during the pregnancy    ASSESSMENT/PLAN:  IUP @ 32+6 weeks  S=D    High Risk Pregnancy  Due date is based on LMP and confirmed with 8w3d early dating ultrasound  Patient's prenatal labs are completed. Patient's blood type A POSITIVE  and rhogam is not indicated in the pregnancy  Early glucola indicated: yes - 184 declined 3 hour proceed as pregestational diabetic   Insulin 0/0/0/30  Patient accepted genetic screening. First trimester screen. Results low risk. Anatomy scan Anatomy scan completed 4/21/23: placenta anterior, normal cord insertion. cervical length 3.14 cm. Mulitple placental lakes are again noted. No evidence of vasa previa and no evidence of placenta previa/low lying placentation seen today. Follow up 2 weeks for TVUSN CL  USN 5/4/23 CL 30-35mm  USN 5/19/23: presentation cephalic, GEORGE WNL, EFW 58%, CL 3.1cm. F/up in 2 weeks for BPP/UAD and TVUSN  FETAL ECHO 5/19/23 WNL  USN 6/2/23 presentation cephalic, GEORGE 78.88, BPP 8/8. UAD WNL, mulitple placental lakes again noted f/up 2 weeks  USN 6/16/23 presentation cephalic, GEORGE 66.22, BPP 8/8, EFW 58%.  CL 21-23 (SHORT CERVIX) f/up in 2 weeks  USN 6/30/23 Presentation

## 2023-07-14 ENCOUNTER — ROUTINE PRENATAL (OUTPATIENT)
Dept: PERINATAL CARE | Age: 28
End: 2023-07-14

## 2023-07-14 VITALS
HEIGHT: 61 IN | WEIGHT: 201 LBS | BODY MASS INDEX: 37.95 KG/M2 | TEMPERATURE: 97.1 F | SYSTOLIC BLOOD PRESSURE: 109 MMHG | DIASTOLIC BLOOD PRESSURE: 71 MMHG | HEART RATE: 105 BPM | RESPIRATION RATE: 16 BRPM

## 2023-07-14 DIAGNOSIS — Z85.89 HISTORY OF MALIGNANT NEOPLASM METASTATIC TO LUNG: ICD-10-CM

## 2023-07-14 DIAGNOSIS — O24.312 PRE-EXISTING DIABETES MELLITUS DURING PREGNANCY IN SECOND TRIMESTER: ICD-10-CM

## 2023-07-14 DIAGNOSIS — C78.02 SECONDARY MALIGNANT NEOPLASM OF LEFT LUNG (HCC): ICD-10-CM

## 2023-07-14 DIAGNOSIS — D75.839 THROMBOCYTOSIS: ICD-10-CM

## 2023-07-14 DIAGNOSIS — N88.3 SHORT CERVIX: Primary | ICD-10-CM

## 2023-07-14 DIAGNOSIS — O10.919 CHRONIC HYPERTENSION AFFECTING PREGNANCY: ICD-10-CM

## 2023-07-14 DIAGNOSIS — Z85.830 HISTORY OF SARCOMA OF BONE: ICD-10-CM

## 2023-07-14 DIAGNOSIS — D50.8 IRON DEFICIENCY ANEMIA SECONDARY TO INADEQUATE DIETARY IRON INTAKE: ICD-10-CM

## 2023-07-20 ENCOUNTER — ROUTINE PRENATAL (OUTPATIENT)
Dept: OBGYN CLINIC | Age: 28
End: 2023-07-20
Payer: COMMERCIAL

## 2023-07-20 VITALS — WEIGHT: 201 LBS | DIASTOLIC BLOOD PRESSURE: 60 MMHG | SYSTOLIC BLOOD PRESSURE: 106 MMHG | BODY MASS INDEX: 37.98 KG/M2

## 2023-07-20 DIAGNOSIS — Z3A.33 33 WEEKS GESTATION OF PREGNANCY: ICD-10-CM

## 2023-07-20 DIAGNOSIS — B95.1 POSITIVE GBS TEST: ICD-10-CM

## 2023-07-20 DIAGNOSIS — O99.210 OBESITY IN PREGNANCY: ICD-10-CM

## 2023-07-20 DIAGNOSIS — O09.90 HIGH RISK PREGNANCY, ANTEPARTUM: Primary | ICD-10-CM

## 2023-07-20 DIAGNOSIS — Z23 NEED FOR DIPHTHERIA-TETANUS-PERTUSSIS (TDAP) VACCINE: ICD-10-CM

## 2023-07-20 DIAGNOSIS — O24.312 PRE-EXISTING DIABETES MELLITUS DURING PREGNANCY IN SECOND TRIMESTER: ICD-10-CM

## 2023-07-20 DIAGNOSIS — O10.919 CHRONIC HYPERTENSION AFFECTING PREGNANCY: ICD-10-CM

## 2023-07-20 DIAGNOSIS — Z87.59 HISTORY OF PRETERM PREMATURE RUPTURE OF MEMBRANES (PPROM): ICD-10-CM

## 2023-07-20 DIAGNOSIS — O26.873 SHORT CERVICAL LENGTH DURING PREGNANCY IN THIRD TRIMESTER: ICD-10-CM

## 2023-07-20 DIAGNOSIS — Z85.89 HISTORY OF MALIGNANT NEOPLASM METASTATIC TO LUNG: ICD-10-CM

## 2023-07-20 PROCEDURE — 59025 FETAL NON-STRESS TEST: CPT | Performed by: ADVANCED PRACTICE MIDWIFE

## 2023-07-20 PROCEDURE — 90471 IMMUNIZATION ADMIN: CPT | Performed by: ADVANCED PRACTICE MIDWIFE

## 2023-07-20 PROCEDURE — 0502F SUBSEQUENT PRENATAL CARE: CPT | Performed by: ADVANCED PRACTICE MIDWIFE

## 2023-07-20 PROCEDURE — 90715 TDAP VACCINE 7 YRS/> IM: CPT | Performed by: ADVANCED PRACTICE MIDWIFE

## 2023-07-20 NOTE — PROGRESS NOTES
SUBJECTIVE:    Antoni Martinez is here for her return OB visit. denies concerns today. She reports  feeling fetal movement. She denies vaginal bleeding. She denies vaginal discharge. She denies leaking of fluid. She denies uterine cramping. She denies  nausea and/or vomiting. OBJECTIVE:  Blood pressure 106/60, weight 201 lb (91.2 kg), last menstrual period 11/25/2022, not currently breastfeeding. Total weight gain: 11 lb (4.99 kg)    NST:   Baseline:  135  Variability:  Moderate  Accelerations:  Present  Decelerations: Absent   Fetal Movement:  Perceived by patient during NST  Contractions: patient denies contractions, contractions Absent  on toco.  Interpretation: Reactive (Aravind Vincenzo)     BPP completed during appointment: No and MFM apt 7/21/23        Vaccinations:   Patient is agreeable to tdap vaccination and Patient has received tdap during the pregnancy    ASSESSMENT/PLAN:  IUP @ 33+6 weeks  S=D    High Risk Pregnancy  Due date is based on LMP and confirmed with 8w3d early dating ultrasound  Patient's prenatal labs are completed. Patient's blood type A POSITIVE  and rhogam is not indicated in the pregnancy  Early glucola indicated: yes - 184 declined 3 hour proceed as pregestational diabetic   Insulin 0/0/0/30  Patient accepted genetic screening. First trimester screen. Results low risk. Anatomy scan Anatomy scan completed 4/21/23: placenta anterior, normal cord insertion. cervical length 3.14 cm. Mulitple placental lakes are again noted. No evidence of vasa previa and no evidence of placenta previa/low lying placentation seen today. Follow up 2 weeks for TVUSN CL  USN 5/4/23 CL 30-35mm  USN 5/19/23: presentation cephalic, GEORGE WNL, EFW 66%, CL 3.1cm. F/up in 2 weeks for BPP/UAD and TVUSN  FETAL ECHO 5/19/23 WNL  USN 6/2/23 presentation cephalic, GEORGE 90.37, BPP 8/8. UAD WNL, mulitple placental lakes again noted f/up 2 weeks  USN 6/16/23 presentation cephalic, GEORGE 86.46, BPP 8/8, EFW 58%.  CL 21-23 (SHORT

## 2023-07-21 ENCOUNTER — ROUTINE PRENATAL (OUTPATIENT)
Dept: PERINATAL CARE | Age: 28
End: 2023-07-21

## 2023-07-21 VITALS
HEIGHT: 61 IN | WEIGHT: 201 LBS | HEART RATE: 104 BPM | RESPIRATION RATE: 16 BRPM | BODY MASS INDEX: 37.95 KG/M2 | DIASTOLIC BLOOD PRESSURE: 77 MMHG | TEMPERATURE: 97.5 F | SYSTOLIC BLOOD PRESSURE: 130 MMHG

## 2023-07-21 DIAGNOSIS — D75.839 THROMBOCYTOSIS: ICD-10-CM

## 2023-07-21 DIAGNOSIS — O10.919 CHRONIC HYPERTENSION AFFECTING PREGNANCY: Primary | ICD-10-CM

## 2023-07-21 DIAGNOSIS — Z85.89 HISTORY OF MALIGNANT NEOPLASM METASTATIC TO LUNG: ICD-10-CM

## 2023-07-21 DIAGNOSIS — O24.312 PRE-EXISTING DIABETES MELLITUS DURING PREGNANCY IN SECOND TRIMESTER: ICD-10-CM

## 2023-07-21 DIAGNOSIS — D50.8 IRON DEFICIENCY ANEMIA SECONDARY TO INADEQUATE DIETARY IRON INTAKE: ICD-10-CM

## 2023-07-24 ENCOUNTER — TELEPHONE (OUTPATIENT)
Dept: PERINATAL CARE | Age: 28
End: 2023-07-24

## 2023-07-24 NOTE — TELEPHONE ENCOUNTER
Patient advised by phone that Dr Sia Matias had reviewed her blood sugar log and his new insulin recommendations is NPH insulin will increase to 40 units at bedtime. Patient understands providers new insulin recommendations.

## 2023-07-27 ENCOUNTER — ROUTINE PRENATAL (OUTPATIENT)
Dept: OBGYN CLINIC | Age: 28
End: 2023-07-27
Payer: MEDICARE

## 2023-07-27 VITALS — SYSTOLIC BLOOD PRESSURE: 104 MMHG | DIASTOLIC BLOOD PRESSURE: 72 MMHG | BODY MASS INDEX: 38.7 KG/M2 | WEIGHT: 204.8 LBS

## 2023-07-27 DIAGNOSIS — Z87.59 HISTORY OF PRETERM PREMATURE RUPTURE OF MEMBRANES (PPROM): ICD-10-CM

## 2023-07-27 DIAGNOSIS — O24.313 PRE-EXISTING DIABETES MELLITUS AFFECTING PREGNANCY IN THIRD TRIMESTER, ANTEPARTUM: ICD-10-CM

## 2023-07-27 DIAGNOSIS — O09.90 HIGH RISK PREGNANCY, ANTEPARTUM: Primary | ICD-10-CM

## 2023-07-27 DIAGNOSIS — B95.1 POSITIVE GBS TEST: ICD-10-CM

## 2023-07-27 DIAGNOSIS — O99.210 OBESITY IN PREGNANCY: ICD-10-CM

## 2023-07-27 DIAGNOSIS — Z85.831 HISTORY OF SARCOMA: ICD-10-CM

## 2023-07-27 DIAGNOSIS — O10.919 CHRONIC HYPERTENSION AFFECTING PREGNANCY: ICD-10-CM

## 2023-07-27 DIAGNOSIS — Z85.89 HISTORY OF MALIGNANT NEOPLASM METASTATIC TO LUNG: ICD-10-CM

## 2023-07-27 DIAGNOSIS — Z14.8 CARRIER OF FRAGILE X CHROMOSOME: ICD-10-CM

## 2023-07-27 DIAGNOSIS — Z3A.34 34 WEEKS GESTATION OF PREGNANCY: ICD-10-CM

## 2023-07-27 DIAGNOSIS — O26.873 SHORT CERVICAL LENGTH DURING PREGNANCY IN THIRD TRIMESTER: ICD-10-CM

## 2023-07-27 PROCEDURE — 59025 FETAL NON-STRESS TEST: CPT | Performed by: ADVANCED PRACTICE MIDWIFE

## 2023-07-27 PROCEDURE — 0502F SUBSEQUENT PRENATAL CARE: CPT | Performed by: ADVANCED PRACTICE MIDWIFE

## 2023-07-27 RX ORDER — BREAST PUMP
1 EACH MISCELLANEOUS PRN
Qty: 1 EACH | Refills: 0 | Status: SHIPPED | OUTPATIENT
Start: 2023-07-27

## 2023-07-27 NOTE — PROGRESS NOTES
SUBJECTIVE:    Cheryle Ewings is here for her return OB visit. denies concerns today. She reports  feeling fetal movement. She denies vaginal bleeding. She denies vaginal discharge. She denies leaking of fluid. She denies uterine cramping. She denies  nausea and/or vomiting. OBJECTIVE:  Blood pressure 104/72, weight 204 lb 12.8 oz (92.9 kg), last menstrual period 11/25/2022, not currently breastfeeding. Total weight gain: 14 lb 12.8 oz (6.713 kg)    NST:   Baseline:  140  Variability:  Moderate  Accelerations:  Present  Decelerations: Absent   Fetal Movement:  Perceived by patient during NST  Contractions: patient denies contractions, contractions Absent  on toco.  Interpretation: Reactive (Lenda Dock)     BPP completed during appointment: No      Vaccinations:   Patient has received tdap during the pregnancy    ASSESSMENT/PLAN:  IUP @ 34+6 weeks  S=D    High Risk Pregnancy  Due date is based on LMP and confirmed with 8w3d early dating ultrasound  Patient's prenatal labs are completed. Patient's blood type A POSITIVE  and rhogam is not indicated in the pregnancy  Early glucola indicated: yes - 184 declined 3 hour proceed as pregestational diabetic   Insulin 0/0/0/40  Patient accepted genetic screening. First trimester screen. Results low risk. Anatomy scan Anatomy scan completed 4/21/23: placenta anterior, normal cord insertion. cervical length 3.14 cm. Mulitple placental lakes are again noted. No evidence of vasa previa and no evidence of placenta previa/low lying placentation seen today. Follow up 2 weeks for TVUSN CL  USN 5/4/23 CL 30-35mm  USN 5/19/23: presentation cephalic, GEORGE WNL, EFW 71%, CL 3.1cm. F/up in 2 weeks for BPP/UAD and TVUSN  FETAL ECHO 5/19/23 WNL  USN 6/2/23 presentation cephalic, GEORGE 37.28, BPP 8/8. UAD WNL, mulitple placental lakes again noted f/up 2 weeks  USN 6/16/23 presentation cephalic, GEORGE 30.85, BPP 8/8, EFW 58%.  CL 21-23 (SHORT CERVIX) f/up in 2 weeks  USN 6/30/23 Presentation

## 2023-07-28 ENCOUNTER — ROUTINE PRENATAL (OUTPATIENT)
Dept: PERINATAL CARE | Age: 28
End: 2023-07-28

## 2023-07-28 VITALS
BODY MASS INDEX: 38.83 KG/M2 | HEART RATE: 85 BPM | SYSTOLIC BLOOD PRESSURE: 120 MMHG | TEMPERATURE: 98.4 F | WEIGHT: 205.69 LBS | HEIGHT: 61 IN | RESPIRATION RATE: 16 BRPM | DIASTOLIC BLOOD PRESSURE: 73 MMHG

## 2023-07-28 DIAGNOSIS — O16.3 HYPERTENSION AFFECTING PREGNANCY IN THIRD TRIMESTER: Primary | ICD-10-CM

## 2023-07-28 DIAGNOSIS — Z36.4 ULTRASOUND FOR ANTENATAL SCREENING FOR FETAL GROWTH RESTRICTION: ICD-10-CM

## 2023-07-28 DIAGNOSIS — O24.119 PRE-EXISTING TYPE 2 DIABETES MELLITUS DURING PREGNANCY, ANTEPARTUM: ICD-10-CM

## 2023-07-28 DIAGNOSIS — Z3A.35 35 WEEKS GESTATION OF PREGNANCY: ICD-10-CM

## 2023-07-28 DIAGNOSIS — O99.213 OBESITY AFFECTING PREGNANCY IN THIRD TRIMESTER: ICD-10-CM

## 2023-07-31 ENCOUNTER — HOSPITAL ENCOUNTER (OUTPATIENT)
Age: 28
Discharge: HOME | End: 2023-07-31
Payer: MEDICARE

## 2023-07-31 DIAGNOSIS — C49.21 PRIMARY LOWER EXTREMITY SARCOMA, RIGHT (HCC): ICD-10-CM

## 2023-07-31 DIAGNOSIS — C34.11 MALIGNANT NEOPLASM OF UPPER LOBE OF RIGHT LUNG (HCC): ICD-10-CM

## 2023-07-31 LAB
ALBUMIN SERPL-MCNC: 3.7 G/DL (ref 3.5–5.2)
ALBUMIN/GLOB SERPL: 1.5 {RATIO} (ref 1–2.5)
ALP SERPL-CCNC: 103 U/L (ref 35–104)
ALT SERPL-CCNC: 8 U/L (ref 5–33)
ANION GAP SERPL CALCULATED.3IONS-SCNC: 12 MMOL/L (ref 9–17)
AST SERPL-CCNC: 17 U/L
BASOPHILS # BLD: 0 K/UL (ref 0–0.2)
BASOPHILS NFR BLD: 0 % (ref 0–2)
BILIRUB SERPL-MCNC: 0.2 MG/DL (ref 0.3–1.2)
BUN SERPL-MCNC: 7 MG/DL (ref 6–20)
CALCIUM SERPL-MCNC: 8.9 MG/DL (ref 8.6–10.4)
CHLORIDE SERPL-SCNC: 105 MMOL/L (ref 98–107)
CO2 SERPL-SCNC: 21 MMOL/L (ref 20–31)
CREAT SERPL-MCNC: <0.4 MG/DL (ref 0.5–0.9)
EOSINOPHIL # BLD: 0 K/UL (ref 0–0.4)
EOSINOPHILS RELATIVE PERCENT: 1 % (ref 1–4)
ERYTHROCYTE [DISTWIDTH] IN BLOOD BY AUTOMATED COUNT: 13.7 % (ref 12.5–15.4)
GFR SERPL CREATININE-BSD FRML MDRD: ABNORMAL ML/MIN/1.73M2
GLUCOSE SERPL-MCNC: 74 MG/DL (ref 70–99)
HCT VFR BLD AUTO: 37.8 % (ref 36–46)
HGB BLD-MCNC: 12.9 G/DL (ref 12–16)
LYMPHOCYTES NFR BLD: 1.1 K/UL (ref 1–4.8)
LYMPHOCYTES RELATIVE PERCENT: 16 % (ref 24–44)
MCH RBC QN AUTO: 28.9 PG (ref 26–34)
MCHC RBC AUTO-ENTMCNC: 34.1 G/DL (ref 31–37)
MCV RBC AUTO: 84.8 FL (ref 80–100)
MONOCYTES NFR BLD: 0.6 K/UL (ref 0.1–1.2)
MONOCYTES NFR BLD: 8 % (ref 2–11)
NEUTROPHILS NFR BLD: 75 % (ref 36–66)
NEUTS SEG NFR BLD: 5.5 K/UL (ref 1.8–7.7)
PLATELET # BLD AUTO: 154 K/UL (ref 140–450)
PMV BLD AUTO: 9 FL (ref 6–12)
POTASSIUM SERPL-SCNC: 3.8 MMOL/L (ref 3.7–5.3)
PROT SERPL-MCNC: 6.2 G/DL (ref 6.4–8.3)
RBC # BLD AUTO: 4.46 M/UL (ref 4–5.2)
SODIUM SERPL-SCNC: 138 MMOL/L (ref 135–144)
WBC OTHER # BLD: 7.3 K/UL (ref 3.5–11)

## 2023-07-31 PROCEDURE — 85025 COMPLETE CBC W/AUTO DIFF WBC: CPT

## 2023-07-31 PROCEDURE — 80053 COMPREHEN METABOLIC PANEL: CPT

## 2023-07-31 PROCEDURE — 36415 COLL VENOUS BLD VENIPUNCTURE: CPT

## 2023-08-03 ENCOUNTER — TELEMEDICINE (OUTPATIENT)
Dept: ONCOLOGY | Age: 28
End: 2023-08-03
Payer: MEDICARE

## 2023-08-03 DIAGNOSIS — Z85.830 HISTORY OF SARCOMA OF BONE: ICD-10-CM

## 2023-08-03 DIAGNOSIS — C78.02 SECONDARY MALIGNANT NEOPLASM OF LEFT LUNG (HCC): ICD-10-CM

## 2023-08-03 DIAGNOSIS — C34.11 MALIGNANT NEOPLASM OF UPPER LOBE OF RIGHT LUNG (HCC): Primary | ICD-10-CM

## 2023-08-03 PROCEDURE — 99214 OFFICE O/P EST MOD 30 MIN: CPT | Performed by: INTERNAL MEDICINE

## 2023-08-03 PROCEDURE — G8427 DOCREV CUR MEDS BY ELIG CLIN: HCPCS | Performed by: INTERNAL MEDICINE

## 2023-08-03 NOTE — PROGRESS NOTES
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 female who is referred to us for sarcoma. She presented with rightt knee sprain at work 04/2020 but the pain and swelling worsened with palpable mass. She underwent MRI which showed mass and was seen at [facility] where biopsy was taken and showed sarcoma. She was transferred to [facility] and decided to undergo full leg amputation due to early stage pregnancy, tumor was 16 cm x 20 cm x 3 cm and removed with negative margins. She was following with [facility] and decided she would prefer to receive treatment locally. The patient is currently 32 weeks pregnant and delivery plans are expected to be finalized 10/02/2020. She has elected to proceed with chemo after delivery. She is working towards prosthesis with test socket. 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 Kathy delivered in late October, after delivery, we decided to start chemo with AIM 11/3/2020. Patient finished 4 cycles of chemotherapy and she was declared in remission and was started surveillance. In May/2022, she had an isolated lung mass that was pleural-based and isolated in the left posterior lung. Biopsy was negative so we decided observation. 09/2022 imaging showed progression of disease, with isolated mass in the left lung, she underwent lobectomy 09/27/2022 which showed 6.8 cm high grade sarcoma with invasion to the chest wall, removed with negative margins.

## 2023-08-04 ENCOUNTER — ROUTINE PRENATAL (OUTPATIENT)
Dept: OBGYN CLINIC | Age: 28
End: 2023-08-04

## 2023-08-04 ENCOUNTER — ROUTINE PRENATAL (OUTPATIENT)
Dept: PERINATAL CARE | Age: 28
End: 2023-08-04
Payer: MEDICARE

## 2023-08-04 VITALS
DIASTOLIC BLOOD PRESSURE: 62 MMHG | WEIGHT: 205 LBS | BODY MASS INDEX: 38.71 KG/M2 | RESPIRATION RATE: 16 BRPM | SYSTOLIC BLOOD PRESSURE: 106 MMHG | HEIGHT: 61 IN | HEART RATE: 100 BPM | TEMPERATURE: 97.9 F

## 2023-08-04 VITALS — DIASTOLIC BLOOD PRESSURE: 82 MMHG | WEIGHT: 210 LBS | SYSTOLIC BLOOD PRESSURE: 128 MMHG | BODY MASS INDEX: 39.68 KG/M2

## 2023-08-04 DIAGNOSIS — Z85.89 HISTORY OF MALIGNANT NEOPLASM METASTATIC TO LUNG: ICD-10-CM

## 2023-08-04 DIAGNOSIS — Z3A.36 36 WEEKS GESTATION OF PREGNANCY: ICD-10-CM

## 2023-08-04 DIAGNOSIS — O26.873 SHORT CERVICAL LENGTH DURING PREGNANCY IN THIRD TRIMESTER: ICD-10-CM

## 2023-08-04 DIAGNOSIS — O24.313 PRE-EXISTING DIABETES MELLITUS AFFECTING PREGNANCY IN THIRD TRIMESTER, ANTEPARTUM: ICD-10-CM

## 2023-08-04 DIAGNOSIS — O16.3 HYPERTENSION AFFECTING PREGNANCY IN THIRD TRIMESTER: Primary | ICD-10-CM

## 2023-08-04 DIAGNOSIS — O24.119 PRE-EXISTING TYPE 2 DIABETES MELLITUS DURING PREGNANCY, ANTEPARTUM: ICD-10-CM

## 2023-08-04 DIAGNOSIS — O99.213 OBESITY AFFECTING PREGNANCY IN THIRD TRIMESTER: ICD-10-CM

## 2023-08-04 DIAGNOSIS — O10.919 CHRONIC HYPERTENSION AFFECTING PREGNANCY: ICD-10-CM

## 2023-08-04 DIAGNOSIS — O09.90 HIGH RISK PREGNANCY, ANTEPARTUM: Primary | ICD-10-CM

## 2023-08-04 DIAGNOSIS — Z87.59 HISTORY OF PRETERM PREMATURE RUPTURE OF MEMBRANES (PPROM): ICD-10-CM

## 2023-08-04 DIAGNOSIS — O43.103 PLACENTAL ABNORMALITY IN THIRD TRIMESTER: ICD-10-CM

## 2023-08-04 DIAGNOSIS — B95.1 POSITIVE GBS TEST: ICD-10-CM

## 2023-08-04 DIAGNOSIS — O99.210 OBESITY IN PREGNANCY: ICD-10-CM

## 2023-08-04 PROCEDURE — 76820 UMBILICAL ARTERY ECHO: CPT | Performed by: OBSTETRICS & GYNECOLOGY

## 2023-08-04 PROCEDURE — 76819 FETAL BIOPHYS PROFIL W/O NST: CPT | Performed by: OBSTETRICS & GYNECOLOGY

## 2023-08-04 PROCEDURE — 76815 OB US LIMITED FETUS(S): CPT | Performed by: OBSTETRICS & GYNECOLOGY

## 2023-08-04 NOTE — PROGRESS NOTES
SUBJECTIVE:    Brendan Uriostegui is here for her return OB visit. denies concerns today. She reports  feeling fetal movement. She denies vaginal bleeding. She denies vaginal discharge. She denies leaking of fluid. She denies uterine cramping. She denies  nausea and/or vomiting. OBJECTIVE:  Blood pressure 128/82, weight 210 lb (95.3 kg), last menstrual period 11/25/2022, not currently breastfeeding. Total weight gain: 20 lb (9.072 kg)    NST:   Baseline:  145  Variability:  Moderate  Accelerations:  Present  Decelerations: Absent   Fetal Movement:  Perceived by patient during NST  Contractions: patient denies contractions, contractions Absent  on toco.  Interpretation: Reactive (Sydell Slight)     BPP completed during appointment: No      Vaccinations:   Patient is agreeable to tdap vaccination    ASSESSMENT/PLAN:  IUP @ 36+0 weeks  S=D    High Risk Pregnancy  Due date is based on LMP and confirmed with 8w3d early dating ultrasound  Patient's prenatal labs are completed. Patient's blood type A POSITIVE  and rhogam is not indicated in the pregnancy  Early glucola indicated: yes - 184 declined 3 hour proceed as pregestational diabetic   Insulin 0/0/0/40  Patient accepted genetic screening. First trimester screen. Results low risk. Anatomy scan Anatomy scan completed 4/21/23: placenta anterior, normal cord insertion. cervical length 3.14 cm. Mulitple placental lakes are again noted. No evidence of vasa previa and no evidence of placenta previa/low lying placentation seen today. Follow up 2 weeks for TVUSN CL  USN 5/4/23 CL 30-35mm  USN 5/19/23: presentation cephalic, GEORGE WNL, EFW 02%, CL 3.1cm. F/up in 2 weeks for BPP/UAD and TVUSN  FETAL ECHO 5/19/23 WNL  USN 6/2/23 presentation cephalic, GEORGE 18.82, BPP 8/8. UAD WNL, mulitple placental lakes again noted f/up 2 weeks  USN 6/16/23 presentation cephalic, GEORGE 31.54, BPP 8/8, EFW 58%.  CL 21-23 (SHORT CERVIX) f/up in 2 weeks  USN 6/30/23 Presentation vertex, GEORGE 13, BPP

## 2023-08-09 ENCOUNTER — HOSPITAL ENCOUNTER (OUTPATIENT)
Dept: INFUSION THERAPY | Age: 28
Discharge: HOME | End: 2023-08-09
Payer: MEDICARE

## 2023-08-09 ENCOUNTER — ROUTINE PRENATAL (OUTPATIENT)
Dept: OBGYN CLINIC | Age: 28
End: 2023-08-09

## 2023-08-09 VITALS — BODY MASS INDEX: 40.4 KG/M2 | WEIGHT: 213.8 LBS | SYSTOLIC BLOOD PRESSURE: 118 MMHG | DIASTOLIC BLOOD PRESSURE: 72 MMHG

## 2023-08-09 DIAGNOSIS — Z85.830 HISTORY OF SARCOMA OF BONE: Primary | ICD-10-CM

## 2023-08-09 DIAGNOSIS — O10.919 CHRONIC HYPERTENSION AFFECTING PREGNANCY: ICD-10-CM

## 2023-08-09 DIAGNOSIS — Z3A.36 36 WEEKS GESTATION OF PREGNANCY: ICD-10-CM

## 2023-08-09 DIAGNOSIS — O09.90 HIGH RISK PREGNANCY, ANTEPARTUM: Primary | ICD-10-CM

## 2023-08-09 DIAGNOSIS — O26.873 SHORT CERVICAL LENGTH DURING PREGNANCY IN THIRD TRIMESTER: ICD-10-CM

## 2023-08-09 DIAGNOSIS — O24.313 PRE-EXISTING DIABETES MELLITUS AFFECTING PREGNANCY IN THIRD TRIMESTER, ANTEPARTUM: ICD-10-CM

## 2023-08-09 PROCEDURE — 96523 IRRIG DRUG DELIVERY DEVICE: CPT

## 2023-08-09 PROCEDURE — 6360000002 HC RX W HCPCS: Performed by: INTERNAL MEDICINE

## 2023-08-09 PROCEDURE — 2580000003 HC RX 258: Performed by: INTERNAL MEDICINE

## 2023-08-09 RX ORDER — SODIUM CHLORIDE 0.9 % (FLUSH) 0.9 %
20 SYRINGE (ML) INJECTION PRN
OUTPATIENT
Start: 2023-08-09

## 2023-08-09 RX ORDER — SODIUM CHLORIDE 0.9 % (FLUSH) 0.9 %
10 SYRINGE (ML) INJECTION PRN
Status: DISCONTINUED | OUTPATIENT
Start: 2023-08-09 | End: 2023-08-10 | Stop reason: HOSPADM

## 2023-08-09 RX ORDER — HEPARIN 100 UNIT/ML
500 SYRINGE INTRAVENOUS PRN
Status: DISCONTINUED | OUTPATIENT
Start: 2023-08-09 | End: 2023-08-10 | Stop reason: HOSPADM

## 2023-08-09 RX ORDER — SODIUM CHLORIDE 0.9 % (FLUSH) 0.9 %
10 SYRINGE (ML) INJECTION PRN
OUTPATIENT
Start: 2023-08-09

## 2023-08-09 RX ORDER — WATER 1000 ML/1000ML
2.2 INJECTION, SOLUTION INTRAVENOUS ONCE
OUTPATIENT
Start: 2023-08-09 | End: 2023-08-09

## 2023-08-09 RX ORDER — HEPARIN 100 UNIT/ML
500 SYRINGE INTRAVENOUS PRN
OUTPATIENT
Start: 2023-08-09

## 2023-08-09 RX ADMIN — SODIUM CHLORIDE, PRESERVATIVE FREE 10 ML: 5 INJECTION INTRAVENOUS at 11:47

## 2023-08-09 RX ADMIN — SODIUM CHLORIDE, PRESERVATIVE FREE 10 ML: 5 INJECTION INTRAVENOUS at 11:50

## 2023-08-09 RX ADMIN — Medication 500 UNITS: at 11:50

## 2023-08-09 NOTE — PROGRESS NOTES
Patient arrived for port flush. Port flushed with ease with blood return. Left in stable condition.  Returns 9/20 for port flush

## 2023-08-11 ENCOUNTER — ROUTINE PRENATAL (OUTPATIENT)
Dept: PERINATAL CARE | Age: 28
End: 2023-08-11

## 2023-08-11 VITALS
DIASTOLIC BLOOD PRESSURE: 80 MMHG | BODY MASS INDEX: 39.84 KG/M2 | WEIGHT: 211 LBS | RESPIRATION RATE: 16 BRPM | TEMPERATURE: 98 F | HEIGHT: 61 IN | SYSTOLIC BLOOD PRESSURE: 120 MMHG | HEART RATE: 84 BPM

## 2023-08-11 DIAGNOSIS — O43.103 PLACENTAL ABNORMALITY IN THIRD TRIMESTER: ICD-10-CM

## 2023-08-11 DIAGNOSIS — O16.3 HYPERTENSION AFFECTING PREGNANCY IN THIRD TRIMESTER: Primary | ICD-10-CM

## 2023-08-11 DIAGNOSIS — O24.119 PRE-EXISTING TYPE 2 DIABETES MELLITUS DURING PREGNANCY, ANTEPARTUM: ICD-10-CM

## 2023-08-11 DIAGNOSIS — Z3A.37 37 WEEKS GESTATION OF PREGNANCY: ICD-10-CM

## 2023-08-11 DIAGNOSIS — O99.213 OBESITY AFFECTING PREGNANCY IN THIRD TRIMESTER: ICD-10-CM

## 2023-08-14 ENCOUNTER — TELEPHONE (OUTPATIENT)
Dept: PERINATAL CARE | Age: 28
End: 2023-08-14

## 2023-08-16 ENCOUNTER — ROUTINE PRENATAL (OUTPATIENT)
Dept: OBGYN CLINIC | Age: 28
End: 2023-08-16
Payer: MEDICARE

## 2023-08-16 VITALS — SYSTOLIC BLOOD PRESSURE: 124 MMHG | DIASTOLIC BLOOD PRESSURE: 74 MMHG | BODY MASS INDEX: 40.93 KG/M2 | WEIGHT: 216.6 LBS

## 2023-08-16 DIAGNOSIS — Z85.89 HISTORY OF MALIGNANT NEOPLASM METASTATIC TO LUNG: ICD-10-CM

## 2023-08-16 DIAGNOSIS — O10.919 CHRONIC HYPERTENSION AFFECTING PREGNANCY: ICD-10-CM

## 2023-08-16 DIAGNOSIS — O09.90 HIGH RISK PREGNANCY, ANTEPARTUM: Primary | ICD-10-CM

## 2023-08-16 DIAGNOSIS — O99.210 OBESITY IN PREGNANCY: ICD-10-CM

## 2023-08-16 DIAGNOSIS — Z3A.37 37 WEEKS GESTATION OF PREGNANCY: ICD-10-CM

## 2023-08-16 DIAGNOSIS — B95.1 POSITIVE GBS TEST: ICD-10-CM

## 2023-08-16 DIAGNOSIS — O26.873 SHORT CERVICAL LENGTH DURING PREGNANCY IN THIRD TRIMESTER: ICD-10-CM

## 2023-08-16 DIAGNOSIS — Z87.59 HISTORY OF PRETERM PREMATURE RUPTURE OF MEMBRANES (PPROM): ICD-10-CM

## 2023-08-16 DIAGNOSIS — O24.313 PRE-EXISTING DIABETES MELLITUS AFFECTING PREGNANCY IN THIRD TRIMESTER, ANTEPARTUM: ICD-10-CM

## 2023-08-16 PROCEDURE — 59025 FETAL NON-STRESS TEST: CPT | Performed by: ADVANCED PRACTICE MIDWIFE

## 2023-08-16 PROCEDURE — G8417 CALC BMI ABV UP PARAM F/U: HCPCS | Performed by: ADVANCED PRACTICE MIDWIFE

## 2023-08-16 PROCEDURE — 3046F HEMOGLOBIN A1C LEVEL >9.0%: CPT | Performed by: ADVANCED PRACTICE MIDWIFE

## 2023-08-16 PROCEDURE — 1036F TOBACCO NON-USER: CPT | Performed by: ADVANCED PRACTICE MIDWIFE

## 2023-08-16 PROCEDURE — G8427 DOCREV CUR MEDS BY ELIG CLIN: HCPCS | Performed by: ADVANCED PRACTICE MIDWIFE

## 2023-08-16 PROCEDURE — 99213 OFFICE O/P EST LOW 20 MIN: CPT | Performed by: ADVANCED PRACTICE MIDWIFE

## 2023-08-16 PROCEDURE — 2022F DILAT RTA XM EVC RTNOPTHY: CPT | Performed by: ADVANCED PRACTICE MIDWIFE

## 2023-08-16 NOTE — PROGRESS NOTES
SUBJECTIVE:    Gabo Das is here for her return OB visit. denies concerns today. She reports  feeling fetal movement. She denies vaginal bleeding. She denies vaginal discharge. She denies leaking of fluid. She denies uterine cramping. She denies  nausea and/or vomiting. OBJECTIVE:  Blood pressure 124/74, weight 216 lb 9.6 oz (98.2 kg), last menstrual period 11/25/2022, not currently breastfeeding. Total weight gain: 26 lb 9.6 oz (12.1 kg)    NST:   Baseline:  135  Variability:  Moderate  Accelerations:  Present  Decelerations: Absent   Fetal Movement:  Perceived by patient during NST  Contractions: patient denies contractions, contractions Absent  on toco.  Interpretation: Reactive (Category1)     BPP completed during appointment: No    ASSESSMENT/PLAN:  IUP @ 37+5 weeks  S=D    High Risk Pregnancy  Due date is based on LMP and confirmed with 8w3d early dating ultrasound  Patient's prenatal labs are completed. Patient's blood type A POSITIVE  and rhogam is not indicated in the pregnancy  Early glucola indicated: yes - 184 declined 3 hour proceed as pregestational diabetic   Insulin 0/0/0/40  Patient accepted genetic screening. First trimester screen. Results low risk. Anatomy scan Anatomy scan completed 4/21/23: placenta anterior, normal cord insertion. cervical length 3.14 cm. Mulitple placental lakes are again noted. No evidence of vasa previa and no evidence of placenta previa/low lying placentation seen today. Follow up 2 weeks for TVUSN CL  USN 5/4/23 CL 30-35mm  USN 5/19/23: presentation cephalic, GEORGE WNL, EFW 96%, CL 3.1cm. F/up in 2 weeks for BPP/UAD and TVUSN  FETAL ECHO 5/19/23 WNL  USN 6/2/23 presentation cephalic, GEORGE 05.67, BPP 8/8. UAD WNL, mulitple placental lakes again noted f/up 2 weeks  USN 6/16/23 presentation cephalic, GEORGE 80.05, BPP 8/8, EFW 58%. CL 21-23 (SHORT CERVIX) f/up in 2 weeks  USN 6/30/23 Presentation vertex, GEORGE 13, BPP 8/8.  CL 22-27 (short cervix)  Fetal echo 7/7/23: no

## 2023-08-17 ENCOUNTER — ROUTINE PRENATAL (OUTPATIENT)
Dept: PERINATAL CARE | Age: 28
End: 2023-08-17
Payer: MEDICARE

## 2023-08-17 VITALS
WEIGHT: 216 LBS | RESPIRATION RATE: 16 BRPM | TEMPERATURE: 97.4 F | SYSTOLIC BLOOD PRESSURE: 120 MMHG | BODY MASS INDEX: 40.78 KG/M2 | HEART RATE: 84 BPM | DIASTOLIC BLOOD PRESSURE: 78 MMHG | HEIGHT: 61 IN

## 2023-08-17 DIAGNOSIS — O24.119 PRE-EXISTING TYPE 2 DIABETES MELLITUS DURING PREGNANCY, ANTEPARTUM: ICD-10-CM

## 2023-08-17 DIAGNOSIS — O16.3 HYPERTENSION AFFECTING PREGNANCY IN THIRD TRIMESTER: Primary | ICD-10-CM

## 2023-08-17 DIAGNOSIS — Z3A.37 37 WEEKS GESTATION OF PREGNANCY: ICD-10-CM

## 2023-08-17 DIAGNOSIS — Z36.4 ULTRASOUND FOR ANTENATAL SCREENING FOR FETAL GROWTH RESTRICTION: ICD-10-CM

## 2023-08-17 DIAGNOSIS — Z13.89 ENCOUNTER FOR ROUTINE SCREENING FOR MALFORMATION USING ULTRASONICS: ICD-10-CM

## 2023-08-17 DIAGNOSIS — O99.213 OBESITY AFFECTING PREGNANCY IN THIRD TRIMESTER: ICD-10-CM

## 2023-08-17 PROCEDURE — 76820 UMBILICAL ARTERY ECHO: CPT | Performed by: OBSTETRICS & GYNECOLOGY

## 2023-08-17 PROCEDURE — 99999 PR OFFICE/OUTPT VISIT,PROCEDURE ONLY: CPT | Performed by: OBSTETRICS & GYNECOLOGY

## 2023-08-17 PROCEDURE — 76819 FETAL BIOPHYS PROFIL W/O NST: CPT | Performed by: OBSTETRICS & GYNECOLOGY

## 2023-08-17 PROCEDURE — 76805 OB US >/= 14 WKS SNGL FETUS: CPT | Performed by: OBSTETRICS & GYNECOLOGY

## 2023-08-18 ENCOUNTER — NURSE TRIAGE (OUTPATIENT)
Dept: OTHER | Facility: CLINIC | Age: 28
End: 2023-08-18

## 2023-08-18 ENCOUNTER — HOSPITAL ENCOUNTER (INPATIENT)
Age: 28
LOS: 1 days | Discharge: HOME | End: 2023-08-19
Attending: OBSTETRICS & GYNECOLOGY | Admitting: SPECIALIST
Payer: MEDICARE

## 2023-08-18 ENCOUNTER — ANESTHESIA (OUTPATIENT)
Dept: LABOR AND DELIVERY | Age: 28
End: 2023-08-18
Payer: COMMERCIAL

## 2023-08-18 ENCOUNTER — ANESTHESIA EVENT (OUTPATIENT)
Dept: LABOR AND DELIVERY | Age: 28
End: 2023-08-18
Payer: COMMERCIAL

## 2023-08-18 PROBLEM — O14.90 PREECLAMPSIA: Status: ACTIVE | Noted: 2023-08-18

## 2023-08-18 PROBLEM — Z3A.38 38 WEEKS GESTATION OF PREGNANCY: Status: ACTIVE | Noted: 2023-08-18

## 2023-08-18 LAB
ABO + RH BLD: NORMAL
ALBUMIN SERPL-MCNC: 3.5 G/DL (ref 3.5–5.2)
ALBUMIN/GLOB SERPL: 1.3 {RATIO} (ref 1–2.5)
ALP SERPL-CCNC: 107 U/L (ref 35–104)
ALT SERPL-CCNC: 8 U/L (ref 5–33)
AMPHET UR QL SCN: NEGATIVE
ANION GAP SERPL CALCULATED.3IONS-SCNC: 11 MMOL/L (ref 9–17)
ARM BAND NUMBER: NORMAL
AST SERPL-CCNC: 13 U/L
BARBITURATES UR QL SCN: NEGATIVE
BASOPHILS # BLD: <0.03 K/UL (ref 0–0.2)
BASOPHILS NFR BLD: 0 % (ref 0–2)
BENZODIAZ UR QL: NEGATIVE
BILIRUB SERPL-MCNC: <0.1 MG/DL (ref 0.3–1.2)
BLOOD BANK SAMPLE EXPIRATION: NORMAL
BLOOD GROUP ANTIBODIES SERPL: NEGATIVE
BUN SERPL-MCNC: 9 MG/DL (ref 6–20)
CALCIUM SERPL-MCNC: 8.8 MG/DL (ref 8.6–10.4)
CANNABINOIDS UR QL SCN: NEGATIVE
CHLORIDE SERPL-SCNC: 102 MMOL/L (ref 98–107)
CO2 SERPL-SCNC: 20 MMOL/L (ref 20–31)
COCAINE UR QL SCN: NEGATIVE
CREAT SERPL-MCNC: 0.3 MG/DL (ref 0.5–0.9)
CREAT UR-MCNC: 30.4 MG/DL (ref 28–217)
EOSINOPHIL # BLD: 0.06 K/UL (ref 0–0.44)
EOSINOPHILS RELATIVE PERCENT: 1 % (ref 1–4)
ERYTHROCYTE [DISTWIDTH] IN BLOOD BY AUTOMATED COUNT: 12.8 % (ref 11.8–14.4)
FENTANYL UR QL: NEGATIVE
GFR SERPL CREATININE-BSD FRML MDRD: >60 ML/MIN/1.73M2
GLUCOSE SERPL-MCNC: 99 MG/DL (ref 70–99)
HCT VFR BLD AUTO: 36 % (ref 36.3–47.1)
HGB BLD-MCNC: 12.3 G/DL (ref 11.9–15.1)
IMM GRANULOCYTES # BLD AUTO: 0.05 K/UL (ref 0–0.3)
IMM GRANULOCYTES NFR BLD: 1 %
LYMPHOCYTES NFR BLD: 1.47 K/UL (ref 1.1–3.7)
LYMPHOCYTES RELATIVE PERCENT: 20 % (ref 24–43)
MCH RBC QN AUTO: 29.3 PG (ref 25.2–33.5)
MCHC RBC AUTO-ENTMCNC: 34.2 G/DL (ref 28.4–34.8)
MCV RBC AUTO: 85.7 FL (ref 82.6–102.9)
METHADONE UR QL: NEGATIVE
MONOCYTES NFR BLD: 0.72 K/UL (ref 0.1–1.2)
MONOCYTES NFR BLD: 10 % (ref 3–12)
NEUTROPHILS NFR BLD: 68 % (ref 36–65)
NEUTS SEG NFR BLD: 5.03 K/UL (ref 1.5–8.1)
NRBC BLD-RTO: 0 PER 100 WBC
OPIATES UR QL SCN: NEGATIVE
OXYCODONE UR QL SCN: NEGATIVE
PCP UR QL SCN: NEGATIVE
PLATELET # BLD AUTO: 135 K/UL (ref 138–453)
PMV BLD AUTO: 12.3 FL (ref 8.1–13.5)
POTASSIUM SERPL-SCNC: 3.9 MMOL/L (ref 3.7–5.3)
PROT SERPL-MCNC: 6.1 G/DL (ref 6.4–8.3)
RBC # BLD AUTO: 4.2 M/UL (ref 3.95–5.11)
SODIUM SERPL-SCNC: 133 MMOL/L (ref 135–144)
T PALLIDUM AB SER QL IA: NONREACTIVE
TEST INFORMATION: NORMAL
TOTAL PROTEIN, URINE: 15 MG/DL
URINE TOTAL PROTEIN CREATININE RATIO: 0.49 (ref 0–0.2)
WBC OTHER # BLD: 7.4 K/UL (ref 3.5–11.3)

## 2023-08-18 PROCEDURE — 3700000025 EPIDURAL BLOCK: Performed by: STUDENT IN AN ORGANIZED HEALTH CARE EDUCATION/TRAINING PROGRAM

## 2023-08-18 PROCEDURE — 84156 ASSAY OF PROTEIN URINE: CPT

## 2023-08-18 PROCEDURE — 59400 OBSTETRICAL CARE: CPT | Performed by: SPECIALIST

## 2023-08-18 PROCEDURE — 6360000002 HC RX W HCPCS: Performed by: STUDENT IN AN ORGANIZED HEALTH CARE EDUCATION/TRAINING PROGRAM

## 2023-08-18 PROCEDURE — 86900 BLOOD TYPING SEROLOGIC ABO: CPT

## 2023-08-18 PROCEDURE — 86850 RBC ANTIBODY SCREEN: CPT

## 2023-08-18 PROCEDURE — 2580000003 HC RX 258

## 2023-08-18 PROCEDURE — 0KQM0ZZ REPAIR PERINEUM MUSCLE, OPEN APPROACH: ICD-10-PCS

## 2023-08-18 PROCEDURE — 6370000000 HC RX 637 (ALT 250 FOR IP)

## 2023-08-18 PROCEDURE — 3E033VJ INTRODUCTION OF OTHER HORMONE INTO PERIPHERAL VEIN, PERCUTANEOUS APPROACH: ICD-10-PCS

## 2023-08-18 PROCEDURE — 7200000001 HC VAGINAL DELIVERY

## 2023-08-18 PROCEDURE — 85025 COMPLETE CBC W/AUTO DIFF WBC: CPT

## 2023-08-18 PROCEDURE — 86901 BLOOD TYPING SEROLOGIC RH(D): CPT

## 2023-08-18 PROCEDURE — 6360000002 HC RX W HCPCS

## 2023-08-18 PROCEDURE — 2500000003 HC RX 250 WO HCPCS: Performed by: NURSE ANESTHETIST, CERTIFIED REGISTERED

## 2023-08-18 PROCEDURE — 1220000000 HC SEMI PRIVATE OB R&B

## 2023-08-18 PROCEDURE — 6360000002 HC RX W HCPCS: Performed by: SPECIALIST

## 2023-08-18 PROCEDURE — 80307 DRUG TEST PRSMV CHEM ANLYZR: CPT

## 2023-08-18 PROCEDURE — 80053 COMPREHEN METABOLIC PANEL: CPT

## 2023-08-18 PROCEDURE — 86780 TREPONEMA PALLIDUM: CPT

## 2023-08-18 PROCEDURE — 6360000002 HC RX W HCPCS: Performed by: NURSE ANESTHETIST, CERTIFIED REGISTERED

## 2023-08-18 PROCEDURE — 82570 ASSAY OF URINE CREATININE: CPT

## 2023-08-18 RX ORDER — SODIUM CHLORIDE 9 MG/ML
INJECTION, SOLUTION INTRAVENOUS PRN
Status: DISCONTINUED | OUTPATIENT
Start: 2023-08-18 | End: 2023-08-19 | Stop reason: HOSPADM

## 2023-08-18 RX ORDER — NALOXONE HYDROCHLORIDE 0.4 MG/ML
INJECTION, SOLUTION INTRAMUSCULAR; INTRAVENOUS; SUBCUTANEOUS PRN
Status: DISCONTINUED | OUTPATIENT
Start: 2023-08-18 | End: 2023-08-18

## 2023-08-18 RX ORDER — IBUPROFEN 600 MG/1
600 TABLET ORAL EVERY 6 HOURS
Qty: 30 TABLET | Refills: 1 | Status: SHIPPED | OUTPATIENT
Start: 2023-08-18

## 2023-08-18 RX ORDER — BISACODYL 10 MG
10 SUPPOSITORY, RECTAL RECTAL DAILY PRN
Status: DISCONTINUED | OUTPATIENT
Start: 2023-08-18 | End: 2023-08-19 | Stop reason: HOSPADM

## 2023-08-18 RX ORDER — HYDROCORTISONE 25 MG/G
CREAM TOPICAL
Status: DISCONTINUED | OUTPATIENT
Start: 2023-08-18 | End: 2023-08-19 | Stop reason: HOSPADM

## 2023-08-18 RX ORDER — ACETAMINOPHEN 500 MG
1000 TABLET ORAL EVERY 6 HOURS PRN
Status: DISCONTINUED | OUTPATIENT
Start: 2023-08-18 | End: 2023-08-18

## 2023-08-18 RX ORDER — ACETAMINOPHEN 500 MG
1000 TABLET ORAL EVERY 6 HOURS PRN
Status: DISCONTINUED | OUTPATIENT
Start: 2023-08-18 | End: 2023-08-19 | Stop reason: HOSPADM

## 2023-08-18 RX ORDER — SODIUM CHLORIDE, SODIUM LACTATE, POTASSIUM CHLORIDE, AND CALCIUM CHLORIDE .6; .31; .03; .02 G/100ML; G/100ML; G/100ML; G/100ML
1000 INJECTION, SOLUTION INTRAVENOUS PRN
Status: DISCONTINUED | OUTPATIENT
Start: 2023-08-18 | End: 2023-08-18

## 2023-08-18 RX ORDER — SODIUM CHLORIDE 0.9 % (FLUSH) 0.9 %
5-40 SYRINGE (ML) INJECTION EVERY 12 HOURS SCHEDULED
Status: DISCONTINUED | OUTPATIENT
Start: 2023-08-18 | End: 2023-08-19 | Stop reason: HOSPADM

## 2023-08-18 RX ORDER — LANOLIN 72 %
OINTMENT (GRAM) TOPICAL PRN
Status: DISCONTINUED | OUTPATIENT
Start: 2023-08-18 | End: 2023-08-19 | Stop reason: HOSPADM

## 2023-08-18 RX ORDER — GLUCAGON 1 MG/ML
1 KIT INJECTION PRN
Status: DISCONTINUED | OUTPATIENT
Start: 2023-08-18 | End: 2023-08-18

## 2023-08-18 RX ORDER — LIDOCAINE HYDROCHLORIDE 10 MG/ML
INJECTION, SOLUTION EPIDURAL; INFILTRATION; INTRACAUDAL; PERINEURAL PRN
Status: DISCONTINUED | OUTPATIENT
Start: 2023-08-18 | End: 2023-08-18 | Stop reason: SDUPTHER

## 2023-08-18 RX ORDER — METOPROLOL SUCCINATE 25 MG/1
25 TABLET, EXTENDED RELEASE ORAL DAILY
Status: DISCONTINUED | OUTPATIENT
Start: 2023-08-18 | End: 2023-08-18 | Stop reason: SDUPTHER

## 2023-08-18 RX ORDER — ONDANSETRON 2 MG/ML
4 INJECTION INTRAMUSCULAR; INTRAVENOUS EVERY 6 HOURS PRN
Status: DISCONTINUED | OUTPATIENT
Start: 2023-08-18 | End: 2023-08-18

## 2023-08-18 RX ORDER — IBUPROFEN 600 MG/1
600 TABLET ORAL EVERY 6 HOURS PRN
Status: DISCONTINUED | OUTPATIENT
Start: 2023-08-18 | End: 2023-08-19 | Stop reason: HOSPADM

## 2023-08-18 RX ORDER — LIDOCAINE HYDROCHLORIDE 10 MG/ML
INJECTION, SOLUTION EPIDURAL; INFILTRATION; INTRACAUDAL; PERINEURAL PRN
Status: DISCONTINUED | OUTPATIENT
Start: 2023-08-18 | End: 2023-08-18

## 2023-08-18 RX ORDER — ROPIVACAINE HYDROCHLORIDE 2 MG/ML
INJECTION, SOLUTION EPIDURAL; INFILTRATION; PERINEURAL PRN
Status: DISCONTINUED | OUTPATIENT
Start: 2023-08-18 | End: 2023-08-18 | Stop reason: SDUPTHER

## 2023-08-18 RX ORDER — SODIUM CHLORIDE 0.9 % (FLUSH) 0.9 %
5-40 SYRINGE (ML) INJECTION PRN
Status: DISCONTINUED | OUTPATIENT
Start: 2023-08-18 | End: 2023-08-18

## 2023-08-18 RX ORDER — SODIUM CHLORIDE 0.9 % (FLUSH) 0.9 %
5-40 SYRINGE (ML) INJECTION EVERY 12 HOURS SCHEDULED
Status: DISCONTINUED | OUTPATIENT
Start: 2023-08-18 | End: 2023-08-18

## 2023-08-18 RX ORDER — DEXTROSE MONOHYDRATE 100 MG/ML
INJECTION, SOLUTION INTRAVENOUS CONTINUOUS PRN
Status: DISCONTINUED | OUTPATIENT
Start: 2023-08-18 | End: 2023-08-18

## 2023-08-18 RX ORDER — INSULIN LISPRO 100 [IU]/ML
0-8 INJECTION, SOLUTION INTRAVENOUS; SUBCUTANEOUS
Status: DISCONTINUED | OUTPATIENT
Start: 2023-08-18 | End: 2023-08-18

## 2023-08-18 RX ORDER — ONDANSETRON 2 MG/ML
4 INJECTION INTRAMUSCULAR; INTRAVENOUS EVERY 6 HOURS PRN
Status: DISCONTINUED | OUTPATIENT
Start: 2023-08-18 | End: 2023-08-18 | Stop reason: SDUPTHER

## 2023-08-18 RX ORDER — SENNA AND DOCUSATE SODIUM 50; 8.6 MG/1; MG/1
1 TABLET, FILM COATED ORAL DAILY
Status: DISCONTINUED | OUTPATIENT
Start: 2023-08-18 | End: 2023-08-19 | Stop reason: HOSPADM

## 2023-08-18 RX ORDER — DIPHENHYDRAMINE HCL 25 MG
25 TABLET ORAL EVERY 4 HOURS PRN
Status: DISCONTINUED | OUTPATIENT
Start: 2023-08-18 | End: 2023-08-18

## 2023-08-18 RX ORDER — METOPROLOL SUCCINATE 25 MG/1
25 TABLET, EXTENDED RELEASE ORAL DAILY
Status: DISCONTINUED | OUTPATIENT
Start: 2023-08-18 | End: 2023-08-18

## 2023-08-18 RX ORDER — SENNA AND DOCUSATE SODIUM 50; 8.6 MG/1; MG/1
2 TABLET, FILM COATED ORAL DAILY
Qty: 60 TABLET | Refills: 1 | Status: SHIPPED | OUTPATIENT
Start: 2023-08-18

## 2023-08-18 RX ORDER — SODIUM CHLORIDE 9 MG/ML
INJECTION, SOLUTION INTRAVENOUS CONTINUOUS
Status: DISCONTINUED | OUTPATIENT
Start: 2023-08-18 | End: 2023-08-18

## 2023-08-18 RX ORDER — ONDANSETRON 2 MG/ML
4 INJECTION INTRAMUSCULAR; INTRAVENOUS EVERY 4 HOURS PRN
Status: DISCONTINUED | OUTPATIENT
Start: 2023-08-18 | End: 2023-08-19 | Stop reason: HOSPADM

## 2023-08-18 RX ORDER — SODIUM CHLORIDE, SODIUM LACTATE, POTASSIUM CHLORIDE, AND CALCIUM CHLORIDE .6; .31; .03; .02 G/100ML; G/100ML; G/100ML; G/100ML
500 INJECTION, SOLUTION INTRAVENOUS PRN
Status: DISCONTINUED | OUTPATIENT
Start: 2023-08-18 | End: 2023-08-18

## 2023-08-18 RX ORDER — SODIUM CHLORIDE 0.9 % (FLUSH) 0.9 %
5-40 SYRINGE (ML) INJECTION PRN
Status: DISCONTINUED | OUTPATIENT
Start: 2023-08-18 | End: 2023-08-19 | Stop reason: HOSPADM

## 2023-08-18 RX ORDER — LIDOCAINE HYDROCHLORIDE AND EPINEPHRINE 15; 5 MG/ML; UG/ML
INJECTION, SOLUTION EPIDURAL PRN
Status: DISCONTINUED | OUTPATIENT
Start: 2023-08-18 | End: 2023-08-18 | Stop reason: SDUPTHER

## 2023-08-18 RX ORDER — SODIUM CHLORIDE 9 MG/ML
INJECTION, SOLUTION INTRAVENOUS PRN
Status: DISCONTINUED | OUTPATIENT
Start: 2023-08-18 | End: 2023-08-18

## 2023-08-18 RX ORDER — SIMETHICONE 80 MG
80 TABLET,CHEWABLE ORAL EVERY 6 HOURS PRN
Status: DISCONTINUED | OUTPATIENT
Start: 2023-08-18 | End: 2023-08-19 | Stop reason: HOSPADM

## 2023-08-18 RX ORDER — SODIUM CHLORIDE, SODIUM LACTATE, POTASSIUM CHLORIDE, CALCIUM CHLORIDE 600; 310; 30; 20 MG/100ML; MG/100ML; MG/100ML; MG/100ML
INJECTION, SOLUTION INTRAVENOUS CONTINUOUS
Status: DISCONTINUED | OUTPATIENT
Start: 2023-08-18 | End: 2023-08-18

## 2023-08-18 RX ADMIN — LIDOCAINE HYDROCHLORIDE,EPINEPHRINE BITARTRATE 5 ML: 15; .005 INJECTION, SOLUTION EPIDURAL; INFILTRATION; INTRACAUDAL; PERINEURAL at 03:51

## 2023-08-18 RX ADMIN — LIDOCAINE HYDROCHLORIDE 2 MG: 10 INJECTION, SOLUTION EPIDURAL; INFILTRATION; INTRACAUDAL; PERINEURAL at 03:46

## 2023-08-18 RX ADMIN — SODIUM CHLORIDE 5 MILLION UNITS: 900 INJECTION INTRAVENOUS at 02:23

## 2023-08-18 RX ADMIN — SODIUM CHLORIDE: 9 INJECTION, SOLUTION INTRAVENOUS at 04:15

## 2023-08-18 RX ADMIN — ROPIVACAINE HYDROCHLORIDE 8 ML: 2 INJECTION, SOLUTION EPIDURAL; INFILTRATION; PERINEURAL at 04:03

## 2023-08-18 RX ADMIN — WITCH HAZEL: 500 SOLUTION RECTAL; TOPICAL at 19:03

## 2023-08-18 RX ADMIN — ACETAMINOPHEN 1000 MG: 500 TABLET ORAL at 09:04

## 2023-08-18 RX ADMIN — SODIUM CHLORIDE: 9 INJECTION, SOLUTION INTRAVENOUS at 02:22

## 2023-08-18 RX ADMIN — ROPIVACAINE HYDROCHLORIDE 10 ML/HR: 2 INJECTION, SOLUTION EPIDURAL; INFILTRATION at 04:03

## 2023-08-18 RX ADMIN — Medication 1 MILLI-UNITS/MIN: at 06:56

## 2023-08-18 RX ADMIN — Medication 87.3 MILLI-UNITS/MIN: at 07:42

## 2023-08-18 RX ADMIN — BENZOCAINE AND LEVOMENTHOL: 200; 5 SPRAY TOPICAL at 19:03

## 2023-08-18 RX ADMIN — Medication 2.5 MILLION UNITS: at 06:06

## 2023-08-18 RX ADMIN — Medication 166.7 ML: at 07:31

## 2023-08-18 RX ADMIN — LIDOCAINE HYDROCHLORIDE 2 MG: 10 INJECTION, SOLUTION EPIDURAL; INFILTRATION; INTRACAUDAL; PERINEURAL at 03:48

## 2023-08-18 ASSESSMENT — PAIN DESCRIPTION - DESCRIPTORS
DESCRIPTORS: PRESSURE
DESCRIPTORS: CRAMPING

## 2023-08-18 NOTE — FLOWSHEET NOTE
Pt arrived with complaints of contractions q 8 to 10min and leakage of fluid with discharge of bloody show. Positive fetal movement.  Approx midnight

## 2023-08-18 NOTE — ANESTHESIA PROCEDURE NOTES
Epidural Block    Patient location during procedure: OB  Reason for block: labor epidural  Staffing  Performed: resident/CRNA   Resident/CRNA: FATEMEH Jaime CRNA  Epidural  Patient position: sitting  Prep: Betadine  Patient monitoring: continuous pulse ox and frequent blood pressure checks  Approach: midline  Location: L2-3  Injection technique: HAO saline  Provider prep: mask and sterile gloves  Needle  Needle type: Tuohy   Needle gauge: 17 G  Needle length: 3.5 in  Needle insertion depth: 7 cm  Catheter type: multi-orifice  Catheter size: 19 G  Catheter at skin depth: 13 cm  Test dose: negativeCatheter Secured: tegaderm and tape  Assessment  Hemodynamics: stable  Attempts: 3+  Outcomes: uncomplicated and patient tolerated procedure well  Preanesthetic Checklist  Completed: patient identified, IV checked, site marked, risks and benefits discussed, surgical/procedural consents, equipment checked, pre-op evaluation, timeout performed, anesthesia consent given, oxygen available and monitors applied/VS acknowledged

## 2023-08-18 NOTE — CARE COORDINATION
CASE MANAGEMENT POST-PARTUM TRANSITIONAL CARE PLAN    38 weeks gestation of pregnancy [Z3A.38]    OB Provider: Dr. Melissa Craig met w/ Kandace Thompson at her bedside to discuss DCP. She is S/P   on 23    Writer verified address/phone number correct on facesheet. She states she lives with / Xena Steen & another son. Kathy  verbalized no difficulties with transportation to and from doctors appointments or with paying for medications upon discharge home. MMO/ Medicare insurance correct. Writer notified Kandace Thompson she has  30 days from date of birth to add  to insurance policy (MMO) . [address] verbalized understanding. Kandace Thompson  confirmed a safe place for infant to sleep at home. Infant name on BC: Lois Corrales       Infant PCP Jori Hargrove CNP. DME: Wheelchair, Ramp,forearm crutches, grab bars, shower chair.  Home is w/c accessible      HOME CARE: none      Readmission Risk              Risk of Unplanned Readmission:  7

## 2023-08-18 NOTE — DISCHARGE SUMMARY
Obstetric Discharge Summary  [facility]    Patient Name: Lorrie Sheth  Patient : 1995  Primary Care Physician: Real Krueger, APRN - CNP  Admit Date: 2023    Principal Diagnosis: IUP at 38w0d, admitted for SROM with spontaneous labor     Her pregnancy has been complicated by:   Patient Active Problem List   Diagnosis    FHx DM    Iron deficiency anemia    History of sarcoma of bone    Thrombocytosis    Transaminitis    cHTN     History of disarticulation of right hip     10/12/20 M Apg 8/9 Wt 4#14     Phantom pain after amputation of lower extremity ([address])    History of malignant neoplasm metastatic to lung    History of right leg amputation    Tachycardia    Pregestational DM    Hx PPROM with sPTD x1    Secondary malignant neoplasm of left lung ([address])    Short cervix    Fetal Macrocephaly    38 weeks gestation of pregnancy    HAMZAH w/o SF's     23 M Apg 8/9 Wt 7#12       Infection Present?: No  Hospital Acquired: NA    Surgical Operations & Procedures:  Analgesia: epidural  Delivery Type: Spontaneous Vaginal Delivery: See Labor and Delivery Summary   Laceration(s): Second degree laceration. Suture used for repair:  Vicryl 2.0. Consultations: Anesthesia    Pertinent Findings & Procedures:   Lorrie Sheth is a 33 y.o. female  at 38w0d admitted for SROM (clear) with spontaneous labor; received epidural, Pen G for GBS prophylaxis. PreE labs wnl, P/C 0.49, meeting criteria for HAMZAH w/o SF. She delivered by spontaneous vaginal a Live Born infant on 23. Information for the patient's :  Hector Evans [[id number]]   male   Birth Weight: 7 lb 12.5 oz (3.53 kg)   Apgars: 8 at 1 minute and 9 at 5 minutes.      Postpartum course:   PPD1: H&H 11.3,       Course of patient: complicated by pregestational DM, cHTN (meds) w/ HAMZAH w/o SF    Discharge to: Home    Readmission planned: no     Indication for 6 week PP 2 hour GTT?: no were reviewed. Pelvic rest, and birth control were reviewed. Signs and symptoms of mastitis and post partum depression were reviewed. The patient is to notify her physician if any of these occur. The patient was counseled on secondary smoke risks and the increased risk of sudden infant death syndrome and respiratory problems to her baby with exposure. She was counseled on various alternate recommendations to decrease the exposure to secondary smoke to her children.

## 2023-08-18 NOTE — L&D DELIVERY NOTE
Yvette Farrar [facility] [[id number]]      Labor Events     Labor: No   Steroids: None  Cervical Ripening Date/Time:      Antibiotics Received during Labor: Yes  Rupture Identifier: Sac 1  Rupture Date/Time:  23 00:00:00   Rupture Type: SROM  Fluid Color: Clear  Fluid Odor: None  Induction: None  Augmentation: Oxytocin  Labor Complications: None              Anesthesia    Method: Epidural       Labor Event Times      Labor onset date/time:  23 04:11:00 EDT     Dilation complete date/time:  23 05:57:00 EDT     Start pushing date/time:  2023 06:19:00   Decision date/time (emergent ):            Delivery Details      Delivery Date: 23 Delivery Time: 07:27:00   Delivery Type: Vaginal, Spontaneous              [place] Presentation    Presentation: Vertex  Position: Right  _: Occiput  _: Anterior       Shoulder Dystocia    Shoulder Dystocia Present?: No       Assisted Delivery Details    Forceps Attempted?: No  Vacuum Extractor Attempted?: No                           Cord    Vessels: 3 Vessels  Complications: Nuchal Tight  Cord Around: Head  Delayed Cord Clamping?: Yes  Cord Clamped Date/Time: 2023 07:28:00  Cord Blood Disposition: Lab  Gases Sent?: Yes              Placenta    Date/Time: 2023 07:31:00  Removal: Spontaneous  Appearance: Intact  Disposition: Discarded       Lacerations    Episiotomy: None  Perineal Lacerations: 2nd  Other Lacerations: no non-perineal laceration  Number of Repair Packets: 1       Vaginal Counts    Initial Count Personnel: KELLIE Cortez  Initial Count Verified By: Lella Fabry RN  Intial Sponge Count: Correct Intial Needles Count: Correct Intial Instruments Count: Correct   Final Count Personnel: Lella Fabry       Blood Loss  Mother: Sylvia De La Garza #[id number]     Start of Mother's Information      Delivery Blood Loss  23 0411 - 23 0749      None                 End of Mother's Information  Mother: Sylvia De La Garza #[id number] received antibiotic prophylaxis. The patient progressed well, received an epidural, became complete and felt the urge to push. After pushing with contractions the fetal head delivered Cephalic, left occiput anterior over an intact perineum, nuchal cord was present a delivered through. The anterior, then posterior shoulder delivered easily and atraumatically followed by the rest of the infant. Nose and mouth suctioned with bulb suction, infant was stimulated and dried. Cord was clamped and cut after one minute delayed cord clamping  and infant was placed on maternal abdomen, and attended by RN for evaluation. The delivery of the placenta was spontaneous and appeared intact, whole, and that the umbilical cord had three vessels noted. Pitocin was started. The vagina was swept of all clots and debris. The perineum and vagina were evaluated and a midline vaginal laceration 2nd degree and perineal laceration was found and repaired in standard fashion with 2-0 vicryl. Mother and baby tolerated procedure well. Dr. Luevano was present for entire delivery.     Delivery Summary:  Labor & Delivery Summary  Labor Onset Date: 08/18/23  Labor Onset Time: 0411  Dilation Complete Date: 08/18/23  Dilation Complete Time: 0557  OB Anesthesia Type: Epidural    Specimen: placenta sent to pathology, cord blood, and cord gases  Quantitative blood loss:  50ml immediately following delivery  Condition:  infant stable to general nursery and mother stable  Counts: instrument and sponge counts correct  Blood Type and Rh: A POSITIVE    Rubella Immunity Status: immune  Infant Feeding: breast feeding    Suzanne Mejia MD  Ob/Gyn Resident  8/18/2023, 7:50 AM

## 2023-08-18 NOTE — CARE COORDINATION
ANTEPARTUM NOTE    38 weeks gestation of pregnancy [Z3A.38]    Daphney Perez  was admitted to L&D on 8/18/23 for SROM          @ 45 W 0 D    OB GYN Provider: Dr. Estephanie Marrero    Will meet with patient after delivery to verify name/address/phone/insurance and discuss discharge planning. Anticipate DC home 2 nights after vaginal delivery or 4 nights after C/S delivery as long as hemodynamically stable.

## 2023-08-18 NOTE — ANESTHESIA POSTPROCEDURE EVALUATION
Department of Anesthesiology  Postprocedure Note    Patient: Elisa Linda  MRN: [id number]  YOB: 1995  Date of evaluation: 8/18/2023      Procedure Summary     Date: 08/18/23 Room / Location:     Anesthesia Start: 0333 Anesthesia Stop: 4652    Procedure: Labor Analgesia Diagnosis:     Scheduled Providers:  Responsible Provider: Stef Montgomery MD    Anesthesia Type: epidural ASA Status: 2          Anesthesia Type: No value filed.     Mark Phase I: Mark Score: 10    Mark Phase II: Mark Score: 10      Anesthesia Post Evaluation    Patient location during evaluation: bedside  Level of consciousness: awake and alert  Pain score: 1  Airway patency: patent  Nausea & Vomiting: no vomiting and no nausea  Complications: no  Cardiovascular status: blood pressure returned to baseline and hemodynamically stable  Respiratory status: room air, spontaneous ventilation and nonlabored ventilation  Hydration status: stable  Pain management: adequate

## 2023-08-18 NOTE — H&P
OBSTETRICAL HISTORY [address]    Date: 2023       Time: 2:06 AM   Patient Name: Stefano Goins     Patient : 1995  Room/Bed: [facility]/0707-    Admission Date/Time: 2023  1:30 AM      CC: HANG     HPI: Stefano Goins is a 33 y.o. Sandee Olayinka at 38w0d who presents c/o spontanous rupture of membranes. Patient denies any fever, chills, N/V, headaches, vision changes, chest pain, shortness of breath, RUQ pain, abdominal pain, and increased swelling/tenderness in bilateral lower extremities. Patient denies any vaginal discharge and any urinary complaints. The patient reports fetal movement is present, complains of contractions, complains of loss of fluid, denies vaginal bleeding. DATING:  LMP: Patient's last menstrual period was 2022.   Estimated Date of Delivery: 23   Based on: early ultrasound, at 8 3/7 weeks GA    PREGNANCY RISK FACTORS:  Patient Active Problem List   Diagnosis    FHx DM    Iron deficiency anemia    History of sarcoma of bone    Thrombocytosis    Transaminitis    cHTN     History of disarticulation of right hip     10/12/ M Apg 8/9 Wt 4#14     Phantom pain after amputation of lower extremity (HCC)    History of malignant neoplasm metastatic to lung    History of right leg amputation    Tachycardia    Pregestational DM    Hx PPROM with sPTD x1    Secondary malignant neoplasm of left lung (HCC)    Short cervix    Fetal Macrocephaly    38 weeks gestation of pregnancy        Steroids Given In This Pregnancy:  no     REVIEW OF SYSTEMS:   Constitutional: negative fever, negative chills, negative weight changes   HEENT: negative visual disturbances, negative headaches, negative dizziness, negative hearing loss  Breast: Negative breast abnormalities, negative breast lumps, negative nipple discharge  Respiratory: negative dyspnea, negative cough, negative SOB  Cardiovascular: negative chest pain,  negative palpitations, negative ordered    Chronic hypertension (meds)    - Denies s/sx of preE   - PreE labs and P/C ordered   - Normotensive at this time   - Home regimen is Toprol XL 25 mg qD    Maternal tachycardia   - Follows with cardiology   - EKG 23, sinus tachycardia   - On Toprol XL 25 mg qD   - Clinically asx    Fetal Macrocephaly   - Noted on MFM US 23   - FTS negative     Hx of sarcoma of bone   - Diagnosed 2020   - S/p full right leg amputation, negative margins    - S/p chemotherapy x4 cycles   - 2022 dx with lung metastases  - S/p left lung lobectomy (22) and chest wall invasion, negative margins  - Last saw oncology 23  - Most recent scan is negative (23)    FHx DM   - Dx with pregestational DM on early 1 hr GTT   - On 40 units of NPH nightly    BMI 41      Patient Active Problem List    Diagnosis Date Noted    Tachycardia 2023     Priority: Medium    History of malignant neoplasm metastatic to lung 2022     Priority: Medium     22 left lobectomy        38 weeks gestation of pregnancy 2023    Fetal Macrocephaly 2023     Noted MFM Ultrasound 23        Short cervix 2023: Cervix 30-31 mm  23: Cervix 21-23 mm    On MFM Scan        Secondary malignant neoplasm of left lung ([address]) 2023    History of right leg amputation 2023     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          Pregestational DM 2023     Early 1 hour 184  NPH 30 qHS         Hx PPROM with sPTD x1 2023     G1: 33w6d  Currently on vaginal progesterone qD        Phantom pain after amputation of lower extremity ([address]) 2021     10/12/20 M Apg 8/9 Wt 4#14  10/12/2020    History of disarticulation of right hip 2020    Transaminitis 2020    cHTN  2020     Metoprolol XL 25 mg (for tachycardia)        Iron deficiency anemia 2020     Last Assessment & Plan:   Hgb 8.2 g/dL  Asymptomatic   S/p iv iron on  and 1 u pRBC

## 2023-08-18 NOTE — FLOWSHEET NOTE
[phone] notified patient requests an epidural.   0330Baird HERMILA Morrison, at bedside. Epidural procedure explained, risks discussed. Patient positioned for epidural. 8545-Pt verbalizes consent for epidural.   -1515 Time out completed. 1048- catheter placed. 1674- test dose given. Epidural catheter taped and secured per anesthesia. 98. to low fowlers with left uterine displacement. 7712- loading dose given Per epidural pump initiated. Pt tolerated procedure well.

## 2023-08-18 NOTE — LACTATION NOTE
INPATIENT CONSULT    Maternal /para status:     Maternal breastfeeding history: was not able to nurse previous child       Current pregnancy:    Gestational age: 37 weeks     C/section or vaginal delivery: vaginal      Birth weight: 7.12lb ([phone])      SGA/LGA/IUGR/diabetes during pregnancy: Pre GDM      Plan for feeding: Breast      Breast pump at home: Working with insurance, having issues communicating with medical supplier. Will give medical necessity form if needed. Needs medical necessity form:      Assessment of breastfeeding: Assisted with feed, refined position, brought baby closer to mom, taught breast support. Nursing well with audible swallows.            Reviewed:   - Breastfeeding packet  - Expectations for normal  feeding   - Hand expression  - Deep latch/milk transfer  - Hunger cues    Encouraged:   - Frequent skin to skin  - Frequent attempts to feed  - Calling for assistance as needed

## 2023-08-19 VITALS
HEART RATE: 90 BPM | RESPIRATION RATE: 16 BRPM | DIASTOLIC BLOOD PRESSURE: 84 MMHG | SYSTOLIC BLOOD PRESSURE: 122 MMHG | OXYGEN SATURATION: 99 % | TEMPERATURE: 97.3 F

## 2023-08-19 LAB
GLUCOSE P FAST SERPL-MCNC: 103 MG/DL (ref 70–99)
HCT VFR BLD AUTO: 35 % (ref 36.3–47.1)
HGB BLD-MCNC: 11.4 G/DL (ref 11.9–15.1)

## 2023-08-19 PROCEDURE — 85014 HEMATOCRIT: CPT

## 2023-08-19 PROCEDURE — 6370000000 HC RX 637 (ALT 250 FOR IP)

## 2023-08-19 PROCEDURE — 82947 ASSAY GLUCOSE BLOOD QUANT: CPT

## 2023-08-19 PROCEDURE — 85018 HEMOGLOBIN: CPT

## 2023-08-19 PROCEDURE — 36415 COLL VENOUS BLD VENIPUNCTURE: CPT

## 2023-08-19 RX ADMIN — DOCUSATE SODIUM 50 MG AND SENNOSIDES 8.6 MG 1 TABLET: 8.6; 5 TABLET, FILM COATED ORAL at 08:58

## 2023-08-19 RX ADMIN — IBUPROFEN 600 MG: 600 TABLET ORAL at 08:58

## 2023-08-19 ASSESSMENT — PAIN DESCRIPTION - LOCATION: LOCATION: VAGINA;PERINEUM

## 2023-08-19 ASSESSMENT — PAIN SCALES - GENERAL: PAINLEVEL_OUTOF10: 2

## 2023-08-19 ASSESSMENT — PAIN DESCRIPTION - DESCRIPTORS: DESCRIPTORS: ACHING;DISCOMFORT

## 2023-08-19 NOTE — LACTATION NOTE
Mom reports baby is feeding well at breast, no needs or concerns offered. She was able to get a pump through insurance and should be delivered by the end of the week. Baby currently in nursery for circumcision, reviewed post circ behaviors and benefit of STS after the procedure. Discharge instructions and LC follow up reviewed.

## 2023-08-19 NOTE — PROGRESS NOTES
CLINICAL PHARMACY NOTE: MEDS TO BEDS    Total # of Prescriptions Filled: 2   The following medications were delivered to the patient:  Senna/docusate 8.6/50 mg tabs  Ibuprofen 600 mg tabs    Additional Documentation:

## 2023-08-19 NOTE — FLOWSHEET NOTE
Discharge instructions given, all questions answered. I have reviewed all [facility] Post-Birth Warning Signs and essential teaching points for pulmonary embolism, cardiac disease, hypertensive disorders of pregnancy, obstetric hemorrhage, venous thromboembolism, infection, and postpartum depression with the patient and Xena Steen (support person) . I have informed the patient on when to call their healthcare provider and when to call 911. I have discussed with the patient  the importance of scheduling a follow-up visit with their physician, nurse practitioner or midwife and provided them with correct contact information for appointment. I have provided the patient with a copy of the \"Save Your Life\" handout. The patient has acknowledged receiving and understanding this education with her signature.

## 2023-08-31 ENCOUNTER — POSTPARTUM VISIT (OUTPATIENT)
Dept: OBGYN CLINIC | Age: 28
End: 2023-08-31

## 2023-08-31 VITALS
WEIGHT: 194 LBS | HEIGHT: 61 IN | BODY MASS INDEX: 36.63 KG/M2 | DIASTOLIC BLOOD PRESSURE: 74 MMHG | SYSTOLIC BLOOD PRESSURE: 122 MMHG

## 2023-08-31 PROCEDURE — 99024 POSTOP FOLLOW-UP VISIT: CPT | Performed by: ADVANCED PRACTICE MIDWIFE

## 2023-08-31 ASSESSMENT — ANXIETY QUESTIONNAIRES
1. FEELING NERVOUS, ANXIOUS, OR ON EDGE: 0
2. NOT BEING ABLE TO STOP OR CONTROL WORRYING: 0
GAD7 TOTAL SCORE: 0
5. BEING SO RESTLESS THAT IT IS HARD TO SIT STILL: 0
4. TROUBLE RELAXING: 0
IF YOU CHECKED OFF ANY PROBLEMS ON THIS QUESTIONNAIRE, HOW DIFFICULT HAVE THESE PROBLEMS MADE IT FOR YOU TO DO YOUR WORK, TAKE CARE OF THINGS AT HOME, OR GET ALONG WITH OTHER PEOPLE: NOT DIFFICULT AT ALL
3. WORRYING TOO MUCH ABOUT DIFFERENT THINGS: 0
7. FEELING AFRAID AS IF SOMETHING AWFUL MIGHT HAPPEN: 0
6. BECOMING EASILY ANNOYED OR IRRITABLE: 0

## 2023-08-31 NOTE — PROGRESS NOTES
[address] [facility],[address] OB/GYN [facility]  [address] [facility] [address] [facility] [place] [address]  Dept: [phone]  Patient Name: Fredrick Enriquez  Patient Age: 33 y.o. Date of Visit: 8/31/2023    SUBJECTIVE:    Chief Complaint:  Chief Complaint   Patient presents with    Postpartum Care       HPI:  DELIVERY DATE: 8/18/23  TYPE OF DELIVERY: normal spontaneous vaginal delivery  PROVIDER:    PERINEUM: 2nd degree with repair    Chau Mcguire was seen for her 2 week visit. Her Male infant is healthy. She is breast feeding. She is breastfeeding without difficulty. She is not experiencing pain. She is rating her pain 0  She reports her lochia is staining only  She reports no perineal discomfort. She denies urinary incontinence. Her bowels have returned to her normal pattern. Chau Mcguire has not engaged in intercourse. She does not report a mood disorder. She feels she is not having difficulty coping. Chau Mcguire feels her family adjustment is effective. She reports an overall positive birth experience.   In the past 7 days:  I have been able to laugh and see the funny side of things: As much as I always could  I have looked forward with enjoyment to things: As much as I ever did  I have blamed myself unnecessarily when things went wrong: No, never  I have been anxious or worried for no good reason: No, not at all  I have felt scared or panicky for no good reason: No, not at all  I haven't been able to cope lately: No, most of the time I have coped quite well  I have been so unhappy that I have had difficulty sleeping: Not at all  I have felt sad or miserable: No, not at all  I have been so unhappy that I have been crying: No, never  The thought of harming myself has occurred to me: Never  Total: 1  Postpartum Depression: Medium Risk    Last EPDS Total Score: 5    Last EPDS Self Harm Result: Never     NANCY-7 SCREENING 8/31/2023 6/23/2023   Feeling nervous, anxious, or on edge Not at

## 2023-09-26 ENCOUNTER — POSTPARTUM VISIT (OUTPATIENT)
Dept: OBGYN CLINIC | Age: 28
End: 2023-09-26

## 2023-09-26 VITALS
SYSTOLIC BLOOD PRESSURE: 110 MMHG | BODY MASS INDEX: 37.19 KG/M2 | DIASTOLIC BLOOD PRESSURE: 76 MMHG | WEIGHT: 197 LBS | HEIGHT: 61 IN

## 2023-09-26 DIAGNOSIS — Z86.32 HX GESTATIONAL DIABETES: ICD-10-CM

## 2023-09-26 DIAGNOSIS — Z13.1 ENCOUNTER FOR SCREENING FOR DIABETES MELLITUS: ICD-10-CM

## 2023-09-26 PROCEDURE — 0503F POSTPARTUM CARE VISIT: CPT | Performed by: NURSE PRACTITIONER

## 2023-09-26 ASSESSMENT — ENCOUNTER SYMPTOMS
VOMITING: 0
COUGH: 0
SHORTNESS OF BREATH: 0
NAUSEA: 0

## 2023-09-26 ASSESSMENT — ANXIETY QUESTIONNAIRES
3. WORRYING TOO MUCH ABOUT DIFFERENT THINGS: 0
IF YOU CHECKED OFF ANY PROBLEMS ON THIS QUESTIONNAIRE, HOW DIFFICULT HAVE THESE PROBLEMS MADE IT FOR YOU TO DO YOUR WORK, TAKE CARE OF THINGS AT HOME, OR GET ALONG WITH OTHER PEOPLE: NOT DIFFICULT AT ALL
7. FEELING AFRAID AS IF SOMETHING AWFUL MIGHT HAPPEN: 0
4. TROUBLE RELAXING: 0
GAD7 TOTAL SCORE: 0
5. BEING SO RESTLESS THAT IT IS HARD TO SIT STILL: 0
2. NOT BEING ABLE TO STOP OR CONTROL WORRYING: 0
1. FEELING NERVOUS, ANXIOUS, OR ON EDGE: 0
6. BECOMING EASILY ANNOYED OR IRRITABLE: 0

## 2023-09-26 NOTE — PROGRESS NOTES
[address] [facility],[address] OB/ [address] [address]  Dept: [phone]  Dept Fax: [phone]    Tolbert Spatz is a 33 y.o. female who presents today for her medical conditions/complaintsas noted below. Tolbert Spatz is c/o of Postpartum Care        HPI:     Jose Hamm presents for 6 week postpartum check up. Delivered Vaginally n on 23 2nd degree with repair  No complaints, of chest pain, S.O.B. Headaches, no abdominal pain, GI or  issues. Breastfeeding Yes Signs mastitis No  Bleeding No When stopped couple weeks ago   Birth control options NFP/condoms    The patient completed :   E.P.D.S. Evaluation form and scored 3. NANCY 7 and scored 0. Denies any  suicidal/homicidal ideations or plans or delusions or hallucinations. Last PAP:2022- negative    Last HGB:11.4 on 23  GDM:pre existing type 2   gHTN or Preeclampsia:hx cHTN and preeclampsia without SF this pregnancy     OB History    Para Term  AB Living   2 2 1 1 0 2   SAB IAB Ectopic Molar Multiple Live Births   0 0 0 0 0 2      # Outcome Date GA Lbr Zeferino/2nd Weight Sex Delivery Anes PTL Lv   2 Term 23 38w0d 01:46 / 01:30 7 lb 12.5 oz (3.53 kg) M Vag-Spont EPI N MAYCO   1  10/12/20 33w6d  4 lb 14 oz (2.21 kg) M Vag-Spont EPI Y MAYCO       Past Medical History:   Diagnosis Date    33 weeks gestation of pregnancy 10/12/2020    child born oct 2020    Anemia 2020    During first pregnancy and cancer    Antepartum premature rupture of membrane     child born oct 2020    COVID-19 2021    fatigue    Gestational diabetes     Gestational diabetes mellitus May 2020    In first pregnancy    High-risk pregnancy, unspecified trimester 10/12/2020    History of anemia     prior blood and iron transfusion, without reactions    History of blood transfusion 2020    History of chemotherapy     last treatment 2021    History of E.  Coli

## 2023-10-05 ENCOUNTER — HOSPITAL ENCOUNTER (OUTPATIENT)
Dept: CT IMAGING | Age: 28
Discharge: HOME | End: 2023-10-07
Attending: INTERNAL MEDICINE
Payer: MEDICARE

## 2023-10-05 ENCOUNTER — HOSPITAL ENCOUNTER (OUTPATIENT)
Dept: INFUSION THERAPY | Age: 28
Discharge: HOME | End: 2023-10-05
Payer: COMMERCIAL

## 2023-10-05 DIAGNOSIS — C78.02 SECONDARY MALIGNANT NEOPLASM OF LEFT LUNG (HCC): Primary | ICD-10-CM

## 2023-10-05 DIAGNOSIS — Z13.1 ENCOUNTER FOR SCREENING FOR DIABETES MELLITUS: ICD-10-CM

## 2023-10-05 DIAGNOSIS — Z85.830 HISTORY OF SARCOMA OF BONE: ICD-10-CM

## 2023-10-05 DIAGNOSIS — C78.02 SECONDARY MALIGNANT NEOPLASM OF LEFT LUNG (HCC): ICD-10-CM

## 2023-10-05 DIAGNOSIS — Z86.32 HX GESTATIONAL DIABETES: ICD-10-CM

## 2023-10-05 LAB
ALBUMIN SERPL-MCNC: 4.7 G/DL (ref 3.5–5.2)
ALBUMIN/GLOB SERPL: 1.9 {RATIO} (ref 1–2.5)
ALP SERPL-CCNC: 75 U/L (ref 35–104)
ALT SERPL-CCNC: 16 U/L (ref 5–33)
ANION GAP SERPL CALCULATED.3IONS-SCNC: 10 MMOL/L (ref 9–17)
AST SERPL-CCNC: 14 U/L
BASOPHILS # BLD: 0 K/UL (ref 0–0.2)
BASOPHILS NFR BLD: 1 % (ref 0–2)
BILIRUB SERPL-MCNC: 0.4 MG/DL (ref 0.3–1.2)
BUN SERPL-MCNC: 13 MG/DL (ref 6–20)
CALCIUM SERPL-MCNC: 9.2 MG/DL (ref 8.6–10.4)
CHLORIDE SERPL-SCNC: 104 MMOL/L (ref 98–107)
CO2 SERPL-SCNC: 25 MMOL/L (ref 20–31)
CREAT SERPL-MCNC: 0.4 MG/DL (ref 0.5–0.9)
EOSINOPHIL # BLD: 0.1 K/UL (ref 0–0.4)
EOSINOPHILS RELATIVE PERCENT: 2 % (ref 1–4)
ERYTHROCYTE [DISTWIDTH] IN BLOOD BY AUTOMATED COUNT: 13.6 % (ref 12.5–15.4)
GFR SERPL CREATININE-BSD FRML MDRD: >60 ML/MIN/1.73M2
GLUCOSE P FAST SERPL-MCNC: 109 MG/DL (ref 70–99)
GLUCOSE SERPL-MCNC: 109 MG/DL (ref 70–99)
HCT VFR BLD AUTO: 38.4 % (ref 36–46)
HGB BLD-MCNC: 13.4 G/DL (ref 12–16)
LYMPHOCYTES NFR BLD: 1.6 K/UL (ref 1–4.8)
LYMPHOCYTES RELATIVE PERCENT: 29 % (ref 24–44)
MCH RBC QN AUTO: 29.4 PG (ref 26–34)
MCHC RBC AUTO-ENTMCNC: 34.9 G/DL (ref 31–37)
MCV RBC AUTO: 84.3 FL (ref 80–100)
MONOCYTES NFR BLD: 0.4 K/UL (ref 0.1–1.2)
MONOCYTES NFR BLD: 7 % (ref 2–11)
NEUTROPHILS NFR BLD: 61 % (ref 36–66)
NEUTS SEG NFR BLD: 3.4 K/UL (ref 1.8–7.7)
PLATELET # BLD AUTO: 211 K/UL (ref 140–450)
PMV BLD AUTO: 8.9 FL (ref 6–12)
POTASSIUM SERPL-SCNC: 4.2 MMOL/L (ref 3.7–5.3)
PROT SERPL-MCNC: 7.2 G/DL (ref 6.4–8.3)
RBC # BLD AUTO: 4.55 M/UL (ref 4–5.2)
SODIUM SERPL-SCNC: 139 MMOL/L (ref 135–144)
WBC OTHER # BLD: 5.5 K/UL (ref 3.5–11)

## 2023-10-05 PROCEDURE — 2580000003 HC RX 258: Performed by: INTERNAL MEDICINE

## 2023-10-05 PROCEDURE — 74177 CT ABD & PELVIS W/CONTRAST: CPT

## 2023-10-05 PROCEDURE — 6360000004 HC RX CONTRAST MEDICATION: Performed by: INTERNAL MEDICINE

## 2023-10-05 PROCEDURE — 36591 DRAW BLOOD OFF VENOUS DEVICE: CPT

## 2023-10-05 PROCEDURE — 2500000003 HC RX 250 WO HCPCS: Performed by: INTERNAL MEDICINE

## 2023-10-05 PROCEDURE — 85025 COMPLETE CBC W/AUTO DIFF WBC: CPT

## 2023-10-05 PROCEDURE — 6360000002 HC RX W HCPCS: Performed by: INTERNAL MEDICINE

## 2023-10-05 PROCEDURE — 80053 COMPREHEN METABOLIC PANEL: CPT

## 2023-10-05 RX ORDER — SODIUM CHLORIDE 0.9 % (FLUSH) 0.9 %
10 SYRINGE (ML) INJECTION PRN
Status: DISCONTINUED | OUTPATIENT
Start: 2023-10-05 | End: 2023-10-08 | Stop reason: HOSPADM

## 2023-10-05 RX ORDER — WATER 1000 ML/1000ML
2.2 INJECTION, SOLUTION INTRAVENOUS ONCE
OUTPATIENT
Start: 2023-10-05 | End: 2023-10-05

## 2023-10-05 RX ORDER — SODIUM CHLORIDE 0.9 % (FLUSH) 0.9 %
10 SYRINGE (ML) INJECTION PRN
OUTPATIENT
Start: 2023-10-05

## 2023-10-05 RX ORDER — 0.9 % SODIUM CHLORIDE 0.9 %
80 INTRAVENOUS SOLUTION INTRAVENOUS ONCE
Status: COMPLETED | OUTPATIENT
Start: 2023-10-05 | End: 2023-10-05

## 2023-10-05 RX ORDER — SODIUM CHLORIDE 0.9 % (FLUSH) 0.9 %
10 SYRINGE (ML) INJECTION PRN
Status: DISCONTINUED | OUTPATIENT
Start: 2023-10-05 | End: 2023-10-06 | Stop reason: HOSPADM

## 2023-10-05 RX ORDER — HEPARIN 100 UNIT/ML
500 SYRINGE INTRAVENOUS PRN
OUTPATIENT
Start: 2023-10-05

## 2023-10-05 RX ORDER — HEPARIN 100 UNIT/ML
500 SYRINGE INTRAVENOUS PRN
Status: DISCONTINUED | OUTPATIENT
Start: 2023-10-05 | End: 2023-10-06 | Stop reason: HOSPADM

## 2023-10-05 RX ORDER — SODIUM CHLORIDE 0.9 % (FLUSH) 0.9 %
20 SYRINGE (ML) INJECTION PRN
OUTPATIENT
Start: 2023-10-05

## 2023-10-05 RX ADMIN — SODIUM CHLORIDE 80 ML: 9 INJECTION, SOLUTION INTRAVENOUS at 11:02

## 2023-10-05 RX ADMIN — BARIUM SULFATE 450 ML: 20 SUSPENSION ORAL at 11:01

## 2023-10-05 RX ADMIN — HEPARIN 500 UNITS: 100 SYRINGE at 11:05

## 2023-10-05 RX ADMIN — IOPAMIDOL 100 ML: 755 INJECTION, SOLUTION INTRAVENOUS at 11:01

## 2023-10-05 RX ADMIN — SODIUM CHLORIDE, PRESERVATIVE FREE 10 ML: 5 INJECTION INTRAVENOUS at 11:01

## 2023-10-05 RX ADMIN — SODIUM CHLORIDE, PRESERVATIVE FREE 10 ML: 5 INJECTION INTRAVENOUS at 11:05

## 2023-10-05 RX ADMIN — SODIUM CHLORIDE, PRESERVATIVE FREE 10 ML: 5 INJECTION INTRAVENOUS at 09:30

## 2023-10-05 NOTE — PROGRESS NOTES
Patient arrived for port access for CT. Labs drawn from port, results reviewed. Complete without incident. Discharged in stable condition. Returns 11/1/2023 for port flush.

## 2023-10-09 DIAGNOSIS — R73.01 ELEVATED FASTING GLUCOSE: Primary | ICD-10-CM

## 2023-10-09 DIAGNOSIS — Z86.32 HX GESTATIONAL DIABETES: ICD-10-CM

## 2023-10-10 ENCOUNTER — TELEMEDICINE (OUTPATIENT)
Dept: ONCOLOGY | Age: 28
End: 2023-10-10
Payer: MEDICARE

## 2023-10-10 ENCOUNTER — TELEPHONE (OUTPATIENT)
Dept: OBGYN CLINIC | Age: 28
End: 2023-10-10

## 2023-10-10 ENCOUNTER — HOSPITAL ENCOUNTER (OUTPATIENT)
Age: 28
End: 2023-10-10

## 2023-10-10 DIAGNOSIS — C78.02 SECONDARY MALIGNANT NEOPLASM OF LEFT LUNG (HCC): ICD-10-CM

## 2023-10-10 DIAGNOSIS — C34.11 MALIGNANT NEOPLASM OF UPPER LOBE OF RIGHT LUNG (HCC): Primary | ICD-10-CM

## 2023-10-10 PROCEDURE — 99213 OFFICE O/P EST LOW 20 MIN: CPT | Performed by: INTERNAL MEDICINE

## 2023-10-10 PROCEDURE — G8427 DOCREV CUR MEDS BY ELIG CLIN: HCPCS | Performed by: INTERNAL MEDICINE

## 2023-10-10 NOTE — PROGRESS NOTES
contact this office for worsening conditions or problems, and seek emergency medical treatment and/or call 911 if deemed necessary. Services were provided through a video synchronous discussion virtually to substitute for in-person clinic visit. Patient and provider were located at their individual homes. --Ho Elizalde MD on 4/6/2020 at 3:50 PM    An electronic signature was used to authenticate this note.

## 2023-10-10 NOTE — TELEPHONE ENCOUNTER
----- Message from [facility] [place], APRN - CNP sent at 10/9/2023  5:37 PM EDT -----  Her fasting glucose was elevated I will order HGB A1c she should also contact her PCP about this for further evaluation management.

## 2023-12-13 ENCOUNTER — HOSPITAL ENCOUNTER (OUTPATIENT)
Dept: INFUSION THERAPY | Age: 28
Discharge: HOME | End: 2023-12-13
Payer: COMMERCIAL

## 2023-12-13 DIAGNOSIS — Z85.830 HISTORY OF SARCOMA OF BONE: Primary | ICD-10-CM

## 2023-12-13 PROCEDURE — 2580000003 HC RX 258: Performed by: INTERNAL MEDICINE

## 2023-12-13 PROCEDURE — 96523 IRRIG DRUG DELIVERY DEVICE: CPT

## 2023-12-13 PROCEDURE — 6360000002 HC RX W HCPCS: Performed by: INTERNAL MEDICINE

## 2023-12-13 RX ORDER — HEPARIN 100 UNIT/ML
500 SYRINGE INTRAVENOUS PRN
OUTPATIENT
Start: 2023-12-13

## 2023-12-13 RX ORDER — SODIUM CHLORIDE 0.9 % (FLUSH) 0.9 %
10 SYRINGE (ML) INJECTION PRN
OUTPATIENT
Start: 2023-12-13

## 2023-12-13 RX ORDER — SODIUM CHLORIDE 0.9 % (FLUSH) 0.9 %
20 SYRINGE (ML) INJECTION PRN
OUTPATIENT
Start: 2023-12-13

## 2023-12-13 RX ORDER — SODIUM CHLORIDE 0.9 % (FLUSH) 0.9 %
20 SYRINGE (ML) INJECTION PRN
Status: DISCONTINUED | OUTPATIENT
Start: 2023-12-13 | End: 2023-12-14 | Stop reason: HOSPADM

## 2023-12-13 RX ORDER — WATER 10 ML/10ML
2.2 INJECTION INTRAMUSCULAR; INTRAVENOUS; SUBCUTANEOUS ONCE
OUTPATIENT
Start: 2023-12-13 | End: 2023-12-13

## 2023-12-13 RX ORDER — HEPARIN 100 UNIT/ML
500 SYRINGE INTRAVENOUS PRN
Status: DISCONTINUED | OUTPATIENT
Start: 2023-12-13 | End: 2023-12-14 | Stop reason: HOSPADM

## 2023-12-13 RX ADMIN — SODIUM CHLORIDE, PRESERVATIVE FREE 20 ML: 5 INJECTION INTRAVENOUS at 09:49

## 2023-12-13 RX ADMIN — HEPARIN 500 UNITS: 100 SYRINGE at 09:49

## 2023-12-13 NOTE — ONCOLOGY
Pt here for port flush. Port accessed, blood return noted. Pt to return 1/24/24. Pt discharged stable.

## 2024-01-24 ENCOUNTER — HOSPITAL ENCOUNTER (OUTPATIENT)
Dept: CT IMAGING | Age: 29
Discharge: HOME | End: 2024-01-26
Attending: INTERNAL MEDICINE
Payer: MEDICARE

## 2024-01-24 ENCOUNTER — HOSPITAL ENCOUNTER (OUTPATIENT)
Dept: INFUSION THERAPY | Age: 29
Discharge: HOME | End: 2024-01-24
Payer: MEDICARE

## 2024-01-24 DIAGNOSIS — C34.11 MALIGNANT NEOPLASM OF UPPER LOBE OF RIGHT LUNG (HCC): ICD-10-CM

## 2024-01-24 DIAGNOSIS — C78.02 SECONDARY MALIGNANT NEOPLASM OF LEFT LUNG (HCC): ICD-10-CM

## 2024-01-24 DIAGNOSIS — Z85.830 HISTORY OF SARCOMA OF BONE: Primary | ICD-10-CM

## 2024-01-24 LAB
ALBUMIN SERPL-MCNC: 4.7 G/DL (ref 3.5–5.2)
ALBUMIN/GLOB SERPL: 1.7 {RATIO} (ref 1–2.5)
ALP SERPL-CCNC: 76 U/L (ref 35–104)
ALT SERPL-CCNC: 9 U/L (ref 5–33)
ANION GAP SERPL CALCULATED.3IONS-SCNC: 13 MMOL/L (ref 9–17)
AST SERPL-CCNC: 13 U/L
BASOPHILS # BLD: 0 K/UL (ref 0–0.2)
BASOPHILS NFR BLD: 1 % (ref 0–2)
BILIRUB SERPL-MCNC: 0.2 MG/DL (ref 0.3–1.2)
BUN SERPL-MCNC: 12 MG/DL (ref 6–20)
CALCIUM SERPL-MCNC: 9.1 MG/DL (ref 8.6–10.4)
CHLORIDE SERPL-SCNC: 103 MMOL/L (ref 98–107)
CO2 SERPL-SCNC: 23 MMOL/L (ref 20–31)
CREAT SERPL-MCNC: <0.4 MG/DL (ref 0.5–0.9)
EOSINOPHIL # BLD: 0 K/UL (ref 0–0.4)
EOSINOPHILS RELATIVE PERCENT: 1 % (ref 1–4)
ERYTHROCYTE [DISTWIDTH] IN BLOOD BY AUTOMATED COUNT: 12.9 % (ref 12.5–15.4)
GFR SERPL CREATININE-BSD FRML MDRD: ABNORMAL ML/MIN/1.73M2
GLUCOSE SERPL-MCNC: 103 MG/DL (ref 70–99)
HCT VFR BLD AUTO: 39 % (ref 36–46)
HGB BLD-MCNC: 13.3 G/DL (ref 12–16)
LYMPHOCYTES NFR BLD: 1 K/UL (ref 1–4.8)
LYMPHOCYTES RELATIVE PERCENT: 27 % (ref 24–44)
MCH RBC QN AUTO: 29.1 PG (ref 26–34)
MCHC RBC AUTO-ENTMCNC: 34.2 G/DL (ref 31–37)
MCV RBC AUTO: 85.3 FL (ref 80–100)
MONOCYTES NFR BLD: 0.7 K/UL (ref 0.1–1.2)
MONOCYTES NFR BLD: 19 % (ref 2–11)
NEUTROPHILS NFR BLD: 52 % (ref 36–66)
NEUTS SEG NFR BLD: 1.9 K/UL (ref 1.8–7.7)
PLATELET # BLD AUTO: 192 K/UL (ref 140–450)
PMV BLD AUTO: 9.2 FL (ref 6–12)
POTASSIUM SERPL-SCNC: 4.3 MMOL/L (ref 3.7–5.3)
PROT SERPL-MCNC: 7.5 G/DL (ref 6.4–8.3)
RBC # BLD AUTO: 4.58 M/UL (ref 4–5.2)
SODIUM SERPL-SCNC: 139 MMOL/L (ref 135–144)
WBC OTHER # BLD: 3.6 K/UL (ref 3.5–11)

## 2024-01-24 PROCEDURE — 85025 COMPLETE CBC W/AUTO DIFF WBC: CPT

## 2024-01-24 PROCEDURE — 36591 DRAW BLOOD OFF VENOUS DEVICE: CPT

## 2024-01-24 PROCEDURE — 6360000002 HC RX W HCPCS: Performed by: INTERNAL MEDICINE

## 2024-01-24 PROCEDURE — 2580000003 HC RX 258: Performed by: INTERNAL MEDICINE

## 2024-01-24 PROCEDURE — 80053 COMPREHEN METABOLIC PANEL: CPT

## 2024-01-24 PROCEDURE — 6360000004 HC RX CONTRAST MEDICATION: Performed by: INTERNAL MEDICINE

## 2024-01-24 PROCEDURE — 71260 CT THORAX DX C+: CPT

## 2024-01-24 RX ORDER — HEPARIN 100 UNIT/ML
500 SYRINGE INTRAVENOUS PRN
Status: DISCONTINUED | OUTPATIENT
Start: 2024-01-24 | End: 2024-01-25 | Stop reason: HOSPADM

## 2024-01-24 RX ORDER — SODIUM CHLORIDE 0.9 % (FLUSH) 0.9 %
10 SYRINGE (ML) INJECTION PRN
Status: DISCONTINUED | OUTPATIENT
Start: 2024-01-24 | End: 2024-01-27 | Stop reason: HOSPADM

## 2024-01-24 RX ORDER — 0.9 % SODIUM CHLORIDE 0.9 %
80 INTRAVENOUS SOLUTION INTRAVENOUS ONCE
Status: COMPLETED | OUTPATIENT
Start: 2024-01-24 | End: 2024-01-24

## 2024-01-24 RX ORDER — SODIUM CHLORIDE 0.9 % (FLUSH) 0.9 %
10 SYRINGE (ML) INJECTION PRN
Status: DISCONTINUED | OUTPATIENT
Start: 2024-01-24 | End: 2024-01-25 | Stop reason: HOSPADM

## 2024-01-24 RX ORDER — SODIUM CHLORIDE 0.9 % (FLUSH) 0.9 %
20 SYRINGE (ML) INJECTION PRN
OUTPATIENT
Start: 2024-01-24

## 2024-01-24 RX ORDER — SODIUM CHLORIDE 0.9 % (FLUSH) 0.9 %
10 SYRINGE (ML) INJECTION PRN
OUTPATIENT
Start: 2024-01-24

## 2024-01-24 RX ORDER — WATER 10 ML/10ML
2.2 INJECTION INTRAMUSCULAR; INTRAVENOUS; SUBCUTANEOUS ONCE
OUTPATIENT
Start: 2024-01-24 | End: 2024-01-24

## 2024-01-24 RX ORDER — HEPARIN 100 UNIT/ML
500 SYRINGE INTRAVENOUS PRN
OUTPATIENT
Start: 2024-01-24

## 2024-01-24 RX ADMIN — HEPARIN 500 UNITS: 100 SYRINGE at 13:06

## 2024-01-24 RX ADMIN — SODIUM CHLORIDE, PRESERVATIVE FREE 10 ML: 5 INJECTION INTRAVENOUS at 11:37

## 2024-01-24 RX ADMIN — IOPAMIDOL 75 ML: 755 INJECTION, SOLUTION INTRAVENOUS at 12:58

## 2024-01-24 RX ADMIN — SODIUM CHLORIDE, PRESERVATIVE FREE 10 ML: 5 INJECTION INTRAVENOUS at 11:38

## 2024-01-24 RX ADMIN — SODIUM CHLORIDE 80 ML: 9 INJECTION, SOLUTION INTRAVENOUS at 12:58

## 2024-01-24 RX ADMIN — SODIUM CHLORIDE, PRESERVATIVE FREE 10 ML: 5 INJECTION INTRAVENOUS at 13:06

## 2024-01-24 RX ADMIN — SODIUM CHLORIDE, PRESERVATIVE FREE 10 ML: 5 INJECTION INTRAVENOUS at 12:58

## 2024-01-24 NOTE — PROGRESS NOTES
Patient arrived for port access for ct and lab work.No new issues and left instable condition. Returns 2/6 for

## 2024-02-06 ENCOUNTER — TELEMEDICINE (OUTPATIENT)
Dept: ONCOLOGY | Age: 29
End: 2024-02-06
Payer: MEDICARE

## 2024-02-06 DIAGNOSIS — E66.01 SEVERE OBESITY (BMI 35.0-39.9) WITH COMORBIDITY (HCC): ICD-10-CM

## 2024-02-06 DIAGNOSIS — C78.02 SECONDARY MALIGNANT NEOPLASM OF LEFT LUNG (HCC): ICD-10-CM

## 2024-02-06 DIAGNOSIS — C34.11 MALIGNANT NEOPLASM OF UPPER LOBE OF RIGHT LUNG (HCC): Primary | ICD-10-CM

## 2024-02-06 DIAGNOSIS — Z85.830 HISTORY OF SARCOMA OF BONE: ICD-10-CM

## 2024-02-06 PROCEDURE — 99214 OFFICE O/P EST MOD 30 MIN: CPT | Performed by: INTERNAL MEDICINE

## 2024-02-06 PROCEDURE — G8427 DOCREV CUR MEDS BY ELIG CLIN: HCPCS | Performed by: INTERNAL MEDICINE

## 2024-02-06 NOTE — PROGRESS NOTES
INTERIM HISTORY: The patient presents for follow up today for follow up with sarcoma surveillance.  This is a virtual visit  The patient did quite well and she is fairly active without any limitation.  She has no symptoms of chest pain shortness of breath cough or hemoptysis.  No swelling of the leg or any signs of recurrent disease  She underwent a CT scan and she is anxious about the results.  I shared the results showing no evidence of progressive disease.  She is feeling very well and started trying to use the prosthesis.    PAST MEDICAL HISTORY: has a past medical history of 33 weeks gestation of pregnancy, Anemia, Antepartum premature rupture of membrane, COVID-19, Gestational diabetes, Gestational diabetes mellitus, High-risk pregnancy, unspecified trimester, History of anemia, History of blood transfusion, History of chemotherapy, History of E. Coli UTI 10-<100,000CFU (20), Lung nodule, Malignant neoplasm affecting pregnancy in third trimester, Osteosarcoma, Pregestational diabetes mellitus, modified White class B, Pregnancy induced hypertension,  delivery, Pseudomonas UTI, Under care of service provider, Under care of service provider, Under care of service provider, and Wears glasses.    PAST SURGICAL HISTORY: has a past surgical history that includes Leg amputation at hip (Right, 2020); IR PORT PLACEMENT > 5 YEARS (10/27/2020); CT NEEDLE BIOPSY LUNG PERCUTANEOUS (2022); CT NEEDLE BIOPSY LUNG PERCUTANEOUS (9/15/2022); and Lung lobectomy (Left, 2022).     CURRENT MEDICATIONS:  has a current medication list which includes the following prescription(s): metoprolol succinate, breast pump, ferrous sulfate, handicap placard, and prenatal mv-min-fe fum-fa-dha.    ALLERGIES:  has No Known Allergies.    FAMILY HISTORY: Negative for any hematological or oncological conditions.     SOCIAL HISTORY:  reports that she has never smoked. She has never used smokeless tobacco. She reports

## 2024-02-07 ENCOUNTER — TELEPHONE (OUTPATIENT)
Dept: ONCOLOGY | Age: 29
End: 2024-02-07

## 2024-02-07 NOTE — TELEPHONE ENCOUNTER
Rv in 3 months. With cbc, cmp and Ct scan prior to RV       Rv scheduled for 5/7/24 @ 4:00pm as a VV    Cbc, cmp to be drawn a week prior    Pt to schedule CT    Patient was called with updates on schedule and mailed a copy of their AVS.    Electronically signed by Ilene Kee on 2/7/2024 at 8:40 AM

## 2024-03-06 ENCOUNTER — HOSPITAL ENCOUNTER (OUTPATIENT)
Dept: INFUSION THERAPY | Age: 29
Discharge: HOME | End: 2024-03-06
Payer: MEDICARE

## 2024-03-06 DIAGNOSIS — Z85.830 HISTORY OF SARCOMA OF BONE: Primary | ICD-10-CM

## 2024-03-06 PROCEDURE — 6360000002 HC RX W HCPCS: Performed by: INTERNAL MEDICINE

## 2024-03-06 PROCEDURE — 96523 IRRIG DRUG DELIVERY DEVICE: CPT

## 2024-03-06 PROCEDURE — 2580000003 HC RX 258: Performed by: INTERNAL MEDICINE

## 2024-03-06 RX ORDER — SODIUM CHLORIDE 0.9 % (FLUSH) 0.9 %
20 SYRINGE (ML) INJECTION PRN
OUTPATIENT
Start: 2024-03-06

## 2024-03-06 RX ORDER — WATER 10 ML/10ML
2.2 INJECTION INTRAMUSCULAR; INTRAVENOUS; SUBCUTANEOUS ONCE
OUTPATIENT
Start: 2024-03-06 | End: 2024-03-06

## 2024-03-06 RX ORDER — HEPARIN 100 UNIT/ML
500 SYRINGE INTRAVENOUS PRN
OUTPATIENT
Start: 2024-03-06

## 2024-03-06 RX ORDER — SODIUM CHLORIDE 0.9 % (FLUSH) 0.9 %
10 SYRINGE (ML) INJECTION PRN
OUTPATIENT
Start: 2024-03-06

## 2024-03-06 RX ORDER — SODIUM CHLORIDE 0.9 % (FLUSH) 0.9 %
10 SYRINGE (ML) INJECTION PRN
Status: DISCONTINUED | OUTPATIENT
Start: 2024-03-06 | End: 2024-03-07 | Stop reason: HOSPADM

## 2024-03-06 RX ORDER — HEPARIN 100 UNIT/ML
500 SYRINGE INTRAVENOUS PRN
Status: DISCONTINUED | OUTPATIENT
Start: 2024-03-06 | End: 2024-03-07 | Stop reason: HOSPADM

## 2024-03-06 RX ADMIN — SODIUM CHLORIDE, PRESERVATIVE FREE 10 ML: 5 INJECTION INTRAVENOUS at 11:49

## 2024-03-06 RX ADMIN — HEPARIN 500 UNITS: 100 SYRINGE at 11:49

## 2024-04-30 RX ORDER — WATER 10 ML/10ML
2.2 INJECTION INTRAMUSCULAR; INTRAVENOUS; SUBCUTANEOUS ONCE
Status: CANCELLED | OUTPATIENT
Start: 2024-04-30 | End: 2024-04-30

## 2024-04-30 RX ORDER — SODIUM CHLORIDE 0.9 % (FLUSH) 0.9 %
20 SYRINGE (ML) INJECTION PRN
Status: CANCELLED | OUTPATIENT
Start: 2024-04-30

## 2024-05-02 ENCOUNTER — HOSPITAL ENCOUNTER (OUTPATIENT)
Dept: INFUSION THERAPY | Age: 29
Discharge: HOME | End: 2024-05-02
Payer: MEDICARE

## 2024-05-02 DIAGNOSIS — Z85.830 HISTORY OF SARCOMA OF BONE: Primary | ICD-10-CM

## 2024-05-02 DIAGNOSIS — C34.11 MALIGNANT NEOPLASM OF UPPER LOBE OF RIGHT LUNG (HCC): ICD-10-CM

## 2024-05-02 DIAGNOSIS — C78.02 SECONDARY MALIGNANT NEOPLASM OF LEFT LUNG (HCC): ICD-10-CM

## 2024-05-02 DIAGNOSIS — E66.01 SEVERE OBESITY (BMI 35.0-39.9) WITH COMORBIDITY (HCC): ICD-10-CM

## 2024-05-02 LAB
ALBUMIN SERPL-MCNC: 4.6 G/DL (ref 3.5–5.2)
ALBUMIN/GLOB SERPL: 1.9 {RATIO} (ref 1–2.5)
ALP SERPL-CCNC: 96 U/L (ref 35–104)
ALT SERPL-CCNC: 16 U/L (ref 5–33)
ANION GAP SERPL CALCULATED.3IONS-SCNC: 9 MMOL/L (ref 9–17)
AST SERPL-CCNC: 14 U/L
BASOPHILS # BLD: 0 K/UL (ref 0–0.2)
BASOPHILS NFR BLD: 1 % (ref 0–2)
BILIRUB SERPL-MCNC: 0.2 MG/DL (ref 0.3–1.2)
BUN SERPL-MCNC: 16 MG/DL (ref 6–20)
CALCIUM SERPL-MCNC: 9.1 MG/DL (ref 8.6–10.4)
CHLORIDE SERPL-SCNC: 104 MMOL/L (ref 98–107)
CO2 SERPL-SCNC: 26 MMOL/L (ref 20–31)
CREAT SERPL-MCNC: <0.4 MG/DL (ref 0.5–0.9)
EOSINOPHIL # BLD: 0.1 K/UL (ref 0–0.4)
EOSINOPHILS RELATIVE PERCENT: 2 % (ref 1–4)
ERYTHROCYTE [DISTWIDTH] IN BLOOD BY AUTOMATED COUNT: 13.2 % (ref 12.5–15.4)
GFR, ESTIMATED: ABNORMAL ML/MIN/1.73M2
GLUCOSE SERPL-MCNC: 92 MG/DL (ref 70–99)
HCT VFR BLD AUTO: 36.6 % (ref 36–46)
HGB BLD-MCNC: 12.6 G/DL (ref 12–16)
LYMPHOCYTES NFR BLD: 1.7 K/UL (ref 1–4.8)
LYMPHOCYTES RELATIVE PERCENT: 33 % (ref 24–44)
MCH RBC QN AUTO: 29.1 PG (ref 26–34)
MCHC RBC AUTO-ENTMCNC: 34.4 G/DL (ref 31–37)
MCV RBC AUTO: 84.7 FL (ref 80–100)
MONOCYTES NFR BLD: 0.5 K/UL (ref 0.1–1.2)
MONOCYTES NFR BLD: 10 % (ref 2–11)
NEUTROPHILS NFR BLD: 54 % (ref 36–66)
NEUTS SEG NFR BLD: 2.9 K/UL (ref 1.8–7.7)
PLATELET # BLD AUTO: 235 K/UL (ref 140–450)
PMV BLD AUTO: 9.5 FL (ref 6–12)
POTASSIUM SERPL-SCNC: 3.9 MMOL/L (ref 3.7–5.3)
PROT SERPL-MCNC: 7 G/DL (ref 6.4–8.3)
RBC # BLD AUTO: 4.32 M/UL (ref 4–5.2)
SODIUM SERPL-SCNC: 139 MMOL/L (ref 135–144)
WBC OTHER # BLD: 5.2 K/UL (ref 3.5–11)

## 2024-05-02 PROCEDURE — 85025 COMPLETE CBC W/AUTO DIFF WBC: CPT

## 2024-05-02 PROCEDURE — 80053 COMPREHEN METABOLIC PANEL: CPT

## 2024-05-02 PROCEDURE — 2580000003 HC RX 258: Performed by: INTERNAL MEDICINE

## 2024-05-02 PROCEDURE — 36591 DRAW BLOOD OFF VENOUS DEVICE: CPT

## 2024-05-02 PROCEDURE — 6360000002 HC RX W HCPCS: Performed by: INTERNAL MEDICINE

## 2024-05-02 RX ORDER — WATER 10 ML/10ML
2.2 INJECTION INTRAMUSCULAR; INTRAVENOUS; SUBCUTANEOUS ONCE
OUTPATIENT
Start: 2024-05-02 | End: 2024-05-02

## 2024-05-02 RX ORDER — SODIUM CHLORIDE 0.9 % (FLUSH) 0.9 %
20 SYRINGE (ML) INJECTION PRN
OUTPATIENT
Start: 2024-05-02

## 2024-05-02 RX ORDER — SODIUM CHLORIDE 0.9 % (FLUSH) 0.9 %
10 SYRINGE (ML) INJECTION PRN
Status: DISCONTINUED | OUTPATIENT
Start: 2024-05-02 | End: 2024-05-03 | Stop reason: HOSPADM

## 2024-05-02 RX ORDER — SODIUM CHLORIDE 0.9 % (FLUSH) 0.9 %
10 SYRINGE (ML) INJECTION PRN
OUTPATIENT
Start: 2024-05-02

## 2024-05-02 RX ORDER — HEPARIN 100 UNIT/ML
500 SYRINGE INTRAVENOUS PRN
Status: DISCONTINUED | OUTPATIENT
Start: 2024-05-02 | End: 2024-05-03 | Stop reason: HOSPADM

## 2024-05-02 RX ORDER — HEPARIN 100 UNIT/ML
500 SYRINGE INTRAVENOUS PRN
OUTPATIENT
Start: 2024-05-02

## 2024-05-02 RX ADMIN — SODIUM CHLORIDE, PRESERVATIVE FREE 10 ML: 5 INJECTION INTRAVENOUS at 15:47

## 2024-05-02 RX ADMIN — Medication 500 UNITS: at 15:58

## 2024-05-02 RX ADMIN — SODIUM CHLORIDE, PRESERVATIVE FREE 10 ML: 5 INJECTION INTRAVENOUS at 15:58

## 2024-05-21 ENCOUNTER — HOSPITAL ENCOUNTER (OUTPATIENT)
Dept: CT IMAGING | Age: 29
Discharge: HOME | End: 2024-05-23
Attending: INTERNAL MEDICINE
Payer: MEDICARE

## 2024-05-21 DIAGNOSIS — C78.02 SECONDARY MALIGNANT NEOPLASM OF LEFT LUNG (HCC): ICD-10-CM

## 2024-05-21 DIAGNOSIS — C34.11 MALIGNANT NEOPLASM OF UPPER LOBE OF RIGHT LUNG (HCC): ICD-10-CM

## 2024-05-21 DIAGNOSIS — Z85.830 HISTORY OF SARCOMA OF BONE: ICD-10-CM

## 2024-05-21 LAB
CREAT SERPL-MCNC: <0.4 MG/DL (ref 0.5–0.9)
GFR, ESTIMATED: ABNORMAL ML/MIN/1.73M2

## 2024-05-21 PROCEDURE — 2500000003 HC RX 250 WO HCPCS: Performed by: INTERNAL MEDICINE

## 2024-05-21 PROCEDURE — 71260 CT THORAX DX C+: CPT

## 2024-05-21 PROCEDURE — 6360000004 HC RX CONTRAST MEDICATION: Performed by: INTERNAL MEDICINE

## 2024-05-21 PROCEDURE — 82565 ASSAY OF CREATININE: CPT

## 2024-05-21 PROCEDURE — 2580000003 HC RX 258: Performed by: INTERNAL MEDICINE

## 2024-05-21 RX ORDER — 0.9 % SODIUM CHLORIDE 0.9 %
80 INTRAVENOUS SOLUTION INTRAVENOUS ONCE
Status: COMPLETED | OUTPATIENT
Start: 2024-05-21 | End: 2024-05-21

## 2024-05-21 RX ORDER — SODIUM CHLORIDE 0.9 % (FLUSH) 0.9 %
10 SYRINGE (ML) INJECTION PRN
Status: DISCONTINUED | OUTPATIENT
Start: 2024-05-21 | End: 2024-05-24 | Stop reason: HOSPADM

## 2024-05-21 RX ADMIN — SODIUM CHLORIDE 80 ML: 9 INJECTION, SOLUTION INTRAVENOUS at 11:26

## 2024-05-21 RX ADMIN — SODIUM CHLORIDE, PRESERVATIVE FREE 10 ML: 5 INJECTION INTRAVENOUS at 11:25

## 2024-05-21 RX ADMIN — BARIUM SULFATE 450 ML: 20 SUSPENSION ORAL at 11:26

## 2024-05-21 RX ADMIN — IOPAMIDOL 100 ML: 755 INJECTION, SOLUTION INTRAVENOUS at 11:24

## 2024-05-30 ENCOUNTER — TELEMEDICINE (OUTPATIENT)
Dept: ONCOLOGY | Age: 29
End: 2024-05-30
Payer: MEDICARE

## 2024-05-30 DIAGNOSIS — C78.02 SECONDARY MALIGNANT NEOPLASM OF LEFT LUNG (HCC): ICD-10-CM

## 2024-05-30 DIAGNOSIS — C34.11 MALIGNANT NEOPLASM OF UPPER LOBE OF RIGHT LUNG (HCC): Primary | ICD-10-CM

## 2024-05-30 PROCEDURE — G8427 DOCREV CUR MEDS BY ELIG CLIN: HCPCS | Performed by: INTERNAL MEDICINE

## 2024-05-30 PROCEDURE — 99213 OFFICE O/P EST LOW 20 MIN: CPT | Performed by: INTERNAL MEDICINE

## 2024-05-30 NOTE — PROGRESS NOTES
seek emergency medical treatment and/or call 911 if deemed necessary.     Services were provided through a video synchronous discussion virtually to substitute for in-person clinic visit. Patient and provider were located at their individual homes.    --Zenaida Chapman MD on 4/6/2020 at 3:50 PM    An electronic signature was used to authenticate this note.

## 2024-06-03 ENCOUNTER — TELEPHONE (OUTPATIENT)
Dept: ONCOLOGY | Age: 29
End: 2024-06-03

## 2024-06-03 NOTE — TELEPHONE ENCOUNTER
Instructions   from Dr. Zenaida Chapman MD    Schedule VV in 3 months , need cbc, cmp and CT scan prior to RV       RV VV 9/5/24 at 4:15 pm.   Patient to call to schedule CT scan and will have labs done that day.

## 2024-06-18 ENCOUNTER — HOSPITAL ENCOUNTER (OUTPATIENT)
Dept: INFUSION THERAPY | Age: 29
Discharge: HOME | End: 2024-06-18
Payer: MEDICARE

## 2024-06-18 DIAGNOSIS — Z85.830 HISTORY OF SARCOMA OF BONE: Primary | ICD-10-CM

## 2024-06-18 PROCEDURE — 2580000003 HC RX 258: Performed by: INTERNAL MEDICINE

## 2024-06-18 PROCEDURE — 96523 IRRIG DRUG DELIVERY DEVICE: CPT

## 2024-06-18 PROCEDURE — 6360000002 HC RX W HCPCS: Performed by: INTERNAL MEDICINE

## 2024-06-18 RX ORDER — HEPARIN 100 UNIT/ML
500 SYRINGE INTRAVENOUS PRN
OUTPATIENT
Start: 2024-06-18

## 2024-06-18 RX ORDER — SODIUM CHLORIDE 0.9 % (FLUSH) 0.9 %
10 SYRINGE (ML) INJECTION PRN
Status: DISCONTINUED | OUTPATIENT
Start: 2024-06-18 | End: 2024-06-19 | Stop reason: HOSPADM

## 2024-06-18 RX ORDER — SODIUM CHLORIDE 0.9 % (FLUSH) 0.9 %
10 SYRINGE (ML) INJECTION PRN
OUTPATIENT
Start: 2024-06-18

## 2024-06-18 RX ORDER — WATER 10 ML/10ML
2.2 INJECTION INTRAMUSCULAR; INTRAVENOUS; SUBCUTANEOUS ONCE
OUTPATIENT
Start: 2024-06-18 | End: 2024-06-18

## 2024-06-18 RX ORDER — HEPARIN 100 UNIT/ML
500 SYRINGE INTRAVENOUS PRN
Status: DISCONTINUED | OUTPATIENT
Start: 2024-06-18 | End: 2024-06-19 | Stop reason: HOSPADM

## 2024-06-18 RX ORDER — SODIUM CHLORIDE 0.9 % (FLUSH) 0.9 %
20 SYRINGE (ML) INJECTION PRN
OUTPATIENT
Start: 2024-06-18

## 2024-06-18 RX ADMIN — SODIUM CHLORIDE, PRESERVATIVE FREE 10 ML: 5 INJECTION INTRAVENOUS at 15:45

## 2024-06-18 RX ADMIN — SODIUM CHLORIDE, PRESERVATIVE FREE 10 ML: 5 INJECTION INTRAVENOUS at 15:39

## 2024-06-18 RX ADMIN — HEPARIN 500 UNITS: 100 SYRINGE at 15:45

## 2024-06-18 NOTE — PROGRESS NOTES
Patient arrived for port flush. Brisk blood return and flushed with ease. Tolerated w/o incident and left in stable condition. Returns  7/31

## 2024-07-31 ENCOUNTER — HOSPITAL ENCOUNTER (OUTPATIENT)
Dept: INFUSION THERAPY | Age: 29
Discharge: HOME | End: 2024-07-31

## 2024-08-21 ENCOUNTER — TELEPHONE (OUTPATIENT)
Dept: ONCOLOGY | Age: 29
End: 2024-08-21

## 2024-08-21 NOTE — TELEPHONE ENCOUNTER
LVM for patient to remind about upcoming appointment and to have labs drawn prior. I also reminded to schedule CT. Scheduling's number was given. Instructed pt to call back with questions.

## 2024-08-26 ENCOUNTER — HOSPITAL ENCOUNTER (OUTPATIENT)
Age: 29
Discharge: HOME | End: 2024-08-26
Payer: MEDICARE

## 2024-08-26 DIAGNOSIS — C78.02 SECONDARY MALIGNANT NEOPLASM OF LEFT LUNG (HCC): ICD-10-CM

## 2024-08-26 DIAGNOSIS — C34.11 MALIGNANT NEOPLASM OF UPPER LOBE OF RIGHT LUNG (HCC): ICD-10-CM

## 2024-08-26 LAB
ALBUMIN SERPL-MCNC: 4.8 G/DL (ref 3.5–5.2)
ALBUMIN/GLOB SERPL: 2 {RATIO} (ref 1–2.5)
ALP SERPL-CCNC: 113 U/L (ref 35–104)
ALT SERPL-CCNC: 18 U/L (ref 5–33)
ANION GAP SERPL CALCULATED.3IONS-SCNC: 11 MMOL/L (ref 9–17)
AST SERPL-CCNC: 16 U/L
BASOPHILS # BLD: 0.1 K/UL (ref 0–0.2)
BASOPHILS NFR BLD: 1 % (ref 0–2)
BILIRUB SERPL-MCNC: 0.3 MG/DL (ref 0.3–1.2)
BUN SERPL-MCNC: 12 MG/DL (ref 6–20)
CALCIUM SERPL-MCNC: 9.3 MG/DL (ref 8.6–10.4)
CHLORIDE SERPL-SCNC: 101 MMOL/L (ref 98–107)
CO2 SERPL-SCNC: 26 MMOL/L (ref 20–31)
CREAT SERPL-MCNC: 0.5 MG/DL (ref 0.5–0.9)
EOSINOPHIL # BLD: 0.2 K/UL (ref 0–0.4)
EOSINOPHILS RELATIVE PERCENT: 2 % (ref 1–4)
ERYTHROCYTE [DISTWIDTH] IN BLOOD BY AUTOMATED COUNT: 13.2 % (ref 12.5–15.4)
GFR, ESTIMATED: >90 ML/MIN/1.73M2
GLUCOSE SERPL-MCNC: 106 MG/DL (ref 70–99)
HCT VFR BLD AUTO: 39.9 % (ref 36–46)
HGB BLD-MCNC: 13.5 G/DL (ref 12–16)
LYMPHOCYTES NFR BLD: 2.2 K/UL (ref 1–4.8)
LYMPHOCYTES RELATIVE PERCENT: 32 % (ref 24–44)
MCH RBC QN AUTO: 28.9 PG (ref 26–34)
MCHC RBC AUTO-ENTMCNC: 34 G/DL (ref 31–37)
MCV RBC AUTO: 85.2 FL (ref 80–100)
MONOCYTES NFR BLD: 0.5 K/UL (ref 0.1–1.2)
MONOCYTES NFR BLD: 7 % (ref 2–11)
NEUTROPHILS NFR BLD: 58 % (ref 36–66)
NEUTS SEG NFR BLD: 4 K/UL (ref 1.8–7.7)
PLATELET # BLD AUTO: 248 K/UL (ref 140–450)
PMV BLD AUTO: 9.2 FL (ref 6–12)
POTASSIUM SERPL-SCNC: 4.2 MMOL/L (ref 3.7–5.3)
PROT SERPL-MCNC: 7.2 G/DL (ref 6.4–8.3)
RBC # BLD AUTO: 4.68 M/UL (ref 4–5.2)
SODIUM SERPL-SCNC: 138 MMOL/L (ref 135–144)
WBC OTHER # BLD: 6.9 K/UL (ref 3.5–11)

## 2024-08-26 PROCEDURE — 85025 COMPLETE CBC W/AUTO DIFF WBC: CPT

## 2024-08-26 PROCEDURE — 36415 COLL VENOUS BLD VENIPUNCTURE: CPT

## 2024-08-26 PROCEDURE — 80053 COMPREHEN METABOLIC PANEL: CPT

## 2024-08-28 ENCOUNTER — HOSPITAL ENCOUNTER (OUTPATIENT)
Dept: CT IMAGING | Age: 29
Discharge: HOME | End: 2024-08-30
Attending: INTERNAL MEDICINE
Payer: COMMERCIAL

## 2024-08-28 DIAGNOSIS — C78.02 SECONDARY MALIGNANT NEOPLASM OF LEFT LUNG (HCC): ICD-10-CM

## 2024-08-28 DIAGNOSIS — C34.11 MALIGNANT NEOPLASM OF UPPER LOBE OF RIGHT LUNG (HCC): ICD-10-CM

## 2024-08-28 PROCEDURE — 71260 CT THORAX DX C+: CPT

## 2024-08-28 PROCEDURE — 6360000004 HC RX CONTRAST MEDICATION: Performed by: INTERNAL MEDICINE

## 2024-08-28 PROCEDURE — 2580000003 HC RX 258: Performed by: INTERNAL MEDICINE

## 2024-08-28 RX ORDER — 0.9 % SODIUM CHLORIDE 0.9 %
100 INTRAVENOUS SOLUTION INTRAVENOUS ONCE
Status: COMPLETED | OUTPATIENT
Start: 2024-08-28 | End: 2024-08-28

## 2024-08-28 RX ORDER — SODIUM CHLORIDE 0.9 % (FLUSH) 0.9 %
10 SYRINGE (ML) INJECTION PRN
Status: DISCONTINUED | OUTPATIENT
Start: 2024-08-28 | End: 2024-08-31 | Stop reason: HOSPADM

## 2024-08-28 RX ORDER — IOPAMIDOL 755 MG/ML
75 INJECTION, SOLUTION INTRAVASCULAR
Status: COMPLETED | OUTPATIENT
Start: 2024-08-28 | End: 2024-08-28

## 2024-08-28 RX ADMIN — SODIUM CHLORIDE 100 ML: 9 INJECTION, SOLUTION INTRAVENOUS at 11:45

## 2024-08-28 RX ADMIN — SODIUM CHLORIDE, PRESERVATIVE FREE 10 ML: 5 INJECTION INTRAVENOUS at 11:45

## 2024-08-28 RX ADMIN — IOPAMIDOL 75 ML: 755 INJECTION, SOLUTION INTRAVENOUS at 11:44

## 2024-09-05 ENCOUNTER — HOSPITAL ENCOUNTER (OUTPATIENT)
Dept: INFUSION THERAPY | Age: 29
Discharge: HOME | End: 2024-09-05
Payer: MEDICARE

## 2024-09-05 ENCOUNTER — TELEMEDICINE (OUTPATIENT)
Dept: ONCOLOGY | Age: 29
End: 2024-09-05
Payer: MEDICARE

## 2024-09-05 DIAGNOSIS — Z85.830 HISTORY OF SARCOMA OF BONE: Primary | ICD-10-CM

## 2024-09-05 DIAGNOSIS — Z85.89 HISTORY OF MALIGNANT NEOPLASM METASTATIC TO LUNG: ICD-10-CM

## 2024-09-05 PROCEDURE — 2580000003 HC RX 258: Performed by: INTERNAL MEDICINE

## 2024-09-05 PROCEDURE — 99214 OFFICE O/P EST MOD 30 MIN: CPT | Performed by: INTERNAL MEDICINE

## 2024-09-05 PROCEDURE — 96523 IRRIG DRUG DELIVERY DEVICE: CPT

## 2024-09-05 PROCEDURE — 6360000002 HC RX W HCPCS: Performed by: INTERNAL MEDICINE

## 2024-09-05 PROCEDURE — G8427 DOCREV CUR MEDS BY ELIG CLIN: HCPCS | Performed by: INTERNAL MEDICINE

## 2024-09-05 RX ORDER — HEPARIN 100 UNIT/ML
500 SYRINGE INTRAVENOUS PRN
Status: DISCONTINUED | OUTPATIENT
Start: 2024-09-05 | End: 2024-09-06 | Stop reason: HOSPADM

## 2024-09-05 RX ORDER — HEPARIN 100 UNIT/ML
500 SYRINGE INTRAVENOUS PRN
OUTPATIENT
Start: 2024-09-05

## 2024-09-05 RX ORDER — SODIUM CHLORIDE 0.9 % (FLUSH) 0.9 %
10 SYRINGE (ML) INJECTION PRN
Status: DISCONTINUED | OUTPATIENT
Start: 2024-09-05 | End: 2024-09-06 | Stop reason: HOSPADM

## 2024-09-05 RX ORDER — SODIUM CHLORIDE 0.9 % (FLUSH) 0.9 %
10 SYRINGE (ML) INJECTION PRN
OUTPATIENT
Start: 2024-09-05

## 2024-09-05 RX ORDER — WATER 10 ML/10ML
2.2 INJECTION INTRAMUSCULAR; INTRAVENOUS; SUBCUTANEOUS ONCE
OUTPATIENT
Start: 2024-09-05 | End: 2024-09-05

## 2024-09-05 RX ORDER — SODIUM CHLORIDE 0.9 % (FLUSH) 0.9 %
20 SYRINGE (ML) INJECTION PRN
OUTPATIENT
Start: 2024-09-05

## 2024-09-05 RX ADMIN — HEPARIN 500 UNITS: 100 SYRINGE at 15:46

## 2024-09-05 RX ADMIN — SODIUM CHLORIDE, PRESERVATIVE FREE 10 ML: 5 INJECTION INTRAVENOUS at 15:46

## 2024-09-05 NOTE — PROGRESS NOTES
DIAGNOSIS:   Undifferentiated pleomorphic  Sarcoma, right leg, T4 N0 M0  (stage IIIb) diagnosed 05/2020  Evidence of isolated single lung metastases measuring 6.8 cm in the left lower lobe      CURRENT THERAPY:  S/P full right leg amputation (Right hip disarticulation)   Adjuvant chemotherapy delayed per patient choice until after delivery   Chemo with ifosfamide and adriamycin (AIM) to started 11/3/2020   S/P left lower wedge resection, 09/27/2022  Surveillance     BRIEF CASE HISTORY:   Kathy Quinn is a very pleasant 29 y.o. female who is referred to us for sarcoma. She presented with rightt knee sprain at work 04/2020 but the pain and swelling worsened with palpable mass. She underwent MRI which showed mass and was seen at [facility] where biopsy was taken and showed sarcoma. She was transferred to [facility] and decided to undergo full leg amputation due to early stage pregnancy, tumor was 16 cm x 20 cm x 3 cm and removed with negative margins. She was following with [facility] and decided she would prefer to receive treatment locally. The patient is currently 32 weeks pregnant and delivery plans are expected to be finalized 10/02/2020. She has elected to proceed with chemo after delivery. She is working towards prosthesis with test socket. 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   Kathy delivered in late October, after delivery, we decided to start chemo with AIM 11/3/2020.  Patient finished 4 cycles of chemotherapy and she was declared in remission and was started surveillance.  In May/2022, she had an isolated lung mass that was pleural-based and isolated in the left posterior lung.  Biopsy was negative so we decided observation.  09/2022 imaging showed progression of disease, with isolated mass in the left lung, she underwent lobectomy 09/27/2022 which showed 6.8 cm high grade sarcoma with invasion to the chest wall, removed with negative margins.

## 2024-10-23 ENCOUNTER — TELEPHONE (OUTPATIENT)
Dept: OBGYN CLINIC | Age: 29
End: 2024-10-23

## 2024-10-23 DIAGNOSIS — N91.2 AMENORRHEA: Primary | ICD-10-CM

## 2024-10-25 ENCOUNTER — HOSPITAL ENCOUNTER (OUTPATIENT)
Age: 29
Setting detail: SPECIMEN
Discharge: HOME | End: 2024-10-25

## 2024-10-25 DIAGNOSIS — N91.2 AMENORRHEA: ICD-10-CM

## 2024-10-25 LAB — B-HCG SERPL EIA 3RD IS-ACNC: 607 MIU/ML (ref 0–7)

## 2024-10-30 ENCOUNTER — TELEPHONE (OUTPATIENT)
Dept: OBGYN CLINIC | Age: 29
End: 2024-10-30

## 2024-10-30 NOTE — TELEPHONE ENCOUNTER
Kathy Quinn calling reporting a positive pregnancy test.    Kathy Quinn is not a new patient.     This is not Kathy Quinn's first pregnancy.    ** Please complete if patient has previous birth history, please delete is not applicable**  Number of pregnancies: 2  Mode of delivery (vaginal//both) VAG  If NEW patient please instruct patient will need prior OBGYN records.     Kahty Quinn last menstrual cycle: 24  Based on LMP patient is 5W4D weeks     Dose patient have any concerns?   [x]YES 1ST PREGNANCY 6 WEEKS EARLY-ALSO ONCOLOGY  []NO  If yes, please discuss concern with provider to determine if patient needs additional appointment.      Scheduling Instructions and Education  [x]If patient less than 8 weeks please schedule missed menses (30 mins) between 6-8 weeks. ALSO scheduled USN w/ IPV (w/ NURSE) to follow 2-3 weeks after missed menses   Patient education: Initial missed menses appointment will consist of a pregnancy test and to establish care and review previous births **if applicable**. There will NOT be an ultrasound at this first visit. Please review that the USN and IPV visit will be 2-3 weeks after patient's missed menses. At this appointment dating ultrasound will be complete, prenatal labs ordered, prenatal education completed, pelvic   Please document appointments scheduled during phone call   Missed menses date/provider:  1:00 MORENITA  USN/IPV date:USN  11:15, IPV 12:00    Please forward telephone encounter to provider appointments are scheduled with prior to closing telephone encounter.

## 2024-11-05 ENCOUNTER — HOSPITAL ENCOUNTER (OUTPATIENT)
Dept: INFUSION THERAPY | Age: 29
Discharge: HOME | End: 2024-11-05
Payer: MEDICARE

## 2024-11-05 DIAGNOSIS — Z85.830 HISTORY OF SARCOMA OF BONE: Primary | ICD-10-CM

## 2024-11-05 PROCEDURE — 2580000003 HC RX 258: Performed by: INTERNAL MEDICINE

## 2024-11-05 PROCEDURE — 96523 IRRIG DRUG DELIVERY DEVICE: CPT

## 2024-11-05 PROCEDURE — 6360000002 HC RX W HCPCS: Performed by: INTERNAL MEDICINE

## 2024-11-05 RX ORDER — HEPARIN 100 UNIT/ML
500 SYRINGE INTRAVENOUS PRN
Status: DISCONTINUED | OUTPATIENT
Start: 2024-11-05 | End: 2024-11-06 | Stop reason: HOSPADM

## 2024-11-05 RX ORDER — WATER 10 ML/10ML
2.2 INJECTION INTRAMUSCULAR; INTRAVENOUS; SUBCUTANEOUS ONCE
OUTPATIENT
Start: 2024-11-05 | End: 2024-11-05

## 2024-11-05 RX ORDER — SODIUM CHLORIDE 0.9 % (FLUSH) 0.9 %
20 SYRINGE (ML) INJECTION PRN
OUTPATIENT
Start: 2024-11-05

## 2024-11-05 RX ORDER — SODIUM CHLORIDE 0.9 % (FLUSH) 0.9 %
10 SYRINGE (ML) INJECTION PRN
Status: DISCONTINUED | OUTPATIENT
Start: 2024-11-05 | End: 2024-11-06 | Stop reason: HOSPADM

## 2024-11-05 RX ORDER — HEPARIN 100 UNIT/ML
500 SYRINGE INTRAVENOUS PRN
OUTPATIENT
Start: 2024-11-05

## 2024-11-05 RX ORDER — SODIUM CHLORIDE 0.9 % (FLUSH) 0.9 %
10 SYRINGE (ML) INJECTION PRN
OUTPATIENT
Start: 2024-11-05

## 2024-11-05 RX ADMIN — SODIUM CHLORIDE, PRESERVATIVE FREE 10 ML: 5 INJECTION INTRAVENOUS at 09:00

## 2024-11-05 RX ADMIN — HEPARIN 500 UNITS: 100 SYRINGE at 09:00

## 2024-11-06 ENCOUNTER — PATIENT MESSAGE (OUTPATIENT)
Dept: ONCOLOGY | Age: 29
End: 2024-11-06

## 2024-11-21 ENCOUNTER — OFFICE VISIT (OUTPATIENT)
Dept: OBGYN CLINIC | Age: 29
End: 2024-11-21
Payer: MEDICARE

## 2024-11-21 VITALS
SYSTOLIC BLOOD PRESSURE: 136 MMHG | BODY MASS INDEX: 39.46 KG/M2 | HEIGHT: 61 IN | DIASTOLIC BLOOD PRESSURE: 64 MMHG | WEIGHT: 209 LBS

## 2024-11-21 DIAGNOSIS — Z87.59 HISTORY OF PRETERM PREMATURE RUPTURE OF MEMBRANES (PPROM): ICD-10-CM

## 2024-11-21 DIAGNOSIS — Z85.89 HISTORY OF MALIGNANT NEOPLASM METASTATIC TO LUNG: ICD-10-CM

## 2024-11-21 DIAGNOSIS — Z85.831 HISTORY OF SARCOMA: ICD-10-CM

## 2024-11-21 DIAGNOSIS — N92.6 MISSED MENSES: Primary | ICD-10-CM

## 2024-11-21 DIAGNOSIS — Z14.8 CARRIER OF FRAGILE X CHROMOSOME: ICD-10-CM

## 2024-11-21 PROBLEM — O35.09X0: Status: RESOLVED | Noted: 2023-07-07 | Resolved: 2024-11-21

## 2024-11-21 PROBLEM — Z3A.38 38 WEEKS GESTATION OF PREGNANCY: Status: RESOLVED | Noted: 2023-08-18 | Resolved: 2024-11-21

## 2024-11-21 PROCEDURE — G8484 FLU IMMUNIZE NO ADMIN: HCPCS | Performed by: ADVANCED PRACTICE MIDWIFE

## 2024-11-21 PROCEDURE — G8417 CALC BMI ABV UP PARAM F/U: HCPCS | Performed by: ADVANCED PRACTICE MIDWIFE

## 2024-11-21 PROCEDURE — 1036F TOBACCO NON-USER: CPT | Performed by: ADVANCED PRACTICE MIDWIFE

## 2024-11-21 PROCEDURE — 99213 OFFICE O/P EST LOW 20 MIN: CPT | Performed by: ADVANCED PRACTICE MIDWIFE

## 2024-11-21 PROCEDURE — G8427 DOCREV CUR MEDS BY ELIG CLIN: HCPCS | Performed by: ADVANCED PRACTICE MIDWIFE

## 2024-11-21 ASSESSMENT — PATIENT HEALTH QUESTIONNAIRE - PHQ9
1. LITTLE INTEREST OR PLEASURE IN DOING THINGS: NOT AT ALL
SUM OF ALL RESPONSES TO PHQ QUESTIONS 1-9: 0
2. FEELING DOWN, DEPRESSED OR HOPELESS: NOT AT ALL
1. LITTLE INTEREST OR PLEASURE IN DOING THINGS: NOT AT ALL
SUM OF ALL RESPONSES TO PHQ9 QUESTIONS 1 & 2: 0
SUM OF ALL RESPONSES TO PHQ QUESTIONS 1-9: 0
SUM OF ALL RESPONSES TO PHQ QUESTIONS 1-9: 0
2. FEELING DOWN, DEPRESSED OR HOPELESS: NOT AT ALL
SUM OF ALL RESPONSES TO PHQ QUESTIONS 1-9: 0
SUM OF ALL RESPONSES TO PHQ9 QUESTIONS 1 & 2: 0

## 2024-11-21 NOTE — PROGRESS NOTES
[facility] [address]  [place] [id number]  Dept: [phone]    Missed Menses Intake    Subjective:    Patient Name:Kathy Quinn  Patient Age: 29 y.o.  Date of Visit: 2024    Kathy Quinn arrives for missed menses visit.   Kathy Quinn had a positive pregnancy test   Kathy Quinn does not have concerns.   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     OBGYN History:   Date of Last Pap Smear:  normal   Number of Pregnancies: 3  Number of Live Births: 2  [x] Vaginal Birth  []  Birth  [] Vaginal Birth after  delivery ()  [x] History of High Risk Pregnancy(ies) cancer, gestational diabetes   [] History of Birth Complications  [x] Hx of infant with NICU stay    Past Medical History  Diabetes: No  Anxiety: No  Depression: No  Bipolar: No  High Blood Pressure:Yes  Stroke: No  Seizure: No  Deep Vein Thrombosis: No  Preeclampsia: No  Gestational Diabetes:Yes  Gestational Hypertension:No  Postpartum Hemorrhage: No  Bleeding Disorder: No  Sexually Transmitted Infections: No  Genital Herpes: No  History of chicken pox/varicella vaccine: Yes  Daily Medications: No  Prenatal     Past Family History (first degree relative)  Family history of blood clots: No  Family history of diabetes:No  Family history of factor V: No  Family history (mother/sister) of preeclampsia in a previous pregnancy: No    Early 1 Hour Glucose Screening  [x] BMI 30 or greater (if marked, positive screen)  Risk Factors (need more than 1 if BMI less than 30)  [] Physical inactivity   [] First degree relative with diabetes  [] High- risk race or ethnicity (, , ,  American, 
after pregnancy     3. History of  premature rupture of membranes (PPROM)  -G1  - G2 full term delivery     4. 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 History of sarcoma      Patient was seen with total face to face time of 25 minutes. More than 50% of this visit was spent face to face coordinating plan of care and answering questions regarding     Diagnosis Orders   1. Missed menses  C.trachomatis N.gonorrhoeae DNA, Urine      2. History of malignant neoplasm metastatic to lung        3. History of  premature rupture of membranes (PPROM)        4. 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 History of sarcoma         .     Return for keep scheduled appointment.    Electronically signed by: FATEMEH Clarke CNM

## 2024-11-22 NOTE — PROGRESS NOTES
250 mg/dL: NA  History of polycystic ovarian syndrome: No  A1c greater than or equal to 5.7%: NA    Assessment/Plan:    No pregnancy checklist tasks were completed during this visit, and no tasks are pending completion.       Pregnancy at 9 WEEKS 2 DAYS   She was counseled on office policies. She was educated about the anticipated course of prenatal care and routine prenatal labs.   Patient has consented to HIV testing and urine drug screen: Yes  Initial Pathways Screen was completed: No  Pregnancy Education Checklist initiated and documented above.  The patient was counseled on carrier screening by provider at prior visit. She requested CF/SMA/Fragile X testing. See scanned form.  The patient has been counseled on the option for 1st trimester screen vs NIPs by provider at prior visit. She verbalized understanding and would like to proceed with: FTS. See scanned form.  The patient was informed that the providers only delivers at [facility] and is agreeable to delivery at [facility].  She denies tobacco use. The patient was counseled on the risks of tobacco abuse, both maternal and fetal. She was instructed to stop smoking if currently using tobacco. Morbidity, mortality, and cessation programs were reviewed. The risks include but are not limited to increased risks of  labor,  delivery, premature rupture of membranes, intrauterine growth restriction, intrauterine fetal demise and abruptio placenta. Secondary smoke risks were also reviewed. Increases in cancer, respiratory problems, and sudden infant death syndrome were reviewed as well.    Swelling: no  Bleeding: no  Discharge: no   Dysuria: no  Nausea: no encouraged small frequent meals, and vitamin B6 and unisom if needed.   Heartburn: no  Epigastric pain: no     Diagnosis Orders   1. 9 weeks gestation of pregnancy  Glucose tolerance, 1 hour    Ambulatory referral to Maternal Fetal    HIV Screen    Hepatitis C

## 2024-11-25 ENCOUNTER — HOSPITAL ENCOUNTER (OUTPATIENT)
Dept: INFUSION THERAPY | Age: 29
Discharge: HOME | End: 2024-11-25

## 2024-11-25 ENCOUNTER — INITIAL PRENATAL (OUTPATIENT)
Dept: OBGYN CLINIC | Age: 29
End: 2024-11-25
Payer: MEDICARE

## 2024-11-25 ENCOUNTER — HOSPITAL ENCOUNTER (OUTPATIENT)
Age: 29
Setting detail: SPECIMEN
Discharge: HOME | End: 2024-11-25

## 2024-11-25 VITALS
HEART RATE: 95 BPM | SYSTOLIC BLOOD PRESSURE: 124 MMHG | BODY MASS INDEX: 39.94 KG/M2 | WEIGHT: 211.4 LBS | DIASTOLIC BLOOD PRESSURE: 66 MMHG

## 2024-11-25 DIAGNOSIS — Z3A.09 9 WEEKS GESTATION OF PREGNANCY: Primary | ICD-10-CM

## 2024-11-25 DIAGNOSIS — Z3A.09 9 WEEKS GESTATION OF PREGNANCY: ICD-10-CM

## 2024-11-25 LAB
AMPHET UR QL SCN: NEGATIVE
BACTERIA URNS QL MICRO: ABNORMAL
BARBITURATES UR QL SCN: NEGATIVE
BENZODIAZ UR QL: NEGATIVE
BILIRUB UR QL STRIP: NEGATIVE
CANNABINOIDS UR QL SCN: NEGATIVE
CASTS #/AREA URNS LPF: ABNORMAL /LPF (ref 0–8)
CLARITY UR: ABNORMAL
COCAINE UR QL SCN: NEGATIVE
COLOR UR: YELLOW
CREAT UR-MCNC: 50.9 MG/DL (ref 28–217)
EPI CELLS #/AREA URNS HPF: ABNORMAL /HPF (ref 0–5)
FENTANYL UR QL: NEGATIVE
GLUCOSE UR STRIP-MCNC: NEGATIVE MG/DL
HGB UR QL STRIP.AUTO: NEGATIVE
KETONES UR STRIP-MCNC: NEGATIVE MG/DL
LEUKOCYTE ESTERASE UR QL STRIP: ABNORMAL
METHADONE UR QL: NEGATIVE
NITRITE UR QL STRIP: NEGATIVE
OPIATES UR QL SCN: NEGATIVE
OXYCODONE UR QL SCN: NEGATIVE
PCP UR QL SCN: NEGATIVE
PH UR STRIP: 7 [PH] (ref 5–8)
PROT UR STRIP-MCNC: NEGATIVE MG/DL
RBC #/AREA URNS HPF: ABNORMAL /HPF (ref 0–4)
SP GR UR STRIP: 1.01 (ref 1–1.03)
TEST INFORMATION: NORMAL
TOTAL PROTEIN, URINE: <4 MG/DL
URINE TOTAL PROTEIN CREATININE RATIO: NORMAL (ref 0–0.2)
UROBILINOGEN UR STRIP-ACNC: NORMAL EU/DL (ref 0–1)
WBC #/AREA URNS HPF: ABNORMAL /HPF (ref 0–5)

## 2024-11-25 PROCEDURE — G8417 CALC BMI ABV UP PARAM F/U: HCPCS | Performed by: ADVANCED PRACTICE MIDWIFE

## 2024-11-25 PROCEDURE — 99211 OFF/OP EST MAY X REQ PHY/QHP: CPT | Performed by: ADVANCED PRACTICE MIDWIFE

## 2024-11-25 PROCEDURE — G8427 DOCREV CUR MEDS BY ELIG CLIN: HCPCS | Performed by: ADVANCED PRACTICE MIDWIFE

## 2024-11-25 RX ORDER — SODIUM CHLORIDE 0.9 % (FLUSH) 0.9 %
10 SYRINGE (ML) INJECTION 2 TIMES DAILY
Status: DISCONTINUED | OUTPATIENT
Start: 2024-11-25 | End: 2024-11-28 | Stop reason: HOSPADM

## 2024-11-25 RX ORDER — HEPARIN 100 UNIT/ML
500 SYRINGE INTRAVENOUS PRN
Status: DISCONTINUED | OUTPATIENT
Start: 2024-11-25 | End: 2024-11-28 | Stop reason: HOSPADM

## 2024-11-25 SDOH — ECONOMIC STABILITY: FOOD INSECURITY: WITHIN THE PAST 12 MONTHS, THE FOOD YOU BOUGHT JUST DIDN'T LAST AND YOU DIDN'T HAVE MONEY TO GET MORE.: NEVER TRUE

## 2024-11-25 SDOH — ECONOMIC STABILITY: INCOME INSECURITY: IN THE LAST 12 MONTHS, WAS THERE A TIME WHEN YOU WERE NOT ABLE TO PAY THE MORTGAGE OR RENT ON TIME?: NO

## 2024-11-25 SDOH — ECONOMIC STABILITY: FOOD INSECURITY: WITHIN THE PAST 12 MONTHS, YOU WORRIED THAT YOUR FOOD WOULD RUN OUT BEFORE YOU GOT MONEY TO BUY MORE.: NEVER TRUE

## 2024-11-25 ASSESSMENT — ANXIETY QUESTIONNAIRES
6. BECOMING EASILY ANNOYED OR IRRITABLE: NOT AT ALL
4. TROUBLE RELAXING: NOT AT ALL
IF YOU CHECKED OFF ANY PROBLEMS ON THIS QUESTIONNAIRE, HOW DIFFICULT HAVE THESE PROBLEMS MADE IT FOR YOU TO DO YOUR WORK, TAKE CARE OF THINGS AT HOME, OR GET ALONG WITH OTHER PEOPLE: NOT DIFFICULT AT ALL
7. FEELING AFRAID AS IF SOMETHING AWFUL MIGHT HAPPEN: NOT AT ALL
1. FEELING NERVOUS, ANXIOUS, OR ON EDGE: NOT AT ALL
3. WORRYING TOO MUCH ABOUT DIFFERENT THINGS: NOT AT ALL
2. NOT BEING ABLE TO STOP OR CONTROL WORRYING: NOT AT ALL
GAD7 TOTAL SCORE: 0
5. BEING SO RESTLESS THAT IT IS HARD TO SIT STILL: NOT AT ALL

## 2024-11-26 LAB
MICROORGANISM SPEC CULT: ABNORMAL
SERVICE CMNT-IMP: ABNORMAL
SPECIMEN DESCRIPTION: ABNORMAL

## 2024-12-05 ENCOUNTER — HOSPITAL ENCOUNTER (OUTPATIENT)
Age: 29
Setting detail: SPECIMEN
Discharge: HOME | End: 2024-12-05

## 2024-12-05 DIAGNOSIS — Z3A.09 9 WEEKS GESTATION OF PREGNANCY: ICD-10-CM

## 2024-12-05 LAB
ABO + RH BLD: NORMAL
AMOUNT GLUCOSE GIVEN: NORMAL G
BASOPHILS # BLD: <0.03 K/UL (ref 0–0.2)
BASOPHILS NFR BLD: 0 % (ref 0–2)
BLOOD GROUP ANTIBODIES SERPL: NEGATIVE
EOSINOPHIL # BLD: 0.08 K/UL (ref 0–0.44)
EOSINOPHILS RELATIVE PERCENT: 1 % (ref 1–4)
ERYTHROCYTE [DISTWIDTH] IN BLOOD BY AUTOMATED COUNT: 12.5 % (ref 11.8–14.4)
EST. AVERAGE GLUCOSE BLD GHB EST-MCNC: 117 MG/DL
GLUCOSE 3H P 100 G GLC PO SERPL-MCNC: 224 MG/DL
HBA1C MFR BLD: 5.7 % (ref 4–6)
HBV SURFACE AG SERPL QL IA: NONREACTIVE
HCT VFR BLD AUTO: 40.2 % (ref 36.3–47.1)
HCV AB SERPL QL IA: NONREACTIVE
HGB BLD-MCNC: 12.8 G/DL (ref 11.9–15.1)
HIV 1+2 AB+HIV1 P24 AG SERPL QL IA: NONREACTIVE
IMM GRANULOCYTES # BLD AUTO: <0.03 K/UL (ref 0–0.3)
IMM GRANULOCYTES NFR BLD: 0 %
LYMPHOCYTES NFR BLD: 1.19 K/UL (ref 1.1–3.7)
LYMPHOCYTES RELATIVE PERCENT: 21 % (ref 24–43)
MCH RBC QN AUTO: 28.6 PG (ref 25.2–33.5)
MCHC RBC AUTO-ENTMCNC: 31.8 G/DL (ref 28.4–34.8)
MCV RBC AUTO: 89.9 FL (ref 82.6–102.9)
MONOCYTES NFR BLD: 0.3 K/UL (ref 0.1–1.2)
MONOCYTES NFR BLD: 5 % (ref 3–12)
NEUTROPHILS NFR BLD: 71 % (ref 36–65)
NEUTS SEG NFR BLD: 4.01 K/UL (ref 1.5–8.1)
NRBC BLD-RTO: 0 PER 100 WBC
PLATELET # BLD AUTO: ABNORMAL K/UL (ref 138–453)
PLATELET, FLUORESCENCE: ABNORMAL K/UL (ref 138–453)
RBC # BLD AUTO: 4.47 M/UL (ref 3.95–5.11)
RUBV IGG SERPL QL IA: 27.1 IU/ML
T PALLIDUM AB SER QL IA: NONREACTIVE
WBC OTHER # BLD: 5.6 K/UL (ref 3.5–11.3)

## 2024-12-06 LAB
ALBUMIN SERPL-MCNC: 4.4 G/DL (ref 3.5–5.2)
ALBUMIN/GLOB SERPL: 2 {RATIO} (ref 1–2.5)
ALP SERPL-CCNC: 57 U/L (ref 35–104)
ALT SERPL-CCNC: 16 U/L (ref 10–35)
ANION GAP SERPL CALCULATED.3IONS-SCNC: 13 MMOL/L (ref 9–16)
AST SERPL-CCNC: 17 U/L (ref 10–35)
BILIRUB SERPL-MCNC: 0.4 MG/DL (ref 0–1.2)
BUN SERPL-MCNC: 6 MG/DL (ref 6–20)
CALCIUM SERPL-MCNC: 9.2 MG/DL (ref 8.6–10.4)
CHLORIDE SERPL-SCNC: 101 MMOL/L (ref 98–107)
CO2 SERPL-SCNC: 22 MMOL/L (ref 20–31)
CREAT SERPL-MCNC: 0.5 MG/DL (ref 0.6–0.9)
GFR, ESTIMATED: >90 ML/MIN/1.73M2
GLUCOSE SERPL-MCNC: 228 MG/DL (ref 74–99)
POTASSIUM SERPL-SCNC: 4 MMOL/L (ref 3.7–5.3)
PROT SERPL-MCNC: 6.6 G/DL (ref 6.6–8.7)
SODIUM SERPL-SCNC: 136 MMOL/L (ref 136–145)
TSH SERPL DL<=0.05 MIU/L-ACNC: 0.97 UIU/ML (ref 0.27–4.2)
VZV IGG SER QL IA: 1.08

## 2024-12-09 ENCOUNTER — TELEPHONE (OUTPATIENT)
Dept: OBGYN CLINIC | Age: 29
End: 2024-12-09

## 2024-12-09 DIAGNOSIS — R73.09 ELEVATED GLUCOSE: Primary | ICD-10-CM

## 2024-12-09 DIAGNOSIS — Z3A.11 11 WEEKS GESTATION OF PREGNANCY: ICD-10-CM

## 2024-12-09 RX ORDER — BLOOD-GLUCOSE METER
1 KIT MISCELLANEOUS DAILY PRN
Qty: 1 KIT | Refills: 0 | Status: SHIPPED | OUTPATIENT
Start: 2024-12-09

## 2024-12-09 RX ORDER — GLUCOSAMINE HCL/CHONDROITIN SU 500-400 MG
CAPSULE ORAL 4 TIMES DAILY
Qty: 200 STRIP | Refills: 3 | Status: SHIPPED | OUTPATIENT
Start: 2024-12-09

## 2024-12-09 RX ORDER — GLUCOSAMINE HCL/CHONDROITIN SU 500-400 MG
1 CAPSULE ORAL 4 TIMES DAILY
Qty: 200 EACH | Refills: 3 | Status: SHIPPED | OUTPATIENT
Start: 2024-12-09

## 2024-12-09 RX ORDER — LANCETS 30 GAUGE
1 EACH MISCELLANEOUS 4 TIMES DAILY
Qty: 1 EACH | Refills: 0 | Status: SHIPPED | OUTPATIENT
Start: 2024-12-09 | End: 2025-01-28

## 2024-12-09 NOTE — TELEPHONE ENCOUNTER
Called patient and gave results per provider note. Education given, all questions answered. Patient expressed understanding.     Patient will call the office with any questions.Glucose supplies and MFM order pending.

## 2024-12-16 ENCOUNTER — HOSPITAL ENCOUNTER (OUTPATIENT)
Dept: DIABETES SERVICES | Age: 29
Setting detail: THERAPIES SERIES
Discharge: HOME | End: 2024-12-16
Payer: MEDICARE

## 2024-12-16 LAB
Lab: NORMAL
NTRA 22Q11.2 DELETION SYNDROME POPULATION-BASED RISK TEXT: NORMAL
NTRA 22Q11.2 DELETION SYNDROME RESULT TEXT: NORMAL
NTRA 22Q11.2 DELETION SYNDROME RISK SCORE TEXT: NORMAL
NTRA FETAL FRACTION: NORMAL
NTRA FETAL RHD SUMMARY: NORMAL
NTRA GENDER OF FETUS: NORMAL
NTRA MONOSOMY X AGE-BASED RISK TEXT: NORMAL
NTRA MONOSOMY X RESULT TEXT: NORMAL
NTRA MONOSOMY X RISK SCORE TEXT: NORMAL
NTRA TRIPLOIDY RESULT TEXT: NORMAL
NTRA TRISOMY 13 AGE-BASED RISK TEXT: NORMAL
NTRA TRISOMY 13 RESULT TEXT: NORMAL
NTRA TRISOMY 13 RISK SCORE TEXT: NORMAL
NTRA TRISOMY 18 AGE-BASED RISK TEXT: NORMAL
NTRA TRISOMY 18 RESULT TEXT: NORMAL
NTRA TRISOMY 18 RISK SCORE TEXT: NORMAL
NTRA TRISOMY 21 AGE-BASED RISK TEXT: NORMAL
NTRA TRISOMY 21 RESULT TEXT: NORMAL
NTRA TRISOMY 21 RISK SCORE TEXT: NORMAL

## 2024-12-16 PROCEDURE — G0108 DIAB MANAGE TRN  PER INDIV: HCPCS

## 2024-12-16 NOTE — PROGRESS NOTES
12-16-24BB  Post Partum   Taking Medications:  - Discuss rationale need and or stopping glucose lowering medications / insulin.  -verbalize role of metformin for pre - diabetes/ lowering insulin resistance      Coping: Address feelings about diagnosis of GDM, describe how stress, depression & anxiety affect blood glucose; identify coping strategies; Identify support needed & support network available.     12-16-24BB  Coping Post Partum  -describe the support system you will have in place after delivery  -describe risk for, symptoms of, and steps to get help \"baby blues”/postpartum depression                    Problem Solving - Prevention, detection & treatment of acute complications:  Identify symptoms of hyper & hypoglycemia, and prevention & treatment strategies   12-16-24BB  Problem Solving - postpartum - describe what to do if blood sugar is higher or lower than what it should be after delivery      Prevention, detection & treatment of potential complications:  Define the effects of the placental hormones on the course of gestational diabetes & describe the relationship of blood glucose levels to health of mom and baby 12-16-24BB  Reducing risks- weight management  -describe how body weight impact blood sugar, risk for diabetes and overall health         Reducing risks- routine care  -describe importance of having a PCP, other than women's health team, -describe how often to see PCP  -describe the tests, if any that have been recommended by health care team / pcp  -describe ways to help you to remember to get recommended tests, describe any barriers at might prevent you from following up with [facility] in the future         Reducing risks- tobacco cessation   -describe the effects of tobacco on your blood sugar  -describe the effects of tobacco on overall health, -describe the benefits of stopping tobacco         Reducing risks- breastfeeding   -describe the effects of breast feeding on blood sugar, describe

## 2024-12-19 ENCOUNTER — TELEPHONE (OUTPATIENT)
Dept: OBGYN CLINIC | Age: 29
End: 2024-12-19

## 2024-12-19 DIAGNOSIS — O24.319 PREGESTATIONAL DIABETES MELLITUS, MODIFIED WHITE CLASS B: Primary | ICD-10-CM

## 2024-12-19 NOTE — TELEPHONE ENCOUNTER
Michelle Davidson-     Dr Helton wanted to start her on insulin but never sent the medication in. According to his XD Nutrition message he wants 6 units short acting at dinner and 10 NPH. Not sure which insulin is correct so medication is not pending but pharmacy is correct. Thanks!

## 2024-12-20 RX ORDER — INSULIN HUMAN 100 [IU]/ML
10 INJECTION, SUSPENSION SUBCUTANEOUS NIGHTLY
Qty: 5 ADJUSTABLE DOSE PRE-FILLED PEN SYRINGE | Refills: 3 | Status: SHIPPED | OUTPATIENT
Start: 2024-12-20

## 2024-12-20 RX ORDER — INSULIN LISPRO 100 [IU]/ML
6 INJECTION, SOLUTION INTRAVENOUS; SUBCUTANEOUS
Qty: 5 ADJUSTABLE DOSE PRE-FILLED PEN SYRINGE | Refills: 3 | Status: SHIPPED | OUTPATIENT
Start: 2024-12-20

## 2024-12-23 ENCOUNTER — ROUTINE PRENATAL (OUTPATIENT)
Dept: OBGYN CLINIC | Age: 29
End: 2024-12-23
Payer: MEDICARE

## 2024-12-23 VITALS
DIASTOLIC BLOOD PRESSURE: 72 MMHG | BODY MASS INDEX: 39.08 KG/M2 | HEIGHT: 61 IN | SYSTOLIC BLOOD PRESSURE: 128 MMHG | WEIGHT: 207 LBS

## 2024-12-23 DIAGNOSIS — O24.319 PREGESTATIONAL DIABETES MELLITUS, MODIFIED WHITE CLASS B: ICD-10-CM

## 2024-12-23 DIAGNOSIS — Z85.89 HISTORY OF MALIGNANT NEOPLASM METASTATIC TO LUNG: ICD-10-CM

## 2024-12-23 DIAGNOSIS — Z3A.13 13 WEEKS GESTATION OF PREGNANCY: ICD-10-CM

## 2024-12-23 DIAGNOSIS — Z14.8 CARRIER OF FRAGILE X CHROMOSOME: ICD-10-CM

## 2024-12-23 DIAGNOSIS — Z87.59 HISTORY OF PRETERM PREMATURE RUPTURE OF MEMBRANES (PPROM): ICD-10-CM

## 2024-12-23 DIAGNOSIS — O09.92 HRP (HIGH RISK PREGNANCY), SECOND TRIMESTER: Primary | ICD-10-CM

## 2024-12-23 DIAGNOSIS — Z85.831 HISTORY OF SARCOMA: ICD-10-CM

## 2024-12-23 PROCEDURE — 99213 OFFICE O/P EST LOW 20 MIN: CPT | Performed by: ADVANCED PRACTICE MIDWIFE

## 2024-12-23 PROCEDURE — G8484 FLU IMMUNIZE NO ADMIN: HCPCS | Performed by: ADVANCED PRACTICE MIDWIFE

## 2024-12-23 PROCEDURE — 2022F DILAT RTA XM EVC RTNOPTHY: CPT | Performed by: ADVANCED PRACTICE MIDWIFE

## 2024-12-23 PROCEDURE — G8417 CALC BMI ABV UP PARAM F/U: HCPCS | Performed by: ADVANCED PRACTICE MIDWIFE

## 2024-12-23 PROCEDURE — 3044F HG A1C LEVEL LT 7.0%: CPT | Performed by: ADVANCED PRACTICE MIDWIFE

## 2024-12-23 PROCEDURE — 1036F TOBACCO NON-USER: CPT | Performed by: ADVANCED PRACTICE MIDWIFE

## 2024-12-23 PROCEDURE — G8427 DOCREV CUR MEDS BY ELIG CLIN: HCPCS | Performed by: ADVANCED PRACTICE MIDWIFE

## 2024-12-23 SDOH — ECONOMIC STABILITY: INCOME INSECURITY: HOW HARD IS IT FOR YOU TO PAY FOR THE VERY BASICS LIKE FOOD, HOUSING, MEDICAL CARE, AND HEATING?: NOT HARD AT ALL

## 2024-12-23 SDOH — ECONOMIC STABILITY: FOOD INSECURITY: WITHIN THE PAST 12 MONTHS, THE FOOD YOU BOUGHT JUST DIDN'T LAST AND YOU DIDN'T HAVE MONEY TO GET MORE.: NEVER TRUE

## 2024-12-23 SDOH — ECONOMIC STABILITY: FOOD INSECURITY: WITHIN THE PAST 12 MONTHS, YOU WORRIED THAT YOUR FOOD WOULD RUN OUT BEFORE YOU GOT MONEY TO BUY MORE.: NEVER TRUE

## 2024-12-23 NOTE — PROGRESS NOTES
SUBJECTIVE:    Kathy is here for her return OB visit. denies concerns today.   She denies  feeling fetal movement.  She denies vaginal bleeding.  She denies vaginal discharge.  She denies leaking of fluid.  She denies uterine cramping.  She denies  nausea and/or vomiting.    OBJECTIVE:  Blood pressure 128/72, height 1.549 m (5' 1\"), weight 93.9 kg (207 lb), last menstrual period 2024, not currently breastfeeding.    Total weight gain: 3.175 kg (7 lb)      Vaccinations:   []Accepted   [x]Declined flu  [] Undecided   [] Educated on recommendations    ASSESSMENT/PLAN:  IUP @ 13+2 weeks  S=D    High Risk Pregnancy  Due date is based on LMP and confirmed with 8w6d early dating ultrasound  Patient's prenatal labs are completed.  Patient's blood type A positive and rhogam is not indicated in the pregnancy.   Early glucola indicated: yes  Completed: Yes  Results: 224 Fail  Plan:   Patient Accepted  genetic screening.   Accepted and cell free DNA testingand test(s) are low risk trisomy 21/18/16 (NIPT)  Anatomy scan scheduled 24   Total weight gain in pregnancy reviewed: Yes  Fetal movement was reviewed.      2. 13 weeks gestation of pregnancy  - Reviewed warning signs     3. Pregestational diabetes mellitus, modified White class B  - Insulin adjusted by  0/0/10/16    4. History of  premature rupture of membranes (PPROM)  - [facility] Referral previously sent     5. History of malignant neoplasm metastatic to lung    6. 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 History of sarcoma        She was counseled regarding all of the above:    Return in about 4 weeks (around 2025) for Return OB.    The patient, Kathy Quinn was seen for 25 minutes were spent on this encounter on the date of service by the provider.        Electronically Signed By: FATEMEH Clarke CNM

## 2024-12-30 ENCOUNTER — TELEPHONE (OUTPATIENT)
Dept: PERINATAL CARE | Age: 29
End: 2024-12-30

## 2024-12-30 ENCOUNTER — ROUTINE PRENATAL (OUTPATIENT)
Dept: PERINATAL CARE | Age: 29
End: 2024-12-30
Payer: MEDICARE

## 2024-12-30 VITALS
SYSTOLIC BLOOD PRESSURE: 131 MMHG | HEIGHT: 61 IN | TEMPERATURE: 98.2 F | HEART RATE: 15 BPM | BODY MASS INDEX: 39.46 KG/M2 | DIASTOLIC BLOOD PRESSURE: 80 MMHG | WEIGHT: 209 LBS | RESPIRATION RATE: 16 BRPM

## 2024-12-30 DIAGNOSIS — O99.212 OBESITY AFFECTING PREGNANCY IN SECOND TRIMESTER, UNSPECIFIED OBESITY TYPE: ICD-10-CM

## 2024-12-30 DIAGNOSIS — Z86.32 HISTORY OF GESTATIONAL DIABETES IN PRIOR PREGNANCY, CURRENTLY PREGNANT, SECOND TRIMESTER: ICD-10-CM

## 2024-12-30 DIAGNOSIS — O09.212 CURRENT PREGNANCY WITH HISTORY OF PRE-TERM LABOR IN SECOND TRIMESTER: ICD-10-CM

## 2024-12-30 DIAGNOSIS — O28.5 ABNORMAL GENETIC TEST DURING PREGNANCY: ICD-10-CM

## 2024-12-30 DIAGNOSIS — Z3A.14 14 WEEKS GESTATION OF PREGNANCY: ICD-10-CM

## 2024-12-30 DIAGNOSIS — O24.119 PRE-EXISTING TYPE 2 DIABETES MELLITUS DURING PREGNANCY, ANTEPARTUM: Primary | ICD-10-CM

## 2024-12-30 DIAGNOSIS — O09.292 HISTORY OF GESTATIONAL DIABETES IN PRIOR PREGNANCY, CURRENTLY PREGNANT, SECOND TRIMESTER: ICD-10-CM

## 2024-12-30 DIAGNOSIS — O16.2 HYPERTENSION AFFECTING PREGNANCY IN SECOND TRIMESTER: ICD-10-CM

## 2024-12-30 DIAGNOSIS — O09.899 SHORT INTERVAL BETWEEN PREGNANCIES COMPLICATING PREGNANCY, ANTEPARTUM: ICD-10-CM

## 2024-12-30 PROCEDURE — G8484 FLU IMMUNIZE NO ADMIN: HCPCS | Performed by: OBSTETRICS & GYNECOLOGY

## 2024-12-30 PROCEDURE — G8428 CUR MEDS NOT DOCUMENT: HCPCS | Performed by: OBSTETRICS & GYNECOLOGY

## 2024-12-30 PROCEDURE — 99204 OFFICE O/P NEW MOD 45 MIN: CPT | Performed by: OBSTETRICS & GYNECOLOGY

## 2024-12-30 PROCEDURE — 2022F DILAT RTA XM EVC RTNOPTHY: CPT | Performed by: OBSTETRICS & GYNECOLOGY

## 2024-12-30 PROCEDURE — G8417 CALC BMI ABV UP PARAM F/U: HCPCS | Performed by: OBSTETRICS & GYNECOLOGY

## 2024-12-30 RX ORDER — ASPIRIN 81 MG/1
81 TABLET ORAL DAILY
COMMUNITY

## 2024-12-30 NOTE — TELEPHONE ENCOUNTER
Patient's second vital pulse reading today was erroneously entered as \"15\" however the second pulse reading should have been entered as \"115.\" The correct \"115\" second pulse reading is reflected in the fully dictated encounter note from 12/30/2024.

## 2025-01-07 ENCOUNTER — PATIENT MESSAGE (OUTPATIENT)
Dept: OBGYN CLINIC | Age: 30
End: 2025-01-07

## 2025-01-09 NOTE — TELEPHONE ENCOUNTER
Thank you for letting us know! Continue sending them to [facility] if he is managing the sugars/insulin. Thanks!

## 2025-01-13 ENCOUNTER — TELEPHONE (OUTPATIENT)
Dept: OBGYN CLINIC | Age: 30
End: 2025-01-13

## 2025-01-13 ENCOUNTER — HOSPITAL ENCOUNTER (OUTPATIENT)
Dept: DIABETES SERVICES | Age: 30
Setting detail: THERAPIES SERIES
Discharge: HOME | End: 2025-01-13
Payer: MEDICARE

## 2025-01-13 ENCOUNTER — PATIENT MESSAGE (OUTPATIENT)
Dept: OBGYN CLINIC | Age: 30
End: 2025-01-13

## 2025-01-13 DIAGNOSIS — O24.312 PRE-EXISTING DIABETES MELLITUS DURING PREGNANCY IN SECOND TRIMESTER: Primary | ICD-10-CM

## 2025-01-13 PROCEDURE — G0108 DIAB MANAGE TRN  PER INDIV: HCPCS

## 2025-01-13 NOTE — TELEPHONE ENCOUNTER
Patient called and would prefer if our office managed her insulin. Her last [facility] visit he changed her dinner and bed insulin. Dinner 16u humalog, bed 30u humulin.    With the dinner insulin sometimes it bottoms out depending on what she is eating.    Patient will send her numbers in today.

## 2025-01-13 NOTE — PROGRESS NOTES
in Subsequent pregnancies   Third pregnancy - took nighttime insulin with last pregnancy  Her mom has T2   Healthy Eating: Describe rationale for eating smaller, more frequent meals, why not to skip meals, choose foods low in simple sugar and high in fiber, consume smaller portions of starch foods, eat a protein source with every meal and snack and eat less CHO at breakfast than lunch or supper           12-16-24BB 1/13/25 JW pt known to writer from previous pregnancy. Pt very + and motivated. Hasn't gained weight at this point. Reports eating enough but gets full a little quicker. Pt is careful to pair protein with cho. Most meals eaten at home (has a 3yo and an 18mo old boy. Reviewed cho counting approach within dinner plate concept. Suggested 15-30g Br  15g sn  45-60gLandS   15g sn. Gave suggestions for snacks, recipes and recipe book.  Healthy Eating Postpartum:  -describe Healthy eating post pregnancy to prevent diabetes and support health, or weight, and/or breastfeeding goals - choose foods low in simple sugar and high in fiber, lean proteins, limit sat and trans fat, refer to What to Expect After GDM document 3/2023      Healthy Eating: Tools to implement self-care plan - Correctly read food labels & demonstrate CHO counting & portion control with personalized meal plan. Identify dining out strategies, food safety/foods to avoid and tips for handling morning sickness.       12-16-24BB 1/13/25 JW total vs type cho Healthy Eating: Tools to implement self-care plan - Correctly read food labels & use of portion control with personalized meal plan.  Identify dining out strategies,   nutrition to help with breastfeeding      Being Active: Verbalize effect of exercise on blood glucose levels; benefits of regular exercise; safety considerations; contraindications; maintenance of activity.                 12-16-24BB  Being Active Post-Partum  describe activity plan postpartum - cardio, stretching, strength training,

## 2025-01-20 ENCOUNTER — ROUTINE PRENATAL (OUTPATIENT)
Dept: OBGYN CLINIC | Age: 30
End: 2025-01-20
Payer: MEDICARE

## 2025-01-20 ENCOUNTER — ROUTINE PRENATAL (OUTPATIENT)
Dept: PERINATAL CARE | Age: 30
End: 2025-01-20
Payer: COMMERCIAL

## 2025-01-20 VITALS
SYSTOLIC BLOOD PRESSURE: 131 MMHG | WEIGHT: 209.44 LBS | RESPIRATION RATE: 18 BRPM | HEIGHT: 61 IN | DIASTOLIC BLOOD PRESSURE: 78 MMHG | HEART RATE: 118 BPM | TEMPERATURE: 98.3 F | BODY MASS INDEX: 39.54 KG/M2

## 2025-01-20 VITALS
WEIGHT: 212 LBS | HEART RATE: 92 BPM | HEIGHT: 60 IN | BODY MASS INDEX: 41.62 KG/M2 | DIASTOLIC BLOOD PRESSURE: 74 MMHG | SYSTOLIC BLOOD PRESSURE: 132 MMHG

## 2025-01-20 DIAGNOSIS — Z85.831 HISTORY OF SARCOMA: ICD-10-CM

## 2025-01-20 DIAGNOSIS — Z87.59 HISTORY OF PRETERM PREMATURE RUPTURE OF MEMBRANES (PPROM): ICD-10-CM

## 2025-01-20 DIAGNOSIS — Z34.90 THIRD PREGNANCY: Primary | ICD-10-CM

## 2025-01-20 DIAGNOSIS — O10.919 CHRONIC HYPERTENSION AFFECTING PREGNANCY: ICD-10-CM

## 2025-01-20 DIAGNOSIS — O24.319 PREGESTATIONAL DIABETES MELLITUS, MODIFIED WHITE CLASS B: ICD-10-CM

## 2025-01-20 DIAGNOSIS — Z14.8 CARRIER OF FRAGILE X CHROMOSOME: ICD-10-CM

## 2025-01-20 DIAGNOSIS — O09.92 HRP (HIGH RISK PREGNANCY), SECOND TRIMESTER: Primary | ICD-10-CM

## 2025-01-20 DIAGNOSIS — Z3A.17 17 WEEKS GESTATION OF PREGNANCY: ICD-10-CM

## 2025-01-20 DIAGNOSIS — Z85.89 HISTORY OF MALIGNANT NEOPLASM METASTATIC TO LUNG: ICD-10-CM

## 2025-01-20 PROBLEM — R74.01 TRANSAMINITIS: Status: RESOLVED | Noted: 2020-07-11 | Resolved: 2025-01-20

## 2025-01-20 PROBLEM — O14.90 PREECLAMPSIA: Status: RESOLVED | Noted: 2023-08-18 | Resolved: 2025-01-20

## 2025-01-20 PROCEDURE — 76817 TRANSVAGINAL US OBSTETRIC: CPT | Performed by: OBSTETRICS & GYNECOLOGY

## 2025-01-20 PROCEDURE — G8427 DOCREV CUR MEDS BY ELIG CLIN: HCPCS | Performed by: ADVANCED PRACTICE MIDWIFE

## 2025-01-20 PROCEDURE — 76805 OB US >/= 14 WKS SNGL FETUS: CPT | Performed by: OBSTETRICS & GYNECOLOGY

## 2025-01-20 PROCEDURE — 1036F TOBACCO NON-USER: CPT | Performed by: ADVANCED PRACTICE MIDWIFE

## 2025-01-20 PROCEDURE — G8417 CALC BMI ABV UP PARAM F/U: HCPCS | Performed by: ADVANCED PRACTICE MIDWIFE

## 2025-01-20 PROCEDURE — 3046F HEMOGLOBIN A1C LEVEL >9.0%: CPT | Performed by: ADVANCED PRACTICE MIDWIFE

## 2025-01-20 PROCEDURE — 2022F DILAT RTA XM EVC RTNOPTHY: CPT | Performed by: ADVANCED PRACTICE MIDWIFE

## 2025-01-20 PROCEDURE — 99213 OFFICE O/P EST LOW 20 MIN: CPT | Performed by: ADVANCED PRACTICE MIDWIFE

## 2025-01-20 PROCEDURE — 99999 PR OFFICE/OUTPT VISIT,PROCEDURE ONLY: CPT | Performed by: OBSTETRICS & GYNECOLOGY

## 2025-01-20 NOTE — PROGRESS NOTES
[facility]ERNAL FETAL MED  [address]  [place] [address]  Dept: [phone]     2025    RE:  Kathy Quinn     : 1995   AGE: 29 y.o.     Dear Dr. Varma,    Thank you for allowing me to see Kathy Quinn.  As I'm sure you will recall, Kathy Quinn is a 29 y.o. [id number]'s last menstrual period was 2024 (exact date). Estimated Date of Delivery: 25 at 17w2d seen in our office today for the following:    REASON FOR VISIT: Greater than 14-week    Patient Active Problem List    Diagnosis Date Noted    Tachycardia 2023    History of malignant neoplasm metastatic to lung 2022    Third pregnancy 2025    Short cervix 2023    Secondary malignant neoplasm of left lung (HCC) 2023    History of right leg amputation 2023    Pregestational DM 2023    Hx PPROM with sPTD x1 2023    Phantom pain after amputation of lower extremity (HCC) 2021    History of disarticulation of right hip 2020    cHTN  2020    Iron deficiency anemia 2020    Thrombocytosis 2020    History of sarcoma of bone 2020    FHx DM 2020        PAST HISTORY:  OB History    Para Term  AB Living   3 2 1 1 0 2   SAB IAB Ectopic Molar Multiple Live Births   0 0 0 0 0 2      # Outcome Date GA Lbr Zeferino/2nd Weight Sex Type Anes PTL Lv   3 Current            2 Term 23 38w0d 01:46 / 01:30 3.53 kg (7 lb 12.5 oz) M Vag-Spont EPI N MAYCO   1  10/12/20 33w6d  2.21 kg (4 lb 14 oz) M Vag-Spont EPI Y MAYCO          MEDICAL:  Past Medical History:   Diagnosis Date    33 weeks gestation of pregnancy 10/12/2020    child born oct 2020    Anemia 2020    During first pregnancy and cancer    Antepartum premature rupture of membrane     child born oct 2020    COVID-19 2021    fatigue    Gestational diabetes     Gestational diabetes mellitus May 2020

## 2025-01-20 NOTE — PROGRESS NOTES
SUBJECTIVE:    Kathy is here for her return OB visit. denies concerns today.   She reports  feeling fetal movement.  She denies vaginal bleeding.  She denies vaginal discharge.  She denies leaking of fluid.  She denies uterine cramping.  She denies  nausea and/or vomiting.    OBJECTIVE:  Blood pressure 132/74, pulse 92, height 1.524 m (5'), weight 96.2 kg (212 lb), last menstrual period 09/21/2024, not currently breastfeeding.    Total weight gain: 5.443 kg (12 lb)      Vaccinations:   []Accepted   [x]Declined flu  [] Undecided   [] Educated on recommendations    ASSESSMENT/PLAN:  IUP @ 17+2 weeks  S=D    High Risk Pregnancy  Due date is based on LMP and confirmed with 8w6d early dating ultrasound  Patient's prenatal labs are completed.  Patient's blood type A positive and rhogam is not indicated in the pregnancy.   Early glucola indicated: yes  Completed: Yes  Results: 224 Fail  Patient Accepted  genetic screening.   Accepted and cell free DNA testingand test(s) are low risk trisomy 21/18/16 (NIPT)  Anatomy scan scheduled 1/20/25 completed. Placenta posterior,normal cord insertion: Yes cervical length 3.2 cm, NO low lying placenta, no placenta previa .  Total weight gain in pregnancy reviewed: Yes  Fetal movement was reviewed.    2. 17 weeks gestation of pregnancy  - Discussed tachycardia with exertion with up and moving typically 110's. Not symptomatic. At rest in office today low 90's. Asking for referral to  referral placed.    3. Chronic hypertension affecting pregnancy  - BP WNL no meds. Taking baby ASA     4. Pregestational diabetes mellitus, modified White class B  - Reviewed patient is a pregestational diabetic dx in this pregnancy on medication  Current Plan of care includes   [] Diet controlled  [x] Insulin controlled. Regimen X/X/16/38  [x] Monitoring blood sugars fasting in the am and q2 hours after breakfast/lunch/dinner  [x] Fetal Growth scans every 4-6 weeks  [] Managed by MFM  [x] Managed by

## 2025-01-30 ENCOUNTER — TELEPHONE (OUTPATIENT)
Dept: OBGYN CLINIC | Age: 30
End: 2025-01-30

## 2025-01-30 DIAGNOSIS — O24.319 PREGESTATIONAL DIABETES MELLITUS, MODIFIED WHITE CLASS B: ICD-10-CM

## 2025-01-30 RX ORDER — INSULIN HUMAN 100 [IU]/ML
38 INJECTION, SUSPENSION SUBCUTANEOUS NIGHTLY
Qty: 3 ML | Refills: 10 | Status: SHIPPED | OUTPATIENT
Start: 2025-01-30

## 2025-01-30 NOTE — TELEPHONE ENCOUNTER
Patient's insurance will not fill her nighttime Humulin because they feel it is too soon. Yusef said they need a new script with new dosage.

## 2025-01-30 NOTE — PROGRESS NOTES
Allie Mendieta is a 33 y.o. female 23w5d        OB History    Para Term  AB Living   2 1 0 1 0 1   SAB IAB Ectopic Molar Multiple Live Births   0 0 0 0 0 1      # Outcome Date GA Lbr Zeferino/2nd Weight Sex Delivery Anes PTL Lv   2 Current            1  10/12/20 33w6d  4 lb 14 oz (2.21 kg) M Vag-Spont EPI Y MAYCO       Vitals  BP: 120/78  Weight - Scale: 199 lb (90.3 kg)  Patient Position: Sitting  Albumin: Negative  Glucose: Negative  Fundal Height (cm): 24 cm  Fetal HR: 148  Movement: Present    The patient was seen and evaluated. There was positive fetal movements. No contractions or leakage of fluid. Signs and symptoms of  labor as well as labor were reviewed. The Nuchal Translucency testing was reviewed and found to be normal A single marker MSAFP was reviewed and found to be normal. The patients anatomy ultrasound has been completed and reviewed with patient. TOP ST OH Reviewed. A 28 week lab panel was ordered. This includes a (HH, 1 hr GTT, U/A C&S). The patient is to complete this in the next two to four weeks. Pt is s/p right leg amputation and states she is having right hip/pelvic pain. She is having trouble with support and discussed limited range of motion and these concerns. It is getting harder to mobilize with her elbow crutches and she is using her wheelchair more. S/p right leg sarcoma as well as lung metastasis with wedge resection and neg margins. She last saw her oncologist  and states CT scan essentially normal.  Will follow closely with oncology and MFM. The S/S of Pre-Eclampsia were reviewed with the patient in detail. She is to report any of these if they occur. She currently denies any of these. The patient is RH positive Rhogam Ordered no    The patient was instructed on fetal kick counts and was given a kick sheet to complete every 8 hours. This is to begin at 28 weeks gestation.  She was instructed that
Presented for safety concerns  Per ED doctor, patient is fearful of .  Of note,  made violent statements in the waiting room and security was needed to remove .     - Does not feel safe at home, however is okay being discharged home.   -  consulted  
No

## 2025-02-03 ENCOUNTER — ROUTINE PRENATAL (OUTPATIENT)
Dept: PERINATAL CARE | Age: 30
End: 2025-02-03
Payer: COMMERCIAL

## 2025-02-03 ENCOUNTER — TELEPHONE (OUTPATIENT)
Dept: OBGYN CLINIC | Age: 30
End: 2025-02-03

## 2025-02-03 VITALS
HEART RATE: 104 BPM | DIASTOLIC BLOOD PRESSURE: 82 MMHG | BODY MASS INDEX: 39.65 KG/M2 | TEMPERATURE: 98.1 F | HEIGHT: 61 IN | RESPIRATION RATE: 16 BRPM | WEIGHT: 210 LBS | SYSTOLIC BLOOD PRESSURE: 134 MMHG

## 2025-02-03 DIAGNOSIS — O16.2 HYPERTENSION AFFECTING PREGNANCY IN SECOND TRIMESTER: ICD-10-CM

## 2025-02-03 DIAGNOSIS — O24.119 PRE-EXISTING TYPE 2 DIABETES MELLITUS DURING PREGNANCY, ANTEPARTUM: ICD-10-CM

## 2025-02-03 DIAGNOSIS — Z3A.19 19 WEEKS GESTATION OF PREGNANCY: Primary | ICD-10-CM

## 2025-02-03 DIAGNOSIS — Z34.90 THIRD PREGNANCY: ICD-10-CM

## 2025-02-03 DIAGNOSIS — O09.899 SHORT INTERVAL BETWEEN PREGNANCIES COMPLICATING PREGNANCY, ANTEPARTUM: ICD-10-CM

## 2025-02-03 DIAGNOSIS — O09.212 CURRENT PREGNANCY WITH HISTORY OF PRE-TERM LABOR IN SECOND TRIMESTER: ICD-10-CM

## 2025-02-03 PROCEDURE — 99999 PR OFFICE/OUTPT VISIT,PROCEDURE ONLY: CPT | Performed by: OBSTETRICS & GYNECOLOGY

## 2025-02-03 PROCEDURE — 76817 TRANSVAGINAL US OBSTETRIC: CPT | Performed by: OBSTETRICS & GYNECOLOGY

## 2025-02-03 NOTE — PROGRESS NOTES
Please refer to attached ultrasound report for doctor's evaluation of the clinical information obtained by vital signs, ultrasound, and/or non-stress test along with management recommendation.  
Pt monitoring blood sugars and on insulin- being monitored and insulin dose adjustments thru prim [facility]  
Gestational diabetes mellitus May 2020    In first pregnancy    High-risk pregnancy, unspecified trimester 10/12/2020    History of anemia     prior blood and iron transfusion, without reactions    History of blood transfusion 2020    History of chemotherapy     last treatment 2021    History of E. 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    Pregestational diabetes mellitus, modified White class B 2020    resolved with childbirth    Pregnancy induced hypertension     resolved with childbirth     delivery 10/12/2020    Delivered son 6 weeks early     labor 10/12/2020    Delivered son 6 weeks early    Pseudomonas UTI 2020 treated with Cefepime 2g IV for 10 days      10/12/20 M Apg 8/9 Wt 4#14  10/12/2020     23 M Apg 8/9 Wt 7#12 2023    Under care of service provider 2022    oncology-[facility]-last visit aug 2022    Under care of service provider 2022    uji-vfpcdcvo-yyxpjdxotl-last visit aug 2022    Under care of service provider 2022    apimysljsnklad-rpslimb-cm vincent-last visit sep 2022    Wears glasses         SURGICAL:  Past Surgical History:   Procedure Laterality Date    CT NEEDLE BIOPSY LUNG PERCUTANEOUS W IMAGING GUIDANCE  2022    CT NEEDLE BIOPSY LUNG PERCUTANEOUS 2022 [facility] CT SCAN    CT NEEDLE BIOPSY LUNG PERCUTANEOUS W IMAGING GUIDANCE  9/15/2022    CT NEEDLE BIOPSY LUNG PERCUTANEOUS 9/15/2022 [facility] CT SCAN    IR PORT PLACEMENT > 5 YEARS  10/27/2020    IR PORT PLACEMENT EQUAL OR GREATER THAN 5 YEARS 10/27/2020 Tk Damian MD [facility] SPECIAL PROCEDURES    LEG AMPUTATION AT HIP Right 2020    [facility], osteosarcoma    LOBECTOMY Left 2022    LT MUSCLE SPARING THORACOTOMY, LOWER LOBE SEGMENTAL RESECTION AND EN BLOC

## 2025-02-03 NOTE — TELEPHONE ENCOUNTER
Patient calling in regards to referral being sent to Cardiologist.     Referral was sent 1/20/25      Just resent referral to cardiologist on 2/3/25 to fax number [phone]

## 2025-02-17 ENCOUNTER — ROUTINE PRENATAL (OUTPATIENT)
Dept: PERINATAL CARE | Age: 30
End: 2025-02-17
Payer: COMMERCIAL

## 2025-02-17 VITALS
TEMPERATURE: 97.7 F | BODY MASS INDEX: 40.4 KG/M2 | HEIGHT: 61 IN | SYSTOLIC BLOOD PRESSURE: 132 MMHG | HEART RATE: 110 BPM | WEIGHT: 214 LBS | RESPIRATION RATE: 16 BRPM | DIASTOLIC BLOOD PRESSURE: 76 MMHG

## 2025-02-17 DIAGNOSIS — O24.119 PRE-EXISTING TYPE 2 DIABETES MELLITUS DURING PREGNANCY, ANTEPARTUM: ICD-10-CM

## 2025-02-17 DIAGNOSIS — Z3A.21 21 WEEKS GESTATION OF PREGNANCY: Primary | ICD-10-CM

## 2025-02-17 DIAGNOSIS — O09.899 SHORT INTERVAL BETWEEN PREGNANCIES COMPLICATING PREGNANCY, ANTEPARTUM: ICD-10-CM

## 2025-02-17 DIAGNOSIS — O09.212 CURRENT PREGNANCY WITH HISTORY OF PRE-TERM LABOR IN SECOND TRIMESTER: ICD-10-CM

## 2025-02-17 DIAGNOSIS — Z34.90 THIRD PREGNANCY: ICD-10-CM

## 2025-02-17 DIAGNOSIS — O16.2 HYPERTENSION AFFECTING PREGNANCY IN SECOND TRIMESTER: ICD-10-CM

## 2025-02-17 PROCEDURE — 76811 OB US DETAILED SNGL FETUS: CPT | Performed by: OBSTETRICS & GYNECOLOGY

## 2025-02-17 PROCEDURE — 99999 PR OFFICE/OUTPT VISIT,PROCEDURE ONLY: CPT | Performed by: OBSTETRICS & GYNECOLOGY

## 2025-02-17 PROCEDURE — 76817 TRANSVAGINAL US OBSTETRIC: CPT | Performed by: OBSTETRICS & GYNECOLOGY

## 2025-02-17 NOTE — PROGRESS NOTES
[facility] MATERNAL FETAL MED  [address]  [place] [address]  Dept: [phone]     2025    RE:  Kathy Quinn     : 1995   AGE: 29 y.o.     Dear Dr. Helton,    Thank you for allowing me to see Kathy Quinn.  As I'm sure you will recall, Kathy Quinn is a 29 y.o. [id number]'s last menstrual period was 2024 (exact date). 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 of lower extremity (HCC) 2021    History of disarticulation of right hip 2020    cHTN  2020    Iron deficiency anemia 2020    Thrombocytosis 2020    History of sarcoma of bone 2020    FHx DM 2020        PAST HISTORY:  OB History    Para Term  AB Living   3 2 1 1 0 2   SAB IAB Ectopic Molar Multiple Live Births   0 0 0 0 0 2      # Outcome Date GA Lbr Zeferino/2nd Weight Sex Type Anes PTL Lv   3 Current            2 Term 23 38w0d 01:46 / 01:30 3.53 kg (7 lb 12.5 oz) M Vag-Spont EPI N MAYCO   1  10/12/20 33w6d  2.21 kg (4 lb 14 oz) M Vag-Spont EPI Y MAYCO          MEDICAL:  Past Medical History:   Diagnosis Date    33 weeks gestation of pregnancy 10/12/2020    child born oct 2020    Anemia 2020    During first pregnancy and cancer    Antepartum premature rupture of membrane     child born oct 2020    COVID-19 2021    fatigue    Gestational diabetes

## 2025-02-18 ENCOUNTER — ROUTINE PRENATAL (OUTPATIENT)
Dept: OBGYN CLINIC | Age: 30
End: 2025-02-18
Payer: COMMERCIAL

## 2025-02-18 VITALS — WEIGHT: 213 LBS | SYSTOLIC BLOOD PRESSURE: 136 MMHG | DIASTOLIC BLOOD PRESSURE: 72 MMHG | BODY MASS INDEX: 40.25 KG/M2

## 2025-02-18 DIAGNOSIS — O24.319 PREGESTATIONAL DIABETES MELLITUS, MODIFIED WHITE CLASS B: ICD-10-CM

## 2025-02-18 DIAGNOSIS — O09.93 HIGH-RISK PREGNANCY IN THIRD TRIMESTER: ICD-10-CM

## 2025-02-18 DIAGNOSIS — Z3A.21 21 WEEKS GESTATION OF PREGNANCY: Primary | ICD-10-CM

## 2025-02-18 DIAGNOSIS — R00.0 TACHYCARDIA: ICD-10-CM

## 2025-02-18 DIAGNOSIS — Z85.830 HISTORY OF SARCOMA OF BONE: ICD-10-CM

## 2025-02-18 DIAGNOSIS — Z89.9 HISTORY OF AMPUTATION: ICD-10-CM

## 2025-02-18 DIAGNOSIS — Z85.89 HISTORY OF MALIGNANT NEOPLASM METASTATIC TO LUNG: ICD-10-CM

## 2025-02-18 DIAGNOSIS — O10.919 CHRONIC HYPERTENSION AFFECTING PREGNANCY: ICD-10-CM

## 2025-02-18 PROBLEM — O24.312 PRE-EXISTING DIABETES MELLITUS DURING PREGNANCY IN SECOND TRIMESTER: Status: RESOLVED | Noted: 2023-02-21 | Resolved: 2025-02-18

## 2025-02-18 PROBLEM — N88.3 SHORT CERVIX: Status: RESOLVED | Noted: 2023-06-16 | Resolved: 2025-02-18

## 2025-02-18 PROBLEM — D50.9 IRON DEFICIENCY ANEMIA: Status: RESOLVED | Noted: 2020-07-02 | Resolved: 2025-02-18

## 2025-02-18 PROBLEM — Z3A.19 19 WEEKS GESTATION OF PREGNANCY: Status: RESOLVED | Noted: 2025-02-03 | Resolved: 2025-02-18

## 2025-02-18 PROBLEM — G54.6 PHANTOM PAIN AFTER AMPUTATION OF LOWER EXTREMITY (HCC): Status: RESOLVED | Noted: 2021-03-04 | Resolved: 2025-02-18

## 2025-02-18 PROBLEM — Z34.90 THIRD PREGNANCY: Status: RESOLVED | Noted: 2025-01-20 | Resolved: 2025-02-18

## 2025-02-18 PROCEDURE — G8417 CALC BMI ABV UP PARAM F/U: HCPCS | Performed by: STUDENT IN AN ORGANIZED HEALTH CARE EDUCATION/TRAINING PROGRAM

## 2025-02-18 PROCEDURE — 2022F DILAT RTA XM EVC RTNOPTHY: CPT | Performed by: STUDENT IN AN ORGANIZED HEALTH CARE EDUCATION/TRAINING PROGRAM

## 2025-02-18 PROCEDURE — 99214 OFFICE O/P EST MOD 30 MIN: CPT | Performed by: STUDENT IN AN ORGANIZED HEALTH CARE EDUCATION/TRAINING PROGRAM

## 2025-02-18 PROCEDURE — 1036F TOBACCO NON-USER: CPT | Performed by: STUDENT IN AN ORGANIZED HEALTH CARE EDUCATION/TRAINING PROGRAM

## 2025-02-18 PROCEDURE — 3046F HEMOGLOBIN A1C LEVEL >9.0%: CPT | Performed by: STUDENT IN AN ORGANIZED HEALTH CARE EDUCATION/TRAINING PROGRAM

## 2025-02-18 PROCEDURE — G8427 DOCREV CUR MEDS BY ELIG CLIN: HCPCS | Performed by: STUDENT IN AN ORGANIZED HEALTH CARE EDUCATION/TRAINING PROGRAM

## 2025-02-18 NOTE — PROGRESS NOTES
Prenatal Visit    Kathy Quinn is a 29 y.o. female  at 21w3d    The patient was seen and evaluated.   Positive fetal movements   Negative contractions, vaginal bleeding, loss of fluid  Discussed resident involvement in triage and labor and delivery.  Discussed call group.  Patient verbalized understanding and agreement.    Vitals:  /72   Wt 96.6 kg (213 lb)   LMP 2024 (Exact Date)   BMI 40.25 kg/m²     Assessment & Plan:  Kathy Quinn is a 29 y.o. female  at 21w3d   - 28 week labs future   - The patients anatomy ultrasound has been completed and reviewed with patient.    - Influenza and Covid vaccinations recommended per ACOG: R/B/A discussed with increased risk of both maternal and fetal morbidity and mortality in unvaccinated pregnant patients.   - Continue prenatal vitamin   - Warning signs reviewed and recommendations when to call or present to the hospital if she experiences signs or symptoms of  labor and pre-eclampsia were reviewed.    - discussed and reviewed blood sugars. NPH to remain the same. Increased insulin with dinner (as below)   - Discussed  at 32 weeks   - CL with MFM until 24 weeks   -Discussed tachycardia as below    - reviewed anatomy scan           FOB Name: Darinel    Last Pap Smear: 2022 > NEEDS postpartum   [x] Urine collected for culture 2025 Adela Angeles DO    [] Negative    [x] Positive  GBS BACTERURIA  [x] UDS collected 2025 Adela Angeles DO > negative   [x] Gc/ct collected > collected but no result? NEEDED  [x] Prenatal Labs completed Adela Angeles DO     Dating based on   [x] LMP  [] USN  Reviewed by (provider) Adela Angeles DO     Genetic Screening:  [x]Accepted FTS 2025 Adela Angeles DO   [] Undecided   []Declined   [x]Completed  [x] Low risk   [] Abn for     Carrier Screening:  [] Undecided   [] Accepted   [x] Declined  [] Known negative CF/SMA/Fragile X  [] Results     Baby aspirin

## 2025-03-03 ENCOUNTER — ROUTINE PRENATAL (OUTPATIENT)
Dept: PERINATAL CARE | Age: 30
End: 2025-03-03
Payer: COMMERCIAL

## 2025-03-03 VITALS
WEIGHT: 213 LBS | TEMPERATURE: 97.2 F | HEART RATE: 107 BPM | DIASTOLIC BLOOD PRESSURE: 69 MMHG | RESPIRATION RATE: 16 BRPM | HEIGHT: 61 IN | BODY MASS INDEX: 40.22 KG/M2 | SYSTOLIC BLOOD PRESSURE: 133 MMHG

## 2025-03-03 DIAGNOSIS — Z3A.23 23 WEEKS GESTATION OF PREGNANCY: Primary | ICD-10-CM

## 2025-03-03 DIAGNOSIS — Z34.90 THIRD PREGNANCY: ICD-10-CM

## 2025-03-03 DIAGNOSIS — O24.119 PRE-EXISTING TYPE 2 DIABETES MELLITUS DURING PREGNANCY, ANTEPARTUM: ICD-10-CM

## 2025-03-03 DIAGNOSIS — O09.212 CURRENT PREGNANCY WITH HISTORY OF PRE-TERM LABOR IN SECOND TRIMESTER: ICD-10-CM

## 2025-03-03 DIAGNOSIS — O16.2 HYPERTENSION AFFECTING PREGNANCY IN SECOND TRIMESTER: ICD-10-CM

## 2025-03-03 PROCEDURE — 76817 TRANSVAGINAL US OBSTETRIC: CPT | Performed by: OBSTETRICS & GYNECOLOGY

## 2025-03-03 PROCEDURE — 99999 PR OFFICE/OUTPT VISIT,PROCEDURE ONLY: CPT | Performed by: OBSTETRICS & GYNECOLOGY

## 2025-03-03 NOTE — PROGRESS NOTES
Please refer to attached ultrasound report for doctor's evaluation of the clinical information obtained by vital signs, ultrasound, and/or non-stress test along with management recommendation.  
History of blood transfusion 2020    History of chemotherapy     last treatment 2021    History of E. 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    Pregestational diabetes mellitus, modified White class B 2020    resolved with childbirth    Pregnancy induced hypertension     resolved with childbirth     delivery 10/12/2020    Delivered son 6 weeks early     labor 10/12/2020    Delivered son 6 weeks early    Pseudomonas UTI 2020 treated with Cefepime 2g IV for 10 days      10/12/20 M Apg 8/9 Wt 4#14  10/12/2020     23 M Apg 8/9 Wt 7#12 2023    Under care of service provider 2022    oncology-[facility]-last visit aug 2022    Under care of service provider 2022    uor-vwfgygud-rhrihvaxhx-last visit aug 2022    Under care of service provider 2022    yakvkvhcihsids-rwwhqkb-md vincent-last visit sep 2022    Wears glasses         SURGICAL:  Past Surgical History:   Procedure Laterality Date    CT NEEDLE BIOPSY LUNG PERCUTANEOUS W IMAGING GUIDANCE  2022    CT NEEDLE BIOPSY LUNG PERCUTANEOUS 2022 [facility] CT SCAN    CT NEEDLE BIOPSY LUNG PERCUTANEOUS W IMAGING GUIDANCE  9/15/2022    CT [facility]    IR PORT PLACEMENT > 5 YEARS  10/27/2020    IR PORT PLACEMENT EQUAL OR GREATER THAN 5 YEARS 10/27/2020 Tk Damian MD [facility] SPECIAL PROCEDURES    LEG AMPUTATION AT HIP Right 2020    [facility], osteosarcoma    LOBECTOMY Left 2022    LT MUSCLE SPARING THORACOTOMY, LOWER LOBE SEGMENTAL RESECTION AND EN BLOC CHEST WALL RESECTION performed by Donell Marinelli MD at [facility]       ALLERGIES:    No Known Allergies      MEDICATIONS:    Current Outpatient Medications   Medication Sig Dispense Refill

## 2025-03-24 ENCOUNTER — ROUTINE PRENATAL (OUTPATIENT)
Dept: OBGYN CLINIC | Age: 30
End: 2025-03-24

## 2025-03-24 VITALS
DIASTOLIC BLOOD PRESSURE: 60 MMHG | BODY MASS INDEX: 40.97 KG/M2 | WEIGHT: 217 LBS | SYSTOLIC BLOOD PRESSURE: 128 MMHG | HEIGHT: 61 IN

## 2025-03-24 DIAGNOSIS — Z85.831 HISTORY OF SARCOMA: ICD-10-CM

## 2025-03-24 DIAGNOSIS — O09.92 HRP (HIGH RISK PREGNANCY), SECOND TRIMESTER: Primary | ICD-10-CM

## 2025-03-24 DIAGNOSIS — O24.319 PREGESTATIONAL DIABETES MELLITUS, MODIFIED WHITE CLASS B: ICD-10-CM

## 2025-03-24 DIAGNOSIS — R00.0 TACHYCARDIA: ICD-10-CM

## 2025-03-24 DIAGNOSIS — Z3A.26 26 WEEKS GESTATION OF PREGNANCY: ICD-10-CM

## 2025-03-24 DIAGNOSIS — O10.919 CHRONIC HYPERTENSION AFFECTING PREGNANCY: ICD-10-CM

## 2025-03-24 PROCEDURE — 0502F SUBSEQUENT PRENATAL CARE: CPT | Performed by: NURSE PRACTITIONER

## 2025-03-24 ASSESSMENT — PATIENT HEALTH QUESTIONNAIRE - PHQ9
SUM OF ALL RESPONSES TO PHQ QUESTIONS 1-9: 0
DEPRESSION UNABLE TO ASSESS: PT REFUSES
2. FEELING DOWN, DEPRESSED OR HOPELESS: NOT AT ALL
1. LITTLE INTEREST OR PLEASURE IN DOING THINGS: NOT AT ALL
SUM OF ALL RESPONSES TO PHQ QUESTIONS 1-9: 0

## 2025-03-24 NOTE — PATIENT INSTRUCTIONS
After Hours Number: You can call the office [phone]  or [phone]  Call if you have:  1.  Bleeding like a period  2.  Cramps or contractions greater than 2 hours  3.  If you are leaking fluid  4.  If you've a fever greater than 100°  5.  If you feel as if baby is not moving  6. If you have continuous vomiting over 3-4 hours   Counting Your Baby's Kicks: Care Instructions  Your Care Instructions    Counting your baby's kicks is one way your doctor can tell that your baby is healthy. Most women--especially in a first pregnancy--feel their baby move for the first time between 16 and 22 weeks. The movement may feel like flutters rather than kicks. Your baby may move more at certain times of the day. When you are active, you may notice less kicking than when you are resting. At your prenatal visits, your doctor will ask whether the baby is active.  In your last trimester, your doctor may ask you to count the number of times you feel your baby move.  Follow-up care is a key part of your treatment and safety. Be sure to make and go to all appointments, and call your doctor if you are having problems. It's also a good idea to know your test results and keep a list of the medicines you take.  How do you count fetal kicks?  A common method of checking your baby's movement is to count the number of kicks or moves you feel in 1 hour. Ten movements (such as kicks, flutters, or rolls) in 1 hour are normal. Some doctors suggest that you count in the morning until you get to 10 movements. Then you can quit for that day and start again the next day.  Pick your baby's most active time of day to count. This may be any time from morning to evening.  If you do not feel 10 movements in an hour, your baby may be sleeping. Wait for the next hour and count again.  When should you call for help?  Call your doctor now or seek immediate medical care if:    You noticed that your baby has stopped moving or is moving much less than

## 2025-03-24 NOTE — PROGRESS NOTES
Presents for OB visit  Gestation 26w2d  Estimated Date of Delivery: 25    Insurance: Medicare    IPV bag/mug given:2025 Adela Angeles DO   Genetic Information given/reviewed: 2025 Adela Angeles DO   1st trimester education packet given: 2025 Adela Angeles DO   2nd trimester education packet given:  3rd trimester education packet given:      FOB Name: Darinel    Last Pap Smear: 2022 > NEEDS postpartum   [x] Urine collected for culture 2025 Adela Angeles DO    [] Negative    [x] Positive  GBS BACTERURIA  [x] UDS collected 2025 Adela Angeles DO > negative   [x] Gc/ct collected > collected but no result? NEEDED  [x] Prenatal Labs completed Adela Angeles DO     Dating based on   [x] LMP  [] USN  Reviewed by (provider) Adela Angeles DO     Genetic Screening:  [x]Accepted FTS 2025 Adela Angeles DO   [] Undecided   []Declined   [x]Completed  [x] Low risk   [] Abn for     Carrier Screening:  [] Undecided   [] Accepted   [x] Declined  [] Known negative CF/SMA/Fragile X  [] Results     Baby aspirin screen:  [x]Positive  []Negative    Early 1 hour GTT:  [x]Yes FAILED 224  [] No    AFP: declined  []Ordered  []Completed    Anatomy scan:  [x]Referral Sent 2025 Adela Angeles DO   [x]Scheduled 24  [x]Completed  [x] Follow up     Fetal Echo:   [] Yes  [x] No    High Risk Factors:  cHTN (no meds)  -was diagnosed during her G1 when going through sarcoma dx and amputation  - no meds  - baseline labs at [facility]  - cmp/cbc/pc ratio  PregDM  Insulin 0/0/10/16 2025   *sugars reviewed 2025 increased dinner to 20 units. NPH to stay at 38 Adela Angeles DO   -understands  testing  -desires to do BPP and growth in our office unless MFM needed otherwise  - 0,0,20, (increased) 42 changed 3/3/25   Hx tachycardia  - was on metoprolol 25 mg in the past  - intermittent tachycardia. Unclear though if recorded values are exertional due to

## 2025-04-01 ENCOUNTER — ROUTINE PRENATAL (OUTPATIENT)
Dept: PERINATAL CARE | Age: 30
End: 2025-04-01
Payer: COMMERCIAL

## 2025-04-01 VITALS
HEART RATE: 110 BPM | BODY MASS INDEX: 41.35 KG/M2 | TEMPERATURE: 97.9 F | HEIGHT: 61 IN | DIASTOLIC BLOOD PRESSURE: 80 MMHG | SYSTOLIC BLOOD PRESSURE: 122 MMHG | WEIGHT: 219 LBS | RESPIRATION RATE: 16 BRPM

## 2025-04-01 DIAGNOSIS — O09.899 SHORT INTERVAL BETWEEN PREGNANCIES COMPLICATING PREGNANCY, ANTEPARTUM: ICD-10-CM

## 2025-04-01 DIAGNOSIS — O09.212 CURRENT PREGNANCY WITH HISTORY OF PRE-TERM LABOR IN SECOND TRIMESTER: ICD-10-CM

## 2025-04-01 DIAGNOSIS — O16.2 HYPERTENSION AFFECTING PREGNANCY IN SECOND TRIMESTER: ICD-10-CM

## 2025-04-01 DIAGNOSIS — Z3A.27 27 WEEKS GESTATION OF PREGNANCY: Primary | ICD-10-CM

## 2025-04-01 DIAGNOSIS — Z34.90 THIRD PREGNANCY: ICD-10-CM

## 2025-04-01 DIAGNOSIS — O24.119 PRE-EXISTING TYPE 2 DIABETES MELLITUS DURING PREGNANCY, ANTEPARTUM: ICD-10-CM

## 2025-04-01 DIAGNOSIS — O99.212 OBESITY AFFECTING PREGNANCY IN SECOND TRIMESTER, UNSPECIFIED OBESITY TYPE: ICD-10-CM

## 2025-04-01 PROCEDURE — 93325 DOPPLER ECHO COLOR FLOW MAPG: CPT | Performed by: OBSTETRICS & GYNECOLOGY

## 2025-04-01 PROCEDURE — 76825 ECHO EXAM OF FETAL HEART: CPT | Performed by: OBSTETRICS & GYNECOLOGY

## 2025-04-01 PROCEDURE — 76816 OB US FOLLOW-UP PER FETUS: CPT | Performed by: OBSTETRICS & GYNECOLOGY

## 2025-04-01 PROCEDURE — 76827 ECHO EXAM OF FETAL HEART: CPT | Performed by: OBSTETRICS & GYNECOLOGY

## 2025-04-01 PROCEDURE — 76819 FETAL BIOPHYS PROFIL W/O NST: CPT | Performed by: OBSTETRICS & GYNECOLOGY

## 2025-04-01 PROCEDURE — 99999 PR OFFICE/OUTPT VISIT,PROCEDURE ONLY: CPT | Performed by: OBSTETRICS & GYNECOLOGY

## 2025-04-01 RX ORDER — DOCUSATE SODIUM 100 MG/1
100 CAPSULE, LIQUID FILLED ORAL AS NEEDED
COMMUNITY

## 2025-04-01 NOTE — PROGRESS NOTES
Please refer to attached ultrasound report for doctor's evaluation of the clinical information obtained by vital signs, ultrasound, and/or non-stress test along with management recommendation.  
   Drug use: Never    Sexual activity: Yes     Partners: Male   Social History Narrative    ** Merged History Encounter **          Social Drivers of Health     Financial Resource Strain: Low Risk  (3/14/2023)    Overall Financial Resource Strain (CARDIA)     Difficulty of Paying Living Expenses: Not hard at all   Food Insecurity: No Food Insecurity (3/17/2025)    Received from [facility]    Hunger Screening     Within the past 12 months we worried whether our food would run out before we got money to buy more.: Never True     Within the past 12 months the food we bought just didn't last and we didn't have money to get more.: Never True   Transportation Needs: No Transportation Needs (11/25/2024)    PRAPARE - Transportation     Lack of Transportation (Medical): No     Lack of Transportation (Non-Medical): No   Intimate Partner Violence: Not At Risk (11/25/2024)    Humiliation, Afraid, Rape, and Kick questionnaire     Fear of Current or Ex-Partner: No     Emotionally Abused: No     Physically Abused: No     Sexually Abused: No   Housing Stability: High Risk (12/23/2024)    Housing Stability Vital Sign     Unable to Pay for Housing in the Last Year: No     Number of Times Moved in the Last Year: 2     Homeless in the Last Year: No          FAMILY MEDICAL HISTORY:   Family History   Problem Relation Age of Onset    Diabetes Mother     Diabetes Maternal Grandmother     Heart Surgery Maternal Grandmother     Stroke Maternal Grandfather     Hypertension Maternal Grandfather           PHYSICAL EXAMINATION:  /80   Pulse (!) 110   Temp 97.9 °F (36.6 °C)   Resp 16   Ht 1.549 m (5' 1\")   Wt 99.3 kg (219 lb)   LMP 09/21/2024 (Exact Date)   Body mass index is 41.38 kg/m².    Urine dipstick:  No results found for this visit on 04/01/25.     A/an growth and fetal echocardiogram ultrasound was done in our office today. Please refer to the enclosed copy of the ultrasound report for further

## 2025-04-09 ENCOUNTER — HOSPITAL ENCOUNTER (OUTPATIENT)
Age: 30
Setting detail: SPECIMEN
Discharge: HOME | End: 2025-04-09

## 2025-04-09 DIAGNOSIS — O24.319 PREGESTATIONAL DIABETES MELLITUS, MODIFIED WHITE CLASS B: ICD-10-CM

## 2025-04-09 DIAGNOSIS — O10.919 CHRONIC HYPERTENSION AFFECTING PREGNANCY: ICD-10-CM

## 2025-04-09 DIAGNOSIS — Z3A.09 9 WEEKS GESTATION OF PREGNANCY: ICD-10-CM

## 2025-04-09 DIAGNOSIS — O09.92 HRP (HIGH RISK PREGNANCY), SECOND TRIMESTER: ICD-10-CM

## 2025-04-09 DIAGNOSIS — R00.0 TACHYCARDIA: ICD-10-CM

## 2025-04-09 DIAGNOSIS — Z3A.26 26 WEEKS GESTATION OF PREGNANCY: ICD-10-CM

## 2025-04-09 DIAGNOSIS — Z85.830 HISTORY OF SARCOMA OF BONE: ICD-10-CM

## 2025-04-09 LAB
ALBUMIN SERPL-MCNC: 3.8 G/DL (ref 3.5–5.2)
ALBUMIN/GLOB SERPL: 1.5 {RATIO} (ref 1–2.5)
ALP SERPL-CCNC: 72 U/L (ref 35–104)
ALT SERPL-CCNC: 12 U/L (ref 10–35)
ANION GAP SERPL CALCULATED.3IONS-SCNC: 15 MMOL/L (ref 9–16)
AST SERPL-CCNC: 15 U/L (ref 10–35)
BASOPHILS # BLD: <0.03 K/UL (ref 0–0.2)
BASOPHILS NFR BLD: 0 % (ref 0–2)
BILIRUB SERPL-MCNC: 0.3 MG/DL (ref 0–1.2)
BUN SERPL-MCNC: 7 MG/DL (ref 6–20)
CALCIUM SERPL-MCNC: 9 MG/DL (ref 8.6–10.4)
CHLORIDE SERPL-SCNC: 103 MMOL/L (ref 98–107)
CO2 SERPL-SCNC: 21 MMOL/L (ref 20–31)
CREAT SERPL-MCNC: 0.4 MG/DL (ref 0.6–0.9)
EOSINOPHIL # BLD: 0.04 K/UL (ref 0–0.44)
EOSINOPHILS RELATIVE PERCENT: 1 % (ref 1–4)
ERYTHROCYTE [DISTWIDTH] IN BLOOD BY AUTOMATED COUNT: 13.1 % (ref 11.8–14.4)
GFR, ESTIMATED: >90 ML/MIN/1.73M2
GLUCOSE SERPL-MCNC: 173 MG/DL (ref 74–99)
HCT VFR BLD AUTO: 37.3 % (ref 36.3–47.1)
HGB BLD-MCNC: 12 G/DL (ref 11.9–15.1)
IMM GRANULOCYTES # BLD AUTO: 0.06 K/UL (ref 0–0.3)
IMM GRANULOCYTES NFR BLD: 1 %
LYMPHOCYTES NFR BLD: 0.95 K/UL (ref 1.1–3.7)
LYMPHOCYTES RELATIVE PERCENT: 15 % (ref 24–43)
MCH RBC QN AUTO: 27.6 PG (ref 25.2–33.5)
MCHC RBC AUTO-ENTMCNC: 32.2 G/DL (ref 28.4–34.8)
MCV RBC AUTO: 85.7 FL (ref 82.6–102.9)
MONOCYTES NFR BLD: 0.37 K/UL (ref 0.1–1.2)
MONOCYTES NFR BLD: 6 % (ref 3–12)
NEUTROPHILS NFR BLD: 77 % (ref 36–65)
NEUTS SEG NFR BLD: 4.77 K/UL (ref 1.5–8.1)
NRBC BLD-RTO: 0 PER 100 WBC
PLATELET # BLD AUTO: 186 K/UL (ref 138–453)
PMV BLD AUTO: 11.9 FL (ref 8.1–13.5)
POTASSIUM SERPL-SCNC: 3.9 MMOL/L (ref 3.7–5.3)
PROT SERPL-MCNC: 6.3 G/DL (ref 6.6–8.7)
RBC # BLD AUTO: 4.35 M/UL (ref 3.95–5.11)
SODIUM SERPL-SCNC: 139 MMOL/L (ref 136–145)
TSH SERPL DL<=0.05 MIU/L-ACNC: 1.09 UIU/ML (ref 0.27–4.2)
WBC OTHER # BLD: 6.2 K/UL (ref 3.5–11.3)

## 2025-04-10 ENCOUNTER — RESULTS FOLLOW-UP (OUTPATIENT)
Dept: OBGYN CLINIC | Age: 30
End: 2025-04-10

## 2025-04-10 LAB
MICROORGANISM SPEC CULT: NORMAL
SERVICE CMNT-IMP: NORMAL
SPECIMEN DESCRIPTION: NORMAL

## 2025-04-25 ENCOUNTER — ROUTINE PRENATAL (OUTPATIENT)
Dept: OBGYN CLINIC | Age: 30
End: 2025-04-25
Payer: MEDICARE

## 2025-04-25 VITALS — WEIGHT: 214 LBS | DIASTOLIC BLOOD PRESSURE: 62 MMHG | SYSTOLIC BLOOD PRESSURE: 124 MMHG | BODY MASS INDEX: 40.43 KG/M2

## 2025-04-25 DIAGNOSIS — O24.319 PREGESTATIONAL DIABETES MELLITUS, MODIFIED WHITE CLASS B: ICD-10-CM

## 2025-04-25 DIAGNOSIS — Z3A.30 30 WEEKS GESTATION OF PREGNANCY: Primary | ICD-10-CM

## 2025-04-25 DIAGNOSIS — O10.919 CHRONIC HYPERTENSION AFFECTING PREGNANCY: ICD-10-CM

## 2025-04-25 PROCEDURE — G8417 CALC BMI ABV UP PARAM F/U: HCPCS | Performed by: OBSTETRICS & GYNECOLOGY

## 2025-04-25 PROCEDURE — G8427 DOCREV CUR MEDS BY ELIG CLIN: HCPCS | Performed by: OBSTETRICS & GYNECOLOGY

## 2025-04-25 PROCEDURE — 1036F TOBACCO NON-USER: CPT | Performed by: OBSTETRICS & GYNECOLOGY

## 2025-04-25 PROCEDURE — 2022F DILAT RTA XM EVC RTNOPTHY: CPT | Performed by: OBSTETRICS & GYNECOLOGY

## 2025-04-25 PROCEDURE — 3046F HEMOGLOBIN A1C LEVEL >9.0%: CPT | Performed by: OBSTETRICS & GYNECOLOGY

## 2025-04-25 PROCEDURE — 99213 OFFICE O/P EST LOW 20 MIN: CPT | Performed by: OBSTETRICS & GYNECOLOGY

## 2025-04-25 RX ORDER — METOPROLOL TARTRATE 25 MG/1
25 TABLET, FILM COATED ORAL 2 TIMES DAILY
COMMUNITY

## 2025-04-25 NOTE — PROGRESS NOTES
Kathy Quinn is a 29 y.o. female 30w6d        OB History    Para Term  AB Living   3 2 1 1 0 2   SAB IAB Ectopic Molar Multiple Live Births   0 0 0 0 0 2      # Outcome Date GA Lbr Zeferino/2nd Weight Sex Type Anes PTL Lv   3 Current            2 Term 23 38w0d 01:46 / 01:30 3.53 kg (7 lb 12.5 oz) M Vag-Spont EPI N MAYCO   1  10/12/20 33w6d  2.21 kg (4 lb 14 oz) M Vag-Spont EPI Y MAYCO       Vitals  BP: 124/62  Weight - Scale: 97.1 kg (214 lb)      The patient was seen and evaluated. There was positive fetal movements. No contractions or leakage of fluid. Signs and symptoms of  labor as well as labor were reviewed. The S/S of Pre-Eclampsia were reviewed with the patient in detail. She is to report any of these if they occur. She currently denies any of these.    The patient had her 28 week labs completed.  Hospital Outpatient Visit on 2025   Component Date Value Ref Range Status    Sodium 2025 139  136 - 145 mmol/L Final    Potassium 2025 3.9  3.7 - 5.3 mmol/L Final    Chloride 2025 103  98 - 107 mmol/L Final    CO2 2025 21  20 - 31 mmol/L Final    Anion Gap 2025 15  9 - 16 mmol/L Final    Glucose 2025 173 (H)  74 - 99 mg/dL Final    BUN 2025 7  6 - 20 mg/dL Final    Creatinine 2025 0.4 (L)  0.6 - 0.9 mg/dL Final    Est, Glom Filt Rate 2025 >90  >60 mL/min/1.73m2 Final    Comment:       These results are not intended for use in patients <18 years of age.        eGFR results are calculated without a race factor using the  CKD-EPI equation.  Careful clinical correlation is recommended, particularly when comparing to results   calculated using previous equations.  The CKD-EPI equation is less accurate in patients with extremes of muscle mass, extra-renal   metabolism of creatine, excessive creatine ingestion, or following therapy that affects   renal tubular secretion.      Calcium 2025 9.0  8.6 - 10.4

## 2025-04-28 ENCOUNTER — ROUTINE PRENATAL (OUTPATIENT)
Dept: PERINATAL CARE | Age: 30
End: 2025-04-28
Payer: COMMERCIAL

## 2025-04-28 VITALS
DIASTOLIC BLOOD PRESSURE: 71 MMHG | TEMPERATURE: 97.3 F | HEART RATE: 100 BPM | HEIGHT: 61 IN | RESPIRATION RATE: 16 BRPM | WEIGHT: 217 LBS | SYSTOLIC BLOOD PRESSURE: 115 MMHG | BODY MASS INDEX: 40.97 KG/M2

## 2025-04-28 DIAGNOSIS — C78.02 SECONDARY MALIGNANT NEOPLASM OF LEFT LUNG (HCC): ICD-10-CM

## 2025-04-28 DIAGNOSIS — R00.0 TACHYCARDIA: ICD-10-CM

## 2025-04-28 DIAGNOSIS — Z3A.31 31 WEEKS GESTATION OF PREGNANCY: ICD-10-CM

## 2025-04-28 DIAGNOSIS — Z79.4 DIABETES MELLITUS DURING PREGNANCY TREATED WITH INSULIN (HCC): ICD-10-CM

## 2025-04-28 DIAGNOSIS — Z34.90 THIRD PREGNANCY: ICD-10-CM

## 2025-04-28 DIAGNOSIS — Z85.830 HISTORY OF SARCOMA OF BONE: ICD-10-CM

## 2025-04-28 DIAGNOSIS — I15.9 SECONDARY HYPERTENSION: Primary | ICD-10-CM

## 2025-04-28 DIAGNOSIS — O24.919 DIABETES MELLITUS DURING PREGNANCY TREATED WITH INSULIN (HCC): ICD-10-CM

## 2025-04-28 PROCEDURE — 76819 FETAL BIOPHYS PROFIL W/O NST: CPT | Performed by: OBSTETRICS & GYNECOLOGY

## 2025-04-28 PROCEDURE — 76805 OB US >/= 14 WKS SNGL FETUS: CPT | Performed by: OBSTETRICS & GYNECOLOGY

## 2025-04-28 PROCEDURE — 99999 PR OFFICE/OUTPT VISIT,PROCEDURE ONLY: CPT | Performed by: OBSTETRICS & GYNECOLOGY

## 2025-04-28 NOTE — PROGRESS NOTES
[facility] MATERNAL FETAL MED  [address]  [place] [address]  Dept: [phone]     2025    RE:  Kathy Quinn     : 1995   AGE: 29 y.o.     Dear Dr. Helton,    Thank you for allowing me to see Kathy Quinn.  As I'm sure you will recall, Kathy Quinn is a 29 y.o. [id number]'s last menstrual period was 2024 (exact date). Estimated Date of Delivery: 25 at 31w2d seen in our office today for the following:    REASON FOR VISIT: Growth, BPP    Patient Active Problem List    Diagnosis Date Noted    Tachycardia 2023    History of malignant neoplasm metastatic to lung 2022    31 weeks gestation of pregnancy 2025    Secondary malignant neoplasm of left lung (HCC) 2023    History of right leg amputation 2023    Hx PPROM with sPTD x1 2023    History of disarticulation of right hip 2020    cHTN  2020    Thrombocytosis 2020    History of sarcoma of bone 2020    FHx DM 2020        PAST HISTORY:  OB History    Para Term  AB Living   3 2 1 1 0 2   SAB IAB Ectopic Molar Multiple Live Births   0 0 0 0 0 2      # Outcome Date GA Lbr Zeferino/2nd Weight Sex Type Anes PTL Lv   3 Current            2 Term 23 38w0d 01:46 / 01:30 3.53 kg (7 lb 12.5 oz) M Vag-Spont EPI N MAYCO   1  10/12/20 33w6d  2.21 kg (4 lb 14 oz) M Vag-Spont EPI Y MAYCO          MEDICAL:  Past Medical History:   Diagnosis Date    33 weeks gestation of pregnancy 10/12/2020    child born oct 2020    Anemia 2020    During first pregnancy and cancer    Antepartum premature rupture of membrane     child born oct 2020    COVID-19 2021    fatigue    Gestational diabetes     Gestational diabetes mellitus May 2020    In first pregnancy    High-risk pregnancy, unspecified trimester 10/12/2020    History of anemia     prior blood and iron transfusion, without reactions

## 2025-05-05 ENCOUNTER — ROUTINE PRENATAL (OUTPATIENT)
Dept: OBGYN CLINIC | Age: 30
End: 2025-05-05
Payer: MEDICARE

## 2025-05-05 ENCOUNTER — ROUTINE PRENATAL (OUTPATIENT)
Age: 30
End: 2025-05-05
Payer: COMMERCIAL

## 2025-05-05 VITALS
WEIGHT: 219 LBS | HEIGHT: 61 IN | BODY MASS INDEX: 41.35 KG/M2 | DIASTOLIC BLOOD PRESSURE: 74 MMHG | SYSTOLIC BLOOD PRESSURE: 122 MMHG

## 2025-05-05 VITALS
HEIGHT: 61 IN | BODY MASS INDEX: 41.72 KG/M2 | WEIGHT: 221 LBS | DIASTOLIC BLOOD PRESSURE: 79 MMHG | HEART RATE: 92 BPM | SYSTOLIC BLOOD PRESSURE: 128 MMHG | RESPIRATION RATE: 16 BRPM | TEMPERATURE: 97.4 F

## 2025-05-05 DIAGNOSIS — O99.210 OBESITY AFFECTING PREGNANCY, ANTEPARTUM, UNSPECIFIED OBESITY TYPE: ICD-10-CM

## 2025-05-05 DIAGNOSIS — Z3A.32 32 WEEKS GESTATION OF PREGNANCY: Primary | ICD-10-CM

## 2025-05-05 DIAGNOSIS — O24.319 PREGESTATIONAL DIABETES MELLITUS, MODIFIED WHITE CLASS B: ICD-10-CM

## 2025-05-05 DIAGNOSIS — O10.919 CHRONIC HYPERTENSION AFFECTING PREGNANCY: ICD-10-CM

## 2025-05-05 DIAGNOSIS — Z34.90 THIRD PREGNANCY: ICD-10-CM

## 2025-05-05 DIAGNOSIS — O09.93 HIGH-RISK PREGNANCY IN THIRD TRIMESTER: Primary | ICD-10-CM

## 2025-05-05 DIAGNOSIS — Z85.830 HISTORY OF SARCOMA OF BONE: ICD-10-CM

## 2025-05-05 DIAGNOSIS — Z89.9 HISTORY OF AMPUTATION: ICD-10-CM

## 2025-05-05 DIAGNOSIS — O24.919 DIABETES MELLITUS DURING PREGNANCY TREATED WITH INSULIN (HCC): ICD-10-CM

## 2025-05-05 DIAGNOSIS — Z3A.32 32 WEEKS GESTATION OF PREGNANCY: ICD-10-CM

## 2025-05-05 DIAGNOSIS — Z79.4 DIABETES MELLITUS DURING PREGNANCY TREATED WITH INSULIN (HCC): ICD-10-CM

## 2025-05-05 DIAGNOSIS — Z23 NEED FOR TDAP VACCINATION: ICD-10-CM

## 2025-05-05 DIAGNOSIS — I15.9 SECONDARY HYPERTENSION: ICD-10-CM

## 2025-05-05 DIAGNOSIS — R00.0 TACHYCARDIA: ICD-10-CM

## 2025-05-05 PROCEDURE — 90715 TDAP VACCINE 7 YRS/> IM: CPT | Performed by: NURSE PRACTITIONER

## 2025-05-05 PROCEDURE — 90471 IMMUNIZATION ADMIN: CPT | Performed by: NURSE PRACTITIONER

## 2025-05-05 PROCEDURE — 76819 FETAL BIOPHYS PROFIL W/O NST: CPT | Performed by: OBSTETRICS & GYNECOLOGY

## 2025-05-05 PROCEDURE — 99999 PR OFFICE/OUTPT VISIT,PROCEDURE ONLY: CPT | Performed by: OBSTETRICS & GYNECOLOGY

## 2025-05-05 PROCEDURE — 59025 FETAL NON-STRESS TEST: CPT | Performed by: NURSE PRACTITIONER

## 2025-05-05 NOTE — PROGRESS NOTES
Tdap given in left deltoid patient tolerated well.     NDC: 02445-960-49  LOT: [id number]  EXP: 8/27/2027

## 2025-05-05 NOTE — PROGRESS NOTES
S:  Here for NST for fetal surveillance secondary to High risk pregnancy- pre gestational diabetes on insulin, cHTN, obesity     Insurance: Medicare    IPV bag/mug given:2025 Adela Angeles DO   Genetic Information given/reviewed: 2025 Adela Angeles DO   1st trimester education packet given: 2025 Adela Angeles DO   2nd trimester education packet given:  3rd trimester education packet given:      FOB Name: Darinel    Last Pap Smear: 2022 > NEEDS postpartum   [x] Urine collected for culture 2025 Adela Angeles DO    [] Negative    [x] Positive  GBS BACTERURIA  [x] UDS collected 2025 Adela Angeles DO > negative   [x] Gc/ct collected > collected but no result? NEEDED  [x] Prenatal Labs completed Adela Angeles DO     Dating based on   [x] LMP  [] USN  Reviewed by (provider) Adela Angeles DO     Genetic Screening:  [x]Accepted FTS 2025 Adela Angeles DO   [] Undecided   []Declined   [x]Completed  [x] Low risk   [] Abn for     Carrier Screening:  [] Undecided   [] Accepted   [x] Declined  [] Known negative CF/SMA/Fragile X  [] Results     Baby aspirin screen:  [x]Positive  []Negative    Early 1 hour GTT:  [x]Yes FAILED 224  [] No    AFP: declined  []Ordered  []Completed    Anatomy scan:  [x]Referral Sent 2025 Adela Angeles DO   [x]Scheduled 24  [x]Completed  [x] Follow up     Fetal Echo:   [] Yes  [x] No    High Risk Factors:  cHTN (no meds)  -was diagnosed during her G1 when going through sarcoma dx and amputation  - no meds  - baseline labs at [facility]  - cmp/cbc/pc ratio  PregDM  -understands  testing  - fetal eco/growth scheduled 25  -desires to do BPP and growth in our office unless MFM needed otherwise  - 0,0,20, (increased) 42 changed 3/3/25, same as of 3/24/25  Hx tachycardia  - was on metoprolol 25 mg in the past  - intermittent tachycardia. Unclear though if recorded values are exertional due to patient's increased work

## 2025-05-05 NOTE — PATIENT INSTRUCTIONS
stop-smoking programs and medicines. These can increase your chances of quitting for good.  When should you call for help?  Call 911 anytime you think you may need emergency care. For example, call if:    You passed out (lost consciousness).     You have severe vaginal bleeding.     You have severe pain in your belly or pelvis.     You have had fluid gushing or leaking from your vagina and you know or think the umbilical cord is bulging into your vagina. If this happens, immediately get down on your knees so your rear end (buttocks) is higher than your head. This will decrease the pressure on the cord until help arrives.    Call your doctor now or seek immediate medical care if:    You have signs of preeclampsia, such as:  Sudden swelling of your face, hands, or feet.  New vision problems (such as dimness or blurring).  A severe headache.     You have any vaginal bleeding.     You have belly pain or cramping.     You have a fever.     You have had regular contractions (with or without pain) for an hour. This means that you have 6 or more within 1 hour after you change your position and drink fluids.     You have a sudden release of fluid from the vagina.     You have low back pain or pelvic pressure that does not go away.     You notice that your baby has stopped moving or is moving much less than normal.    Watch closely for changes in your health, and be sure to contact your doctor if you have any problems.  Where can you learn more?  Go to [email or web address] and sign in to your [employer] account. Enter Q400 in the Search Health Information box to learn more about \" Labor: Care Instructions.\"     If you do not have an account, please click on the \"Sign Up Now\" link.  Current as of: 2018  Content Version: 12.0  © 6554-1297 Healthwise, [employer]. Care instructions adapted under license by [employer]. If you have questions about a medical condition or this instruction,

## 2025-05-05 NOTE — PROGRESS NOTES
Please refer to attached ultrasound report for doctor's evaluation of the clinical information obtained by vital signs, ultrasound, and/or non-stress test along with management recommendation.    
cephalic presentation. Fetal motion and cardiac motion is seen and appears normal.  2. The placenta is posterior/fundal. The amniotic fluid is normal.  3. Biophysical profile is 8/8.    RECOMMENDATIONS:  Each of the recommendations were discussed with the patient:  1.  Continue weekly antepartum testing including nonstress test.  2.  Growth ultrasounds every 4 weeks.  3.  Delivery between 37 and 39 weeks gestation depending on blood pressure control.  4.  Follow-up would be otherwise as clinically indicated    The patient is to continue to follow with you in your office for ongoing obstetric care.     PLAN:    As noted above or sooner prn.    Sincerely,        Pipo Harmon MD

## 2025-05-06 DIAGNOSIS — O09.93 HIGH-RISK PREGNANCY IN THIRD TRIMESTER: ICD-10-CM

## 2025-05-06 DIAGNOSIS — O10.919 CHRONIC HYPERTENSION AFFECTING PREGNANCY: ICD-10-CM

## 2025-05-06 DIAGNOSIS — Z3A.32 32 WEEKS GESTATION OF PREGNANCY: ICD-10-CM

## 2025-05-06 DIAGNOSIS — O24.319 PREGESTATIONAL DIABETES MELLITUS, MODIFIED WHITE CLASS B: ICD-10-CM

## 2025-05-12 ENCOUNTER — ROUTINE PRENATAL (OUTPATIENT)
Age: 30
End: 2025-05-12
Payer: COMMERCIAL

## 2025-05-12 ENCOUNTER — ROUTINE PRENATAL (OUTPATIENT)
Dept: OBGYN CLINIC | Age: 30
End: 2025-05-12
Payer: MEDICARE

## 2025-05-12 VITALS
RESPIRATION RATE: 16 BRPM | DIASTOLIC BLOOD PRESSURE: 75 MMHG | SYSTOLIC BLOOD PRESSURE: 115 MMHG | HEART RATE: 107 BPM | TEMPERATURE: 97.5 F | WEIGHT: 221 LBS | HEIGHT: 61 IN | BODY MASS INDEX: 41.72 KG/M2

## 2025-05-12 VITALS
WEIGHT: 220.8 LBS | SYSTOLIC BLOOD PRESSURE: 120 MMHG | DIASTOLIC BLOOD PRESSURE: 62 MMHG | HEART RATE: 95 BPM | BODY MASS INDEX: 41.72 KG/M2

## 2025-05-12 DIAGNOSIS — O99.210 OBESITY AFFECTING PREGNANCY, ANTEPARTUM, UNSPECIFIED OBESITY TYPE: ICD-10-CM

## 2025-05-12 DIAGNOSIS — Z89.9 HISTORY OF AMPUTATION: ICD-10-CM

## 2025-05-12 DIAGNOSIS — O10.919 CHRONIC HYPERTENSION AFFECTING PREGNANCY: ICD-10-CM

## 2025-05-12 DIAGNOSIS — Z85.830 HISTORY OF SARCOMA OF BONE: ICD-10-CM

## 2025-05-12 DIAGNOSIS — O24.319 PREGESTATIONAL DIABETES MELLITUS, MODIFIED WHITE CLASS B: ICD-10-CM

## 2025-05-12 DIAGNOSIS — Z79.4 DIABETES MELLITUS DURING PREGNANCY TREATED WITH INSULIN (HCC): ICD-10-CM

## 2025-05-12 DIAGNOSIS — O24.919 DIABETES MELLITUS DURING PREGNANCY TREATED WITH INSULIN (HCC): ICD-10-CM

## 2025-05-12 DIAGNOSIS — O09.93 HIGH-RISK PREGNANCY IN THIRD TRIMESTER: Primary | ICD-10-CM

## 2025-05-12 DIAGNOSIS — Z89.621 HISTORY OF DISARTICULATION OF RIGHT HIP: ICD-10-CM

## 2025-05-12 DIAGNOSIS — Z34.90 THIRD PREGNANCY: ICD-10-CM

## 2025-05-12 DIAGNOSIS — Z3A.33 33 WEEKS GESTATION OF PREGNANCY: Primary | ICD-10-CM

## 2025-05-12 PROCEDURE — 3046F HEMOGLOBIN A1C LEVEL >9.0%: CPT | Performed by: OBSTETRICS & GYNECOLOGY

## 2025-05-12 PROCEDURE — 2022F DILAT RTA XM EVC RTNOPTHY: CPT | Performed by: OBSTETRICS & GYNECOLOGY

## 2025-05-12 PROCEDURE — 99999 PR OFFICE/OUTPT VISIT,PROCEDURE ONLY: CPT | Performed by: OBSTETRICS & GYNECOLOGY

## 2025-05-12 PROCEDURE — G8417 CALC BMI ABV UP PARAM F/U: HCPCS | Performed by: OBSTETRICS & GYNECOLOGY

## 2025-05-12 PROCEDURE — 1036F TOBACCO NON-USER: CPT | Performed by: OBSTETRICS & GYNECOLOGY

## 2025-05-12 PROCEDURE — 76819 FETAL BIOPHYS PROFIL W/O NST: CPT | Performed by: OBSTETRICS & GYNECOLOGY

## 2025-05-12 PROCEDURE — 59025 FETAL NON-STRESS TEST: CPT | Performed by: OBSTETRICS & GYNECOLOGY

## 2025-05-12 PROCEDURE — G8427 DOCREV CUR MEDS BY ELIG CLIN: HCPCS | Performed by: OBSTETRICS & GYNECOLOGY

## 2025-05-12 PROCEDURE — 99213 OFFICE O/P EST LOW 20 MIN: CPT | Performed by: OBSTETRICS & GYNECOLOGY

## 2025-05-12 NOTE — PROGRESS NOTES
Gestational age 33w2d  Indications obesity, chtn, pregdm  NST started 2:10  /62  Pulse 95  Weight 220.8#  Patient recording movements. Patient stated fetal movement active, no issues

## 2025-05-12 NOTE — PROGRESS NOTES
[facility] MATERNAL FETAL MED   [facility] MATERNAL FETAL MED  [address]  [facility] [address]  Dept: [phone]     2025    RE:  Kathy Quinn     : 1995   AGE: 29 y.o.     Dear Dr. Helton,    Thank you for allowing me to see Kathy Quinn.  As I'm sure you will recall, Kathy Quinn is a 29 y.o. [id number]'s last menstrual period was 2024 (exact date). Estimated Date of Delivery: 25 at 33w2d seen in our office today for the following:    REASON FOR VISIT: BPP Only    Patient Active Problem List    Diagnosis Date Noted    Tachycardia 2023    History of malignant neoplasm metastatic to lung 2022    33 weeks gestation of pregnancy 2025    Secondary malignant neoplasm of left lung (HCC) 2023    History of right leg amputation 2023    Hx PPROM with sPTD x1 2023    History of disarticulation of right hip 2020    cHTN  2020    Thrombocytosis 2020    History of sarcoma of bone 2020    FHx DM 2020        PAST HISTORY:  OB History    Para Term  AB Living   3 2 1 1 0 2   SAB IAB Ectopic Molar Multiple Live Births   0 0 0 0 0 2      # Outcome Date GA Lbr Zeferino/2nd Weight Sex Type Anes PTL Lv   3 Current            2 Term 23 38w0d 01:46 / 01:30 3.53 kg (7 lb 12.5 oz) M Vag-Spont EPI N MAYCO   1  10/12/20 33w6d  2.21 kg (4 lb 14 oz) M Vag-Spont EPI Y MAYCO          MEDICAL:  Past Medical History:   Diagnosis Date    33 weeks gestation of pregnancy 10/12/2020    child born oct 2020    Anemia 2020    During first pregnancy and cancer    Antepartum premature rupture of membrane     child born oct 2020    COVID-19 2021    fatigue    Gestational diabetes     Gestational diabetes mellitus May 2020    In first pregnancy    High-risk pregnancy, unspecified trimester 10/12/2020    History of anemia     prior blood and iron

## 2025-05-12 NOTE — PROGRESS NOTES
NST (Non Stress Test) Worksheet  25    Diagnosis:  Kathy Quinn is a 29 y.o. female    33w2d  1. High-risk pregnancy in third trimester    2. Chronic hypertension affecting pregnancy    3. Pregestational diabetes mellitus, modified White class B    4. Obesity affecting pregnancy, antepartum, unspecified obesity type          Indication for Testing:  gestational diabetes mellitus and chronic hypertension      Scheduled Procedure:  Non Stress Test (59025-CPT)      Findings:  Total time of tracing to complete testing: >20 Minutes (Minimum must be 20 minutes)  NST is  reactive  CATEGORY 1 TRACING  Variability is moderate  Baseline FHR of 130 bpm    Accelerations are identified 15 beats above the baseline for 15 minutes: Yes (>32 weeks gestation)    Accelerations are identified 10 beats above the baseline for 10 minutes: NA (28-32 weeks gestation)                  RTO 1 WEEK FOR A FOLLOW UP APPOINTMENT WITH  TESTING.    The patient, Kathy Quinn is a 29 y.o. female, was seen with a total time spent of 20 minutes for the visit on this date of service by the provider for  testing only. The time component had both face to face and non face to face time spent in determining the total time component.  Counseling and education regarding her diagnosis listed below and her options regarding those diagnoses were also included in determining her time component.      Diagnosis Orders   1. High-risk pregnancy in third trimester  Fetal nonstress test      2. Chronic hypertension affecting pregnancy  Fetal nonstress test      3. Pregestational diabetes mellitus, modified White class B  Fetal nonstress test      4. Obesity affecting pregnancy, antepartum, unspecified obesity type  Fetal nonstress test           The patient had her preventative health maintenance recommendations and follow-up reviewed with her at the completion of her visit.        Electronically signed by Pipo Helton DO on

## 2025-05-19 ENCOUNTER — ROUTINE PRENATAL (OUTPATIENT)
Dept: OBGYN CLINIC | Age: 30
End: 2025-05-19
Payer: MEDICARE

## 2025-05-19 ENCOUNTER — ROUTINE PRENATAL (OUTPATIENT)
Age: 30
End: 2025-05-19
Payer: COMMERCIAL

## 2025-05-19 VITALS
HEART RATE: 102 BPM | HEIGHT: 61 IN | RESPIRATION RATE: 16 BRPM | WEIGHT: 225.5 LBS | SYSTOLIC BLOOD PRESSURE: 129 MMHG | TEMPERATURE: 97.2 F | BODY MASS INDEX: 42.57 KG/M2 | DIASTOLIC BLOOD PRESSURE: 84 MMHG

## 2025-05-19 VITALS
WEIGHT: 225.8 LBS | HEART RATE: 97 BPM | DIASTOLIC BLOOD PRESSURE: 70 MMHG | BODY MASS INDEX: 42.66 KG/M2 | SYSTOLIC BLOOD PRESSURE: 120 MMHG

## 2025-05-19 DIAGNOSIS — O09.93 HIGH-RISK PREGNANCY IN THIRD TRIMESTER: ICD-10-CM

## 2025-05-19 DIAGNOSIS — O10.919 CHRONIC HYPERTENSION AFFECTING PREGNANCY: ICD-10-CM

## 2025-05-19 DIAGNOSIS — O24.319 PREGESTATIONAL DIABETES MELLITUS, MODIFIED WHITE CLASS B: ICD-10-CM

## 2025-05-19 DIAGNOSIS — D75.839 THROMBOCYTOSIS: ICD-10-CM

## 2025-05-19 DIAGNOSIS — O09.93 HIGH-RISK PREGNANCY IN THIRD TRIMESTER: Primary | ICD-10-CM

## 2025-05-19 DIAGNOSIS — Z3A.34 34 WEEKS GESTATION OF PREGNANCY: Primary | ICD-10-CM

## 2025-05-19 DIAGNOSIS — Z71.89 ENCOUNTER FOR MEDICATION REVIEW AND COUNSELING: ICD-10-CM

## 2025-05-19 DIAGNOSIS — O99.210 OBESITY AFFECTING PREGNANCY, ANTEPARTUM, UNSPECIFIED OBESITY TYPE: ICD-10-CM

## 2025-05-19 DIAGNOSIS — Z34.90 THIRD PREGNANCY: ICD-10-CM

## 2025-05-19 DIAGNOSIS — Z89.9 HISTORY OF AMPUTATION: ICD-10-CM

## 2025-05-19 DIAGNOSIS — Z79.899 MEDICATION DOSE INCREASED: ICD-10-CM

## 2025-05-19 DIAGNOSIS — Z85.830 HISTORY OF SARCOMA OF BONE: ICD-10-CM

## 2025-05-19 PROCEDURE — 3046F HEMOGLOBIN A1C LEVEL >9.0%: CPT | Performed by: STUDENT IN AN ORGANIZED HEALTH CARE EDUCATION/TRAINING PROGRAM

## 2025-05-19 PROCEDURE — G8428 CUR MEDS NOT DOCUMENT: HCPCS | Performed by: STUDENT IN AN ORGANIZED HEALTH CARE EDUCATION/TRAINING PROGRAM

## 2025-05-19 PROCEDURE — 1036F TOBACCO NON-USER: CPT | Performed by: STUDENT IN AN ORGANIZED HEALTH CARE EDUCATION/TRAINING PROGRAM

## 2025-05-19 PROCEDURE — G8417 CALC BMI ABV UP PARAM F/U: HCPCS | Performed by: STUDENT IN AN ORGANIZED HEALTH CARE EDUCATION/TRAINING PROGRAM

## 2025-05-19 PROCEDURE — 99999 PR OFFICE/OUTPT VISIT,PROCEDURE ONLY: CPT | Performed by: OBSTETRICS & GYNECOLOGY

## 2025-05-19 PROCEDURE — 99214 OFFICE O/P EST MOD 30 MIN: CPT | Performed by: STUDENT IN AN ORGANIZED HEALTH CARE EDUCATION/TRAINING PROGRAM

## 2025-05-19 PROCEDURE — 59025 FETAL NON-STRESS TEST: CPT | Performed by: STUDENT IN AN ORGANIZED HEALTH CARE EDUCATION/TRAINING PROGRAM

## 2025-05-19 PROCEDURE — 76819 FETAL BIOPHYS PROFIL W/O NST: CPT | Performed by: OBSTETRICS & GYNECOLOGY

## 2025-05-19 PROCEDURE — 2022F DILAT RTA XM EVC RTNOPTHY: CPT | Performed by: STUDENT IN AN ORGANIZED HEALTH CARE EDUCATION/TRAINING PROGRAM

## 2025-05-19 PROCEDURE — 99212 OFFICE O/P EST SF 10 MIN: CPT | Performed by: STUDENT IN AN ORGANIZED HEALTH CARE EDUCATION/TRAINING PROGRAM

## 2025-05-19 NOTE — PROGRESS NOTES
Please refer to attached ultrasound report for doctor's evaluation of the clinical information obtained by vital signs, ultrasound, and/or non-stress test along with management recommendation.    
transfusion, without reactions    History of blood transfusion 2020    History of chemotherapy     last treatment 2021    History of E. 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    Pregestational diabetes mellitus, modified White class B 2020    resolved with childbirth    Pregnancy induced hypertension     resolved with childbirth     delivery 10/12/2020    Delivered son 6 weeks early     labor 10/12/2020    Delivered son 6 weeks early    Pseudomonas UTI 2020 treated with Cefepime 2g IV for 10 days      10/12/20 M Apg 8/9 Wt 4#14  10/12/2020     23 M Apg 8/9 Wt 7#12 2023    Under care of service provider 2022    oncology-[facility]-last visit aug 2022    Under care of service provider 2022    gdt-kbcqvpze-sqvopukpcm-last visit aug 2022    Under care of service provider 2022    hipccfqvottxfz-plcguiz-op vincent-last visit sep 2022    Wears glasses         SURGICAL:  Past Surgical History:   Procedure Laterality Date    CT NEEDLE BIOPSY LUNG PERCUTANEOUS W IMAGING GUIDANCE  2022    CT NEEDLE BIOPSY LUNG PERCUTANEOUS 2022 [facility] CT SCAN    CT NEEDLE BIOPSY LUNG PERCUTANEOUS W IMAGING GUIDANCE  9/15/2022    CT NEEDLE BIOPSY LUNG PERCUTANEOUS 9/15/2022 [facility] CT SCAN    IR PORT PLACEMENT > 5 YEARS  10/27/2020    IR PORT PLACEMENT EQUAL OR GREATER THAN 5 YEARS 10/27/2020 Tk Damian MD [facility] SPECIAL PROCEDURES    LEG AMPUTATION AT HIP Right 2020    [facility], osteosarcoma    LOBECTOMY Left 2022    LT MUSCLE SPARING THORACOTOMY, LOWER LOBE SEGMENTAL RESECTION AND EN BLOC CHEST WALL RESECTION performed by Donell Marinelli MD at [facility]OR       ALLERGIES:    No Known Allergies      MEDICATIONS:    Current Outpatient Medications

## 2025-05-19 NOTE — PROGRESS NOTES
Gestational age 34w2d  Indications obesity, chtn, pregdm  NST started 11:18  /70  Pulse 97  Weight 225.8lb  Patient recording movements. Patient stated fetal movement active, blood sugar log on your desk

## 2025-05-19 NOTE — PROGRESS NOTES
Prenatal Visit    Kathy Quinn is a 29 y.o. female  at 34w2d    Positive fetal movement  Negative contractions, vaginal bleeding, loss of fluid    Discussed resident involvement in triage and labor and delivery process. Discussed call group. All questions answered. Patient verbalized understanding and agreement.     Vitals:  /70   Pulse 97   Wt 102.4 kg (225 lb 12.8 oz)   LMP 2024 (Exact Date)   BMI 42.66 kg/m²     NST:  Baseline: 150  Variability: moderate  Accelerations: present  Decelerations: absent  Reactive: yes   Time: 20 min      Assessment & Plan:  Kathy Quinn is a 29 y.o. female  at 34w2d   - 28 week labs completed   - Influenza and Covid vaccinations recommended per ACOG: R/B/A discussed with increased risk of both maternal and fetal morbidity and mortality in unvaccinated pregnant patients.    - TDAP and RSV vaccinations recommended per ACOG. R/B/A discussed. She understands must received RSV vaccine at pharmacy.   - Continue prenatal vitamin   - Saw MFM today for BPP   - did not report sugars for a week which were previously controlled. All fastings significantly elevated. Increase NPH from 42 to 50 units. Take 3 AM blood sugar. Keep 20 units with dinner 2025    - Schedule IOL for  AM with Dr. Helton for cHTN no meds and pregestational diabetes         FOB Name: Darinel    Last Pap Smear: 2022 > NEEDS postpartum   [x] Urine collected for culture 2025 Adela Angeles DO    [] Negative    [x] Positive  GBS BACTERURIA  [x] UDS collected 2025 Adela Angeles DO > negative   [x] Gc/ct collected > collected but no result? NEEDED  [x] Prenatal Labs completed Adela Angeles DO     Dating based on   [x] LMP  [] USN  Reviewed by (provider) Adela Angeles DO     Genetic Screening:  [x]Accepted FTS 2025 Adela Angeles DO   [] Undecided   []Declined   [x]Completed  [x] Low risk   [] Abn for     Carrier Screening:  [] Undecided

## 2025-05-27 ENCOUNTER — ROUTINE PRENATAL (OUTPATIENT)
Age: 30
End: 2025-05-27
Payer: MEDICARE

## 2025-05-27 ENCOUNTER — ROUTINE PRENATAL (OUTPATIENT)
Dept: OBGYN CLINIC | Age: 30
End: 2025-05-27
Payer: MEDICARE

## 2025-05-27 VITALS
BODY MASS INDEX: 43.43 KG/M2 | DIASTOLIC BLOOD PRESSURE: 76 MMHG | HEIGHT: 61 IN | WEIGHT: 230 LBS | HEART RATE: 90 BPM | RESPIRATION RATE: 16 BRPM | TEMPERATURE: 98 F | SYSTOLIC BLOOD PRESSURE: 124 MMHG

## 2025-05-27 VITALS — BODY MASS INDEX: 41.76 KG/M2 | SYSTOLIC BLOOD PRESSURE: 126 MMHG | WEIGHT: 221 LBS | DIASTOLIC BLOOD PRESSURE: 72 MMHG

## 2025-05-27 DIAGNOSIS — O10.919 CHRONIC HYPERTENSION AFFECTING PREGNANCY: ICD-10-CM

## 2025-05-27 DIAGNOSIS — R00.0 TACHYCARDIA: ICD-10-CM

## 2025-05-27 DIAGNOSIS — Z3A.35 35 WEEKS GESTATION OF PREGNANCY: Primary | ICD-10-CM

## 2025-05-27 DIAGNOSIS — Z89.9 HISTORY OF AMPUTATION: ICD-10-CM

## 2025-05-27 DIAGNOSIS — O09.93 HIGH-RISK PREGNANCY IN THIRD TRIMESTER: Primary | ICD-10-CM

## 2025-05-27 DIAGNOSIS — O24.319 PREGESTATIONAL DIABETES MELLITUS, MODIFIED WHITE CLASS B: ICD-10-CM

## 2025-05-27 DIAGNOSIS — C78.02 SECONDARY MALIGNANT NEOPLASM OF LEFT LUNG (HCC): ICD-10-CM

## 2025-05-27 DIAGNOSIS — Z85.89 HISTORY OF MALIGNANT NEOPLASM METASTATIC TO LUNG: ICD-10-CM

## 2025-05-27 DIAGNOSIS — O99.210 OBESITY AFFECTING PREGNANCY, ANTEPARTUM, UNSPECIFIED OBESITY TYPE: ICD-10-CM

## 2025-05-27 DIAGNOSIS — Z85.830 HISTORY OF SARCOMA OF BONE: ICD-10-CM

## 2025-05-27 PROCEDURE — 99999 PR OFFICE/OUTPT VISIT,PROCEDURE ONLY: CPT | Performed by: OBSTETRICS & GYNECOLOGY

## 2025-05-27 PROCEDURE — 59025 FETAL NON-STRESS TEST: CPT | Performed by: OBSTETRICS & GYNECOLOGY

## 2025-05-27 PROCEDURE — 76819 FETAL BIOPHYS PROFIL W/O NST: CPT | Performed by: OBSTETRICS & GYNECOLOGY

## 2025-05-27 PROCEDURE — 76816 OB US FOLLOW-UP PER FETUS: CPT | Performed by: OBSTETRICS & GYNECOLOGY

## 2025-05-27 PROCEDURE — 0502F SUBSEQUENT PRENATAL CARE: CPT | Performed by: OBSTETRICS & GYNECOLOGY

## 2025-05-27 NOTE — PROGRESS NOTES
[facility] MATERNAL FETAL MED  [address]  [place] [address]  Dept: [phone]     2025    RE:  Kathy Quinn     : 1995   AGE: 29 y.o.     Dear Dr. Helton,    Thank you for allowing me to see Kathy Quinn.  As I'm sure you will recall, Kathy Quinn is a 29 y.o. [id number]'s last menstrual period was 2024 (exact date). Estimated Date of Delivery: 25 at 35w3d seen in our office today for the following:    REASON FOR VISIT: Growth, BPP    Patient Active Problem List    Diagnosis Date Noted    Tachycardia 2023    History of malignant neoplasm metastatic to lung 2022    35 weeks gestation of pregnancy 2025    Secondary malignant neoplasm of left lung (HCC) 2023    History of right leg amputation 2023    Hx PPROM with sPTD x1 2023    History of disarticulation of right hip 2020    cHTN  2020    Thrombocytosis 2020    History of sarcoma of bone 2020    FHx DM 2020        PAST HISTORY:  OB History    Para Term  AB Living   3 2 1 1 0 2   SAB IAB Ectopic Molar Multiple Live Births   0 0 0 0 0 2      # Outcome Date GA Lbr Zeferino/2nd Weight Sex Type Anes PTL Lv   3 Current            2 Term 23 38w0d 01:46 / 01:30 3.53 kg (7 lb 12.5 oz) M Vag-Spont EPI N MAYCO   1  10/12/20 33w6d  2.21 kg (4 lb 14 oz) M Vag-Spont EPI Y MAYCO          MEDICAL:  Past Medical History:   Diagnosis Date    33 weeks gestation of pregnancy 10/12/2020    child born oct 2020    Anemia 2020    During first pregnancy and cancer    Antepartum premature rupture of membrane     child born oct 2020    COVID-19 2021    fatigue    Gestational diabetes     Gestational diabetes mellitus May 2020    In first pregnancy    High-risk pregnancy, unspecified trimester 10/12/2020    History of anemia     prior blood and iron transfusion, without reactions

## 2025-06-05 ENCOUNTER — ROUTINE PRENATAL (OUTPATIENT)
Dept: OBGYN CLINIC | Age: 30
End: 2025-06-05
Payer: MEDICARE

## 2025-06-05 ENCOUNTER — ROUTINE PRENATAL (OUTPATIENT)
Age: 30
End: 2025-06-05
Payer: MEDICARE

## 2025-06-05 VITALS
HEART RATE: 82 BPM | WEIGHT: 232 LBS | DIASTOLIC BLOOD PRESSURE: 84 MMHG | BODY MASS INDEX: 43.86 KG/M2 | SYSTOLIC BLOOD PRESSURE: 128 MMHG

## 2025-06-05 VITALS
SYSTOLIC BLOOD PRESSURE: 129 MMHG | BODY MASS INDEX: 43.8 KG/M2 | HEART RATE: 89 BPM | HEIGHT: 61 IN | WEIGHT: 232 LBS | DIASTOLIC BLOOD PRESSURE: 76 MMHG | TEMPERATURE: 98 F | RESPIRATION RATE: 16 BRPM

## 2025-06-05 DIAGNOSIS — Z3A.36 36 WEEKS GESTATION OF PREGNANCY: Primary | ICD-10-CM

## 2025-06-05 DIAGNOSIS — O24.319 PREGESTATIONAL DIABETES MELLITUS, MODIFIED WHITE CLASS B: ICD-10-CM

## 2025-06-05 DIAGNOSIS — O09.93 HIGH-RISK PREGNANCY IN THIRD TRIMESTER: Primary | ICD-10-CM

## 2025-06-05 DIAGNOSIS — O10.919 CHRONIC HYPERTENSION AFFECTING PREGNANCY: ICD-10-CM

## 2025-06-05 DIAGNOSIS — Z34.90 THIRD PREGNANCY: ICD-10-CM

## 2025-06-05 DIAGNOSIS — Z89.9 HISTORY OF AMPUTATION: ICD-10-CM

## 2025-06-05 DIAGNOSIS — O99.210 OBESITY AFFECTING PREGNANCY, ANTEPARTUM, UNSPECIFIED OBESITY TYPE: ICD-10-CM

## 2025-06-05 PROCEDURE — 76819 FETAL BIOPHYS PROFIL W/O NST: CPT | Performed by: OBSTETRICS & GYNECOLOGY

## 2025-06-05 PROCEDURE — 59025 FETAL NON-STRESS TEST: CPT | Performed by: STUDENT IN AN ORGANIZED HEALTH CARE EDUCATION/TRAINING PROGRAM

## 2025-06-05 PROCEDURE — 0502F SUBSEQUENT PRENATAL CARE: CPT | Performed by: STUDENT IN AN ORGANIZED HEALTH CARE EDUCATION/TRAINING PROGRAM

## 2025-06-05 NOTE — PROGRESS NOTES
[facility] MATERNAL FETAL MED   [facility] MATERNAL FETAL MED  [address]  [place] [address]  Dept: [phone]     2025    RE:  Kathy Quinn     : 1995   AGE: 29 y.o.     Dear Dr. Helton,    Thank you for allowing me to see Kathy Quinn.  As I'm sure you will recall, Kathy Quinn is a 29 y.o. [id number]'s last menstrual period was 2024 (exact date). Estimated Date of Delivery: 25 at 36w5d seen in our office today for the following:    REASON FOR VISIT: BPP Only    Patient Active Problem List    Diagnosis Date Noted    Tachycardia 2023    History of malignant neoplasm metastatic to lung 2022    36 weeks gestation of pregnancy 2025    Secondary malignant neoplasm of left lung (HCC) 2023    History of right leg amputation 2023    Hx PPROM with sPTD x1 2023    History of disarticulation of right hip 2020    cHTN  2020    Thrombocytosis 2020    History of sarcoma of bone 2020    FHx DM 2020        PAST HISTORY:  OB History    Para Term  AB Living   3 2 1 1 0 2   SAB IAB Ectopic Molar Multiple Live Births   0 0 0 0 0 2      # Outcome Date GA Lbr Zeferino/2nd Weight Sex Type Anes PTL Lv   3 Current            2 Term 23 38w0d 01:46 / 01:30 3.53 kg (7 lb 12.5 oz) M Vag-Spont EPI N MAYCO   1  10/12/20 33w6d  2.21 kg (4 lb 14 oz) M Vag-Spont EPI Y MAYCO          MEDICAL:  Past Medical History:   Diagnosis Date    33 weeks gestation of pregnancy 10/12/2020    child born oct 2020    Anemia 2020    During first pregnancy and cancer    Antepartum premature rupture of membrane     child born oct 2020    COVID-19 2021    fatigue    Gestational diabetes     Gestational diabetes mellitus May 2020    In first pregnancy    High-risk pregnancy, unspecified trimester 10/12/2020    History of anemia     prior blood and iron

## 2025-06-05 NOTE — PROGRESS NOTES
TESTING NOTE    Kathy Quinn is a 29 y.o. female  at 36w5d    The patient was seen and examined. She is here today for  testing for because she is at high risk for the following reasons: cHTN, GDMA.  The baby is moving well and she denies any complaints.    Patient Active Problem List   Diagnosis    FHx DM    History of sarcoma of bone    Thrombocytosis    cHTN     History of disarticulation of right hip    History of malignant neoplasm metastatic to lung    History of right leg amputation    Tachycardia    Hx PPROM with sPTD x1    Secondary malignant neoplasm of left lung (HCC)    36 weeks gestation of pregnancy       Vitals:  /84   Pulse 82   Wt 105.2 kg (232 lb)   LMP 2024 (Exact Date)   BMI 43.86 kg/m²      NST:   Fetal heart rate baseline:  125 , moderate variability, accelerations present, decelerations absent    The tracing has been reviewed and is considered reactive.    Biophysical Profile:   Was performed at [facility] today     Assessment/Plan:  Kathy Quinn is a 29 y.o. female  at 36w5d  NST/ reactive   - The patient will continue with her scheduled office appointments.     - She will continue with her  testing as scheduled  GDMA2  Patient has not brought in blood glucose logs since  therefore I am unable to assess glucose control and maternal/fetal risk   20 units fast acting with dinner and 50 units NPH nightly   4. cHTN   BP normotensive     DO Vikki Romano OBGYMICHAEL  [address]    [id number]  [place], [address]  2025, 4:55 PM

## 2025-06-09 DIAGNOSIS — O09.93 HIGH-RISK PREGNANCY IN THIRD TRIMESTER: ICD-10-CM

## 2025-06-09 DIAGNOSIS — O99.210 OBESITY AFFECTING PREGNANCY, ANTEPARTUM, UNSPECIFIED OBESITY TYPE: ICD-10-CM

## 2025-06-09 DIAGNOSIS — O24.319 PREGESTATIONAL DIABETES MELLITUS, MODIFIED WHITE CLASS B: ICD-10-CM

## 2025-06-09 DIAGNOSIS — O10.919 CHRONIC HYPERTENSION AFFECTING PREGNANCY: ICD-10-CM

## 2025-06-12 ENCOUNTER — ROUTINE PRENATAL (OUTPATIENT)
Dept: OBGYN CLINIC | Age: 30
End: 2025-06-12

## 2025-06-12 ENCOUNTER — ROUTINE PRENATAL (OUTPATIENT)
Age: 30
End: 2025-06-12
Payer: MEDICARE

## 2025-06-12 VITALS
HEART RATE: 80 BPM | WEIGHT: 232.81 LBS | DIASTOLIC BLOOD PRESSURE: 87 MMHG | BODY MASS INDEX: 43.95 KG/M2 | RESPIRATION RATE: 16 BRPM | SYSTOLIC BLOOD PRESSURE: 133 MMHG | TEMPERATURE: 97.5 F | HEIGHT: 61 IN

## 2025-06-12 VITALS
BODY MASS INDEX: 44.14 KG/M2 | DIASTOLIC BLOOD PRESSURE: 84 MMHG | WEIGHT: 233.8 LBS | HEIGHT: 61 IN | SYSTOLIC BLOOD PRESSURE: 120 MMHG

## 2025-06-12 DIAGNOSIS — O10.919 CHRONIC HYPERTENSION AFFECTING PREGNANCY: ICD-10-CM

## 2025-06-12 DIAGNOSIS — Z3A.37 37 WEEKS GESTATION OF PREGNANCY: ICD-10-CM

## 2025-06-12 DIAGNOSIS — Z89.9 HISTORY OF AMPUTATION: ICD-10-CM

## 2025-06-12 DIAGNOSIS — Z87.59 HISTORY OF PRETERM PREMATURE RUPTURE OF MEMBRANES (PPROM): ICD-10-CM

## 2025-06-12 DIAGNOSIS — D75.839 THROMBOCYTOSIS: ICD-10-CM

## 2025-06-12 DIAGNOSIS — Z34.90 THIRD PREGNANCY: ICD-10-CM

## 2025-06-12 DIAGNOSIS — O99.210 OBESITY AFFECTING PREGNANCY, ANTEPARTUM, UNSPECIFIED OBESITY TYPE: ICD-10-CM

## 2025-06-12 DIAGNOSIS — O24.319 PREGESTATIONAL DIABETES MELLITUS, MODIFIED WHITE CLASS B: ICD-10-CM

## 2025-06-12 DIAGNOSIS — Z3A.37 37 WEEKS GESTATION OF PREGNANCY: Primary | ICD-10-CM

## 2025-06-12 DIAGNOSIS — Z85.830 HISTORY OF SARCOMA OF BONE: ICD-10-CM

## 2025-06-12 DIAGNOSIS — O09.93 HIGH-RISK PREGNANCY IN THIRD TRIMESTER: Primary | ICD-10-CM

## 2025-06-12 DIAGNOSIS — Z85.89 HISTORY OF MALIGNANT NEOPLASM METASTATIC TO LUNG: ICD-10-CM

## 2025-06-12 PROCEDURE — 76819 FETAL BIOPHYS PROFIL W/O NST: CPT | Performed by: OBSTETRICS & GYNECOLOGY

## 2025-06-12 PROCEDURE — 59025 FETAL NON-STRESS TEST: CPT | Performed by: ADVANCED PRACTICE MIDWIFE

## 2025-06-12 PROCEDURE — 0502F SUBSEQUENT PRENATAL CARE: CPT | Performed by: ADVANCED PRACTICE MIDWIFE

## 2025-06-12 NOTE — PROGRESS NOTES
[facility] MATERNAL FETAL MED   [facility] MATERNAL FETAL MED  [address]  [place] [address]  Dept: [phone]     2025    RE:  Kathy Quinn     : 1995   AGE: 29 y.o.     Dear Dr. Helton,    Thank you for allowing me to see Kathy Quinn.  As I'm sure you will recall, Kathy Quinn is a 29 y.o. [id number]'s last menstrual period was 2024 (exact date). Estimated Date of Delivery: 25 at 37w5d seen in our office today for the following:    REASON FOR VISIT: BPP Only    Patient Active Problem List    Diagnosis Date Noted    Tachycardia 2023    History of malignant neoplasm metastatic to lung 2022    37 weeks gestation of pregnancy 2025    Secondary malignant neoplasm of left lung (HCC) 2023    History of right leg amputation 2023    Hx PPROM with sPTD x1 2023    History of disarticulation of right hip 2020    cHTN  2020    Thrombocytosis 2020    History of sarcoma of bone 2020    FHx DM 2020        PAST HISTORY:  OB History    Para Term  AB Living   3 2 1 1 0 2   SAB IAB Ectopic Molar Multiple Live Births   0 0 0 0 0 2      # Outcome Date GA Lbr Zeferino/2nd Weight Sex Type Anes PTL Lv   3 Current            2 Term 23 38w0d 01:46 / 01:30 3.53 kg (7 lb 12.5 oz) M Vag-Spont EPI N MAYCO   1  10/12/20 33w6d  2.21 kg (4 lb 14 oz) M Vag-Spont EPI Y MAYCO          MEDICAL:  Past Medical History:   Diagnosis Date    33 weeks gestation of pregnancy 10/12/2020    child born oct 2020    Anemia 2020    During first pregnancy and cancer    Antepartum premature rupture of membrane     child born oct 2020    COVID-19 2021    fatigue    Gestational diabetes     Gestational diabetes mellitus May 2020    In first pregnancy    High-risk pregnancy, unspecified trimester 10/12/2020    History of anemia     prior blood and iron

## 2025-06-12 NOTE — PROGRESS NOTES
visit  [] Negative from   [] Positive  [x] Positive in urine  Reviewed signs and symptoms of labor: Yes  Reviewed signs and symptoms of preeclampsia: Yes  Reviewed signs and symptoms of daniel hick contractions:  Yes    2. 37 weeks gestation of pregnancy  IOL scheduled     3. Pregestational diabetes mellitus, modified White class B  - forgot to send in blood sugars will do reports has been well controlled. Glycemia control not evaluated at visit today  - insulin 0/0/20/50  - Fetal nonstress test; Future    4. Chronic hypertension affecting pregnancy  - bp wnl   - reviewed s/s of preE  - denies any s/s of preE  - Fetal nonstress test; Future    5. Obesity affecting pregnancy, antepartum, unspecified obesity type  - Fetal nonstress test; Future    6. History of  premature rupture of membranes (PPROM)    7. History of malignant neoplasm metastatic to lung      8. History of sarcoma of bone        She was counseled regarding all of the above:    Return in about 1 week (around 2025) for BPP, NST.    The patient, Ktahy Quinn was seen for 25 minutes were spent on this encounter on the date of service by the provider.        Electronically Signed By: FATEMEH Clarke CNM

## 2025-06-18 ENCOUNTER — HOSPITAL ENCOUNTER (INPATIENT)
Age: 30
LOS: 2 days | Discharge: HOME | End: 2025-06-20
Attending: STUDENT IN AN ORGANIZED HEALTH CARE EDUCATION/TRAINING PROGRAM | Admitting: OBSTETRICS & GYNECOLOGY
Payer: MEDICARE

## 2025-06-18 ENCOUNTER — ANESTHESIA (OUTPATIENT)
Dept: LABOR AND DELIVERY | Age: 30
End: 2025-06-18
Payer: MEDICARE

## 2025-06-18 ENCOUNTER — APPOINTMENT (OUTPATIENT)
Dept: LABOR AND DELIVERY | Age: 30
End: 2025-06-18
Payer: MEDICARE

## 2025-06-18 ENCOUNTER — ANESTHESIA EVENT (OUTPATIENT)
Dept: LABOR AND DELIVERY | Age: 30
End: 2025-06-18
Payer: MEDICARE

## 2025-06-18 PROBLEM — Z3A.38 38 WEEKS GESTATION OF PREGNANCY: Status: ACTIVE | Noted: 2025-06-18

## 2025-06-18 LAB
ABO + RH BLD: NORMAL
ALBUMIN SERPL-MCNC: 3.4 G/DL (ref 3.5–5.2)
ALBUMIN/GLOB SERPL: 1.5 {RATIO} (ref 1–2.5)
ALP SERPL-CCNC: 159 U/L (ref 35–104)
ALT SERPL-CCNC: 12 U/L (ref 10–35)
AMPHET UR QL SCN: NEGATIVE
ANION GAP SERPL CALCULATED.3IONS-SCNC: 12 MMOL/L (ref 9–16)
ARM BAND NUMBER: NORMAL
AST SERPL-CCNC: 19 U/L (ref 10–35)
BARBITURATES UR QL SCN: NEGATIVE
BASOPHILS # BLD: <0.03 K/UL (ref 0–0.2)
BASOPHILS NFR BLD: 0 % (ref 0–2)
BENZODIAZ UR QL: NEGATIVE
BILIRUB SERPL-MCNC: 0.2 MG/DL (ref 0–1.2)
BLOOD BANK SAMPLE EXPIRATION: NORMAL
BLOOD GROUP ANTIBODIES SERPL: NEGATIVE
BUN SERPL-MCNC: 10 MG/DL (ref 6–20)
CALCIUM SERPL-MCNC: 8.8 MG/DL (ref 8.6–10.4)
CANNABINOIDS UR QL SCN: NEGATIVE
CHLORIDE SERPL-SCNC: 105 MMOL/L (ref 98–107)
CO2 SERPL-SCNC: 20 MMOL/L (ref 20–31)
COCAINE UR QL SCN: NEGATIVE
CREAT SERPL-MCNC: 0.4 MG/DL (ref 0.6–0.9)
CREAT UR-MCNC: 58.9 MG/DL (ref 28–217)
EOSINOPHIL # BLD: 0.04 K/UL (ref 0–0.44)
EOSINOPHILS RELATIVE PERCENT: 1 % (ref 1–4)
ERYTHROCYTE [DISTWIDTH] IN BLOOD BY AUTOMATED COUNT: 13.3 % (ref 11.8–14.4)
FENTANYL UR QL: NEGATIVE
GFR, ESTIMATED: >90 ML/MIN/1.73M2
GLUCOSE BLD-MCNC: 115 MG/DL (ref 65–105)
GLUCOSE SERPL-MCNC: 145 MG/DL (ref 74–99)
HCT VFR BLD AUTO: 35.1 % (ref 36.3–47.1)
HGB BLD-MCNC: 12 G/DL (ref 11.9–15.1)
IMM GRANULOCYTES # BLD AUTO: 0.04 K/UL (ref 0–0.3)
IMM GRANULOCYTES NFR BLD: 1 %
LYMPHOCYTES NFR BLD: 0.95 K/UL (ref 1.1–3.7)
LYMPHOCYTES RELATIVE PERCENT: 17 % (ref 24–43)
MCH RBC QN AUTO: 28.4 PG (ref 25.2–33.5)
MCHC RBC AUTO-ENTMCNC: 34.2 G/DL (ref 28.4–34.8)
MCV RBC AUTO: 83.2 FL (ref 82.6–102.9)
METHADONE UR QL: NEGATIVE
MONOCYTES NFR BLD: 0.45 K/UL (ref 0.1–1.2)
MONOCYTES NFR BLD: 8 % (ref 3–12)
NEUTROPHILS NFR BLD: 73 % (ref 36–65)
NEUTS SEG NFR BLD: 4.01 K/UL (ref 1.5–8.1)
NRBC BLD-RTO: 0 PER 100 WBC
OPIATES UR QL SCN: NEGATIVE
OXYCODONE UR QL SCN: NEGATIVE
PCP UR QL SCN: NEGATIVE
PLATELET # BLD AUTO: ABNORMAL K/UL (ref 138–453)
PLATELET, FLUORESCENCE: 126 K/UL (ref 138–453)
PLATELETS.RETICULATED NFR BLD AUTO: 14.8 % (ref 1.1–10.3)
POTASSIUM SERPL-SCNC: 4.1 MMOL/L (ref 3.7–5.3)
PROT SERPL-MCNC: 5.6 G/DL (ref 6.6–8.7)
RBC # BLD AUTO: 4.22 M/UL (ref 3.95–5.11)
SODIUM SERPL-SCNC: 137 MMOL/L (ref 136–145)
T PALLIDUM AB SER QL IA: NONREACTIVE
TEST INFORMATION: NORMAL
TOTAL PROTEIN, URINE: 11 MG/DL
URINE TOTAL PROTEIN CREATININE RATIO: 0.19 (ref 0–0.2)
WBC OTHER # BLD: 5.5 K/UL (ref 3.5–11.3)

## 2025-06-18 PROCEDURE — 82947 ASSAY GLUCOSE BLOOD QUANT: CPT

## 2025-06-18 PROCEDURE — 1220000000 HC SEMI PRIVATE OB R&B

## 2025-06-18 PROCEDURE — 6360000002 HC RX W HCPCS

## 2025-06-18 PROCEDURE — 7200000001 HC VAGINAL DELIVERY

## 2025-06-18 PROCEDURE — 2500000003 HC RX 250 WO HCPCS

## 2025-06-18 PROCEDURE — 86901 BLOOD TYPING SEROLOGIC RH(D): CPT

## 2025-06-18 PROCEDURE — 6360000002 HC RX W HCPCS: Performed by: ANESTHESIOLOGY

## 2025-06-18 PROCEDURE — 86900 BLOOD TYPING SEROLOGIC ABO: CPT

## 2025-06-18 PROCEDURE — 2580000003 HC RX 258

## 2025-06-18 PROCEDURE — 80307 DRUG TEST PRSMV CHEM ANLYZR: CPT

## 2025-06-18 PROCEDURE — 3700000025 EPIDURAL BLOCK: Performed by: ANESTHESIOLOGY

## 2025-06-18 PROCEDURE — 82570 ASSAY OF URINE CREATININE: CPT

## 2025-06-18 PROCEDURE — 84156 ASSAY OF PROTEIN URINE: CPT

## 2025-06-18 PROCEDURE — 80053 COMPREHEN METABOLIC PANEL: CPT

## 2025-06-18 PROCEDURE — 86850 RBC ANTIBODY SCREEN: CPT

## 2025-06-18 PROCEDURE — 85055 RETICULATED PLATELET ASSAY: CPT

## 2025-06-18 PROCEDURE — 10907ZC DRAINAGE OF AMNIOTIC FLUID, THERAPEUTIC FROM PRODUCTS OF CONCEPTION, VIA NATURAL OR ARTIFICIAL OPENING: ICD-10-PCS | Performed by: OBSTETRICS & GYNECOLOGY

## 2025-06-18 PROCEDURE — 3E033VJ INTRODUCTION OF OTHER HORMONE INTO PERIPHERAL VEIN, PERCUTANEOUS APPROACH: ICD-10-PCS | Performed by: OBSTETRICS & GYNECOLOGY

## 2025-06-18 PROCEDURE — 86780 TREPONEMA PALLIDUM: CPT

## 2025-06-18 PROCEDURE — 85025 COMPLETE CBC W/AUTO DIFF WBC: CPT

## 2025-06-18 PROCEDURE — 6370000000 HC RX 637 (ALT 250 FOR IP)

## 2025-06-18 RX ORDER — SODIUM CHLORIDE, SODIUM LACTATE, POTASSIUM CHLORIDE, AND CALCIUM CHLORIDE .6; .31; .03; .02 G/100ML; G/100ML; G/100ML; G/100ML
500 INJECTION, SOLUTION INTRAVENOUS PRN
Status: DISCONTINUED | OUTPATIENT
Start: 2025-06-18 | End: 2025-06-18 | Stop reason: HOSPADM

## 2025-06-18 RX ORDER — DEXTROSE MONOHYDRATE 100 MG/ML
INJECTION, SOLUTION INTRAVENOUS CONTINUOUS PRN
Status: DISCONTINUED | OUTPATIENT
Start: 2025-06-18 | End: 2025-06-20 | Stop reason: HOSPADM

## 2025-06-18 RX ORDER — NALOXONE HYDROCHLORIDE 0.4 MG/ML
INJECTION, SOLUTION INTRAMUSCULAR; INTRAVENOUS; SUBCUTANEOUS PRN
Status: DISCONTINUED | OUTPATIENT
Start: 2025-06-18 | End: 2025-06-18 | Stop reason: HOSPADM

## 2025-06-18 RX ORDER — BISACODYL 10 MG
10 SUPPOSITORY, RECTAL RECTAL DAILY PRN
Status: DISCONTINUED | OUTPATIENT
Start: 2025-06-18 | End: 2025-06-20 | Stop reason: HOSPADM

## 2025-06-18 RX ORDER — METOPROLOL TARTRATE 25 MG/1
25 TABLET, FILM COATED ORAL 2 TIMES DAILY
Status: DISCONTINUED | OUTPATIENT
Start: 2025-06-18 | End: 2025-06-18

## 2025-06-18 RX ORDER — ONDANSETRON 2 MG/ML
4 INJECTION INTRAMUSCULAR; INTRAVENOUS EVERY 6 HOURS PRN
Status: DISCONTINUED | OUTPATIENT
Start: 2025-06-18 | End: 2025-06-19

## 2025-06-18 RX ORDER — SODIUM CHLORIDE 0.9 % (FLUSH) 0.9 %
5-40 SYRINGE (ML) INJECTION PRN
Status: DISCONTINUED | OUTPATIENT
Start: 2025-06-18 | End: 2025-06-20 | Stop reason: HOSPADM

## 2025-06-18 RX ORDER — IBUPROFEN 600 MG/1
600 TABLET, FILM COATED ORAL EVERY 6 HOURS PRN
Qty: 30 TABLET | Refills: 1 | Status: SHIPPED | OUTPATIENT
Start: 2025-06-18

## 2025-06-18 RX ORDER — INSULIN LISPRO 100 [IU]/ML
0-12 INJECTION, SOLUTION INTRAVENOUS; SUBCUTANEOUS 4 TIMES DAILY PRN
Status: DISCONTINUED | OUTPATIENT
Start: 2025-06-18 | End: 2025-06-19

## 2025-06-18 RX ORDER — ACETAMINOPHEN 500 MG
1000 TABLET ORAL EVERY 6 HOURS PRN
Qty: 60 TABLET | Refills: 1 | Status: SHIPPED | OUTPATIENT
Start: 2025-06-18

## 2025-06-18 RX ORDER — SODIUM CHLORIDE 0.9 % (FLUSH) 0.9 %
5-40 SYRINGE (ML) INJECTION PRN
Status: DISCONTINUED | OUTPATIENT
Start: 2025-06-18 | End: 2025-06-18 | Stop reason: HOSPADM

## 2025-06-18 RX ORDER — SODIUM CHLORIDE, SODIUM LACTATE, POTASSIUM CHLORIDE, AND CALCIUM CHLORIDE .6; .31; .03; .02 G/100ML; G/100ML; G/100ML; G/100ML
1000 INJECTION, SOLUTION INTRAVENOUS PRN
Status: DISCONTINUED | OUTPATIENT
Start: 2025-06-18 | End: 2025-06-18 | Stop reason: HOSPADM

## 2025-06-18 RX ORDER — ROPIVACAINE HYDROCHLORIDE 2 MG/ML
INJECTION, SOLUTION EPIDURAL; INFILTRATION; PERINEURAL
Status: DISCONTINUED | OUTPATIENT
Start: 2025-06-18 | End: 2025-06-18 | Stop reason: SDUPTHER

## 2025-06-18 RX ORDER — ONDANSETRON 4 MG/1
4 TABLET, ORALLY DISINTEGRATING ORAL EVERY 6 HOURS PRN
Status: DISCONTINUED | OUTPATIENT
Start: 2025-06-18 | End: 2025-06-20 | Stop reason: HOSPADM

## 2025-06-18 RX ORDER — GLUCAGON 1 MG/ML
1 KIT INJECTION PRN
Status: DISCONTINUED | OUTPATIENT
Start: 2025-06-18 | End: 2025-06-20 | Stop reason: HOSPADM

## 2025-06-18 RX ORDER — INSULIN LISPRO 100 [IU]/ML
20 INJECTION, SOLUTION INTRAVENOUS; SUBCUTANEOUS
Status: DISCONTINUED | OUTPATIENT
Start: 2025-06-18 | End: 2025-06-18

## 2025-06-18 RX ORDER — SODIUM CHLORIDE 9 MG/ML
INJECTION, SOLUTION INTRAVENOUS PRN
Status: DISCONTINUED | OUTPATIENT
Start: 2025-06-18 | End: 2025-06-18 | Stop reason: HOSPADM

## 2025-06-18 RX ORDER — ACETAMINOPHEN 500 MG
1000 TABLET ORAL EVERY 6 HOURS
Status: DISCONTINUED | OUTPATIENT
Start: 2025-06-18 | End: 2025-06-20 | Stop reason: HOSPADM

## 2025-06-18 RX ORDER — ONDANSETRON 4 MG/1
4 TABLET, ORALLY DISINTEGRATING ORAL EVERY 6 HOURS PRN
Status: DISCONTINUED | OUTPATIENT
Start: 2025-06-18 | End: 2025-06-19

## 2025-06-18 RX ORDER — SODIUM CHLORIDE 0.9 % (FLUSH) 0.9 %
5-40 SYRINGE (ML) INJECTION EVERY 12 HOURS SCHEDULED
Status: DISCONTINUED | OUTPATIENT
Start: 2025-06-18 | End: 2025-06-18 | Stop reason: HOSPADM

## 2025-06-18 RX ORDER — METOPROLOL TARTRATE 25 MG/1
12.5 TABLET, FILM COATED ORAL 2 TIMES DAILY
Status: DISCONTINUED | OUTPATIENT
Start: 2025-06-18 | End: 2025-06-20 | Stop reason: HOSPADM

## 2025-06-18 RX ORDER — SENNA AND DOCUSATE SODIUM 50; 8.6 MG/1; MG/1
1 TABLET, FILM COATED ORAL DAILY
Qty: 30 TABLET | Refills: 1 | Status: SHIPPED | OUTPATIENT
Start: 2025-06-18

## 2025-06-18 RX ORDER — LIDOCAINE HYDROCHLORIDE AND EPINEPHRINE 15; 5 MG/ML; UG/ML
INJECTION, SOLUTION EPIDURAL
Status: DISCONTINUED | OUTPATIENT
Start: 2025-06-18 | End: 2025-06-18 | Stop reason: SDUPTHER

## 2025-06-18 RX ORDER — EPHEDRINE SULFATE 50 MG/ML
10 INJECTION INTRAVENOUS EVERY 5 MIN PRN
Status: DISCONTINUED | OUTPATIENT
Start: 2025-06-18 | End: 2025-06-20 | Stop reason: HOSPADM

## 2025-06-18 RX ORDER — SENNA AND DOCUSATE SODIUM 50; 8.6 MG/1; MG/1
2 TABLET, FILM COATED ORAL NIGHTLY
Status: DISCONTINUED | OUTPATIENT
Start: 2025-06-18 | End: 2025-06-20 | Stop reason: HOSPADM

## 2025-06-18 RX ORDER — LABETALOL HYDROCHLORIDE 5 MG/ML
80 INJECTION, SOLUTION INTRAVENOUS
Status: DISCONTINUED | OUTPATIENT
Start: 2025-06-18 | End: 2025-06-18

## 2025-06-18 RX ORDER — ONDANSETRON 2 MG/ML
4 INJECTION INTRAMUSCULAR; INTRAVENOUS EVERY 6 HOURS PRN
Status: DISCONTINUED | OUTPATIENT
Start: 2025-06-18 | End: 2025-06-20 | Stop reason: HOSPADM

## 2025-06-18 RX ORDER — SODIUM CHLORIDE 0.9 % (FLUSH) 0.9 %
5-40 SYRINGE (ML) INJECTION EVERY 12 HOURS SCHEDULED
Status: DISCONTINUED | OUTPATIENT
Start: 2025-06-18 | End: 2025-06-20 | Stop reason: HOSPADM

## 2025-06-18 RX ORDER — SIMETHICONE 80 MG
80 TABLET,CHEWABLE ORAL EVERY 6 HOURS PRN
Status: DISCONTINUED | OUTPATIENT
Start: 2025-06-18 | End: 2025-06-20 | Stop reason: HOSPADM

## 2025-06-18 RX ORDER — TRANEXAMIC ACID 10 MG/ML
1000 INJECTION, SOLUTION INTRAVENOUS ONCE
Status: COMPLETED | OUTPATIENT
Start: 2025-06-18 | End: 2025-06-18

## 2025-06-18 RX ORDER — METOPROLOL SUCCINATE 25 MG/1
12.5 TABLET, EXTENDED RELEASE ORAL 2 TIMES DAILY
Status: DISCONTINUED | OUTPATIENT
Start: 2025-06-18 | End: 2025-06-18

## 2025-06-18 RX ORDER — SODIUM CHLORIDE 9 MG/ML
INJECTION, SOLUTION INTRAVENOUS PRN
Status: DISCONTINUED | OUTPATIENT
Start: 2025-06-18 | End: 2025-06-20 | Stop reason: HOSPADM

## 2025-06-18 RX ORDER — SODIUM CHLORIDE 9 MG/ML
INJECTION, SOLUTION INTRAVENOUS CONTINUOUS
Status: DISCONTINUED | OUTPATIENT
Start: 2025-06-18 | End: 2025-06-18 | Stop reason: HOSPADM

## 2025-06-18 RX ORDER — ACETAMINOPHEN 500 MG
1000 TABLET ORAL EVERY 6 HOURS PRN
Status: DISCONTINUED | OUTPATIENT
Start: 2025-06-18 | End: 2025-06-18 | Stop reason: HOSPADM

## 2025-06-18 RX ORDER — INSULIN LISPRO 100 [IU]/ML
0-8 INJECTION, SOLUTION INTRAVENOUS; SUBCUTANEOUS
Status: DISCONTINUED | OUTPATIENT
Start: 2025-06-18 | End: 2025-06-18

## 2025-06-18 RX ORDER — LANOLIN
CREAM (ML) TOPICAL PRN
Status: DISCONTINUED | OUTPATIENT
Start: 2025-06-18 | End: 2025-06-20 | Stop reason: HOSPADM

## 2025-06-18 RX ORDER — IBUPROFEN 600 MG/1
600 TABLET, FILM COATED ORAL EVERY 6 HOURS
Status: DISCONTINUED | OUTPATIENT
Start: 2025-06-18 | End: 2025-06-20 | Stop reason: HOSPADM

## 2025-06-18 RX ADMIN — ROPIVACAINE HYDROCHLORIDE 10 ML/HR: 2 INJECTION, SOLUTION EPIDURAL; INFILTRATION at 19:18

## 2025-06-18 RX ADMIN — OXYTOCIN 1 MILLI-UNITS/MIN: 10 INJECTION, SOLUTION INTRAMUSCULAR; INTRAVENOUS at 11:23

## 2025-06-18 RX ADMIN — LIDOCAINE HYDROCHLORIDE,EPINEPHRINE BITARTRATE 2 ML: 15; .005 INJECTION, SOLUTION EPIDURAL; INFILTRATION; INTRACAUDAL; PERINEURAL at 18:31

## 2025-06-18 RX ADMIN — Medication 2.5 MILLION UNITS: at 15:16

## 2025-06-18 RX ADMIN — ROPIVACAINE HYDROCHLORIDE 10 ML/HR: 2 INJECTION, SOLUTION EPIDURAL; INFILTRATION at 18:36

## 2025-06-18 RX ADMIN — PENICILLIN G POTASSIUM 5 MILLION UNITS: 5000000 INJECTION, POWDER, FOR SOLUTION INTRAMUSCULAR; INTRAVENOUS at 10:54

## 2025-06-18 RX ADMIN — ROPIVACAINE HYDROCHLORIDE 5 ML: 2 INJECTION, SOLUTION EPIDURAL; INFILTRATION at 18:35

## 2025-06-18 RX ADMIN — Medication 166.7 ML: at 20:11

## 2025-06-18 RX ADMIN — Medication 2.5 MILLION UNITS: at 19:05

## 2025-06-18 RX ADMIN — SODIUM CHLORIDE: 0.9 INJECTION, SOLUTION INTRAVENOUS at 10:53

## 2025-06-18 RX ADMIN — TRANEXAMIC ACID 1000 MG: 10 INJECTION, SOLUTION INTRAVENOUS at 21:38

## 2025-06-18 RX ADMIN — LIDOCAINE HYDROCHLORIDE,EPINEPHRINE BITARTRATE 3 ML: 15; .005 INJECTION, SOLUTION EPIDURAL; INFILTRATION; INTRACAUDAL; PERINEURAL at 18:24

## 2025-06-18 RX ADMIN — IBUPROFEN 600 MG: 600 TABLET, FILM COATED ORAL at 21:52

## 2025-06-18 RX ADMIN — METOPROLOL TARTRATE 12.5 MG: 25 TABLET, FILM COATED ORAL at 22:23

## 2025-06-18 NOTE — PROGRESS NOTES
Labor Progress Note    Kathy Quinn is a 29 y.o. female  at 38w4d  The patient was seen and examined. Her pain is well controlled. She reports fetal movement is present, complains of contractions, denies loss of fluid, denies vaginal bleeding.      I have discussed the risk and benefits of AROM and the alternatives with the patient. Risks include cord prolapse, risk of infection, and emergency . All questions answered to their satisfaction. Patient amenable to AROM at this time.        Vital Signs:  Vitals:    25 1845 25 1900 25 1905 25 1915   BP: 134/81 (!) 130/54 133/88 133/85   Pulse: 74 78  78   Resp: 16 15     Temp:       TempSrc:       SpO2: 97% 97%         FHT: 130, moderate variability, accelerations present, decelerations absent  Contractions: regular difficult to trace    Chaperone for Intimate Exam: Chaperone was present for entire exam, Chaperone Name: STEPHANE Clements  Cervical Exam:   Pitocin: @ 14 mu/min    Membranes: Ruptured clear fluid  Scalp Electrode in place: absent  Intrauterine Pressure Catheter in Place: absent    Interventions: SVE, AROM    Assessment/Plan:  Kathy Quinn is a 29 y.o. female  at 38w4d admitted for IOL 2/2 cHTN (no meds) & PreGDM   - GBS bacteruria, Pen G for GBS prophylaxis   - VSS, afebrile   - cEFM/TOCO   - SVE:    - AROM clear, tolerated well   - Epidural in place   - Continue pitocin per protocol    Attending updated and in agreement with plan    Kathy Reece DO  Ob/Gyn Resident  2025, 7:25 PM

## 2025-06-18 NOTE — ANESTHESIA PRE PROCEDURE
Department of Anesthesiology  Preprocedure Note       Name:  Kathy Quinn   Age:  29 y.o.  :  1995                                          MRN:  [id number]         Date:  2025      Surgeon: * No surgeons listed *    Procedure: * No procedures listed *    Medications prior to admission:   Prior to Admission medications    Medication Sig Start Date End Date Taking? Authorizing Provider   metoprolol tartrate (LOPRESSOR) 25 MG tablet Take 1 tablet by mouth 2 times daily Taking 1/2 tablet AM and PM    Ramya Linares MD   docusate sodium (COLACE) 100 MG capsule Take 1 capsule by mouth as needed for Constipation    Ramya Linares MD   insulin NPH (HUMULIN N KWIKPEN) 100 UNIT/ML injection pen Inject 38 Units into the skin nightly 25   Adela Angeles DO   aspirin 81 MG EC tablet Take 1 tablet by mouth daily    Ramya Linares MD   insulin lispro, 1 Unit Dial, (HUMALOG KWIKPEN) 100 UNIT/ML SOPN Inject 6 Units into the skin daily (before dinner) 24   Lety Pacheco APRN - CNP   Insulin Pen Needle 32G X 4 MM MISC 1 each by Does not apply route daily 24   Lety Pacheco APRN - CNP   glucose monitoring kit 1 kit by Does not apply route daily as needed (Check blood sugar 4 times daily) Dispense what is covered by insurance 24   Eva Varma APRN - CNM   Lancets MISC 1 each by Does not apply route 4 times daily for 200 doses Dispense what is covered by insurance 24  Eva Varma APRN - CNM   blood glucose monitor strips by Other route 4 times daily Test 4 times a day & as needed for symptoms of irregular blood glucose. Dispense sufficient amount for indicated testing frequency plus additional to accommodate PRN testing needs. 24   Eva Varma APRN - CNM   Alcohol Swabs 70 % PADS 1 Box by Does not apply route 4 times daily Dispense what is covered by insurance 24   Eva Varma APRN - CNM  Value Date/Time    PROTIME 9.5 09/23/2022 10:45 AM    INR 0.9 09/23/2022 10:45 AM    APTT 25.4 09/23/2022 10:45 AM       HCG (If Applicable):   Lab Results   Component Value Date    PREGSERUM NEGATIVE 09/23/2022    HCGQUANT 607.0 (H) 10/25/2024        ABGs: No results found for: \"PHART\", \"PO2ART\", \"SRE8UIF\", \"QRT5IPM\", \"BEART\", \"X2KABBMV\"     Type & Screen (If Applicable):  Lab Results   Component Value Date    ABORH A POSITIVE 06/18/2025    LABANTI NEGATIVE 06/18/2025       Drug/Infectious Status (If Applicable):  Lab Results   Component Value Date/Time    HEPCAB NONREACTIVE 12/05/2024 11:49 AM       COVID-19 Screening (If Applicable):   Lab Results   Component Value Date/Time    COVID19 Not Detected 10/12/2020 05:00 AM           Anesthesia Evaluation  Patient summary reviewed and Nursing notes reviewed  Airway: Mallampati: III  TM distance: >3 FB   Neck ROM: full  Mouth opening: > = 3 FB   Dental: normal exam         Pulmonary:Negative Pulmonary ROS and normal exam                              ROS comment: LLL lobectomy    Cardiovascular:    (+) hypertension:                  Neuro/Psych:   Negative Neuro/Psych ROS              GI/Hepatic/Renal:   (+) GERD:          Endo/Other:    (+) Diabeteswell controlled.                 Abdominal: normal exam            Vascular: negative vascular ROS.         Other Findings:             Anesthesia Plan      epidural     ASA 3             Anesthetic plan and risks discussed with patient.    Use of blood products discussed with patient whom consented to blood products.    Plan discussed with CRNA and attending.                    FATEMEH Pedro - CRNA   6/18/2025

## 2025-06-18 NOTE — FLOWSHEET NOTE
1734,   HERMILA Beckman, at bedside.  Epidural procedure explained, risks discussed.  Pt verbalizes consent for epidural.   1740 patient positioned for epidural.1742 Time out completed.   1622 catheter placed. 1624 test dose given.  Epidural catheter taped and secured per anesthesia.   1830 to low fowlers with left uterine displacement. 1835 loading dose given. 1836 pump initiated.  Pt tolerated procedure well.

## 2025-06-18 NOTE — PROGRESS NOTES
Labor Progress Note    Kathy Quinn is a 29 y.o. female  at 38w4d  The patient was seen and examined. Her pain is well controlled. She reports fetal movement is present, denies contractions, denies loss of fluid, denies vaginal bleeding.       Vital Signs:  Vitals:    25 1018 25 1500   BP: (!) 140/75 (!) 133/91   Pulse: 91 81   Resp: 18 17   Temp: 98.5 °F (36.9 °C)    TempSrc: Oral    SpO2: 100%         FHT: 140, moderate variability, accelerations present, decelerations absent  Contractions: irregular, every 4-5 minutes    Chaperone for Intimate Exam: Chaperone was present for entire exam, Chaperone Name: STEPHANE Clements  Cervical Exam: /ballotable  Pitocin: @ 8 mu/min    Membranes: Intact  Scalp Electrode in place: absent  Intrauterine Pressure Catheter in Place: absent    Interventions: SVE    Assessment/Plan:  Kathy Quinn is a 29 y.o. female  at 38w4d admitted for IOL 2/2 cHTN (no meds) & Pre GDM    - GBS bact, Pen G for GBS prophylaxis   - VSS, afebrile   - cEFM/TOCO: CAT 1   - SVE: /ballotable   - Patient assessed for AROM. Head is ballotable. Will reassess in 1-2 hrs.   - Continue Pitocin per protocol   - Will continue to monitor closely      Attending updated and in agreement with plan    Lupe Kaplan,   Ob/Gyn Resident  2025, 3:07 PM

## 2025-06-18 NOTE — ANESTHESIA PROCEDURE NOTES
Epidural Block    Patient location during procedure: OB  End time: 6/18/2025 6:22 PM  Reason for block: labor epidural  Staffing  Performed: resident/CRNA and anesthesiologist   Anesthesiologist: Marta Byers MD  Resident/CRNA: Jaden Logan APRN - CRNA  Performed by: Jaden Logan APRN - CRNA  Authorized by: Marta Byers MD    Epidural  Patient position: sitting  Prep: Betadine  Patient monitoring: continuous pulse ox and frequent blood pressure checks  Approach: midline  Location: L3-4  Injection technique: HAO air and HAO saline  Provider prep: mask and sterile gloves  Needle  Needle type: Tuohy   Needle gauge: 17 G  Needle length: 3.5 in  Needle insertion depth: 8 cm  Catheter type: multi-orifice  Catheter size: 19 G  Catheter at skin depth: 15 cm  Test dose: negativeCatheter Secured: tegaderm and tape  Assessment  Sensory level: T8  Hemodynamics: stable  Attempts: 1  Outcomes: uncomplicated and patient tolerated procedure well  Preanesthetic Checklist  Completed: patient identified, IV checked, site marked, risks and benefits discussed, surgical/procedural consents, equipment checked, pre-op evaluation, timeout performed, anesthesia consent given, oxygen available, monitors applied/VS acknowledged, fire risk safety assessment completed and verbalized and blood product R/B/A discussed and consented

## 2025-06-18 NOTE — DISCHARGE SUMMARY
Obstetric Discharge Summary  [facility]    Patient Name: Kathy Quinn  Patient : 1995  Primary Care Physician: Cori Reyes APRN - CNP  Admit Date: 2025    Principal Diagnosis: IUP at 38w4d, admitted for IOL 2/2 cHTN (no meds) & Pre GDM      Her pregnancy has been complicated by:   Patient Active Problem List   Diagnosis    FHx DM    History of sarcoma of bone    Thrombocytosis    cHTN     History of disarticulation of right hip    History of malignant neoplasm metastatic to lung    History of right leg amputation    Tachycardia    Hx PPROM with sPTD x1    Secondary malignant neoplasm of left lung (HCC)    38 weeks gestation of pregnancy     25 F Apg 8/9 Wt 8#4       Infection Present?: No  Hospital Acquired: n/a    Surgical Operations & Procedures:  Analgesia: epidural  Delivery Type: Spontaneous Vaginal Delivery: See Labor and Delivery Summary   Laceration(s): Absent    Consultations:  Anesthesia    Pertinent Findings & Procedures:   Kathy Quinn is a 29 y.o. female  at 38w4d admitted for IOL 2/2 cHTN (no meds) & Pre GDM; received Pitocin, epidural, AROM.     She delivered by spontaneous vaginal a Live Born infant on 25.       Information for the patient's :  [place], Girl Kathy [[id number]]   female   Birth Weight: 3.745 kg (8 lb 4.1 oz)    Apgars: 8 at 1 minute and 9 at 5 minutes.       Postpartum course: normal.    PPD#1: FBS 86.    Course of patient: uncomplicated    Discharge to: Home    Readmission planned: no     Indication for 6 week PP 2 hour GTT?: no     Eligible for 2 week PP virtual visit? no - private patient    Contraception: none    Recommendations on Discharge:     Medications:      Medication List        START taking these medications      acetaminophen 500 MG tablet  Commonly known as: TYLENOL  Take 2 tablets by mouth every 6 hours as needed for Pain     ibuprofen 600 MG tablet  Commonly known as: ADVIL;MOTRIN  Take 1 tablet

## 2025-06-18 NOTE — DISCHARGE SUMMARY
Obstetric Discharge Summary  [facility]    Patient Name: Kathy Quinn  Patient : 1995  Primary Care Physician: Cori Reyes APRN - CNP  Admit Date: 2025    Principal Diagnosis: IUP at 38w4d, admitted for IOL 2/2 cHTN (no meds) & Pre GDM      Her pregnancy has been complicated by:   Patient Active Problem List   Diagnosis    FHx DM    History of sarcoma of bone    Thrombocytosis    cHTN     History of disarticulation of right hip    History of malignant neoplasm metastatic to lung    History of right leg amputation    Tachycardia    Hx PPROM with sPTD x1    Secondary malignant neoplasm of left lung (HCC)    38 weeks gestation of pregnancy     25 F Apg 8/9 Wt 8#4       Infection Present?: No  Hospital Acquired: n/a    Surgical Operations & Procedures:  Analgesia: epidural  Delivery Type: Spontaneous Vaginal Delivery: See Labor and Delivery Summary   Laceration(s): Absent    Consultations:  Anesthesia    Pertinent Findings & Procedures:   Kathy Quinn is a 29 y.o. female  at 38w4d admitted for IOL 2/2 cHTN (no meds) & Pre GDM; received Pitocin, epidural, AROM.     She delivered by spontaneous vaginal a Live Born infant on 25.       Information for the patient's :  [place], Girl Kathy [[id number]]   female   Birth Weight: 3.745 kg (8 lb 4.1 oz)    Apgars: 8 at 1 minute and 9 at 5 minutes.       Postpartum course: normal.    PPD#1: FBS 86.  PPD#2: BP consistently elevated. Procardia 30 XL ordered. PreE labs ***.    Course of patient: uncomplicated    Discharge to: Home    Readmission planned: no     Indication for 6 week PP 2 hour GTT?: no     Eligible for 2 week PP virtual visit? no - private patient    Contraception: none    Recommendations on Discharge:     Medications:      Medication List        START taking these medications      acetaminophen 500 MG tablet  Commonly known as: TYLENOL  Take 2 tablets by mouth every 6 hours as needed for Pain

## 2025-06-18 NOTE — H&P
BEDSIDE US:  Position: Cephalic  Placental Location: posterior  Fetal Heart Tones: Present  Fetal Movement: Present  Amniotic Fluid Index/Volume: adequate 2x2 cm fluid pocket  Estimated Fetal Weight:  7 lbs 6oz    PRENATAL LAB RESULTS:  Blood Type/Rh: A pos  Antibody Screen: negative  Hemoglobin, Hematocrit, Platelets: 12.8/40.2/plate clumps present, count appears adequate   Rubella: immune  T. Pallidum, IgG: non-reactive  Hepatitis B Surface Antigen: non-reactive   Hepatitis C Antibody: non-reactive   HIV: negative   Gonorrhea: negative  Chlamydia: negative  Urine culture: positive for GBS 10-50, date: 25    Early 1 hour Glucose Tolerance Test: 224 > Pregestational DM    Group B Strep: positive on 25  Cystic Fibrosis Screen: negative  Sickle Cell Screen: not done  First Trimester Screen: no aneuploidy  msAFP: na  Non-Invasive Prenatal Testing: low risk for aneuploidy  Anatomy US: posterior placenta, 3 VC, female gender, normal anatomy    ASSESSMENT & PLAN:  Kathy Quinn is a 29 y.o. female  at 38w4d    - GBS positive / Rh positive / R immune   - Pen G for GBS prophylaxis    IOL 2/2 cHTN (no meds) and PreGDM  - Admit to labor and delivery under the service of Dr. Helton   - VSS    - cEFM and TOCO   - Cat 1 FHT and TOCO showing no contractions   - CBC, T&S, T.Pal ordered   - UDS ordered. R/B/A discussed with patient and patient agreeable   - IVF: 125 ml/hr NS   - BSUS: cephalic, EFW 7#6   - SVE: 50/-2   - Plan of Induction: Pitocin    cHTN (no meds)   - /74 on admission   - Denies s/sx of PreE   - CBC, CMP, P/C ordered   - Will continue to monitor    Pre GDM   - Diagnosed in G1 pregnancy   - Early 1 hr 224   - EFW 7#6   - POCT fasting and 2 hr PP ordered   - Home Novolog 0/0/20; NPH 50 ordered   - OB SSI    Hx Bone Sarcoma/Right Leg Amputation/Hx Lung Neoplasm/Lung Neoplasm Resection   - S/p right LE amputation in . Patient underwent adjuvant chemotherapy. Recurrence in left lung and is

## 2025-06-18 NOTE — FLOWSHEET NOTE
Pt sitting straight up in bed.  Unable to trace FHR d/t pt position.  Instructed to call out when finished eating for monitor adjustment.  Verbalizes understanding.

## 2025-06-18 NOTE — CARE COORDINATION
ANTEPARTUM NOTE    38 weeks gestation of pregnancy [Z3A.38]    Kathy was admitted to L&D on 6/18/2025 for IOL 2/2 cHTN and pre-gDM @ 38 4/7    OB GYN Provider: Dr Helton    Will meet with patient after delivery to verify name/address/phone/insurance and discuss discharge planning.    Anticipate DC home 2 nights after vaginal delivery or 4 nights after C/S delivery as long as hemodynamically stable.

## 2025-06-19 LAB — GLUCOSE BLD-MCNC: 86 MG/DL (ref 65–105)

## 2025-06-19 PROCEDURE — 82947 ASSAY GLUCOSE BLOOD QUANT: CPT

## 2025-06-19 PROCEDURE — 2500000003 HC RX 250 WO HCPCS

## 2025-06-19 PROCEDURE — 6370000000 HC RX 637 (ALT 250 FOR IP)

## 2025-06-19 PROCEDURE — 1220000000 HC SEMI PRIVATE OB R&B

## 2025-06-19 RX ADMIN — ACETAMINOPHEN 1000 MG: 500 TABLET ORAL at 13:06

## 2025-06-19 RX ADMIN — IBUPROFEN 600 MG: 600 TABLET, FILM COATED ORAL at 03:59

## 2025-06-19 RX ADMIN — METOPROLOL TARTRATE 12.5 MG: 25 TABLET, FILM COATED ORAL at 10:01

## 2025-06-19 RX ADMIN — SODIUM CHLORIDE, PRESERVATIVE FREE 10 ML: 5 INJECTION INTRAVENOUS at 19:57

## 2025-06-19 RX ADMIN — ACETAMINOPHEN 1000 MG: 500 TABLET ORAL at 19:53

## 2025-06-19 RX ADMIN — DOCUSATE SODIUM 50MG AND SENNOSIDES 8.6MG 2 TABLET: 8.6; 5 TABLET, FILM COATED ORAL at 19:53

## 2025-06-19 RX ADMIN — IBUPROFEN 600 MG: 600 TABLET, FILM COATED ORAL at 10:01

## 2025-06-19 RX ADMIN — METOPROLOL TARTRATE 12.5 MG: 25 TABLET, FILM COATED ORAL at 19:53

## 2025-06-19 RX ADMIN — SODIUM CHLORIDE, PRESERVATIVE FREE 10 ML: 5 INJECTION INTRAVENOUS at 10:01

## 2025-06-19 RX ADMIN — IBUPROFEN 600 MG: 600 TABLET, FILM COATED ORAL at 17:10

## 2025-06-19 RX ADMIN — IBUPROFEN 600 MG: 600 TABLET, FILM COATED ORAL at 23:11

## 2025-06-19 RX ADMIN — ACETAMINOPHEN 1000 MG: 500 TABLET ORAL at 00:55

## 2025-06-19 ASSESSMENT — PAIN SCALES - GENERAL
PAINLEVEL_OUTOF10: 3
PAINLEVEL_OUTOF10: 2
PAINLEVEL_OUTOF10: 1
PAINLEVEL_OUTOF10: 2

## 2025-06-19 ASSESSMENT — PAIN - FUNCTIONAL ASSESSMENT: PAIN_FUNCTIONAL_ASSESSMENT: ACTIVITIES ARE NOT PREVENTED

## 2025-06-19 ASSESSMENT — PAIN DESCRIPTION - DESCRIPTORS
DESCRIPTORS: ACHING;SORE
DESCRIPTORS: SORE;TENDER
DESCRIPTORS: SORE
DESCRIPTORS: SORE

## 2025-06-19 ASSESSMENT — PAIN DESCRIPTION - LOCATION
LOCATION: BACK
LOCATION: PERINEUM;BACK
LOCATION: BACK;PERINEUM
LOCATION: BACK

## 2025-06-19 ASSESSMENT — PAIN DESCRIPTION - ORIENTATION: ORIENTATION: LOWER

## 2025-06-19 NOTE — CARE COORDINATION
CASE MANAGEMENT POST-PARTUM TRANSITIONAL CARE PLAN    38 weeks gestation of pregnancy [Z3A.38]    OB Provider: Dr Danie Cervantes met w/ Kathy at her bedside to discuss DCP. She is S/P  on 2025    Female  to WIN    Writer verified address, her phone number and emergency contacts are correct on facesheet. She lives with her  Darinel. Kathy denied barriers with transportation home, to doctor's appointments or with paying for medications upon discharge home.     Primary insurance is Medicare.   IMM letter delivered to patient and explained.   Signed and given to patient at 0950.   Copy placed in blue bedside folder.      Secondary insurance is [employer] [place] through  Darinel  This will be the insurance the baby is added to.      Writer notified Kathy that Darinel has 30 days from date of birth to add  to insurance policy. She verbalized understanding.    Infant name on BC: Rach Quinn.   Infant PCP Paulette WELDON.     DME: Has a wheelchair and crutches.     HOME CARE: no    Anticipated DC of mother and infant 1-2 days after .     BS RISK OF UNPLANNED READMISSION 2.0             11.1 Total Score

## 2025-06-19 NOTE — PROGRESS NOTES
Obstetric/Gynecology Resident Interval Note    Called to patient's bedside due to RN report of large blood clot being passed followed by a moderately sized blood clot. Presented to patient's room patient is resting comfortably in bed. No obvious trickling, but trickle expressed with fundal massage. Discussed bleeding with patient and asked if she would be amenable to bimanual exam. Patient is in agreement. Bimanual exam performed revealing multiple large and moderate sized clots. 1000 mg TXA ordered to be given. Changed pad in between checks. Following bimanual exam, uterine fundus noted to be firm. No additional bleeding noted. Will continue to monitor closely.    Total QBL of delivery course: 495 ml following bimanual    Vitals:    06/18/25 2032 06/18/25 2047 06/18/25 2102 06/18/25 2117   BP: 129/72 130/70 (!) 139/93 119/65   Pulse: 77 76 71 68   Resp: 18 18     Temp:       TempSrc:       SpO2:           Daniela Fitzpatrick MD  OB/GYN Resident, PGY2  [place], [place]  6/18/2025, 9:34 PM

## 2025-06-19 NOTE — FLOWSHEET NOTE
2225-Patient up to bathroom in w/c.  Transferred well to chair and toilet.  Pericare instructed and performed per pt.  Able to void without difficulty.  2240- Transferred to  room 735 via w/c for continuation of care.  2245-Bedside report given to KELLIE Patiño RN.

## 2025-06-19 NOTE — PROGRESS NOTES
Labor Progress Note    Kathy Quinn is a 29 y.o. female  at 38w4d  The patient was seen and examined. Her pain is well controlled. She reports fetal movement is present, complains of contractions, complains of loss of fluid, denies vaginal bleeding.       Vital Signs:  Vitals:    25 1900 25 1905 25 1915 25 1931   BP: (!) 130/54 133/88 133/85 (!) 142/95   Pulse: 78  78 70   Resp: 15   16   Temp:    98.3 °F (36.8 °C)   TempSrc:    Oral   SpO2: 97%   98%         FHT: 130, moderate variability, accelerations present, early decelerations present  Contractions: regular, every 2-5 minutes    Chaperone for Intimate Exam: Chaperone was present for entire exam, Chaperone Name: Barb CALDERÓN  Cervical Exam: 10 cm dilated, 100 effaced, 0 station  Pitocin: @ 14 mu/min    Membranes: Ruptured clear fluid  Scalp Electrode in place: absent  Intrauterine Pressure Catheter in Place: absent    Interventions: SVE    Assessment/Plan:  Kathy Quinn is a 29 y.o. female  at 38w4d admitted for IOL 2/2 cHTN (no meds) & Pre GDM   - GBS positive, Pen G for GBS prophylaxis   - VSS, afebrile   - cEFM/TOCO cat 1, regular contractions   - SVE: complete/100/0   - Epidural in place, patient feeling more comfortable   - AROM (clr)   - Continue pitocin per protocol   - Will continue to monitor closely   - Attending en route    Attending updated and in agreement with plan    Daniela Fitzpatrick MD  Ob/Gyn Resident  2025, 8:03 PM

## 2025-06-19 NOTE — CARE COORDINATION
Social Work     Sw reviewed medical record (current active problem list) and tox screens and found no current concerns.     Sw spoke with mom briefly to explain Sw role, inquire if any needs or concerns, and provide safe sleep education and discuss.  Mom denied any needs or questions and informs baby has a safe sleep environment  (bass and crib).     Mom denied any current s/s of anxiety or depression and is aware to reach out to OB if any s/s occur after dc.     Mom reports a good support system, her parents reside nearby, and denied any current questions or needs.      Mom reports this is her 3rd child (boys- 4, 2) both exicited for baby.       Mom states ped will be [facility] (Paulette Marte).       Sw encouraged mom to reach out if any issues or concerns arise.

## 2025-06-19 NOTE — CONSULTS
Breastfeeding education given and reviewed with pt. Pt reports breastfeeding is going well. She also  her second child for 15 months without difficulties. Pt reports latch has been comfortable and she has been waking, changing diaper and offering the breast every 3 hours when baby is not alerting to feed. Reviewed cluster feeding and feeding on demand at breast. Encouraged pt to call out with questions or concerns as needed.

## 2025-06-19 NOTE — PLAN OF CARE
Problem: Chronic Conditions and Co-morbidities  Goal: Patient's chronic conditions and co-morbidity symptoms are monitored and maintained or improved  Outcome: Progressing     Problem: Pain  Goal: Verbalizes/displays adequate comfort level or baseline comfort level  Outcome: Progressing     Problem: Safety - Adult  Goal: Free from fall injury  Outcome: Progressing     Problem: Postpartum  Goal: Experiences normal postpartum course  Description:  Postpartum OB-Pregnancy care plan goal which identifies if the mother is experiencing a normal postpartum course  Outcome: Progressing  Goal: Establishment of infant feeding pattern  Description:  Postpartum OB-Pregnancy care plan goal which identifies if the mother is establishing a feeding pattern with their   Outcome: Progressing     Problem: Risk for Maternal Injury  Goal: Remains free from seizures and injury related to seizure activity  Description: INTERVENTIONS:.  -Maintain airway, patient safety and administer oxygen as ordered  -Monitor patient for seizure activity, document and report duration and descrition  -If Seizure occurs, turn patient to dide and suction secretions as needed  -Reorient patient post seizure  -Seizure Pads  -Instruct patient/family to notify RN of any seizure activity  -Instruct patient/family to call for assistance with activity based on assessment  Outcome: Progressing

## 2025-06-19 NOTE — L&D DELIVERY NOTE
Eliana Quinn [[id number]]      Labor Events     Labor: No   Steroids: None  Cervical Ripening Date/Time:      Antibiotics Received during Labor: Yes  Rupture Identifier: Sac 1  Rupture Date/Time:  25 19:21:00   Rupture Type: AROM  Fluid Color: Clear  Fluid Odor: None  Fluid Volume: Moderate  Induction: AROM       Anesthesia    Method: Epidural       Labor Event Times      Labor onset date/time:        Dilation complete date/time:  25 19:43:00 EDT     Start pushing date/time:  2025 19:59:00   Decision date/time (emergent ):            Delivery Details      Delivery Date: 25 Delivery Time: 20:07:12   Delivery Type: Vaginal, Spontaneous               Presentation    Presentation: Vertex  Position: Right  _: Occiput  _: Anterior       Shoulder Dystocia    Shoulder Dystocia Present?: No       Assisted Delivery Details    Forceps Attempted?: No  Vacuum Extractor Attempted?: No                           Cord    Vessels: 3 Vessels  Complications: Nuchal Loose  Delayed Cord Clamping?: Yes  Cord Clamped Date/Time: 2025 20:09:01  Cord Blood Disposition: Lab  Gases Sent?: Yes              Placenta    Date/Time: 2025 20:11:29  Removal: Spontaneous  Appearance: Intact  Disposition: Pathology       Lacerations    Episiotomy: None  Perineal Lacerations: None  Other Lacerations: no non-perineal laceration       Vaginal Counts    Initial Count Personnel: KELLIE DASH  Initial Count Verified By: DR. SANCHES  Intial Sponge Count: Correct Intial Needles Count: Correct Intial Instruments Count: Correct   Final Sponges Count: Correct Final Needles  Count: Correct Final Instruments Count: Correct   Final Count Personnel: DR. SANCHES  Final Count Verified By: KELLIE DASH  Accurate Final Count?: No       Blood Loss  Mother: Kathy Quinn E #[id number]     Start of Mother's Information      Delivery Blood Loss   Intrapartum & Postpartum: 25 0807 - 25    Delivery

## 2025-06-19 NOTE — FLOWSHEET NOTE
RN oversight while patient goes to restroom.  Patient able to transfer from bed to wheelchair independently; reports no dizziness or lightheadedness.  Patient able to void spontaneously at this time, en care performed, new mesh underwear and pad applied.

## 2025-06-19 NOTE — ANESTHESIA POSTPROCEDURE EVALUATION
Department of Anesthesiology  Postprocedure Note    Patient: Kathy Quinn  MRN: [id number]  YOB: 1995  Date of evaluation: 6/18/2025    Procedure Summary       Date: 06/18/25 Room / Location:     Anesthesia Start: 1736 Anesthesia Stop: 2007    Procedure: Labor Analgesia Diagnosis:     Scheduled Providers:  Responsible Provider: Marta Byers MD    Anesthesia Type: epidural ASA Status: 3            Anesthesia Type: No value filed.    Mark Phase I:      Mark Phase II:      Anesthesia Post Evaluation    Patient location during evaluation: bedside  Patient participation: complete - patient cannot participate  Level of consciousness: awake  Pain score: 2  Airway patency: patent  Nausea & Vomiting: no nausea and no vomiting  Cardiovascular status: blood pressure returned to baseline  Respiratory status: acceptable  Hydration status: euvolemic  Pain management: adequate        No notable events documented.

## 2025-06-19 NOTE — PROGRESS NOTES
POST PARTUM DAY # 1    Kathy Quinn is a 29 y.o. female  This patient was seen & examined today.  on 25    Her pregnancy was complicated by:   Patient Active Problem List   Diagnosis    FHx DM    History of sarcoma of bone    Thrombocytosis    cHTN     History of disarticulation of right hip    History of malignant neoplasm metastatic to lung    History of right leg amputation    Tachycardia    Hx PPROM with sPTD x1    Secondary malignant neoplasm of left lung (HCC)    38 weeks gestation of pregnancy     26 F Apg ** Wt **       Today she is doing well without any chief complaint. Her lochia is light. She denies chest pain, shortness of breath, headache, lightheadedness and blurred vision. She is breast feeding and she denies any breast tenderness. Her voiding pattern is normal. I reviewed signs and symptoms of post partum depression with the patient, she currently denies any of these symptoms. She is tolerating solids.     Vital Signs:  Vitals:    25 2202 25 2218 25 2251 25 0102   BP: (!) 148/90 (!) 142/93 124/70 134/74   Pulse: 77 66 74 77   Resp:    Temp:  98.1 °F (36.7 °C) 98.1 °F (36.7 °C) 97.5 °F (36.4 °C)   TempSrc:  Oral Oral Oral   SpO2:   97%        Physical Exam:  General:  no apparent distress, alert and cooperative  Neurologic:  alert, oriented, normal speech, no focal findings or movement disorder noted  Lungs:  No increased work of breathing  Heart: Regular rate   Abdomen: abdomen soft, non-distended, non-tender  Fundus: non-tender, firm, below umbilicus  Extremities:  no calf tenderness, non edematous left extremity    Lab:  Lab Results   Component Value Date    HGB 12.0 2025     Lab Results   Component Value Date    HCT 35.1 (L) 2025       Assessment/Plan:  Kathy Quinn is a  PPD # 1 s/p    - Doing well, VSS   - female infant in General Care Nursery   - Encourage ambulation   - Motrin/Tylenol   - S/p TXA     Rh

## 2025-06-20 VITALS
SYSTOLIC BLOOD PRESSURE: 148 MMHG | HEART RATE: 97 BPM | OXYGEN SATURATION: 98 % | RESPIRATION RATE: 16 BRPM | DIASTOLIC BLOOD PRESSURE: 95 MMHG | TEMPERATURE: 97.6 F

## 2025-06-20 LAB
ALBUMIN SERPL-MCNC: 2.9 G/DL (ref 3.5–5.2)
ALBUMIN/GLOB SERPL: 1.5 {RATIO} (ref 1–2.5)
ALP SERPL-CCNC: 116 U/L (ref 35–104)
ALT SERPL-CCNC: 9 U/L (ref 10–35)
ANION GAP SERPL CALCULATED.3IONS-SCNC: 13 MMOL/L (ref 9–16)
AST SERPL-CCNC: 21 U/L (ref 10–35)
BASOPHILS # BLD: <0.03 K/UL (ref 0–0.2)
BASOPHILS NFR BLD: 0 % (ref 0–2)
BILIRUB SERPL-MCNC: 0.2 MG/DL (ref 0–1.2)
BUN SERPL-MCNC: 9 MG/DL (ref 6–20)
CALCIUM SERPL-MCNC: 8.3 MG/DL (ref 8.6–10.4)
CHLORIDE SERPL-SCNC: 105 MMOL/L (ref 98–107)
CO2 SERPL-SCNC: 17 MMOL/L (ref 20–31)
CREAT SERPL-MCNC: 0.4 MG/DL (ref 0.6–0.9)
EOSINOPHIL # BLD: 0.09 K/UL (ref 0–0.44)
EOSINOPHILS RELATIVE PERCENT: 2 % (ref 1–4)
ERYTHROCYTE [DISTWIDTH] IN BLOOD BY AUTOMATED COUNT: 13.5 % (ref 11.8–14.4)
GFR, ESTIMATED: >90 ML/MIN/1.73M2
GLUCOSE SERPL-MCNC: 91 MG/DL (ref 74–99)
HCT VFR BLD AUTO: 31.6 % (ref 36.3–47.1)
HGB BLD-MCNC: 9.9 G/DL (ref 11.9–15.1)
IMM GRANULOCYTES # BLD AUTO: 0.04 K/UL (ref 0–0.3)
IMM GRANULOCYTES NFR BLD: 1 %
LYMPHOCYTES NFR BLD: 1.18 K/UL (ref 1.1–3.7)
LYMPHOCYTES RELATIVE PERCENT: 23 % (ref 24–43)
MCH RBC QN AUTO: 27.7 PG (ref 25.2–33.5)
MCHC RBC AUTO-ENTMCNC: 31.3 G/DL (ref 28.4–34.8)
MCV RBC AUTO: 88.3 FL (ref 82.6–102.9)
MONOCYTES NFR BLD: 0.43 K/UL (ref 0.1–1.2)
MONOCYTES NFR BLD: 9 % (ref 3–12)
NEUTROPHILS NFR BLD: 65 % (ref 36–65)
NEUTS SEG NFR BLD: 3.29 K/UL (ref 1.5–8.1)
NRBC BLD-RTO: 0 PER 100 WBC
PLATELET # BLD AUTO: 110 K/UL (ref 138–453)
PMV BLD AUTO: 13 FL (ref 8.1–13.5)
POTASSIUM SERPL-SCNC: 4.2 MMOL/L (ref 3.7–5.3)
PROT SERPL-MCNC: 4.9 G/DL (ref 6.6–8.7)
RBC # BLD AUTO: 3.58 M/UL (ref 3.95–5.11)
SODIUM SERPL-SCNC: 135 MMOL/L (ref 136–145)
WBC OTHER # BLD: 5.1 K/UL (ref 3.5–11.3)

## 2025-06-20 PROCEDURE — 36415 COLL VENOUS BLD VENIPUNCTURE: CPT

## 2025-06-20 PROCEDURE — 6370000000 HC RX 637 (ALT 250 FOR IP)

## 2025-06-20 PROCEDURE — 85025 COMPLETE CBC W/AUTO DIFF WBC: CPT

## 2025-06-20 PROCEDURE — 80053 COMPREHEN METABOLIC PANEL: CPT

## 2025-06-20 RX ORDER — NIFEDIPINE 30 MG/1
30 TABLET, EXTENDED RELEASE ORAL DAILY
Status: DISCONTINUED | OUTPATIENT
Start: 2025-06-20 | End: 2025-06-20 | Stop reason: HOSPADM

## 2025-06-20 RX ORDER — NIFEDIPINE 30 MG/1
30 TABLET, EXTENDED RELEASE ORAL DAILY
Qty: 30 TABLET | Refills: 0 | Status: SHIPPED | OUTPATIENT
Start: 2025-06-20

## 2025-06-20 RX ADMIN — IBUPROFEN 600 MG: 600 TABLET, FILM COATED ORAL at 05:06

## 2025-06-20 RX ADMIN — NIFEDIPINE 30 MG: 30 TABLET, FILM COATED, EXTENDED RELEASE ORAL at 05:22

## 2025-06-20 RX ADMIN — ACETAMINOPHEN 1000 MG: 500 TABLET ORAL at 02:06

## 2025-06-20 RX ADMIN — IBUPROFEN 600 MG: 600 TABLET, FILM COATED ORAL at 12:47

## 2025-06-20 RX ADMIN — METOPROLOL TARTRATE 12.5 MG: 25 TABLET, FILM COATED ORAL at 12:47

## 2025-06-20 ASSESSMENT — PAIN - FUNCTIONAL ASSESSMENT
PAIN_FUNCTIONAL_ASSESSMENT: ACTIVITIES ARE NOT PREVENTED
PAIN_FUNCTIONAL_ASSESSMENT: ACTIVITIES ARE NOT PREVENTED

## 2025-06-20 ASSESSMENT — PAIN DESCRIPTION - DESCRIPTORS
DESCRIPTORS: ACHING
DESCRIPTORS: CRAMPING;SORE

## 2025-06-20 ASSESSMENT — PAIN DESCRIPTION - LOCATION
LOCATION: ABDOMEN;PERINEUM
LOCATION: ABDOMEN

## 2025-06-20 ASSESSMENT — PAIN DESCRIPTION - ORIENTATION
ORIENTATION: MID;LOWER
ORIENTATION: MID;LOWER

## 2025-06-20 ASSESSMENT — PAIN SCALES - GENERAL
PAINLEVEL_OUTOF10: 2
PAINLEVEL_OUTOF10: 2

## 2025-06-20 NOTE — LACTATION NOTE
Pt states baby has started cluster feeding, reviewed discharge information and encouraged to reach out to lactation with any questions or concerns.

## 2025-06-20 NOTE — PROGRESS NOTES
CLINICAL PHARMACY NOTE: MEDS TO BEDS    Total # of Prescriptions Filled: 3   The following medications were delivered to the patient:  Stool softener/ lax  Ibuprofen  acetaminophen    Additional Documentation: adilia patel

## 2025-06-20 NOTE — PLAN OF CARE
Problem: Chronic Conditions and Co-morbidities  Goal: Patient's chronic conditions and co-morbidity symptoms are monitored and maintained or improved  2025 by Yajaira Dan RN  Outcome: Adequate for Discharge  2025 by Eleanor Patiño RN  Outcome: Progressing     Problem: Pain  Goal: Verbalizes/displays adequate comfort level or baseline comfort level  2025 by Yajaira Dan RN  Outcome: Adequate for Discharge  2025 by Eleanor Patiño RN  Outcome: Progressing     Problem: Safety - Adult  Goal: Free from fall injury  2025 by Yajaira Dan RN  Outcome: Adequate for Discharge  2025 by Eleanor Patiño RN  Outcome: Progressing     Problem: Postpartum  Goal: Experiences normal postpartum course  Description:  Postpartum OB-Pregnancy care plan goal which identifies if the mother is experiencing a normal postpartum course  2025 by Yajaira Dan RN  Outcome: Adequate for Discharge  2025 by Eleanor Patiño RN  Outcome: Progressing  Goal: Establishment of infant feeding pattern  Description:  Postpartum OB-Pregnancy care plan goal which identifies if the mother is establishing a feeding pattern with their   2025 by Yajaira Dan RN  Outcome: Adequate for Discharge  2025 by Eleanor Patiño RN  Outcome: Progressing  Goal: Appropriate maternal -  bonding  Description:  Postpartum OB-Pregnancy care plan goal which identifies if the mother and  are bonding appropriately  2025 by Yajaira Dan RN  Outcome: Adequate for Discharge  2025 by Eleanor Patiño RN  Outcome: Progressing  Goal: Incisions, wounds, or drain sites healing without S/S of infection  2025 by Yajaira Dan RN  Outcome: Adequate for Discharge  2025 by Eleanor Patiño RN  Outcome: Progressing     Problem: Infection - Adult  Goal: Absence of infection at

## 2025-06-20 NOTE — PLAN OF CARE
Problem: Chronic Conditions and Co-morbidities  Goal: Patient's chronic conditions and co-morbidity symptoms are monitored and maintained or improved  Outcome: Progressing     Problem: Pain  Goal: Verbalizes/displays adequate comfort level or baseline comfort level  Outcome: Progressing     Problem: Safety - Adult  Goal: Free from fall injury  Outcome: Progressing     Problem: Postpartum  Goal: Experiences normal postpartum course  Description:  Postpartum OB-Pregnancy care plan goal which identifies if the mother is experiencing a normal postpartum course  Outcome: Progressing  Goal: Establishment of infant feeding pattern  Description:  Postpartum OB-Pregnancy care plan goal which identifies if the mother is establishing a feeding pattern with their   Outcome: Progressing  Goal: Appropriate maternal -  bonding  Description:  Postpartum OB-Pregnancy care plan goal which identifies if the mother and  are bonding appropriately  Outcome: Progressing  Goal: Incisions, wounds, or drain sites healing without S/S of infection  Outcome: Progressing     Problem: Risk for Maternal Injury  Goal: Remains free from seizures and injury related to seizure activity  Description: INTERVENTIONS:.  -Maintain airway, patient safety and administer oxygen as ordered  -Monitor patient for seizure activity, document and report duration and descrition  -If Seizure occurs, turn patient to dide and suction secretions as needed  -Reorient patient post seizure  -Seizure Pads  -Instruct patient/family to notify RN of any seizure activity  -Instruct patient/family to call for assistance with activity based on assessment  Outcome: Progressing     Problem: Infection - Adult  Goal: Absence of infection at discharge  Outcome: Progressing  Goal: Absence of infection during hospitalization  Outcome: Progressing  Goal: Absence of fever/infection during anticipated neutropenic period  Outcome: Progressing     Problem: Discharge

## 2025-06-20 NOTE — PROGRESS NOTES
POST PARTUM DAY # 2    Kathy Quinn is a 29 y.o. female  This patient was seen & examined today.     Her pregnancy was complicated by:   Patient Active Problem List   Diagnosis    FHx DM    History of sarcoma of bone    Thrombocytosis    cHTN     History of disarticulation of right hip    History of malignant neoplasm metastatic to lung    History of right leg amputation    Tachycardia    Hx PPROM with sPTD x1    Secondary malignant neoplasm of left lung (HCC)    38 weeks gestation of pregnancy     26 F Apg 8/9 Wt 8#4       Today she is doing well without any chief complaint. Her lochia is light. She denies chest pain, shortness of breath, headache, lightheadedness, blurred vision, peripheral edema, and palpitations. She is breast feeding and she denies any signs or symptoms of mastitis. . She is voiding without difficulty. She currently denies S/S of postpartum depression.  She is tolerating solids.      Vital Signs:  Vitals:    25 1630 25 1953 25 19525 0509   BP: 130/80 (!) 140/90 (!) 140/90 (!) 123/90   Pulse: 93  92 81   Resp: 16  16 16   Temp: 98.2 °F (36.8 °C)  97.5 °F (36.4 °C) 97.5 °F (36.4 °C)   TempSrc: Oral  Oral Oral   SpO2: 98%  98%          Physical Exam:  General:  no apparent distress, alert, and cooperative  Neurologic:  alert, oriented, normal speech, no focal findings or movement disorder noted  Lungs:  No increased work of breathing noted  Heart:  regular rate and rhythm    Abdomen: abdomen soft, non-distended, non-tender  Fundus: non-tender, normal size, firm, below umbilicus  Extremities:  no calf tenderness, non edematous      Lab:  Lab Results   Component Value Date    HGB 12.0 2025     Lab Results   Component Value Date    HCT 35.1 (L) 2025       Assessment/Plan:  Kathy Quinn is a  PPD # 2 s/p    - Doing well, VSS   - Female infant in General Care Nursery   - Encourage ambulation   - Postpartum Hgb/Hct if symptomatic    -

## 2025-06-20 NOTE — CARE COORDINATION
Discharge Report    [facility]  Clinical Case Management Department  Written by: Micaela Nichole RN    Patient Name: Kathy Quinn  Attending Provider: Pipo Helton DO  Admit Date: 2025  9:50 AM  MRN: [id number]  Account: [id number]                     : 1995  Discharge Date: 2025    Disposition: home with infant    Micaela Nichole RN

## 2025-06-20 NOTE — FLOWSHEET NOTE
Discharge instructions given, all questions answered.  I have reviewed all [facility] Post-Birth Warning Signs and essential teaching points for pulmonary embolism, cardiac disease, hypertensive disorders of pregnancy, obstetric hemorrhage, venous thromboembolism, infection, and postpartum depression with the patient and Darinel (support person) . I have informed the patient on when to call their healthcare provider and when to call 911. I have discussed with the patient  the importance of scheduling a follow-up visit with their physician, nurse practitioner or midwife and provided them with correct contact information for appointment. I have provided the patient with a copy of the \"Save Your Life\" handout. The patient has acknowledged receiving and understanding this education with her signature.

## 2025-06-23 LAB — SURGICAL PATHOLOGY REPORT: NORMAL

## 2025-06-24 DIAGNOSIS — O10.919 CHRONIC HYPERTENSION AFFECTING PREGNANCY: ICD-10-CM

## 2025-06-24 DIAGNOSIS — O24.319 PREGESTATIONAL DIABETES MELLITUS, MODIFIED WHITE CLASS B: ICD-10-CM

## 2025-06-24 DIAGNOSIS — O99.210 OBESITY AFFECTING PREGNANCY, ANTEPARTUM, UNSPECIFIED OBESITY TYPE: ICD-10-CM

## 2025-06-24 DIAGNOSIS — O09.93 HIGH-RISK PREGNANCY IN THIRD TRIMESTER: ICD-10-CM

## 2025-07-29 ENCOUNTER — POSTPARTUM VISIT (OUTPATIENT)
Dept: OBGYN CLINIC | Age: 30
End: 2025-07-29

## 2025-07-29 VITALS
SYSTOLIC BLOOD PRESSURE: 116 MMHG | HEIGHT: 61 IN | DIASTOLIC BLOOD PRESSURE: 74 MMHG | BODY MASS INDEX: 39.65 KG/M2 | WEIGHT: 210 LBS

## 2025-07-29 PROCEDURE — 0503F POSTPARTUM CARE VISIT: CPT | Performed by: OBSTETRICS & GYNECOLOGY

## 2025-07-29 NOTE — PROGRESS NOTES
0 0 0 0 3      # Outcome Date GA Lbr Zeferino/2nd Weight Sex Type Anes PTL Lv   3 Term 25 38w4d / 00:24 3.745 kg (8 lb 4.1 oz) F Vag-Spont EPI N MAYCO      Name: Rach Quinn      Apgar1: 8  Apgar5: 9   2 Term 23 38w0d 01:46 / 01:30 3.53 kg (7 lb 12.5 oz) M Vag-Spont EPI N MAYCO      Name: POPEYE,BABY TONI MAXWELL      Apgar1: 8  Apgar5: 9   1  10/12/20 33w6d  2.21 kg (4 lb 14 oz) M Vag-Spont EPI Y MAYCO      Name: POPEYE,BABY BOY HANS      Apgar1: 8  Apgar5: 9       Patient Active Problem List   Diagnosis    FHx DM    History of sarcoma of bone    Thrombocytosis    cHTN     History of disarticulation of right hip    History of malignant neoplasm metastatic to lung    History of right leg amputation    Tachycardia    Hx PPROM with sPTD x1    Secondary malignant neoplasm of left lung (HCC)    38 weeks gestation of pregnancy     26 F Apg 8/9 Wt 8#4       Blood pressure 116/74, height 1.549 m (5' 1\"), weight 95.3 kg (210 lb), last menstrual period 2024, currently breastfeeding.        Abdomen: Soft and non-tender; good bowel sounds; no guarding, rebound or rigidity; no CVA tenderness bilaterally.    Extremities: No calf tenderness bilaterally. DTR 2/4 bilaterally. No edema.    Perineum: declined    Assessment:   Diagnosis Orders   1.  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       Hx PPROM with sPTD x1 2023     G1: 33w6d  Currently on vaginal

## 2025-09-03 ENCOUNTER — PATIENT MESSAGE (OUTPATIENT)
Age: 30
End: 2025-09-03

## 2025-09-03 DIAGNOSIS — Z85.89 HISTORY OF MALIGNANT NEOPLASM METASTATIC TO LUNG: ICD-10-CM

## 2025-09-03 DIAGNOSIS — Z85.830 HISTORY OF SARCOMA OF BONE: Primary | ICD-10-CM

## (undated) DEVICE — SUTURE PROL SZ 4-0 L36IN NONABSORBABLE BLU L26MM SH 1/2 CIR 8521H

## (undated) DEVICE — BARD® PTFE FELT, 10.2 CM X 10.2 CM: Brand: BARD® PTFE FELT

## (undated) DEVICE — ADHESIVE SKIN CLOSURE TOP 36 CC HI VISC DERMBND MINI

## (undated) DEVICE — PREMIUM DRY TRAY LF: Brand: MEDLINE INDUSTRIES, INC.

## (undated) DEVICE — GLOVE SURG SZ 75 CRM LTX FREE POLYISOPRENE POLYMER BEAD ANTI

## (undated) DEVICE — DISSECTOR LAP DIA5MM BLNT TIP ENDOPATH

## (undated) DEVICE — RELOAD STPL H4.1X2MM DIA60MM THCK TISS GRN 6 ROW PWR GST B

## (undated) DEVICE — GLOVE SURG SZ 75 L12IN FNGR THK79MIL GRN LTX FREE

## (undated) DEVICE — CLIP LIG M BLU TI HRT SHP WIRE HORZ 180 PER BX

## (undated) DEVICE — TUBE LUKENS 20CC 6 1/4": Brand: ALLEGIANCE

## (undated) DEVICE — PACK PROCEDURE SURG SVMMC THORACOTOMY

## (undated) DEVICE — GLOVE SURG SZ 7.5 L11.73IN FNGR THK9.8MIL STRW LTX POLYMER

## (undated) DEVICE — BLADE SCALPEL STERILE NO 10

## (undated) DEVICE — WIRE PACING BIPOLAR VENTRICULAR 60CM ULTRA THIN

## (undated) DEVICE — SUTURE VCRL + SZ 0 L27IN ABSRB UD CT-1 L36MM 1/2 CIR TAPR VCP260H

## (undated) DEVICE — APPLICATOR MEDICATED 26 CC SOLUTION HI LT ORNG CHLORAPREP

## (undated) DEVICE — CONTAINER,SPECIMEN,4OZ,OR STRL: Brand: MEDLINE

## (undated) DEVICE — INTENDED FOR TISSUE SEPARATION, AND OTHER PROCEDURES THAT REQUIRE A SHARP SURGICAL BLADE TO PUNCTURE OR CUT.: Brand: BARD-PARKER ® CARBON RIB-BACK BLADES

## (undated) DEVICE — AGENT HEMSTAT W2XL14IN OXIDIZED REGENERATED CELOS ABSRB FOR

## (undated) DEVICE — CLIP INT L ORNG TI TRNSVRS GRV CHEVRON SHP W/ PRECIS TIP TO

## (undated) DEVICE — SUTURE VIC + BR UD PS2 4-0 18IN VCP496G

## (undated) DEVICE — SUTURE VCRL + SZ 2-0 L27IN ABSRB CLR CT-1 1/2 CIR TAPERCUT VCP259H

## (undated) DEVICE — SUTURE NONABSORBABLE MONOFILAMENT 4-0 RB-1 36 IN BLU PROLENE 8557H

## (undated) DEVICE — STAPLER INT L34CM 60MM LNG ENDOSCP ARTC PWR + ECHELON FLX

## (undated) DEVICE — BLADE SCALPEL NO 15 STERILE

## (undated) DEVICE — SUTURE NONABSORBABLE MONOFILAMENT 5-0 C-1 1X24 IN PROLENE 8725H

## (undated) DEVICE — STAPLER SKIN LN REINF 60 MM ECHELON ENDOPATH

## (undated) DEVICE — DISSECTOR LAP DIA5MM STD W/ COT SWAB DISP FOR SWABBING FLD

## (undated) DEVICE — GLOVE SURG SZ 7 CRM LTX FREE POLYISOPRENE POLYMER BEAD ANTI

## (undated) DEVICE — CATHETER THORACENTESIS STR 28 FRX23 IN 6 EYELET TAPR TIP LF

## (undated) DEVICE — COVER,LIGHT HANDLE,FLX,2/PK: Brand: MEDLINE INDUSTRIES, INC.

## (undated) DEVICE — SUTURE VCRL 1 L27IN ABSRB VLT TP-1 L65MM 1/2 CIR TAPERPOINT J650G

## (undated) DEVICE — CONNECTOR TBNG Y 6IN 1 PLAS LTWT

## (undated) DEVICE — GLIDESCOPE BFLEX 3.8 BRONCHOSCOPE

## (undated) DEVICE — CLIP INT M L GRN TI TRNSVRS GRV CHEVRON SHP W/ PRECIS TIP

## (undated) DEVICE — TOWEL,OR,DSP,ST,BLUE,DLX,XR,4/PK,20PK/CS: Brand: MEDLINE

## (undated) DEVICE — SUTURE VCRL SZ 1 L27IN ABSRB VLT L70MM XLH 1/2 CIR J583G

## (undated) DEVICE — GOWN,SURGICAL,AURORA,SLEEVE: Brand: MEDLINE

## (undated) DEVICE — TOTAL TRAY, 16FR 10ML SIL FOLEY, URN: Brand: MEDLINE

## (undated) DEVICE — GOWN,SIRUS,NONRNF,SETINSLV,XL,20/CS: Brand: MEDLINE

## (undated) DEVICE — SUTURE VCRL + SZ 4-0 L18IN ABSRB UD L19MM PS-2 3/8 CIR PRIM VCP496H

## (undated) DEVICE — PENCIL ES L3M BTTN SWCH HOLSTER W/ BLDE ELECTRD EDGE

## (undated) DEVICE — GOWN,AURORA,NONREINFORCED,LARGE: Brand: MEDLINE

## (undated) DEVICE — DRESSING,GAUZE,XEROFORM,CURAD,5"X9",ST: Brand: CURAD

## (undated) DEVICE — GLOVE SURG SZ 65 CRM LTX FREE POLYISOPRENE POLYMER BEAD ANTI

## (undated) DEVICE — GLOVE SURG SZ 65 L12IN FNGR THK79MIL GRN LTX FREE

## (undated) DEVICE — GARMENT,MEDLINE,DVT,INT,CALF,MED, GEN2: Brand: MEDLINE

## (undated) DEVICE — NEEDLE SPINAL 22GA L3.5IN SPINOCAN

## (undated) DEVICE — GLOVE SURG SZ 8 L12IN FNGR THK79MIL GRN LTX FREE